# Patient Record
Sex: FEMALE | Race: WHITE | NOT HISPANIC OR LATINO | Employment: UNEMPLOYED | URBAN - METROPOLITAN AREA
[De-identification: names, ages, dates, MRNs, and addresses within clinical notes are randomized per-mention and may not be internally consistent; named-entity substitution may affect disease eponyms.]

---
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
## 2020-03-19 NOTE — PROGRESS NOTES
04/24/19 3977   Patient Belongings   Did you bring any home meds/supplements to the hospital?  No   Patient Belongings other (see comments)   Belongings Search Yes   Clothing Search Yes   Second Staff Christian      Bin:  Pillow, blanket, black leggings, gold key necklace, pink shoes, book (5), flower duffle bag, gray backpack (filled with fidgets & art supplies), glasses, journal (w/ string), essential oil diffuser, socks (10), gray leggings, navy sweatshirt (w/ string), black jeans, blue (w/ string),  Underwear (9), black t-shirt, gray sweatshirt (w/ string), black spandex shorts, red sweatshirt (w/ string), yellow sweater, black long sleeve, black ananya jacket, navy leggings, yellow t-shirt, purple t-shirt, gray t-shirt, gray/black t-shirt, charcoal t-shirt, mint tank top, navy blue shorts, black sweat pants (w/ string), bra (4)    No cellphone in belongings or items sent to security    A               Admission:  I am responsible for any personal items that are not sent to the safe or pharmacy.  [facility] is not responsible for loss, theft or damage of any property in my possession.    Signature:  _________________________________ Date: _______  Time: _____                                              Staff Signature:  ____________________________ Date: ________  Time: _____      2nd Staff person, if patient is unable/unwilling to sign:    Signature: ________________________________ Date: ________  Time: _____     Discharge:  [facility] has returned all of my personal belongings:    Signature: _________________________________ Date: ________  Time: _____                                          Staff Signature:  ____________________________ Date: ________  Time: _____            no tinnitus

## 2020-08-11 ENCOUNTER — TELEPHONE (OUTPATIENT)
Dept: BEHAVIORAL HEALTH | Facility: CLINIC | Age: 20
End: 2020-08-11

## 2020-08-11 ENCOUNTER — HOSPITAL ENCOUNTER (INPATIENT)
Facility: CLINIC | Age: 20
LOS: 2 days | Discharge: HOME | DRG: 885 | End: 2020-08-14
Attending: EMERGENCY MEDICINE | Admitting: PSYCHIATRY & NEUROLOGY
Payer: COMMERCIAL

## 2020-08-11 DIAGNOSIS — F42.9 OBSESSIVE-COMPULSIVE DISORDER, UNSPECIFIED TYPE: ICD-10-CM

## 2020-08-11 DIAGNOSIS — F33.2 SEVERE EPISODE OF RECURRENT MAJOR DEPRESSIVE DISORDER, WITHOUT PSYCHOTIC FEATURES (H): ICD-10-CM

## 2020-08-11 DIAGNOSIS — F43.10 PTSD (POST-TRAUMATIC STRESS DISORDER): ICD-10-CM

## 2020-08-11 DIAGNOSIS — K21.9 GASTROESOPHAGEAL REFLUX DISEASE, ESOPHAGITIS PRESENCE NOT SPECIFIED: ICD-10-CM

## 2020-08-11 DIAGNOSIS — Z20.822 2019-NCOV NOT DETECTED: ICD-10-CM

## 2020-08-11 DIAGNOSIS — G47.00 INSOMNIA, UNSPECIFIED TYPE: Primary | ICD-10-CM

## 2020-08-11 DIAGNOSIS — K59.00 CONSTIPATION, UNSPECIFIED CONSTIPATION TYPE: ICD-10-CM

## 2020-08-11 DIAGNOSIS — F60.3 BORDERLINE PERSONALITY DISORDER (H): ICD-10-CM

## 2020-08-11 DIAGNOSIS — R45.851 SUICIDAL IDEATION: ICD-10-CM

## 2020-08-11 PROCEDURE — 99285 EMERGENCY DEPT VISIT HI MDM: CPT | Mod: Z6 | Performed by: EMERGENCY MEDICINE

## 2020-08-11 PROCEDURE — 25000132 ZZH RX MED GY IP 250 OP 250 PS 637: Performed by: EMERGENCY MEDICINE

## 2020-08-11 PROCEDURE — U0003 INFECTIOUS AGENT DETECTION BY NUCLEIC ACID (DNA OR RNA); SEVERE ACUTE RESPIRATORY SYNDROME CORONAVIRUS 2 (SARS-COV-2) (CORONAVIRUS DISEASE [COVID-19]), AMPLIFIED PROBE TECHNIQUE, MAKING USE OF HIGH THROUGHPUT TECHNOLOGIES AS DESCRIBED BY CMS-2020-01-R: HCPCS | Performed by: EMERGENCY MEDICINE

## 2020-08-11 PROCEDURE — 81025 URINE PREGNANCY TEST: CPT | Performed by: FAMILY MEDICINE

## 2020-08-11 PROCEDURE — 99285 EMERGENCY DEPT VISIT HI MDM: CPT | Mod: 25 | Performed by: EMERGENCY MEDICINE

## 2020-08-11 PROCEDURE — 90791 PSYCH DIAGNOSTIC EVALUATION: CPT

## 2020-08-11 PROCEDURE — C9803 HOPD COVID-19 SPEC COLLECT: HCPCS | Performed by: EMERGENCY MEDICINE

## 2020-08-11 PROCEDURE — 80307 DRUG TEST PRSMV CHEM ANLYZR: CPT | Performed by: FAMILY MEDICINE

## 2020-08-11 PROCEDURE — 80320 DRUG SCREEN QUANTALCOHOLS: CPT | Performed by: FAMILY MEDICINE

## 2020-08-11 RX ORDER — GABAPENTIN 300 MG/1
300 CAPSULE ORAL ONCE
Status: COMPLETED | OUTPATIENT
Start: 2020-08-11 | End: 2020-08-11

## 2020-08-11 RX ADMIN — GABAPENTIN 300 MG: 300 CAPSULE ORAL at 22:01

## 2020-08-11 ASSESSMENT — ENCOUNTER SYMPTOMS
NERVOUS/ANXIOUS: 1
HALLUCINATIONS: 0

## 2020-08-11 NOTE — ED TRIAGE NOTES
BIB mother , per patient she was at her therapist today and they recommended her to come to the ED for Eval.
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
## 2020-08-11 NOTE — ED NOTES
RN (María) performed safety screen. Removed, shoes, sock, and belt to security desk. Pt retained her cell phone. Filled in white board. AIDET.

## 2020-08-12 PROBLEM — R45.851 SUICIDE IDEATION: Status: ACTIVE | Noted: 2020-08-12

## 2020-08-12 LAB
LABORATORY COMMENT REPORT: NORMAL
SARS-COV-2 RNA SPEC QL NAA+PROBE: NEGATIVE
SARS-COV-2 RNA SPEC QL NAA+PROBE: NORMAL
SPECIMEN SOURCE: NORMAL
SPECIMEN SOURCE: NORMAL

## 2020-08-12 PROCEDURE — 12400001 ZZH R&B MH UMMC

## 2020-08-12 PROCEDURE — 25000132 ZZH RX MED GY IP 250 OP 250 PS 637: Performed by: EMERGENCY MEDICINE

## 2020-08-12 PROCEDURE — 25000132 ZZH RX MED GY IP 250 OP 250 PS 637: Performed by: FAMILY MEDICINE

## 2020-08-12 PROCEDURE — 25000132 ZZH RX MED GY IP 250 OP 250 PS 637: Performed by: CLINICAL NURSE SPECIALIST

## 2020-08-12 RX ORDER — ASCORBIC ACID 500 MG
500 TABLET ORAL DAILY
Status: DISCONTINUED | OUTPATIENT
Start: 2020-08-13 | End: 2020-08-14 | Stop reason: HOSPADM

## 2020-08-12 RX ORDER — FAMOTIDINE 20 MG/1
20 TABLET, FILM COATED ORAL DAILY
Status: DISCONTINUED | OUTPATIENT
Start: 2020-08-12 | End: 2020-08-14 | Stop reason: HOSPADM

## 2020-08-12 RX ORDER — OLANZAPINE 5 MG/1
5-10 TABLET ORAL
Status: DISCONTINUED | OUTPATIENT
Start: 2020-08-12 | End: 2020-08-14 | Stop reason: HOSPADM

## 2020-08-12 RX ORDER — BISACODYL 10 MG
10 SUPPOSITORY, RECTAL RECTAL DAILY PRN
Status: DISCONTINUED | OUTPATIENT
Start: 2020-08-12 | End: 2020-08-14 | Stop reason: HOSPADM

## 2020-08-12 RX ORDER — GABAPENTIN 300 MG/1
600 CAPSULE ORAL AT BEDTIME
Status: DISCONTINUED | OUTPATIENT
Start: 2020-08-12 | End: 2020-08-14 | Stop reason: HOSPADM

## 2020-08-12 RX ORDER — VITAMIN B COMPLEX
50 TABLET ORAL DAILY
Status: DISCONTINUED | OUTPATIENT
Start: 2020-08-13 | End: 2020-08-14 | Stop reason: HOSPADM

## 2020-08-12 RX ORDER — CLINDAMYCIN PHOSPHATE 10 MG/G
1 GEL TOPICAL DAILY
Status: DISCONTINUED | OUTPATIENT
Start: 2020-08-13 | End: 2020-08-13

## 2020-08-12 RX ORDER — DOCUSATE SODIUM 100 MG/1
100 CAPSULE, LIQUID FILLED ORAL DAILY
Status: ON HOLD | COMMUNITY
End: 2020-08-14

## 2020-08-12 RX ORDER — GABAPENTIN 300 MG/1
300 CAPSULE ORAL ONCE
Status: COMPLETED | OUTPATIENT
Start: 2020-08-12 | End: 2020-08-12

## 2020-08-12 RX ORDER — GABAPENTIN 300 MG/1
300 CAPSULE ORAL 3 TIMES DAILY PRN
Status: DISCONTINUED | OUTPATIENT
Start: 2020-08-12 | End: 2020-08-14 | Stop reason: HOSPADM

## 2020-08-12 RX ORDER — MULTIPLE VITAMINS W/ MINERALS TAB 9MG-400MCG
1 TAB ORAL DAILY
Status: DISCONTINUED | OUTPATIENT
Start: 2020-08-12 | End: 2020-08-14 | Stop reason: HOSPADM

## 2020-08-12 RX ORDER — VENLAFAXINE HYDROCHLORIDE 150 MG/1
300 CAPSULE, EXTENDED RELEASE ORAL DAILY
Status: ON HOLD | COMMUNITY
End: 2020-08-14

## 2020-08-12 RX ORDER — POLYETHYLENE GLYCOL 3350 17 G/17G
1 POWDER, FOR SOLUTION ORAL DAILY PRN
Status: ON HOLD | COMMUNITY
End: 2020-08-27

## 2020-08-12 RX ORDER — GABAPENTIN 300 MG/1
300 CAPSULE ORAL 3 TIMES DAILY
Status: DISCONTINUED | OUTPATIENT
Start: 2020-08-12 | End: 2020-08-14 | Stop reason: HOSPADM

## 2020-08-12 RX ORDER — MULTIPLE VITAMINS W/ MINERALS TAB 9MG-400MCG
1 TAB ORAL DAILY
Status: ON HOLD | COMMUNITY
End: 2020-08-27

## 2020-08-12 RX ORDER — CALCIUM CARBONATE 500 MG/1
500 TABLET, CHEWABLE ORAL DAILY PRN
Status: DISCONTINUED | OUTPATIENT
Start: 2020-08-12 | End: 2020-08-14 | Stop reason: HOSPADM

## 2020-08-12 RX ORDER — ACETAMINOPHEN 325 MG/1
650 TABLET ORAL EVERY 6 HOURS PRN
Status: DISCONTINUED | OUTPATIENT
Start: 2020-08-12 | End: 2020-08-12

## 2020-08-12 RX ORDER — GABAPENTIN 300 MG/1
600 CAPSULE ORAL AT BEDTIME
Status: ON HOLD | COMMUNITY
End: 2020-08-27

## 2020-08-12 RX ORDER — OLANZAPINE 10 MG/2ML
5-10 INJECTION, POWDER, FOR SOLUTION INTRAMUSCULAR
Status: DISCONTINUED | OUTPATIENT
Start: 2020-08-12 | End: 2020-08-14 | Stop reason: HOSPADM

## 2020-08-12 RX ORDER — HYDROXYZINE HYDROCHLORIDE 25 MG/1
25 TABLET, FILM COATED ORAL EVERY 4 HOURS PRN
Status: DISCONTINUED | OUTPATIENT
Start: 2020-08-12 | End: 2020-08-13

## 2020-08-12 RX ORDER — GABAPENTIN 300 MG/1
300 CAPSULE ORAL 3 TIMES DAILY
Status: ON HOLD | COMMUNITY
End: 2020-08-27

## 2020-08-12 RX ORDER — TRAZODONE HYDROCHLORIDE 150 MG/1
150 TABLET ORAL AT BEDTIME
Status: DISCONTINUED | OUTPATIENT
Start: 2020-08-12 | End: 2020-08-14 | Stop reason: HOSPADM

## 2020-08-12 RX ORDER — ERENUMAB-AOOE 140 MG/ML
140 INJECTION, SOLUTION SUBCUTANEOUS
COMMUNITY
Start: 2020-08-17 | End: 2021-09-28

## 2020-08-12 RX ORDER — FAMOTIDINE 20 MG/1
20 TABLET, FILM COATED ORAL DAILY
Status: ON HOLD | COMMUNITY
End: 2020-08-14

## 2020-08-12 RX ORDER — CLINDAMYCIN PHOSPHATE 10 MG/G
1 GEL TOPICAL DAILY
Status: ON HOLD | COMMUNITY
End: 2020-08-27

## 2020-08-12 RX ORDER — TRETINOIN 0.5 MG/G
1 CREAM TOPICAL AT BEDTIME
Status: DISCONTINUED | OUTPATIENT
Start: 2020-08-12 | End: 2020-08-13

## 2020-08-12 RX ORDER — CALCIUM CARBONATE 500 MG/1
1 TABLET, CHEWABLE ORAL PRN
Status: ON HOLD | COMMUNITY
End: 2020-08-27

## 2020-08-12 RX ORDER — POLYETHYLENE GLYCOL 3350 17 G/17G
17 POWDER, FOR SOLUTION ORAL DAILY PRN
Status: DISCONTINUED | OUTPATIENT
Start: 2020-08-12 | End: 2020-08-14 | Stop reason: HOSPADM

## 2020-08-12 RX ORDER — VENLAFAXINE HYDROCHLORIDE 150 MG/1
300 CAPSULE, EXTENDED RELEASE ORAL AT BEDTIME
Status: DISCONTINUED | OUTPATIENT
Start: 2020-08-12 | End: 2020-08-12

## 2020-08-12 RX ORDER — ASCORBIC ACID 500 MG
500 TABLET ORAL DAILY
Status: ON HOLD | COMMUNITY
End: 2020-08-27

## 2020-08-12 RX ORDER — GABAPENTIN 300 MG/1
300 CAPSULE ORAL AT BEDTIME
Status: DISCONTINUED | OUTPATIENT
Start: 2020-08-12 | End: 2020-08-12

## 2020-08-12 RX ORDER — VENLAFAXINE HYDROCHLORIDE 150 MG/1
300 CAPSULE, EXTENDED RELEASE ORAL AT BEDTIME
Status: DISCONTINUED | OUTPATIENT
Start: 2020-08-12 | End: 2020-08-13

## 2020-08-12 RX ORDER — ALUMINA, MAGNESIA, AND SIMETHICONE 2400; 2400; 240 MG/30ML; MG/30ML; MG/30ML
30 SUSPENSION ORAL EVERY 4 HOURS PRN
Status: DISCONTINUED | OUTPATIENT
Start: 2020-08-12 | End: 2020-08-14 | Stop reason: HOSPADM

## 2020-08-12 RX ORDER — TRETINOIN 0.5 MG/G
1 CREAM TOPICAL AT BEDTIME
Status: ON HOLD | COMMUNITY
End: 2020-08-27

## 2020-08-12 RX ORDER — TRAZODONE HYDROCHLORIDE 150 MG/1
150 TABLET ORAL AT BEDTIME
Status: ON HOLD | COMMUNITY
End: 2020-08-27

## 2020-08-12 RX ORDER — ACETAMINOPHEN 325 MG/1
650 TABLET ORAL EVERY 4 HOURS PRN
Status: DISCONTINUED | OUTPATIENT
Start: 2020-08-12 | End: 2020-08-14 | Stop reason: HOSPADM

## 2020-08-12 RX ADMIN — MELATONIN 5 MG TABLET 5 MG: at 04:07

## 2020-08-12 RX ADMIN — CALCIUM CARBONATE (ANTACID) CHEW TAB 500 MG 500 MG: 500 CHEW TAB at 22:36

## 2020-08-12 RX ADMIN — ACETAMINOPHEN 650 MG: 325 TABLET, FILM COATED ORAL at 03:06

## 2020-08-12 RX ADMIN — GABAPENTIN 300 MG: 300 CAPSULE ORAL at 16:52

## 2020-08-12 RX ADMIN — Medication 75 MG: at 04:07

## 2020-08-12 RX ADMIN — VENLAFAXINE HYDROCHLORIDE 300 MG: 150 CAPSULE, EXTENDED RELEASE ORAL at 03:06

## 2020-08-12 RX ADMIN — GABAPENTIN 300 MG: 300 CAPSULE ORAL at 03:06

## 2020-08-12 RX ADMIN — ACETAMINOPHEN 650 MG: 325 TABLET, FILM COATED ORAL at 16:53

## 2020-08-12 RX ADMIN — GABAPENTIN 300 MG: 300 CAPSULE ORAL at 11:07

## 2020-08-12 ASSESSMENT — ACTIVITIES OF DAILY LIVING (ADL)
TOILETING: 0-->INDEPENDENT
RETIRED_COMMUNICATION: 0-->UNDERSTANDS/COMMUNICATES WITHOUT DIFFICULTY
COGNITION: 0 - NO COGNITION ISSUES REPORTED
RETIRED_EATING: 0-->INDEPENDENT
DRESS: 0-->INDEPENDENT
FALL_HISTORY_WITHIN_LAST_SIX_MONTHS: NO
SWALLOWING: 0-->SWALLOWS FOODS/LIQUIDS WITHOUT DIFFICULTY
BATHING: 0-->INDEPENDENT
AMBULATION: 0-->INDEPENDENT
TRANSFERRING: 0-->INDEPENDENT

## 2020-08-12 NOTE — PHARMACY-ADMISSION MEDICATION HISTORY
Admission Medication History Completed by Pharmacy    See [facility] Admission Navigator for allergy information, preferred outpatient pharmacy and prior to admission medications.     Medication History Sources:     Prescription fill history ([facility] in [place]) via [employer] data    Patient via iPad     Changes made to PTA medication list (reason):    Added:   o MVT, vitamin C, famotidine, docusate, miralax (per pt report)   o Clindamycin gel, tretinoin cream (per fill history, confirmed by pt)     Deleted: None    Changed: None    Additional Information:    Writer confirmed all prescribed medications with [facility] pharmacy fill records. Patient's report consistent with recent fill records.       Aimovig subcutaneous injection: monthly for migraines. Overdue per pt. Last injection was beginning of July. Informed pt we do not have supply at hospital, so will need to discuss options with provider or see if can obtain home supply.       Venlafaxine: Patient reports medication was being tapered starting 8/12 per outpt psychiatrist, however pt still taking 300mg PO daily.       Quetiapine 25mg tablets qty 60, 30 day supply last filled 6/1/2020: Did not add to PTA med list since not filled recently and patient did not seem to know if taking or not.     MN :  Gabapentin 300mg capsules, qty #120 (24 days) last filled 8-    Prior to Admission medications    Medication Sig Last Dose     acetaminophen (TYLENOL) 325 MG tablet Take 2 tablets (650 mg) by mouth every 4 hours as needed for mild pain     Past Month     calcium carbonate (TUMS) 500 MG chewable tablet Take 1 chew tab by mouth as needed for heartburn     Past Week     clindamycin (CLINDAMAX) 1 % external gel Apply 1 applicator topically daily     Past Week     docusate sodium (COLACE) 100 MG capsule Take 100 mg by mouth daily     8/11/2020     erenumab-aooe (AIMOVIG) 140 MG/ML injection     Inject 140 mg Subcutaneous every 30 days beginning of July      famotidine (PEPCID) 20 MG tablet Take 20 mg by mouth daily     8/11/2020     gabapentin (NEURONTIN) 300 MG capsule Take 300 mg by mouth 3 times daily In addition to 600mg at bedtime.     8/11/2020     gabapentin (NEURONTIN) 300 MG capsule Take 600 mg by mouth At Bedtime In addition to 300mg by mouth three times daily.     Past Week     melatonin 5 MG CAPS Take 5 mg by mouth At Bedtime     Past Week     multivitamin w/minerals (THERA-VIT-M) tablet     Take 1 tablet by mouth daily 8/11/2020     polyethylene glycol (MIRALAX) 17 g packet Take 1 packet by mouth daily as needed for constipation     Past Week     traZODone (DESYREL) 150 MG tablet Take 150 mg by mouth At Bedtime     Past Week     tretinoin (RETIN-A) 0.05 % external cream Apply 1 g topically At Bedtime     Past Week     venlafaxine (EFFEXOR-XR) 150 MG 24 hr capsule     Take 300 mg by mouth daily 8/11/2020     vitamin C (ASCORBIC ACID) 500 MG tablet Take 500 mg by mouth daily     8/11/2020     vitamin D3 (CHOLECALCIFEROL) 2000 units tablet     Take 1 tablet by mouth daily 8/10/2020       Date completed: 08/12/20    Medication history completed by:   -----------  Nina Zaidi PharmNoelleD., BCPP  Behavioral Health Inpatient Pharmacist  [facility]) Emergency Department  Phone: *[phone] ([facility]) or [phone]

## 2020-08-12 NOTE — ED NOTES
Patient reports feeling high anxiety since tray was delivered to room (history of anorexia). Asking for PRN. PRN not available in med room in ED, ordered from pharmacy and worked on distraction with patient.

## 2020-08-12 NOTE — ED NOTES
Patient states she is feeling very anxious, suicidal, and wishes she hadn't talked to her therapist yesterday and ended up here in the hospital so she could have instead acted on suicidal thought.

## 2020-08-12 NOTE — ED NOTES
"Pt reports \"I shouldn't be here, I should be dead right now\". RN asked if pt would hurt herself in the hospital and she stated No. Pt anxious in room, picking at nails and moving her leg contantly  "

## 2020-08-12 NOTE — ED NOTES
Sign out Provider: Anson      Sign out Plan: Patient was seen in the behavioral emergency center yesterday due to multiple mental health diagnosis with active suicidal ideation and plan for admission.  Has been cleared medically.      Reassessment: Regular medications are ordered and the patient is cooperative this morning. this morning.      Disposition: Continue to await bed placement       Connor Moser MD  08/12/20 0759

## 2020-08-12 NOTE — TELEPHONE ENCOUNTER
S: Pt is a 20 yrs old female in the [facility] ED for SI with a plan, reports by Dmitriy at 8:45PM.     B: Pt has a hx of Bordeline Personality d/o, Major Depressive d/o, Generalized Anxiety d/o, PTSD, Anorexia, and OCD.  Pt was referred by her DBT therapist for admission.  Pt has been having increased SI for the past 2 and 1/2 weeks.  Pt's parents are out of town and they removed anything lethal in the home including meds, scarf, sharp- that she could hurt herself with.  Pt still endorses SI with a plan to take her medications or bleach.  Pt told her therapist she has urgency to act on plan.  Pt is not able to contract for safety although she has [facility] providers.     Pt is medically cleared and ambulates independently.        Utox was negative.  Pt was swabbed for COVID test.  She is asymptomatic.  Result is pending.     A: Vol.  Prefers to stay at [facility].       R: Pt is placed on wait list until an appropriate bed becomes available.       Patient cleared and ready for behavioral bed placement: Yes

## 2020-08-12 NOTE — ED NOTES
"Patient was offered her nighttime meds.  She declined to take them at this time (although she asked for them earlier), stating, \"I don't feel comfortable taking them.\"  Informed patient meds will be held, and she can take them when she feels comfortable.  "

## 2020-08-12 NOTE — ED NOTES
ED to Behavioral Floor Handoff    SITUATION  Thea Castle is a 20 year old female who speaks English and lives in a home with family members The patient arrived in the ED by private car from home with a complaint of Suicidal (Worsening SI with plan to OD or drink bleach)  .The patient's current symptoms started/worsened 1 week(s) ago and during this time the symptoms have increased.   In the ED, pt was diagnosed with   Final diagnoses:   Severe episode of recurrent major depressive disorder, without psychotic features (H)   PTSD (post-traumatic stress disorder)   Borderline personality disorder (H)   Obsessive-compulsive disorder, unspecified type   Suicidal ideation        Initial vitals were: BP: 135/80  Pulse: 117  Temp: 99.6  F (37.6  C)  Resp: 20  Weight: (refused weight check d/t anorexia)  SpO2: 98 %   --------  Is the patient diabetic? No   If yes, last blood glucose? --     If yes, was this treated in the ED? --  --------  Is the patient inebriated (ETOH) No or Impaired on other substances? No  MSSA done? N/A  Last MSSA score: --    Were withdrawal symptoms treated? N/A  Does the patient have a seizure history? No. If yes, date of most recent seizure--  --------  Is the patient patient experiencing suicidal ideation? reports occasional suicidal thoughts representing feeling that life is not worth feeling    Homicidal ideation? denies current or recent homicidal ideation or behaviors.    Self-injurious behavior/urges? denies current or recent self injurious behavior or ideation.  ------  Was pt aggressive in the ED No  Was a code called No  Is the pt now cooperative? Yes  -------  Meds given in ED:   Medications   venlafaxine (EFFEXOR-XR) 24 hr capsule 300 mg (300 mg Oral Given 8/12/20 0306)   traZODone (DESYREL) half-tab 75 mg (75 mg Oral Given 8/12/20 0407)   acetaminophen (TYLENOL) tablet 650 mg (650 mg Oral Given 8/12/20 0306)   gabapentin (NEURONTIN) capsule 300 mg (300 mg Oral Given 8/12/20  0306)   gabapentin (NEURONTIN) capsule 300 mg (has no administration in time range)   melatonin tablet 5 mg (has no administration in time range)   gabapentin (NEURONTIN) capsule 300 mg (300 mg Oral Given 8/11/20 2201)   melatonin tablet 5 mg (5 mg Oral Given 8/12/20 0407)      Family present during ED course? No  Family currently present? No    BACKGROUND  Does the patient have a cognitive impairment or developmental disability? No  Allergies:   Allergies   Allergen Reactions     Penicillins    .   Social demographics are   Social History     Socioeconomic History     Marital status: Single     Spouse name: None     Number of children: None     Years of education: None     Highest education level: None   Occupational History     None   Social Needs     Financial resource strain: None     Food insecurity     Worry: None     Inability: None     Transportation needs     Medical: None     Non-medical: None   Tobacco Use     Smoking status: Never Smoker     Smokeless tobacco: Never Used   Substance and Sexual Activity     Alcohol use: Not Currently     Frequency: Never     Drug use: No     Sexual activity: Never   Lifestyle     Physical activity     Days per week: None     Minutes per session: None     Stress: None   Relationships     Social connections     Talks on phone: None     Gets together: None     Attends Church service: None     Active member of club or organization: None     Attends meetings of clubs or organizations: None     Relationship status: None     Intimate partner violence     Fear of current or ex partner: None     Emotionally abused: None     Physically abused: None     Forced sexual activity: None   Other Topics Concern     Parent/sibling w/ CABG, MI or angioplasty before 65F 55M? Not Asked   Social History Narrative     None        ASSESSMENT  Labs results   Labs Ordered and Resulted from Time of ED Arrival Up to the Time of Departure from the ED   DRUG ABUSE SCREEN 6 CHEM DEP URINE ([facility])    HCG QUALITATIVE URINE   COVID-19 VIRUS (CORONAVIRUS) BY PCR   SARS-COV-2 (COVID-19) VIRUS RT-PCR      Imaging Studies: No results found for this or any previous visit (from the past 24 hour(s)).   Most recent vital signs /70   Pulse 82   Temp 97.8  F (36.6  C) (Oral)   Resp 16   SpO2 100%   Breastfeeding No    Abnormal labs/tests/findings requiring intervention:---   Pain control: pt had none  Nausea control: pt had none    RECOMMENDATION  Are any infection precautions needed (MRSA, VRE, etc.)? No If yes, what infection? --  ---  Does the patient have mobility issues? independently. If yes, what device does the pt use? ---  ---  Is patient on 72 hour hold or commitment? No If on 72 hour hold, have hold and rights been given to patient? N/A  Are admitting orders written if after 10 p.m. ?N/A  Tasks needing to be completed:---     Desi Myers RN   [facility]-- [phone] [place] ED   [phone] [facility]

## 2020-08-12 NOTE — ED NOTES
Patient talked with staff about being scared and triggered by meal tray. Patient given new scrubs, new linens and hygiene products to clean up with. Meal tray removed from room. Patient states she is nervous and having flashbacks of stressors. Worried about virtual therapy appointment today at noon and 1pm. Staff encouraged patient to alert us when she is ready for virtual assessments. RN notified.

## 2020-08-12 NOTE — PROGRESS NOTES
"While pt was in the intake room, pt stated \"Last time I was here, I lied to get out.\"  Staff asked if pt wanted to go to her room and get the rest of her numbers that she needed.  She agreed to leave the exam room and go to her room.  While she was in her room, pt was saying \"Am I going to be able to have my therapy appointment tomorrow?\" Staff reminded her that she would have to talk to her doctor tomorrow and ask them.  Pt stated \"Oh I'll find a way.  I know how to play and I'll find a way.\"  Pt made numerous statements \"Look how many friends I have.  I have all these friends and I don't want to live.  I want to die but look at all the friends I have.\"  She also refused to work with a male RN and stated \"I absolutely can't have a male nurse.  It has to be female.\"  Staff informed her that staff would relay the information on.    Also, pt informed staff and RN, reforming the search, that she goes by \"Ezequiel\" in the common areas and by \"Thea\" in private because she doesn't want anyone to know her real name.  While doing the exam when she first got on the unit, pt appeared extremely paranoid about there being cameras in the bathroom in the intake room and why she can't wear her own clothes and mask.     "

## 2020-08-12 NOTE — PROGRESS NOTES
08/12/20 1844   Patient Belongings   Did you bring any home meds/supplements to the hospital?  No   Patient Belongings remains with patient;locker   Patient Belongings Remaining with Patient bracelet;clothing   Patient Belongings Put in Hospital Secure Location (Security or Locker, etc.) clothing;cell phone/electronics;watch;shoes   Belongings Search Yes   Clothing Search Yes   Second Staff Rafaela AGUILAR       On Patient:   bra  Underwear  2 tubes of play dough  Tin of puddy  2 bracelets    Locker:  pants  Sweater  Mask  Stocking hat  Watch-apple watch  Cellphone  Pair of socks  Shoes  Belt          A               Admission:  I am responsible for any personal items that are not sent to the safe or pharmacy.  [facility] is not responsible for loss, theft or damage of any property in my possession.    Signature:  _________________________________ Date: _______  Time: _____                                              Staff Signature:  ____________________________ Date: ________  Time: _____      2nd Staff person, if patient is unable/unwilling to sign:    Signature: ________________________________ Date: ________  Time: _____     Discharge:  [facility] has returned all of my personal belongings:    Signature: _________________________________ Date: ________  Time: _____                                          Staff Signature:  ____________________________ Date: ________  Time: _____

## 2020-08-13 LAB
ALBUMIN SERPL-MCNC: 3.7 G/DL (ref 3.4–5)
ALP SERPL-CCNC: 61 U/L (ref 40–150)
ALT SERPL W P-5'-P-CCNC: 24 U/L (ref 0–50)
ANION GAP SERPL CALCULATED.3IONS-SCNC: 8 MMOL/L (ref 3–14)
AST SERPL W P-5'-P-CCNC: 24 U/L (ref 0–45)
BASOPHILS # BLD AUTO: 0 10E9/L (ref 0–0.2)
BASOPHILS NFR BLD AUTO: 0.6 %
BILIRUB SERPL-MCNC: 0.5 MG/DL (ref 0.2–1.3)
BUN SERPL-MCNC: 13 MG/DL (ref 7–30)
CALCIUM SERPL-MCNC: 8.4 MG/DL (ref 8.5–10.1)
CHLORIDE SERPL-SCNC: 105 MMOL/L (ref 94–109)
CHOLEST SERPL-MCNC: 169 MG/DL
CO2 SERPL-SCNC: 26 MMOL/L (ref 20–32)
CREAT SERPL-MCNC: 0.84 MG/DL (ref 0.52–1.04)
DEPRECATED CALCIDIOL+CALCIFEROL SERPL-MC: 54 UG/L (ref 20–75)
DIFFERENTIAL METHOD BLD: NORMAL
EOSINOPHIL # BLD AUTO: 0.1 10E9/L (ref 0–0.7)
EOSINOPHIL NFR BLD AUTO: 1.1 %
ERYTHROCYTE [DISTWIDTH] IN BLOOD BY AUTOMATED COUNT: 12.9 % (ref 10–15)
GFR SERPL CREATININE-BSD FRML MDRD: >90 ML/MIN/{1.73_M2}
GLUCOSE SERPL-MCNC: 63 MG/DL (ref 70–99)
HCT VFR BLD AUTO: 36 % (ref 35–47)
HDLC SERPL-MCNC: 63 MG/DL
HGB BLD-MCNC: 12.1 G/DL (ref 11.7–15.7)
IMM GRANULOCYTES # BLD: 0 10E9/L (ref 0–0.4)
IMM GRANULOCYTES NFR BLD: 0 %
LDLC SERPL CALC-MCNC: 83 MG/DL
LYMPHOCYTES # BLD AUTO: 2.6 10E9/L (ref 0.8–5.3)
LYMPHOCYTES NFR BLD AUTO: 49.6 %
MAGNESIUM SERPL-MCNC: 1.8 MG/DL (ref 1.6–2.3)
MCH RBC QN AUTO: 30.9 PG (ref 26.5–33)
MCHC RBC AUTO-ENTMCNC: 33.6 G/DL (ref 31.5–36.5)
MCV RBC AUTO: 92 FL (ref 78–100)
MONOCYTES # BLD AUTO: 0.4 10E9/L (ref 0–1.3)
MONOCYTES NFR BLD AUTO: 8.2 %
NEUTROPHILS # BLD AUTO: 2.1 10E9/L (ref 1.6–8.3)
NEUTROPHILS NFR BLD AUTO: 40.5 %
NONHDLC SERPL-MCNC: 106 MG/DL
NRBC # BLD AUTO: 0 10*3/UL
NRBC BLD AUTO-RTO: 0 /100
PHOSPHATE SERPL-MCNC: 4.4 MG/DL (ref 2.5–4.5)
PLATELET # BLD AUTO: 233 10E9/L (ref 150–450)
POTASSIUM SERPL-SCNC: 4 MMOL/L (ref 3.4–5.3)
PROT SERPL-MCNC: 6.9 G/DL (ref 6.8–8.8)
RBC # BLD AUTO: 3.92 10E12/L (ref 3.8–5.2)
SODIUM SERPL-SCNC: 139 MMOL/L (ref 133–144)
TRIGL SERPL-MCNC: 113 MG/DL
TSH SERPL DL<=0.005 MIU/L-ACNC: 1.52 MU/L (ref 0.4–4)
WBC # BLD AUTO: 5.2 10E9/L (ref 4–11)

## 2020-08-13 PROCEDURE — 12400001 ZZH R&B MH UMMC

## 2020-08-13 PROCEDURE — 25000132 ZZH RX MED GY IP 250 OP 250 PS 637: Performed by: PSYCHIATRY & NEUROLOGY

## 2020-08-13 PROCEDURE — 84443 ASSAY THYROID STIM HORMONE: CPT | Performed by: CLINICAL NURSE SPECIALIST

## 2020-08-13 PROCEDURE — 80061 LIPID PANEL: CPT | Performed by: CLINICAL NURSE SPECIALIST

## 2020-08-13 PROCEDURE — 83735 ASSAY OF MAGNESIUM: CPT | Performed by: CLINICAL NURSE SPECIALIST

## 2020-08-13 PROCEDURE — 82306 VITAMIN D 25 HYDROXY: CPT | Performed by: CLINICAL NURSE SPECIALIST

## 2020-08-13 PROCEDURE — 85025 COMPLETE CBC W/AUTO DIFF WBC: CPT | Performed by: CLINICAL NURSE SPECIALIST

## 2020-08-13 PROCEDURE — 25000132 ZZH RX MED GY IP 250 OP 250 PS 637: Performed by: CLINICAL NURSE SPECIALIST

## 2020-08-13 PROCEDURE — 99207 ZZC DOWN CODE DUE TO INITIAL EXAM: CPT | Performed by: PSYCHIATRY & NEUROLOGY

## 2020-08-13 PROCEDURE — 84100 ASSAY OF PHOSPHORUS: CPT | Performed by: CLINICAL NURSE SPECIALIST

## 2020-08-13 PROCEDURE — 25000132 ZZH RX MED GY IP 250 OP 250 PS 637: Performed by: EMERGENCY MEDICINE

## 2020-08-13 PROCEDURE — 80053 COMPREHEN METABOLIC PANEL: CPT | Performed by: CLINICAL NURSE SPECIALIST

## 2020-08-13 PROCEDURE — 99221 1ST HOSP IP/OBS SF/LOW 40: CPT | Mod: AI | Performed by: PSYCHIATRY & NEUROLOGY

## 2020-08-13 PROCEDURE — 36415 COLL VENOUS BLD VENIPUNCTURE: CPT | Performed by: CLINICAL NURSE SPECIALIST

## 2020-08-13 RX ORDER — ARIPIPRAZOLE 2 MG/1
2 TABLET ORAL DAILY
Status: DISCONTINUED | OUTPATIENT
Start: 2020-08-13 | End: 2020-08-14 | Stop reason: HOSPADM

## 2020-08-13 RX ORDER — HYDROXYZINE HYDROCHLORIDE 25 MG/1
25-50 TABLET, FILM COATED ORAL EVERY 4 HOURS PRN
Status: DISCONTINUED | OUTPATIENT
Start: 2020-08-13 | End: 2020-08-14 | Stop reason: HOSPADM

## 2020-08-13 RX ORDER — SERTRALINE HYDROCHLORIDE 25 MG/1
25 TABLET, FILM COATED ORAL DAILY
Status: DISCONTINUED | OUTPATIENT
Start: 2020-08-13 | End: 2020-08-14 | Stop reason: HOSPADM

## 2020-08-13 RX ADMIN — VITAMIN D 50 MCG: 25 TAB ORAL at 09:04

## 2020-08-13 RX ADMIN — Medication 5 MG: at 22:00

## 2020-08-13 RX ADMIN — HYDROXYZINE HYDROCHLORIDE 25 MG: 25 TABLET, FILM COATED ORAL at 09:20

## 2020-08-13 RX ADMIN — VENLAFAXINE HYDROCHLORIDE 225 MG: 150 CAPSULE, EXTENDED RELEASE ORAL at 22:00

## 2020-08-13 RX ADMIN — ARIPIPRAZOLE 2 MG: 2 TABLET ORAL at 14:41

## 2020-08-13 RX ADMIN — FAMOTIDINE 20 MG: 20 TABLET ORAL at 09:07

## 2020-08-13 RX ADMIN — TRAZODONE HYDROCHLORIDE 150 MG: 150 TABLET ORAL at 22:00

## 2020-08-13 RX ADMIN — OLANZAPINE 5 MG: 5 TABLET, FILM COATED ORAL at 17:31

## 2020-08-13 RX ADMIN — GABAPENTIN 300 MG: 300 CAPSULE ORAL at 09:04

## 2020-08-13 RX ADMIN — SERTRALINE HYDROCHLORIDE 25 MG: 25 TABLET ORAL at 14:41

## 2020-08-13 RX ADMIN — GABAPENTIN 300 MG: 300 CAPSULE ORAL at 18:37

## 2020-08-13 RX ADMIN — MULTIPLE VITAMINS W/ MINERALS TAB 1 TABLET: TAB at 09:09

## 2020-08-13 RX ADMIN — DOCUSATE SODIUM 100 MG: 50 CAPSULE, LIQUID FILLED ORAL at 09:08

## 2020-08-13 RX ADMIN — OXYCODONE HYDROCHLORIDE AND ACETAMINOPHEN 500 MG: 500 TABLET ORAL at 09:07

## 2020-08-13 RX ADMIN — GABAPENTIN 600 MG: 300 CAPSULE ORAL at 22:00

## 2020-08-13 RX ADMIN — GABAPENTIN 300 MG: 300 CAPSULE ORAL at 14:03

## 2020-08-13 ASSESSMENT — ACTIVITIES OF DAILY LIVING (ADL)
HYGIENE/GROOMING: INDEPENDENT
DRESS: INDEPENDENT
DRESS: SCRUBS (BEHAVIORAL HEALTH)
ORAL_HYGIENE: INDEPENDENT
HYGIENE/GROOMING: INDEPENDENT
LAUNDRY: WITH SUPERVISION
ORAL_HYGIENE: INDEPENDENT
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
## 2020-08-13 NOTE — PROGRESS NOTES
CLINICAL NUTRITION SERVICES - ASSESSMENT NOTE     Nutrition Prescription    RECOMMENDATIONS FOR MDs/PROVIDERS TO ORDER:  If pt refuses the next 2-3 meals over the next 24 hrs, suggest ordering Boost as supplement    Encourage pt to allow staff to take blind weight     Suggest staff give pt encouragement to take meals, suggest staff document meal intakes in flow sheets     Malnutrition Status:    Unable to determine due to no NFPA    Recommendations already ordered by Registered Dietitian (RD):  RD will adjust weight order to weekly weights - blind if needed     Future/Additional Recommendations:  Monitor meal intakes  Monitor for need of supplement interventions  Monitor weight trends      REASON FOR ASSESSMENT  Thea Castle is a/an 20 year old female assessed by the dietitian for Provider Order - Hx of anorexia, hasn't ate in 3 days    NUTRITION/MEDICAL HISTORY  Per chart review: Pt admits to facility in the setting of SI, past medical history noted for PTSD, BPD, OCD, MDD, anorexia, and anxiety.    Patient visit done by phone today in light of reduced in-person exposure during COVID outbreak. RD reviewed reason for phone visit, reviewed pt's history of anorexia. Pt agreeing to discuss, reports oral intake restriction for some time, but reports even more so since around 8/9. RD reviewed how this can impact health and pt's course, pt very resistive to agreeing to recommendations, very resistive to oral supplement at present time, but with strong encouragement, was agreeing to attempting to take meals with safe foods (fruits/vegetables only), noted pt is on a vegan diet so menu would only have these items, reviewed the importance of attempting to consume at least 50% of a meal tray due to concern for inadequate protein intakes with only fruit/vegetable intake. Pt was very concerned with calorie counting when RD was discussing recommendations of either 50% of meals or supplement. Pt stated was very  "concerned with gaining weight. Pt admits current mental status strongly affecting ED.     CURRENT NUTRITION ORDERS  Diet: Vegan   Intake/Tolerance: Minimal thus far, pt had orange and some crackers last evening upon admit     LABS  Labs reviewed    MEDICATIONS  MVI, Pepcid, Vit C, Vit D     ANTHROPOMETRICS  Height: 162.6 cm (5' 4\")  Most Recent Weight: (refused)    48.9 kg (107 lb 12.9 oz) 07/30/2020 Per CE     49 kg (108 lb) 04/18/2020 Per CE     53.2 kg (117 lb 5 oz) 09/03/2019 Per CE     Wt Readings from Last 10 Encounters:   04/25/19 54.5 kg (120 lb 2.4 oz)    03/25/19 57.6 kg (127 lb)    03/07/19 55.3 kg (122 lb)    IBW: 54.5 kg  BMI: Underweight BMI <18.5 - per last noted weight of 107 lbs     Dosing Weight: 48.9 kg    ASSESSED NUTRITION NEEDS  Estimated Energy Needs: 9870-1459 kcals/day (30 - 35 kcals/kg )  Justification: Underweight  Estimated Protein Needs: 50-60 grams protein/day (1 - 1.2 grams of pro/kg)  Justification: Underweight   Estimated Fluid Needs: 1 mL/kcal  Justification: Maintenance    MALNUTRITION  % Intake: </= 50% for >/= 5 days (severe)  % Weight Loss: Weight loss does not meet criteria  Subcutaneous Fat Loss: Unable to assess  Muscle Loss: Unable to assess  Fluid Accumulation/Edema: None noted  Malnutrition Diagnosis: Unable to determine due to no NFPA    NUTRITION DIAGNOSIS  Inadequate protein-energy intake related to restrictive eating as evidenced by pt reports, minimal meal intakes recorded thus far     INTERVENTIONS  Implementation  Nutrition Education: RD reviewed the importance of adequate oral intakes for improved health status       Goals  Patient to consume % of nutritionally adequate meal trays TID, or the equivalent with supplements/snacks.     Monitoring/Evaluation  Progress toward goals will be monitored and evaluated per protocol.    Stephanie Donnelly RD, LD  [facility] RD Pager: [phone]  "

## 2020-08-13 NOTE — PROGRESS NOTES
Work Completed:  Team discussed patient's behaviors.  She is staff splitting to get want she wants and what she wants cannot happen: namely, wearing her own mask and doing therapy by phone with her therapist. Upset she isn't on 4A felt the ED staff lied to her.  Screaming and crying the entire day.  Mask given to her by the staff and she ripped it up and again demanded her own.  Stated she was anxious and given a fidgit but immediately ripped it apart.  Does not like our unit and is having a hard time with the rules on the unit.  She mentioned to staff this afternoon that she wants to [place].  This writer paged Dr. Hull and updated him and emphasized that the unit is very acute, we are short staffed trying to keep everyone safe and the patient has spent the day going from staff to staff arguing and upset while they are assigned to watch other patients on SIOs. Team to discuss discharging her tomorrow.    Discharge plan or goal:   Patient lives with her parents and has psychiatrist, therapist, nutritionist and primary care that she can resume care with after discharge.                Barriers to discharge:    Needs reassessment by the psychiatrist tomorrow.

## 2020-08-13 NOTE — PLAN OF CARE
BEHAVIORAL TEAM DISCUSSION    Participants: Dr. Александр Hull; Elizabeth MORSE; Kalyn Fogn RN  Progress: Patient compliant with care.  Anticipated length of stay: Short stay  Continued Stay Criteria/Rationale: New admit  Precautions:   Behavioral Orders   Procedures    Code 1 - Restrict to Unit    Routine Programming     As clinically indicated    Self Injury Precaution    Single Room    Status 15     Every 15 minutes.    Suicide precautions     Patients on Suicide Precautions should have a Combination Diet ordered that includes a Diet selection(s) AND a Behavioral Tray selection for Safe Tray - with utensils, or Safe Tray - NO utensils       Plan: Patient needs psychiatric assessment, medication management and stabilization of her mood and will return home when stable with her outpatient provider services.  Rationale for change in precautions or plan: No changes

## 2020-08-13 NOTE — PROGRESS NOTES
"Pt has been isolative to her room much of the shift.  She came out for meals and requests.  Pt reports that his anxiety level is \"off the charts.\"  Pt reports that depression level is 9/10 with 10 the most. Pt reports is very labile.  She is irritable and demanding.  Pt also is very contradicting in her statements. She reports that she can't have her own mask \"I'm going to kill myself.\"  She then will say \"why would I kill myself, I have everything going for me.\"  Pt denies any SIB thinking.  Will continue to monitor.     08/13/20 7985   Behavioral Health   Hallucinations denies / not responding to hallucinations   Thinking distractable;poor concentration   Orientation person: oriented;place: oriented;date: oriented   Insight poor   Judgement impaired   Eye Contact at examiner   Affect blunted, flat;tense;irritable   Mood depressed;anxious;labile   Physical Appearance/Attire attire appropriate to age and situation   Hygiene neglected grooming - unclean body, hair, teeth   Suicidality thoughts only  (says she is has SI one moment than denies she is at all)   Self Injury   (denies SIB thinkin)   Elopement   (none observed)   Activity isolative;hyperactive (agitated, impulsive);refusal   Speech pressured;rambling   Psychomotor / Gait balanced;steady   Activities of Daily Living   Hygiene/Grooming independent   Oral Hygiene independent   Dress scrubs (behavioral health)   Room Organization independent     "

## 2020-08-14 VITALS
RESPIRATION RATE: 16 BRPM | DIASTOLIC BLOOD PRESSURE: 49 MMHG | BODY MASS INDEX: 20.62 KG/M2 | HEART RATE: 75 BPM | TEMPERATURE: 98 F | OXYGEN SATURATION: 95 % | SYSTOLIC BLOOD PRESSURE: 106 MMHG | HEIGHT: 64 IN

## 2020-08-14 PROCEDURE — 99238 HOSP IP/OBS DSCHRG MGMT 30/<: CPT | Performed by: PSYCHIATRY & NEUROLOGY

## 2020-08-14 PROCEDURE — 25000132 ZZH RX MED GY IP 250 OP 250 PS 637: Performed by: PSYCHIATRY & NEUROLOGY

## 2020-08-14 PROCEDURE — 99207 ZZC CDG-CODE INCORRECT PER BILLING BASED ON TIME: CPT | Performed by: PSYCHIATRY & NEUROLOGY

## 2020-08-14 PROCEDURE — 25000132 ZZH RX MED GY IP 250 OP 250 PS 637: Performed by: CLINICAL NURSE SPECIALIST

## 2020-08-14 RX ORDER — SERTRALINE HYDROCHLORIDE 25 MG/1
25 TABLET, FILM COATED ORAL DAILY
Qty: 30 TABLET | Refills: 0 | Status: ON HOLD | OUTPATIENT
Start: 2020-08-15 | End: 2020-08-22

## 2020-08-14 RX ORDER — VENLAFAXINE HYDROCHLORIDE 75 MG/1
225 CAPSULE, EXTENDED RELEASE ORAL AT BEDTIME
Qty: 90 CAPSULE | Refills: 0 | Status: ON HOLD | OUTPATIENT
Start: 2020-08-14 | End: 2020-08-27

## 2020-08-14 RX ORDER — FAMOTIDINE 20 MG/1
20 TABLET, FILM COATED ORAL DAILY
Qty: 30 TABLET | Refills: 1 | Status: ON HOLD | OUTPATIENT
Start: 2020-08-14 | End: 2020-08-27

## 2020-08-14 RX ORDER — GABAPENTIN 300 MG/1
300 CAPSULE ORAL 3 TIMES DAILY PRN
Qty: 60 CAPSULE | Refills: 0 | Status: ON HOLD | OUTPATIENT
Start: 2020-08-14 | End: 2020-08-27

## 2020-08-14 RX ORDER — DOCUSATE SODIUM 100 MG/1
100 CAPSULE, LIQUID FILLED ORAL DAILY
Qty: 30 CAPSULE | Refills: 1 | Status: ON HOLD | OUTPATIENT
Start: 2020-08-14 | End: 2020-08-27

## 2020-08-14 RX ORDER — HYDROXYZINE HYDROCHLORIDE 25 MG/1
25-50 TABLET, FILM COATED ORAL EVERY 4 HOURS PRN
Qty: 60 TABLET | Refills: 0 | Status: ON HOLD | OUTPATIENT
Start: 2020-08-14 | End: 2020-08-27

## 2020-08-14 RX ORDER — ARIPIPRAZOLE 2 MG/1
2 TABLET ORAL DAILY
Qty: 30 TABLET | Refills: 0 | Status: ON HOLD | OUTPATIENT
Start: 2020-08-15 | End: 2020-08-27

## 2020-08-14 RX ADMIN — HYDROXYZINE HYDROCHLORIDE 50 MG: 25 TABLET, FILM COATED ORAL at 12:24

## 2020-08-14 RX ADMIN — DOCUSATE SODIUM 100 MG: 50 CAPSULE, LIQUID FILLED ORAL at 08:45

## 2020-08-14 RX ADMIN — CALCIUM CARBONATE (ANTACID) CHEW TAB 500 MG 500 MG: 500 CHEW TAB at 12:24

## 2020-08-14 RX ADMIN — ARIPIPRAZOLE 2 MG: 2 TABLET ORAL at 08:45

## 2020-08-14 RX ADMIN — FAMOTIDINE 20 MG: 20 TABLET ORAL at 08:45

## 2020-08-14 RX ADMIN — HYDROXYZINE HYDROCHLORIDE 50 MG: 25 TABLET, FILM COATED ORAL at 08:55

## 2020-08-14 RX ADMIN — SERTRALINE HYDROCHLORIDE 25 MG: 25 TABLET ORAL at 08:45

## 2020-08-14 RX ADMIN — OXYCODONE HYDROCHLORIDE AND ACETAMINOPHEN 500 MG: 500 TABLET ORAL at 08:46

## 2020-08-14 RX ADMIN — GABAPENTIN 300 MG: 300 CAPSULE ORAL at 12:24

## 2020-08-14 RX ADMIN — MULTIPLE VITAMINS W/ MINERALS TAB 1 TABLET: TAB at 08:45

## 2020-08-14 RX ADMIN — ACETAMINOPHEN 650 MG: 325 TABLET, FILM COATED ORAL at 12:26

## 2020-08-14 RX ADMIN — VITAMIN D 50 MCG: 25 TAB ORAL at 08:55

## 2020-08-14 RX ADMIN — GABAPENTIN 300 MG: 300 CAPSULE ORAL at 08:45

## 2020-08-14 ASSESSMENT — ACTIVITIES OF DAILY LIVING (ADL)
LAUNDRY: WITH SUPERVISION
ORAL_HYGIENE: INDEPENDENT
HYGIENE/GROOMING: INDEPENDENT
DRESS: STREET CLOTHES

## 2020-08-14 NOTE — PROGRESS NOTES
Work Completed:  The patient has a previously scheduled psychiatry appointment at [facility] with Dr. Dasha Jara on Tuesday 8/18/20 at 9:20am for medication management.     Discharge plan or goal:  Patient's mood is now stabilized and she will return home today with mother transporting.                Barriers to discharge:    Patient discharging this afternoon.

## 2020-08-14 NOTE — DISCHARGE INSTRUCTIONS
Behavioral Discharge Planning and Instructions      Summary:  You were admitted on 8/11/2020  due to Hx of PTSD, PBD, OCD, MDD Anorexia and Anxiety..  You were treated by Dr. Александр Hull MD and discharged on 8/14/2020 from [facility] to your home.    Principal Diagnosis:   Major Depressive Disorder  Borderline Personality Disorder  Anorexia   Anxiety Disorder  Posttraumatic Stress Disorder  Obssessive Compulsive Disorder    Health Care Follow-up Appointments:   Dr. Dasha Jarquin - Next August 18th at 9:20am for continued medication management  [address] [address]  [phone]  FAX: [phone]    Therapist Layla Carbajal [facility] [phone]  Julianne Chow [phone]  Chantell Rangel -[phone]                  Attend all scheduled appointments with your outpatient providers. Call at least 24 hours in advance if you need to reschedule an appointment to ensure continued access to your outpatient providers.   Major Treatments, Procedures and Findings:  You were provided with: a psychiatric assessment, assessed for medical stability, medication evaluation and/or management, group therapy and milieu management    Symptoms to Report: thoughts of suicide    Early warning signs can include: increased thoughts or behaviors of suicide or self-harm     Safety and Wellness:  Take all medicines as directed.  Make no changes unless your doctor suggests them.      Follow treatment recommendations.  Refrain from alcohol and non-prescribed drugs.  If there is a concern for safety, call 911.    Resources:   Crisis Intervention: [phone] or [phone] (TTY: [phone]).  Call anytime for help.  National Suicide Prevention Line (www.mentalhealthmn.org): 356-656-QYDK (5097)      The treatment team has appreciated the opportunity to work with you.     If you have any questions or concerns our unit number is [phone]

## 2020-08-14 NOTE — PLAN OF CARE
"  Problem: Adult Inpatient Plan of Care  Goal: Plan of Care Review  Outcome: No Change    48 Hour Nursing Observation    8/11/2020     Borderline personality disorder (H) [F60.3]  Suicidal ideation [R45.851]  PTSD (post-traumatic stress disorder) [F43.10]  Severe episode of recurrent major depressive disorder, without psychotic features (H) [F33.2]  Obsessive-compulsive disorder, unspecified type [F42.9]     /49   Pulse 75   Temp 98.4  F (36.9  C) (Oral)   Resp 16   Ht 1.626 m (5' 4\")   SpO2 95%   Breastfeeding No   BMI 20.62 kg/m       Patient was very anxious and irritable at early part of shift. Questioning staff about the rules here at the unit. Demanding to use her own mask, wanting to get another fidget toy. Staff was firm in explaining to her that she can not have the fidget toy since she already destroyed one. She was just crying and asking why's and insisting what she wants. And stated once that she will be suicidal if she'll not be given the fidget toy. Staff contacted Patient Relations Office and this pt was able to talk with them. Writer was able to offer her a PRN Olanzapine 5 mg in the evening and she eventually agreed to take it. She was observed to calm down, resting in bed. She ate her meal at supper time. After supper, she was given her scheduled Gabapentin 300 mg as she preferred to take it early than 8 pm. After taking her medication, she requested to wear her regular pants which was given to her. Then later was observed sleeping until past 10 pm. She woke up at past 10 pm and she went to the medication window to request her bed time meds. She went back to bed resting. Will continue to monitor and assess pt.   "

## 2020-08-14 NOTE — PROGRESS NOTES
Work Completed:  The patient's [facility] Care Manager called.  The patient has been doctor shopping and seeking treatment in many different emergency rooms and switching to too many primary care doctors and health systems.      Discharge plan or goal:   [facility] plans to restrict the patient.                Barriers to discharge:    Patient discharging today.
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
## 2020-08-14 NOTE — PROGRESS NOTES
"Discharge Note    Diagnosis: Borderline personality disorder (H) [F60.3]  Suicidal ideation [R45.851]  PTSD (post-traumatic stress disorder) [F43.10]  Severe episode of recurrent major depressive disorder, without psychotic features (H) [F33.2]  Obsessive-compulsive disorder, unspecified type [F42.9]  Admit Date: 8/11/2020  Precautions: Orders Placed This Encounter      Suicide precautions      Self Injury Precaution      Single Room     Recent Vitals: /49   Pulse 75   Temp 98  F (36.7  C) (Oral)   Resp 16   Ht 1.626 m (5' 4\")   SpO2 95%   Breastfeeding No   BMI 20.62 kg/m  ,  , Sitting Orthostatic BP: 104/66, Standing Orthostatic BP: 92/56     Patient primarily isolated to room, she stated she did not feel safe around the other patients. Patient stated she felt ready to discharge. She denied SI/HI/AVH/SIB, voiced mild anxiety and depression. She is medication compliant. Incite and judgement seem fair. Affect was blunted/flat. Patient discharged at 1415 to home. She was walked out to the [facility] entrance and picked up by mom in her mothers car. Patient was educated on her discharge instructions, writer dicussed medications with her. All questions were answered. Medications are to be picked up at her [facility] pharmacy. Patient left in her personal clothing, staff went over her belongings and all items were present.    Violetta Smiley RN MSN    "

## 2020-08-15 NOTE — DISCHARGE SUMMARY
"Admit Date:     08/11/2020   Discharge Date:     08/14/2020      DISCHARGE SUMMARY      The patient prefers to be called \"Lovely.\"      On the day of the patient was hospitalized between 08/11/2020-/08/14/2020.  On the day of discharge, was seen via telemedicine ([employer] [facility]) between 9:00-9:30 a.m.  The patient was hospitalized at [facility].  This provider was in the Hospitalist's room at [facility] in front of his computer.  Reason for tele-visit (COVID-19 pandemic) was explained to the patient and the patient consented to it.      CHIEF COMPLAINT AND REASON FOR ADMISSION:  The patient is a 20-year-old  female with longstanding history of mental illness, multiple psychiatric diagnoses who was admitted to this facility at the insistence of her outpatient therapist after she reported she could not contract for safety.  The patient presented as only a partially reliable historian, did provide some valuable information.  She said that her parents were out of town on vacation.  Said that she had never tried to kill herself, but had many urges to do so recently.  She was thinking about either overdosing on pills or drinking bleach.  Reported that she had thoughts about cutting herself, but her parents prior to leaving made significant efforts to lock up all dangerous objects such medications, sharps, scarves, car keys, etc.  The patient reported that she had been texting her therapist endorsing suicidal ideation with the above-mentioned plans.  For more details about the patient's presentation and past psychiatric history, please refer to Dr. Александр Hull's note from 08/13/2020.      DISCHARGE DIAGNOSES:   1. Major depressive disorder, recurrent, moderate severity.   2. Anorexia nervosa.   3. Generalized anxiety disorder.   4. Posttraumatic stress disorder.   5. Borderline personality disorder.      CONSULTATIONS:  There were no consults performed during this brief " "hospital stay.      LABORATORY WORK:  Comprehensive metabolic better showed decreased calcium of 8.4.  The rest of test was normal.  Fasting panel was normal.  TSH was normal.  Vitamin D was normal.  Glucose was normal.  CBC with differential was normal.  COVID-19 test was negative.  Urine drug screen negative for all screened substances.      HOSPITAL COURSE:  The patient presented as partially cooperative with multiple demands.  For example, had spent a significant amount of time talking about her requests to wear her clothes and to wear her own face mask.  She desired to talk to a counselor while at this hospital. I did inform that we could help her with some requests, but not others.  For example, that she would not be allowed to have her own mask which had strings on it and that she could not talk to her therapist as the therapist has no privileges to work at this hospital.  She spent a significant amount of time talking about her medication.  She was very concerned about weight gain.  For this reason, refused to take Paxil.  After long consideration, agreed to start taking sertraline.  We discussed starting her on a mood stabilizer such as Abilify.  After hearing that Abilify was somewhat weight neutral, she agreed to try it as well.  She agreed to start tapering herself off Effexor, agreed that medication was not working any longer.  Altogether had only a couple of visits with the patient.  According to the staff, she reported pretty labile mood, was irritable, demanding.  For example, initially saying that she could not have her mask because \"I'm going to kill myself.\"  Then said, \"why would I kill myself.  I have everything going on for me.\"  Reported by the staff members to be very anxious, irritable, demanding to get another fidget toy.  Stated that she would be suicidal if she were not given the fidget toy.  Needed multiple medications p.r.n. to calm down and rest.      On the day of discharge, the " patient primarily isolated to her room.  Said that she did not feel safe around other patients and said that she was safe to be discharged.  Denied suicidal or homicidal ideations, denied auditory or visual hallucinations and self-injurious behavior.  Reported that she would go back to her family.  She was unsure if she would go to [facility] as she says she was somewhat dissatisfied with the help they provided.  She was discharged back to the community.  According to clinical treatment coordinator, the patient [facility] contacted care manager and informed her that patient had been doctor shopping seeking treatment in many different emergency rooms, switching to too many primary care doctor health plans, and informed [facility] that preferred and plans to restrict the patient to only certain providers.      She was discharged on the following medications.      DISCHARGE MEDICATIONS:   1. Acetaminophen 650 mg every 4 hours as needed for mild pain.   2. Aimovig 140 mg subcutaneously every 30 days.   3. Abilify 2 mg daily.   4. Calcium carbonate 500 mg as needed for heartburn.   5. Clindamycin 1% external gel, 1 application topically daily.   6. Colace 100 mg daily.   7. Famotidine 20 mg daily.   8. Gabapentin 300 mg 3 times a day and 600 mg at night.   9. Gabapentin 300 mg 3 times a day p.r.n. for anxiety.   10. Hydroxyzine 25-50 mg every 4 hours p.r.n. for anxiety.   11. Melatonin 5 mg at bedtime p.r.n. for sleep.   12. Polyethylene glycol 17 grams as needed for constipation.   13. Multivitamins with minerals 1 tablet daily.   14. Sertraline 25 mg daily.   15. Trazodone 150 mg at bedtime.   16. Tretinoin 0.05% external cream 1 gram topically at bedtime.   17. Venlafaxine 225 mg at bedtime.   18. Vitamin C 500 mg daily.   19. Vitamin D3 2000 units daily.      The patient was informed that she was started on a pretty low dose of sertraline and Abilify and doses will likely have to be increased.  She was  also informed that she needed to discuss with her outpatient psychiatrist how to proceed with further tapering of Effexor.  An appointment was scheduled with Casimiro Saavedra on  at 9:20 a.m. at [facility] in [place], [place].  The patient will call and schedule an appointment with her outpatient therapist, which are Layla Carbajal, Krista Chow and Chantell Rangel.            MIKE YE MD             D: 2020   T: 2020   MT:       Name:     KENDALL JIMENEZ   MRN:      [id number]        Account:        [id number]   :      2000           Admit Date:     2020                                  Discharge Date: 2020      Document: [id number]       cc: Copy for Provider        EVELIA Bender MA

## 2020-08-17 ENCOUNTER — TELEPHONE (OUTPATIENT)
Dept: PHARMACY | Facility: OTHER | Age: 20
End: 2020-08-17

## 2020-08-17 NOTE — TELEPHONE ENCOUNTER
MTM referral from: Transitions of Care (recent hospital discharge or ED visit)    MTM referral outreach attempt #1 on August 17, 2020 at 9:03 AM      Outcome: Patient is not interested at this time because they are an [facility] patient, will route to MTM Pharmacist/Provider as an FYI. Thank you for the referral.     Yuridia Roper, MTM Coordinator

## 2020-08-21 ENCOUNTER — TELEPHONE (OUTPATIENT)
Dept: BEHAVIORAL HEALTH | Facility: CLINIC | Age: 20
End: 2020-08-21

## 2020-08-21 ENCOUNTER — HOSPITAL ENCOUNTER (INPATIENT)
Facility: CLINIC | Age: 20
LOS: 6 days | Discharge: HOME | DRG: 885 | End: 2020-08-27
Attending: PSYCHIATRY & NEUROLOGY | Admitting: PSYCHIATRY & NEUROLOGY
Payer: COMMERCIAL

## 2020-08-21 DIAGNOSIS — K59.00 CONSTIPATION, UNSPECIFIED CONSTIPATION TYPE: ICD-10-CM

## 2020-08-21 DIAGNOSIS — R45.851 SUICIDAL INTENT: ICD-10-CM

## 2020-08-21 DIAGNOSIS — G47.00 INSOMNIA, UNSPECIFIED TYPE: Primary | ICD-10-CM

## 2020-08-21 DIAGNOSIS — E56.9 VITAMIN DEFICIENCY: ICD-10-CM

## 2020-08-21 DIAGNOSIS — F32.A DEPRESSION WITH SUICIDAL IDEATION: ICD-10-CM

## 2020-08-21 DIAGNOSIS — F50.9 EATING DISORDER, UNSPECIFIED TYPE: ICD-10-CM

## 2020-08-21 DIAGNOSIS — K30 INDIGESTION: ICD-10-CM

## 2020-08-21 DIAGNOSIS — F33.2 SEVERE RECURRENT MAJOR DEPRESSION WITHOUT PSYCHOTIC FEATURES (H): ICD-10-CM

## 2020-08-21 DIAGNOSIS — R45.851 DEPRESSION WITH SUICIDAL IDEATION: ICD-10-CM

## 2020-08-21 DIAGNOSIS — F60.3 BORDERLINE PERSONALITY DISORDER (H): ICD-10-CM

## 2020-08-21 DIAGNOSIS — L70.9 ACNE, UNSPECIFIED ACNE TYPE: ICD-10-CM

## 2020-08-21 DIAGNOSIS — K21.9 GASTROESOPHAGEAL REFLUX DISEASE, ESOPHAGITIS PRESENCE NOT SPECIFIED: ICD-10-CM

## 2020-08-21 DIAGNOSIS — F43.10 PTSD (POST-TRAUMATIC STRESS DISORDER): ICD-10-CM

## 2020-08-21 DIAGNOSIS — Z20.822 2019-NCOV NOT DETECTED: ICD-10-CM

## 2020-08-21 DIAGNOSIS — R52 PAIN: ICD-10-CM

## 2020-08-21 LAB
AMPHETAMINES UR QL SCN: NEGATIVE
BARBITURATES UR QL: NEGATIVE
BENZODIAZ UR QL: NEGATIVE
CANNABINOIDS UR QL SCN: NEGATIVE
COCAINE UR QL: NEGATIVE
ETHANOL UR QL SCN: NEGATIVE
HCG UR QL: NEGATIVE
OPIATES UR QL SCN: NEGATIVE
SARS-COV-2 RNA SPEC QL NAA+PROBE: NORMAL
SPECIMEN SOURCE: NORMAL

## 2020-08-21 PROCEDURE — 80320 DRUG SCREEN QUANTALCOHOLS: CPT | Performed by: PSYCHIATRY & NEUROLOGY

## 2020-08-21 PROCEDURE — 90791 PSYCH DIAGNOSTIC EVALUATION: CPT

## 2020-08-21 PROCEDURE — 25000132 ZZH RX MED GY IP 250 OP 250 PS 637: Performed by: PSYCHIATRY & NEUROLOGY

## 2020-08-21 PROCEDURE — C9803 HOPD COVID-19 SPEC COLLECT: HCPCS | Performed by: PSYCHIATRY & NEUROLOGY

## 2020-08-21 PROCEDURE — 99285 EMERGENCY DEPT VISIT HI MDM: CPT | Mod: Z6 | Performed by: PSYCHIATRY & NEUROLOGY

## 2020-08-21 PROCEDURE — 12400001 ZZH R&B MH UMMC

## 2020-08-21 PROCEDURE — 80307 DRUG TEST PRSMV CHEM ANLYZR: CPT | Performed by: PSYCHIATRY & NEUROLOGY

## 2020-08-21 PROCEDURE — 99285 EMERGENCY DEPT VISIT HI MDM: CPT | Mod: 25 | Performed by: PSYCHIATRY & NEUROLOGY

## 2020-08-21 PROCEDURE — 81025 URINE PREGNANCY TEST: CPT | Performed by: PSYCHIATRY & NEUROLOGY

## 2020-08-21 RX ORDER — TRETINOIN 0.5 MG/G
1 CREAM TOPICAL AT BEDTIME
Status: DISCONTINUED | OUTPATIENT
Start: 2020-08-21 | End: 2020-08-22

## 2020-08-21 RX ORDER — GABAPENTIN 300 MG/1
300 CAPSULE ORAL 3 TIMES DAILY PRN
Status: DISCONTINUED | OUTPATIENT
Start: 2020-08-21 | End: 2020-08-27 | Stop reason: HOSPADM

## 2020-08-21 RX ORDER — POLYETHYLENE GLYCOL 3350 17 G/17G
17 POWDER, FOR SOLUTION ORAL DAILY PRN
Status: DISCONTINUED | OUTPATIENT
Start: 2020-08-21 | End: 2020-08-27 | Stop reason: HOSPADM

## 2020-08-21 RX ORDER — ACETAMINOPHEN 325 MG/1
TABLET ORAL
Status: DISCONTINUED
Start: 2020-08-21 | End: 2020-08-21 | Stop reason: HOSPADM

## 2020-08-21 RX ORDER — GABAPENTIN 300 MG/1
600 CAPSULE ORAL AT BEDTIME
Status: DISCONTINUED | OUTPATIENT
Start: 2020-08-21 | End: 2020-08-27 | Stop reason: HOSPADM

## 2020-08-21 RX ORDER — FAMOTIDINE 20 MG/1
20 TABLET, FILM COATED ORAL DAILY
Status: DISCONTINUED | OUTPATIENT
Start: 2020-08-22 | End: 2020-08-27 | Stop reason: HOSPADM

## 2020-08-21 RX ORDER — ASCORBIC ACID 500 MG
500 TABLET ORAL DAILY
Status: DISCONTINUED | OUTPATIENT
Start: 2020-08-22 | End: 2020-08-27 | Stop reason: HOSPADM

## 2020-08-21 RX ORDER — ALUMINA, MAGNESIA, AND SIMETHICONE 2400; 2400; 240 MG/30ML; MG/30ML; MG/30ML
30 SUSPENSION ORAL EVERY 4 HOURS PRN
Status: DISCONTINUED | OUTPATIENT
Start: 2020-08-21 | End: 2020-08-27 | Stop reason: HOSPADM

## 2020-08-21 RX ORDER — ACETAMINOPHEN 325 MG/1
650 TABLET ORAL EVERY 4 HOURS PRN
Status: DISCONTINUED | OUTPATIENT
Start: 2020-08-21 | End: 2020-08-27 | Stop reason: HOSPADM

## 2020-08-21 RX ORDER — TRAZODONE HYDROCHLORIDE 50 MG/1
50 TABLET, FILM COATED ORAL
Status: DISCONTINUED | OUTPATIENT
Start: 2020-08-21 | End: 2020-08-27 | Stop reason: HOSPADM

## 2020-08-21 RX ORDER — HYDROXYZINE HYDROCHLORIDE 25 MG/1
25-50 TABLET, FILM COATED ORAL EVERY 4 HOURS PRN
Status: DISCONTINUED | OUTPATIENT
Start: 2020-08-21 | End: 2020-08-27 | Stop reason: HOSPADM

## 2020-08-21 RX ORDER — VITAMIN B COMPLEX
50 TABLET ORAL DAILY
Status: DISCONTINUED | OUTPATIENT
Start: 2020-08-22 | End: 2020-08-27 | Stop reason: HOSPADM

## 2020-08-21 RX ORDER — CALCIUM CARBONATE 500 MG/1
500 TABLET, CHEWABLE ORAL DAILY PRN
Status: DISCONTINUED | OUTPATIENT
Start: 2020-08-21 | End: 2020-08-27 | Stop reason: HOSPADM

## 2020-08-21 RX ORDER — ARIPIPRAZOLE 2 MG/1
2 TABLET ORAL DAILY
Status: DISCONTINUED | OUTPATIENT
Start: 2020-08-22 | End: 2020-08-27 | Stop reason: HOSPADM

## 2020-08-21 RX ORDER — GABAPENTIN 300 MG/1
300 CAPSULE ORAL 3 TIMES DAILY
Status: DISCONTINUED | OUTPATIENT
Start: 2020-08-22 | End: 2020-08-27 | Stop reason: HOSPADM

## 2020-08-21 RX ORDER — TRAZODONE HYDROCHLORIDE 150 MG/1
150 TABLET ORAL AT BEDTIME
Status: DISCONTINUED | OUTPATIENT
Start: 2020-08-21 | End: 2020-08-27 | Stop reason: HOSPADM

## 2020-08-21 RX ORDER — ACETAMINOPHEN 325 MG/1
650 TABLET ORAL ONCE
Status: COMPLETED | OUTPATIENT
Start: 2020-08-21 | End: 2020-08-21

## 2020-08-21 RX ORDER — MULTIPLE VITAMINS W/ MINERALS TAB 9MG-400MCG
1 TAB ORAL DAILY
Status: DISCONTINUED | OUTPATIENT
Start: 2020-08-22 | End: 2020-08-27 | Stop reason: HOSPADM

## 2020-08-21 RX ORDER — HYDROXYZINE HYDROCHLORIDE 25 MG/1
25 TABLET, FILM COATED ORAL EVERY 4 HOURS PRN
Status: DISCONTINUED | OUTPATIENT
Start: 2020-08-21 | End: 2020-08-21

## 2020-08-21 RX ORDER — ACETAMINOPHEN 325 MG/1
650 TABLET ORAL EVERY 4 HOURS PRN
Status: DISCONTINUED | OUTPATIENT
Start: 2020-08-21 | End: 2020-08-21

## 2020-08-21 RX ORDER — OLANZAPINE 10 MG/1
10 TABLET, ORALLY DISINTEGRATING ORAL ONCE
Status: COMPLETED | OUTPATIENT
Start: 2020-08-21 | End: 2020-08-21

## 2020-08-21 RX ORDER — BISACODYL 10 MG
10 SUPPOSITORY, RECTAL RECTAL DAILY PRN
Status: DISCONTINUED | OUTPATIENT
Start: 2020-08-21 | End: 2020-08-27 | Stop reason: HOSPADM

## 2020-08-21 RX ORDER — CLINDAMYCIN PHOSPHATE 10 MG/G
1 GEL TOPICAL DAILY
Status: DISCONTINUED | OUTPATIENT
Start: 2020-08-22 | End: 2020-08-27 | Stop reason: HOSPADM

## 2020-08-21 RX ORDER — SERTRALINE HYDROCHLORIDE 25 MG/1
25 TABLET, FILM COATED ORAL DAILY
Status: DISCONTINUED | OUTPATIENT
Start: 2020-08-22 | End: 2020-08-22

## 2020-08-21 RX ADMIN — ACETAMINOPHEN 650 MG: 325 TABLET, FILM COATED ORAL at 18:52

## 2020-08-21 RX ADMIN — GABAPENTIN 600 MG: 300 CAPSULE ORAL at 22:40

## 2020-08-21 RX ADMIN — TRAZODONE HYDROCHLORIDE 150 MG: 150 TABLET ORAL at 22:40

## 2020-08-21 RX ADMIN — OLANZAPINE 10 MG: 10 TABLET, ORALLY DISINTEGRATING ORAL at 18:52

## 2020-08-21 RX ADMIN — VENLAFAXINE HYDROCHLORIDE 225 MG: 150 CAPSULE, EXTENDED RELEASE ORAL at 22:40

## 2020-08-21 ASSESSMENT — ENCOUNTER SYMPTOMS
GASTROINTESTINAL NEGATIVE: 1
MUSCULOSKELETAL NEGATIVE: 1
HALLUCINATIONS: 0
CONSTITUTIONAL NEGATIVE: 1
RESPIRATORY NEGATIVE: 1
DECREASED CONCENTRATION: 1
EYES NEGATIVE: 1
CARDIOVASCULAR NEGATIVE: 1
NERVOUS/ANXIOUS: 1
HYPERACTIVE: 0
NEUROLOGICAL NEGATIVE: 1

## 2020-08-21 ASSESSMENT — ACTIVITIES OF DAILY LIVING (ADL)
LAUNDRY: WITH SUPERVISION
ORAL_HYGIENE: INDEPENDENT
DRESS: INDEPENDENT
RETIRED_EATING: 0-->INDEPENDENT
TRANSFERRING: 0-->INDEPENDENT
COGNITION: 0 - NO COGNITION ISSUES REPORTED
BATHING: 0-->INDEPENDENT
AMBULATION: 0-->INDEPENDENT
FALL_HISTORY_WITHIN_LAST_SIX_MONTHS: NO
RETIRED_COMMUNICATION: 0-->UNDERSTANDS/COMMUNICATES WITHOUT DIFFICULTY
TOILETING: 0-->INDEPENDENT
HYGIENE/GROOMING: INDEPENDENT
DRESS: 0-->INDEPENDENT
SWALLOWING: 0-->SWALLOWS FOODS/LIQUIDS WITHOUT DIFFICULTY
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
## 2020-08-21 NOTE — ED NOTES
"Patient on phone with therapist and told patient that she was feeling suicidal. Patient states she had plan to drown herself with intent to act on plan. Patient states that she only wants to be called Sandy while in the hospital. Patient reports she doesn't want mother involved with care, as mother is \"manipulative and will say I have good support at home and am only doing this for attention.\"   "

## 2020-08-21 NOTE — ED NOTES
Bed: ED15  Expected date: 8/21/20  Expected time: 3:55 PM  Means of arrival:   Comments:  20 yoa female. +SI. Cooperative.

## 2020-08-22 LAB
LABORATORY COMMENT REPORT: NORMAL
SARS-COV-2 RNA SPEC QL NAA+PROBE: NEGATIVE
SPECIMEN SOURCE: NORMAL

## 2020-08-22 PROCEDURE — 25000132 ZZH RX MED GY IP 250 OP 250 PS 637: Performed by: PSYCHIATRY & NEUROLOGY

## 2020-08-22 PROCEDURE — 99207 ZZC CDG-HISTORY COMP: MEETS EXP. PROBLEM FOCUSED-DOWN CODED LACK OF ROS: CPT | Performed by: PSYCHIATRY & NEUROLOGY

## 2020-08-22 PROCEDURE — 12400001 ZZH R&B MH UMMC

## 2020-08-22 PROCEDURE — 99221 1ST HOSP IP/OBS SF/LOW 40: CPT | Mod: GT | Performed by: PSYCHIATRY & NEUROLOGY

## 2020-08-22 PROCEDURE — 99207 ZZC DOWN CODE DUE TO INITIAL EXAM: CPT | Performed by: PSYCHIATRY & NEUROLOGY

## 2020-08-22 RX ORDER — PRAZOSIN HYDROCHLORIDE 1 MG/1
1 CAPSULE ORAL AT BEDTIME
Status: DISCONTINUED | OUTPATIENT
Start: 2020-08-22 | End: 2020-08-24

## 2020-08-22 RX ORDER — VENLAFAXINE HYDROCHLORIDE 150 MG/1
150 CAPSULE, EXTENDED RELEASE ORAL AT BEDTIME
Status: DISCONTINUED | OUTPATIENT
Start: 2020-08-22 | End: 2020-08-24

## 2020-08-22 RX ORDER — TRETINOIN 0.5 MG/G
1 CREAM TOPICAL AT BEDTIME
Status: DISCONTINUED | OUTPATIENT
Start: 2020-08-24 | End: 2020-08-27 | Stop reason: HOSPADM

## 2020-08-22 RX ADMIN — TRAZODONE HYDROCHLORIDE 150 MG: 150 TABLET ORAL at 21:25

## 2020-08-22 RX ADMIN — ARIPIPRAZOLE 2 MG: 2 TABLET ORAL at 10:19

## 2020-08-22 RX ADMIN — MULTIPLE VITAMINS W/ MINERALS TAB 1 TABLET: TAB at 10:20

## 2020-08-22 RX ADMIN — HYDROXYZINE HYDROCHLORIDE 50 MG: 25 TABLET, FILM COATED ORAL at 10:28

## 2020-08-22 RX ADMIN — PRAZOSIN HYDROCHLORIDE 1 MG: 1 CAPSULE ORAL at 21:24

## 2020-08-22 RX ADMIN — GABAPENTIN 600 MG: 300 CAPSULE ORAL at 21:24

## 2020-08-22 RX ADMIN — SERTRALINE HYDROCHLORIDE 50 MG: 50 TABLET ORAL at 10:47

## 2020-08-22 RX ADMIN — GABAPENTIN 300 MG: 300 CAPSULE ORAL at 13:41

## 2020-08-22 RX ADMIN — HYDROXYZINE HYDROCHLORIDE 50 MG: 25 TABLET, FILM COATED ORAL at 17:13

## 2020-08-22 RX ADMIN — FAMOTIDINE 20 MG: 20 TABLET ORAL at 10:20

## 2020-08-22 RX ADMIN — OXYCODONE HYDROCHLORIDE AND ACETAMINOPHEN 500 MG: 500 TABLET ORAL at 10:20

## 2020-08-22 RX ADMIN — VENLAFAXINE HYDROCHLORIDE 150 MG: 150 CAPSULE, EXTENDED RELEASE ORAL at 21:24

## 2020-08-22 RX ADMIN — GABAPENTIN 300 MG: 300 CAPSULE ORAL at 10:20

## 2020-08-22 RX ADMIN — GABAPENTIN 300 MG: 300 CAPSULE ORAL at 16:04

## 2020-08-22 RX ADMIN — DOCUSATE SODIUM 100 MG: 50 CAPSULE, LIQUID FILLED ORAL at 10:20

## 2020-08-22 RX ADMIN — CLINDAMYCIN PHOSPHATE 1 G: 10 GEL TOPICAL at 10:47

## 2020-08-22 RX ADMIN — GABAPENTIN 300 MG: 300 CAPSULE ORAL at 12:29

## 2020-08-22 RX ADMIN — Medication 50 MCG: at 10:20

## 2020-08-22 ASSESSMENT — ACTIVITIES OF DAILY LIVING (ADL)
DRESS: INDEPENDENT
ORAL_HYGIENE: INDEPENDENT
DRESS: INDEPENDENT
HYGIENE/GROOMING: INDEPENDENT
LAUNDRY: WITH SUPERVISION
ORAL_HYGIENE: INDEPENDENT
HYGIENE/GROOMING: INDEPENDENT

## 2020-08-22 NOTE — PROGRESS NOTES
"ADMISSION     Precaution: SI,SINGLE ROOM     Status: 72HH     Reason for Admission: Patient is a 20 year old admitted for SI with a plan to drown herself, overdose, drink bleach. Pt participating in DBT therapy, OP services at [place] (anorexia)   Pt reports her tx programs aren't working so she is going to kill herself. Patient was recently discharge from [facility]. She likes to go by CC, she's paranoid of other patients knowing her name.         Upon arrival to the unit, pt was A/O x4. Speech was clear. Mood was calm, sad. Her affect was flat.Patient endorses depression, anxiety, racing thoughts, and SI. She stated, \" the thoughts are just really bad. I'm not going to hurt myself in the hospital, you guys don't have what I need to kill myself.\" Patient contracted for safety and verbalized she will let staff know if thoughts worsen. She denied SIB, Hallucinations, and HI. Patient doesn't appear to be responding to internal stimuli.She reports getting very little sleep . She last ate yesterday 8/20. Nutritional consult was placed due to Hx of anorexia and inconsistent with meal. Her stressor is starting classes for school on Monday. Skin is intact. Plan is to monitor patient status q 15 mins, assess response to medications, and maintain the patients safety.  "

## 2020-08-22 NOTE — PROGRESS NOTES
"\"I would never do anything to break this necklace.  I won't hurt myself.\"  Comment made regarding wearing her necklace, was able to contract verbally with this staff that would not use necklace to harm self.  Order to wear necklace given by Dr. Cardoza.  "

## 2020-08-22 NOTE — PROGRESS NOTES
08/21/20 2040   Patient Belongings   Patient Belongings remains with patient;locker   Patient Belongings Put in Hospital Secure Location (Security or Locker, etc.) shoes;watch;clothing   Belongings Search Yes   Clothing Search Yes   Second Staff Angely   .No items sent to security    On person - one sweat shirt, 1 pantie , 1 bra    In locker  Phillips bag  Side pocket - fidget, cell phone, , puddy - plastic bag - necklace, bracelet, watch, and essential oils  1 plaid shirt  4 sweat shirts  3 t shirts  4 pants  Book  3 panties  1 bra  Bible  Stuffed animal          A               Admission:  I am responsible for any personal items that are not sent to the safe or pharmacy.  [facility] is not responsible for loss, theft or damage of any property in my possession.    Signature:  _________________________________ Date: _______  Time: _____                                              Staff Signature:  ____________________________ Date: ________  Time: _____      2nd Staff person, if patient is unable/unwilling to sign:    Signature: ________________________________ Date: ________  Time: _____     Discharge:  [facility] has returned all of my personal belongings:    Signature: _________________________________ Date: ________  Time: _____                                          Staff Signature:  ____________________________ Date: ________  Time: _____

## 2020-08-22 NOTE — PROGRESS NOTES
"CLINICAL NUTRITION SERVICES - ASSESSMENT NOTE     Nutrition Prescription    RECOMMENDATIONS FOR MDs/PROVIDERS TO ORDER:  -Encourage pt to allow staff to take blind weight (last available weight 7/30).    -Suggest staff give pt encouragement to take meals and document meal intakes in flow sheets.  -If pt begins to take significant po intakes, recommend monitor refeeding labs K, Mg and Phos.      Malnutrition Status:    -Unable to determine due to no NFPA or recent weight check.    Recommendations already ordered by Registered Dietitian (RD):  -RN removed lactose allergy from Epic so pt can receive cottage cheese.  -Ordered cottage cheese with lunch and dinner daily.    -Ordered carrots and fresh orange with all meals.  -Ordered Breeze berry with dinner for a trial.      Future/Additional Recommendations:  -Monitor po intakes.  -Monitor supplement acceptance and adjust as indicated.    -Monitor weight trends.     REASON FOR ASSESSMENT  Thea Castle is a/an 20 year old female assessed by the dietitian for Admission Nutrition Risk Screen for unintentional loss of 10# or more in the past two months and RN consult:  \"Reported loss of about 20lbs. Hx of Eating Disorder\"     Per chart review:  Pt was admitted to the hospital due to suicidal planning.  PMH included anxiety, major depression, posttraumatic stress disorder, anorexia, OCD, borderline personality disorder.       NUTRITION HISTORY  Per chart review, pt has sought services at [facility] and also the [facility] in [facility]. .  Eating disorder probably had its onset in 8th grade per psych note.      Lives at home with her parents.    Pt assessed by RD during recent admission.  Per RD note 8/13:  \"RD reviewed reason for phone visit, reviewed pt's history of anorexia. Pt agreeing to discuss, reports oral intake restriction for some time, but reports even more so since around 8/9. RD reviewed how this can impact " "health and pt's course, pt very resistive to agreeing to recommendations, very resistive to oral supplement at present time, but with strong encouragement, was agreeing to attempting to take meals with safe foods (fruits/vegetables only), noted pt is on a vegan diet so menu would only have these items, reviewed the importance of attempting to consume at least 50% of a meal tray due to concern for inadequate protein intakes with only fruit/vegetable intake. Pt was very concerned with calorie counting when RD was discussing recommendations of either 50% of meals or supplement. Pt stated was very concerned with gaining weight. Pt admits current mental status strongly affecting ED.\"     Patient visit done by phone today in light of reduced in-person exposure during COVID outbreak. Per pt prefers fresh fruit (oranges, melon) and vegetables.  She will eat cottage cheese, but no other dairy.  Pt states will eat eggs from her neighbor due to \"I know where they come from.\"  She reports has been Vegan x 1 year.      CURRENT NUTRITION ORDERS  Diet: Regular  Food Allergy:  Lactose    Intake/Tolerance: Per nursing note last evening pt \"last ate yesterday 8/20.\"  Per RN, pt did not eat breakfast or lunch today.  She has been drinking some water and pop per RN.  Discussed food preferences with pt.  She states the only dairy she will eat is cottage cheese.  She did request to receive an orange and carrots with each meal and cottage cheese with lunch and supper daily.  Agreed to try Breeze Berry with supper.     LABS  Labs reviewed  (8/13) Vitamin D 54  BG 63 (L)    MEDICATIONS  Medications reviewed  Vitamin D3  Vitamin C  MVI with minerals    ANTHROPOMETRICS  Height: 160 cm (5' 3\")  Most Recent Weight: (refused to wt )    IBW: 115 lbs  BMI: Underweight BMI <18.5-per last noted weight of 107 lbs.  Weight History:   48.9 kg (107 lb 12.9 oz) 07/30/2020 Per CE      49 kg (108 lb) 04/18/2020 Per CE      53.2 kg (117 lb 5 oz) 09/03/2019 " Per CE       Wt Readings from Last 20 Encounters:   04/25/19 54.5 kg (120 lb 2.4 oz) (37 %, Z= -0.33)*   03/25/19 57.6 kg (127 lb) (51 %, Z= 0.03)*   03/07/19 55.3 kg (122 lb) (42 %, Z= -0.21)*     * Growth percentiles are based on CDC (Girls, 2-20 Years) data.       Dosing Weight: 49 kg (last available weight on 7/30)    ASSESSED NUTRITION NEEDS  Estimated Energy Needs: 6293-8014 kcals/day (30 - 35 kcals/kg )  Justification: Underweight  Estimated Protein Needs: 49-59 grams protein/day (1 - 1.2 grams of pro/kg)  Justification: Repletion  Estimated Fluid Needs: (1 mL/kcal)   Justification: Per provider pending fluid status    PHYSICAL FINDINGS  See malnutrition section below.    MALNUTRITION  % Intake: </= 50% for >/= 5 days (severe)  % Weight Loss: Unable to assess  Subcutaneous Fat Loss: Unable to assess  Muscle Loss: Unable to assess  Fluid Accumulation/Edema: None noted  Malnutrition Diagnosis: Unable to determine due to no NFPA or recent weight check.      NUTRITION DIAGNOSIS  Inadequate protein-energy intake related to restrictive eating as evidenced by pt reports, minimal meal intakes recorded thus far.     INTERVENTIONS  Implementation  Nutrition Education: RD reviewed the importance of adequate oral intakes for improved health status.         Collaboration with other providers-discussed with RN pt's po intakes and nutrition plan.    Goals  Patient to consume % of nutritionally adequate meal trays TID, or the equivalent with supplements/snacks.     Monitoring/Evaluation  Progress toward goals will be monitored and evaluated per protocol.    Audrey Blue RD, LD

## 2020-08-22 NOTE — PHARMACY-ADMISSION MEDICATION HISTORY
Admission medication history interview status for the 8/21/2020 admission is complete. See EPIC admission navigator for allergy information, pharmacy, prior to admission medications and immunization status.     Medication history interview sources:  Surescripts, Discharge summary from 8/14    Changes made to PTA medication list (reason)  Added: none  Deleted: none  Changed: changed sertraline from 25mg daily to 50mg daily    Additional medication history information (including reliability of information, actions taken by pharmacist):None    Medication history completed by: Paula Dempsey, PharmD, MPH      Prior to Admission medications    Medication Sig Last Dose Taking? Auth Provider   erenumab-aooe (AIMOVIG) 140 MG/ML injection Inject 140 mg Subcutaneous every 30 days Most recently given on 8/17.  Due on 9/17 Past Week at Unknown time Yes Unknown, Entered By History   sertraline (ZOLOFT) 50 MG tablet Take 50 mg by mouth daily  Yes Unknown, Entered By History   acetaminophen (TYLENOL) 325 MG tablet Take 2 tablets (650 mg) by mouth every 4 hours as needed for mild pain   Pierre Lenz MD   ARIPiprazole (ABILIFY) 2 MG tablet Take 1 tablet (2 mg) by mouth daily   Александр Hull MD   calcium carbonate (TUMS) 500 MG chewable tablet Take 1 chew tab by mouth as needed for heartburn   Reported, Patient   clindamycin (CLINDAMAX) 1 % external gel Apply 1 applicator topically daily   Unknown, Entered By History   docusate sodium (COLACE) 100 MG capsule Take 1 capsule (100 mg) by mouth daily   Александр Hull MD   famotidine (PEPCID) 20 MG tablet Take 1 tablet (20 mg) by mouth daily   Александр Hull MD   gabapentin (NEURONTIN) 300 MG capsule Take 1 capsule (300 mg) by mouth 3 times daily as needed for other (anxiety)   Александр Hull MD   gabapentin (NEURONTIN) 300 MG capsule Take 300 mg by mouth 3 times daily In addition to 600mg at bedtime.   Unknown, Entered By History   gabapentin  (NEURONTIN) 300 MG capsule Take 600 mg by mouth At Bedtime In addition to 300mg by mouth three times daily.   Unknown, Entered By History   hydrOXYzine (ATARAX) 25 MG tablet Take 1-2 tablets (25-50 mg) by mouth every 4 hours as needed for anxiety   Александр Hull MD   melatonin 5 MG CAPS Take 5 mg by mouth At Bedtime   Pierre Lenz MD   melatonin 5 MG tablet Take 1 tablet (5 mg) by mouth nightly as needed for sleep   Александр Hull MD   multivitamin w/minerals (THERA-VIT-M) tablet Take 1 tablet by mouth daily   Unknown, Entered By History   polyethylene glycol (MIRALAX) 17 g packet Take 1 packet by mouth daily as needed for constipation   Unknown, Entered By History   traZODone (DESYREL) 150 MG tablet Take 150 mg by mouth At Bedtime   Reported, Patient   tretinoin (RETIN-A) 0.05 % external cream Apply 1 g topically At Bedtime   Unknown, Entered By History   venlafaxine (EFFEXOR-XR) 75 MG 24 hr capsule Take 3 capsules (225 mg) by mouth At Bedtime   Александр Hull MD   vitamin C (ASCORBIC ACID) 500 MG tablet Take 500 mg by mouth daily   Unknown, Entered By History   vitamin D3 (CHOLECALCIFEROL) 2000 units tablet Take 1 tablet by mouth daily   Reported, Patient
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
## 2020-08-22 NOTE — PLAN OF CARE
"\"I'm very anxious.\"  When speaking with psychiatrist indicated has having many suicidal thoughts.  Indicates to this staff these thoughts were regarding plans if were not in the hospital.  \"There is nothing here I could use.\"  States feels safer here as knows the environment is safe.  Has dreams at night of killing self.  Agrees would be able to talk with staff if feels is unable to keep self safe.  Endorses a wish to be dead.  Denies self harm thoughts, \"I only scratch myself if I am having a panic attack.\"  Rates depression level at a 9 on a scale with 10 being the worst.  Rates anxiety at a 10 on the same scale.  Endorses poor focus and concentration with racing and ruminating thoughts.  States sleeps better in the hospital than at home as is unable to access means for suicide here.  Talked about eating disorder symptoms of restricting being very strong at the present.  Did not eat breakfast or lunch.  Would like to be able to eat in room due to being less anxious than in dining area.  Was given permission to do so.  After discharge plans to continue to work with DBT therapist Layla Carbajal at [facility] and [facility] with whom she meets weekly.  Layla has indicated would like information regarding hospital stay upon discharge.  Will also work with Shanta Chow who is \"depression and anxiety\" therapist with whom she meets twice weekly.  Works with a psychiatrist as well.  Has requested to talk with a dietitian here, a consult has been ordered.  Spoke to this staff regarding being able to use fidget sensory tools as was unable to do this during last hospitalization a few weeks ago after breaking a fidget.  States was having a panic attack and will work at talking to staff for assistance when extremely anxious.  \"I would like another chance to try and do better this time.\"  Staff agreed to allow her to use these if able to do so appropriately.  Requested and received hydroxyzine 50 mg's for " anxiety at 1030.  Has spent time resting in room this shift.

## 2020-08-22 NOTE — PLAN OF CARE
Illness Management Recovery model:  Ways to [place].    Patient identified the following specific strategies to cope with their symptoms:    1. Will talk with staff  2. Will use fidget toys  3. Will ask for prn's for anxiety
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
## 2020-08-23 PROCEDURE — 25000132 ZZH RX MED GY IP 250 OP 250 PS 637: Performed by: PSYCHIATRY & NEUROLOGY

## 2020-08-23 PROCEDURE — 12400001 ZZH R&B MH UMMC

## 2020-08-23 RX ORDER — OLANZAPINE 2.5 MG/1
2.5-5 TABLET, FILM COATED ORAL EVERY 6 HOURS PRN
Status: DISCONTINUED | OUTPATIENT
Start: 2020-08-23 | End: 2020-08-27 | Stop reason: HOSPADM

## 2020-08-23 RX ADMIN — PRAZOSIN HYDROCHLORIDE 1 MG: 1 CAPSULE ORAL at 22:41

## 2020-08-23 RX ADMIN — GABAPENTIN 600 MG: 300 CAPSULE ORAL at 22:41

## 2020-08-23 RX ADMIN — ACETAMINOPHEN 650 MG: 325 TABLET, FILM COATED ORAL at 14:01

## 2020-08-23 RX ADMIN — CLINDAMYCIN PHOSPHATE 1 G: 10 GEL TOPICAL at 09:12

## 2020-08-23 RX ADMIN — FAMOTIDINE 20 MG: 20 TABLET ORAL at 09:12

## 2020-08-23 RX ADMIN — ACETAMINOPHEN 650 MG: 325 TABLET, FILM COATED ORAL at 02:54

## 2020-08-23 RX ADMIN — ARIPIPRAZOLE 2 MG: 2 TABLET ORAL at 09:12

## 2020-08-23 RX ADMIN — HYDROXYZINE HYDROCHLORIDE 50 MG: 25 TABLET, FILM COATED ORAL at 09:12

## 2020-08-23 RX ADMIN — DOCUSATE SODIUM 100 MG: 50 CAPSULE, LIQUID FILLED ORAL at 09:12

## 2020-08-23 RX ADMIN — ACETAMINOPHEN 650 MG: 325 TABLET, FILM COATED ORAL at 09:15

## 2020-08-23 RX ADMIN — OLANZAPINE 5 MG: 2.5 TABLET, FILM COATED ORAL at 20:10

## 2020-08-23 RX ADMIN — SERTRALINE HYDROCHLORIDE 50 MG: 50 TABLET ORAL at 09:12

## 2020-08-23 RX ADMIN — HYDROXYZINE HYDROCHLORIDE 50 MG: 25 TABLET, FILM COATED ORAL at 02:54

## 2020-08-23 RX ADMIN — MULTIPLE VITAMINS W/ MINERALS TAB 1 TABLET: TAB at 09:12

## 2020-08-23 RX ADMIN — TRAZODONE HYDROCHLORIDE 50 MG: 50 TABLET ORAL at 02:54

## 2020-08-23 RX ADMIN — GABAPENTIN 300 MG: 300 CAPSULE ORAL at 16:52

## 2020-08-23 RX ADMIN — Medication 50 MCG: at 09:12

## 2020-08-23 RX ADMIN — TRAZODONE HYDROCHLORIDE 150 MG: 150 TABLET ORAL at 22:41

## 2020-08-23 RX ADMIN — GABAPENTIN 300 MG: 300 CAPSULE ORAL at 11:43

## 2020-08-23 RX ADMIN — HYDROXYZINE HYDROCHLORIDE 50 MG: 25 TABLET, FILM COATED ORAL at 17:20

## 2020-08-23 RX ADMIN — VENLAFAXINE HYDROCHLORIDE 150 MG: 150 CAPSULE, EXTENDED RELEASE ORAL at 22:41

## 2020-08-23 RX ADMIN — OXYCODONE HYDROCHLORIDE AND ACETAMINOPHEN 500 MG: 500 TABLET ORAL at 09:12

## 2020-08-23 RX ADMIN — GABAPENTIN 300 MG: 300 CAPSULE ORAL at 14:01

## 2020-08-23 RX ADMIN — GABAPENTIN 300 MG: 300 CAPSULE ORAL at 09:12

## 2020-08-23 RX ADMIN — OLANZAPINE 2.5 MG: 2.5 TABLET, FILM COATED ORAL at 12:55

## 2020-08-23 ASSESSMENT — ACTIVITIES OF DAILY LIVING (ADL)
ORAL_HYGIENE: INDEPENDENT
ORAL_HYGIENE: INDEPENDENT
DRESS: SCRUBS (BEHAVIORAL HEALTH)
LAUNDRY: WITH SUPERVISION
LAUNDRY: UNABLE TO COMPLETE
HYGIENE/GROOMING: INDEPENDENT
HYGIENE/GROOMING: INDEPENDENT
DRESS: INDEPENDENT

## 2020-08-23 NOTE — PROGRESS NOTES
Pt talked openly about her eating disorder. She said she was very anxious today and was having ptsd flashbacks. Pt was given numerous coping skills to try but continues to be very anxious. Pt requires a lot of time and attention. Pt was given a small dose of zyprexa which seemed to help     08/23/20 1336   Sleep/Rest/Relaxation   Sleep/Rest/Relaxation sleep interrupted;unable to go back to sleep;unable to stay asleep;feeling unrested;yawning frequently   Behavioral Health   Hallucinations denies / not responding to hallucinations   Thinking confused;distractable;paranoid;poor concentration   Orientation person: oriented;place: oriented;date: oriented;time: oriented   Memory baseline memory   Insight admits / accepts;poor   Judgement impaired   Eye Contact at examiner   Affect angry;blunted, flat;sad;tense;irritable   Mood depressed;hopeless;anxious   Physical Appearance/Attire neat   Suicidality other (see comments)  (denies)   Self Injury other (see comment)  (denies)   Activity isolative;withdrawn;restless   Speech clear;coherent   Psychomotor / Gait slow   Coping/Psychosocial   Verbalized Emotional State anger;anxiety;depression;fear;frustration;hopelessness;loneliness;powerlessness;sadness   Psycho Education   Type of Intervention 1:1 intervention   Safety   Suicidality Status 15   Activities of Daily Living   Hygiene/Grooming independent   Oral Hygiene independent   Dress scrubs (behavioral health)   Laundry unable to complete        08/23/20 1336   Sleep/Rest/Relaxation   Sleep/Rest/Relaxation sleep interrupted;unable to go back to sleep;unable to stay asleep;feeling unrested;yawning frequently   Behavioral Health   Hallucinations denies / not responding to hallucinations   Thinking confused;distractable;paranoid;poor concentration   Orientation person: oriented;place: oriented;date: oriented;time: oriented   Memory baseline memory   Insight admits / accepts;poor   Judgement impaired   Eye Contact at examiner    Affect angry;blunted, flat;sad;tense;irritable   Mood depressed;hopeless;anxious   Physical Appearance/Attire neat   Suicidality other (see comments)  (denies)   Self Injury other (see comment)  (denies)   Activity isolative;withdrawn;restless   Speech clear;coherent   Psychomotor / Gait slow   Coping/Psychosocial   Verbalized Emotional State anger;anxiety;depression;fear;frustration;hopelessness;loneliness;powerlessness;sadness   Psycho Education   Type of Intervention 1:1 intervention   Safety   Suicidality Status 15   Activities of Daily Living   Hygiene/Grooming independent   Oral Hygiene independent   Dress scrubs (behavioral health)   Laundry unable to complete     Pt. Said the medication did not help

## 2020-08-23 NOTE — PROGRESS NOTES
"   08/23/20 1328   Values Beliefs and Spiritual Care   C: Community: In support of your spiritual health, is there someone we may contact for you? (identify all that apply)   (shs/8-23)   Visit Information   Visit Made By Staff    Type of Visit Initial;On-call;Staff consultation/triage;Distress   Distress Emotional   Visited Patient  (Tele visit)   Interventions   Basic Spiritual Interventions    introduction/orientation to Spiritual Health Services;Assessment of spiritual needs/resources;Reflective conversation;Prayer   Advanced Assessments/Interventions   Presenting Concerns/Issues Spiritual/Presybeterian/emotional support;Stress/self-care;Challenged coping;Self-concept disturbance;Other  (Suicidal thoughts)   SPIRITUAL HEALTH SERVICES: Tele-Encounter  / On-Call  Patient Location (Hospital, Bank, Unit): [facility], ,[facility]  Spoke with (patient, family relationship): Pt      Referral Source: Epic consult.    If applicable: patient was appropriately screened for telechaplaincy support with bedside nurse prior to visit (e.g. Mental Health and Addiction contexts). See call details below.    DATA:  Thea said that she has PTSD from something that happened five months ago that made her feel like an object, and that about 1.5 years ago she started experiencing depression and an eating disorder. \"I didn't feel like I deserved to eat. 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  One of them sent her to [facility] in an ambulance b/c she had a plan and a time for suicide.    Thea said that her nadeen teaches her that God would leave her if she kills herself. \"I think that is a good thing, because it's keeping me here, along with some people I love.\" We spoke about her not leaving herself, and learning to trust her own wishes for wholesomeness and love " for herself.  We shared a prayer for protection and reassurance from God.       PLAN:  Will let unit  know of visit.    Chaplain NILA SAENZ    ______________________________    Type of service:  Telephone Visit     has received verbal consent for a TelephoneVisit from the patient? Yes    Distance Provider Location: designated [place] office or home office (secure setting)    Mode of Communication: telephone (via [employer] phone or [employer] tele-call-number ([phone]))

## 2020-08-23 NOTE — PROGRESS NOTES
"CC presents as tense, her mood is anxious and labile. She does admit/ accept her illness, but feels her current hospitalization is unnecessary. She reports her mother supports this belief. Her mother has voiced to her that she has an adequate support network outside of the hospital to support her returning home.     She reports high anxiety and was unable to rate this anxiety on a scale of 1-10. She explained that she is fearful that she is unsafe on this unit and is scared of the other patients. She denies active SI thinking, but acknowledges that this thinking was present prior to hospitalization. She denies SIB. She rates her depression at 7/10 and explains that if she eats her depression worsens. She reports food restriction makes her feel empowered and provides her a sense of control over her life (and reports this causes her depression to lessen).     When asked about her life situation, she described feeling that others in her life make her decisions for her and she feels an inability to take control of her life. 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     She asked writer what she needed to do to 'take control of her life\", writer explained that this is a question she will have to answer on her own as her depression begins to lift, but for this evening it would be better to focus on what would make tonight a pleasant experience for her. CC asked writer a few times throughout the evening for additional check-in time, after this writer spent 30 minutes talking with her. She retrieved numbers from her cellphone in order " "to call friends. slept for a couple hours, took a shower, declined her dinner meal, did not eat 4pm or 8pm snack, and seemed restless after she woke up from her evening nap.     CC may benefit from doctor's notes for her employer and college upon discharge.     Marisela Rosales  Psychiatric Associate        08/22/20 2024   Behavioral Health   Hallucinations denies / not responding to hallucinations   Thinking other (see comment)  (reports \"rapid changing thoughts\" )   Orientation person: oriented;place: oriented   Memory baseline memory   Insight poor   Judgement impaired   Eye Contact at examiner   Affect tense   Mood anxious;labile   Physical Appearance/Attire attire appropriate to age and situation   Hygiene other (see comment)  (adequate )   Suicidality other (see comments)  (denies )   1. Wish to be Dead (Recent) No   2. Non-Specific Active Suicidal Thoughts (Recent) No   Self Injury other (see comment)  (denies )   Elopement Statements about wanting to leave   Activity isolative;withdrawn   Speech clear;coherent   Psychomotor / Gait balanced;steady   Daily Care   Patient Performed Hygiene shower   Activities of Daily Living   Hygiene/Grooming independent   Oral Hygiene independent   Dress independent   Room Organization independent     "

## 2020-08-24 PROCEDURE — 25000132 ZZH RX MED GY IP 250 OP 250 PS 637: Performed by: PSYCHIATRY & NEUROLOGY

## 2020-08-24 PROCEDURE — 99207 ZZC NO CHARGE, DOC BY STUDENT: CPT | Performed by: PSYCHIATRY & NEUROLOGY

## 2020-08-24 PROCEDURE — 12400001 ZZH R&B MH UMMC

## 2020-08-24 RX ORDER — VENLAFAXINE HYDROCHLORIDE 75 MG/1
75 CAPSULE, EXTENDED RELEASE ORAL AT BEDTIME
Status: DISCONTINUED | OUTPATIENT
Start: 2020-08-24 | End: 2020-08-26

## 2020-08-24 RX ORDER — PRAZOSIN HYDROCHLORIDE 2 MG/1
2 CAPSULE ORAL AT BEDTIME
Status: DISCONTINUED | OUTPATIENT
Start: 2020-08-24 | End: 2020-08-26

## 2020-08-24 RX ORDER — SERTRALINE HYDROCHLORIDE 100 MG/1
100 TABLET, FILM COATED ORAL DAILY
Status: DISCONTINUED | OUTPATIENT
Start: 2020-08-25 | End: 2020-08-27 | Stop reason: HOSPADM

## 2020-08-24 RX ADMIN — OLANZAPINE 5 MG: 2.5 TABLET, FILM COATED ORAL at 18:27

## 2020-08-24 RX ADMIN — ARIPIPRAZOLE 2 MG: 2 TABLET ORAL at 08:39

## 2020-08-24 RX ADMIN — GABAPENTIN 300 MG: 300 CAPSULE ORAL at 03:34

## 2020-08-24 RX ADMIN — GABAPENTIN 600 MG: 300 CAPSULE ORAL at 21:24

## 2020-08-24 RX ADMIN — ACETAMINOPHEN 650 MG: 325 TABLET, FILM COATED ORAL at 10:44

## 2020-08-24 RX ADMIN — GABAPENTIN 300 MG: 300 CAPSULE ORAL at 16:23

## 2020-08-24 RX ADMIN — GABAPENTIN 300 MG: 300 CAPSULE ORAL at 08:39

## 2020-08-24 RX ADMIN — Medication 50 MCG: at 08:39

## 2020-08-24 RX ADMIN — ACETAMINOPHEN 650 MG: 325 TABLET, FILM COATED ORAL at 16:31

## 2020-08-24 RX ADMIN — OXYCODONE HYDROCHLORIDE AND ACETAMINOPHEN 500 MG: 500 TABLET ORAL at 08:39

## 2020-08-24 RX ADMIN — OLANZAPINE 5 MG: 2.5 TABLET, FILM COATED ORAL at 08:40

## 2020-08-24 RX ADMIN — TRAZODONE HYDROCHLORIDE 150 MG: 150 TABLET ORAL at 22:07

## 2020-08-24 RX ADMIN — PRAZOSIN HYDROCHLORIDE 2 MG: 2 CAPSULE ORAL at 21:24

## 2020-08-24 RX ADMIN — TRETINOIN 1 G: 0.5 CREAM TOPICAL at 22:16

## 2020-08-24 RX ADMIN — GABAPENTIN 300 MG: 300 CAPSULE ORAL at 11:59

## 2020-08-24 RX ADMIN — FAMOTIDINE 20 MG: 20 TABLET ORAL at 08:39

## 2020-08-24 RX ADMIN — CLINDAMYCIN PHOSPHATE 1 G: 10 GEL TOPICAL at 09:16

## 2020-08-24 RX ADMIN — VENLAFAXINE HYDROCHLORIDE 75 MG: 75 CAPSULE, EXTENDED RELEASE ORAL at 21:25

## 2020-08-24 RX ADMIN — DOCUSATE SODIUM 100 MG: 50 CAPSULE, LIQUID FILLED ORAL at 08:39

## 2020-08-24 RX ADMIN — HYDROXYZINE HYDROCHLORIDE 50 MG: 25 TABLET, FILM COATED ORAL at 11:59

## 2020-08-24 RX ADMIN — SERTRALINE HYDROCHLORIDE 50 MG: 50 TABLET ORAL at 08:39

## 2020-08-24 RX ADMIN — HYDROXYZINE HYDROCHLORIDE 50 MG: 25 TABLET, FILM COATED ORAL at 16:31

## 2020-08-24 RX ADMIN — GABAPENTIN 300 MG: 300 CAPSULE ORAL at 14:03

## 2020-08-24 RX ADMIN — MULTIPLE VITAMINS W/ MINERALS TAB 1 TABLET: TAB at 08:39

## 2020-08-24 ASSESSMENT — ACTIVITIES OF DAILY LIVING (ADL)
DRESS: SCRUBS (BEHAVIORAL HEALTH);STREET CLOTHES
HYGIENE/GROOMING: INDEPENDENT
ORAL_HYGIENE: INDEPENDENT
LAUNDRY: WITH SUPERVISION

## 2020-08-24 NOTE — PLAN OF CARE
"48 hour assessment    Admission date 8/21/2020 .    Admitting dx:Borderline personality disorder (H) [F60.3]  PTSD (post-traumatic stress disorder) [F43.10]  Suicidal intent [R45.851]  Eating disorder, unspecified type [F50.9]     BP 96/62   Pulse 92   Temp 97.6  F (36.4  C) (Oral)   Resp 16   Ht 1.6 m (5' 3\")   SpO2 98%   BMI 21.28 kg/m       Pt has been isolative and withdrawn to her room.  Pt did not eat breakfast and ate some fruit and saline crackers for lunch. Pt also had a diet soda before lunch. Pt requested several PRNs this shift. Requested zyprexa 5 first and stated it was minimally effective then requested visteral at 1200 and again said it only had some relief and then PRN gabapentin with little to no relief. Pt states her anxiety as 10/10 with 10 being high. Pt was given lavender patch. Pt has and uses fidgets, reading and weighted blanket. Tylenol given for headache with relief.  Rates depression 9/10.  Pt was able to keep her therapy call today, she called the unit. Pt stated it went really well.  Pt states she had flashbacks last night of her trauma that was very upsetting to her. Minipress increased at HS to 2mg from 1mg. Pt wants to discharge on Wednesday because she has a family vacation she would like to attend on Thursday. Her 72 hold is up Wednesday night. Pt wanted a different flavor boost for dinner, she wanted vanilla. Writer called and was told the boost breeze only comes in 3 flavors (berry, orange and peach) and the regular boost has lactose in it. Pt stated she wanted to stick with the berry breeze at supper. Medication complaint. Pt required lots of reassurance from writer this shift. Pt requested to talk several times this shift. Pt has SI thoughts with no plan or intent and contracts for safety. Denies SIB. Denies hallucinations.   "

## 2020-08-24 NOTE — PROGRESS NOTES
Personal Plan of Care    Reasons you are in the Hospital  1. Suicidal ideation  2. Increase in depressive symptoms  3. Increase in anxiety  4. Mood decompensation    Goals for Discharge   1. Absence of suicidal ideation  2. Decrease in depressive symptoms  3. Decrease in anxiety  4. Mood stabilization
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
## 2020-08-24 NOTE — PLAN OF CARE
BEHAVIORAL TEAM DISCUSSION    Participants: Byron Miller PA Student, Sola WOMACK, Rusty ARREDONDO, JANNETH  Progress:  Continuing to assess  Anticipated length of stay: 5-7 days  Continued Stay Criteria/Rationale: New patient  Medical/Physical:  Per Internal Medicine  Precautions:   Behavioral Orders   Procedures    Assault precautions    Code 1 - Restrict to Unit    Routine Programming     As clinically indicated    Single Room    Status 15     Every 15 minutes.    Suicide precautions     Patients on Suicide Precautions should have a Combination Diet ordered that includes a Diet selection(s) AND a Behavioral Tray selection for Safe Tray - with utensils, or Safe Tray - NO utensils       Plan: Patient will have a comprehensive psychiatric assessment to discuss medication options and treatment plan. CTC will meet with patient to discuss disposition plan. During hospitalization patient will attend therapy groups and participate in unit programming. CTC will coordinate aftercare plan.   Rationale for change in precautions or plan: No change

## 2020-08-24 NOTE — PLAN OF CARE
Problem: Behavioral Health Plan of Care  Goal: Patient-Specific Goal (Individualization)  Flowsheets (Taken 8/24/2020 1050)  Patient Personal Strengths:   ability to maintain sobriety   community support   coping skills   expressive of emotions   expressive of needs   family/social support   independent living skills   insight into illness/situation   intellectual cognitive skills   medication/treatment adherence   motivated for recovery   motivated for treatment   no history of violence   positive attitude   positive vocational history   positive educational history   resourceful   realistic evaluation of current/future capabilities   socioeconomic stability   self-awareness   stable living environment  Note:

## 2020-08-24 NOTE — PROGRESS NOTES
Work Completed:  CTC attended Team Meeting, reviewed chart notes. Completed Care Plan, Goal Note and Strengths and vulnerabilities.     Discharge plan or goal:  Unknown as patient may need referral for residential eating disorder program.                Barriers to discharge:  Acuity of illness complicated by anorexia. Patient continues to stabilize.

## 2020-08-24 NOTE — PROGRESS NOTES
"   08/23/20 2200   Behavioral Health   Hallucinations denies / not responding to hallucinations   Thinking confused;distractable;poor concentration   Orientation person: oriented;place: oriented   Memory baseline memory   Insight poor   Judgement impaired   Eye Contact at floor   Affect blunted, flat;sad   Mood depressed;hopeless;anxious;mood is calm   Physical Appearance/Attire appears stated age;attire appropriate to age and situation   Hygiene well groomed   Suicidality other (see comments)  (denies)   1. Wish to be Dead (Recent) Yes   Self Injury other (see comment)  (denies)   Elopement   (none staed or observed)   Activity isolative;withdrawn   Speech rambling;tangential;clear;coherent   Medication Sensitivity no stated side effects;no observed side effects   Psychomotor / Gait balanced;steady   Activities of Daily Living   Hygiene/Grooming independent   Oral Hygiene independent   Dress independent   Laundry with supervision   Room Organization independent     Patient was isolative and withdrawn. Spent majority of the time in her room either sleeping or laying in bed. Pt talked about her eating disorder and how she has been feeling nauseous. Patient reported that she has been feeling like vomiting all day. She said she was very anxious today and was having flashbacks of her PTSD. Patient reported feeling hopeless and helpless. She stated that she she is not actively suicidal but she wishes to be dead. Patient stated that she would have been better off if she wasn't alive.\" when writer asked if she is having any thoughts of hurting herself or ending her life, patient said \"No\" pt. continues to be very anxious. Pt requires a lot of time and attention and has had a lot of needs/requests throughout the shift. Patient took a shower. Denies any concerns, and contracts for safety.   "

## 2020-08-25 PROCEDURE — 99207 ZZC NO CHARGE, DOC BY STUDENT: CPT | Performed by: PSYCHIATRY & NEUROLOGY

## 2020-08-25 PROCEDURE — 25000132 ZZH RX MED GY IP 250 OP 250 PS 637: Performed by: PSYCHIATRY & NEUROLOGY

## 2020-08-25 PROCEDURE — 12400001 ZZH R&B MH UMMC

## 2020-08-25 RX ADMIN — GABAPENTIN 300 MG: 300 CAPSULE ORAL at 03:52

## 2020-08-25 RX ADMIN — PRAZOSIN HYDROCHLORIDE 2 MG: 2 CAPSULE ORAL at 22:29

## 2020-08-25 RX ADMIN — GABAPENTIN 600 MG: 300 CAPSULE ORAL at 21:50

## 2020-08-25 RX ADMIN — GABAPENTIN 300 MG: 300 CAPSULE ORAL at 16:10

## 2020-08-25 RX ADMIN — CLINDAMYCIN PHOSPHATE 1 G: 10 GEL TOPICAL at 08:58

## 2020-08-25 RX ADMIN — HYDROXYZINE HYDROCHLORIDE 50 MG: 25 TABLET, FILM COATED ORAL at 15:21

## 2020-08-25 RX ADMIN — DOCUSATE SODIUM 100 MG: 50 CAPSULE, LIQUID FILLED ORAL at 08:58

## 2020-08-25 RX ADMIN — OLANZAPINE 5 MG: 2.5 TABLET, FILM COATED ORAL at 03:52

## 2020-08-25 RX ADMIN — VENLAFAXINE HYDROCHLORIDE 75 MG: 75 CAPSULE, EXTENDED RELEASE ORAL at 21:50

## 2020-08-25 RX ADMIN — MAGNESIUM HYDROXIDE 30 ML: 400 SUSPENSION ORAL at 18:45

## 2020-08-25 RX ADMIN — HYDROXYZINE HYDROCHLORIDE 50 MG: 25 TABLET, FILM COATED ORAL at 11:29

## 2020-08-25 RX ADMIN — CALCIUM CARBONATE (ANTACID) CHEW TAB 500 MG 500 MG: 500 CHEW TAB at 22:13

## 2020-08-25 RX ADMIN — ACETAMINOPHEN 650 MG: 325 TABLET, FILM COATED ORAL at 08:58

## 2020-08-25 RX ADMIN — GABAPENTIN 300 MG: 300 CAPSULE ORAL at 12:19

## 2020-08-25 RX ADMIN — POLYETHYLENE GLYCOL 3350 17 G: 17 POWDER, FOR SOLUTION ORAL at 21:50

## 2020-08-25 RX ADMIN — ARIPIPRAZOLE 2 MG: 2 TABLET ORAL at 08:58

## 2020-08-25 RX ADMIN — TRAZODONE HYDROCHLORIDE 150 MG: 150 TABLET ORAL at 21:50

## 2020-08-25 RX ADMIN — Medication 50 MCG: at 08:58

## 2020-08-25 RX ADMIN — MULTIPLE VITAMINS W/ MINERALS TAB 1 TABLET: TAB at 08:58

## 2020-08-25 RX ADMIN — FAMOTIDINE 20 MG: 20 TABLET ORAL at 08:58

## 2020-08-25 RX ADMIN — OXYCODONE HYDROCHLORIDE AND ACETAMINOPHEN 500 MG: 500 TABLET ORAL at 08:58

## 2020-08-25 RX ADMIN — SERTRALINE HYDROCHLORIDE 100 MG: 100 TABLET ORAL at 08:58

## 2020-08-25 RX ADMIN — GABAPENTIN 300 MG: 300 CAPSULE ORAL at 08:58

## 2020-08-25 RX ADMIN — ACETAMINOPHEN 650 MG: 325 TABLET, FILM COATED ORAL at 14:35

## 2020-08-25 ASSESSMENT — ACTIVITIES OF DAILY LIVING (ADL)
ORAL_HYGIENE: INDEPENDENT
DRESS: INDEPENDENT
HYGIENE/GROOMING: INDEPENDENT
DRESS: SCRUBS (BEHAVIORAL HEALTH);STREET CLOTHES
HYGIENE/GROOMING: INDEPENDENT
LAUNDRY: WITH SUPERVISION
ORAL_HYGIENE: INDEPENDENT
LAUNDRY: WITH SUPERVISION

## 2020-08-25 NOTE — PROGRESS NOTES
"Pt was restless but calm throughout the shift.  She didn't eat much of her supper and no snacks and also not hydrating.  Pt reported having flashbacks from last night.  She feels that she wants to die and it would fix everything and she doesn't feels supported at home from her family.  Pt also stated that she is only living for a few people and not herself and stated that she doesn't know why she can't live for herself as well as a few people.      Pt reported depression at 9, sadness \"I'm internalizing everything.  It's the aspect of dying and I'm not sad if I die,\" confusion \"why God hasn't taken everything from me,\" anxiety at 10, concentration is low, and a lot of racing thoughts especially when she isolates in her room.  Pt reported thoughts of harming herself but she didn't want to divulge her plan to staff but she also did say that she would try to stay safe while she is here.  She reported no hallucinations.       Pt stated \"how do I know when it's time for me to discharge.  I feel that the meds aren't helping me and they have tried 3 different anxiety meds and they all haven't worked.\"  She reported her appetite \"when I eat food I feel like I'm losing control and that's when my suicidal thoughts increase,\" sleep \"I wake up because I have bad nightmares.  I can fall asleep but I can't stay asleep,\" pain \"headache and a stomach ache.  The stomach ache is probably from not eating enough and anxiety,\" and SE's \"more tired.\"         Staff will continue to monitor.      08/24/20 2208   Behavioral Health   Hallucinations denies / not responding to hallucinations   Thinking distractable;paranoid;poor concentration   Orientation time: oriented;date: oriented;place: oriented;person: oriented   Memory baseline memory   Insight poor   Judgement impaired   Eye Contact at examiner  (at her bed)   Affect blunted, flat;tense   Mood depressed;anxious;mood is calm   Physical Appearance/Attire appears stated age   Hygiene " well groomed   Suicidality active;thoughts and plan  (pt won't devulge plan to staff)   1. Wish to be Dead (Recent) Yes   Wish to be Dead Description (Recent)   (SI thoughts and plan but won't tell staff )   2. Non-Specific Active Suicidal Thoughts (Recent) Yes   3. Active Sucidal Ideation with any Methods (Not Plan) Without Intent to Act (Recent) Yes   Active Suicidal Ideation with any Methods (Not Plan) Description (Recent)   (SI thoughts, refused to state plan)   4. Active Suicidal Ideation with Some Intent to Act, Without Specific Plan (Recent) Yes   Active Suicidal Ideation with Some Intent to Act, Without Specific Plan Description (Recent)   (SI thoughts, refused to devulge plan)   5. Active Suicidal Ideation with Specific Plan and Intent (Recent) No   Self Injury plan;active;urges  (SI refuses to state plan)   Elopement   (shows no signs)   Activity withdrawn;isolative;restless   Speech coherent;clear;other (see comments)  (muffled at times)   Medication Sensitivity no stated side effects   Psychomotor / Gait steady;balanced   Activities of Daily Living   Hygiene/Grooming independent   Oral Hygiene independent   Dress scrubs (behavioral health);street clothes   Laundry with supervision   Room Organization independent

## 2020-08-25 NOTE — PROGRESS NOTES
"            Work Completed: JANNETH (writer) met with trx team, provided an update, and reviewed case. CTC received a call from care manager, Georgina [phone], at [facility] and was informed that pt's psychiatrist there recommended that pt attend a PHP program before returning to [facility]. At this time the [facility] team believes that pt needs to address her depression prior to being able to return to [place] to focus on her ED issues, and CTC updated attending.    [facility] met with pt to introduce themselves, and update them about what [facility] had reported. Pt stated that she was trying to get into residential at [facility] and that she wasn't interested in PHP at this time. [facility] explained that [place] team had called today and expressed that they would like pt to attend a PHP prior to her coming back. CT offered to help in getting referrals or appointments set up to facilitate this, but pt abruptly ended their conversations stating: \"I don't want to talk anymore\" and walked out of her room. Pt immediately went to make a call on the phone, [facility] is unsure of who pt called.    Discharge plan or goal:  Trx team is supporting referral to PHP program at this time.                  Barriers to discharge:  Acuity of illness complicated by anorexia. Patient continues to stabilize.    "

## 2020-08-25 NOTE — PROGRESS NOTES
Writer was able to reassess pt regarding her statement of SI, SIB at past 2200 and she said she has SI thoughts but no plans to act on them. She added that she will only have plans outside the hospital.  She said she will let staff know if those thoughts are bothering her during the night. She added that she will only have plans outside the hospital. All available PRN medications were given this shift for increase anxiety, pain and chronic SI thoughts. Will continue to monitor and assess pt.

## 2020-08-25 NOTE — PLAN OF CARE
"Pt is still stating \"if I leave the hospital right now, I will kill myself\". However, pt does contract for safety in the hospital. Pt has been complaining of \"high anxiety and flashbacks\" this afternoon. Pt states that since we have started recording her intake, she has felt a lot more anxious about that because eating/food in general causes a lot of anxiety for her. Pt given scheduled gabapentin 300 mg and a lavender patch around 1615. Pt needs a lot of reassurance and has been talking with multiple staff this evening.    Pt explained to writer that her main concern right now is taking care of her eating disorder because she feels like that is what is making her depression and SI thoughts worse. Writer gave patient a lot of positive feedback and we talked a lot about trying to change her negative thought processes related to food into positive ones. Pt stated she looks forward to having phone appointment with her outpatient therapist tomorrow. She says she is feeling stuck where she is because she feels like she needs to be doing her school work and she feels like she needs to be at [facility] for her eating disorder. Pt also mentioned she puts a lot of pressure and expectations on herself. We talked about taking baby steps which she seemed a bit receptive to. Pt was able to identify people in her support system as well.      Pt is complaining of lower abdominal cramping. Pt told this writer she has not had a bowel movement since last Thursday 8/20/2020. Pt states \"it's because i've abused laxatives in the past\". Pt states she feels comfortable trying milk of magnesia to try to have BM. Pt is also encouraged to try to eat more fiber, drink more water, and take some walks in the [place]. Pt verbalizes understanding of education. Will continue to monitor.  Pt given PRN miralax later tonight and prune juice to try to help with bowel movement. No success as of yet.     Pt is requesting another consult with ZACHARY. Pt also " requested to get Vanilla boost with dinner tray instead of the berry boost breeze. After further discussion related to her lactose allergy, she stated maybe she would try the peach boost breeze if available. Will pass this information on to day shift to discuss in team.    Pt took in 5,760 mLs of fluids this evening with a lot of encouragement from staff. Pt also ate some dinner and multiple snacks. Pt given a lot of positive feedback related to eating and drinking.

## 2020-08-25 NOTE — PROGRESS NOTES
"[facility], [place]   Psychiatric Progress Note        Interim History:   The patient's care was discussed with the treatment team during the daily team meeting and/or staff's chart notes were reviewed.  Staff report patient had a difficult night due to \"nightmares\" and \"flashbacks\" to past traumas in spite of taking medications including Minipress. She's still been feeling anxious, but reported that Zyprexa has been somewhat helpful for this. She reportedly been severely restricting meal intake (limited to supper). She was reporting suicidal ideations last evening (initially was not forthcoming about plan, but then later stated that she would attempt suicide in the hospital).      Time In: 1030  Time Out: 1045    The patient (\"CC\") is a 20 year old female who was recently discharged on 08/14/2020 from [facility] (refer to past notes for reason for admission and discharge) but was readmitted on 08/21/2020 for worsening suicidal ideation (with plan to \"drown herself\"). She is tapering off of Effexor and is on Zoloft.     Of note, patient reported being inpatient for eating disorder six times in the past. She sees a lot of providers as an outpatient, including therapists and a psychiatrist. She's been a part of the [facility] program as well.     She is on a 72 hour hold.     The patient was seen in the presence of a PA student. The patient states that, overall, she feels a \"little better\" than before. She states that she did have nightmares last night and was awake multiple times, and was complaining of waking up with sweats and dizziness/lightheadedness. She does complain of anxiety and suicidal ideations. She states that she does feel safe here (not intent of suicide or self harm here in the hospital) but it sounds like she would not feel safe if she were at home. When queried about meals, she states that she did eat a banana and grapes this morning for breakfast. " "Regarding her 72 hour hold, although apprehensive and indecisive at first, she eventually agrees to stay as late as Monday if deemed appropriate for safety and stability. We did inquire about participating in a partial hospitalization program, be she declined this option, stating that \"she hates groups\" and relates that groups bring up memories of past traumas. She did request a referral for inpatient program at [facility]. She does express desire to return to online college classes that she is enrolled for. Overall, she is tolerating medication changes. She does express that she \"wants to get help\". She also states that her family is going on a trip and will not return until Sunday evening. She did request if staff could check on her at night and, if she's awake, ask her how she is doing. She seems to tolerate yesterday med adjustments well. Otherwise no other questions or complaints at this time.          Medications:       ARIPiprazole  2 mg Oral Daily     clindamycin  1 applicator Topical Daily     docusate sodium  100 mg Oral Daily     [START ON 9/16/2020] erenumab-aooe  140 mg Subcutaneous Q30 Days     famotidine  20 mg Oral Daily     gabapentin  300 mg Oral TID     gabapentin  600 mg Oral At Bedtime     multivitamin w/minerals  1 tablet Oral Daily     prazosin  2 mg Oral At Bedtime     sertraline  100 mg Oral Daily     traZODone  150 mg Oral At Bedtime     tretinoin  1 g Topical At Bedtime     venlafaxine  75 mg Oral At Bedtime     vitamin C  500 mg Oral Daily     vitamin D3  50 mcg Oral Daily          Allergies:     Allergies   Allergen Reactions     Penicillins      Other Food Allergy GI Disturbance     Cilantro          Labs:   No results found for this or any previous visit (from the past 24 hour(s)).       Psychiatric Examination:     /64   Pulse 83   Temp 98.1  F (36.7  C) (Oral)   Resp 16   Ht 1.6 m (5' 3\")   SpO2 98%   BMI 21.28 kg/m    Weight is 0 lbs 0 oz  Body mass index is 21.28 " kg/m .    Orthostatic Vitals       Most Recent      Sitting Orthostatic /70 08/25 0905    Sitting Orthostatic Pulse (bpm) 107 08/25 0905    Standing Orthostatic /62 08/25 0905    Standing Orthostatic Pulse (bpm) 112 08/25 0905        Appearance: awake, alert  Attitude:  somewhat cooperative  Eye Contact:  poor   Mood:  depressed, anxious  Affect:  intensity is blunted  Speech:  clear, coherent; some latency before answering questions  Psychomotor Behavior:  no evidence of tardive dyskinesia, dystonia, or tics  Throught Process:  logical, linear  Associations:  no loose associations  Thought Content:  positive for suicidal thinking, but no evidence of homicidal ideations; no evidence of auditory or visual hallucinations  Insight:  moderately impaired  Judgement:  moderately impaired  Oriented to:  time, person, and place  Attention Span and Concentration:  intact  Recent and Remote Memory:  intact    Clinical Global Impressions  First:  Considering your total clinical experience with this particular patient population, how severe are the patient's symptoms at this time?: 6 (08/22/20 1010)  Compared to the patient's condition at the START of treatment, this patient's condition is: 4 (08/22/20 1010)  Most recent:  Considering your total clinical experience with this particular patient population, how severe are the patient's symptoms at this time?: 6 (08/22/20 1010)  Compared to the patient's condition at the START of treatment, this patient's condition is: 4 (08/22/20 1010)         Precautions:     Behavioral Orders   Procedures     Assault precautions     Code 1 - Restrict to Unit     Routine Programming     As clinically indicated     Single Room     Status 15     Every 15 minutes.     Suicide precautions     Patients on Suicide Precautions should have a Combination Diet ordered that includes a Diet selection(s) AND a Behavioral Tray selection for Safe Tray - with utensils, or Safe Tray - NO utensils             DIagnoses:   1. Major Depression  2. Generalized Anxiety Disorder  3. Postraumatic Stress Disorder  4. Anorexia  5. OCD  6. Borderline Personality Disorder         Plan:     No medication adjustments today. She remains on a 72 hour hold through tomorrow.      Disposition: Inpatient eating disorder program at [facility] versus [facility], will discuss with the patient's outpatient team. I will contact Dr. Jarquin (her psychiatrist) to discuss possible option of inpatient program at [facility].

## 2020-08-25 NOTE — PROGRESS NOTES
"Pt woke up at 0345 and came to the desk requesting \"something for anxiety\". Pt stated she had a bad dream including flashbacks that woke her up. She stated \"my thoughts are just really bad right now\". Pt first asked if she could have her 2nd trazodone dose, but it is too late in the night for that.  Pt then requested gabapentin and zyprexa. PRN gabapentin 300 mg and PRN zyprexa 5 mg were given. Will continue to monitor pt.  "

## 2020-08-26 PROCEDURE — 25000132 ZZH RX MED GY IP 250 OP 250 PS 637: Performed by: PSYCHIATRY & NEUROLOGY

## 2020-08-26 PROCEDURE — G0177 OPPS/PHP; TRAIN & EDUC SERV: HCPCS

## 2020-08-26 PROCEDURE — 12400001 ZZH R&B MH UMMC

## 2020-08-26 PROCEDURE — 99207 ZZC NO CHARGE, DOC BY STUDENT: CPT | Performed by: PSYCHIATRY & NEUROLOGY

## 2020-08-26 RX ORDER — NAPROXEN 250 MG/1
250 TABLET ORAL 3 TIMES DAILY PRN
Status: DISCONTINUED | OUTPATIENT
Start: 2020-08-26 | End: 2020-08-27 | Stop reason: HOSPADM

## 2020-08-26 RX ORDER — VENLAFAXINE HYDROCHLORIDE 37.5 MG/1
37.5 CAPSULE, EXTENDED RELEASE ORAL AT BEDTIME
Status: DISCONTINUED | OUTPATIENT
Start: 2020-08-26 | End: 2020-08-27 | Stop reason: HOSPADM

## 2020-08-26 RX ADMIN — ACETAMINOPHEN 650 MG: 325 TABLET, FILM COATED ORAL at 11:34

## 2020-08-26 RX ADMIN — DOCUSATE SODIUM 100 MG: 50 CAPSULE, LIQUID FILLED ORAL at 11:32

## 2020-08-26 RX ADMIN — HYDROXYZINE HYDROCHLORIDE 50 MG: 25 TABLET, FILM COATED ORAL at 11:34

## 2020-08-26 RX ADMIN — OXYCODONE HYDROCHLORIDE AND ACETAMINOPHEN 500 MG: 500 TABLET ORAL at 11:32

## 2020-08-26 RX ADMIN — ACETAMINOPHEN 650 MG: 325 TABLET, FILM COATED ORAL at 01:35

## 2020-08-26 RX ADMIN — SERTRALINE HYDROCHLORIDE 100 MG: 100 TABLET ORAL at 11:33

## 2020-08-26 RX ADMIN — TRAZODONE HYDROCHLORIDE 150 MG: 150 TABLET ORAL at 21:33

## 2020-08-26 RX ADMIN — NAPROXEN 250 MG: 250 TABLET ORAL at 21:34

## 2020-08-26 RX ADMIN — NAPROXEN 250 MG: 250 TABLET ORAL at 14:29

## 2020-08-26 RX ADMIN — ARIPIPRAZOLE 2 MG: 2 TABLET ORAL at 11:32

## 2020-08-26 RX ADMIN — PRAZOSIN HYDROCHLORIDE 3 MG: 2 CAPSULE ORAL at 21:33

## 2020-08-26 RX ADMIN — GABAPENTIN 300 MG: 300 CAPSULE ORAL at 14:30

## 2020-08-26 RX ADMIN — HYDROXYZINE HYDROCHLORIDE 50 MG: 25 TABLET, FILM COATED ORAL at 01:35

## 2020-08-26 RX ADMIN — TRETINOIN 1 G: 0.5 CREAM TOPICAL at 21:34

## 2020-08-26 RX ADMIN — OLANZAPINE 5 MG: 2.5 TABLET, FILM COATED ORAL at 13:13

## 2020-08-26 RX ADMIN — Medication 50 MCG: at 11:32

## 2020-08-26 RX ADMIN — MULTIPLE VITAMINS W/ MINERALS TAB 1 TABLET: TAB at 11:33

## 2020-08-26 RX ADMIN — GABAPENTIN 300 MG: 300 CAPSULE ORAL at 11:33

## 2020-08-26 RX ADMIN — OLANZAPINE 5 MG: 2.5 TABLET, FILM COATED ORAL at 04:07

## 2020-08-26 RX ADMIN — TRAZODONE HYDROCHLORIDE 50 MG: 50 TABLET ORAL at 01:35

## 2020-08-26 RX ADMIN — GABAPENTIN 600 MG: 300 CAPSULE ORAL at 21:33

## 2020-08-26 RX ADMIN — CALCIUM CARBONATE (ANTACID) CHEW TAB 500 MG 500 MG: 500 CHEW TAB at 20:17

## 2020-08-26 RX ADMIN — GABAPENTIN 300 MG: 300 CAPSULE ORAL at 17:07

## 2020-08-26 RX ADMIN — VENLAFAXINE HYDROCHLORIDE 37.5 MG: 37.5 CAPSULE, EXTENDED RELEASE ORAL at 21:34

## 2020-08-26 RX ADMIN — FAMOTIDINE 20 MG: 20 TABLET ORAL at 11:33

## 2020-08-26 RX ADMIN — CLINDAMYCIN PHOSPHATE 1 G: 10 GEL TOPICAL at 11:34

## 2020-08-26 RX ADMIN — HYDROXYZINE HYDROCHLORIDE 50 MG: 25 TABLET, FILM COATED ORAL at 18:23

## 2020-08-26 ASSESSMENT — ACTIVITIES OF DAILY LIVING (ADL)
DRESS: INDEPENDENT
LAUNDRY: WITH SUPERVISION
HYGIENE/GROOMING: INDEPENDENT
HYGIENE/GROOMING: INDEPENDENT
LAUNDRY: WITH SUPERVISION
ORAL_HYGIENE: INDEPENDENT
ORAL_HYGIENE: INDEPENDENT
DRESS: STREET CLOTHES;SCRUBS (BEHAVIORAL HEALTH)

## 2020-08-26 NOTE — PROGRESS NOTES
"Talked with patient. She reported that her therapist requested her weight to be done. The therapist wanted to make sure she is eating in the hospital. Patient's weight was 115 lb. Patient was upset about it because she wanted it to be lower. She was also upset that her therapist want her to continue therapy. Patient stated \" I hate my life, I should have found a way to die. I wasn't even that suicidal but now I am after talking to her.\" Patient continues to ask for miralax because she doesn't like her weight being that high. She has a small BM on day shift. She also reported \" I took a bunch of laxatives before I came in, my mom gives me laxatives when I gain too much weight at home\". Patient calmed down after a while and apologized for the way she acted. She appear brighter and socializing with peers. Patient thanked writer for talking with her today. He ate dinner and had snack. She wasn't focused on her weight, she stated, \" I think you are right, I just need to get healthy and that is why I'm here.\" Patient denied SI,SIB thoughts in the hospital but is afraid she will act on them at home. She did a few activities tonight independently.   "

## 2020-08-26 NOTE — PROGRESS NOTES
"[facility], [place]   Psychiatric Progress Note        Interim History:   The patient's care was discussed with the treatment team during the daily team meeting and/or staff's chart notes were reviewed.  Staff report patient \"needs lots of reassurance\". Reportedly had another difficult night sleeping despite medication regimen including Trazodone. She reported feeling safe here, but would not feel safe to discharge home. She took a PRN laxative due to constipation. She continues to restrict meal intake. [facility] reported that [facility] is willing to take the patient if she participates in a PHP first.     Time In: 1030  Time Out: 1045    The patient (\"CC\") is a 20 year old female who was recently discharged on 08/14/2020 from [facility] (refer to past notes for reason for admission and discharge) but was readmitted on 08/21/2020 for worsening suicidal ideation (with plan to \"drown herself\"). She is tapering off of Effexor and is on Zoloft.     Of note, patient reported being inpatient for eating disorder six times in the past. She sees a lot of providers as an outpatient, including therapists and a psychiatrist. She's been a part of the [facility] program as well.     She is on a 72 hour hold.     The patient was seen in the presence of a PA student. The patient had just woken up from sleep before the start of the interview. The patient states that she \"still feels depressed\" and \"worse than yesterday\" - voices suicidal ideations, but states that she feels safe at the hospital. She reports ongoing difficulties with sleep - requesting for melatonin (10 mg). She also requested for Naproxen for migraines. Agreed with suggestion to increase Minipress dose. When queried about [facility]'s request for PHP, the patient states that she does not want to participate in a PHP (voicing that she does not like group therapies), states that she does not want to return to [facility], and says that " "her mother canceled her Friday appointment with [facility]. She explains that she is planning to see \"an eating disorder doctor\" through [facility] and plans to continue with therapists she's been seeing as well as a dietician. She states that she would not feel safe to discharge home before Monday (stating that her parents don't return home from a trip until late Sunday night). She was requesting to go outside, but we explained that we cannot give her a pass due to safety concerns. Otherwise no other questions at this time.     Side Note: RN reported that the patient was \"requesting to have a different doctor\", but after speaking with the patient, she did agree to stay with the same doctor.           Medications:       ARIPiprazole  2 mg Oral Daily     clindamycin  1 applicator Topical Daily     docusate sodium  100 mg Oral Daily     [START ON 9/16/2020] erenumab-aooe  140 mg Subcutaneous Q30 Days     famotidine  20 mg Oral Daily     gabapentin  300 mg Oral TID     gabapentin  600 mg Oral At Bedtime     multivitamin w/minerals  1 tablet Oral Daily     prazosin  2 mg Oral At Bedtime     sertraline  100 mg Oral Daily     traZODone  150 mg Oral At Bedtime     tretinoin  1 g Topical At Bedtime     venlafaxine  75 mg Oral At Bedtime     vitamin C  500 mg Oral Daily     vitamin D3  50 mcg Oral Daily          Allergies:     Allergies   Allergen Reactions     Penicillins      Other Food Allergy GI Disturbance     Cilantro          Labs:   No results found for this or any previous visit (from the past 24 hour(s)).       Psychiatric Examination:     /76   Pulse 75   Temp 98.9  F (37.2  C) (Oral)   Resp 16   Ht 1.6 m (5' 3\")   SpO2 98%   BMI 21.28 kg/m    Weight is 0 lbs 0 oz  Body mass index is 21.28 kg/m .    Orthostatic Vitals       Most Recent      Sitting Orthostatic /68 08/26 1238    Sitting Orthostatic Pulse (bpm) 76 08/26 1238    Standing Orthostatic /71 08/26 1238    Standing Orthostatic Pulse " (bpm) 93 08/26 1238        Appearance: awake, alert  Attitude:  somewhat cooperative  Eye Contact:  poor   Mood:  depressed, anxious,   Affect:  intensity is blunted, slightly irritable  Speech:  clear, coherent; some latency before answering questions  Psychomotor Behavior:  no evidence of tardive dyskinesia, dystonia, or tics  Throught Process:  logical, linear  Associations:  no loose associations  Thought Content:  positive for suicidal thinking, but no evidence of homicidal ideations; no evidence of auditory or visual hallucinations  Insight:  moderately impaired  Judgement:  moderately impaired  Oriented to:  time, person, and place  Attention Span and Concentration:  intact  Recent and Remote Memory:  intact    Clinical Global Impressions  First:  Considering your total clinical experience with this particular patient population, how severe are the patient's symptoms at this time?: 6 (08/22/20 1010)  Compared to the patient's condition at the START of treatment, this patient's condition is: 4 (08/22/20 1010)  Most recent:  Considering your total clinical experience with this particular patient population, how severe are the patient's symptoms at this time?: 6 (08/22/20 1010)  Compared to the patient's condition at the START of treatment, this patient's condition is: 4 (08/22/20 1010)         Precautions:     Behavioral Orders   Procedures     Assault precautions     Code 1 - Restrict to Unit     Routine Programming     As clinically indicated     Single Room     Status 15     Every 15 minutes.     Suicide precautions     Patients on Suicide Precautions should have a Combination Diet ordered that includes a Diet selection(s) AND a Behavioral Tray selection for Safe Tray - with utensils, or Safe Tray - NO utensils            DIagnoses:   1. Major Depression  2. Generalized Anxiety Disorder  3. Postraumatic Stress Disorder  4. Anorexia  5. OCD  6. Borderline Personality Disorder         Plan:     Adding  melatonin (10 mg) as per the patient's request for sleep. We increased minipress from 2 mg to 3 mg for nightmares. Will add naproxen for migraines. She remains on a 72 hour hold through this evening. After this will sign in voluntarily.     Disposition: Home on Monday, with possibility of intermediate location as the patient states she would not feel safe at home without her parents there (they reportedly arrive home from a trip on Sunday evening).

## 2020-08-26 NOTE — PROGRESS NOTES
Work Completed: JANNETH (writer) met with trx team, provided an update, and reviewed case. Pt continues to advocate that she isn't ready for discharge. Pt has changed her mind about [facility] and no longer wants to attend their program. Pt continues to not be open to PHP/Day trx, but may be more willing tomorrow. JANNETH advocated that pt should discharge once her 72 hour hold is up due to pt having substantial services in place in the community and her flippant attitude towards her trx goals. Trx team will discuss pt case again tomorrow.      Discharge plan or goal:  Trx team is supporting referral to PHP program at this time.                  Barriers to discharge:  Acuity of illness complicated by anorexia. Patient continues to stabilize.

## 2020-08-26 NOTE — PROGRESS NOTES
Pt slept in until the early afternoon, reported poor sleep the day prior which is why she slept in late today. Pt reports high anxiety and depression. Minimally social with peers. Pt approached staff and reported thoughts of suicide, and thoughts specifically of self harm. Pt agreed to approach staff when the urges become unmanageable, agreed to remain in the lounge and try coping skills.      08/26/20 1430   Behavioral Health   Hallucinations denies / not responding to hallucinations   Thinking distractable;poor concentration   Orientation person: oriented;place: oriented;date: oriented;time: oriented   Memory baseline memory   Insight poor   Judgement impaired   Eye Contact at examiner   Affect blunted, flat   Mood mood is calm;depressed   Physical Appearance/Attire attire appropriate to age and situation   Hygiene well groomed   Suicidality thoughts and plan   1. Wish to be Dead (Recent) Yes   2. Non-Specific Active Suicidal Thoughts (Recent) Yes   Self Injury urges   Activity isolative;withdrawn;restless   Speech clear;coherent   Medication Sensitivity no stated side effects;no observed side effects   Psychomotor / Gait balanced;steady   Activities of Daily Living   Hygiene/Grooming independent   Oral Hygiene independent   Dress independent   Laundry with supervision   Room Organization independent   Activity   Activity Assistance Provided independent

## 2020-08-26 NOTE — PROGRESS NOTES
08/26/20 1442   General Information   Date Initially Attended OT 08/26/20     OT Groups Attended: Clinic     Affect/Hygiene/Presentation: Calm and pleasant upon approach, engaged, blunted affect. No apparent hygiene concerns.     Patient Performance/Response: Pt actively participated in occupational therapy clinic. Pt was able to ask for assistance as needed, and independently initiate a novel, goal-directed task with minimal assistance for task setup. Pt demonstrated good focus, planning, and attention to detail during task completion. Pt appeared comfortable interacting with peers and writer, and verbalized feeling proud of her project at the end of group.    Plan: More information needed to complete OT Initial Assessment; OT staff will meet with pt to review the role of occupational therapy and explain the value of having them involved in their treatment plan including options to meet current needs/self-identified goals. As group attendance is established, Pt will be given self-assessment to inform OT initial assessment.

## 2020-08-27 ENCOUNTER — TELEPHONE (OUTPATIENT)
Dept: PHARMACY | Facility: OTHER | Age: 20
End: 2020-08-27

## 2020-08-27 VITALS
RESPIRATION RATE: 16 BRPM | HEART RATE: 75 BPM | TEMPERATURE: 98.2 F | BODY MASS INDEX: 21.28 KG/M2 | DIASTOLIC BLOOD PRESSURE: 66 MMHG | OXYGEN SATURATION: 97 % | HEIGHT: 63 IN | SYSTOLIC BLOOD PRESSURE: 109 MMHG

## 2020-08-27 PROCEDURE — 25000132 ZZH RX MED GY IP 250 OP 250 PS 637: Performed by: PSYCHIATRY & NEUROLOGY

## 2020-08-27 PROCEDURE — 99239 HOSP IP/OBS DSCHRG MGMT >30: CPT | Performed by: PSYCHIATRY & NEUROLOGY

## 2020-08-27 RX ORDER — MULTIPLE VITAMINS W/ MINERALS TAB 9MG-400MCG
1 TAB ORAL DAILY
Qty: 30 TABLET | Refills: 0 | Status: SHIPPED | OUTPATIENT
Start: 2020-08-27

## 2020-08-27 RX ORDER — CALCIUM CARBONATE 500 MG/1
1 TABLET, CHEWABLE ORAL PRN
Qty: 30 TABLET | Refills: 0 | Status: SHIPPED | OUTPATIENT
Start: 2020-08-27

## 2020-08-27 RX ORDER — TRETINOIN 0.5 MG/G
1 CREAM TOPICAL AT BEDTIME
Qty: 45 G | Refills: 0 | Status: ON HOLD | OUTPATIENT
Start: 2020-08-27 | End: 2021-04-13

## 2020-08-27 RX ORDER — ARIPIPRAZOLE 2 MG/1
2 TABLET ORAL DAILY
Qty: 30 TABLET | Refills: 0 | Status: ON HOLD | OUTPATIENT
Start: 2020-08-27 | End: 2021-04-13

## 2020-08-27 RX ORDER — VENLAFAXINE HYDROCHLORIDE 37.5 MG/1
37.5 CAPSULE, EXTENDED RELEASE ORAL AT BEDTIME
Qty: 3 CAPSULE | Refills: 0 | Status: ON HOLD | OUTPATIENT
Start: 2020-08-27 | End: 2021-04-13

## 2020-08-27 RX ORDER — PHENOL 1.4 %
10 AEROSOL, SPRAY (ML) MUCOUS MEMBRANE
Qty: 30 TABLET | Refills: 0 | Status: SHIPPED | OUTPATIENT
Start: 2020-08-27 | End: 2021-09-28

## 2020-08-27 RX ORDER — FAMOTIDINE 20 MG/1
20 TABLET, FILM COATED ORAL DAILY
Qty: 30 TABLET | Refills: 1 | Status: ON HOLD | OUTPATIENT
Start: 2020-08-27 | End: 2021-04-13

## 2020-08-27 RX ORDER — GABAPENTIN 300 MG/1
300 CAPSULE ORAL 3 TIMES DAILY
Qty: 90 CAPSULE | Refills: 1 | Status: SHIPPED | OUTPATIENT
Start: 2020-08-27 | End: 2021-04-14

## 2020-08-27 RX ORDER — DOCUSATE SODIUM 100 MG/1
100 CAPSULE, LIQUID FILLED ORAL DAILY
Qty: 30 CAPSULE | Refills: 1 | Status: ON HOLD | OUTPATIENT
Start: 2020-08-27 | End: 2021-04-13

## 2020-08-27 RX ORDER — CHOLECALCIFEROL (VITAMIN D3) 50 MCG
1 TABLET ORAL DAILY
Qty: 30 TABLET | Refills: 0 | Status: SHIPPED | OUTPATIENT
Start: 2020-08-27 | End: 2023-01-04

## 2020-08-27 RX ORDER — TRAZODONE HYDROCHLORIDE 150 MG/1
150 TABLET ORAL AT BEDTIME
Qty: 30 TABLET | Refills: 0 | Status: SHIPPED | OUTPATIENT
Start: 2020-08-27 | End: 2021-09-28

## 2020-08-27 RX ORDER — HYDROXYZINE HYDROCHLORIDE 25 MG/1
25-50 TABLET, FILM COATED ORAL EVERY 4 HOURS PRN
Qty: 60 TABLET | Refills: 0 | Status: SHIPPED | OUTPATIENT
Start: 2020-08-27 | End: 2021-09-28

## 2020-08-27 RX ORDER — NAPROXEN 250 MG/1
250 TABLET ORAL 3 TIMES DAILY PRN
Qty: 60 TABLET | Refills: 0 | Status: ON HOLD | OUTPATIENT
Start: 2020-08-27 | End: 2023-02-03

## 2020-08-27 RX ORDER — GABAPENTIN 300 MG/1
600 CAPSULE ORAL AT BEDTIME
Qty: 60 CAPSULE | Refills: 1 | Status: SHIPPED | OUTPATIENT
Start: 2020-08-27 | End: 2021-09-28

## 2020-08-27 RX ORDER — CLINDAMYCIN PHOSPHATE 10 MG/G
1 GEL TOPICAL DAILY
Qty: 30 G | Refills: 0 | Status: ON HOLD | OUTPATIENT
Start: 2020-08-27 | End: 2021-04-13

## 2020-08-27 RX ORDER — SERTRALINE HYDROCHLORIDE 100 MG/1
100 TABLET, FILM COATED ORAL DAILY
Qty: 30 TABLET | Refills: 0 | Status: ON HOLD | OUTPATIENT
Start: 2020-08-28 | End: 2021-04-13

## 2020-08-27 RX ORDER — GABAPENTIN 300 MG/1
300 CAPSULE ORAL 3 TIMES DAILY PRN
Qty: 60 CAPSULE | Refills: 0 | Status: ON HOLD | OUTPATIENT
Start: 2020-08-27 | End: 2021-04-13

## 2020-08-27 RX ORDER — POLYETHYLENE GLYCOL 3350 17 G/17G
1 POWDER, FOR SOLUTION ORAL DAILY PRN
Qty: 30 PACKET | Refills: 0 | Status: SHIPPED | OUTPATIENT
Start: 2020-08-27 | End: 2023-01-04

## 2020-08-27 RX ORDER — ASCORBIC ACID 500 MG
500 TABLET ORAL DAILY
Qty: 30 TABLET | Refills: 0 | Status: SHIPPED | OUTPATIENT
Start: 2020-08-27 | End: 2023-01-04

## 2020-08-27 RX ORDER — PRAZOSIN HYDROCHLORIDE 1 MG/1
3 CAPSULE ORAL AT BEDTIME
Qty: 90 CAPSULE | Refills: 0 | Status: ON HOLD | OUTPATIENT
Start: 2020-08-27 | End: 2021-04-13

## 2020-08-27 RX ADMIN — Medication 50 MCG: at 08:52

## 2020-08-27 RX ADMIN — DOCUSATE SODIUM 100 MG: 50 CAPSULE, LIQUID FILLED ORAL at 08:51

## 2020-08-27 RX ADMIN — HYDROXYZINE HYDROCHLORIDE 50 MG: 25 TABLET, FILM COATED ORAL at 04:15

## 2020-08-27 RX ADMIN — FAMOTIDINE 20 MG: 20 TABLET ORAL at 08:51

## 2020-08-27 RX ADMIN — CLINDAMYCIN PHOSPHATE 1 G: 10 GEL TOPICAL at 08:52

## 2020-08-27 RX ADMIN — ARIPIPRAZOLE 2 MG: 2 TABLET ORAL at 08:51

## 2020-08-27 RX ADMIN — SERTRALINE HYDROCHLORIDE 100 MG: 100 TABLET ORAL at 08:52

## 2020-08-27 RX ADMIN — GABAPENTIN 300 MG: 300 CAPSULE ORAL at 13:21

## 2020-08-27 RX ADMIN — HYDROXYZINE HYDROCHLORIDE 50 MG: 25 TABLET, FILM COATED ORAL at 08:52

## 2020-08-27 RX ADMIN — OXYCODONE HYDROCHLORIDE AND ACETAMINOPHEN 500 MG: 500 TABLET ORAL at 08:51

## 2020-08-27 RX ADMIN — MULTIPLE VITAMINS W/ MINERALS TAB 1 TABLET: TAB at 08:52

## 2020-08-27 RX ADMIN — GABAPENTIN 300 MG: 300 CAPSULE ORAL at 08:52

## 2020-08-27 RX ADMIN — GABAPENTIN 300 MG: 300 CAPSULE ORAL at 07:40

## 2020-08-27 RX ADMIN — CALCIUM CARBONATE (ANTACID) CHEW TAB 500 MG 500 MG: 500 CHEW TAB at 10:03

## 2020-08-27 NOTE — PROGRESS NOTES
Staff wasn't able to do a formal check-in but this is what staffed observed.  Pt was active in the milieu and somewhat social with peers and staff.  She spent some time doing a sticker by number and reported that it was really nice to do that and she can't wait to be able to paint her elephant again.  She didn't appear to be a harm to herself or others.  She ate some food for supper and had a couple acosta crackers for snack this evening.      Staff will continue to monitor.      08/26/20 2300   Behavioral Health   Hallucinations other (see comment)  (OMID)   Thinking distractable   Orientation time: oriented;date: oriented;place: oriented;person: oriented   Memory baseline memory   Insight other (see comment)  (IMPROVING)   Judgement   (IMPROVING)   Eye Contact at examiner   Affect full range affect   Mood mood is calm;anxious   Physical Appearance/Attire attire appropriate to age and situation;appears stated age   Hygiene well groomed   Suicidality other (see comments)  (OMID)   1. Wish to be Dead (Recent)   (OMID)   2. Non-Specific Active Suicidal Thoughts (Recent)   (OMID)   3. Active Sucidal Ideation with any Methods (Not Plan) Without Intent to Act (Recent)   (OMID)   4. Active Suicidal Ideation with Some Intent to Act, Without Specific Plan (Recent)   (OMID)   5. Active Suicidal Ideation with Specific Plan and Intent (Recent)   (OMID)   Self Injury other (see comment)  (SHOWS NO SIGNS)   Elopement   (SHOWS NO SIGNS)   Activity withdrawn  (ACTIVE IN [place] AND SOMEWHAT SOCIAL WITH PEERS)   Speech coherent;clear   Medication Sensitivity no observed side effects   Psychomotor / Gait steady;balanced   Activities of Daily Living   Hygiene/Grooming independent   Oral Hygiene independent   Dress street clothes;scrubs (behavioral health)   Laundry with supervision   Room Organization independent

## 2020-08-27 NOTE — PLAN OF CARE
Patient has been been visible in the lounge/hallway. She spent time on the phone talking to her therapist. Social with selective peers. Reports she will try to attend one OT group today. Writer met with patient and she endorses high anxiety and depression. Rates depression 8-9 out 10 and anxiety 8 out of 10. Denies any AVH. Reports SI thoughts with no plan/intent to act in the hospital but states if discharged she will act on her SI. She is able to contract for safety on the unit. Reports she is trying to eat/drink. Has no issue falling asleep but woke up twice last night with nightmares. Shew denies pain. Denies medication SE. Staff will continue to monitor.

## 2020-08-27 NOTE — TELEPHONE ENCOUNTER
MTM referral from: Transitions of Care (recent hospital discharge or ED visit)    MTM referral outreach attempt #1 on August 27, 2020 at 4:48 PM      Outcome: Patient is not interested at this time because they are a [facility] patient, will route to MTM Pharmacist/Provider as an FYI. Thank you for the referral.     Yuridia Roper, MTM Coordinator

## 2020-08-27 NOTE — PROGRESS NOTES
"            Work Completed: JANNETH (writer) met with trx team, provided an update, and reviewed case. Trx team, including CTC, met with pt to discuss discharge plans and pt refused to take recommendation to be discharged to a Crisis bed. CTC attempted to explained what a crisis bed was and pt interrupted them multiple times to simply state that she would not go so such a place. Pt requested that her mother be included in the conversation and trx team accommodated her request. See Family Meeting Note for further details on this meeting. After the meeting CTC called and confirmed upcoming appointments with [facility]. Update AVS with this information and including community resources that had been discussed with family. [facility] was informed that by the time pt left, pt was expressing that \"everything was working out\" and was fine with discharge.      Discharge plan or goal:  Trx team is supporting referral to PHP program at this time.                  Barriers to discharge:  Acuity of illness complicated by anorexia. Patient continues to stabilize.    "

## 2020-08-27 NOTE — DISCHARGE INSTRUCTIONS
Behavioral Discharge Planning and Instructions      Summary:  You were admitted on 8/21/2020  due to suicidal ideation and increase in depression.  You were treated by Dr. Александр Hull MD and discharged on 08/27/2020 from [facility]  to home.    Principal Diagnosis:   Major depressive disorder  Generalized anxiety disorder  PTSD  Anorexia  OCD  Boderline personality disorder    Follow-up Appointments  Psychiatry Follow-up Appointments:  [facility]:  Video Appointment  Date/Time:  9/31/20 @ 9:40am:  Psychiatrist:  Dasha Jarquin  Address:  [address] [place].   Phone:  [phone]:  Fax: [phone]    DBT Therapist: Layla Carbajal @ [facility]: [place]  Appointment: 8/28/20 at 2:00pm (Video Appointment)  Phone: [phone]    Individual Therapist:  Marilyn Chow @ [facility]: [place]  Appointment: 8/31/20 at 5:00pm (Video Appointment)  Phone: [phone]    [facility]:  Chantell Mackeytle @ [facility]:  # [phone]  Care Manager: Georgina @ [facility] [phone]  You and your family decided to quite utilizing [facility], but this contact info is being provided for your convenience in case you change you mind.    Patient Relations Number for [facility]  Call Patient Relations, between 8-4:30, M-F at [phone]  Please direct any concerns or comments about your care to this number.    Crisis Bed Options  [facility] [employer]   Address: [address] [place] [address]   Phone: [phone]   Fax: [phone]    [facility] [employer]   Address: [address] [place] [address]   Phone: [phone]   Fax: [phone]    [facility] Crisis Stabilization Program  Address: [address] [facility] [address]   Telephone: [phone]   Fax: [phone]    Janell humphries [place]  Address: in a [facility]  Phone: [phone]   Call 24 hours a day for access to services anytime,  day or night,  if a room is available.    Crisis Residence  Address: [address] [place] [address]   Phone: [phone]   Fax: [phone]  Call COPE at [phone] if you need a referral    [facility] Day [facility]   Located in [facility] [address] ; [address] [facility] [place] [address]    If you have questions about the program call: [phone]  If questions about scheduling referral - contact out patient intake at : [phone]    Attend all scheduled appointments with your outpatient providers. Call at least 24 hours in advance if you need to reschedule an appointment to ensure continued access to your outpatient providers.   Major Treatments, Procedures and Findings:  You were provided with: a psychiatric assessment, assessed for medical stability, medication evaluation and/or management, group therapy and individual therapy    Symptoms to Report: feeling more aggressive, increased confusion, losing more sleep, mood getting worse or thoughts of suicide    Early warning signs can include: increased depression or anxiety sleep disturbances increased thoughts or behaviors of suicide or self-harm  increased unusual thinking, such as paranoia or hearing voices    Safety and Wellness:  Take all medicines as directed.  Make no changes unless your doctor suggests them.      Follow treatment recommendations.  Refrain from alcohol and non-prescribed drugs.  Ask your support system to help you reduce your access to items that could harm yourself or others. Items could include:  Firearms  Medicines (both prescribed and over-the-counter)  Knives and other sharp objects  Ropes and like materials  Car keys  If there is a concern for safety, call 911. If there is a concern for safety, call 911.    Community Resource Numers:   Crisis Intervention: 407.817.1601 or 462-444-1439 (TTY: 177.324.7305).  Call anytime for help.  [place] on Mental Illness  (www.[place].celestine.org): 394.127.5530 or 649-941-8026.  Suicide Awareness Voices of Education (SAVE) (www.save.org): 629-368-FRMS (5483)  National Suicide Prevention Line (www.mentalhealthmn.org): 417-146-LSPN (8031)  Mental Health Consumer/Survivor Network of [place] (www.mhcsn.net): 470.509.3833 or 728-879-8684  Mental Health Association of [place] (www.mentalhealth.org): 221.914.5248 or 484-217-9956  Self- Management and Recovery Training., SMART-- Toll free: [phone]  www.[address].org  [facility] Crisis (COPE) Response - Adult [phone]    The treatment team has appreciated the opportunity to work with you,  please take care and make your recovery a daily recovery.   The treatment team would also suggest considering family therapy in the future as an additional support option; please contact your insurance provider to find an in network provider if you decide to pursue this option.  If you have any questions or concerns our unit number is [phone]

## 2020-08-27 NOTE — PROGRESS NOTES
"FAMILY MEETING (remotely via phone)    Participants: Keshia Castle (Mom), Dr. Hull (Attending), Alexander Thornton (CTC), and Pt.  Time: 30-45 minutes.    At the pt's request the trx team agreed to do an impromptu family meeting to discuss discharge when she did not agree with the trx teams recommendation to discharge to a crsis bed. The trx team explained to pt's family that they believed pt would not benefit from continuing to stay in the hospital. Furthermore the attending clarified that a crisis bed option would meet pt's needs at this time and recommended that pt be discharge to one. CTC attempted to explain to family what a crisis bed was and assure the family that it was a safe discharge option. [facility] did admit that crisis beds are not locked units like the current IP unit, but are supervised by staff on site and do screen people prior to admitting them for appropraitness. Pt stated that she refused to attend such a place as \"it isn't the kind of place for people like me who go to country clubs\" and \"I wouldn't fit in there\".  Pt's parents supported pt's decision to not attend a crisis bed. Family also confirmed that they agreed with pt to [facility], and that her mother had cancelled her therapy appointments their due to not making progress. Family agreed that pt would likely be able to work through her depression on an outpatient basis with [facility]. Trx team did explained they offered pt PHP as an alternative, but pt refused that recommendation as well, as she plans on going back to school which will \"provide puspose\" to her life. A sentiment echoed by her mother. Pt expressed that she felt she was \"being given up on\", which trx team attempted to explain was not the case, but pt refused to acknowledge. Family was informed that the reason behind deciding to discharge was that pt's SI appeared to be chronically Suicidal and that when pressed, pt was unable to give clear indicators that her SI " "had not improved and was asked for details pt was vague/general, simply stating \"she had many\" plans and that life made her feel suicidal. Furthermore, the trx team did not feel three additional days in the hospital would not provide any additional benefit. Family had many concerns, most notably that they were out of town and were under the impression taht pt would be able to stay in the hospital till Monday. Pt's mother also clarified that she had been told in the ED that her daughter could remain in the hospital for two weeks. Pt's mother also expressed concern about her daughters ongoing SI statements, but trx team once again reiterated that this appears to be chronic. Furthermore, when asked why the pt came into the hospital again this time and what could be done to avoid this in the futuer, the family blamed pt's therapist for having pt admitted to the hospital this time and was unable to provide a clear solution or option to help reduce recidivism. It was clear that the family was upset about the change in the reported plan, though attending denied that this had been the plan and that originally the plan had been for pt to discharge today. It was also clear that the pt had made statements the to trx team and her parents that resulted jeannine lapse of communication to occurred. During the meeting pt became tearful, alternating between demanding her parents return to pick her up, stating she was suicidal, and offering to uber/[facility] home where she would be fine with being alone. Pt's family did not agree with pt going home by herself and that they would not leave her alone after being in the hospital, and family decided to cut their trip short and return to the [facility] to pick pt up for discharge. Pt clearly was most upset when her father tried to speak, and was very focused on trying to get her parents to acknowledge that the situation was not her fault. A large argument about having to cancel the family's hotel for " the night was also a big trigger point between pt and her family as well. CTC apologized for the lapse in communication and that they would provide the pt with the patient relations contact info for them in discharge paperwork. When asked, CTC explained that given pt's willingness to engage in treatment and pt's statement that continuing in an outpatient setting and coming up with an alternative plan to the hospital, including researching crisis beds, would likely be a good next step. Family as agreeable to this and [facility] provided crisis bed resources in AVS as well for them to look into post discharge as well. Family agreed that the hospital was not working for pt's symptoms and that an alternative plan was needed. [facility] encouraged pt and her family to connect with the therapist to discuss such plans, though if the family felt pt was a danger to herself, that the ED was always an option for evaluation as well.

## 2020-08-27 NOTE — PROGRESS NOTES
"Patient is discharged at 1515, accompanied by parents with belongings, medications and instructions.  During suicide risk prior to discharge patient indicated was having suicidal thoughts with a plan.  Would not relate plan, states wished she was dead.  Expressed frustration was being discharged with the suggestion to go to a crisis home.  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  Indicated would be going to [place] with her parents tomorrow and planned to spend time with friends after this.  Had initially refused to sign discharge paper work, but did sign if prior to leaving.  Stated, \"ready to go now\", appeared less angry and tense, brighter and more animated. Patient is noted to have chronic suicidal thoughts.  This was discussed with Dr Hull prior to her discharge.  "

## 2020-08-28 NOTE — DISCHARGE SUMMARY
Admit Date:     08/21/2020   Discharge Date:     08/27/2020      The patient was hospitalized between 08/21/2018 and 08/27/2021.  On the day of discharge, the patient was seen in the presence of clinical treatment coordinator BECCA salazar for altogether 45 minutes.  During that time, I had approximately 25 minutes of teleconference with the patient's parents.      CHIEF COMPLAINT AND REASON FOR ADMISSION:  The patient is a 20-year-old  female admitted to [facility] on 08/21/2020 due to depression, suicidal thoughts.  She was just recently discharged from under care of this provider (discharge note 08/14/2020).  The patient reported that she had misrepresented to safety and left when she was not safe to leave.  Reported quite significant anxiety and depressive symptoms.  She said that her eating disorder has been unstable.  She started restricting because of weight, did not believe that medications she was on were working for her.  Of note, the patient was rather vague about if she had any specific plan to commit suicide.      DISCHARGE DIAGNOSES:   1.  Major depressive disorder, recurrent, moderate severity.   2.  Generalized anxiety disorder.   3.  Posttraumatic stress disorder.   4.  Anorexia nervosa.     5.  Obsessive-compulsive disorder.     6.  Possible borderline personality disorder.      CONSULTATIONS:  No consults were done during this hospitalization.      LABORATORY DATA:  Comprehensive metabolic battery was not done.  Hematology was not done as patient was just recently discharged from this hospital.  Urine drug screen was negative for all screened substances.  COVID-19 nasopharyngeal swab was negative and pregnancy test was negative.  These tests were done on this admission.      HOSPITAL COURSE:  The patient was continued on her home medication.  She discussed with on-call doctor who admitted her switch from Effexor to Zoloft.  She agreed with that plan.   "Also agreed to be started on Minipress.  Also in agreement with recommendations.  Presented as rather dysphoric, was reported to have poor appetite.  For example, did not eat breakfast, complained of nausea.  Reported that she needed a lot of reassurance, was not going to group therapy stated that they were making her anxious, also stated food makes me \"suicidal.\"  She provided rather conflicting statements about her suicidality when for example asked about self-harm thoughts, said, \"I would not harm myself\" and then said that she does not want to live.  It sounded like she did not want to come to the hospital and was brought to the hospital by ambulance when therapist called EMS to bring her in for evaluation and safety.  When asked about her eating disorder, said that controlling how much she was eating was one of few things that she had control over, said that she had been in inpatient eating disorder program 6 times, did not feel like she benefited from it.  Reported nightmares.  Continued to taper her off Effexor and the dose of Zoloft was gradually increased.  Reported difficulties with sleep because of nightmares and flashbacks so dose of Minipress was gradually increased as well.  Continued to limit her food intake, limited to supper.  Stated that she would like to leave the hospital, then, said that she felt safe being in the hospital, but would not feel safe outside of hospital.  She said that she would like to go back to [facility].  However, after she was told that [facility] required her to go to partial hospitalization program and would not take that advice, she declined to go to [facility] and to go to partial hospitalization program.  \"I don't like groups and groups bring up memories of past traumas.\"  When I pointed out to the patient she does not necessarily have to talk about her past traumatic experiences, she told me that she does not like to hear painful history about " "people's trauma either.  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  She said that she would like to go to [place] with her parents.  On the day of patient's discharge, I, PA student, and clinical treatment coordinator talked to the patient first alone, then had a phone conference with her mother and dad.  When asked how she was doing, she stated that she feels suicidal about her life in general!  She then reported to RN about mentioned plans to become PCA, looking forward to doing that, said that she wanted to go to [place] with her parents and was planning to spend time with his friends after discharge.  We discussed with that patient that her suicidal thoughts seemed to be pretty chronic and she appears to be able to control them and did not believe that we had grounds for keeping her in the hospital for a longer period of time.  She did agree that she did not feel safe being alone at home.  Her parents left [facility] for [place].  I suggested to her to go to a crisis bed.  The patient hardly said that she would never go to one of those places because \"They are full of rapists and you don't know who is going to be there.\"  At the patient's request, I had a phone conference with her family.  I explained to the family that I did not believe the patient's condition would improve or worsen even a little bit with 4 more days of hospitalization until Monday and that she was offered to go to crisis bed as alternative option if she felt unsafe being alone at home.  The patient's mother indicated that " she was unhappy with this plan.  She did not understand why we could not keep patient in the hospital until Monday.  I explained again the reasoning behind discharging Thea today as not seeing clinical indications for that.  Mother said that she will come back from [place] and get patient from the hospital.  Immediately before discharge, the patient still reported some vague suicidal thinking, but would not say her plan.  Again, refused to go to crisis bed.      DISCHARGE MEDICATIONS:  Discharged on the following medications.   1.  Acetaminophen 650 mg every 4 hours as needed for mild pain.     2.  Aimovig 140 mg subcutaneously every 30 days, due on 09/17.   3.  Abilify 2 mg daily.   4.  Calcium carbonate 500 mg p.r.n. as needed for heartburn.   5.  Clindamycin 1% external gel, apply 1 gram topically daily.   6.  Colace 100 mg daily.   7.  Famotidine 20 mg daily.   8.  Gabapentin 300 mg 3 times a day and 600 mg at bedtime and gabapentin 300 mg 3 times a day as needed p.r.n. for anxiety.   9.  Hydroxyzine 25-50 mg every 4 hours as needed for anxiety.   10.  Melatonin 10 mg nightly as needed for sleep.   11.  Multivitamins with minerals 1 tablet daily.   12.  Naproxen 250 mg 2 times a day p.r.n. for headaches.   13.  Polyethylene glycol 17 g daily as needed for constipation.   14.  Prazosin 3 mg at bedtime.   15.  Sertraline 100 mg daily.   16.  Trazodone 150 mg at bedtime.   17.  Venlafaxine 37.5 mg at bedtime.   18.  Vitamin C 500 mg daily.   19.  Vitamin D3 2000 units daily.      FOLLOWUP APPOINTMENTS:  Psychiatric followup appointment was with the patient [facility].  [facility] psychiatrist Dasha Jarquin, appointment date 09/31/2020 at 9:40 a.m. in [place], [place].  The patient will see therapist, Maria Eugenia Carbajal at [facility] at [place] on 08/28 at 2:00 p.m.  For individual therapy with Julianne Chow at [facility] in [place] on 08/31 at p.m.  She has a care manager,  Georgina, at [facility] [phone] and Ct Rangel, also at [facility], phone number [phone].         MIKE YE MD             D: 2020   T: 2020   MT: JULIETTE      Name:     KENDALL JIMENEZ   MRN:      [id number]        Account:        [id number]   :      2000           Admit Date:     2020                                  Discharge Date: 2020      Document: [id number]

## 2020-09-24 ENCOUNTER — HOSPITAL ENCOUNTER (EMERGENCY)
Facility: CLINIC | Age: 20
Discharge: HOME | End: 2020-09-24
Attending: EMERGENCY MEDICINE | Admitting: EMERGENCY MEDICINE
Payer: COMMERCIAL

## 2020-09-24 VITALS
HEART RATE: 73 BPM | BODY MASS INDEX: 17.93 KG/M2 | OXYGEN SATURATION: 94 % | DIASTOLIC BLOOD PRESSURE: 61 MMHG | RESPIRATION RATE: 16 BRPM | SYSTOLIC BLOOD PRESSURE: 102 MMHG | WEIGHT: 105 LBS | TEMPERATURE: 97.3 F | HEIGHT: 64 IN

## 2020-09-24 DIAGNOSIS — E86.0 DEHYDRATION: ICD-10-CM

## 2020-09-24 DIAGNOSIS — E16.2 HYPOGLYCEMIA: ICD-10-CM

## 2020-09-24 LAB
ANION GAP SERPL CALCULATED.3IONS-SCNC: 9 MMOL/L (ref 3–14)
BUN SERPL-MCNC: 13 MG/DL (ref 7–30)
CALCIUM SERPL-MCNC: 8.8 MG/DL (ref 8.5–10.1)
CHLORIDE SERPL-SCNC: 107 MMOL/L (ref 94–109)
CO2 SERPL-SCNC: 23 MMOL/L (ref 20–32)
CREAT SERPL-MCNC: 0.86 MG/DL (ref 0.52–1.04)
GFR SERPL CREATININE-BSD FRML MDRD: >90 ML/MIN/{1.73_M2}
GLUCOSE BLDC GLUCOMTR-MCNC: 47 MG/DL (ref 70–99)
GLUCOSE BLDC GLUCOMTR-MCNC: 94 MG/DL (ref 70–99)
GLUCOSE SERPL-MCNC: 52 MG/DL (ref 70–99)
MAGNESIUM SERPL-MCNC: 2 MG/DL (ref 1.6–2.3)
PHOSPHATE SERPL-MCNC: 4 MG/DL (ref 2.5–4.5)
POTASSIUM SERPL-SCNC: 3.7 MMOL/L (ref 3.4–5.3)
SODIUM SERPL-SCNC: 139 MMOL/L (ref 133–144)

## 2020-09-24 PROCEDURE — 99285 EMERGENCY DEPT VISIT HI MDM: CPT | Mod: 25

## 2020-09-24 PROCEDURE — 25800025 ZZH RX 258: Performed by: EMERGENCY MEDICINE

## 2020-09-24 PROCEDURE — 99284 EMERGENCY DEPT VISIT MOD MDM: CPT | Mod: 25

## 2020-09-24 PROCEDURE — 96361 HYDRATE IV INFUSION ADD-ON: CPT

## 2020-09-24 PROCEDURE — 80048 BASIC METABOLIC PNL TOTAL CA: CPT | Performed by: EMERGENCY MEDICINE

## 2020-09-24 PROCEDURE — 25800030 ZZH RX IP 258 OP 636: Performed by: EMERGENCY MEDICINE

## 2020-09-24 PROCEDURE — 83735 ASSAY OF MAGNESIUM: CPT | Performed by: EMERGENCY MEDICINE

## 2020-09-24 PROCEDURE — 00000146 ZZHCL STATISTIC GLUCOSE BY METER IP

## 2020-09-24 PROCEDURE — 96365 THER/PROPH/DIAG IV INF INIT: CPT

## 2020-09-24 PROCEDURE — 84100 ASSAY OF PHOSPHORUS: CPT | Performed by: EMERGENCY MEDICINE

## 2020-09-24 RX ORDER — DEXTROSE MONOHYDRATE 25 G/50ML
25 INJECTION, SOLUTION INTRAVENOUS ONCE
Status: DISCONTINUED | OUTPATIENT
Start: 2020-09-24 | End: 2020-09-24

## 2020-09-24 RX ADMIN — SODIUM CHLORIDE 1000 ML: 9 INJECTION, SOLUTION INTRAVENOUS at 18:55

## 2020-09-24 RX ADMIN — DEXTROSE AND SODIUM CHLORIDE: 5; 450 INJECTION, SOLUTION INTRAVENOUS at 20:04

## 2020-09-24 RX ADMIN — SODIUM CHLORIDE 1000 ML: 9 INJECTION, SOLUTION INTRAVENOUS at 17:51

## 2020-09-24 ASSESSMENT — ENCOUNTER SYMPTOMS
FEVER: 0
DIZZINESS: 1
VOMITING: 0
COUGH: 0
SHORTNESS OF BREATH: 0
APPETITE CHANGE: 1

## 2020-09-24 ASSESSMENT — MIFFLIN-ST. JEOR: SCORE: 1231.28

## 2020-09-24 NOTE — ED PROVIDER NOTES
History   Chief Complaint:  Hypotension and Psychiatric Evaluation    HPI  Thea Castle is a 20 year old female with a history of anorexia, depression, and SI who presents from [facility] for evaluation of hypotension and psychiatric evaluation. She was seen in clinic for her eating disorder, and states she has been restricting food and fluids the past couple weeks and is now dizzy. In clinic her SBP was 60 and she was sent here. She states she has not eaten or drank anything recently, and notes a decreased urine output and generalized body aches. She states her last period was in March 2019 and she is not on birth control. She denies fever, cough, shortness of breath, or vomiting.     The patient also notes she has thoughts of killing herself but states she does not feel the need to act on them. She states she has a safety plan and is going to treatment next week. 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 She notes that she lives at home and feels supported by her mother. She does not take medication for depression.    Allergies:  Penicillins    Medications:    Abilify  Colace  Pepcid  Neurontin  Atarax  Naprosyn  Minipress  Zoloft  Desyrel  Effexor-XR    Past Medical History:    Anorexia  Anxiety  Depression  OCD  PTSD  SI    Past Surgical History:    ENT surgery     Family History:    No past pertinent family history.    Social History:  Marital Status: Single  Tobacco use: No  Alcohol use: Yes  Drug use: No    Review of Systems   Constitutional: Positive for appetite change. Negative for fever.   Respiratory: Negative for cough and shortness of breath.    Gastrointestinal: Negative for vomiting.   Genitourinary: Positive for decreased urine volume.   Musculoskeletal:        Body aches   Neurological: Positive for dizziness.   Psychiatric/Behavioral: Positive for suicidal ideas. Negative for self-injury.   All other systems reviewed and are  "negative.      Physical Exam     Patient Vitals for the past 24 hrs:   BP Temp Temp src Pulse Resp SpO2 Height Weight   09/24/20 2005 102/61 -- -- 73 16 94 % -- --   09/24/20 1645 106/62 97.3  F (36.3  C) Temporal -- 18 94 % 1.626 m (5' 4\") 47.6 kg (105 lb)       Physical Exam  General: Patient is alert and normal appearing.  HEENT: Head atraumatic    Eyes: pupils equal and reactive. Conjunctiva clear   Nares: patent   Oropharynx: no lesions, uvula midline, no palatal draping, normal voice, no trismus  Neck: Supple without lymphadenopathy, no meningismus  Chest: Heart regular rate and rhythm.   Lungs: Equal clear to auscultation with no wheeze or rales  Abdomen: Soft, non tender, nondistended, normal bowel sounds  Back: No costovertebral angle tenderness, no midline C, T or L spine tenderness  Neuro: Grossly nonfocal, normal speech, strength equal bilaterally, CN 2-12 intact  Extremities: No deformities, equal radial and DP pulses. No clubbing, cyanosis.  No edema  Skin: Warm and dry with no rash.       Emergency Department Course     Laboratory:  Laboratory findings were communicated with the patient who voiced understanding of the findings.    BMP: Glucose 52 (L), o/w WNL (Creatinine: 0.86)    Phosphorus: 4.0    Magnesium: 2.0     Glucose by meter: 47 (L)    Glucose by meter 109    Interventions:  1751: NS, 1 L, IV  1855: NS, 1 L, IV  2004: Dextrose 5% and 0.45% NaCl infusion, IV    Emergency Department Course:  Past medical records, nursing notes, and vitals reviewed.    1736: I performed an exam of the patient as documented above.     IV was inserted and blood was drawn for laboratory testing, results above.    1845:   Patient refused IV dextrose but agreed to eat applesauce x2.    1902: I rechecked the patient and discussed the results of her workup thus far.     1911:   I spoke with DEC regarding patient's presentation, findings, and plan of care.    2011:   I spoke with DEC again regarding patient's plan of " care after she refuses to eat or take IV sugar.    Findings and plan explained to the Patient. Patient discharged home with instructions regarding supportive care, medications, and reasons to return. The importance of close follow-up was reviewed.    I personally reviewed the laboratory results with the Patient and answered all related questions prior to discharge.     Impression & Plan     Medical Decision Making:  Patient is a 20-year-old female with history of restrictive eating disorder and depression and chronic suicidal ideation who presents from clinic today for hypotension.  Patient states that she has chronic suicidal thoughts and that she has a safety plan at home and has no intention of harming herself.  She feels that she has things to live for.  She is actively pursuing treatment for her eating disorder with plan for inpatient treatment starting next week.  She was feeling dizzy and was found to be hypotensive in clinic therefore sent to the emergency department.  Here fluids were provided and electrolytes evaluated.  She was found to be quite hypoglycemic.  It took quite extensive amount of time to get her to eat as well as a small amount of D5 half-normal saline to improve her blood sugar.  She did not wish for extensive food.  She states that she would rather be hyperglycemic.  I did discuss with her that hypoglycemia could lead to death but she said she would rather that then eat.  Ultimately she did eat applesauce and crackers and her blood sugar improved to 109.  DE C  did discuss with the patient's mother who did agree that the patient has chronic suicidal thoughts and that she did not feel she would actively act on them tonight.  She was comfortable with the plan for the patient to go home per the DE C .  Patient at this time is requesting discharge.  Improvement of her blood sugar allows this to be possible.  Return precautions the emergency department reviewed.    Diagnosis:     ICD-10-CM    1. Dehydration  E86.0    2. Hypoglycemia  E16.2        Disposition:  Discharged to home.    Scribe Disclosure:  I, Nanette Gorman, am serving as a scribe at 5:37 PM on 9/24/2020 to document services personally performed by Anni Camara MD based on my observations and the provider's statements to me.      Anni Camara MD  09/24/20 5619

## 2020-09-24 NOTE — ED TRIAGE NOTES
Hx of eating disorder, which creates issues with even drinking water.  Low PO for a few days, and now dizzy.  Came from clinic where SBP was 60.  Answered yes to suicide question, but states she has a safety plan and going to treatment next week.  States she won't hurt herself in the hospital today.

## 2020-09-24 NOTE — ED AVS SNAPSHOT
Emergency Department  [address] [place] [address]  Phone:  [phone]  Fax:  [phone]                                    Thea Castle   MRN: [id number]    Department:   Emergency Department   Date of Visit:  9/24/2020           After Visit Summary Signature Page    I have received my discharge instructions, and my questions have been answered. I have discussed any challenges I see with this plan with the nurse or doctor.    ..........................................................................................................................................  Patient/Patient Representative Signature      ..........................................................................................................................................  Patient Representative Print Name and Relationship to Patient    ..................................................               ................................................  Date                                   Time    ..........................................................................................................................................  Reviewed by Signature/Title    ...................................................              ..............................................  Date                                               Time          22EPIC Rev 08/18

## 2020-09-25 NOTE — PROGRESS NOTES
Writer spoke with pt's mother, Keshia ([phone]), who reported she is aware that pt is in the ED. She was sent here from her PCP due to concerns for her blood pressure and possibly her heart rate. Mother noted pt has an eating disorder and hasn't been drinking fluids, so they wanted her checked out. Mother stated pt has struggled with mental health and suicidal thoughts. However, she just came up with a new safety plan with her therapist today. Mother stated they are always on top of her mental health, and noted she is not left alone.     Mother further noted that she is aware pt's moods have been up and down recently, noting pt is leaving for a dual program (mh and eating disorder) in [place] on 10/2/20 (next Friday). She added that pt's eating disorder has also been ramping up due to her anxiety over going to this program. She has been worrying about gaining a few pounds and that they may then not accept her (pt has already been accepted and cleared for admission to the program). Mother does not think pt needs to have a crisis evaluation today. She will be picking pt up.     Discussed with TIMA who shared that pt also now did not wish to speak with writer; noting she is future oriented, has a safety plan, etc., and so TIMA is comfortable with this and will discharge pt.

## 2020-09-30 ENCOUNTER — APPOINTMENT (OUTPATIENT)
Dept: CT IMAGING | Facility: CLINIC | Age: 20
End: 2020-09-30
Attending: PHYSICIAN ASSISTANT
Payer: COMMERCIAL

## 2020-09-30 ENCOUNTER — HOSPITAL ENCOUNTER (EMERGENCY)
Facility: CLINIC | Age: 20
Discharge: HOME | End: 2020-09-30
Attending: PHYSICIAN ASSISTANT | Admitting: PHYSICIAN ASSISTANT
Payer: COMMERCIAL

## 2020-09-30 VITALS
RESPIRATION RATE: 18 BRPM | OXYGEN SATURATION: 100 % | DIASTOLIC BLOOD PRESSURE: 59 MMHG | BODY MASS INDEX: 17.93 KG/M2 | SYSTOLIC BLOOD PRESSURE: 107 MMHG | HEART RATE: 80 BPM | HEIGHT: 64 IN | TEMPERATURE: 99.2 F | WEIGHT: 105 LBS

## 2020-09-30 DIAGNOSIS — M54.2 NECK PAIN: ICD-10-CM

## 2020-09-30 DIAGNOSIS — S09.90XA INJURY OF HEAD, INITIAL ENCOUNTER: ICD-10-CM

## 2020-09-30 DIAGNOSIS — R42 DIZZINESS: ICD-10-CM

## 2020-09-30 DIAGNOSIS — E86.0 DEHYDRATION: ICD-10-CM

## 2020-09-30 LAB
ANION GAP SERPL CALCULATED.3IONS-SCNC: 1 MMOL/L (ref 3–14)
BASOPHILS # BLD AUTO: 0 10E9/L (ref 0–0.2)
BASOPHILS NFR BLD AUTO: 0.4 %
BUN SERPL-MCNC: 18 MG/DL (ref 7–30)
CALCIUM SERPL-MCNC: 8.3 MG/DL (ref 8.5–10.1)
CHLORIDE SERPL-SCNC: 111 MMOL/L (ref 94–109)
CO2 SERPL-SCNC: 30 MMOL/L (ref 20–32)
CREAT SERPL-MCNC: 0.76 MG/DL (ref 0.52–1.04)
DIFFERENTIAL METHOD BLD: ABNORMAL
EOSINOPHIL # BLD AUTO: 0.2 10E9/L (ref 0–0.7)
EOSINOPHIL NFR BLD AUTO: 3.3 %
ERYTHROCYTE [DISTWIDTH] IN BLOOD BY AUTOMATED COUNT: 13.1 % (ref 10–15)
GFR SERPL CREATININE-BSD FRML MDRD: >90 ML/MIN/{1.73_M2}
GLUCOSE SERPL-MCNC: 84 MG/DL (ref 70–99)
HCG SERPL QL: NEGATIVE
HCT VFR BLD AUTO: 32.2 % (ref 35–47)
HGB BLD-MCNC: 10.9 G/DL (ref 11.7–15.7)
IMM GRANULOCYTES # BLD: 0 10E9/L (ref 0–0.4)
IMM GRANULOCYTES NFR BLD: 0.2 %
INTERPRETATION ECG - MUSE: NORMAL
LYMPHOCYTES # BLD AUTO: 1.8 10E9/L (ref 0.8–5.3)
LYMPHOCYTES NFR BLD AUTO: 34.8 %
MAGNESIUM SERPL-MCNC: 2.1 MG/DL (ref 1.6–2.3)
MCH RBC QN AUTO: 31.1 PG (ref 26.5–33)
MCHC RBC AUTO-ENTMCNC: 33.9 G/DL (ref 31.5–36.5)
MCV RBC AUTO: 92 FL (ref 78–100)
MONOCYTES # BLD AUTO: 0.2 10E9/L (ref 0–1.3)
MONOCYTES NFR BLD AUTO: 4.7 %
NEUTROPHILS # BLD AUTO: 2.9 10E9/L (ref 1.6–8.3)
NEUTROPHILS NFR BLD AUTO: 56.6 %
NRBC # BLD AUTO: 0 10*3/UL
NRBC BLD AUTO-RTO: 0 /100
PLATELET # BLD AUTO: 227 10E9/L (ref 150–450)
POTASSIUM SERPL-SCNC: 4 MMOL/L (ref 3.4–5.3)
RBC # BLD AUTO: 3.5 10E12/L (ref 3.8–5.2)
SODIUM SERPL-SCNC: 142 MMOL/L (ref 133–144)
WBC # BLD AUTO: 5.2 10E9/L (ref 4–11)

## 2020-09-30 PROCEDURE — 96374 THER/PROPH/DIAG INJ IV PUSH: CPT

## 2020-09-30 PROCEDURE — 72125 CT NECK SPINE W/O DYE: CPT

## 2020-09-30 PROCEDURE — 25000128 H RX IP 250 OP 636: Performed by: PHYSICIAN ASSISTANT

## 2020-09-30 PROCEDURE — 96361 HYDRATE IV INFUSION ADD-ON: CPT

## 2020-09-30 PROCEDURE — 83735 ASSAY OF MAGNESIUM: CPT | Performed by: PHYSICIAN ASSISTANT

## 2020-09-30 PROCEDURE — 70450 CT HEAD/BRAIN W/O DYE: CPT

## 2020-09-30 PROCEDURE — 84703 CHORIONIC GONADOTROPIN ASSAY: CPT | Performed by: PHYSICIAN ASSISTANT

## 2020-09-30 PROCEDURE — 93005 ELECTROCARDIOGRAM TRACING: CPT

## 2020-09-30 PROCEDURE — 96375 TX/PRO/DX INJ NEW DRUG ADDON: CPT

## 2020-09-30 PROCEDURE — 25800030 ZZH RX IP 258 OP 636: Performed by: PHYSICIAN ASSISTANT

## 2020-09-30 PROCEDURE — 80048 BASIC METABOLIC PNL TOTAL CA: CPT | Performed by: PHYSICIAN ASSISTANT

## 2020-09-30 PROCEDURE — 99285 EMERGENCY DEPT VISIT HI MDM: CPT | Mod: 25

## 2020-09-30 PROCEDURE — 85025 COMPLETE CBC W/AUTO DIFF WBC: CPT | Performed by: PHYSICIAN ASSISTANT

## 2020-09-30 RX ORDER — DIPHENHYDRAMINE HYDROCHLORIDE 50 MG/ML
25 INJECTION INTRAMUSCULAR; INTRAVENOUS ONCE
Status: COMPLETED | OUTPATIENT
Start: 2020-09-30 | End: 2020-09-30

## 2020-09-30 RX ORDER — METOCLOPRAMIDE HYDROCHLORIDE 5 MG/ML
5 INJECTION INTRAMUSCULAR; INTRAVENOUS ONCE
Status: COMPLETED | OUTPATIENT
Start: 2020-09-30 | End: 2020-09-30

## 2020-09-30 RX ADMIN — METOCLOPRAMIDE 5 MG: 5 INJECTION, SOLUTION INTRAMUSCULAR; INTRAVENOUS at 16:18

## 2020-09-30 RX ADMIN — SODIUM CHLORIDE 1000 ML: 9 INJECTION, SOLUTION INTRAVENOUS at 16:16

## 2020-09-30 RX ADMIN — DIPHENHYDRAMINE HYDROCHLORIDE 25 MG: 50 INJECTION, SOLUTION INTRAMUSCULAR; INTRAVENOUS at 16:19

## 2020-09-30 ASSESSMENT — ENCOUNTER SYMPTOMS
FEVER: 0
ARTHRALGIAS: 1
HEADACHES: 1
COUGH: 0
NECK PAIN: 1
VOMITING: 0
DIZZINESS: 1

## 2020-09-30 ASSESSMENT — MIFFLIN-ST. JEOR: SCORE: 1231.28
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
## 2020-09-30 NOTE — ED AVS SNAPSHOT
Emergency Department  [address] [place] [address]  Phone:  [phone]  Fax:  [phone]                                    Thea Castle   MRN: [id number]    Department:   Emergency Department   Date of Visit:  9/30/2020           After Visit Summary Signature Page    I have received my discharge instructions, and my questions have been answered. I have discussed any challenges I see with this plan with the nurse or doctor.    ..........................................................................................................................................  Patient/Patient Representative Signature      ..........................................................................................................................................  Patient Representative Print Name and Relationship to Patient    ..................................................               ................................................  Date                                   Time    ..........................................................................................................................................  Reviewed by Signature/Title    ...................................................              ..............................................  Date                                               Time          22EPIC Rev 08/18

## 2020-09-30 NOTE — DISCHARGE INSTRUCTIONS
Discharge Instructions  Head Injury    You have been seen today for a head injury. Your evaluation included a history and physical examination. You may have had a CT (CAT) scan performed, though most head injuries do not require a scan. Based on this evaluation, your provider today does not feel that your head injury is serious.    Generally, every Emergency Department visit should have a follow-up clinic visit with either a primary or a specialty clinic/provider. Please follow-up as instructed by your emergency provider today.  Return to the Emergency Department if:  You are confused or you are not acting right.  Your headache gets worse or you start to have a really bad headache even with your recommended treatment plan.  You vomit (throw up) more than once.  You have a seizure.  You have trouble walking.  You have weakness or paralysis (cannot move) in an arm or a leg.  You have blood or fluid coming from your ears or nose.  You have new symptoms or anything that worries you.    Sleeping:  It is okay for you to sleep, but someone should wake you up if instructed by your provider, and someone should check on you at your usual time to wake up.     Activity:  Do not drive for at least 24 hours.  Do not drive if you have dizzy spells or trouble concentrating, or remembering things.  Do not return to any contact sports until cleared by your regular provider.     MORE INFORMATION:    Concussion:  A concussion is a minor head injury that may cause temporary problems with the way the brain works. Although concussions are important, they are generally not an emergency or a reason that a person needs to be hospitalized. Some concussion symptoms include confusion, amnesia (forgetful), nausea (sick to your stomach) and vomiting (throwing up), dizziness, fatigue, memory or concentration problems, irritability and sleep problems. For most people, concussions are mild and temporary but some will have more severe and persistent  symptoms that require on-going care and treatment.  CT Scans: Your evaluation today may have included a CT scan (CAT scan) to look for things like bleeding or a skull fracture (broken bone).  CT scans involve radiation and too many CT scans can cause serious health problems like cancer, especially in children.  Because of this, your provider may not have ordered a CT scan today if they think you are at low risk for a serious or life threatening problem.    If you were given a prescription for medicine here today, be sure to read all of the information (including the package insert) that comes with your prescription.  This will include important information about the medicine, its side effects, and any warnings that you need to know about.  The pharmacist who fills the prescription can provide more information and answer questions you may have about the medicine.  If you have questions or concerns that the pharmacist cannot address, please call or return to the Emergency Department.     Remember that you can always come back to the Emergency Department if you are not able to see your regular provider in the amount of time listed above, if you get any new symptoms, or if there is anything that worries you.    Discharge Instructions  Neck Strain    You have been seen today for a neck sprain or strain.  Neck strains usually result from an injury to the neck. Car accidents, contact sports, and falls are common causes of neck strain. Sometimes your neck can start to hurt because of increased activity, muscle tension, an abnormal sleeping position, or because of other problems like arthritis in the neck.     Neck pain usually comes from injured muscles and ligaments. Sometimes there is a herniated ( slipped ) disc. We do not usually do MRI scans to look for these right away, since most herniated discs will get better on their own with time. Today, we did not find any evidence that your neck pain was caused by a serious or  dangerous condition. However, sometimes symptoms develop over time and cannot be found during an emergency visit, so it is very important that you follow up with your primary provider.    Generally, every Emergency Department visit should have a follow-up clinic visit with either a primary or a specialty clinic/provider. Please follow-up as instructed by your emergency provider today.    Return to the Emergency Department if:  You have increasing pain in your neck.  You develop difficulty swallowing or breathing.  You have numbness, weakness, or trouble moving your arms or legs.  You have severe dizziness and difficulty walking.  You are unable to control your bladder or bowels.  You develop severe headache or ringing in the ears.    What can I do to help myself at home?  If you had an injury, use cold for the first 1-2 days. Cold helps relieve pain and reduce inflammation.  Apply ice packs to the neck or areas of pain every 1-2 hours for 20 minutes at a time. Place a towel or cloth between your skin and the ice pack.  After the first 2 days, using heat can help with neck pain and stiffness. You may use a warm shower or bath, warm towels on the neck, or a heating pad. Do not sleep with a heating pad, as you can be burned.   Pain medications - You may take a pain medication such as Tylenol  (acetaminophen), Advil  and Motrin  (ibuprofen), or Aleve  (naproxen).  It is usually best to rest the neck for 1-2 days after an injury, then start gentle stretching exercises.   It is helpful to place a small pillow under the nape of your neck to provide proper neutral positioning.   You should stay active and do your usual work as much as you can, unless this involves heavy physical labor. Ask your provider if you need work restrictions.  If you were given a prescription for medicine here today, be sure to read all of the information (including the package insert) that comes with your prescription.  This will include important  information about the medicine, its side effects, and any warnings that you need to know about.  The pharmacist who fills the prescription can provide more information and answer questions you may have about the medicine.  If you have questions or concerns that the pharmacist cannot address, please call or return to the Emergency Department.   Remember that you can always come back to the Emergency Department if you are not able to see your regular provider in the amount of time listed above, if you get any new symptoms, or if there is anything that worries you.

## 2020-11-16 ENCOUNTER — HEALTH MAINTENANCE LETTER (OUTPATIENT)
Age: 20
End: 2020-11-16

## 2021-04-12 ENCOUNTER — HOSPITAL ENCOUNTER (INPATIENT)
Facility: CLINIC | Age: 21
LOS: 1 days | Discharge: HOME | DRG: 885 | End: 2021-04-14
Attending: EMERGENCY MEDICINE | Admitting: PSYCHIATRY & NEUROLOGY
Payer: COMMERCIAL

## 2021-04-12 DIAGNOSIS — F33.9 RECURRENT MAJOR DEPRESSIVE DISORDER, REMISSION STATUS UNSPECIFIED (H): ICD-10-CM

## 2021-04-12 DIAGNOSIS — F41.9 ANXIETY: ICD-10-CM

## 2021-04-12 DIAGNOSIS — R45.851 SUICIDAL IDEATION: ICD-10-CM

## 2021-04-12 DIAGNOSIS — F43.10 PTSD (POST-TRAUMATIC STRESS DISORDER): ICD-10-CM

## 2021-04-12 DIAGNOSIS — Z11.52 ENCOUNTER FOR SCREENING LABORATORY TESTING FOR SEVERE ACUTE RESPIRATORY SYNDROME CORONAVIRUS 2 (SARS-COV-2): ICD-10-CM

## 2021-04-12 DIAGNOSIS — F60.3 BORDERLINE PERSONALITY DISORDER (H): ICD-10-CM

## 2021-04-12 PROCEDURE — 80320 DRUG SCREEN QUANTALCOHOLS: CPT | Performed by: EMERGENCY MEDICINE

## 2021-04-12 PROCEDURE — 80307 DRUG TEST PRSMV CHEM ANLYZR: CPT | Performed by: EMERGENCY MEDICINE

## 2021-04-12 PROCEDURE — C9803 HOPD COVID-19 SPEC COLLECT: HCPCS | Performed by: EMERGENCY MEDICINE

## 2021-04-12 PROCEDURE — 99283 EMERGENCY DEPT VISIT LOW MDM: CPT | Performed by: EMERGENCY MEDICINE

## 2021-04-12 PROCEDURE — 99285 EMERGENCY DEPT VISIT HI MDM: CPT | Mod: 25 | Performed by: EMERGENCY MEDICINE

## 2021-04-12 PROCEDURE — 81025 URINE PREGNANCY TEST: CPT | Performed by: EMERGENCY MEDICINE

## 2021-04-12 PROCEDURE — 250N000013 HC RX MED GY IP 250 OP 250 PS 637: Performed by: EMERGENCY MEDICINE

## 2021-04-12 RX ORDER — ACETAMINOPHEN 325 MG/1
975 TABLET ORAL ONCE
Status: COMPLETED | OUTPATIENT
Start: 2021-04-12 | End: 2021-04-12

## 2021-04-12 RX ADMIN — ACETAMINOPHEN 975 MG: 325 TABLET, FILM COATED ORAL at 21:33

## 2021-04-13 ENCOUNTER — TELEPHONE (OUTPATIENT)
Dept: BEHAVIORAL HEALTH | Facility: CLINIC | Age: 21
End: 2021-04-13
Payer: COMMERCIAL

## 2021-04-13 VITALS
TEMPERATURE: 97.9 F | RESPIRATION RATE: 16 BRPM | OXYGEN SATURATION: 98 % | SYSTOLIC BLOOD PRESSURE: 107 MMHG | HEART RATE: 69 BPM | DIASTOLIC BLOOD PRESSURE: 69 MMHG

## 2021-04-13 LAB
ANION GAP SERPL CALCULATED.3IONS-SCNC: 7 MMOL/L (ref 3–14)
BASOPHILS # BLD AUTO: 0 10E9/L (ref 0–0.2)
BASOPHILS NFR BLD AUTO: 0.5 %
BUN SERPL-MCNC: 14 MG/DL (ref 7–30)
CALCIUM SERPL-MCNC: 8.4 MG/DL (ref 8.5–10.1)
CHLORIDE SERPL-SCNC: 108 MMOL/L (ref 94–109)
CO2 SERPL-SCNC: 25 MMOL/L (ref 20–32)
CREAT SERPL-MCNC: 0.77 MG/DL (ref 0.52–1.04)
DIFFERENTIAL METHOD BLD: ABNORMAL
EOSINOPHIL # BLD AUTO: 0.1 10E9/L (ref 0–0.7)
EOSINOPHIL NFR BLD AUTO: 1.1 %
ERYTHROCYTE [DISTWIDTH] IN BLOOD BY AUTOMATED COUNT: 12.7 % (ref 10–15)
GFR SERPL CREATININE-BSD FRML MDRD: >90 ML/MIN/{1.73_M2}
GLUCOSE BLDC GLUCOMTR-MCNC: 121 MG/DL (ref 70–99)
GLUCOSE SERPL-MCNC: 133 MG/DL (ref 70–99)
HCT VFR BLD AUTO: 32.1 % (ref 35–47)
HGB BLD-MCNC: 11.1 G/DL (ref 11.7–15.7)
IMM GRANULOCYTES # BLD: 0 10E9/L (ref 0–0.4)
IMM GRANULOCYTES NFR BLD: 0.2 %
LABORATORY COMMENT REPORT: NORMAL
LYMPHOCYTES # BLD AUTO: 1.9 10E9/L (ref 0.8–5.3)
LYMPHOCYTES NFR BLD AUTO: 35.2 %
MCH RBC QN AUTO: 29.5 PG (ref 26.5–33)
MCHC RBC AUTO-ENTMCNC: 34.6 G/DL (ref 31.5–36.5)
MCV RBC AUTO: 85 FL (ref 78–100)
MONOCYTES # BLD AUTO: 0.3 10E9/L (ref 0–1.3)
MONOCYTES NFR BLD AUTO: 4.9 %
NEUTROPHILS # BLD AUTO: 3.2 10E9/L (ref 1.6–8.3)
NEUTROPHILS NFR BLD AUTO: 58.1 %
NRBC # BLD AUTO: 0 10*3/UL
NRBC BLD AUTO-RTO: 0 /100
PLATELET # BLD AUTO: 281 10E9/L (ref 150–450)
POTASSIUM SERPL-SCNC: 3.9 MMOL/L (ref 3.4–5.3)
RBC # BLD AUTO: 3.76 10E12/L (ref 3.8–5.2)
SARS-COV-2 RNA RESP QL NAA+PROBE: NEGATIVE
SODIUM SERPL-SCNC: 140 MMOL/L (ref 133–144)
SPECIMEN SOURCE: NORMAL
WBC # BLD AUTO: 5.5 10E9/L (ref 4–11)

## 2021-04-13 PROCEDURE — 258N000003 HC RX IP 258 OP 636: Performed by: INTERNAL MEDICINE

## 2021-04-13 PROCEDURE — 999N001017 HC STATISTIC GLUCOSE BY METER IP

## 2021-04-13 PROCEDURE — 87635 SARS-COV-2 COVID-19 AMP PRB: CPT | Performed by: EMERGENCY MEDICINE

## 2021-04-13 PROCEDURE — 90791 PSYCH DIAGNOSTIC EVALUATION: CPT

## 2021-04-13 PROCEDURE — 250N000013 HC RX MED GY IP 250 OP 250 PS 637: Performed by: CLINICAL NURSE SPECIALIST

## 2021-04-13 PROCEDURE — 85025 COMPLETE CBC W/AUTO DIFF WBC: CPT | Performed by: INTERNAL MEDICINE

## 2021-04-13 PROCEDURE — 80048 BASIC METABOLIC PNL TOTAL CA: CPT | Performed by: INTERNAL MEDICINE

## 2021-04-13 PROCEDURE — 124N000002 HC R&B MH UMMC

## 2021-04-13 PROCEDURE — 36415 COLL VENOUS BLD VENIPUNCTURE: CPT | Performed by: INTERNAL MEDICINE

## 2021-04-13 PROCEDURE — 93005 ELECTROCARDIOGRAM TRACING: CPT

## 2021-04-13 PROCEDURE — 250N000013 HC RX MED GY IP 250 OP 250 PS 637: Performed by: EMERGENCY MEDICINE

## 2021-04-13 PROCEDURE — 99223 1ST HOSP IP/OBS HIGH 75: CPT | Mod: AI | Performed by: CLINICAL NURSE SPECIALIST

## 2021-04-13 RX ORDER — LORAZEPAM 1 MG/1
1 TABLET ORAL AT BEDTIME
Status: DISCONTINUED | OUTPATIENT
Start: 2021-04-13 | End: 2021-04-14 | Stop reason: HOSPADM

## 2021-04-13 RX ORDER — TRAZODONE HYDROCHLORIDE 150 MG/1
150 TABLET ORAL AT BEDTIME
Status: DISCONTINUED | OUTPATIENT
Start: 2021-04-13 | End: 2021-04-13

## 2021-04-13 RX ORDER — HYDROXYZINE HYDROCHLORIDE 25 MG/1
25 TABLET, FILM COATED ORAL EVERY 4 HOURS PRN
Status: DISCONTINUED | OUTPATIENT
Start: 2021-04-13 | End: 2021-04-13

## 2021-04-13 RX ORDER — POLYETHYLENE GLYCOL 3350 17 G/17G
17 POWDER, FOR SOLUTION ORAL DAILY PRN
Status: DISCONTINUED | OUTPATIENT
Start: 2021-04-13 | End: 2021-04-14 | Stop reason: HOSPADM

## 2021-04-13 RX ORDER — LANOLIN ALCOHOL/MO/W.PET/CERES
3 CREAM (GRAM) TOPICAL
Status: DISCONTINUED | OUTPATIENT
Start: 2021-04-13 | End: 2021-04-13

## 2021-04-13 RX ORDER — LORAZEPAM 1 MG/1
1 TABLET ORAL EVERY 4 HOURS PRN
Status: DISCONTINUED | OUTPATIENT
Start: 2021-04-13 | End: 2021-04-13

## 2021-04-13 RX ORDER — FERROUS SULFATE 325(65) MG
325 TABLET ORAL DAILY
Status: DISCONTINUED | OUTPATIENT
Start: 2021-04-13 | End: 2021-04-13

## 2021-04-13 RX ORDER — ONDANSETRON 4 MG/1
4 TABLET, FILM COATED ORAL EVERY 6 HOURS PRN
Status: DISCONTINUED | OUTPATIENT
Start: 2021-04-13 | End: 2021-04-14 | Stop reason: HOSPADM

## 2021-04-13 RX ORDER — LORAZEPAM 1 MG/1
1 TABLET ORAL AT BEDTIME
Status: ON HOLD | COMMUNITY
End: 2023-02-08

## 2021-04-13 RX ORDER — MAGNESIUM HYDROXIDE/ALUMINUM HYDROXICE/SIMETHICONE 120; 1200; 1200 MG/30ML; MG/30ML; MG/30ML
30 SUSPENSION ORAL EVERY 4 HOURS PRN
Status: DISCONTINUED | OUTPATIENT
Start: 2021-04-13 | End: 2021-04-14 | Stop reason: HOSPADM

## 2021-04-13 RX ORDER — VENLAFAXINE HYDROCHLORIDE 37.5 MG/1
37.5 CAPSULE, EXTENDED RELEASE ORAL AT BEDTIME
Status: DISCONTINUED | OUTPATIENT
Start: 2021-04-13 | End: 2021-04-13

## 2021-04-13 RX ORDER — GABAPENTIN 300 MG/1
300 CAPSULE ORAL 3 TIMES DAILY
Status: DISCONTINUED | OUTPATIENT
Start: 2021-04-13 | End: 2021-04-13

## 2021-04-13 RX ORDER — HYDROXYZINE HYDROCHLORIDE 25 MG/1
25-50 TABLET, FILM COATED ORAL EVERY 4 HOURS PRN
Status: DISCONTINUED | OUTPATIENT
Start: 2021-04-13 | End: 2021-04-14 | Stop reason: HOSPADM

## 2021-04-13 RX ORDER — IBUPROFEN 200 MG
600 TABLET ORAL EVERY 6 HOURS PRN
Status: DISCONTINUED | OUTPATIENT
Start: 2021-04-13 | End: 2021-04-14 | Stop reason: HOSPADM

## 2021-04-13 RX ORDER — OLANZAPINE 10 MG/2ML
10 INJECTION, POWDER, FOR SOLUTION INTRAMUSCULAR 3 TIMES DAILY PRN
Status: DISCONTINUED | OUTPATIENT
Start: 2021-04-13 | End: 2021-04-13

## 2021-04-13 RX ORDER — ONDANSETRON 4 MG/1
4 TABLET, FILM COATED ORAL EVERY 6 HOURS PRN
Status: DISCONTINUED | OUTPATIENT
Start: 2021-04-13 | End: 2021-04-13

## 2021-04-13 RX ORDER — FLUOXETINE 40 MG/1
40 CAPSULE ORAL DAILY
COMMUNITY
End: 2021-09-29

## 2021-04-13 RX ORDER — NAPROXEN 250 MG/1
250 TABLET ORAL 3 TIMES DAILY PRN
Status: DISCONTINUED | OUTPATIENT
Start: 2021-04-13 | End: 2021-04-13

## 2021-04-13 RX ORDER — OLANZAPINE 10 MG/2ML
5 INJECTION, POWDER, FOR SOLUTION INTRAMUSCULAR 3 TIMES DAILY PRN
Status: DISCONTINUED | OUTPATIENT
Start: 2021-04-13 | End: 2021-04-14 | Stop reason: HOSPADM

## 2021-04-13 RX ORDER — ACETAMINOPHEN 325 MG/1
650 TABLET ORAL EVERY 4 HOURS PRN
Status: DISCONTINUED | OUTPATIENT
Start: 2021-04-13 | End: 2021-04-13

## 2021-04-13 RX ORDER — ASCORBIC ACID 500 MG
500 TABLET ORAL DAILY
Status: DISCONTINUED | OUTPATIENT
Start: 2021-04-13 | End: 2021-04-14 | Stop reason: HOSPADM

## 2021-04-13 RX ORDER — OLANZAPINE 10 MG/1
10 TABLET ORAL 3 TIMES DAILY PRN
Status: DISCONTINUED | OUTPATIENT
Start: 2021-04-13 | End: 2021-04-13

## 2021-04-13 RX ORDER — FAMOTIDINE 10 MG
20 TABLET ORAL DAILY
Status: DISCONTINUED | OUTPATIENT
Start: 2021-04-13 | End: 2021-04-13

## 2021-04-13 RX ORDER — DROSPIRENONE AND ETHINYL ESTRADIOL 0.02-3(28)
1 KIT ORAL DAILY
COMMUNITY
End: 2023-01-04

## 2021-04-13 RX ORDER — OLANZAPINE 5 MG/1
5-10 TABLET ORAL 3 TIMES DAILY PRN
Status: DISCONTINUED | OUTPATIENT
Start: 2021-04-13 | End: 2021-04-13

## 2021-04-13 RX ORDER — ARIPIPRAZOLE 15 MG/1
7.5 TABLET ORAL DAILY
COMMUNITY
End: 2021-09-28

## 2021-04-13 RX ORDER — VITAMIN B COMPLEX
50 TABLET ORAL DAILY
Status: DISCONTINUED | OUTPATIENT
Start: 2021-04-13 | End: 2021-04-14 | Stop reason: HOSPADM

## 2021-04-13 RX ORDER — TRAZODONE HYDROCHLORIDE 150 MG/1
150 TABLET ORAL AT BEDTIME
Status: DISCONTINUED | OUTPATIENT
Start: 2021-04-13 | End: 2021-04-14 | Stop reason: HOSPADM

## 2021-04-13 RX ORDER — SERTRALINE HYDROCHLORIDE 100 MG/1
100 TABLET, FILM COATED ORAL DAILY
Status: DISCONTINUED | OUTPATIENT
Start: 2021-04-13 | End: 2021-04-13

## 2021-04-13 RX ORDER — GABAPENTIN 300 MG/1
600 CAPSULE ORAL AT BEDTIME
Status: DISCONTINUED | OUTPATIENT
Start: 2021-04-13 | End: 2021-04-14 | Stop reason: HOSPADM

## 2021-04-13 RX ORDER — OLANZAPINE 2.5 MG/1
2.5-5 TABLET, FILM COATED ORAL 3 TIMES DAILY PRN
Status: DISCONTINUED | OUTPATIENT
Start: 2021-04-13 | End: 2021-04-14 | Stop reason: HOSPADM

## 2021-04-13 RX ORDER — CALCIUM CARBONATE 500 MG/1
500 TABLET, CHEWABLE ORAL DAILY PRN
Status: DISCONTINUED | OUTPATIENT
Start: 2021-04-13 | End: 2021-04-14 | Stop reason: HOSPADM

## 2021-04-13 RX ORDER — MULTIPLE VITAMINS W/ MINERALS TAB 9MG-400MCG
1 TAB ORAL DAILY
Status: DISCONTINUED | OUTPATIENT
Start: 2021-04-13 | End: 2021-04-14 | Stop reason: HOSPADM

## 2021-04-13 RX ORDER — GABAPENTIN 300 MG/1
300 CAPSULE ORAL 3 TIMES DAILY PRN
Status: DISCONTINUED | OUTPATIENT
Start: 2021-04-13 | End: 2021-04-14 | Stop reason: HOSPADM

## 2021-04-13 RX ADMIN — SODIUM CHLORIDE 1000 ML: 9 INJECTION, SOLUTION INTRAVENOUS at 18:52

## 2021-04-13 RX ADMIN — GABAPENTIN 300 MG: 300 CAPSULE ORAL at 08:56

## 2021-04-13 RX ADMIN — Medication 7.5 MG: at 21:54

## 2021-04-13 RX ADMIN — Medication 50 MCG: at 12:30

## 2021-04-13 RX ADMIN — TRAZODONE HYDROCHLORIDE 150 MG: 150 TABLET ORAL at 01:58

## 2021-04-13 RX ADMIN — GABAPENTIN 600 MG: 300 CAPSULE ORAL at 21:54

## 2021-04-13 RX ADMIN — GABAPENTIN 600 MG: 300 CAPSULE ORAL at 01:33

## 2021-04-13 RX ADMIN — FLUOXETINE 40 MG: 20 CAPSULE ORAL at 12:30

## 2021-04-13 RX ADMIN — LORAZEPAM 1 MG: 1 TABLET ORAL at 21:54

## 2021-04-13 RX ADMIN — IBUPROFEN 600 MG: 200 TABLET, FILM COATED ORAL at 20:49

## 2021-04-13 RX ADMIN — TRAZODONE HYDROCHLORIDE 150 MG: 150 TABLET ORAL at 21:59

## 2021-04-13 RX ADMIN — HYDROXYZINE HYDROCHLORIDE 50 MG: 25 TABLET, FILM COATED ORAL at 18:08

## 2021-04-13 RX ADMIN — GABAPENTIN 300 MG: 300 CAPSULE ORAL at 13:48

## 2021-04-13 RX ADMIN — Medication 10 MG: at 01:58

## 2021-04-13 RX ADMIN — IBUPROFEN 600 MG: 200 TABLET, FILM COATED ORAL at 14:13

## 2021-04-13 NOTE — PLAN OF CARE
"Patient has been mostly reclusive to her room thus far.  Out for vital signs, talked with karenist on the phone.   Refused breakfast.    Flat affect, depressed.  Reports feeling sad.  Patient tearful. Quiet and soft spoken.  Requested naprosyn.  Counseled taking some food with medications which she initially agreed but did not eat the cereal or water that she requested..    Reports her main stressor is her parents-feels they are not supportive any more--\"they have given up\".  Also school is stressful.      Continues to endorse suicidal though but without a plan or intent.  Contracts for safety in that she will let staff know if she is not feeling safe.    Rates depression and anxiety both as 8-9 010 worst).    1200: Still has not taken any po.      "

## 2021-04-13 NOTE — PROGRESS NOTES
Patient appeared to be asleep for 2 hours (new admit) during safety checks this shift. No complaints or concerns voiced by patient or noted by staff. Will continue to monitor and update if there are changes.

## 2021-04-13 NOTE — TELEPHONE ENCOUNTER
Patient cleared and ready for behavioral bed placement: Yes     S: 20 y/o female presented to the [facility] ED with SI.    B: Hx depression, PTSD, anorexia, OCD.  SI with a plan to overdose on gabapentin or asphyxiation with carbon monoxide in the garage.  Pt was meeting with her PCP due to her anorexia and was subsequently placed on a [place] due to SI.  Pt's anorexia is active but she was able to eat some crackers and cheese in the ER.  Pt is upset because her mother does not want her to go to a residential treatment facility for her eating disorder and has stress with school.  No reported HI, psychosis or substance abuse    A: Voluntary.   reported pt's PCP placed her on a [place] but she will sign in voluntarily.  No acute medical concerns.  COVID has not been ordered.  No reported sx.  Drug screen and HCG negative    R: 0132 COVID has been collected.    0156 On-Call paged.  Awaiting callback.      0158 On-Call accepts.  4A/[facility] ([place]).  Placed in queue at 0210.  Unit notified at 0210 and will call ED when available for report.  ED notified at 0213.

## 2021-04-13 NOTE — ED PROVIDER NOTES
ED Provider Note  [facility]      History     Chief Complaint   Patient presents with     Suicidal     Plan to overdose, intention      HPI  Thea Frances Castle is a 21 year old female who has a past medical history of anxiety, PTSD, depression presenting with thoughts of harming her self.  Plan on overdosing on drugs.  She denies ingestions, alcohol or drug use tonight.  Denies hallucinations or voices.  She denies physical discomfort or pain.  Denies self injury cutting.  She has not had a stress test in the past.  She has not had an admission.  She does see a psychiatrist and therapist, she does have medication which she has been taking regularly.  No extra doses.    Past Medical History  Past Medical History:   Diagnosis Date     Anorexia      Anxiety      Depressive disorder      OCD (obsessive compulsive disorder)      PTSD (post-traumatic stress disorder)      Past Surgical History:   Procedure Laterality Date     ENT SURGERY       gabapentin (NEURONTIN) 300 MG capsule  melatonin 10 MG TABS tablet  traZODone (DESYREL) 150 MG tablet  acetaminophen (TYLENOL) 325 MG tablet  ARIPiprazole (ABILIFY) 2 MG tablet  calcium carbonate (TUMS) 500 MG chewable tablet  clindamycin (CLINDAMAX) 1 % external gel  docusate sodium (COLACE) 100 MG capsule  erenumab-aooe (AIMOVIG) 140 MG/ML injection  famotidine (PEPCID) 20 MG tablet  gabapentin (NEURONTIN) 300 MG capsule  gabapentin (NEURONTIN) 300 MG capsule  hydrOXYzine (ATARAX) 25 MG tablet  multivitamin w/minerals (THERA-VIT-M) tablet  naproxen (NAPROSYN) 250 MG tablet  polyethylene glycol (MIRALAX) 17 g packet  prazosin (MINIPRESS) 1 MG capsule  sertraline (ZOLOFT) 100 MG tablet  tretinoin (RETIN-A) 0.05 % external cream  venlafaxine (EFFEXOR-XR) 37.5 MG 24 hr capsule  vitamin C (ASCORBIC ACID) 500 MG tablet  vitamin D3 (CHOLECALCIFEROL) 50 mcg (2000 units) tablet      Allergies   Allergen Reactions     Penicillins      Other Food Allergy GI  Disturbance     Cilantro     Family History  Family History   Problem Relation Age of Onset     Suicide Other      Social History   Social History     Tobacco Use     Smoking status: Never Smoker     Smokeless tobacco: Never Used   Substance Use Topics     Alcohol use: Yes     Frequency: Never     Drug use: No      Past medical history, past surgical history, medications, allergies, family history, and social history were reviewed with the patient. No additional pertinent items.       Review of Systems  A complete review of systems was performed with pertinent positives and negatives noted in the HPI, and all other systems negative.    Physical Exam   BP: 111/71  Pulse: 81  Temp: 98.9  F (37.2  C)  Resp: 14  SpO2: 98 %  Physical Exam  Physical Exam   Constitutional: oriented to person, place, and time. appears well-developed and well-nourished.   HENT:   Head: Normocephalic and atraumatic.   Neck: Normal range of motion.   Pulmonary/Chest: Effort normal. No respiratory distress.   Cardiac: No murmurs, rubs, gallops. RRR.  Abdominal: Abdomen soft, nontender, nondistended. No rebound tenderness.  MSK: Long bones without deformity or evidence of trauma  Neurological: alert and oriented to person, place, and time.   Skin: Skin is warm and dry.   Psychiatric: Flat affect. Poor eye contact    ED Course      Procedures             Results for orders placed or performed during the hospital encounter of 04/12/21   Drug abuse screen 6 urine (tox)     Status: None   Result Value Ref Range    Amphetamine Qual Urine Negative NEG^Negative    Barbiturates Qual Urine Negative NEG^Negative    Benzodiazepine Qual Urine Negative NEG^Negative    Cannabinoids Qual Urine Negative NEG^Negative    Cocaine Qual Urine Negative NEG^Negative    Ethanol Qual Urine Negative NEG^Negative    Opiates Qualitative Urine Negative NEG^Negative   HCG qualitative urine     Status: None   Result Value Ref Range    HCG Qual Urine Negative NEG^Negative      Medications   acetaminophen (TYLENOL) tablet 975 mg (975 mg Oral Given 4/12/21 2133)        Assessments & Plan (with Medical Decision Making)   MDM  Patient with SI and plan. Patient has SI with plan. Cannot be safe as outpatient. Will plan on inpatient psych treatment for stabilization.    I have reviewed the nursing notes. I have reviewed the findings, diagnosis, plan and need for follow up with the patient.    New Prescriptions    No medications on file       Final diagnoses:   Suicidal ideation       --  Robert Griggs  [facility] EMERGENCY DEPARTMENT  4/12/2021     Robert Griggs MD  04/13/21 0113

## 2021-04-13 NOTE — SAFE
Thea Daniels Tin  April 13, 2021    Due to SI with plans and intent to either OD on her Gabapentin or to sit in the garage with the car running; this patient will be admitted to [facility] for further assessment, safety, and stabilization.  She is voluntary.  The patient agreed to this plan.  Patient denied SIB or HI.  She denied any symptoms of Psychosis or Laurita.  She denied alcohol or other chemical use.  She is not aggressive.      Current Suicidal Ideation/Self-Injurious Concerns/Methods: Ingestion on her Gabapentin or to sit in the garage with her car on.    Inappropriate Sexual Behavior: No    Aggression/Homicidal Ideation: None - N/A      For additional details see full DEC assessment.       Caryl Fowler, LICSW

## 2021-04-13 NOTE — PLAN OF CARE
S: Patient is a 21 year old female admitted from [facility] ED to 4A due to SI.    B: Patient was at PCP visit, when patient admitted to SI with plans to overdose on Gabapentin or from carbon monoxide inhalation in car garage. Therefore, PCP recommended that patient be admitted to the hospital. Patient has history of depression, OCD, anorexia, and SI. On admission, patient said does have thoughts of SI with plans to overdose, but contracts for safety. Patient denies SIB/HI/AH/VH. Per patient, she had a fall on Easter-4/4/21.    A: Voluntary. YESSI signed for patient's mother- Keshia Castle ([phone]). On-call doc called, updated and orders placed (see new orders). PTA medications were not reconciled because patient said does not know the medications she is taking. Patient said her mother is the one that knows her medications. Patient did not want mother to be called tonight rather during the day. On-call doc placed an order for pharmacy consult for medications. Nutrition consult was also placed per patient's request. Patient was cooperative with the admission. However, refused to be weighed. No skin injuries or rashes noted or reported by patient.    R: Please call mother for medications.Please weigh patient. Assess and treat as ordered.
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
## 2021-04-13 NOTE — ED NOTES
"Pt informed MH  that she has not been offered her medications for food. Pt was informed that the medications she requested (trazadone/melatonin/gabapentin would be given after her mental health assessment, multiple times). Pt also offered food/jitendra. When this writer asked if pt wanted food pt said no. Pt reports that \"she is a liar\" (mental health ). Pt drank water though.   "
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
## 2021-04-13 NOTE — ED NOTES
ED to Behavioral Floor Handoff    SITUATION  Thea Castle is a 21 year old female who speaks English and lives in a home with family members The patient arrived in the ED by ambulance from clinic with a complaint of Suicidal (Plan to overdose, intention )  .The patient's current symptoms started/worsened 1 day(s) ago and during this time the symptoms have increased.   In the ED, pt was diagnosed with   Final diagnoses:   Suicidal ideation        Initial vitals were: BP: 111/71  Pulse: 81  Temp: 98.9  F (37.2  C)  Resp: 14  SpO2: 98 %   --------  Is the patient diabetic? No   If yes, last blood glucose? --     If yes, was this treated in the ED? --  --------  Is the patient inebriated (ETOH) No or Impaired on other substances? No  MSSA done? N/A  Last MSSA score: --    Were withdrawal symptoms treated? N/A  Does the patient have a seizure history? No. If yes, date of most recent seizure--  --------  Is the patient patient experiencing suicidal ideation? reports the following suicide factors: suicidal ideations with plan to overdose     Homicidal ideation? denies current or recent homicidal ideation or behaviors.    Self-injurious behavior/urges? denies current or recent self injurious behavior or ideation.  ------  Was pt aggressive in the ED No  Was a code called No  Is the pt now cooperative? Yes  -------  Meds given in ED:   Medications   gabapentin (NEURONTIN) capsule 300 mg (has no administration in time range)   gabapentin (NEURONTIN) capsule 600 mg (600 mg Oral Given 4/13/21 0133)   traZODone (DESYREL) tablet 150 mg (150 mg Oral Given 4/13/21 0158)   melatonin tablet 10 mg (10 mg Oral Given 4/13/21 0158)   acetaminophen (TYLENOL) tablet 975 mg (975 mg Oral Given 4/12/21 2133)      Family present during ED course? No  Family currently present? No    BACKGROUND  Does the patient have a cognitive impairment or developmental disability? No  Allergies:   Allergies   Allergen Reactions     Penicillins       Other Food Allergy GI Disturbance     Cilantro   .   Social demographics are   Social History     Socioeconomic History     Marital status: Single     Spouse name: None     Number of children: None     Years of education: None     Highest education level: None   Occupational History     None   Social Needs     Financial resource strain: None     Food insecurity     Worry: None     Inability: None     Transportation needs     Medical: None     Non-medical: None   Tobacco Use     Smoking status: Never Smoker     Smokeless tobacco: Never Used   Substance and Sexual Activity     Alcohol use: Yes     Frequency: Never     Drug use: No     Sexual activity: Never   Lifestyle     Physical activity     Days per week: None     Minutes per session: None     Stress: None   Relationships     Social connections     Talks on phone: None     Gets together: None     Attends Church service: None     Active member of club or organization: None     Attends meetings of clubs or organizations: None     Relationship status: None     Intimate partner violence     Fear of current or ex partner: None     Emotionally abused: None     Physically abused: None     Forced sexual activity: None   Other Topics Concern     Parent/sibling w/ CABG, MI or angioplasty before 65F 55M? Not Asked   Social History Narrative     None        ASSESSMENT  Labs results   Labs Ordered and Resulted from Time of ED Arrival Up to the Time of Departure from the ED   DRUG ABUSE SCREEN 6 CHEM DEP URINE ([facility])   HCG QUALITATIVE URINE   SARS-COV-2 (COVID-19) VIRUS RT-PCR   DOCUMENT IN LEGAL HOLD NAVIGATOR      Imaging Studies: No results found for this or any previous visit (from the past 24 hour(s)).   Most recent vital signs /71   Pulse 81   Temp 98.9  F (37.2  C) (Tympanic)   Resp 14   SpO2 98%    Abnormal labs/tests/findings requiring intervention:---   Pain control: good  Nausea control: pt had none    RECOMMENDATION  Are any infection precautions  needed (MRSA, VRE, etc.)? No If yes, what infection? --  ---  Does the patient have mobility issues? independently. If yes, what device does the pt use? ---  ---  Is patient on 72 hour hold or commitment? No If on 72 hour hold, have hold and rights been given to patient? N/A  Are admitting orders written if after 10 p.m. ?N/A  Tasks needing to be completed:---     ELVER ZAFAR RN   [facility]--    [phone] [facility]   [phone] [facility]

## 2021-04-13 NOTE — PHARMACY-ADMISSION MEDICATION HISTORY
Admission Medication History Completed by Pharmacy    See [facility] Admission Navigator for allergy information, preferred outpatient pharmacy, prior to admission medications and immunization status.     Medication History Sources:     Christiannerioliver (fill history), [place], and patient's mother, Keshia (via telephone)    Changes made to PTA medication list (reason):    Added: per fill history and mother  o Drosperinone-ethinyl estradiol tablet   o Fluoxetine   o Lorazepam  o Tazorac cream    Deleted: old/completed rx's per fill history and mother  o Clindamycin gel  o Docusate   o Famotidine   o Prazosin   o Sertraline  o Tretinoin cream  o Venlafaxine    Changed: per fill history and mother  o Aripiprazole 15 mg tablet - take one-half tablet (7.5 mg) daily  o Hydroxyzine 25 mg tablet - TID PRN     Additional Information:    Spoke with mother about cost of dispensing multivitamins, vitamin C, and D to patient in hospital. Each would be $5 per tab, per day. Provided mother information that unopened bottles of vitamins and supplements can be brought for personal use to be verified by pharmacy, if needed. Mother is ok to hold off on all vitamins/supplements until closer to discharge.  o Reviewed additional medications on list with mother that are ok to hold during admission:  - Drosperinone-ethinyl estradiol tablet - taking for acne, will resume at discharge  - Tazorac cream - taking for acne, will resume at discharge    Prior to Admission medications    Medication Sig Last Dose Taking? Auth Provider   ARIPiprazole (ABILIFY) 15 MG tablet Take 7.5 mg by mouth daily  4/12/2021 at Unknown time Yes Unknown, Entered By History   drospirenone-ethinyl estradiol (DIANA) 3-0.02 MG tablet Take 1 tablet by mouth daily Past Week at Unknown time Yes Unknown, Entered By History   FLUoxetine (PROZAC) 40 MG capsule Take 40 mg by mouth daily 4/12/2021 at Unknown time Yes Unknown, Entered By History   gabapentin (NEURONTIN) 300 MG capsule  Take 1 capsule (300 mg) by mouth 3 times daily In addition to 600mg at bedtime. 4/12/2021 at Unknown time Yes Александр Hull MD   gabapentin (NEURONTIN) 300 MG capsule Take 2 capsules (600 mg) by mouth At Bedtime In addition to 300mg by mouth three times daily. 4/11/2021 at Unknown time Yes Александр Hull MD   hydrOXYzine (ATARAX) 25 MG tablet Take 25 mg by mouth 3 times daily as needed for anxiety  4/12/2021 at Unknown time Yes Александр Hull MD   LORazepam (ATIVAN) 1 MG tablet Take 1 mg by mouth At Bedtime Past Week at Unknown time Yes Unknown, Entered By History   melatonin 10 MG TABS tablet Take 1 tablet (10 mg) by mouth nightly as needed for sleep 4/11/2021 at Unknown time Yes Александр Hull MD   multivitamin w/minerals (THERA-VIT-M) tablet Take 1 tablet by mouth daily 4/12/2021 at Unknown time Yes Александр Hull MD   tazarotene (TAZORAC) 0.05 % external cream Externally apply topically every evening Past Week at Unknown time Yes Unknown, Entered By History   traZODone (DESYREL) 150 MG tablet Take 1 tablet (150 mg) by mouth At Bedtime 4/11/2021 at Unknown time Yes Александр Hull MD   vitamin C (ASCORBIC ACID) 500 MG tablet Take 1 tablet (500 mg) by mouth daily 4/12/2021 at Unknown time Yes Александр Hull MD   vitamin D3 (CHOLECALCIFEROL) 50 mcg (2000 units) tablet Take 1 tablet (50 mcg) by mouth daily 4/12/2021 at Unknown time Yes Александр Hull MD   acetaminophen (TYLENOL) 325 MG tablet Take 2 tablets (650 mg) by mouth every 4 hours as needed for mild pain  at PRN  Pierre Lenz MD   calcium carbonate (TUMS) 500 MG chewable tablet Take 1 tablet (500 mg) by mouth as needed for heartburn  at PRN  Александр Hull MD   erenumab-aooe (AIMOVIG) 140 MG/ML injection Inject 140 mg Subcutaneous every 30 days  4/1/2021  Unknown, Entered By History   naproxen (NAPROSYN) 250 MG tablet Take 1 tablet (250 mg) by mouth 3 times daily as needed for  headaches  at PRN  Александр Hull MD   polyethylene glycol (MIRALAX) 17 g packet Take 17 g by mouth daily as needed for constipation  at PRАлександр Pretty MD       Date completed: 04/13/21    Medication history completed by:     Nicollette McMann, Renea  [facility] Building: [facility] *[id number]

## 2021-04-13 NOTE — PLAN OF CARE
The patient specific goals include:   Patient will participate in unit programming  Patient will identify triggers and positive coping skills  Patient will take medications as prescribed by physician both for mental health and medical  Patient coached to work on coping packet    The patient identified the following reasons for hospitalization:  Suicidal Ideations    The patient identified the following goals for discharge:    Pt would like to attend [employer] after her spring semester of school    Mood stabilization    Medication Management.

## 2021-04-13 NOTE — PROGRESS NOTES
CLINICAL NUTRITION SERVICES - ASSESSMENT NOTE     Nutrition Prescription    RECOMMENDATIONS FOR MDs/PROVIDERS TO ORDER:  None at this time    Malnutrition Status:    Non-severe malnutrition in the context of environmental/social circumstances - pt may meet criteria for severe malnutrition however no NFPE performed    Recommendations already ordered by Registered Dietitian (RD):  Ensure (vanilla) BID with lunch & dinner   In order to reduce eating disorder symptome usage, recommend the following:  - Remove trash can from pt's room  - Keep pt in common space for 1.5 hour after eating  - Encourage distraction during mealtime   - Remove bathroom door if able     Future/Additional Recommendations:  Monitor PO intake and feeding behaviors      REASON FOR ASSESSMENT  Thea Castle is a/an 21 year old female assessed by the dietitian for Patient/Family Request    NUTRITION HISTORY  Presents with SI. PMH of anxiety, depression, PTSD, OCD and anorexia. Syncope episodes.   Per previous RD assessments:  - 4/25/2019: ED started in 2017; eats very little, restricting kcal to 500/day. Stated she has success eating when encouraged   - 8/13/20: reported restriction increased, pt noted safe foods were fruits and vegetables. Pt stated she has been vegan for 1x year, however states she eats cottage cheese & eggs. Pt agreed to boost claudia flaherty    Per chart review of outpatient eating disorder treatment:  1/21/21: attended inpatient program (10/6/20-12/6/20); has been 5x, residential facility 6x.   3/25/21 (most recent note): lost 2#, has been restricting heavily, last laxative use on 3/21/21.     Per pt: Spoke with pt today over the phone. Pt states she requested RD consult as she has been dizzy and dehydrated - wants someone to discuss with her what foods to eat. With pt's permission, discussed eating disorder history, treatment, triggers, and feelings. Pt confirmed she has been completing outpatient ED treatment but has  not seen an RD since January. Noted her recent intake PTA was has been under 1,000kcal, consuming goldfish, popcorn. Last meal was a cheese stick at 2pm, per mom's encouragement. Safe foods are fruit. Pt identified meal planing as helpful in alleviating stress around meal times, no other environmental triggers/tips noted. Has had nutritional supplements in past, unable to identify types and frequency.     CURRENT NUTRITION ORDERS  Diet: Regular  Intake/Tolerance:  Per chart review: pt declined PO in ED  Per RN: did not eat breakfast, refusing weights, is requiring encouragement with fluids.  Per pt: Pt has had breakfast today, states that she does not eat in the morning as she is not hungry. Pt has yet to receive menu (confirmed with RN) and was unable to choose meal for lunch. When discussing lunch, pt expressed concern and anxiety over meal time, especially given default tray. Writer counseled and encouraged pt to eat lunch as best as able; given recent poor intake and c/o dizziness, pt agreed to receiving ensure enlive and consuming if unable to eat lunch meal. During call RN provided pt with diet lemon soda to encourage fluids, pt agreed to drinking. Writer asked how pt could be supported during meal times, pt requested writer to assist in talking over menu. After speaking with pt, RN noted that she ate a small salad and box of cereal - requested more cereal. Spoke with patient again after lunch to go over menu options. Pt reported she did not have the supplement that was sent up (wanted Ensure instead of boost) but did have part of the meal instead. Pt open to having Ensure BID with lunch and dinner. Writer counseled patient through choosing menu options, pt chose salad and fruit for lunch & dinner, but was open to adding chicken and soup to side. Pt continued to endorse dizziness. Encouraged protein and educated on the importance of protein, focused on the positive effects that protein foods will have on  "dizziness (did not discuss calorie content). Pt and writer agreed to try to eat as much PO as tolerated/able, and consuming Ensure in addition or if unable to eat PO. Pt asked how much fluid should be drank, writer encouraged at least one cup (unsure of cup sizes on unit) between meals to provide fluids but limit feelings of fullness from fluids that may impede PO intake. Pt open and willing to discuss food intake and participated in making nutrition plan/recommendations.     LABS  Labs reviewed  Labs on 3/4/21 WNL  No updated labs during time of assessment, will monitor results     MEDICATIONS  Medications reviewed  Zyprexa  Miralax PRN  Maalox PRN    ANTHROPOMETRICS  Height: 5'4\"  Most Recent Weight: (pt refused); most recent from OSH: 51.8kg (114lb) 4/12/21    IBW: 54.5 kg  BMI: Normal BMI  Weight History:   3/7/19: 55.3kg (122lb)  3/25/19: 57.6kg (127lb)  4/25/19: 54.5kg (120lb)  9/3/19: 53.2kg (117lb)  4/18/20: 49kg (108lb)  7/30/20: 48.9kg (107lb)  9/30/20: 47.6kg (105lb)   1/21/21: 52.3kg (115lb)  3/11/21: 51.1kg (112lb)    Pt has a hx of significant weight loss (4/2019-4/2020 lost 10.2%BW in 1 year). Recently gained 8.6% BW in past 6-7 months, recently per outpatient reports weight was stable but recently been down a few pounds. Based on recent weight of 4/12/21 weight is trending up.     Dosing Weight: 52 kg, based on last weight of 51.8kg on 4/12/21    ASSESSED NUTRITION NEEDS  Estimated Energy Needs: 0192-4771 kcals/day (30 - 35 kcals/kg )  Justification: Repletion  Estimated Protein Needs: 57-73 grams protein/day (1.1 - 1.4 grams of pro/kg)  Justification: Increased needs  Estimated Fluid Needs: (1 mL/kcal)   Justification: Maintenance    PHYSICAL FINDINGS  See malnutrition section below.  Did not see in person; per chart review: \"appears well-developed and well-nourished\"    MALNUTRITION  % Intake: </=50% for >/= 1 month (severe)  % Weight Loss: Up to 1-2% in 1 week (non-severe)  Subcutaneous Fat Loss: " Unable to assess  Muscle Loss: Unable to assess  Fluid Accumulation/Edema: None noted  Malnutrition Diagnosis: Non-severe malnutrition in the context of environmental/social circumstances - pt may meet criteria for severe malnutrition however no NFPE performed    NUTRITION DIAGNOSIS  Inadequate oral intake related to anorexia nervosa as evidenced by pt reporting of restricted intake (<1000kcal/day), minimal PO in past 24hr       INTERVENTIONS  Implementation  Nutrition education for nutrition relationship to health/disease - as above   Medical food supplement therapy - as above   Feeding environment: In order to reduce eating disorder symptome usage, recommend the following:  - Remove trash can from pt's room  - Keep pt in common space for 1.5 hour after eating  - Encourage distraction during mealtime   - Remove bathroom door if able     Goals  Patient to consume 50-75% of nutritionally adequate meal trays TID, or the equivalent with supplements/snacks.  Patient to consume 25-50% of Ensure BID      Monitoring/Evaluation  Progress toward goals will be monitored and evaluated per protocol.    Shabnam Duvall  Dietetic Intern

## 2021-04-13 NOTE — PLAN OF CARE
Work Completed: Completed psychosocial assessment, team note and plan of care.    Discharge plan or goal: Home with current services.                 Barriers to discharge: Symptom Management.

## 2021-04-13 NOTE — PROGRESS NOTES
1234: patient up and now eating: ate a box of corn flakes, small salad.  Requesting more cereal. 60 ml water with meds.  More alert than this morning.

## 2021-04-13 NOTE — PROGRESS NOTES
04/13/21 0355   Patient Belongings   Did you bring any home meds/supplements to the hospital?  No   Patient Belongings locker   Patient Belongings Put in Hospital Secure Location (Security or Locker, etc.) cell phone/electronics;clothing;necklace;plastic bag;shoes;other (see comments)   Belongings Search Yes   Clothing Search Yes   Second Staff Beata and Renetta   Comment Patient came in with a grey sweater, one yellow hair tie, pair of black sock, bra, under garment shirt, grey pant, mask, underwear, cell phone, one yellow necklace with a pendant, pair of white tennis shoe, one draw string and two fitgits.     A               Admission:  I am responsible for any personal items that are not sent to the safe or pharmacy.  [facility] is not responsible for loss, theft or damage of any property in my possession.    Signature:  _________________________________ Date: _______  Time: _____                                              Staff Signature:  ____________________________ Date: ________  Time: _____      2nd Staff person, if patient is unable/unwilling to sign:    Signature: ________________________________ Date: ________  Time: _____     Discharge:  [facility] has returned all of my personal belongings:    Signature: _________________________________ Date: ________  Time: _____                                          Staff Signature:  ____________________________ Date: ________  Time: _____

## 2021-04-14 ENCOUNTER — APPOINTMENT (OUTPATIENT)
Dept: CT IMAGING | Facility: CLINIC | Age: 21
DRG: 885 | End: 2021-04-14
Attending: PHYSICIAN ASSISTANT
Payer: COMMERCIAL

## 2021-04-14 LAB
ALBUMIN SERPL-MCNC: 3 G/DL (ref 3.4–5)
ALP SERPL-CCNC: 50 U/L (ref 40–150)
ALT SERPL W P-5'-P-CCNC: 19 U/L (ref 0–50)
ANION GAP SERPL CALCULATED.3IONS-SCNC: 5 MMOL/L (ref 3–14)
AST SERPL W P-5'-P-CCNC: 11 U/L (ref 0–45)
BASOPHILS # BLD AUTO: 0 10E9/L (ref 0–0.2)
BASOPHILS NFR BLD AUTO: 0.6 %
BILIRUB SERPL-MCNC: 0.3 MG/DL (ref 0.2–1.3)
BUN SERPL-MCNC: 10 MG/DL (ref 7–30)
CALCIUM SERPL-MCNC: 8.5 MG/DL (ref 8.5–10.1)
CHLORIDE SERPL-SCNC: 110 MMOL/L (ref 94–109)
CHOLEST SERPL-MCNC: 164 MG/DL
CO2 SERPL-SCNC: 26 MMOL/L (ref 20–32)
CREAT SERPL-MCNC: 0.88 MG/DL (ref 0.52–1.04)
DIFFERENTIAL METHOD BLD: ABNORMAL
EOSINOPHIL # BLD AUTO: 0.1 10E9/L (ref 0–0.7)
EOSINOPHIL NFR BLD AUTO: 2.7 %
ERYTHROCYTE [DISTWIDTH] IN BLOOD BY AUTOMATED COUNT: 12.9 % (ref 10–15)
GFR SERPL CREATININE-BSD FRML MDRD: >90 ML/MIN/{1.73_M2}
GLUCOSE SERPL-MCNC: 91 MG/DL (ref 70–99)
HCT VFR BLD AUTO: 34.4 % (ref 35–47)
HDLC SERPL-MCNC: 72 MG/DL
HGB BLD-MCNC: 11.4 G/DL (ref 11.7–15.7)
IMM GRANULOCYTES # BLD: 0 10E9/L (ref 0–0.4)
IMM GRANULOCYTES NFR BLD: 0.2 %
IRON SERPL-MCNC: 83 UG/DL (ref 35–180)
LDLC SERPL CALC-MCNC: 73 MG/DL
LYMPHOCYTES # BLD AUTO: 2.3 10E9/L (ref 0.8–5.3)
LYMPHOCYTES NFR BLD AUTO: 48.1 %
MAGNESIUM SERPL-MCNC: 1.8 MG/DL (ref 1.6–2.3)
MCH RBC QN AUTO: 28.9 PG (ref 26.5–33)
MCHC RBC AUTO-ENTMCNC: 33.1 G/DL (ref 31.5–36.5)
MCV RBC AUTO: 87 FL (ref 78–100)
MONOCYTES # BLD AUTO: 0.3 10E9/L (ref 0–1.3)
MONOCYTES NFR BLD AUTO: 6.4 %
NEUTROPHILS # BLD AUTO: 2 10E9/L (ref 1.6–8.3)
NEUTROPHILS NFR BLD AUTO: 42 %
NONHDLC SERPL-MCNC: 92 MG/DL
NRBC # BLD AUTO: 0 10*3/UL
NRBC BLD AUTO-RTO: 0 /100
PHOSPHATE SERPL-MCNC: 3.1 MG/DL (ref 2.5–4.5)
PLATELET # BLD AUTO: 250 10E9/L (ref 150–450)
POTASSIUM SERPL-SCNC: 4.1 MMOL/L (ref 3.4–5.3)
PROT SERPL-MCNC: 6.3 G/DL (ref 6.8–8.8)
RBC # BLD AUTO: 3.94 10E12/L (ref 3.8–5.2)
SODIUM SERPL-SCNC: 141 MMOL/L (ref 133–144)
TRIGL SERPL-MCNC: 96 MG/DL
TSH SERPL DL<=0.005 MIU/L-ACNC: 1.8 MU/L (ref 0.4–4)
WBC # BLD AUTO: 4.8 10E9/L (ref 4–11)

## 2021-04-14 PROCEDURE — 250N000013 HC RX MED GY IP 250 OP 250 PS 637: Performed by: CLINICAL NURSE SPECIALIST

## 2021-04-14 PROCEDURE — 70450 CT HEAD/BRAIN W/O DYE: CPT

## 2021-04-14 PROCEDURE — 85025 COMPLETE CBC W/AUTO DIFF WBC: CPT | Performed by: PSYCHIATRY & NEUROLOGY

## 2021-04-14 PROCEDURE — 80061 LIPID PANEL: CPT | Performed by: PSYCHIATRY & NEUROLOGY

## 2021-04-14 PROCEDURE — 84100 ASSAY OF PHOSPHORUS: CPT | Performed by: PSYCHIATRY & NEUROLOGY

## 2021-04-14 PROCEDURE — 84443 ASSAY THYROID STIM HORMONE: CPT | Performed by: PSYCHIATRY & NEUROLOGY

## 2021-04-14 PROCEDURE — 83735 ASSAY OF MAGNESIUM: CPT | Performed by: PSYCHIATRY & NEUROLOGY

## 2021-04-14 PROCEDURE — 83540 ASSAY OF IRON: CPT | Performed by: PSYCHIATRY & NEUROLOGY

## 2021-04-14 PROCEDURE — 99239 HOSP IP/OBS DSCHRG MGMT >30: CPT | Performed by: CLINICAL NURSE SPECIALIST

## 2021-04-14 PROCEDURE — 36415 COLL VENOUS BLD VENIPUNCTURE: CPT | Performed by: PSYCHIATRY & NEUROLOGY

## 2021-04-14 PROCEDURE — 99232 SBSQ HOSP IP/OBS MODERATE 35: CPT | Performed by: PHYSICIAN ASSISTANT

## 2021-04-14 PROCEDURE — 80053 COMPREHEN METABOLIC PANEL: CPT | Performed by: PSYCHIATRY & NEUROLOGY

## 2021-04-14 PROCEDURE — 70450 CT HEAD/BRAIN W/O DYE: CPT | Mod: 26 | Performed by: RADIOLOGY

## 2021-04-14 RX ORDER — ARIPIPRAZOLE 10 MG/1
7.5 TABLET ORAL DAILY
COMMUNITY
End: 2021-04-14

## 2021-04-14 RX ORDER — ARIPIPRAZOLE 2 MG/1
2 TABLET ORAL DAILY
Qty: 30 TABLET | Refills: 0 | Status: SHIPPED | OUTPATIENT
Start: 2021-04-14 | End: 2021-09-28

## 2021-04-14 RX ADMIN — IBUPROFEN 600 MG: 200 TABLET, FILM COATED ORAL at 17:32

## 2021-04-14 RX ADMIN — IBUPROFEN 600 MG: 200 TABLET, FILM COATED ORAL at 10:39

## 2021-04-14 RX ADMIN — IBUPROFEN 600 MG: 200 TABLET, FILM COATED ORAL at 05:29

## 2021-04-14 RX ADMIN — FLUOXETINE 40 MG: 20 CAPSULE ORAL at 09:05

## 2021-04-14 NOTE — PROGRESS NOTES
Pt is on SI/ SIB precaution for;  Pt reports to PA on the unit about feeling suicidal thoughts and strong urges to hurt self. (Points to a plastic cover from IV tubing in her room and states that she had been thinking of using them to hurt herself) per the PA. Pt is currently on SIO, tolerating well.

## 2021-04-14 NOTE — DISCHARGE SUMMARY
"Psychiatric Discharge Summary    Thea Castle MRN# [id number]   Age: 21 year old YOB: 2000     Date of Admission:  4/12/2021  Date of Discharge:  4/14/2021  5:39 PM  Admitting Physician:  Roberto Joshua MD  Discharge Physician:  Debra A. Naegele, APRN CNS (Contact: [phone])         Event Leading to Hospitalization:   Thea Castle is a 21-year-old single  female presenting with a history of PTSD, depression, anxiety, OCD, anorexia nervosa and suicidal ideation with plan to overdose on gabapentin or sit in a running car.  The patient reports she wants to go back to an RTC for her eating disorder of anorexia nervosa.  The patient states that she wants to go to [facility].  Mother is in disagreement with his plan.  Mother is concerned that patient has eating disorder as part of her identity and is having trouble letting go.  The patient does not do well when she is at eating disorder treatment.  She tends to not comply with the rules at the treatment center and is discharged.  The patient reports that she was at her primary care for her annual checkup regarding her eating disorder.  PCP sent her to the emergency room because \"my mother wouldn't bring me to the ED.\"  The patient has some conflict with mother.  When provider met with patient, she reports she resolved the conflict with mother.  Mother reports she is concerned regarding patient's reluctance to work on her eating disorder and sees it as her identity.  The patient signed a release of information for mother.  Goal for this hospitalization is stabilization with medications and returning to home.             See Admission note by Naegele, Debra Ann, APRN CNS found on 4/13/2021 for additional details.          DIagnoses:   1.  Major depressive disorder, recurrent, severe without psychosis.   2.  Anorexia nervosa, severe symptoms currently active.   3.  History of posttraumatic stress disorder.   4.  History of " obsessive-compulsive disorder.          Labs:     Results for orders placed or performed during the hospital encounter of 04/12/21   CT Head w/o Contrast     Status: None    Narrative    CT HEAD W/O CONTRAST 4/14/2021 1:33 PM    History: Head trauma, mod-severe; fall 4/13 unwitnessed, hit head   ICD-10:    Comparison: 9/30/2020    Technique: Using multidetector thin collimation helical acquisition  technique, axial, coronal and sagittal CT images from the skull base  to the vertex were obtained without intravenous contrast.   (topogram) image(s) also obtained and reviewed.    Findings: There is no intracranial hemorrhage, mass effect, or midline  shift. Gray/white matter differentiation in both cerebral hemispheres  is preserved. Ventricles are proportionate to the cerebral sulci. The  basal cisterns are clear.    The bony calvaria and the bones of the skull base are normal. The  visualized portions of the paranasal sinuses and mastoid air cells are  clear.      Impression    Impression:  No acute intracranial pathology.     ALISIA DEAN MD   Drug abuse screen 6 urine (tox)     Status: None   Result Value Ref Range    Amphetamine Qual Urine Negative NEG^Negative    Barbiturates Qual Urine Negative NEG^Negative    Benzodiazepine Qual Urine Negative NEG^Negative    Cannabinoids Qual Urine Negative NEG^Negative    Cocaine Qual Urine Negative NEG^Negative    Ethanol Qual Urine Negative NEG^Negative    Opiates Qualitative Urine Negative NEG^Negative   HCG qualitative urine     Status: None   Result Value Ref Range    HCG Qual Urine Negative NEG^Negative   Asymptomatic SARS-CoV-2 COVID-19 Virus (Coronavirus) by PCR     Status: None    Specimen: Nasopharyngeal   Result Value Ref Range    SARS-CoV-2 Virus Specimen Source Nasopharyngeal     SARS-CoV-2 PCR Result NEGATIVE     SARS-CoV-2 PCR Comment (Note)    Glucose by meter     Status: Abnormal   Result Value Ref Range    Glucose 121 (H) 70 - 99 mg/dL   CBC with  platelets differential     Status: Abnormal   Result Value Ref Range    WBC 5.5 4.0 - 11.0 10e9/L    RBC Count 3.76 (L) 3.8 - 5.2 10e12/L    Hemoglobin 11.1 (L) 11.7 - 15.7 g/dL    Hematocrit 32.1 (L) 35.0 - 47.0 %    MCV 85 78 - 100 fl    MCH 29.5 26.5 - 33.0 pg    MCHC 34.6 31.5 - 36.5 g/dL    RDW 12.7 10.0 - 15.0 %    Platelet Count 281 150 - 450 10e9/L    Diff Method Automated Method     % Neutrophils 58.1 %    % Lymphocytes 35.2 %    % Monocytes 4.9 %    % Eosinophils 1.1 %    % Basophils 0.5 %    % Immature Granulocytes 0.2 %    Nucleated RBCs 0 0 /100    Absolute Neutrophil 3.2 1.6 - 8.3 10e9/L    Absolute Lymphocytes 1.9 0.8 - 5.3 10e9/L    Absolute Monocytes 0.3 0.0 - 1.3 10e9/L    Absolute Eosinophils 0.1 0.0 - 0.7 10e9/L    Absolute Basophils 0.0 0.0 - 0.2 10e9/L    Abs Immature Granulocytes 0.0 0 - 0.4 10e9/L    Absolute Nucleated RBC 0.0    Basic metabolic panel     Status: Abnormal   Result Value Ref Range    Sodium 140 133 - 144 mmol/L    Potassium 3.9 3.4 - 5.3 mmol/L    Chloride 108 94 - 109 mmol/L    Carbon Dioxide 25 20 - 32 mmol/L    Anion Gap 7 3 - 14 mmol/L    Glucose 133 (H) 70 - 99 mg/dL    Urea Nitrogen 14 7 - 30 mg/dL    Creatinine 0.77 0.52 - 1.04 mg/dL    GFR Estimate >90 >60 mL/min/[1.73_m2]    GFR Estimate If Black >90 >60 mL/min/[1.73_m2]    Calcium 8.4 (L) 8.5 - 10.1 mg/dL   Lipid panel     Status: None   Result Value Ref Range    Cholesterol 164 <200 mg/dL    Triglycerides 96 <150 mg/dL    HDL Cholesterol 72 >49 mg/dL    LDL Cholesterol Calculated 73 <100 mg/dL    Non HDL Cholesterol 92 <130 mg/dL   TSH with free T4 reflex and/or T3 as indicated     Status: None   Result Value Ref Range    TSH 1.80 0.40 - 4.00 mU/L   Comprehensive metabolic panel     Status: Abnormal   Result Value Ref Range    Sodium 141 133 - 144 mmol/L    Potassium 4.1 3.4 - 5.3 mmol/L    Chloride 110 (H) 94 - 109 mmol/L    Carbon Dioxide 26 20 - 32 mmol/L    Anion Gap 5 3 - 14 mmol/L    Glucose 91 70 - 99 mg/dL     Urea Nitrogen 10 7 - 30 mg/dL    Creatinine 0.88 0.52 - 1.04 mg/dL    GFR Estimate >90 >60 mL/min/[1.73_m2]    GFR Estimate If Black >90 >60 mL/min/[1.73_m2]    Calcium 8.5 8.5 - 10.1 mg/dL    Bilirubin Total 0.3 0.2 - 1.3 mg/dL    Albumin 3.0 (L) 3.4 - 5.0 g/dL    Protein Total 6.3 (L) 6.8 - 8.8 g/dL    Alkaline Phosphatase 50 40 - 150 U/L    ALT 19 0 - 50 U/L    AST 11 0 - 45 U/L   CBC with platelets differential     Status: Abnormal   Result Value Ref Range    WBC 4.8 4.0 - 11.0 10e9/L    RBC Count 3.94 3.8 - 5.2 10e12/L    Hemoglobin 11.4 (L) 11.7 - 15.7 g/dL    Hematocrit 34.4 (L) 35.0 - 47.0 %    MCV 87 78 - 100 fl    MCH 28.9 26.5 - 33.0 pg    MCHC 33.1 31.5 - 36.5 g/dL    RDW 12.9 10.0 - 15.0 %    Platelet Count 250 150 - 450 10e9/L    Diff Method Automated Method     % Neutrophils 42.0 %    % Lymphocytes 48.1 %    % Monocytes 6.4 %    % Eosinophils 2.7 %    % Basophils 0.6 %    % Immature Granulocytes 0.2 %    Nucleated RBCs 0 0 /100    Absolute Neutrophil 2.0 1.6 - 8.3 10e9/L    Absolute Lymphocytes 2.3 0.8 - 5.3 10e9/L    Absolute Monocytes 0.3 0.0 - 1.3 10e9/L    Absolute Eosinophils 0.1 0.0 - 0.7 10e9/L    Absolute Basophils 0.0 0.0 - 0.2 10e9/L    Abs Immature Granulocytes 0.0 0 - 0.4 10e9/L    Absolute Nucleated RBC 0.0    Magnesium     Status: None   Result Value Ref Range    Magnesium 1.8 1.6 - 2.3 mg/dL   Phosphorus     Status: None   Result Value Ref Range    Phosphorus 3.1 2.5 - 4.5 mg/dL   Iron     Status: None   Result Value Ref Range    Iron 83 35 - 180 ug/dL   EKG 12-lead, complete     Status: None (Preliminary result)   Result Value Ref Range    Interpretation ECG Click View Image link to view waveform and result             Consults:   Consultation during this admission received from internal medicine    Alla Hall PA   Physician Assistant   Medicine   Progress Notes      Addendum   Date of Service:  4/14/2021 10:24 AM   Creation Time:  4/14/2021 10:24 AM            Addendum              []Hide copied text    []Toney for details  Internal Medicine Progress Note        Assessment and plan:  Thea Castle is a 21 year old female with a past medical history of PTSD, depression, anxiety, OCD, anorexia nervosa, suicidal ideation admitted to station 4A for SI. Patient was evaluated 4/13 for rapid response following a fall. Patient reported she felt weak while walking and fell. She did not remember anything until she woke up. She reported she felt lightheaded and dizzy prior to falling. No cardiac hx. EKG with NSR. HR 66. Patient received 1L IVF.      Fall   Headache   Dizziness   Concussion   Extensive hx of head injury/concussions. Was recently seen at sports medicine for concussion on 4/6/2021 after head re-injury on 4/4/21 secondary to fainting in the bathroom and hitting the left side of her head. She was previously treated for concussion 2019 with protracted recovery with recommendations of vision therapy and vestibular therapy. Also sustained concussion in 2020. She is currently undergoing vision therapy with Dr. Guzman. Fell 4/13 and hit the left side of her head. Patient reported she felt weak while walking and fell. She did not remember anything until she woke up. She reported she felt lightheaded and dizzy prior to falling. No cardiac hx. EKG with NSR. HR 66. Patient received 1L IVF. Patient reports she feels slightly more confused today than days prior. She also notes she continues to feel dizzy. She notes she is restricting PO fluid intake but ate cereal this AM. CT head 4/14 with no intracranial pathology.   - Encourage PO fluid intake    - Monitor orthostatic blood pressures - notify medicine if positive orthostatic blood pressures as she may require additional IVF if continues to restrict PO fluid intake   - Continued follow up with sports medicine for concussion as outpatient   - Follow up with PCP within one week of discharge   - Fall precautions     - Patient encouraged  "to rest and limit physical and \"thinking\" activities, drink a lot of fluids  - Please notify medicine if patient develops any of the following     Headaches that get worse    Feeling more and more drowsy    Keeps repeating herrself    Strange behavior    Seizures    Repeat vomiting (throwing up)    Trouble walking    Growing confusion    Feeling more irritable    Neck pain that gets worse    Slurred speech    Weakness or numbness    Loss of consciousness    Fluid or blood coming from ears or nose        Objective:  /69   Pulse 69   Temp 97.9  F (36.6  C) (Oral)   Resp 16   SpO2 98%      Vitals signs reviewed and noted     GENERAL: Alert and oriented x 3. NAD.   HEENT: Anicteric sclera. Mucous membranes moist.  CV: RRR. S1, S2. No murmurs appreciated.   RESPIRATORY: Effort normal on RA. Lungs CTAB with no wheezing, rales, rhonchi.   GI: Abdomen soft and non distended with normoactive bowel sounds present in all quadrants. No tenderness, rebound, guarding.   NEUROLOGICAL: CN II-XII grossly intact. Generalized weakness. Moves all extremities. Gait steady.   EXTREMITIES: No peripheral edema. Intact bilateral pedal pulses.   SKIN: No jaundice. No rashes.      Pertinent labs and procedures were reviewed.      Subjective:   Patient seen and examined. Reports she continues to have headache today on left side. Notes associated nausea and mild confusion. She reports she continues to feel dizzy and slightly unsteady on her feet. Notes she has felt dizzy for a while and has recently had a concussion for a fall PTA. Reports she follows with the concussion clinic. She reports mild central chest pain that she and SOB that she associates with anxiety. Denies fevers or chills, dysuria, abdominal pain.      Alla Hall PA-C  Internal Medicine [facility] Hospitalist   Contact information available via [facility] Paging/Directory               Revision History                                         Hospital Course:   Thea Daniels " Tin was admitted to Station 4A with attending Roberto Joshua MD found as a voluntary patient. The patient was placed under status 15 (15 minute checks) to ensure patient safety.     Patient was continued on Abilify 7.5 mg for mood stabilization, Prozac 40 mg to address depressive and anxiety symptoms, gabapentin 600 ms at bedtime for anxiety., hydroxyzine for anxiety, Ativan for panic. Dicussed risks, benefits and side effects of medication with patient and with her mother.    Reviewed admission labs: CMP WNL except for chloride 110, protein   6.3 Low, albumin 3 low, CBC with diff WNL except for hemoglobin 11.4, hematocrit 34.4 low, Head CT WNL, EKG , normal, sinus rhythm.    Thea Castle did not participate in groups and was visible in the milieu. The patient's symptoms of suicidal ideation improved. Patient's mood improved and she deneis suicidal ideation. Patient has protective factors of supportive mother and going to treatment.     Patient no longer meets criteria for hospital level of care.     She is at moderate risk of relapse due to her psychiatric history.     Thea Castle was released to home into mother's care. At the time of discharge Thea Castle was determined to not be a danger to herself or others.          Discharge Medications:     Discharge Medication List as of 4/14/2021  4:02 PM      CONTINUE these medications which have NOT CHANGED    Details   ARIPiprazole (ABILIFY) 15 MG tablet Take 7.5 mg by mouth daily , Historical      drospirenone-ethinyl estradiol (DIANA) 3-0.02 MG tablet Take 1 tablet by mouth daily, Historical      FLUoxetine (PROZAC) 40 MG capsule Take 40 mg by mouth daily, Historical      !! gabapentin (NEURONTIN) 300 MG capsule Take 2 capsules (600 mg) by mouth At Bedtime In addition to 300mg by mouth three times daily., Disp-60 capsule, R-1, E-Prescribe      hydrOXYzine (ATARAX) 25 MG tablet Take 1-2 tablets (25-50 mg) by mouth every 4 hours as  needed for anxiety, Disp-60 tablet, R-0, E-Prescribe      LORazepam (ATIVAN) 1 MG tablet Take 1 mg by mouth At Bedtime, Historical      melatonin 10 MG TABS tablet Take 1 tablet (10 mg) by mouth nightly as needed for sleep, Disp-30 tablet, R-0, E-Prescribe      multivitamin w/minerals (THERA-VIT-M) tablet Take 1 tablet by mouth daily, Disp-30 tablet, R-0, E-Prescribe      tazarotene (TAZORAC) 0.05 % external cream Externally apply topically every eveningHistorical      traZODone (DESYREL) 150 MG tablet Take 1 tablet (150 mg) by mouth At Bedtime, Disp-30 tablet, R-0, E-Prescribe      vitamin C (ASCORBIC ACID) 500 MG tablet Take 1 tablet (500 mg) by mouth daily, Disp-30 tablet, R-0, E-Prescribe      vitamin D3 (CHOLECALCIFEROL) 50 mcg (2000 units) tablet Take 1 tablet (50 mcg) by mouth daily, Disp-30 tablet, R-0, E-Prescribe      acetaminophen (TYLENOL) 325 MG tablet Take 2 tablets (650 mg) by mouth every 4 hours as needed for mild pain, Disp-30 tablet, R-1, E-Prescribe      calcium carbonate (TUMS) 500 MG chewable tablet Take 1 tablet (500 mg) by mouth as needed for heartburn, Disp-30 tablet, R-0, E-Prescribe      erenumab-aooe (AIMOVIG) 140 MG/ML injection Inject 140 mg Subcutaneous every 30 days , Historical      naproxen (NAPROSYN) 250 MG tablet Take 1 tablet (250 mg) by mouth 3 times daily as needed for headaches, Disp-60 tablet, R-0, E-Prescribe      polyethylene glycol (MIRALAX) 17 g packet Take 17 g by mouth daily as needed for constipation, Disp-30 packet, R-0, E-Prescribe      !! gabapentin (NEURONTIN) 300 MG capsule Take 1 capsule (300 mg) by mouth 3 times daily In addition to 600mg at bedtime., Disp-90 capsule, R-1, E-Prescribe       !! - Potential duplicate medications found. Please discuss with provider.               Psychiatric Examination:   Appearance:  awake, alert and adequately groomed  Attitude:  guarded  Eye Contact:  good  Mood:  better  Affect:  appropriate and in normal range  Speech:  normal  prosody  Psychomotor Behavior:  no evidence of tardive dyskinesia, dystonia, or tics  Thought Process:  goal oriented  Associations:  no loose associations  Thought Content:  no evidence of suicidal ideation or homicidal ideation  Insight:  limited  Judgment:  limited  Oriented to:  time, person, and place  Attention Span and Concentration:  intact  Recent and Remote Memory:  intact  Language: Able to name objects, Able to repeat phrases and Able to read and write  Fund of Knowledge: appropriate  Muscle Strength and Tone: normal  Gait and Station: Normal         Discharge Plan:   Behavioral Discharge Planning and Instructions     Summary: You were admitted on 4/12/2021  due to Suicidal Ideations.  You were treated by Debra Naegele, APRN and discharged on 04/14/2021 from 4A to Home     Main Diagnosis:   1.  Major depressive disorder, recurrent, severe without psychosis.   2.  Anorexia nervosa, severe symptoms currently active.   3.  History of posttraumatic stress disorder.   4.  History of obsessive-compulsive disorder.      Health Care Follow-up:   You have stated that you will set up follow up appointments with your providers listed below.      Medication Management:   Provider: Brittney Pneny  Address: [facility]:   Phone: [phone]        Individual Therapist:   [facility] Shanta Chow  [facility]   Phone: [phone]    Fax: [phone]     Eating Disorder Therapist:   Provider: Анна Alvarado  Address: [facility]: [address], [place] [address]  Phone: [phone]    Fax: [phone]     Attend all scheduled appointments with your outpatient providers. Call at least 24 hours in advance if you need to reschedule an appointment to ensure continued access to your outpatient providers.      Major Treatments, Procedures and Findings:  You were provided with: a psychiatric assessment, assessed for  "medical stability, medication evaluation and/or management, group therapy and medical interventions     Symptoms to Report: feeling more aggressive, increased confusion, losing more sleep, mood getting worse or thoughts of suicide     Early warning signs can include: increased depression or anxiety sleep disturbances increased thoughts or behaviors of suicide or self-harm  increased unusual thinking, such as paranoia or hearing voices     Safety and Wellness:  Take all medicines as directed.  Make no changes unless your doctor suggests them.      Follow treatment recommendations.  Refrain from alcohol and non-prescribed drugs.  Ask your support system to help you reduce your access to items that could harm yourself or others. If there is a concern for safety, call 911.     Resources:   Crisis Intervention: 834.389.1018 or 131-013-8677 (TTY: 334.249.2257).  Call anytime for help.  National Rebecca on Mental Illness (www.mn.celestine.org): 311.492.8345 or 114-896-4716.  National Suicide Prevention Line (www.mentalhealthmn.org): 184-784-PYXH (3288)  [facility] Crisis (COPE) Response - Adult 031 685-7345  Text 4 Life: txt \"LIFE\" to 84068 for immediate support and crisis intervention  Crisis text line: Text \"[place]\" to 973179. Free, confidential, 24/7.     [facility]- Acute Psychiatric Services:[phone]  Services 24-hour walk-in crisis intervention and treatment of behavioral emergencies. Crisis intervention phone service for assessment, information, and referral for psychiatric emergencies. Treatment of psychiatric emergencies such as acute psychotic conditions, panic states, severe and incapacitating depression, suicidal crisis, danger to others, sudden loss of memory, and situations involving grave mental disability. Community consultation and education on crisis intervention     Lifestyle Adjustment:   1. Adjust your lifestyle to get enough sleep, relaxation, exercise and good nutrition.  Continue to develop healthy " coping skills to decrease stress and promote a healthy lifestyle.  2. Abstain from all substances of abuse.  3. Take medications as prescribed.  Please work with your doctor to discuss any concerns you have with your medications or side affects you may be experiencing.  4. Follow up with appointments as scheduled.       General Medication Instructions:   See your medication sheet(s) for instructions.   Take all medicines as directed.  Make no changes unless your doctor suggests them.   Go to all your doctor visits.  Be sure to have all your required lab tests. This way, your medicines can be refilled on time.  Do not use any drugs not prescribed by your doctor.  Avoid alcohol.     Advance Directives:   Scanned document on file with [facility]? No scanned doc  Is document scanned? Pt states no documents  Honoring Choices Your Rights Handout: Informed and given  Was more information offered? Pt declined     The Treatment team has appreciated the opportunity to work with you. If you have any questions or concerns about your recent admission, you can contact the unit which can receive your call 24 hours a day, 7 days a week. They will be able to get in touch with a Provider if needed. The unit number is [phone] .     If you would like to obtain any specific documentation regarding your hospitalization after your discharge, contact [facility] Release of Information/Medical Records:  [phone]     Attestation:   The patient has been seen and evaluated by me,  Debra A. Naegele, APRN CNS on 4/14/2021  Discharge summary time > 30 minutes

## 2021-04-14 NOTE — PROVIDER NOTIFICATION
04/13/21 2100   Quick Adds   Quick Adds Pupils (Group)   Pupils (CN II)   Pupil PERRLA yes   [place] Coma Scale   Best Eye Response 4-->(E4) spontaneous   Best Motor Response 6-->(M6) obeys commands   Best Verbal Response 5-->(V5) oriented   [place] Coma Scale Score 15       Neuro checks post fall.  Patient vitals are stable.  Neuro checks are WNL.  Patient reports pain 8/10. Located on her head.  Head is smooth, skin intact, and no swelling is observed.  Ibuprofen 600 mg is given.

## 2021-04-14 NOTE — PROGRESS NOTES
"Internal Medicine Progress Note      Assessment and plan:  Thea Castle is a 21 year old female with a past medical history of PTSD, depression, anxiety, OCD, anorexia nervosa, suicidal ideation admitted to station 4A for SI. Patient was evaluated 4/13 for rapid response following a fall. Patient reported she felt weak while walking and fell. She did not remember anything until she woke up. She reported she felt lightheaded and dizzy prior to falling. No cardiac hx. EKG with NSR. HR 66. Patient received 1L IVF.     Fall   Headache   Dizziness   Concussion   Extensive hx of head injury/concussions. Was recently seen at sports medicine for concussion on 4/6/2021 after head re-injury on 4/4/21 secondary to fainting in the bathroom and hitting the left side of her head. She was previously treated for concussion 2019 with protracted recovery with recommendations of vision therapy and vestibular therapy. Also sustained concussion in 2020. She is currently undergoing vision therapy with Dr. Guzman. Fell 4/13 and hit the left side of her head. Patient reported she felt weak while walking and fell. She did not remember anything until she woke up. She reported she felt lightheaded and dizzy prior to falling. No cardiac hx. EKG with NSR. HR 66. Patient received 1L IVF. Patient reports she feels slightly more confused today than days prior. She also notes she continues to feel dizzy. She notes she is restricting PO fluid intake but ate cereal this AM. CT head 4/14 with no intracranial pathology.   - Encourage PO fluid intake    - Monitor orthostatic blood pressures - notify medicine if positive orthostatic blood pressures as she may require additional IVF if continues to restrict PO fluid intake   - Continued follow up with sports medicine for concussion as outpatient   - Follow up with PCP within one week of discharge   - Fall precautions     - Patient encouraged to rest and limit physical and \"thinking\" " activities, drink a lot of fluids  - Please notify medicine if patient develops any of the following     Headaches that get worse    Feeling more and more drowsy    Keeps repeating herrself    Strange behavior    Seizures    Repeat vomiting (throwing up)    Trouble walking    Growing confusion    Feeling more irritable    Neck pain that gets worse    Slurred speech    Weakness or numbness    Loss of consciousness    Fluid or blood coming from ears or nose      Objective:  /69   Pulse 69   Temp 97.9  F (36.6  C) (Oral)   Resp 16   SpO2 98%     Vitals signs reviewed and noted    GENERAL: Alert and oriented x 3. NAD.   HEENT: Anicteric sclera. Mucous membranes moist.  CV: RRR. S1, S2. No murmurs appreciated.   RESPIRATORY: Effort normal on RA. Lungs CTAB with no wheezing, rales, rhonchi.   GI: Abdomen soft and non distended with normoactive bowel sounds present in all quadrants. No tenderness, rebound, guarding.   NEUROLOGICAL: CN II-XII grossly intact. Generalized weakness. Moves all extremities. Gait steady.   EXTREMITIES: No peripheral edema. Intact bilateral pedal pulses.   SKIN: No jaundice. No rashes.     Pertinent labs and procedures were reviewed.     Subjective:   Patient seen and examined. Reports she continues to have headache today on left side. Notes associated nausea and mild confusion. She reports she continues to feel dizzy and slightly unsteady on her feet. Notes she has felt dizzy for a while and has recently had a concussion for a fall PTA. Reports she follows with the concussion clinic. She reports mild central chest pain that she and SOB that she associates with anxiety. Denies fevers or chills, dysuria, abdominal pain.     Alla Hall PA-C  Internal Medicine [facility]   Contact information available via [facility] Paging/Directory

## 2021-04-14 NOTE — PLAN OF CARE
Work Completed: Checked in with pt about discharge follow up appointments. Pt reported that she doesn't need help with setting any appointments up. Pt asked about safety plan and who she can review it with before she discharges. CTC let pt know that staff or her assigned nurse would go over it with her before she leaves.     Discharge plan or goal: home and return to current services.                 Barriers to discharge: None. Pt will discharge this afternoon.

## 2021-04-14 NOTE — PROGRESS NOTES
Rapid response note:      Rapid response was called as patient fell on the ground. Patient was walking on the lounge. No one witnessed the fall. On my arrival patient was sitting on the chair. She was alert,a wake, and communicative    Patient reports she was walking and felt weak and fell. She denies remembering anything until she woke up. She felt lightheaded and dizzy before this. She denies any chest pain or palpitation prior to event. No seizure event noted  She reports hitting left side of the head.     Currently vital signs are within normal limit    Alert,a wake, and oriented  No open injury on the head. Mild tenderness on left parietal area on the head. But no swelling.   Pupils b/l reactive to light  Neuro exam non focal.       A/p    21-year-old single  female presenting with a history of PTSD, depression, anxiety, OCD, anorexia nervosa and suicidal ideation with plan to overdose on gabapentin or sit in a running car.    Rapid response called for fall  Likely d/t poor po intake, generalized weakness , as patient reports not eating and drinking well today  Blood sugar has been normal  Vital signs are normal  Doubt any cardiac or neurological event. No prior cardiac hx as well  Will give 1 liter NS infusion  Will check CBC, BMP  Patient is okay to continue on current unit  No indication for head imaging unless clinical situation changes  Continue to monitor  Page with questions    Goran Chávez MD  [facility]  Contact information available via [facility] Paging/Directory

## 2021-04-14 NOTE — PLAN OF CARE
Focus: SIO Discontinuation/PRN administration    Data: SIO discontinued due to no longer being on IV fluids. Discontinuation criteria met. Patient also requested ibuprofen for pain. Ibuprofen 600 mg PO PRN given @ 0532 for 8/10 pain that is described as a headache. No other interventions necessary at this time. Will continue to monitor and provide interventions as necessary.    Response: Continue to monitor patient and re-evaluate pain in one hour at 0630.

## 2021-04-14 NOTE — PROGRESS NOTES
Pt's BG at the time she fell was at 121. No hypoglycemic symptoms  noted. Will continue to monitor.

## 2021-04-14 NOTE — PLAN OF CARE
"Shift update: Thea states that she \"isn't doing well\" today. She has a headache from the fall yesterday. Ibuprofen and ice pack given. This helped. She said her mood is low today. Calm but quiet and low interaction in milieu.     Mood/Affect: flat/ restricted    SI: chronic, unchanged    Hallucinations/Psychosis: Denies    Activity/Participation: needs encouragement to participate  PRN Meds: Tylenol for H/A   Appetite: poor- eating 25% breakfast and lunch. Adequate fluid intake.     Medical: none - denies dizziness this morning.     Plan:        "

## 2021-04-14 NOTE — PROGRESS NOTES
"Pt refused to eat dinner in spite of being encouraged. Agreed to eat cereal. Pt indicated that she was having increased anxiety. Rated her anxiety and depression as 10/10.  Stated \"I'm worried I'm gonna be behind in school. I have feelings that my parents are not supportive. I don't feel like my parents understand, they want me to leave so I can focus on school and I don't know how it'll be when I get home\". Pt endorsed SI and SIB but has no plans while in the hospital. Pt expressed that when she gets home, she plans to overdose on Gabapentin. Pt denied HI and hallucinations. Writer wanted to administer Hydroxyzine to help her to  calm down when she began to cry. While writer was getting the medication, staff was informed that pt had fell. Pt was found laying on the floor, no thud was heard. No staff witnessed the fall. Pt indicated that she hit the left side of her head on the floor. Pt's VS were WDL. Pt was assisted into the chair, MD was paged. Dr Chávez ordered 0.9% NS 1000 ml  to be infused at the rate of 500 ml/hr. Neuro assessment were intact, No apparent injuries noted at this time. Pt's mother, Keshia was updated about the incidence. Per pt's mother, pt has history of saying that she fell at home but never did. Per pt's mother, pt does this to pravin attention. Pt's mother stated ,\"Beware, she does this to get attention, she never fell at home\". Another patient that witnessed the fall  mentioned that she may have fallen on purpose. The other male patient stated that he witnessed patient going on the floor on her own.   Dr Chávez was informed to order neuro checks since the fall was unwitnessed.  Will continue to assess pt's status.   "

## 2021-04-14 NOTE — PLAN OF CARE
discharge info given.   Safety plan discussed in length.   Therapy appointment set up for tomorrow.   Questions answered.   AVS signed.   No medications needed for discharge/

## 2021-04-15 LAB — INTERPRETATION ECG - MUSE: NORMAL
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
## 2021-09-18 ENCOUNTER — HEALTH MAINTENANCE LETTER (OUTPATIENT)
Age: 21
End: 2021-09-18

## 2021-09-27 ENCOUNTER — TELEPHONE (OUTPATIENT)
Dept: BEHAVIORAL HEALTH | Facility: CLINIC | Age: 21
End: 2021-09-27

## 2021-09-27 ENCOUNTER — HOSPITAL ENCOUNTER (EMERGENCY)
Facility: CLINIC | Age: 21
Discharge: HOME | End: 2021-09-29
Attending: EMERGENCY MEDICINE | Admitting: EMERGENCY MEDICINE
Payer: COMMERCIAL

## 2021-09-27 DIAGNOSIS — R45.851 SUICIDAL IDEATION: ICD-10-CM

## 2021-09-27 LAB — SARS-COV-2 RNA RESP QL NAA+PROBE: NEGATIVE

## 2021-09-27 PROCEDURE — 90791 PSYCH DIAGNOSTIC EVALUATION: CPT

## 2021-09-27 PROCEDURE — 99204 OFFICE O/P NEW MOD 45 MIN: CPT | Performed by: PSYCHIATRY & NEUROLOGY

## 2021-09-27 PROCEDURE — 87635 SARS-COV-2 COVID-19 AMP PRB: CPT | Performed by: EMERGENCY MEDICINE

## 2021-09-27 PROCEDURE — 99285 EMERGENCY DEPT VISIT HI MDM: CPT | Mod: 25

## 2021-09-27 PROCEDURE — 250N000013 HC RX MED GY IP 250 OP 250 PS 637: Performed by: EMERGENCY MEDICINE

## 2021-09-27 PROCEDURE — C9803 HOPD COVID-19 SPEC COLLECT: HCPCS

## 2021-09-27 PROCEDURE — 250N000013 HC RX MED GY IP 250 OP 250 PS 637: Performed by: PSYCHIATRY & NEUROLOGY

## 2021-09-27 RX ORDER — POLYETHYLENE GLYCOL 3350 17 G/17G
17 POWDER, FOR SOLUTION ORAL DAILY PRN
Status: DISCONTINUED | OUTPATIENT
Start: 2021-09-27 | End: 2021-09-29 | Stop reason: HOSPADM

## 2021-09-27 RX ORDER — GABAPENTIN 300 MG/1
600 CAPSULE ORAL ONCE
Status: COMPLETED | OUTPATIENT
Start: 2021-09-27 | End: 2021-09-27

## 2021-09-27 RX ORDER — LORAZEPAM 1 MG/1
1 TABLET ORAL AT BEDTIME
Status: DISCONTINUED | OUTPATIENT
Start: 2021-09-27 | End: 2021-09-29 | Stop reason: HOSPADM

## 2021-09-27 RX ORDER — CALCIUM CARBONATE 500 MG/1
500 TABLET, CHEWABLE ORAL 2 TIMES DAILY PRN
Status: DISCONTINUED | OUTPATIENT
Start: 2021-09-27 | End: 2021-09-29 | Stop reason: HOSPADM

## 2021-09-27 RX ORDER — MULTIPLE VITAMINS W/ MINERALS TAB 9MG-400MCG
1 TAB ORAL DAILY
Status: DISCONTINUED | OUTPATIENT
Start: 2021-09-27 | End: 2021-09-29 | Stop reason: HOSPADM

## 2021-09-27 RX ORDER — NAPROXEN 250 MG/1
250 TABLET ORAL 3 TIMES DAILY PRN
Status: DISCONTINUED | OUTPATIENT
Start: 2021-09-27 | End: 2021-09-29 | Stop reason: HOSPADM

## 2021-09-27 RX ORDER — HYDROXYZINE HYDROCHLORIDE 25 MG/1
25 TABLET, FILM COATED ORAL 3 TIMES DAILY PRN
Status: DISCONTINUED | OUTPATIENT
Start: 2021-09-27 | End: 2021-09-29 | Stop reason: HOSPADM

## 2021-09-27 RX ORDER — DROSPIRENONE AND ETHINYL ESTRADIOL 0.02-3(28)
1 KIT ORAL DAILY
Status: DISCONTINUED | OUTPATIENT
Start: 2021-09-27 | End: 2021-09-27 | Stop reason: RX

## 2021-09-27 RX ORDER — ACETAMINOPHEN 325 MG/1
650 TABLET ORAL ONCE
Status: COMPLETED | OUTPATIENT
Start: 2021-09-27 | End: 2021-09-27

## 2021-09-27 RX ORDER — TRAZODONE HYDROCHLORIDE 150 MG/1
150 TABLET ORAL AT BEDTIME
Status: DISCONTINUED | OUTPATIENT
Start: 2021-09-27 | End: 2021-09-29 | Stop reason: HOSPADM

## 2021-09-27 RX ORDER — ACETAMINOPHEN 325 MG/1
650 TABLET ORAL EVERY 4 HOURS PRN
Status: DISCONTINUED | OUTPATIENT
Start: 2021-09-27 | End: 2021-09-29 | Stop reason: HOSPADM

## 2021-09-27 RX ADMIN — GABAPENTIN 600 MG: 300 CAPSULE ORAL at 20:35

## 2021-09-27 RX ADMIN — ACETAMINOPHEN 650 MG: 325 TABLET, FILM COATED ORAL at 17:16

## 2021-09-27 RX ADMIN — TRAZODONE HYDROCHLORIDE 150 MG: 150 TABLET ORAL at 20:35

## 2021-09-27 RX ADMIN — MELATONIN 5 MG TABLET 10 MG: at 21:06

## 2021-09-27 RX ADMIN — LORAZEPAM 1 MG: 1 TABLET ORAL at 20:35

## 2021-09-27 ASSESSMENT — MIFFLIN-ST. JEOR
SCORE: 1307.92
SCORE: 1308.38

## 2021-09-27 ASSESSMENT — ENCOUNTER SYMPTOMS
SHORTNESS OF BREATH: 0
ABDOMINAL PAIN: 0
DYSPHORIC MOOD: 1

## 2021-09-27 NOTE — ED TRIAGE NOTES
At therapy appointment today and expressed suicidal thoughts with plan x3 weeks to overdose on benadryl tonight. Per therapist patient reported doing research on if she can go to [facility] after suicide. Hx of anorexia, depression, anxiety, ptsd. Cooperative, voluntary.

## 2021-09-27 NOTE — Clinical Note
Tin was seen and treated in our emergency department on 9/27/2021.  She may return to school on 09/30/2021.      If you have any questions or concerns, please don't hesitate to call.      Best Collier MD
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
## 2021-09-27 NOTE — PROGRESS NOTES
Patient expressed feeling unsafe around other patients. Denies thoughts of self harm currently. In sensory room A.

## 2021-09-27 NOTE — ED NOTES
Video Observation initiated, patient informed. Pt changed into scrubs and belongings secured per unit protocol.     Caryl Riley RN

## 2021-09-27 NOTE — PROGRESS NOTES
"Patient finished meeting with [facility] and asked to stay in the consultation room alone with the lights off. Writer informed patient that she would not be allowed to stay in consultation room with the lights off and suggested patient go to a sensory room instead. Patient stated \"I don't feel safe.\" Writer offered to sit with patient in sensory room and offered stress ball and fidget popper as distraction. Patient was agreeable. When questioned what was making her feel unsafe, patient shrugged her shoulders.       "

## 2021-09-27 NOTE — ED PROVIDER NOTES
"  History   Chief Complaint:  Suicidal       HPI   Thea Castle is a 21 year old female with history of anxiety, depression, and PTSD who presents with suicidal ideation. Patient was at her therapist's office today and reported increased suicidal ideation. She has been researching if she can go to [facility] if she commits suicide and had plan on overdosing on Benadryl this evening. 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 She endorses increased depression secondary to loneliness. Past medical history of OCD, anxiety, eating disorder, and PTSD. She denies overdose, toxic ingestion, or intoxication. Denies drug use, homicidal ideation or hallucinations.      Review of Systems   Respiratory: Negative for shortness of breath.    Cardiovascular: Negative for chest pain.   Gastrointestinal: Negative for abdominal pain.   Psychiatric/Behavioral: Positive for dysphoric mood and suicidal ideas.   All other systems reviewed and are negative.    Allergies:  Penicillins    Medications:  Tylenol   Abilify   Prozac   Atarax   Ativan   Neurontin   Naprosyn   Desyrel     Past Medical History:    Anorexia   Depression   Anxiety   PTSD  Suicidal ideation   OCD  Borderline personality disorder  Migraines    Past Surgical History:    Tonsillectomy      Social History:  Patient presents to the ED via EMS  2 alcoholic beverages weekly  Denies drug use and smoking    Physical Exam     Patient Vitals for the past 24 hrs:   BP Temp Temp src Pulse Resp SpO2 Height Weight   09/27/21 1730 112/71 98.4  F (36.9  C) Oral 99 16 94 % 1.626 m (5' 4\") 55.8 kg (123 lb 1.6 oz)   09/27/21 1549 127/79 98.8  F (37.1  C) Oral 81 16 100 % 1.626 m (5' 4\") 55.8 kg (123 lb)       Physical Exam  General: Patient in mild distress.  Alert and cooperative with exam. Normal mentation  HEENT: NC/AT. Conjunctiva without injection or scleral icterus. External ears normal.  Respiratory: Breathing " comfortably on room air  CV: Normal rate, all extremities well perfused  GI:  Non-distended abdomen  Skin: Warm, dry, no rashes/open wounds on exposed skin  Musculoskeletal: No obvious deformities  Neuro: Alert, answers questions appropriately. No gross motor deficits  Psychiatric: Endorses depression and suicidal ideation with plan. Denies hallucination or homicidal ideation      Emergency Department Course     Laboratory:  Asymptomatic COVID-19 PCR: negative        Emergency Department Course:    Reviewed:  nursing notes, vitals, past medical history and care everywhere        Interventions:  1716 Tylenol 650 mg PO    [facility]  [facility]       Impression & Plan     Medical Decision Making:  Patient is a 21-year-old female presents with depression and suicidal ideation.  On evaluation she denies toxic ingestion, intentional overdose, or intoxication.  Asymptomatic Covid testing was obtained and negative.  Patient's vital signs are normal and there is no indication for additional emergent testing at this point.  Given depression and active suicidal ideation, patient was transferred to empath unit for further mental health assessment and care.  She remained stable throughout ED course.    Diagnosis:    ICD-10-CM    1. Suicidal ideation  R45.851        Scribe Disclosure:  I, Ruperto Yi, am serving as a scribe at 3:56 PM on 9/27/2021 to document services personally performed by Rohan Garcia DO based on my observations and the provider's statements to me.              Rohan Garcia DO  09/28/21 0152

## 2021-09-27 NOTE — PROGRESS NOTES
"Writer gave patient tour of unit and showed patient to her assigned recliner chair. Patient stated \"I usually don't do well around others in these settings.\" Writer showed patient sensory room and patient stated \"no. I don't want to go in there.\" Patient then stated, \"I don't want to stay the night here.\" Writer encouraged patient to talk to [facility] and Psychiatrist. Patient agreeable with this.     "

## 2021-09-27 NOTE — PLAN OF CARE
21 year old female with history of depression, anxiety, PTSD, and anorexia received from ED due to suicidal ideation. Reports was at therapy appointment today and had thoughts of suicide with plan to overdose on benadryl. Expressed that she continues to feel suicidal with plan to overdose on benadryl. Contracts for safety on EmPATH unit.    Nursing and risk assessments completed. Assessments reviewed with LMHP and physician. Video monitoring in progress, patient informed.  Admission information reviewed with patient. Patient given a tour of Em[facility] and instructions on using the facility. Questions regarding [facility] addressed. Pt search completed and belongings inventoried.    Araceli Pappas RN

## 2021-09-28 ENCOUNTER — TELEPHONE (OUTPATIENT)
Dept: BEHAVIORAL HEALTH | Facility: CLINIC | Age: 21
End: 2021-09-28

## 2021-09-28 PROBLEM — T74.21XA ADULT VICTIM OF RAPE: Status: ACTIVE | Noted: 2020-03-17

## 2021-09-28 PROBLEM — N91.1 SECONDARY AMENORRHEA: Status: ACTIVE | Noted: 2019-05-06

## 2021-09-28 PROBLEM — F50.89 OTHER SPECIFIED EATING DISORDER: Status: ACTIVE | Noted: 2020-02-11

## 2021-09-28 PROBLEM — F50.9 ATYPICAL ANOREXIA NERVOSA: Status: ACTIVE | Noted: 2021-09-28

## 2021-09-28 PROBLEM — F42.9 OCD (OBSESSIVE COMPULSIVE DISORDER): Status: ACTIVE | Noted: 2019-10-01

## 2021-09-28 PROBLEM — G43.909 MIGRAINE: Status: ACTIVE | Noted: 2020-05-08

## 2021-09-28 PROBLEM — F32.9 MAJOR DEPRESSIVE DISORDER: Status: ACTIVE | Noted: 2020-02-11

## 2021-09-28 PROBLEM — F50.019 ANOREXIA NERVOSA, RESTRICTING TYPE: Status: ACTIVE | Noted: 2019-04-23

## 2021-09-28 LAB
AMPHETAMINES UR QL SCN: NORMAL
BARBITURATES UR QL: NORMAL
BENZODIAZ UR QL: NORMAL
CANNABINOIDS UR QL SCN: NORMAL
COCAINE UR QL: NORMAL
HCG UR QL: NEGATIVE
OPIATES UR QL SCN: NORMAL
PCP UR QL SCN: NORMAL

## 2021-09-28 PROCEDURE — 250N000013 HC RX MED GY IP 250 OP 250 PS 637: Performed by: PSYCHIATRY & NEUROLOGY

## 2021-09-28 PROCEDURE — 99218 PR INITIAL OBSERVATION CARE,LEVEL I: CPT | Performed by: PSYCHIATRY & NEUROLOGY

## 2021-09-28 PROCEDURE — 80307 DRUG TEST PRSMV CHEM ANLYZR: CPT | Performed by: PSYCHIATRY & NEUROLOGY

## 2021-09-28 PROCEDURE — 99207 PR CDG-CODE CATEGORY CHANGED: CPT | Performed by: PSYCHIATRY & NEUROLOGY

## 2021-09-28 PROCEDURE — 81025 URINE PREGNANCY TEST: CPT | Performed by: PSYCHIATRY & NEUROLOGY

## 2021-09-28 RX ORDER — DULOXETIN HYDROCHLORIDE 20 MG/1
20 CAPSULE, DELAYED RELEASE ORAL DAILY
Status: DISCONTINUED | OUTPATIENT
Start: 2021-09-28 | End: 2021-09-29 | Stop reason: HOSPADM

## 2021-09-28 RX ADMIN — HYDROXYZINE HYDROCHLORIDE 25 MG: 25 TABLET ORAL at 22:38

## 2021-09-28 RX ADMIN — HYDROXYZINE HYDROCHLORIDE 25 MG: 25 TABLET ORAL at 13:44

## 2021-09-28 RX ADMIN — MULTIPLE VITAMINS W/ MINERALS TAB 1 TABLET: TAB at 08:50

## 2021-09-28 RX ADMIN — HYDROXYZINE HYDROCHLORIDE 25 MG: 25 TABLET ORAL at 17:07

## 2021-09-28 RX ADMIN — MELATONIN 5 MG TABLET 10 MG: at 21:50

## 2021-09-28 RX ADMIN — ARIPIPRAZOLE 7.5 MG: 15 TABLET ORAL at 08:49

## 2021-09-28 RX ADMIN — TRAZODONE HYDROCHLORIDE 150 MG: 150 TABLET ORAL at 19:22

## 2021-09-28 RX ADMIN — DULOXETINE 20 MG: 20 CAPSULE, DELAYED RELEASE ORAL at 10:22

## 2021-09-28 RX ADMIN — LORAZEPAM 1 MG: 1 TABLET ORAL at 19:21

## 2021-09-28 RX ADMIN — FLUOXETINE 40 MG: 20 CAPSULE ORAL at 08:50

## 2021-09-28 RX ADMIN — ACETAMINOPHEN 650 MG: 325 TABLET, FILM COATED ORAL at 10:22

## 2021-09-28 ASSESSMENT — ACTIVITIES OF DAILY LIVING (ADL)
LAUNDRY: WITH SUPERVISION
DRESS: SCRUBS (BEHAVIORAL HEALTH)
HYGIENE/GROOMING: INDEPENDENT
ORAL_HYGIENE: INDEPENDENT
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
## 2021-09-28 NOTE — SAFE
"Thea Daniels Tin  September 27, 2021  SAFE Note    Critical Safety Issues: Pt is a 21 year old female with history of anxiety, depression, PTSD, OCD, and eating disorder who presents to the ED with reports in increase of suicidal ideation, plan, and intent.  Pt reports being really suicidal for the past 2 weeks. Pt reports it has been increasing and getting worse. While at therapy today, Pt reports her therapist was able to \"pry\" it out of her regarding her suicidal ideation, plan, and intent. Pt reports she has a plan of overdosing on her Benadryl. Pt reports her therapist then called 911 and came to the ED voluntarily in an ambulance. She reports multiple hospitalizations regarding similar presentation. Pt endorses SI, plan, and intent. When prompted by Writer, Pt stated if her parents were to lock up all the medication, she would just find another way to complete suicide.      Current Suicidal Ideation/Self-Injurious Concerns/Methods: Ingestion Benadryl and/or other medications.      Current or Historical Inappropriate Sexual Behavior: No      Current or Historical Aggression/Homicidal Ideation: None - N/A      Triggers: Does not want Keshia (mother) involved in her care. Believes parents to be invalidating.    Updated care team: Yes: RN, Provider    For additional details see full LMHP assessment.       VICENTE Cohn      "

## 2021-09-28 NOTE — ED NOTES
Pt still waiting for placement. States she will go out of the [facility]. This was message was passed on to Intake.
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
## 2021-09-28 NOTE — ED NOTES
Pt has been accepted to [facility] inpatient. Pt states she wants a private room and [facility] was called and could not guarantee a private bed. Will communicate this to patient.

## 2021-09-28 NOTE — ED NOTES
Patient does not want to eat breakfast.. She states she has anxiety and depression and SI to overdose but contracts for safety and will not do anything here.She does not endorse  Self harm at this point.   36.9

## 2021-09-28 NOTE — TELEPHONE ENCOUNTER
R:  Patient cleared and ready for behavioral bed placement: Yes     Intake called April At 2:27pm and presented pt for 5[facility]  April Accepted pt for [facility] @ 2:31pm  Pt placed in units que and charge called with disposition @ 2:32pm  Justina Brantley updated with placement @ 3;27pm

## 2021-09-28 NOTE — TELEPHONE ENCOUNTER
R:  Patient cleared and ready for behavioral bed placement: Yes     No available beds for placement at [place], [facility], [facility], or [place].   Pt prefers to stay within [facility] area.

## 2021-09-28 NOTE — ED NOTES
Pt states she is chronically suicidal but will not do anything here.Pt also states that she does not have self harm urges at this time.Pt has an eating disorder and will not eat or drink anything here. Pt states she will drink a smoothie. She has not eaten since yesterday. Order obtained to have a smoothie brought in and will pick it up at door 6.

## 2021-09-28 NOTE — PROGRESS NOTES
Patient was to discharge to inpatient tonight, however pt's mother adamant that she cannot go to the planned hospital in [place] due to insurance issues, saying they wouldn't cover anything outside of the [facility], and that her insurance has heavily restricted the pt's options due to the number of previous hospital stays.    Mother also fixed on thinking pt does not need to be hospitalized, due to the fact that she can keep the pt safe at home. Dr Collier called and agreed to keep on [facility] overnight on observation status, and reassess in the morning. Pt currently on 72 hour hold.

## 2021-09-28 NOTE — TELEPHONE ENCOUNTER
S: 7:10 pm Pt is a 21 yr old fem on [facility] for SI w/ plan to overdose on benadryl report by Ben DAWKINS: Hx of anxiety dep, ptsd, odd, and eating disorder.  Pt has a psychiatrist and a therapist.  Last reported ip admission was 1 yr ago.  No reported cd issues.      A: vol     R: Pt waiting in ED for appropriate placement.  [facility] only at this time.    Patient cleared and ready for behavioral bed placement: Yes

## 2021-09-29 VITALS
WEIGHT: 123.1 LBS | SYSTOLIC BLOOD PRESSURE: 110 MMHG | OXYGEN SATURATION: 100 % | HEIGHT: 64 IN | DIASTOLIC BLOOD PRESSURE: 73 MMHG | BODY MASS INDEX: 21.02 KG/M2 | RESPIRATION RATE: 16 BRPM | HEART RATE: 76 BPM | TEMPERATURE: 98.9 F

## 2021-09-29 PROCEDURE — 250N000013 HC RX MED GY IP 250 OP 250 PS 637: Performed by: PSYCHIATRY & NEUROLOGY

## 2021-09-29 PROCEDURE — 99217 PR OBSERVATION CARE DISCHARGE: CPT | Performed by: PSYCHIATRY & NEUROLOGY

## 2021-09-29 RX ORDER — DULOXETIN HYDROCHLORIDE 30 MG/1
30 CAPSULE, DELAYED RELEASE ORAL DAILY
Qty: 30 CAPSULE | Refills: 0 | Status: SHIPPED | OUTPATIENT
Start: 2021-09-29 | End: 2023-01-04

## 2021-09-29 RX ADMIN — ARIPIPRAZOLE 7.5 MG: 15 TABLET ORAL at 08:08

## 2021-09-29 RX ADMIN — MULTIPLE VITAMINS W/ MINERALS TAB 1 TABLET: TAB at 08:08

## 2021-09-29 RX ADMIN — DULOXETINE 20 MG: 20 CAPSULE, DELAYED RELEASE ORAL at 08:07

## 2021-09-29 ASSESSMENT — ACTIVITIES OF DAILY LIVING (ADL): DRESS: SCRUBS (BEHAVIORAL HEALTH);STREET CLOTHES

## 2021-09-29 NOTE — DISCHARGE INSTRUCTIONS
You are being discharged today from the [facility] EmPath/ED unit.    You stated you are no longer feeling suicidal and are safe to go home.    You are being discharged to your mother's care.  Please follow up with your providers (you schedule your own appointments)    Therapist:  Shanta Chow at [facility] in [place].   You see her twice per week.  Psychiatric medication provider:  Dr Cortes at [facility] in [facility]  Doctor who treats eating disorder:  Dr De La O at [facility] in [place]      If I am feeling unsafe or I am in a crisis, I will:   Contact my established care providers   Call the National Suicide Prevention Lifeline: [phone]   Go to the nearest emergency room   Call [phone]       Warning signs that I or other people might notice when a crisis is developing for me:  I start sleeping too much, isolating from friends, not getting homework done.     Things I am able to do for distraction and on my own to cope or help me feel better:   Take short naps, listen to music, take a walk, interact with my cat    Things that I am able to do with others to cope or help me better:   Go to school at [employer] in [place].  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   My goal is to be a .   I also like hanging out with friends - like going downtown and going out to dinner.    Changes I can make to support my mental health and wellness:  Start to use my coping skills    People in my life that I can ask for help:   My family, my friends, my therapist    Your [facility] has a mental health crisis team you can call 24/7:    COPE (Community Outreach for Psychiatric Emergencies): [phone].  Available 24-hours a day, 7 days a week.  COPE professionals are available to go where you are. Telephone consultations are also available.    Other things that are important when I m in crisis:  Remember you are not alone when you are struggling with mental health.   You can reach out and talk  with your personal supports and professional supports.    Or, you can always go to the nearest emergency room.

## 2021-09-29 NOTE — TELEPHONE ENCOUNTER
709pm - [place] called and reports the pt is no longer coming. Mom doesn't feel the pt needs to be admitted. EmPath to keep the pt overnight and re-assess in the morning.   711pm - Intake called [facility] and confirms the pt is no longer in need of IP care.   Intake no longer following   Pt removed from queue

## 2021-09-29 NOTE — ED NOTES
Patient agrees to discharge plan. Discharge instructions reviewed with patient including follow-up care plan. Medications: ordered and sent to own pharmacy. Reviewed safety plan and outpatient resources. Denies SI and HI. All belongings that were brought into the hospital have been returned to patient. Escorted off the unit at 0955 accompanied by [facility] staff. Discharged to home via ride with family friend. Pt escorted to door 6 to wait for ride.

## 2021-09-29 NOTE — ED PROVIDER NOTES
"EmPATH Unit - Psychiatric Observation Discharge Summary  [facility] Emergency Department  Discharge Date: 9/29/2021    Thea Castle MRN: [id number]   Age: 21 year old YOB: 2000     Brief HPI & Initial ED Course     Chief Complaint   Patient presents with     Suicidal     HPI  Thea Castle is a 21 year old female with PMH notable for PTSD, OCD, MAT, anorexia, and MDD, who presents to the ED Observation Unit with suicidal ideation.   She was admitted to observation status, Prozac was discontinued and replaced with Cymbalta for more effective mood and anxiety management.  Plans were made to transition to the inpatient setting given reports of suicidal ideation and inability to contract for safety if returning home.  There was an extended wait time for inpatient admission which resulted in the patient having her stay on the empath unit extended.  During that time, her mood symptoms began to improve and she no longer reported experiencing active suicidal thoughts.  She was able to contract for safety if returning home.  Conversations were initiated with her mother regarding discharge planning will also supported the patient discharging home.  She is being reassessed today.  On examination, the patient continues to report symptom stability in the improvements outlined above.  She continues to deny suicidal ideation.  She is looking forward to her therapy session later today.  She requested a letter to excuse her from work.  She discussed plans to attend homecoming this weekend.  She anticipates returning home today.  She is tolerating her medications without side effects.        Physical Examination   BP: 110/73  Pulse: 76  Temp: 98.9  F (37.2  C)  Resp: 16  Height: 162.6 cm (5' 4\")  Weight: 55.8 kg (123 lb 1.6 oz)  SpO2: 100 %    Physical Exam  General: Appears stated age.   Neuro: Alert and fully oriented. Extremities appear to demonstrate normal strength on visual inspection. "   Integumentary/Skin: no rash visualized, normal color    Psychiatric Examination   Appearance: awake, alert  Attitude:  cooperative  Eye Contact:  fair  Mood:  better  Affect:  appropriate and in normal range  Speech:  clear, coherent  Psychomotor Behavior:  no evidence of tardive dyskinesia, dystonia, or tics  Thought Process:  logical and linear  Associations:  no loose associations  Thought Content:  no evidence of suicidal ideation or homicidal ideation and no evidence of psychotic thought  Insight:  fair  Judgement:  intact  Oriented to:  time, person, and place  Attention Span and Concentration:  intact  Recent and Remote Memory:  intact  Language: able to name/identify objects without impairment  Fund of Knowledge: intact with awareness of current and past events    Results        Labs Ordered and Resulted from Time of ED Arrival Up to the Time of Departure from the ED   COVID-19 VIRUS (CORONAVIRUS) BY PCR - Normal    Narrative:     Testing was performed using the kye  SARS-CoV-2 & Influenza A/B Assay on the kye  Esther  System.  This test should be ordered for the detection of SARS-COV-2 in individuals who meet SARS-CoV-2 clinical and/or epidemiological criteria. Test performance is unknown in asymptomatic patients.  This test is for in vitro diagnostic use under the FDA EUA for laboratories certified under CLIA to perform moderate and/or high complexity testing. This test has not been FDA cleared or approved.  A negative test does not rule out the presence of PCR inhibitors in the specimen or target RNA in concentration below the limit of detection for the assay. The possibility of a false negative should be considered if the patient's recent exposure or clinical presentation suggests COVID-19.  [facility] are certified under the Clinical Laboratory Improvement Amendments of 1988 (CLIA-88) as qualified to perform moderate and/or high complexity laboratory testing.   HCG QUALITATIVE  URINE - Normal   DRUG ABUSE SCREEN 77 URINE (FL, RH, SH) - Normal   DOCUMENT   PATIENT CARE ORDER   PATIENT CARE ORDER   URINE DRUGS OF ABUSE SCREEN    Narrative:     The following orders were created for panel order Urine Drugs of Abuse Screen.  Procedure                               Abnormality         Status                     ---------                               -----------         ------                     Drug abuse screen 77 uri...[[id number]]  Normal              Final result                 Please view results for these tests on the individual orders.       Observation Course   The patient was found to have a psychiatric condition that would benefit from an observation stay in the emergency department for further psychiatric stabilization and/or coordination of a safe disposition. The plan upon observation admission included serial assessments of psychiatric condition, potential administration of medications if indicated, further disposition pending the patient's psychiatric course during the monitoring period.     Serial assessments of the patient's psychiatric condition were performed. Nursing notes were reviewed. During the observation period, the patient did not require medications for agitation, and did not require restraints/seclusion for patient and/or provider safety.     After a period of working with the treatment team on the EmPATH unit, the patient's mental state improved to allow a safe transition to outpatient care. After counseling on the diagnosis, work-up, and treatment plan, the patient was discharged. Close follow-up with a psychiatrist and/or therapist was recommended and community psychiatric resources were provided. Patient is to return to the ED if any urgent or potentially life-threatening concerns.     Discharge Diagnoses:   Major depressive disorder-recurrent, severe without psychotic features  Generalized anxiety disorder  Posttraumatic stress disorder  Unspecified eating  disorder    Treatment Plan:  -Increase Cymbalta from 20 to 30 mg daily as planned.  She should continue this dose for at least 1 week to assess her response and tolerability after which time optimizing the dose to 40 or 60 mg should be considered.  -The remainder of her medications have been continued without change including Abilify for augmentation of her antidepressant.  -She will follow-up with her outpatient therapist later today  -She will follow-up with her outpatient psychiatrist as scheduled  -Discharge home today.      At the time of discharge, the patient's acute suicide risk was determined to be low due to the following factors: Reduction in the intensity of mood/anxiety symptoms that preceded the admission, denial of suicidal thoughts, denies feeling helpless or helpless, not currently under the influence of alcohol or illicit substances, denies experiencing command hallucinations, no immediate access to firearms. The patient's acute risk could be higher if noncompliant with their treatment plan, medications, follow-up appointments or using illicit substances or alcohol. Protective factors include: social supports, school, stable housing    --  Best Collier MD  [facility] EMERGENCY DEPT  EmPATH Unit  9/29/2021      Best Collier MD  09/29/21 [id number]

## 2021-09-29 NOTE — ED NOTES
"emPATH [facility] Reassessment and Progress Note    Client Name: Thea Castle  Date: September 29, 2021       Presenting issue that brought patient to the emPATH unit:     Pt presents to the ED with reports in increase of suicidal ideation, plan, and intent.  Pt reports being really suicidal for the past 2 weeks. Pt reports it has been increasing and getting worse. While at therapy today, Pt reports her therapist was able to \"pry\" it out of her regarding her suicidal ideation, plan, and intent. Pt reports she has a plan of overdosing on her Benadryl. Pt reports her therapist then called 911 and came to the ED voluntarily in an ambulance. She reports multiple hospitalizations regarding similar presentation.  Pt reports she has been feeling lonely lately and not being supported by her parents. Pt reports when she speaks to them about her mental health, they invalidate her and tell her to stop being entitled. Pt reports last night her father told her to stop being a \"adry downer\" and being entitled. Pt reports difficulties with her school work lately and not connecting with others. 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   Pt endorses SI, plan, and intent. When prompted by Writer, Pt stated if her parents were to lock up all the medication, she would just find another way to complete suicide. Pt denies any substance use. Pt states she wants to inpatient care because she believes it is what is best for her. Pt reports not wanting her mother involved in her care because she will only invalidate Pt to the providers.    Current presentation on the unit:   Pt presents as sitting in the milieu and alert.   She was quick to engage on approach.   Affect lacked spontaneity    Current risk to self or others? No    Summary of therapeutic interventions completed with patient: active listening, rapport building, brief supportive therapy    Treatment objectives addressed in this session:  " "safety    Progress on treatment goals:  Pt made good progress regarding suicidal ideation.     \"I feel I'm in a much better spot than the other people around me here.   So that makes me feel better.  And my Mom says she can keep me safe at home.     I had gone to my therapist's office and said I was thinking of taking Benadryl.   Then my therapist caled 911.   My pills were kept in the bathroom and dining room closet, so I knew right where to get them if I wanted to overdose.   But now, my Mom said she is going to put the pills somewhere else - in a place I can't find.\"     Mental Status:     Appearance:   Appropriate    Eye Contact:   Fair    Psychomotor Behavior: Normal    Attitude:   Cooperative    Orientation:   All   Speech    Rate / Production: Normal/ Responsive    Volume:  Normal    Mood:    Normal   Affect:    Blunted  Constricted    Thought Content:  Clear    Thought Form:  Logical    Insight:    Fair       Plan: discharge home today.  She will resume seeing her outpt providers.    Disposition:    discharge home today.  She will resume seeing her outpt providers.    Rationale for disposition:  Pt states she is no longer feeling suicidal and says she feels safe to return home    Reviewed assessment with attending provider: Yes, Dr Collier     Total time spent with patient:.50 hrs     CPT code: 60367 - Psychotherapy (with patient) - 30 (16-37*) min      LITO DAVE                                                           "

## 2021-09-29 NOTE — ED NOTES
Pt appeared irritable and blunt. Asked about discharging this morning. States that she feels that she can keep herself safe. Also states that her mom will be able to keep watch over her. Cooperative with cares.

## 2022-01-08 ENCOUNTER — HEALTH MAINTENANCE LETTER (OUTPATIENT)
Age: 22
End: 2022-01-08

## 2022-11-19 ENCOUNTER — HEALTH MAINTENANCE LETTER (OUTPATIENT)
Age: 22
End: 2022-11-19

## 2023-01-03 ENCOUNTER — TELEPHONE (OUTPATIENT)
Dept: BEHAVIORAL HEALTH | Facility: CLINIC | Age: 23
End: 2023-01-03

## 2023-01-03 ENCOUNTER — HOSPITAL ENCOUNTER (INPATIENT)
Facility: CLINIC | Age: 23
LOS: 34 days | Discharge: PSYCHIATRIC OR SUBSTANCE USE FACILITY | DRG: 918 | End: 2023-02-07
Attending: EMERGENCY MEDICINE | Admitting: INTERNAL MEDICINE
Payer: COMMERCIAL

## 2023-01-03 DIAGNOSIS — F32.A DEPRESSION WITH SUICIDAL IDEATION: Primary | ICD-10-CM

## 2023-01-03 DIAGNOSIS — R45.851 SUICIDAL IDEATION: ICD-10-CM

## 2023-01-03 DIAGNOSIS — R33.9 URINARY RETENTION: ICD-10-CM

## 2023-01-03 DIAGNOSIS — F50.89 OTHER SPECIFIED EATING DISORDER: ICD-10-CM

## 2023-01-03 DIAGNOSIS — R45.851 DEPRESSION WITH SUICIDAL IDEATION: Primary | ICD-10-CM

## 2023-01-03 LAB
ALBUMIN SERPL-MCNC: 3.4 G/DL (ref 3.4–5)
ALP SERPL-CCNC: 59 U/L (ref 40–150)
ALT SERPL W P-5'-P-CCNC: 26 U/L (ref 0–50)
AMPHETAMINES UR QL SCN: ABNORMAL
ANION GAP SERPL CALCULATED.3IONS-SCNC: 8 MMOL/L (ref 3–14)
APAP SERPL-MCNC: <2 MG/L (ref 10–30)
AST SERPL W P-5'-P-CCNC: 25 U/L (ref 0–45)
ATRIAL RATE - MUSE: 102 BPM
BARBITURATES UR QL: ABNORMAL
BASOPHILS # BLD AUTO: 0 10E3/UL (ref 0–0.2)
BASOPHILS NFR BLD AUTO: 1 %
BENZODIAZ UR QL: ABNORMAL
BILIRUB SERPL-MCNC: 0.2 MG/DL (ref 0.2–1.3)
BUN SERPL-MCNC: 11 MG/DL (ref 7–30)
CALCIUM SERPL-MCNC: 8.7 MG/DL (ref 8.5–10.1)
CANNABINOIDS UR QL SCN: ABNORMAL
CHLORIDE BLD-SCNC: 107 MMOL/L (ref 94–109)
CO2 SERPL-SCNC: 24 MMOL/L (ref 20–32)
COCAINE UR QL: ABNORMAL
CREAT SERPL-MCNC: 0.74 MG/DL (ref 0.52–1.04)
DIASTOLIC BLOOD PRESSURE - MUSE: NORMAL MMHG
EOSINOPHIL # BLD AUTO: 0 10E3/UL (ref 0–0.7)
EOSINOPHIL NFR BLD AUTO: 0 %
ERYTHROCYTE [DISTWIDTH] IN BLOOD BY AUTOMATED COUNT: 13.5 % (ref 10–15)
ETHANOL SERPL-MCNC: <0.01 G/DL
GFR SERPL CREATININE-BSD FRML MDRD: >90 ML/MIN/1.73M2
GLUCOSE BLD-MCNC: 113 MG/DL (ref 70–99)
HCG SERPL QL: NEGATIVE
HCT VFR BLD AUTO: 33.1 % (ref 35–47)
HGB BLD-MCNC: 11.1 G/DL (ref 11.7–15.7)
HOLD SPECIMEN: NORMAL
IMM GRANULOCYTES # BLD: 0 10E3/UL
IMM GRANULOCYTES NFR BLD: 0 %
INTERPRETATION ECG - MUSE: NORMAL
LYMPHOCYTES # BLD AUTO: 2.4 10E3/UL (ref 0.8–5.3)
LYMPHOCYTES NFR BLD AUTO: 38 %
MCH RBC QN AUTO: 29.8 PG (ref 26.5–33)
MCHC RBC AUTO-ENTMCNC: 33.5 G/DL (ref 31.5–36.5)
MCV RBC AUTO: 89 FL (ref 78–100)
MONOCYTES # BLD AUTO: 0.3 10E3/UL (ref 0–1.3)
MONOCYTES NFR BLD AUTO: 5 %
NEUTROPHILS # BLD AUTO: 3.5 10E3/UL (ref 1.6–8.3)
NEUTROPHILS NFR BLD AUTO: 56 %
NRBC # BLD AUTO: 0 10E3/UL
NRBC BLD AUTO-RTO: 0 /100
OPIATES UR QL SCN: ABNORMAL
P AXIS - MUSE: 71 DEGREES
PCP UR QL SCN: ABNORMAL
PLATELET # BLD AUTO: 273 10E3/UL (ref 150–450)
POTASSIUM BLD-SCNC: 3.6 MMOL/L (ref 3.4–5.3)
PR INTERVAL - MUSE: 116 MS
PROT SERPL-MCNC: 7.1 G/DL (ref 6.8–8.8)
QRS DURATION - MUSE: 84 MS
QT - MUSE: 342 MS
QTC - MUSE: 445 MS
R AXIS - MUSE: 70 DEGREES
RBC # BLD AUTO: 3.73 10E6/UL (ref 3.8–5.2)
SALICYLATES SERPL-MCNC: <2 MG/DL
SARS-COV-2 RNA RESP QL NAA+PROBE: NEGATIVE
SODIUM SERPL-SCNC: 139 MMOL/L (ref 133–144)
SYSTOLIC BLOOD PRESSURE - MUSE: NORMAL MMHG
T AXIS - MUSE: -9 DEGREES
VENTRICULAR RATE- MUSE: 102 BPM
WBC # BLD AUTO: 6.2 10E3/UL (ref 4–11)

## 2023-01-03 PROCEDURE — 80143 DRUG ASSAY ACETAMINOPHEN: CPT | Performed by: EMERGENCY MEDICINE

## 2023-01-03 PROCEDURE — 90791 PSYCH DIAGNOSTIC EVALUATION: CPT

## 2023-01-03 PROCEDURE — 80179 DRUG ASSAY SALICYLATE: CPT | Performed by: EMERGENCY MEDICINE

## 2023-01-03 PROCEDURE — 85025 COMPLETE CBC W/AUTO DIFF WBC: CPT | Performed by: EMERGENCY MEDICINE

## 2023-01-03 PROCEDURE — C9803 HOPD COVID-19 SPEC COLLECT: HCPCS

## 2023-01-03 PROCEDURE — 82077 ASSAY SPEC XCP UR&BREATH IA: CPT | Performed by: EMERGENCY MEDICINE

## 2023-01-03 PROCEDURE — 36415 COLL VENOUS BLD VENIPUNCTURE: CPT | Performed by: EMERGENCY MEDICINE

## 2023-01-03 PROCEDURE — 258N000003 HC RX IP 258 OP 636: Performed by: EMERGENCY MEDICINE

## 2023-01-03 PROCEDURE — 96366 THER/PROPH/DIAG IV INF ADDON: CPT

## 2023-01-03 PROCEDURE — 84703 CHORIONIC GONADOTROPIN ASSAY: CPT | Performed by: EMERGENCY MEDICINE

## 2023-01-03 PROCEDURE — 80307 DRUG TEST PRSMV CHEM ANLYZR: CPT | Performed by: EMERGENCY MEDICINE

## 2023-01-03 PROCEDURE — 99285 EMERGENCY DEPT VISIT HI MDM: CPT

## 2023-01-03 PROCEDURE — 93005 ELECTROCARDIOGRAM TRACING: CPT

## 2023-01-03 PROCEDURE — U0005 INFEC AGEN DETEC AMPLI PROBE: HCPCS | Performed by: EMERGENCY MEDICINE

## 2023-01-03 PROCEDURE — 96365 THER/PROPH/DIAG IV INF INIT: CPT

## 2023-01-03 PROCEDURE — 80053 COMPREHEN METABOLIC PANEL: CPT | Performed by: EMERGENCY MEDICINE

## 2023-01-03 RX ORDER — HYDROXYZINE HYDROCHLORIDE 25 MG/1
25 TABLET, FILM COATED ORAL
Status: DISCONTINUED | OUTPATIENT
Start: 2023-01-03 | End: 2023-01-07

## 2023-01-03 RX ADMIN — DEXTROSE AND SODIUM CHLORIDE: 5; 900 INJECTION, SOLUTION INTRAVENOUS at 20:35

## 2023-01-03 ASSESSMENT — COLUMBIA-SUICIDE SEVERITY RATING SCALE - C-SSRS
TOTAL  NUMBER OF INTERRUPTED ATTEMPTS LIFETIME: 1
1. HAVE YOU WISHED YOU WERE DEAD OR WISHED YOU COULD GO TO SLEEP AND NOT WAKE UP?: YES
TOTAL  NUMBER OF INTERRUPTED ATTEMPTS LIFETIME: YES
LETHALITY/MEDICAL DAMAGE CODE MOST LETHAL POTENTIAL ATTEMPT: BEHAVIOR LIKELY TO RESULT IN INJURY BUT NOT LIKELY TO CAUSE DEATH
TOTAL  NUMBER OF ABORTED OR SELF INTERRUPTED ATTEMPTS LIFETIME: NO
2. HAVE YOU ACTUALLY HAD ANY THOUGHTS OF KILLING YOURSELF?: YES
2. HAVE YOU ACTUALLY HAD ANY THOUGHTS OF KILLING YOURSELF?: YES
4. HAVE YOU HAD THESE THOUGHTS AND HAD SOME INTENTION OF ACTING ON THEM?: YES
TOTAL  NUMBER OF ACTUAL ATTEMPTS PAST 3 MONTHS: 1
ATTEMPT LIFETIME: YES
4. HAVE YOU HAD THESE THOUGHTS AND HAD SOME INTENTION OF ACTING ON THEM?: YES
MOST RECENT DATE: 66477
TOTAL  NUMBER OF ACTUAL ATTEMPTS LIFETIME: 1
LETHALITY/MEDICAL DAMAGE CODE MOST LETHAL ACTUAL ATTEMPT: MODERATE PHYSICAL DAMAGE, MEDICAL ATTENTION NEEDED
LETHALITY/MEDICAL DAMAGE CODE MOST RECENT ACTUAL ATTEMPT: MODERATE PHYSICAL DAMAGE, MEDICAL ATTENTION NEEDED
ATTEMPT PAST THREE MONTHS: YES
REASONS FOR IDEATION PAST MONTH: MOSTLY TO END OR STOP THE PAIN (YOU COULDN'T GO ON LIVING WITH THE PAIN OR HOW YOU WERE FEELING)
5. HAVE YOU STARTED TO WORK OUT OR WORKED OUT THE DETAILS OF HOW TO KILL YOURSELF? DO YOU INTEND TO CARRY OUT THIS PLAN?: YES
3. HAVE YOU BEEN THINKING ABOUT HOW YOU MIGHT KILL YOURSELF?: YES
6. HAVE YOU EVER DONE ANYTHING, STARTED TO DO ANYTHING, OR PREPARED TO DO ANYTHING TO END YOUR LIFE?: NO
LETHALITY/MEDICAL DAMAGE CODE FIRST ACTUAL ATTEMPT: MODERATE PHYSICAL DAMAGE, MEDICAL ATTENTION NEEDED
TOTAL  NUMBER OF INTERRUPTED ATTEMPTS PAST 3 MONTHS: YES
REASONS FOR IDEATION LIFETIME: MOSTLY TO END OR STOP THE PAIN (YOU COULDN'T GO ON LIVING WITH THE PAIN OR HOW YOU WERE FEELING)
MOST LETHAL DATE: 66477
5. HAVE YOU STARTED TO WORK OUT OR WORKED OUT THE DETAILS OF HOW TO KILL YOURSELF? DO YOU INTEND TO CARRY OUT THIS PLAN?: YES
LETHALITY/MEDICAL DAMAGE CODE FIRST POTENTIAL ATTEMPT: BEHAVIOR LIKELY TO RESULT IN INJURY BUT NOT LIKELY TO CAUSE DEATH
TOTAL  NUMBER OF INTERRUPTED ATTEMPTS PAST 3 MONTHS: 1
1. IN THE PAST MONTH, HAVE YOU WISHED YOU WERE DEAD OR WISHED YOU COULD GO TO SLEEP AND NOT WAKE UP?: YES
FIRST ATTEMPT DATE: 66477
LETHALITY/MEDICAL DAMAGE CODE MOST RECENT POTENTIAL ATTEMPT: BEHAVIOR LIKELY TO RESULT IN INJURY BUT NOT LIKELY TO CAUSE DEATH

## 2023-01-03 ASSESSMENT — ACTIVITIES OF DAILY LIVING (ADL)
ADLS_ACUITY_SCORE: 35

## 2023-01-03 ASSESSMENT — ENCOUNTER SYMPTOMS: FATIGUE: 1
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
## 2023-01-03 NOTE — ED TRIAGE NOTES
"Patient had 2 virtual appointments this am with therapists. Had a home care RN at house also. Patient expressed sadness and at 0900 grabbed Gabapentin and at 1200 consumed Gabapentin. Patient states that she took 20 x 300 mg pills. Mom manages patients meds and doesn't believe that patient took that many as the pill bottle is only slightly lower than when she last saw it.     Patient has a pantoja for chronic urinary retention. Patient states \"we don't need to talk about it.\"     Triage Assessment     Row Name 01/03/23 1410       Triage Assessment (Adult)    Airway WDL WDL       Respiratory WDL    Respiratory WDL WDL       Skin Circulation/Temperature WDL    Skin Circulation/Temperature WDL WDL       Cardiac WDL    Cardiac WDL WDL       Peripheral/Neurovascular WDL    Peripheral Neurovascular WDL WDL       Cognitive/Neuro/Behavioral WDL    Cognitive/Neuro/Behavioral WDL WDL              "

## 2023-01-04 ENCOUNTER — TELEPHONE (OUTPATIENT)
Dept: BEHAVIORAL HEALTH | Facility: CLINIC | Age: 23
End: 2023-01-04
Payer: COMMERCIAL

## 2023-01-04 ENCOUNTER — APPOINTMENT (OUTPATIENT)
Dept: GENERAL RADIOLOGY | Facility: CLINIC | Age: 23
DRG: 918 | End: 2023-01-04
Attending: EMERGENCY MEDICINE
Payer: COMMERCIAL

## 2023-01-04 PROBLEM — R45.851 SUICIDAL IDEATION: Status: ACTIVE | Noted: 2019-04-24

## 2023-01-04 LAB
ANION GAP SERPL CALCULATED.3IONS-SCNC: 6 MMOL/L (ref 3–14)
BUN SERPL-MCNC: 8 MG/DL (ref 7–30)
CALCIUM SERPL-MCNC: 8.5 MG/DL (ref 8.5–10.1)
CHLORIDE BLD-SCNC: 107 MMOL/L (ref 94–109)
CO2 SERPL-SCNC: 27 MMOL/L (ref 20–32)
CREAT SERPL-MCNC: 0.73 MG/DL (ref 0.52–1.04)
GFR SERPL CREATININE-BSD FRML MDRD: >90 ML/MIN/1.73M2
GLUCOSE BLD-MCNC: 111 MG/DL (ref 70–99)
MAGNESIUM SERPL-MCNC: 1.9 MG/DL (ref 1.6–2.3)
POTASSIUM BLD-SCNC: 3.8 MMOL/L (ref 3.4–5.3)
SODIUM SERPL-SCNC: 140 MMOL/L (ref 133–144)

## 2023-01-04 PROCEDURE — 80048 BASIC METABOLIC PNL TOTAL CA: CPT | Performed by: EMERGENCY MEDICINE

## 2023-01-04 PROCEDURE — 99223 1ST HOSP IP/OBS HIGH 75: CPT | Mod: AI | Performed by: INTERNAL MEDICINE

## 2023-01-04 PROCEDURE — 120N000001 HC R&B MED SURG/OB

## 2023-01-04 PROCEDURE — 83735 ASSAY OF MAGNESIUM: CPT | Performed by: EMERGENCY MEDICINE

## 2023-01-04 PROCEDURE — 250N000009 HC RX 250: Performed by: EMERGENCY MEDICINE

## 2023-01-04 PROCEDURE — 250N000013 HC RX MED GY IP 250 OP 250 PS 637: Performed by: EMERGENCY MEDICINE

## 2023-01-04 PROCEDURE — 36415 COLL VENOUS BLD VENIPUNCTURE: CPT | Performed by: EMERGENCY MEDICINE

## 2023-01-04 PROCEDURE — 250N000013 HC RX MED GY IP 250 OP 250 PS 637: Performed by: STUDENT IN AN ORGANIZED HEALTH CARE EDUCATION/TRAINING PROGRAM

## 2023-01-04 PROCEDURE — 74340 X-RAY GUIDE FOR GI TUBE: CPT

## 2023-01-04 RX ORDER — ACETAMINOPHEN 325 MG/1
325 TABLET ORAL EVERY 4 HOURS PRN
Status: DISCONTINUED | OUTPATIENT
Start: 2023-01-04 | End: 2023-01-07 | Stop reason: ALTCHOICE

## 2023-01-04 RX ORDER — TAMSULOSIN HYDROCHLORIDE 0.4 MG/1
0.4 CAPSULE ORAL DAILY
COMMUNITY
Start: 2022-12-20 | End: 2023-01-04

## 2023-01-04 RX ORDER — TRAZODONE HYDROCHLORIDE 50 MG/1
50 TABLET, FILM COATED ORAL AT BEDTIME
Status: DISCONTINUED | OUTPATIENT
Start: 2023-01-04 | End: 2023-01-07

## 2023-01-04 RX ORDER — LORAZEPAM 1 MG/1
1 TABLET ORAL AT BEDTIME
Status: DISCONTINUED | OUTPATIENT
Start: 2023-01-04 | End: 2023-01-07

## 2023-01-04 RX ORDER — DROSPIRENONE AND ETHINYL ESTRADIOL 0.02-3(28)
1 KIT ORAL DAILY
COMMUNITY

## 2023-01-04 RX ORDER — GUAIFENESIN 600 MG/1
15 TABLET, EXTENDED RELEASE ORAL DAILY
Status: DISCONTINUED | OUTPATIENT
Start: 2023-01-04 | End: 2023-01-23

## 2023-01-04 RX ORDER — ARIPIPRAZOLE 10 MG/1
10 TABLET ORAL DAILY
Status: ON HOLD | COMMUNITY
End: 2023-02-08

## 2023-01-04 RX ORDER — TRAZODONE HYDROCHLORIDE 50 MG/1
50 TABLET, FILM COATED ORAL AT BEDTIME
Status: ON HOLD | COMMUNITY
Start: 2022-10-26 | End: 2023-02-08

## 2023-01-04 RX ORDER — LACTOBACILLUS RHAMNOSUS GG 10B CELL
1 CAPSULE ORAL DAILY
COMMUNITY

## 2023-01-04 RX ORDER — GABAPENTIN 300 MG/1
300 CAPSULE ORAL AT BEDTIME
Status: ON HOLD | COMMUNITY
Start: 2022-10-08 | End: 2023-02-03

## 2023-01-04 RX ORDER — POLYETHYLENE GLYCOL 3350 17 G/17G
17 POWDER, FOR SOLUTION ORAL DAILY PRN
Status: DISCONTINUED | OUTPATIENT
Start: 2023-01-04 | End: 2023-02-07 | Stop reason: HOSPADM

## 2023-01-04 RX ORDER — PHENOL 1.4 %
10 AEROSOL, SPRAY (ML) MUCOUS MEMBRANE AT BEDTIME
COMMUNITY
Start: 2022-04-11 | End: 2023-04-11

## 2023-01-04 RX ORDER — LORAZEPAM 1 MG/1
1 TABLET ORAL
COMMUNITY
Start: 2022-01-24 | End: 2023-01-04

## 2023-01-04 RX ORDER — PROMETHAZINE HYDROCHLORIDE 25 MG/1
25 TABLET ORAL 2 TIMES DAILY
Status: ON HOLD | COMMUNITY
End: 2023-02-08

## 2023-01-04 RX ORDER — PANTOPRAZOLE SODIUM 40 MG/1
40 TABLET, DELAYED RELEASE ORAL 2 TIMES DAILY
Status: ON HOLD | COMMUNITY
Start: 2022-11-10 | End: 2023-02-08

## 2023-01-04 RX ORDER — ACETAMINOPHEN 325 MG/1
2 TABLET ORAL EVERY 4 HOURS PRN
COMMUNITY
Start: 2022-08-29

## 2023-01-04 RX ORDER — DEXTROSE MONOHYDRATE 100 MG/ML
INJECTION, SOLUTION INTRAVENOUS CONTINUOUS PRN
Status: DISCONTINUED | OUTPATIENT
Start: 2023-01-04 | End: 2023-01-19

## 2023-01-04 RX ORDER — DULOXETIN HYDROCHLORIDE 30 MG/1
30 CAPSULE, DELAYED RELEASE ORAL DAILY
Status: ON HOLD | COMMUNITY
Start: 2021-09-29 | End: 2023-02-08

## 2023-01-04 RX ORDER — LIDOCAINE HYDROCHLORIDE 20 MG/ML
JELLY TOPICAL ONCE
Status: COMPLETED | OUTPATIENT
Start: 2023-01-04 | End: 2023-01-04

## 2023-01-04 RX ORDER — ERENUMAB-AOOE 140 MG/ML
140 INJECTION, SOLUTION SUBCUTANEOUS
COMMUNITY
Start: 2022-09-19 | End: 2023-01-04

## 2023-01-04 RX ORDER — DULOXETIN HYDROCHLORIDE 60 MG/1
60 CAPSULE, DELAYED RELEASE ORAL DAILY
Status: ON HOLD | COMMUNITY
End: 2023-02-03

## 2023-01-04 RX ORDER — PRUCALOPRIDE 2 MG/1
1 TABLET, FILM COATED ORAL DAILY
Status: ON HOLD | COMMUNITY
Start: 2022-09-28 | End: 2023-01-12

## 2023-01-04 RX ORDER — PROMETHAZINE HYDROCHLORIDE 25 MG/1
12.5 TABLET ORAL EVERY 6 HOURS PRN
Status: DISCONTINUED | OUTPATIENT
Start: 2023-01-04 | End: 2023-01-07

## 2023-01-04 RX ORDER — QUETIAPINE FUMARATE 25 MG/1
TABLET, FILM COATED ORAL
Status: ON HOLD | COMMUNITY
Start: 2022-12-21 | End: 2023-02-08

## 2023-01-04 RX ORDER — MULTIVIT WITH MINERALS/LUTEIN
250 TABLET ORAL DAILY
COMMUNITY

## 2023-01-04 RX ORDER — DOCUSATE SODIUM 100 MG/1
100 CAPSULE, LIQUID FILLED ORAL DAILY
COMMUNITY
End: 2023-01-04

## 2023-01-04 RX ADMIN — MELATONIN 5 MG TABLET 5 MG: at 21:11

## 2023-01-04 RX ADMIN — TRAZODONE HYDROCHLORIDE 50 MG: 50 TABLET ORAL at 02:04

## 2023-01-04 RX ADMIN — TRAZODONE HYDROCHLORIDE 50 MG: 50 TABLET ORAL at 21:06

## 2023-01-04 RX ADMIN — QUETIAPINE 12.5 MG: 25 TABLET, FILM COATED ORAL at 21:06

## 2023-01-04 RX ADMIN — MELATONIN 5 MG TABLET 5 MG: at 02:39

## 2023-01-04 RX ADMIN — QUETIAPINE 12.5 MG: 25 TABLET, FILM COATED ORAL at 02:39

## 2023-01-04 RX ADMIN — PROMETHAZINE HYDROCHLORIDE 12.5 MG: 25 TABLET ORAL at 21:06

## 2023-01-04 RX ADMIN — MULTIVITAMIN 15 ML: LIQUID ORAL at 16:56

## 2023-01-04 RX ADMIN — LORAZEPAM 1 MG: 1 TABLET ORAL at 21:06

## 2023-01-04 RX ADMIN — ACETAMINOPHEN 325 MG: 325 TABLET, FILM COATED ORAL at 20:20

## 2023-01-04 RX ADMIN — LIDOCAINE HYDROCHLORIDE 3 ML: 20 JELLY TOPICAL at 08:48

## 2023-01-04 RX ADMIN — LORAZEPAM 1 MG: 1 TABLET ORAL at 02:03

## 2023-01-04 ASSESSMENT — COLUMBIA-SUICIDE SEVERITY RATING SCALE - C-SSRS
ATTEMPT SINCE LAST CONTACT: NO
2. HAVE YOU ACTUALLY HAD ANY THOUGHTS OF KILLING YOURSELF?: YES
TOTAL  NUMBER OF ABORTED OR SELF INTERRUPTED ATTEMPTS SINCE LAST CONTACT: NO
LETHALITY/MEDICAL DAMAGE CODE MOST LETHAL POTENTIAL ATTEMPT: BEHAVIOR LIKELY TO RESULT IN INJURY BUT NOT LIKELY TO CAUSE DEATH
1. SINCE LAST CONTACT, HAVE YOU WISHED YOU WERE DEAD OR WISHED YOU COULD GO TO SLEEP AND NOT WAKE UP?: YES
SUICIDE, SINCE LAST CONTACT: NO
5. HAVE YOU STARTED TO WORK OUT OR WORKED OUT THE DETAILS OF HOW TO KILL YOURSELF? DO YOU INTEND TO CARRY OUT THIS PLAN?: NO
MOST LETHAL DATE: 66477
6. HAVE YOU EVER DONE ANYTHING, STARTED TO DO ANYTHING, OR PREPARED TO DO ANYTHING TO END YOUR LIFE?: NO
LETHALITY/MEDICAL DAMAGE CODE MOST LETHAL ACTUAL ATTEMPT: MODERATE PHYSICAL DAMAGE, MEDICAL ATTENTION NEEDED
TOTAL  NUMBER OF INTERRUPTED ATTEMPTS SINCE LAST CONTACT: NO
REASONS FOR IDEATION SINCE LAST CONTACT: COMPLETELY TO END OR STOP THE PAIN (YOU COULDN'T GO ON LIVING WITH THE PAIN OR HOW YOU WERE FEELING)

## 2023-01-04 ASSESSMENT — ACTIVITIES OF DAILY LIVING (ADL)
ADLS_ACUITY_SCORE: 35

## 2023-01-04 NOTE — CONSULTS
"CLINICAL NUTRITION SERVICES - BRIEF NOTE    Consult received for Provider Order - Patient on tube feedings, assistance in arranging for proper tube feeding in ED    PMH significant for anorexia nervosa, depression, anxiety     Noted patient initially had nasoenteric (NDT) tube placed at OSH on 12/21/22 per recommendation of GI to gain weight. Patient reported she felt some discomfort with the tube and that part of it was coming out of her mouth so she pulled it out and appears to have cut it. Presented to urgent care at outside facility on 1/2 where distal remnant of tube was found in the stomach and successfully removed.   Presented to ED here yesterday after reported suicide attempt.   Patient reported on admission that she had not eaten or drank anything for 2 days prior to admission. Had NDT vs NJT replaced this morning in IR.   Noted patient has been following with a dietitian with [facility] but due to their documentation process I am not able to see these records in chart/Care Everywhere. Unclear what TF formula patient was using at home and whether she was truly utilizing her FT or not.     Weight hx reviewed. Current BMI at height of 5' 4\" is 20.53  54.3 kg (119 lb 9.6 oz) 01/02/2023     Wt Readings from Last 10 Encounters:   09/27/21 55.8 kg (123 lb 1.6 oz)   09/30/20 47.6 kg (105 lb)   09/24/20 47.6 kg (105 lb)   04/25/19 54.5 kg (120 lb 2.4 oz) (37 %, Z= -0.33)*   03/25/19 57.6 kg (127 lb) (51 %, Z= 0.03)*   03/07/19 55.3 kg (122 lb) (42 %, Z= -0.21)*     * Growth percentiles are based on CDC (Girls, 2-20 Years) data.       Assessed Nutrition Needs  Dosing Weight: 54.3 kg (1/2)  Estimated Energy Needs: 5227-7198+ (25-30 kcal/kg)  Justification: maintenance vs repletion   Estimated Protein Needs: 55-65+ (1-1.2+ g/kg)  Justification: preservation of lean body mass vs repletion  Estimated Fluid Needs: 1 mL/Kcal for maintenance     Interventions/Recommendations:  Start Osmolite 1.5 at 10 mL/hr today and " monitor tolerance/acceptance of TF   Will reassess tomorrow morning for TF advancement   Continue regular diet and encourage regular meal intakes as per unit routine   Baseline K+, Mg and Phos labs  Certavite 15 mL/day for micronutrients     Recommended TF goal -   Osmolite 1.5 Pramod @ goal of  40ml/hr  (960ml/day)  will provide: 1440 kcals (27 kcal/kg), 60 g PRO (1.1 g/kg), 731 ml free H20, 195 g CHO, and 0 g fiber daily.    Leona Russ RD, LD
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
## 2023-01-04 NOTE — ED NOTES
PT'S FRIEND HANNA EXPRESSED CONCERN ABOUT PT GOING HOME TO HER MOTHER'S HOUSE . PT AND HER FRIEND BOTH STATED THAT THE HOME ENVIRONMENT IS NOT SAFE.   This was also discussed with Tabby . Plan is to have [facility] assess pt and go from there. Debate is whether or not pt can have her NG tube replaced before discharge.

## 2023-01-04 NOTE — ED PROVIDER NOTES
ED course:  Patient received as a handoff from my partner Dr. Ta  On face-to-face evaluation patient reports no additional complaints.  Patient refused transfer to [facility].  Patient's care was discussed with extended care and it is felt that she continues to require inpatient mental health admission; I agree with this assessment.  She did have NG tube replaced by IR as she requires supplemental feedings given gastroparesis.  It is unclear if patient will be accepted by inpatient mental health unit due to NG tube; discussed with central intake who is looking into the matter.  Patient is otherwise medically clear.  Patient will be signed out to my partner Dr. Thakkar.    Impression:    ICD-10-CM    1. Suicidal ideation  R45.851             Disposition:  Awaiting inpatient mental health bed availability versus inpatient medical bed with psychiatry consultation if inpatient psychiatry unable to accommodate NG tube         Rohan Garcia, DO  01/04/23 1554
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
## 2023-01-04 NOTE — TELEPHONE ENCOUNTER
Patient cleared and ready for behavioral bed placement: Yes     S: [facility] ED , DEC  [place] calling at 10:07 PM  about a 22 year old/Female presenting with SA by overdose; SI and depression       B: Pt arrived via Friend. Presenting problem, stressors: Pt reports she took approximately 20 300mg Gabapentin pills as an overdose.  Pts mom doesn't believe that amount but Pt denied permission to talk to family and Pt and her friend say her family is not safe.  Pt reports continued SI and doesn't feel safe to leave ED.  Pt reports depression and SI since 2019 when grandmother .  Pt reports lots of trauma and friends who  of eating disorder and suicide.    Pt has a feeding tube and says she hasn't had any food or water for last 2 days - ED is looking into replacing it.  Pt also uses a Smith catheter due to chronic UTIs.    DEC  stated Pt had appropriate affect but appeared younger than age; almost infantile and childlike.     Pt affect in ED: Anxious , Tearful and childlike  Pt Dx: Borderline Personality Disorder  Previous [facility] hx? Yes: Last [facility] was in     Pt endorses SI with a plan to overdose   Hx of suicide attempt? Yes: earlier today by Gaaipentin overdose  Pt denies SIB  Pt denies HI   Pt denies hallucinations .     Hx of aggression/violence, sexual offences, legal concerns, or Epic care plan? describe: None  Current concerns for aggression this visit? No  Does pt have a history of Civil Commitment? No  Is Pt their own guardian? Yes    Pt is prescribed medication. Is patient medication compliant? Yes  Pt endorses OP services: Psychiatrist and Therapist  CD concerns: None  Acute or chronic medical concerns: Pt endorses an Eating Disorder in remission  Does Pt present with specific needs, assistive devices, or exclusionary criteria? Catheter: will need further review for admission criteria  and Feeding tube: requires Nurse        Pt is ambulatory  Pt is able to perform ADLs  independently      A: Pt to be reviewed for IP admission. Pt is Voluntary  [facility] placement: [facility]    COVID:Negative  Utox: Positive for Benzodiazepines   CMP: WNL  CBC: WNL  HCG: Negative    R: Patient cleared and ready for behavioral bed placement: Yes  Pt placed on [facility] worklist? Yes

## 2023-01-04 NOTE — ED NOTES
Spoke with ICU Rn constantino Avelar regarding attempting tube feeding placement because ICU Rn's do place keofeeds - but they have a policy that stated they cannot place the SALOME-feeds outside of ICU . Md and myself discussed this .

## 2023-01-04 NOTE — CONSULTS
IR consult for NG tube replacement. Order entered. Consult completed.     Thanks, Terrie [facility] Interventional Radiology CNP ([phone]) (phone [phone])

## 2023-01-04 NOTE — PHARMACY-ADMISSION MEDICATION HISTORY
Pharmacy Medication History  Admission medication history interview status for the 1/3/2023  admission is complete. See EPIC admission navigator for prior to admission medications     Location of Interview: Outside patient room but on unit  Medication history sources: Patient's family/friend (mother), Surescripts and Care Everywhere    Significant changes made to the medication list:  Added: Probiotic, Abilify  Removed: Flomax, docusate, miralax  Changed: gabapentin, linzess, melatonin, promethazine, pantoprazole      Time spent in this activity: 30 minutes    Prior to Admission medications    Medication Sig Last Dose Taking? Auth Provider Long Term End Date   acetaminophen (TYLENOL) 325 MG tablet Take 2 tablets by mouth every 4 hours as needed Past Week at pn Yes Reported, Patient No    ARIPiprazole (ABILIFY) 10 MG tablet Take 10 mg by mouth daily 1/3/2023 at AM Yes Unknown, Entered By History Yes    calcium carbonate (TUMS) 500 MG chewable tablet Take 1 tablet (500 mg) by mouth as needed for heartburn Unknown at prn Yes Александр Hull MD     cholecalciferol (VITAMIN D3) 25 mcg (1000 units) capsule Take 1 capsule by mouth daily 1/3/2023 Yes Reported, Patient     drospirenone-ethinyl estradiol (FELIPE) 3-0.02 MG tablet Take 1 tablet by mouth daily 1/3/2023 Yes Unknown, Entered By History No    DULoxetine (CYMBALTA) 30 MG capsule Take 30 mg by mouth daily With 60mg to make 90mg total 1/3/2023 at AM Yes Reported, Patient Yes    DULoxetine (CYMBALTA) 60 MG capsule Take 60 mg by mouth daily With 30mg to make 90mg total 1/3/2023 at AM Yes Unknown, Entered By History Yes    gabapentin (NEURONTIN) 300 MG capsule Take 300 mg by mouth At Bedtime 1/3/2023 at HS Yes Reported, Patient Yes    lactobacillus rhamnosus, GG, (CULTURELL) capsule Take 1 capsule by mouth daily 1/3/2023 at AM Yes Unknown, Entered By History     linaclotide (LINZESS) 290 MCG capsule Take 290 mcg by mouth every morning (before breakfast) 1/3/2023  at AM Yes Reported, Patient     LORazepam (ATIVAN) 1 MG tablet Take 1 mg by mouth At Bedtime 1/3/2023 at HS Yes Unknown, Entered By History     Melatonin 10 MG TABS tablet Take 10 mg by mouth At Bedtime 1/3/2023 at HS Yes Reported, Patient No 4/11/23   multivitamin w/minerals (THERA-VIT-M) tablet Take 1 tablet by mouth daily 1/3/2023 at AM Yes Александр Hull MD     naproxen (NAPROSYN) 250 MG tablet Take 1 tablet (250 mg) by mouth 3 times daily as needed for headaches Unknown at prn Yes Александр Hull MD     pantoprazole (PROTONIX) 40 MG EC tablet Take 40 mg by mouth 2 times daily 1/3/2023 at PM Yes Reported, Patient     promethazine (PHENERGAN) 25 MG tablet Take 25 mg by mouth 2 times daily 1/3/2023 Yes Reported, Patient     prucalopride succinate (MOTEGRITY) 2 MG tablet Take 1 tablet by mouth daily 1/3/2023 at AM Yes Reported, Patient     QUEtiapine (SEROQUEL) 25 MG tablet Take 0.5 Tablets (12.5 mg) by mouth daily at bedtime. Indications: Generalized Anxiety Disorder 1/3/2023 at HS Yes Reported, Patient Yes    tazarotene (TAZORAC) 0.05 % external cream Externally apply topically every evening Unknown Yes Unknown, Entered By History     traZODone (DESYREL) 50 MG tablet Take 50 mg by mouth At Bedtime 1/3/2023 at HS Yes Reported, Patient Yes    vitamin C (ASCORBIC ACID) 250 MG tablet Take 250 mg by mouth daily 1/3/2023 at AM Yes Unknown, Entered By History         The information provided in this note is only as accurate as the sources available at the time of update(s)

## 2023-01-04 NOTE — ED NOTES
Pt quite concerned that she has not had any food or water for the last 2 days - discussed with pt and provider regarding getting tube feeding replaced. DEC is to come and assess pt - pt is to get an iv placed. Alert and orientated to baseline. Will place an IV and get orders for fluids.

## 2023-01-04 NOTE — ED NOTES
Connor from [facility] adult [facility] services called for an update on patient and requested to be kept in loop for plan of care. [phone]

## 2023-01-04 NOTE — TELEPHONE ENCOUNTER
2/2 Per JOSEPH Howell, in conference with Dr Joshua we are placing patient on work list. Pt has NJ tube for feeding and pantoja catheter. Both can and will be removed upon transfer to [facility].  Pt continues to state SI and no contract for safety.  No aggression and will be placed on 72 he hold when bed on MH available.     1/4 130 pm. Pt has NJ tube receiving 10 ML per hour. Patient does eat orally but very minimally. Pt dx with anorexia,   Per ED  , no emergent GI issues, Tube is for supplemental feeding only. Pt uses catheter and is currently getting IV fluids but per RN, can be removed from IV. Intake directive is to have DEC leadership review and determine proper placement. 1/4 3pm Per DEC Leadership, Feeding tube is exclusionary criteria. DEC will inform [facility] ED.
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
## 2023-01-04 NOTE — ED NOTES
"[facility]  ED Nurse Handoff Report    ED Chief complaint: No chief complaint on file.      ED Diagnosis:   Final diagnoses:   None       Code Status: Full Code    Allergies:   Allergies   Allergen Reactions    Penicillins Unknown     Mother unsure if patient or sister had a reaction. Mother reports she \"didn't think it was anaphylactic\".    Other Food Allergy GI Disturbance     Cilantro       Patient Story: Pt presents for suicidal thoughts.  Focused Assessment:  Pt is here for suicidal thought. Pt has a hx of gastroparesis and is supposed to have a tube feeding but pt had cut it a few days ago. Pt takes all meds and food through the tube feeding. Also has a pantoja catheter. DEC  wants patient inpatient. Receiving iv fluids D5NS at 125/hr. Pt walks independently. Tearful at times and needs lots of reassurance.    Treatments and/or interventions provided: see above  Patient's response to treatments and/or interventions: see above    To be done/followed up on inpatient unit:  possible GI consult to have feeding tube replaced.    Does this patient have any cognitive concerns?: none    Activity level - Baseline/Home:  Independent  Activity Level - Current:   Independent    Patient's Preferred language: English   Needed?: No    Isolation: None  Infection: Not Applicable  Patient tested for COVID 19 prior to admission: YES  Bariatric?: No    Vital Signs:   Vitals:    01/03/23 1600 01/03/23 2100 01/03/23 2130 01/03/23 2200   BP: (!) 131/92 (!) 135/93 (!) 127/93 (!) 124/92   Pulse: 87 92 89 92   Resp: 14 17 17 17   Temp:       TempSrc:       SpO2: 98% 97% 97% 96%       Cardiac Rhythm:     Was the PSS-3 completed:   Yes  What interventions are required if any?               Family Comments: No family present  OBS brochure/video discussed/provided to patient/family: N/A              Name of person given brochure if not patient: N/A              Relationship to patient: N/A    For the " majority of the shift this patient's behavior was Yellow.   Behavioral interventions performed were information.    ED NURSE PHONE NUMBER: [phone]

## 2023-01-05 LAB
ANION GAP SERPL CALCULATED.3IONS-SCNC: 8 MMOL/L (ref 3–14)
BUN SERPL-MCNC: 9 MG/DL (ref 7–30)
CALCIUM SERPL-MCNC: 9 MG/DL (ref 8.5–10.1)
CHLORIDE BLD-SCNC: 106 MMOL/L (ref 94–109)
CO2 SERPL-SCNC: 24 MMOL/L (ref 20–32)
CREAT SERPL-MCNC: 0.68 MG/DL (ref 0.52–1.04)
ERYTHROCYTE [DISTWIDTH] IN BLOOD BY AUTOMATED COUNT: 13.3 % (ref 10–15)
GFR SERPL CREATININE-BSD FRML MDRD: >90 ML/MIN/1.73M2
GLUCOSE BLD-MCNC: 102 MG/DL (ref 70–99)
GLUCOSE BLDC GLUCOMTR-MCNC: 115 MG/DL (ref 70–99)
HCT VFR BLD AUTO: 34 % (ref 35–47)
HGB BLD-MCNC: 11.3 G/DL (ref 11.7–15.7)
MAGNESIUM SERPL-MCNC: 2.1 MG/DL (ref 1.6–2.3)
MCH RBC QN AUTO: 30.4 PG (ref 26.5–33)
MCHC RBC AUTO-ENTMCNC: 33.2 G/DL (ref 31.5–36.5)
MCV RBC AUTO: 91 FL (ref 78–100)
PHOSPHATE SERPL-MCNC: 4.2 MG/DL (ref 2.5–4.5)
PLATELET # BLD AUTO: 282 10E3/UL (ref 150–450)
POTASSIUM BLD-SCNC: 3.9 MMOL/L (ref 3.4–5.3)
RBC # BLD AUTO: 3.72 10E6/UL (ref 3.8–5.2)
SODIUM SERPL-SCNC: 138 MMOL/L (ref 133–144)
WBC # BLD AUTO: 5.1 10E3/UL (ref 4–11)

## 2023-01-05 PROCEDURE — 120N000001 HC R&B MED SURG/OB

## 2023-01-05 PROCEDURE — 80048 BASIC METABOLIC PNL TOTAL CA: CPT | Performed by: INTERNAL MEDICINE

## 2023-01-05 PROCEDURE — 250N000013 HC RX MED GY IP 250 OP 250 PS 637: Performed by: INTERNAL MEDICINE

## 2023-01-05 PROCEDURE — 258N000003 HC RX IP 258 OP 636: Performed by: STUDENT IN AN ORGANIZED HEALTH CARE EDUCATION/TRAINING PROGRAM

## 2023-01-05 PROCEDURE — 99232 SBSQ HOSP IP/OBS MODERATE 35: CPT

## 2023-01-05 PROCEDURE — 99233 SBSQ HOSP IP/OBS HIGH 50: CPT | Performed by: REGISTERED NURSE

## 2023-01-05 PROCEDURE — 84100 ASSAY OF PHOSPHORUS: CPT | Performed by: INTERNAL MEDICINE

## 2023-01-05 PROCEDURE — 85014 HEMATOCRIT: CPT | Performed by: INTERNAL MEDICINE

## 2023-01-05 PROCEDURE — 250N000011 HC RX IP 250 OP 636: Performed by: INTERNAL MEDICINE

## 2023-01-05 PROCEDURE — 250N000009 HC RX 250: Performed by: EMERGENCY MEDICINE

## 2023-01-05 PROCEDURE — 83735 ASSAY OF MAGNESIUM: CPT | Performed by: INTERNAL MEDICINE

## 2023-01-05 PROCEDURE — 36415 COLL VENOUS BLD VENIPUNCTURE: CPT | Performed by: INTERNAL MEDICINE

## 2023-01-05 PROCEDURE — 250N000013 HC RX MED GY IP 250 OP 250 PS 637: Performed by: REGISTERED NURSE

## 2023-01-05 PROCEDURE — G0463 HOSPITAL OUTPT CLINIC VISIT: HCPCS

## 2023-01-05 RX ORDER — BISACODYL 10 MG
10 SUPPOSITORY, RECTAL RECTAL DAILY PRN
Status: DISCONTINUED | OUTPATIENT
Start: 2023-01-05 | End: 2023-02-07 | Stop reason: HOSPADM

## 2023-01-05 RX ORDER — LORAZEPAM 2 MG/ML
2 INJECTION INTRAMUSCULAR
Status: DISCONTINUED | OUTPATIENT
Start: 2023-01-05 | End: 2023-01-10

## 2023-01-05 RX ORDER — LIDOCAINE 40 MG/G
CREAM TOPICAL
Status: DISCONTINUED | OUTPATIENT
Start: 2023-01-05 | End: 2023-02-07 | Stop reason: HOSPADM

## 2023-01-05 RX ORDER — ARIPIPRAZOLE 5 MG/1
5 TABLET ORAL DAILY
Status: DISCONTINUED | OUTPATIENT
Start: 2023-01-05 | End: 2023-01-07

## 2023-01-05 RX ORDER — DROSPIRENONE AND ETHINYL ESTRADIOL 0.02-3(28)
1 KIT ORAL DAILY
Status: DISCONTINUED | OUTPATIENT
Start: 2023-01-05 | End: 2023-02-07 | Stop reason: HOSPADM

## 2023-01-05 RX ORDER — ONDANSETRON 4 MG/1
4 TABLET, ORALLY DISINTEGRATING ORAL EVERY 6 HOURS PRN
Status: DISCONTINUED | OUTPATIENT
Start: 2023-01-05 | End: 2023-02-07 | Stop reason: HOSPADM

## 2023-01-05 RX ORDER — ACETAMINOPHEN 325 MG/1
650 TABLET ORAL EVERY 6 HOURS PRN
Status: DISCONTINUED | OUTPATIENT
Start: 2023-01-05 | End: 2023-01-07

## 2023-01-05 RX ORDER — PANTOPRAZOLE SODIUM 40 MG/1
40 TABLET, DELAYED RELEASE ORAL 2 TIMES DAILY
Status: DISCONTINUED | OUTPATIENT
Start: 2023-01-05 | End: 2023-01-07 | Stop reason: ALTCHOICE

## 2023-01-05 RX ORDER — ARIPIPRAZOLE 10 MG/1
10 TABLET ORAL DAILY
Status: DISCONTINUED | OUTPATIENT
Start: 2023-01-09 | End: 2023-01-07

## 2023-01-05 RX ORDER — ACETAMINOPHEN 650 MG/1
650 SUPPOSITORY RECTAL EVERY 6 HOURS PRN
Status: DISCONTINUED | OUTPATIENT
Start: 2023-01-05 | End: 2023-01-07

## 2023-01-05 RX ORDER — ONDANSETRON 2 MG/ML
4 INJECTION INTRAMUSCULAR; INTRAVENOUS EVERY 6 HOURS PRN
Status: DISCONTINUED | OUTPATIENT
Start: 2023-01-05 | End: 2023-02-07 | Stop reason: HOSPADM

## 2023-01-05 RX ORDER — SILVER SULFADIAZINE 10 MG/G
CREAM TOPICAL DAILY
Status: DISCONTINUED | OUTPATIENT
Start: 2023-01-05 | End: 2023-01-25

## 2023-01-05 RX ORDER — DULOXETIN HYDROCHLORIDE 60 MG/1
60 CAPSULE, DELAYED RELEASE ORAL DAILY
Status: DISCONTINUED | OUTPATIENT
Start: 2023-01-05 | End: 2023-02-07 | Stop reason: HOSPADM

## 2023-01-05 RX ADMIN — LINACLOTIDE 290 MCG: 290 CAPSULE, GELATIN COATED ORAL at 08:32

## 2023-01-05 RX ADMIN — QUETIAPINE 12.5 MG: 25 TABLET, FILM COATED ORAL at 21:15

## 2023-01-05 RX ADMIN — TRAZODONE HYDROCHLORIDE 50 MG: 50 TABLET ORAL at 21:14

## 2023-01-05 RX ADMIN — PANTOPRAZOLE SODIUM 40 MG: 40 TABLET, DELAYED RELEASE ORAL at 08:32

## 2023-01-05 RX ADMIN — ACETAMINOPHEN 650 MG: 325 TABLET, FILM COATED ORAL at 14:32

## 2023-01-05 RX ADMIN — ARIPIPRAZOLE 5 MG: 5 TABLET ORAL at 17:57

## 2023-01-05 RX ADMIN — ONDANSETRON 4 MG: 2 INJECTION INTRAMUSCULAR; INTRAVENOUS at 16:20

## 2023-01-05 RX ADMIN — PANTOPRAZOLE SODIUM 40 MG: 40 TABLET, DELAYED RELEASE ORAL at 21:15

## 2023-01-05 RX ADMIN — SILVER SULFADIAZINE: 10 CREAM TOPICAL at 14:38

## 2023-01-05 RX ADMIN — SODIUM CHLORIDE, POTASSIUM CHLORIDE, SODIUM LACTATE AND CALCIUM CHLORIDE 1000 ML: 600; 310; 30; 20 INJECTION, SOLUTION INTRAVENOUS at 21:06

## 2023-01-05 RX ADMIN — LORAZEPAM 1 MG: 1 TABLET ORAL at 21:13

## 2023-01-05 RX ADMIN — MULTIVITAMIN 15 ML: LIQUID ORAL at 08:32

## 2023-01-05 RX ADMIN — PROMETHAZINE HYDROCHLORIDE 12.5 MG: 25 TABLET ORAL at 20:00

## 2023-01-05 RX ADMIN — DULOXETINE HYDROCHLORIDE 60 MG: 60 CAPSULE, DELAYED RELEASE ORAL at 17:57

## 2023-01-05 RX ADMIN — LINACLOTIDE 290 MCG: 290 CAPSULE, GELATIN COATED ORAL at 01:50

## 2023-01-05 ASSESSMENT — ACTIVITIES OF DAILY LIVING (ADL)
ADLS_ACUITY_SCORE: 35

## 2023-01-05 NOTE — ED NOTES
Hi ED Bed 18 SIMA is boarding status in ED. She is asking for tylenol for headache. Please advise and if you want any orders released while she is boarding please release.     Thank you.   VU Elias  [phone]

## 2023-01-05 NOTE — PROGRESS NOTES
ED 21 CJ  Pt experienced a seizure lasting 3 minutes   Can we have orders for I.V Ativan ?    Please advise  VU Evans  *[phone]

## 2023-01-05 NOTE — CODE/RAPID RESPONSE
Notified patient having rectal pain after having a bowel movement, not relieved by Tylenol. Witch Hazel Tucks pads ordered. Suspect pain related to hemorrhoids vs. Straining pain from constipation.

## 2023-01-05 NOTE — CONSULTS
"      Psychiatry Consultation; Follow up              Reason for Consult, requesting source:    Restarting psychiatric medications    Requesting source: Michoacano Mcpherson    Labs and imaging reviewed.               Interim history:    Thea Castle is a 22 year-old female admitted to the emergency room following an attempted overdose on 30 tablets of gabapentin. Aside from her gabapentin, she states that she has not taken her medications for six days. Patient reports that she feels she needs a medication change. She feels that she is still irritable, lacks interest in things, and has chronic suicidal ideation. States \"If I'm discharged from the hospital, I have an active plan how I will kill myself.\" However, pt feels safe in the hospital. Pt denies active or thoughts of SIB.     She reports that she is concerned that she has chronic suicidal thoughts.         Current Medications:       drospirenone-ethinyl estradiol  1 tablet Oral Daily     linaclotide  290 mcg Oral QAM AC     LORazepam  1 mg Oral At Bedtime     multivitamins w/minerals  15 mL Per Feeding Tube Daily     pantoprazole  40 mg Oral BID     QUEtiapine  12.5 mg Oral At Bedtime     silver sulfADIAZINE   Topical Daily     sodium chloride (PF)  3 mL Intracatheter Q8H     traZODone  50 mg Oral At Bedtime              Family and Social History:   Patient currently lives at home with her mother, father and sister. 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 States that she feels her home life is somewhat dysfunctional, but there is no abuse present and patient feels safe at home.           MSE:   Appearance: appeared younger than stated age and poorly groomed  Attitude:  cooperative  Eye Contact:  fair  Mood:  anxious and \"OK\"  Affect:  : slightly restricted  Speech:  clear, coherent  Psychomotor Behavior:  fidgeting, somewhat restless  Muscle strength and tone: Appears underweight  Thought Process:  logical  Associations:  no loose " "associations  Thought Content:  no evidence of psychotic thought, plan for suicide present, no auditory hallucinations present and no visual hallucinations present  Insight:  limited  Judgement:  poor  Oriented to:  time, person, and place  Attention Span and Concentration:  fair  Recent and Remote Memory:  fair    Vital signs:      BP: 92/68 Pulse: 82   Resp: 16 SpO2: 98 % O2 Device: None (Room air)     Weight: 54 kg (119 lb)  Estimated body mass index is 20.43 kg/m  as calculated from the following:    Height as of 9/27/21: 1.626 m (5' 4\").    Weight as of this encounter: 54 kg (119 lb).    Qtc: 445 as of 01/03/2023         DSM-5 Diagnosis:   PTSD per report and history  OCD per report and history  Borderline personality disorder per report and history  MDD  Anxiety  Anorexia            Assessment:   Patient assessment reveals chronic suicidal thoughts with a specific plan, were patient to be discharged from the hospital. Continued symptoms of irritation and chronic suicidal thoughts may benefit from increased mood stabilization. Due to patient reporting being without her medications for six days, will restart Abilify at 5 mg daily for four days and then increase to 10 mg daily. Will restart duloxetine (Cymbalta) at 60 mg daily, as opposed to 90 mg daily. There is evidence to support that dosing Cymbalta higher than 60 mg does not have additional benefit to symptom management. Will hold off on restarting gabapentin, as this is a medication that patient used for attempted overdose.           Summary of Recommendations:   1. Restart duloxetine (Cymbalta) 60 mg daily  2. Start Abilify at 5 mg daily for four days; increase to 10 mg daily thereafter  3. Consult psychiatry as needed    RUBIN Nazario, APRN  Psych Consult/[facility]          "
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
## 2023-01-05 NOTE — ED NOTES
Patient reporting rectal pain post BM unrelieved by tylenol, hospitalist notified. Awaiting further orders.

## 2023-01-05 NOTE — ED PROVIDER NOTES
This patient's care was signed out to me by Dr. Garcia.  This patient arrived to the emergency department due to suicide attempt.  She was initially on the list for mental health admission however empath in the mental health unit would not take her because she has a feeding tube.  Therefore she was admitted medically under the care of the hospitalist service after I spoke to Dr. Rubi.     Yluy Thakkar MD  01/04/23 1379
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
## 2023-01-05 NOTE — PROGRESS NOTES
Pt had what appears to be a tonic clonic seizure lasting 3 minutes from 0421 to 0424. Pt did bite tongue and airway was maintained. MD notified. Will continue to monitor.,

## 2023-01-06 LAB
ANION GAP SERPL CALCULATED.3IONS-SCNC: 5 MMOL/L (ref 3–14)
BUN SERPL-MCNC: 10 MG/DL (ref 7–30)
CALCIUM SERPL-MCNC: 8.4 MG/DL (ref 8.5–10.1)
CHLORIDE BLD-SCNC: 105 MMOL/L (ref 94–109)
CO2 SERPL-SCNC: 29 MMOL/L (ref 20–32)
CREAT SERPL-MCNC: 0.81 MG/DL (ref 0.52–1.04)
ERYTHROCYTE [DISTWIDTH] IN BLOOD BY AUTOMATED COUNT: 13.2 % (ref 10–15)
GFR SERPL CREATININE-BSD FRML MDRD: >90 ML/MIN/1.73M2
GLUCOSE BLD-MCNC: 121 MG/DL (ref 70–99)
HCT VFR BLD AUTO: 32.1 % (ref 35–47)
HGB BLD-MCNC: 10.8 G/DL (ref 11.7–15.7)
MAGNESIUM SERPL-MCNC: 2.1 MG/DL (ref 1.6–2.3)
MCH RBC QN AUTO: 30.1 PG (ref 26.5–33)
MCHC RBC AUTO-ENTMCNC: 33.6 G/DL (ref 31.5–36.5)
MCV RBC AUTO: 89 FL (ref 78–100)
PHOSPHATE SERPL-MCNC: 4.7 MG/DL (ref 2.5–4.5)
PLATELET # BLD AUTO: 275 10E3/UL (ref 150–450)
POTASSIUM BLD-SCNC: 4.2 MMOL/L (ref 3.4–5.3)
RBC # BLD AUTO: 3.59 10E6/UL (ref 3.8–5.2)
SODIUM SERPL-SCNC: 139 MMOL/L (ref 133–144)
WBC # BLD AUTO: 5 10E3/UL (ref 4–11)

## 2023-01-06 PROCEDURE — 250N000013 HC RX MED GY IP 250 OP 250 PS 637: Performed by: REGISTERED NURSE

## 2023-01-06 PROCEDURE — 83735 ASSAY OF MAGNESIUM: CPT | Performed by: INTERNAL MEDICINE

## 2023-01-06 PROCEDURE — 85027 COMPLETE CBC AUTOMATED: CPT

## 2023-01-06 PROCEDURE — 250N000013 HC RX MED GY IP 250 OP 250 PS 637: Performed by: STUDENT IN AN ORGANIZED HEALTH CARE EDUCATION/TRAINING PROGRAM

## 2023-01-06 PROCEDURE — 36415 COLL VENOUS BLD VENIPUNCTURE: CPT | Performed by: INTERNAL MEDICINE

## 2023-01-06 PROCEDURE — 250N000013 HC RX MED GY IP 250 OP 250 PS 637: Performed by: HOSPITALIST

## 2023-01-06 PROCEDURE — 250N000013 HC RX MED GY IP 250 OP 250 PS 637: Performed by: INTERNAL MEDICINE

## 2023-01-06 PROCEDURE — 84100 ASSAY OF PHOSPHORUS: CPT | Performed by: INTERNAL MEDICINE

## 2023-01-06 PROCEDURE — 250N000013 HC RX MED GY IP 250 OP 250 PS 637: Performed by: PHYSICIAN ASSISTANT

## 2023-01-06 PROCEDURE — 99232 SBSQ HOSP IP/OBS MODERATE 35: CPT | Performed by: HOSPITALIST

## 2023-01-06 PROCEDURE — 80048 BASIC METABOLIC PNL TOTAL CA: CPT | Performed by: INTERNAL MEDICINE

## 2023-01-06 PROCEDURE — 250N000011 HC RX IP 250 OP 636: Performed by: INTERNAL MEDICINE

## 2023-01-06 PROCEDURE — 120N000001 HC R&B MED SURG/OB

## 2023-01-06 RX ORDER — VITAMIN B COMPLEX
25 TABLET ORAL DAILY
Status: DISCONTINUED | OUTPATIENT
Start: 2023-01-06 | End: 2023-01-07

## 2023-01-06 RX ORDER — MULTIVIT WITH MINERALS/LUTEIN
250 TABLET ORAL DAILY
Status: DISCONTINUED | OUTPATIENT
Start: 2023-01-06 | End: 2023-01-07

## 2023-01-06 RX ADMIN — Medication 0.25 G: at 21:51

## 2023-01-06 RX ADMIN — DULOXETINE HYDROCHLORIDE 60 MG: 60 CAPSULE, DELAYED RELEASE ORAL at 08:22

## 2023-01-06 RX ADMIN — ACETAMINOPHEN 325 MG: 325 TABLET, FILM COATED ORAL at 19:00

## 2023-01-06 RX ADMIN — PANTOPRAZOLE SODIUM 40 MG: 40 TABLET, DELAYED RELEASE ORAL at 20:53

## 2023-01-06 RX ADMIN — ARIPIPRAZOLE 5 MG: 5 TABLET ORAL at 08:21

## 2023-01-06 RX ADMIN — Medication 250 MG: at 10:15

## 2023-01-06 RX ADMIN — BISACODYL 10 MG: 10 SUPPOSITORY RECTAL at 15:12

## 2023-01-06 RX ADMIN — LORAZEPAM 1 MG: 1 TABLET ORAL at 21:50

## 2023-01-06 RX ADMIN — ONDANSETRON 4 MG: 2 INJECTION INTRAMUSCULAR; INTRAVENOUS at 08:31

## 2023-01-06 RX ADMIN — LINACLOTIDE 290 MCG: 290 CAPSULE, GELATIN COATED ORAL at 10:01

## 2023-01-06 RX ADMIN — MULTIVITAMIN 15 ML: LIQUID ORAL at 08:22

## 2023-01-06 RX ADMIN — TRAZODONE HYDROCHLORIDE 50 MG: 50 TABLET ORAL at 21:50

## 2023-01-06 RX ADMIN — SILVER SULFADIAZINE: 10 CREAM TOPICAL at 10:01

## 2023-01-06 RX ADMIN — ONDANSETRON 4 MG: 2 INJECTION INTRAMUSCULAR; INTRAVENOUS at 21:59

## 2023-01-06 RX ADMIN — QUETIAPINE 12.5 MG: 25 TABLET, FILM COATED ORAL at 21:50

## 2023-01-06 RX ADMIN — Medication 25 MCG: at 10:15

## 2023-01-06 RX ADMIN — PANTOPRAZOLE SODIUM 40 MG: 40 TABLET, DELAYED RELEASE ORAL at 10:01

## 2023-01-06 RX ADMIN — MELATONIN 5 MG TABLET 5 MG: at 01:17

## 2023-01-06 RX ADMIN — ONDANSETRON 4 MG: 2 INJECTION INTRAMUSCULAR; INTRAVENOUS at 15:12

## 2023-01-06 RX ADMIN — ACETAMINOPHEN 650 MG: 325 TABLET, FILM COATED ORAL at 10:06

## 2023-01-06 ASSESSMENT — ACTIVITIES OF DAILY LIVING (ADL)
ADLS_ACUITY_SCORE: 35

## 2023-01-06 NOTE — ED NOTES
Pt had call light on, stating that her mother is asking for information but pt doesn't feel comfortable having information given to mother. Informed pt that no information needs to be relayed to mother if she doesn't want any but then stated that maybe the DEC  could give minor information as the mother will get upset if there is no information given.

## 2023-01-06 NOTE — ED NOTES
Per report from previous RN, NG TPN rate should change from 20mL/hour to 30mL/hour at midnight, and increase again to 40mL/hour at noon. Rate is now at 30mL/hour and pt is tolerating well. Goal is for pt to receive TPN at 40mL/hour.

## 2023-01-06 NOTE — ED NOTES
"Triage & Transition Services, Extended Care     Therapy Progress Note    Patient: Thea goes by \"Thea,\" uses she/her pronouns  Date of Service: January 6, 2023  Site of Service: [facility] ED  Patient was seen virtually (AmWell cart or other teleconferencing device).     Presenting problem:   Thea is followed related to Long wait time for admission: awaiting admission to medical floor.  Please see initial DEC/[facility] Crisis Assessment completed by Kenyon Jose on 1/3/23 for complete assessment information. Notable concerns include suicide attempt by overdose on gabapentin.     Individuals Present: Thea & Mavis Salazar    Session start: 2:15pm  Session end: 2:37pm  Session duration in minutes: 22 minutes  Session number: 2  Anticipated number of sessions or this episode of care: 2-4  CPT utilized: 66197 - Psychotherapy (with patient) - 30 (16-37*) min    Current Presentation:     Clinician received report from clinician Dmitriy SANDY who worked with patient yesterday.  Received update that patient is under a 72HH and is willing to stay.  A petition for commitment has not been initiated.  72HH expires on 1/9/23 at 3:23pm    Patient reports feeling sad and lonely.  She reports having trouble setting boundaries with her mother.  Patient states her mother texted her stating that she needs an MD to call her.  Patient shares that there are things she does not want shared with her mom and others that she is okay with sharing.  Patient states her mother would be upset that she has a feeding tube so she does not want that shared with her mom and wants to keep it that way.  Patient states she is comfortable with her mother knowing she is still here and generally speaking getting the help that she needs.  Patient continues to reports suicidal ideation with plan to overdose and identifies her ideation is worse following the exchange with her mother.  Patient shares that her mother did not want her to get tube feedings at home.  " "Patient states it is important for her to get the tube feedings and her providers have indicated so.  Patient states her mother has threatened calling a  and sending patient to group home.  \"I'm not willing to go to a group home.  I am capable of a lot.\"  Patient states her mother thinks she likes the attention she receives at the hospital and that is not true.  She reports mother tells her a lot of lies.  Patient states she does not want to die a painful death and would rather fall asleep peacefully. Patient reports a friend came to visit today and that was positive.  She reports having access to her phone and playing games on it.  Discussed different apps including calm, headspace, and happify.  Patient states she enjoys listening to Judaism music.       Mental Status Exam:   Appearance: awake, alert  Attitude: cooperative  Eye Contact: good  Mood: sad  and depressed  Affect: mood congruent  Speech: clear, coherent  Psychomotor Behavior: no evidence of tardive dyskinesia, dystonia, or tics  Thought Process:  logical  Associations: no loose associations  Thought Content: active suicidal ideation present and plan for suicide present  Insight: good  Judgement: intact  Oriented to: time, person, and place  Attention Span and Concentration: intact  Recent and Remote Memory: intact    Diagnosis:   311 (F32.9) Unspecified Depressive Disorder    300.00 (F41.9) Unspecified Anxiety Disorder  - Rule Out   307.1 Anorexia Nervosa  (F50.01) Restricting type  In partial remission  Severity: Moderate - by history     Therapeutic Intervention(s):   Provided active listening, unconditional positive regard, and validation. Identified stress relief practices. Explored strategies for self-soothing.     Treatment Objective(s) Addressed:   The focus of this session was on rapport building, identifying and practicing coping strategies, identifying additional supports and patient's reported struggles with boundaries for " mother.      Progress Towards Goals:   Patient reports her suicidal ideation is really high today.  She reports it is worse today than yesterday following messages with her mother.  Patient continues to endorse plan to overdose.  Patient states she does not understand why she lets her mother trigger her.     Case Management:   Spoke with patient's mother by phone.  Patient requested mother be called and reassured that patient is still in the hospital and where she needs to be.  Patient did not want any specific medical information shared with mother, and more specifically states she did not want her mother to know about her feeding tube.  Clinician informed mother patient is still in the ED.   Clinician listened to mother's concerns and provided support.  Mother reports patient is complicated and has neuro, GI issues with her mental health concerns.  Mother states patient wanted a G tube long term when it was not warranted.  Mother states patient needs a diagnosis that warrants it and it is hard to see her pursue it.  She reports sitting in a dietician meeting with patient and patient believed she needed a feeding tube for her diagnosis of gastroparesis.  In the meeting the dietician stated to patient that she had not yet been diagnosed with gastroparesis.  Mother states patient leaps to the worst possible case scenarios.  Mother states when patient had a feeding tube she was eating at the same time.  Patient was reporting feeling sick and induced vomiting.  Mother states they were trying to help get patient off her catheter       General Recommendations:   Continue to monitor for harm. Consider: Be firm but gentle when redirecting and Listen in a neutral, non-judgmental way. Offer reassurance    Plan:   Inpatient Medical with support from psychiatry.  Patient reports suicidal ideation with plan to overdose.  She has reported that she will not act on SI while being in the hospital.  She is under a 72HH which expires  on 1/9/23 at 3:23pm.  Patient states that is agreement with plan for admission to medical.  She expresses uncertainty regarding what she would do if she were not under a hold.  Reviewed willingness for admission and patient states she intends to stay.  Patient notes a preference to work with female assessors.  Clinician reported patient preference to Extended Care team.     Plan for Care reviewed with Assigned Medical Provider? Yes Provider, Dr. Hanna, response: agreement     Mavis Salazar, [facility]  Licensed Mental Health Professional (LMHP), Extended Care  [phone]
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
## 2023-01-06 NOTE — ED PROVIDER NOTES
"Sign Out Note     Pt accepted in sign out from: Dr. Gaming    Briefly pt presented to the ED for: Patient admitted to hospitalist on previous shift.  Patient came in to the ED for overdose of gabapentin.  Has a chronic feeding tube for history of gastroparesis and anorexia.       Plan at time of sign out: continue inpatient care.     Care of patient during my shift: I spoke to the DEC .   was able to contact the patient's mother, honoring patient's preference to not inform mother about feeding tube.  Mother related that the patient has been trying to get a feeding tube placed even when her diagnosis of gastroparesis had not been confirmed.  Mother is concerned that the patient has seen many different doctors and \"grabs onto any diagnosis.\"     Plan for patient at this time: Continue care with hospitalist team.   MD Cyndi Irizarry, Chelsey Rolle MD  01/06/23 2011    "
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
## 2023-01-07 ENCOUNTER — HOSPITAL ENCOUNTER (INPATIENT)
Dept: NEUROLOGY | Facility: CLINIC | Age: 23
Discharge: HOME | DRG: 918 | End: 2023-01-07
Attending: INTERNAL MEDICINE
Payer: COMMERCIAL

## 2023-01-07 PROCEDURE — 99207 PR NO BILLABLE SERVICE THIS VISIT: CPT | Performed by: HOSPITALIST

## 2023-01-07 PROCEDURE — 120N000001 HC R&B MED SURG/OB

## 2023-01-07 PROCEDURE — 95816 EEG AWAKE AND DROWSY: CPT

## 2023-01-07 PROCEDURE — 250N000013 HC RX MED GY IP 250 OP 250 PS 637: Performed by: HOSPITALIST

## 2023-01-07 PROCEDURE — 99232 SBSQ HOSP IP/OBS MODERATE 35: CPT | Performed by: HOSPITALIST

## 2023-01-07 PROCEDURE — 250N000013 HC RX MED GY IP 250 OP 250 PS 637: Performed by: INTERNAL MEDICINE

## 2023-01-07 PROCEDURE — 250N000011 HC RX IP 250 OP 636: Performed by: INTERNAL MEDICINE

## 2023-01-07 PROCEDURE — 250N000011 HC RX IP 250 OP 636: Performed by: HOSPITALIST

## 2023-01-07 PROCEDURE — 250N000013 HC RX MED GY IP 250 OP 250 PS 637: Performed by: REGISTERED NURSE

## 2023-01-07 RX ORDER — LORAZEPAM 1 MG/1
1 TABLET ORAL AT BEDTIME
Status: DISCONTINUED | OUTPATIENT
Start: 2023-01-07 | End: 2023-02-07 | Stop reason: HOSPADM

## 2023-01-07 RX ORDER — METOCLOPRAMIDE HYDROCHLORIDE 5 MG/ML
10 INJECTION INTRAMUSCULAR; INTRAVENOUS EVERY 6 HOURS
Status: DISCONTINUED | OUTPATIENT
Start: 2023-01-07 | End: 2023-01-08

## 2023-01-07 RX ORDER — PROMETHAZINE HYDROCHLORIDE 25 MG/1
12.5 TABLET ORAL EVERY 6 HOURS PRN
Status: DISCONTINUED | OUTPATIENT
Start: 2023-01-07 | End: 2023-01-16

## 2023-01-07 RX ORDER — ARIPIPRAZOLE 5 MG/1
5 TABLET ORAL DAILY
Status: COMPLETED | OUTPATIENT
Start: 2023-01-08 | End: 2023-01-08

## 2023-01-07 RX ORDER — HYDROXYZINE HYDROCHLORIDE 25 MG/1
25 TABLET, FILM COATED ORAL
Status: DISCONTINUED | OUTPATIENT
Start: 2023-01-07 | End: 2023-01-24

## 2023-01-07 RX ORDER — VITAMIN B COMPLEX
25 TABLET ORAL DAILY
Status: DISCONTINUED | OUTPATIENT
Start: 2023-01-08 | End: 2023-02-07 | Stop reason: HOSPADM

## 2023-01-07 RX ORDER — SENNOSIDES 8.6 MG
1-2 TABLET ORAL 2 TIMES DAILY PRN
Status: DISCONTINUED | OUTPATIENT
Start: 2023-01-07 | End: 2023-01-08

## 2023-01-07 RX ORDER — ARIPIPRAZOLE 10 MG/1
10 TABLET ORAL DAILY
Status: DISCONTINUED | OUTPATIENT
Start: 2023-01-09 | End: 2023-01-16

## 2023-01-07 RX ORDER — ACETAMINOPHEN 325 MG/1
650 TABLET ORAL EVERY 6 HOURS PRN
Status: DISCONTINUED | OUTPATIENT
Start: 2023-01-07 | End: 2023-02-07 | Stop reason: HOSPADM

## 2023-01-07 RX ORDER — HYDROXYZINE HCL 10 MG/5 ML
25 SOLUTION, ORAL ORAL
Status: DISCONTINUED | OUTPATIENT
Start: 2023-01-07 | End: 2023-01-24

## 2023-01-07 RX ORDER — ACETAMINOPHEN 650 MG/1
650 SUPPOSITORY RECTAL EVERY 6 HOURS PRN
Status: DISCONTINUED | OUTPATIENT
Start: 2023-01-07 | End: 2023-02-07 | Stop reason: HOSPADM

## 2023-01-07 RX ORDER — MULTIVIT WITH MINERALS/LUTEIN
250 TABLET ORAL DAILY
Status: DISCONTINUED | OUTPATIENT
Start: 2023-01-08 | End: 2023-02-07 | Stop reason: HOSPADM

## 2023-01-07 RX ORDER — TRAZODONE HYDROCHLORIDE 50 MG/1
50 TABLET, FILM COATED ORAL AT BEDTIME
Status: DISCONTINUED | OUTPATIENT
Start: 2023-01-07 | End: 2023-02-07 | Stop reason: HOSPADM

## 2023-01-07 RX ADMIN — Medication 40 MG: at 16:46

## 2023-01-07 RX ADMIN — LORAZEPAM 1 MG: 1 TABLET ORAL at 21:00

## 2023-01-07 RX ADMIN — Medication 12.5 MG: at 16:46

## 2023-01-07 RX ADMIN — Medication 12.5 MG: at 21:00

## 2023-01-07 RX ADMIN — ONDANSETRON 4 MG: 2 INJECTION INTRAMUSCULAR; INTRAVENOUS at 08:06

## 2023-01-07 RX ADMIN — POLYETHYLENE GLYCOL 3350 17 G: 17 POWDER, FOR SOLUTION ORAL at 10:31

## 2023-01-07 RX ADMIN — METOCLOPRAMIDE 10 MG: 5 INJECTION, SOLUTION INTRAMUSCULAR; INTRAVENOUS at 20:52

## 2023-01-07 RX ADMIN — SILVER SULFADIAZINE: 10 CREAM TOPICAL at 10:34

## 2023-01-07 RX ADMIN — Medication 0.25 G: at 21:13

## 2023-01-07 RX ADMIN — Medication 250 MG: at 10:33

## 2023-01-07 RX ADMIN — ARIPIPRAZOLE 5 MG: 5 TABLET ORAL at 10:32

## 2023-01-07 RX ADMIN — DULOXETINE HYDROCHLORIDE 60 MG: 60 CAPSULE, DELAYED RELEASE ORAL at 10:32

## 2023-01-07 RX ADMIN — Medication 25 MCG: at 10:32

## 2023-01-07 RX ADMIN — Medication 0.25 G: at 10:33

## 2023-01-07 RX ADMIN — MELATONIN TAB 3 MG 5 MG: 3 TAB at 20:58

## 2023-01-07 RX ADMIN — PANTOPRAZOLE SODIUM 40 MG: 40 TABLET, DELAYED RELEASE ORAL at 10:34

## 2023-01-07 RX ADMIN — MELATONIN 5 MG TABLET 5 MG: at 01:14

## 2023-01-07 RX ADMIN — Medication 0.25 G: at 16:46

## 2023-01-07 RX ADMIN — TRAZODONE HYDROCHLORIDE 50 MG: 50 TABLET ORAL at 21:00

## 2023-01-07 RX ADMIN — ACETAMINOPHEN 650 MG: 325 TABLET, FILM COATED ORAL at 20:58

## 2023-01-07 RX ADMIN — PROMETHAZINE HYDROCHLORIDE 12.5 MG: 25 TABLET ORAL at 10:33

## 2023-01-07 RX ADMIN — ONDANSETRON 4 MG: 2 INJECTION INTRAMUSCULAR; INTRAVENOUS at 14:45

## 2023-01-07 RX ADMIN — ACETAMINOPHEN 650 MG: 325 TABLET, FILM COATED ORAL at 10:32

## 2023-01-07 ASSESSMENT — ACTIVITIES OF DAILY LIVING (ADL)
ADLS_ACUITY_SCORE: 25
ADLS_ACUITY_SCORE: 26
ADLS_ACUITY_SCORE: 25
ADLS_ACUITY_SCORE: 26
ADLS_ACUITY_SCORE: 25
ADLS_ACUITY_SCORE: 26
ADLS_ACUITY_SCORE: 35
ADLS_ACUITY_SCORE: 26
ADLS_ACUITY_SCORE: 26
ADLS_ACUITY_SCORE: 35
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
## 2023-01-07 NOTE — PROVIDER NOTIFICATION
Pt complaining of nausea, abdominal pain and constipation all day, miralax given. Pt requested additional meds and says the suppositories do not work for her. Paged Dr. Mathews, order for Senokot received.

## 2023-01-07 NOTE — PROGRESS NOTES
RECEIVING UNIT ED HANDOFF REVIEW    ED Nurse Handoff Report was reviewed by: Kiara Becerra RN on January 7, 2023 at 2:48 AM
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
## 2023-01-07 NOTE — PROVIDER NOTIFICATION
"Per nursing assistant, pt had a 30 second episode of shaking and unresponsiveness, vitals stable immediately after episode. Pt was alert but slow to follow commands, told writer she is usually \"confused for 20-30 minutes after episodes. Notified hospitalist and neurology.   "

## 2023-01-07 NOTE — PROGRESS NOTES
"SPIRITUAL HEALTH SERVICES Progress Note  [facility]    Saw pt Thea Castle per admission request. Pt reflected on her recent suicide attempt and past hospital experiences, as well as her support system, nadeen, and coping strategies. Pt is familiar with Spiritual Health Services and requested prayer, a prayer shawl, and tactile stones, which I provided.      Patient/Family Understanding of Illness and Goals of Care    - Pt shared that she \"overdosed on gabapentin.\" She stated, \"I had an argument with my mom, and that was kind of the straw that broke the camel's back for me to want to just be done.\" Pt shared that she has had previous admissions to [facility].    - Pt noted that she has had \"a few seizures\" in the hospital.   - In prayer together, pt stated that her goal is for \"[her] body and brain to heal together.\"      Distress and Loss    - Relational: Pt shared that she has a difficult relationship with her mom, as she feels her mom \"is in denial about [her] illness.\"   - Spiritual: Pt stated that she has been \"angry at God\" for allowing her to continue to be sick. She also noted that her illness and hospitalizations have made it difficult for her to engage at [employer].    Strengths, Coping, and Resources - Pt shared that she feels supported by her friends, and expects a friend to come and visit her today. Pt also noted feeling comforted by \"soft things\" and requested a prayer shawl. Pt also requested a tactile stone, which I provided along with '[employer]' deidra for additional tactile options.      Meaning, Beliefs, and Spirituality - Pt is Lutheran and attends [employer], where she has a \"great mentor.\" She requested prayer together, specifically asking for healing.       Plan of Care - Chaplains to follow up with pt as requested. Spiritual Health remains available for spiritual and emotional support as needed/requested, please consult should any immediate needs " arise.    Sarah Johanson  Chaplain Resident  Spiritual Health Services  Pager: [phone]     [facility] available 24/7 for emergent requests/referrals, either by having the on-call  paged or by entering an ASAP/STAT consult in Epic (this will also page the on-call ).

## 2023-01-07 NOTE — PROGRESS NOTES
Charge RN called to pt room. 60 second episode of shaking with eyes closed. No apnea or desaturation of oxygen levels. No loss of bowel or bladder control. Pt alert after episode. Bedside RN assessing pt and need to page neurology. Awaiting EEG to be started.

## 2023-01-08 LAB
ANION GAP SERPL CALCULATED.3IONS-SCNC: 7 MMOL/L (ref 3–14)
BUN SERPL-MCNC: 10 MG/DL (ref 7–30)
CALCIUM SERPL-MCNC: 8.7 MG/DL (ref 8.5–10.1)
CHLORIDE BLD-SCNC: 102 MMOL/L (ref 94–109)
CO2 SERPL-SCNC: 28 MMOL/L (ref 20–32)
CREAT SERPL-MCNC: 0.69 MG/DL (ref 0.52–1.04)
ERYTHROCYTE [DISTWIDTH] IN BLOOD BY AUTOMATED COUNT: 13.1 % (ref 10–15)
GFR SERPL CREATININE-BSD FRML MDRD: >90 ML/MIN/1.73M2
GLUCOSE BLD-MCNC: 98 MG/DL (ref 70–99)
GLUCOSE BLDC GLUCOMTR-MCNC: 104 MG/DL (ref 70–99)
HCT VFR BLD AUTO: 34.3 % (ref 35–47)
HGB BLD-MCNC: 11.4 G/DL (ref 11.7–15.7)
MAGNESIUM SERPL-MCNC: 2 MG/DL (ref 1.6–2.3)
MCH RBC QN AUTO: 29.9 PG (ref 26.5–33)
MCHC RBC AUTO-ENTMCNC: 33.2 G/DL (ref 31.5–36.5)
MCV RBC AUTO: 90 FL (ref 78–100)
PLATELET # BLD AUTO: 308 10E3/UL (ref 150–450)
POTASSIUM BLD-SCNC: 3.7 MMOL/L (ref 3.4–5.3)
RBC # BLD AUTO: 3.81 10E6/UL (ref 3.8–5.2)
SODIUM SERPL-SCNC: 137 MMOL/L (ref 133–144)
WBC # BLD AUTO: 8.1 10E3/UL (ref 4–11)

## 2023-01-08 PROCEDURE — 120N000001 HC R&B MED SURG/OB

## 2023-01-08 PROCEDURE — 85027 COMPLETE CBC AUTOMATED: CPT | Performed by: NURSE PRACTITIONER

## 2023-01-08 PROCEDURE — 36415 COLL VENOUS BLD VENIPUNCTURE: CPT | Performed by: NURSE PRACTITIONER

## 2023-01-08 PROCEDURE — 250N000013 HC RX MED GY IP 250 OP 250 PS 637: Performed by: INTERNAL MEDICINE

## 2023-01-08 PROCEDURE — 250N000013 HC RX MED GY IP 250 OP 250 PS 637: Performed by: HOSPITALIST

## 2023-01-08 PROCEDURE — 93005 ELECTROCARDIOGRAM TRACING: CPT

## 2023-01-08 PROCEDURE — 80048 BASIC METABOLIC PNL TOTAL CA: CPT | Performed by: NURSE PRACTITIONER

## 2023-01-08 PROCEDURE — 250N000011 HC RX IP 250 OP 636: Performed by: HOSPITALIST

## 2023-01-08 PROCEDURE — 250N000013 HC RX MED GY IP 250 OP 250 PS 637: Performed by: REGISTERED NURSE

## 2023-01-08 PROCEDURE — 250N000011 HC RX IP 250 OP 636: Performed by: INTERNAL MEDICINE

## 2023-01-08 PROCEDURE — 99231 SBSQ HOSP IP/OBS SF/LOW 25: CPT | Performed by: NURSE PRACTITIONER

## 2023-01-08 PROCEDURE — 83735 ASSAY OF MAGNESIUM: CPT | Performed by: NURSE PRACTITIONER

## 2023-01-08 PROCEDURE — 93010 ELECTROCARDIOGRAM REPORT: CPT | Performed by: INTERNAL MEDICINE

## 2023-01-08 PROCEDURE — 99232 SBSQ HOSP IP/OBS MODERATE 35: CPT | Performed by: HOSPITALIST

## 2023-01-08 RX ORDER — AMOXICILLIN 250 MG
1 CAPSULE ORAL 2 TIMES DAILY
Status: DISCONTINUED | OUTPATIENT
Start: 2023-01-08 | End: 2023-02-07 | Stop reason: HOSPADM

## 2023-01-08 RX ORDER — METOCLOPRAMIDE 5 MG/1
5 TABLET ORAL 4 TIMES DAILY
Status: DISCONTINUED | OUTPATIENT
Start: 2023-01-08 | End: 2023-01-08

## 2023-01-08 RX ADMIN — ACETAMINOPHEN 650 MG: 325 TABLET, FILM COATED ORAL at 04:23

## 2023-01-08 RX ADMIN — ONDANSETRON 4 MG: 2 INJECTION INTRAMUSCULAR; INTRAVENOUS at 17:06

## 2023-01-08 RX ADMIN — Medication 0.25 G: at 20:58

## 2023-01-08 RX ADMIN — DULOXETINE HYDROCHLORIDE 60 MG: 60 CAPSULE, DELAYED RELEASE ORAL at 07:59

## 2023-01-08 RX ADMIN — Medication 12.5 MG: at 21:00

## 2023-01-08 RX ADMIN — LORAZEPAM 1 MG: 1 TABLET ORAL at 21:00

## 2023-01-08 RX ADMIN — LINACLOTIDE 290 MCG: 290 CAPSULE, GELATIN COATED ORAL at 07:59

## 2023-01-08 RX ADMIN — Medication 0.25 G: at 08:00

## 2023-01-08 RX ADMIN — METOCLOPRAMIDE 5 MG: 5 TABLET ORAL at 14:08

## 2023-01-08 RX ADMIN — POLYETHYLENE GLYCOL 3350 17 G: 17 POWDER, FOR SOLUTION ORAL at 21:10

## 2023-01-08 RX ADMIN — Medication 250 MG: at 07:59

## 2023-01-08 RX ADMIN — Medication 40 MG: at 17:05

## 2023-01-08 RX ADMIN — METOCLOPRAMIDE 10 MG: 5 INJECTION, SOLUTION INTRAMUSCULAR; INTRAVENOUS at 09:43

## 2023-01-08 RX ADMIN — SILVER SULFADIAZINE: 10 CREAM TOPICAL at 09:43

## 2023-01-08 RX ADMIN — METOCLOPRAMIDE 10 MG: 5 INJECTION, SOLUTION INTRAMUSCULAR; INTRAVENOUS at 04:14

## 2023-01-08 RX ADMIN — MULTIVITAMIN 15 ML: LIQUID ORAL at 08:00

## 2023-01-08 RX ADMIN — MELATONIN TAB 3 MG 5 MG: 3 TAB at 21:10

## 2023-01-08 RX ADMIN — Medication 0.25 G: at 14:08

## 2023-01-08 RX ADMIN — LORAZEPAM 2 MG: 2 INJECTION INTRAMUSCULAR; INTRAVENOUS at 04:09

## 2023-01-08 RX ADMIN — TRAZODONE HYDROCHLORIDE 50 MG: 50 TABLET ORAL at 21:00

## 2023-01-08 RX ADMIN — ARIPIPRAZOLE 5 MG: 5 TABLET ORAL at 07:59

## 2023-01-08 RX ADMIN — SENNOSIDES AND DOCUSATE SODIUM 1 TABLET: 8.6; 5 TABLET ORAL at 20:59

## 2023-01-08 RX ADMIN — Medication 40 MG: at 08:00

## 2023-01-08 RX ADMIN — Medication 25 MCG: at 07:59

## 2023-01-08 ASSESSMENT — ACTIVITIES OF DAILY LIVING (ADL)
ADLS_ACUITY_SCORE: 26

## 2023-01-08 NOTE — PROGRESS NOTES
Tap water enema given @ 4:30    ADDENDUM @ 5:20   Pt not passing any stool or liquid from enema---Dr. Mathews notified--will repeat enema

## 2023-01-08 NOTE — PROGRESS NOTES
Received a page regarding persistent nausea and and abdominal pain.  Held tube feeds and started Reglan.
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
## 2023-01-08 NOTE — PLAN OF CARE
A&Ox3, witnessed seizure at 0400, ativan given x1. Not always following commands, slow to respond. VSS, tylenol given for headache. Smith intact, no BM. Sitter at bedside. Continue plan of care.
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
## 2023-01-08 NOTE — PROGRESS NOTES
"Triage & Transition Services, Extended Care     Therapy Progress Note    Patient: Thea goes by \"Thea,\" uses she/her pronouns  Date of Service: January 8, 2023  Site of Service:  EEG unit  Patient was seen virtually (Novap[employer] cart or other teleconferencing device).     Presenting problem:   Thea is followed related to Complex Care Plan which indicates pt unable to move to in mental health unit as feeding tube meets exclusionary requirements. Please see initial DEC/[facility] Crisis Assessment completed by Kenyon Villanueva on 01/03/23 for complete assessment information. Notable concerns include suicide attempt via overdose of gabapentin..     Individuals Present: Thea & Agnieszka Nuno [facility]    Session start: 1130  Session end: 1212  Session duration in minutes: 42  Session number: 2  Anticipated number of sessions or this episode of care: 1-4  CPT utilized: 56590 - Psychotherapy (with patient) - 45 (38-52*) min    Current Presentation:   Pt is sitting up in bed, cooperative and agreeable to meeting with writer.  Reports she continues to be in a 'dark place'.  Pt reports conflicted relationship with mother has been most significant trigger, states mother's statements about 'wasting life god gave you' has been devastating. Pt reports having someone who is suppose to know you so well (mother) make this statement makes her believe she may have wasted her life.  Discussed pt's nadeen and beliefs around who decides in the end what someone did with their life, pt reports God does.  Pt does believe God is forgiving and supportive of those in need.  Pt is able to identify future goals including completing college and getting her degree.  Pt does want to work with children in ED setting.  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  Pt reports parents have access to my chart, email, bank account, my location on her phone.  Discussed creating boundaries by changing passwords, having discussions about " limited access.  Pt reports she feels anxious about doing this, admits fearing her parents will withdraw their support of her.  Continues to endorse high levels of SI with plan to overdose on medications.  Rates SI at an 8 on scale of 0-10.  Pt would like to see psychiatrist again to discuss medications, reports there had been discussion about changing a medication.  Pt reports taking medications a prescribed but having several episodes of AM vomiting, often vomiting medications.  Pt did ask how long she needed to stay in the hospital, discussed need for pt to be safe and supported due to continued SI with plan.       Mental Status Exam:   Appearance: awake, alert and disheveled   Attitude: cooperative  Eye Contact: good  Mood: anxious and depressed  Affect: mood congruent  Speech: clear, coherent  Psychomotor Behavior: no evidence of tardive dyskinesia, dystonia, or tics  Thought Process:  logical  Associations: no loose associations  Thought Content: active suicidal ideation present and plan for suicide present  Insight: fair  Judgement: fair  Oriented to: time, person, and place  Attention Span and Concentration: intact  Recent and Remote Memory: intact    Diagnosis:   311 (F32.9) Unspecified Depressive Disorder    300.00 (F41.9) Unspecified Anxiety Disorder  - Rule Out   307.1 Anorexia Nervosa  (F50.01) Restricting type  In partial remission  Severity: Moderate - by history     Therapeutic Intervention(s):   Provided active listening, unconditional positive regard, and validation. Engaged in safety planning.  Engaged in cognitive restructuring/ reframing, looked at common cognitive distortions and challenged negative thoughts. Worked on relapse prevention planning (review of stressors, early warning signs, written plan to respond to signs, and rehearse plan).    Treatment Objective(s) Addressed:   The focus of this session was on rapport building, orienting the patient to therapy, processing feelings related to  conflicted relationship with mother, , identifying an appropriate aftercare plan and building self-esteem .     Progress Towards Goals:   Patient reports stable symptoms. Patient is making progress towards treatment goals as evidenced by engaging in video visits with extended care, identifying barriers to independence .     General Recommendations:   Continue to monitor for harm. Consider: Use a positive, direct and calm approach. Pt's tend to match the energy/mood of the staff. Keep focus positive and upbeat, Be firm but gentle when redirecting and Listen in a neutral, non-judgmental way. Offer reassurance    Plan:   Inpatient Medical with support from psychiatry.  Pt continues to endorse SI. Pt reports she will not act on SI while being in the hospital because she does not have the means. Pt reports she does not have any plans to use any other medical equipment to harm herself because she does not want it to hurt if she attempts suicide. Pt expresses feeling sad today and having a difficult time identifying reasons for living. Pt is currently at elevated risk of harm to self.      Pt is unable to admit to IP MH unit or [facility] due to exclusionary criteria: medical needs with feeding tube. Pt plans to admit medically with psych support.     Plan for Care reviewed with Assigned Medical Provider? Yes. Provider,hospitalist Dr Mathews , response: supports plan.  Pt continues to have episodes seizure like activity.  Medical work ups have been unremarkable.  Maybe pseudoseizure activity.  Team will continue to monitor.      Agnieszka Reina [facility]   Licensed Mental Health Professional (LMHP), [facility]  [phone]

## 2023-01-08 NOTE — PROGRESS NOTES
"    Gastroenterology Follow Up Note    Thea Castle MRN#  [id number]   Age:  22 year old YOB: 2000    Date of Admission:  1/3/2023     Subjective   Patient noted worsening of her abdominal pain and distention yesterday evening prompting tube feeds to be held.  Today patient reports rema-umbilicus abdominal discomfort and bloating.  Denies any nausea, emesis, or worsening heartburn.  Notes no bowel movements in past 3 days, and only once in past week.  She feels pain likely due to constipation.  Otherwise remains afebrile and hemodynamically stable.       MEDICATIONS & ALLERGIES   Current medication list reviewed.      Allergies   Allergen Reactions     Penicillins Unknown     Mother unsure if patient or sister had a reaction. Mother reports she \"didn't think it was anaphylactic\".     Other Food Allergy GI Disturbance     Cilantro          OBJECTIVE   Vitals /73 (BP Location: Right arm)   Pulse 71   Temp 98.8  F (37.1  C) (Oral)   Resp 14   Wt 54.3 kg (119 lb 11.4 oz)   SpO2 97%   BMI 20.55 kg/m          General:   Thin young CF in NAD.   HEENT:   NC/AT. MMM. NG in place, clamped.   Lungs:  No respiratory distress.   Heart:  RRR. +S1, S2.    Abdomen:   Soft. Minimal distention. Moderate tenderness diffusely without rebound or guarding.    Ext/MS:   No cyanosis or erythema.    Neuro:   No focal deficits noted.    Psych:  Deferred.   Skin:  No obvious rashes or lesions noted.         LABORATORY AND IMAGING    ELECTROLYTE PANEL   Recent Labs   Lab Test 01/06/23  0625 01/05/23  0643 01/04/23  1309   POTASSIUM 4.2 3.9 3.8   BUN 10 9 8      HEMATOLOGY PANEL   Recent Labs   Lab Test 01/06/23  0625 01/05/23  0643 01/03/23  1427   WBC 5.0 5.1 6.2   MCV 89 91 89      LIVER AND PANCREAS PANEL   Recent Labs   Lab Test 01/03/23  1427 04/14/21  0727 08/13/20  0728   ALBUMIN 3.4 3.0* 3.7      I have reviewed the current diagnostic and laboratory tests.     Assessment / Recommendations: "     Assessment:  1. Suicidal attempt with ingestion of 20-30 tablets of gabapentin.  2. Chronic constipation secondary to slow transit and pelvic floor dysfunction.  Managed with Linzess, Motegrity, and Colace at home.  Also takes as needed Bisacodyl or enemas for breakthrough symptoms.  No bowel movements in past 3 days, and only one in past week.  Patient to get tap water enema x1 this afternoon.  Suspect acute worsening of constipation likely a side effect of gabapentin overdose.  Patient without any other worrisome features to suggest acute obstruction or ileus on exam.  3. Malnutrition.  Secondary to severe GI symptoms from delayed motility.  On NG tube feeds since last month.  Held yesterday evening after worsening of abdominal pain.    Recommendations:  1. Agree with tap water enema administration today.  2. Continue home regimen of Linzess 290 mcg and Motegrity 2 mg daily.  3. If constipation persists, would give as needed doses of Bisacodyl.  4. Once abdominal pain improves, resume tube feeds and slowly advance to goal rate as tolerated.  5. Management of gabapentin overdose as per primary team.  6. Outpatient GI/[facility] clinic follow up discussed with patient.       Total time spent in chart review, direct medical discussion, examination, and documentation was 20 minutes.    Shawn Travis MD  [facility]  [phone]  I appreciate the opportunity to participate in the care of this patient.   Please feel free to call me with any questions or concerns.

## 2023-01-09 ENCOUNTER — APPOINTMENT (OUTPATIENT)
Dept: GENERAL RADIOLOGY | Facility: CLINIC | Age: 23
DRG: 918 | End: 2023-01-09
Attending: NURSE PRACTITIONER
Payer: COMMERCIAL

## 2023-01-09 LAB
ANION GAP SERPL CALCULATED.3IONS-SCNC: 6 MMOL/L (ref 3–14)
BUN SERPL-MCNC: 10 MG/DL (ref 7–30)
CALCIUM SERPL-MCNC: 9 MG/DL (ref 8.5–10.1)
CHLORIDE BLD-SCNC: 103 MMOL/L (ref 94–109)
CO2 SERPL-SCNC: 28 MMOL/L (ref 20–32)
CREAT SERPL-MCNC: 0.72 MG/DL (ref 0.52–1.04)
GFR SERPL CREATININE-BSD FRML MDRD: >90 ML/MIN/1.73M2
GLUCOSE BLD-MCNC: 117 MG/DL (ref 70–99)
GLUCOSE BLDC GLUCOMTR-MCNC: 177 MG/DL (ref 70–99)
MAGNESIUM SERPL-MCNC: 2.1 MG/DL (ref 1.6–2.3)
PHOSPHATE SERPL-MCNC: 3.8 MG/DL (ref 2.5–4.5)
POTASSIUM BLD-SCNC: 4.2 MMOL/L (ref 3.4–5.3)
SODIUM SERPL-SCNC: 137 MMOL/L (ref 133–144)

## 2023-01-09 PROCEDURE — 99233 SBSQ HOSP IP/OBS HIGH 50: CPT | Performed by: HOSPITALIST

## 2023-01-09 PROCEDURE — 36415 COLL VENOUS BLD VENIPUNCTURE: CPT | Performed by: INTERNAL MEDICINE

## 2023-01-09 PROCEDURE — 250N000013 HC RX MED GY IP 250 OP 250 PS 637: Performed by: HOSPITALIST

## 2023-01-09 PROCEDURE — 250N000011 HC RX IP 250 OP 636: Performed by: INTERNAL MEDICINE

## 2023-01-09 PROCEDURE — 250N000013 HC RX MED GY IP 250 OP 250 PS 637: Performed by: REGISTERED NURSE

## 2023-01-09 PROCEDURE — 250N000013 HC RX MED GY IP 250 OP 250 PS 637: Performed by: INTERNAL MEDICINE

## 2023-01-09 PROCEDURE — 84100 ASSAY OF PHOSPHORUS: CPT | Performed by: INTERNAL MEDICINE

## 2023-01-09 PROCEDURE — 74018 RADEX ABDOMEN 1 VIEW: CPT

## 2023-01-09 PROCEDURE — 120N000001 HC R&B MED SURG/OB

## 2023-01-09 PROCEDURE — 82947 ASSAY GLUCOSE BLOOD QUANT: CPT | Performed by: INTERNAL MEDICINE

## 2023-01-09 PROCEDURE — 83735 ASSAY OF MAGNESIUM: CPT | Performed by: INTERNAL MEDICINE

## 2023-01-09 RX ADMIN — ONDANSETRON 4 MG: 4 TABLET, ORALLY DISINTEGRATING ORAL at 21:28

## 2023-01-09 RX ADMIN — Medication 40 MG: at 16:03

## 2023-01-09 RX ADMIN — Medication 12.5 MG: at 21:26

## 2023-01-09 RX ADMIN — Medication 25 MCG: at 08:08

## 2023-01-09 RX ADMIN — MELATONIN TAB 3 MG 5 MG: 3 TAB at 21:25

## 2023-01-09 RX ADMIN — ACETAMINOPHEN 650 MG: 325 TABLET, FILM COATED ORAL at 20:01

## 2023-01-09 RX ADMIN — SENNOSIDES AND DOCUSATE SODIUM 1 TABLET: 8.6; 5 TABLET ORAL at 21:25

## 2023-01-09 RX ADMIN — LORAZEPAM 2 MG: 2 INJECTION INTRAMUSCULAR; INTRAVENOUS at 02:11

## 2023-01-09 RX ADMIN — ONDANSETRON 4 MG: 2 INJECTION INTRAMUSCULAR; INTRAVENOUS at 01:46

## 2023-01-09 RX ADMIN — LORAZEPAM 2 MG: 2 INJECTION INTRAMUSCULAR; INTRAVENOUS at 10:06

## 2023-01-09 RX ADMIN — Medication 250 MG: at 08:08

## 2023-01-09 RX ADMIN — ARIPIPRAZOLE 10 MG: 10 TABLET ORAL at 08:08

## 2023-01-09 RX ADMIN — ONDANSETRON 4 MG: 2 INJECTION INTRAMUSCULAR; INTRAVENOUS at 10:06

## 2023-01-09 RX ADMIN — POLYETHYLENE GLYCOL 3350 17 G: 17 POWDER, FOR SOLUTION ORAL at 14:04

## 2023-01-09 RX ADMIN — LINACLOTIDE 290 MCG: 290 CAPSULE, GELATIN COATED ORAL at 10:01

## 2023-01-09 RX ADMIN — Medication 0.25 G: at 20:01

## 2023-01-09 RX ADMIN — LORAZEPAM 2 MG: 2 INJECTION INTRAMUSCULAR; INTRAVENOUS at 06:07

## 2023-01-09 RX ADMIN — Medication 0.25 G: at 14:06

## 2023-01-09 RX ADMIN — Medication 0.25 G: at 10:21

## 2023-01-09 RX ADMIN — Medication 40 MG: at 10:02

## 2023-01-09 RX ADMIN — BISACODYL 10 MG: 10 SUPPOSITORY RECTAL at 16:08

## 2023-01-09 RX ADMIN — LORAZEPAM 1 MG: 1 TABLET ORAL at 21:25

## 2023-01-09 RX ADMIN — DULOXETINE HYDROCHLORIDE 60 MG: 60 CAPSULE, DELAYED RELEASE ORAL at 08:08

## 2023-01-09 RX ADMIN — MULTIVITAMIN 15 ML: LIQUID ORAL at 10:02

## 2023-01-09 RX ADMIN — TRAZODONE HYDROCHLORIDE 50 MG: 50 TABLET ORAL at 21:26

## 2023-01-09 RX ADMIN — SILVER SULFADIAZINE: 10 CREAM TOPICAL at 10:19

## 2023-01-09 RX ADMIN — SENNOSIDES AND DOCUSATE SODIUM 1 TABLET: 8.6; 5 TABLET ORAL at 08:08

## 2023-01-09 RX ADMIN — ACETAMINOPHEN 650 MG: 325 TABLET, FILM COATED ORAL at 07:37

## 2023-01-09 ASSESSMENT — ACTIVITIES OF DAILY LIVING (ADL)
ADLS_ACUITY_SCORE: 26
ADLS_ACUITY_SCORE: 26
ADLS_ACUITY_SCORE: 30
ADLS_ACUITY_SCORE: 26
ADLS_ACUITY_SCORE: 30
ADLS_ACUITY_SCORE: 26
ADLS_ACUITY_SCORE: 30
ADLS_ACUITY_SCORE: 26
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
## 2023-01-09 NOTE — PROGRESS NOTES
"SPIRITUAL HEALTH SERVICES Progress Note  [facility]  Unit Medical- Surgical     Saw pt Thea Castle per referral from previous . Introduced myself and SHS, I assessed for spiritual needs.      Patient/Family Understanding of Illness and Goals of Care - Pt was tearful and shaking as she spoke about how having multiple seizures and getting code blue called were the reasons why \"today is not a good day.\" She named complex family dynamics and friends as difficult to manage at times, specifically remarking about \"creating boundaries.\"      Distress and Loss - Pt stated physical and emotional distress as hard and painful to deal with.    Physical- She spoke about the toll that having seizures takes on her body.  \"When I wake up, I can't remember my last name or the president.\"               Emotional- She spoke about how difficult is can be combating negative voices when she \"wants to live\" and \"get better\" but has to advocate for her care by coming to the hospital.        Strengths, Coping, and Resources - Pt spoke about her home nurse, friends, and \"people who are like a second family\" as supportive, trusting, and encouraging influences in her life.    Nurse- \"I told her about what I did and she told me to go to the hospital.\"    Friends- \"I talk to my friends everyday.\" \"My friends visited me 2 or 3 times yesterday.\"     Meaning, Beliefs, and Spirituality - Pt is a [employer] and talked about her nadeen. I provided support that affirmed emotions and validated experiences.  Pt requested a prayer shawl, which I provided. I also provided her with a journal, Bible, and prayer bear. I provided reflective listening, compassionate touch, and said a prayer.     Plan of Care - I communicated with the  that I plan to have the on-call  follow-up with her daily.     SHS remain available.     LAUREN HuDiv  Chaplain Resident   [phone]   "

## 2023-01-09 NOTE — CONSULTS
Care Management Follow Up    Length of Stay (days): 5    Expected Discharge Date: 01/11/2023     Concerns to be Addressed:       Patient plan of care discussed at interdisciplinary rounds: Yes    Anticipated Discharge Disposition: TBD  Pt is being followed by mental health and likely move to  mental health unit     Anticipated Discharge Services:    Anticipated Discharge DME:      Referrals Placed by CM/SW:  SW filed [facility] report # [id number] per request of gastroenterology after seeing pt today.  [facility] report also filed per chart note on 1/4 from mental health.  Private pay costs discussed:     Additional Information:      DIVYA Toussaint  [facility]  Care Transitions  [phone]
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
## 2023-01-09 NOTE — PROVIDER NOTIFICATION
"Call out to Dr. Dahl: \"Pt having increased episodes of seizure like activity. 3 episodes in 4 hours. No major change in VS. Any concerns or intervention? Also---Ativan ordered for seizure lasting >1min. Verifying this is correct and you don't want it to be seizure lasting >2-3min?\"    Per Dr. Dahl----as long as VS remain stable no further interventions/workup from a general neurology standpoint indicated. Ok to give ativan for seizure >1min.   "

## 2023-01-09 NOTE — CONSULTS
Triage and Transition - Consult and Liaison     Thea Castle  January 8, 2023    Psychiatry consult acknowledged. Patient seen by extended care clinician. See note by Agnieszka Reina for details.    Anne Melendez, [facility]   Triage and Transition - Consult and Liaison   [phone]
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
## 2023-01-09 NOTE — PROGRESS NOTES
"    Thea Castle MRN#  [id number]   Age:  22 year old YOB: 2000    Date of Admission:  1/3/2023     Subjective   Patient reports she has three episodes of \"seizure like\" activity, after she feels very confused and becomes incontinent of stool. She is still very depressed. She does not feel like she is emptying. She is tolerating tube feeding at this time, no nausea. Requesting IV fluids.     She also reports PTA her mom withheld tube feedings for two days, states her mom threatened to send her to a [facility] home, she also states when she intentionally overdosed on 30 pills of gabapentin RN called 911 but mom thought did not want her sent via ambulance.      MEDICATIONS & ALLERGIES   Current medication list reviewed.      Allergies   Allergen Reactions     Penicillins Unknown     Mother unsure if patient or sister had a reaction. Mother reports she \"didn't think it was anaphylactic\".     Other Food Allergy GI Disturbance     Cilantro          OBJECTIVE   Vitals /69 (BP Location: Right arm)   Pulse 98   Temp 98.4  F (36.9  C) (Oral)   Resp 17   Wt 54.3 kg (119 lb 11.4 oz)   SpO2 98%   BMI 20.55 kg/m          General:   NAD, pale chronically ill appearing   HEENT:   NC/AT. MMM. NJ in place, clamped.   Lungs:  No respiratory distress.   Heart:  RRR. +S1, S2.    Abdomen:   Soft. +distention. Minimal tenderness diffusely without rebound or guarding.    Ext/MS:   No cyanosis or erythema.    Neuro:   No focal deficits noted.    Psych:  Depressed, flat   Skin:  No obvious rashes or lesions noted.         LABORATORY AND IMAGING    ELECTROLYTE PANEL   Recent Labs   Lab Test 01/06/23  0625 01/05/23  0643 01/04/23  1309   POTASSIUM 4.2 3.9 3.8   BUN 10 9 8      HEMATOLOGY PANEL   Recent Labs   Lab Test 01/06/23  0625 01/05/23  0643 01/03/23  1427   WBC 5.0 5.1 6.2   MCV 89 91 89      LIVER AND PANCREAS PANEL   Recent Labs   Lab Test 01/03/23  1427 04/14/21  0727 08/13/20  0728   ALBUMIN 3.4 " 3.0* 3.7      I have reviewed the current diagnostic and laboratory tests.     Assessment / Recommendations:     Assessment:  1. Suicidal attempt with ingestion of 20-30 tablets of gabapentin. Concerns today about patient being a vulnerable adult given statements made today to staff including ISAEL, GI NP and RN regarding her mom.   2. Chronic constipation secondary to slow transit and pelvic floor dysfunction.  Managed with Linzess, Motegrity, and Colace at home.  Also takes as needed Bisacodyl or enemas for breakthrough symptoms.  No bowel movements in past 3 days, and only one in past week.  Received a tap water enema x1 this afternoon.  Suspect acute worsening of constipation likely a side effect of gabapentin overdose.  Patient without any other worrisome features to suggest acute obstruction or ileus on exam. Xray of abdomen today to evaluate stool burden.   3. Malnutrition.  Secondary to severe GI symptoms from delayed motility.  On NJ tube feeds since last month.  Tolerating now.     Recommendations:  1. Xray of abdomen today to evaluate stool burden   2. Continue home regimen of Linzess 290 mcg and Motegrity 2 mg daily.  3. If constipation persists, would give as needed doses of Bisacodyl.  4. Once abdominal pain improves, resume tube feeds and slowly advance to goal rate as tolerated.  5. Management of gabapentin overdose as per primary team.  6. Outpatient GI/[facility] clinic follow up discussed with patient.  7. I did speak with social work to discuss patient considered vulnerable adult, I do not think she is safe to return to currently living situation       Total time spent in chart review, direct medical discussion, examination, and documentation was 45 minutes.    Jennyfer Brown  DNP, APRN, FNP-C  Please feel free to call me or GI MD with any questions or concerns.

## 2023-01-09 NOTE — CODE/RAPID RESPONSE
[facility] NP Note  1/9/2023  1133    CODE BLUE initially called to x-ray.  On my arrival, the patient has some upper extremity rhythmic movements, eyes deviated up and left, she is nonresponsive to voice.  Vitals were stable the entire time, BP 130s/60s, heart rate sinus rhythm, oxygen 100%.  Oxygen mask was placed on the patient at 10 L, she had adequate air movement, normal respiratory effort.  The rhythmic movements ceased spontaneously without intervention.      Following this event, the patient was immediately oriented, following commands, no observed postictal period.  On the way up to her medical room, the patient was conversant and endorsed a headache. She denied any nausea.    I did not recommend any Ativan, the patient is back at her baseline status.  Discussed with nursing staff, updated hospitalist MD.  Neurology following for nonepileptic events.  Please contact rapid response team should any further needs arise.    /89 (BP Location: Right arm)   Pulse 102   Temp 97.6  F (36.4  C) (Oral)   Resp 18   Wt 54.3 kg (119 lb 11.4 oz)   SpO2 98%   BMI 20.55 kg/m      Dx: self limiting non epileptic events   - monitor, supportive cares     CONRAD Garcia [facility]  Hospitalist Service  House Officer  Pager: [phone] (2j - 6p)

## 2023-01-09 NOTE — PROGRESS NOTES
CLINICAL NUTRITION SERVICES - REASSESSMENT NOTE      Recommendations:     Advance TF back to goal rate of 40 mL/hr as tolerates     Malnutrition:   % Weight Loss:  Weight loss does not meet criteria for malnutrition - wt appears to fluctuate 110-120# over the past 3 months (suspect 2' to Anorexia and GI issues)  % Intake:  <75% for > 7 days (moderate malnutrition) - admit 7 days ago, TF started 1/4, was on hold yesterday, pt hasn't received consistent goal nutrition in this past week  Subcutaneous Fat Loss:  Unable to assess  Muscle Loss:  Unable to assess  Fluid Retention:  None noted    Malnutrition Diagnosis: Suspect Moderate malnutrition  In Context of:  Chronic illness or disease         EVALUATION OF PROGRESS TOWARD GOALS   Diet:    Regular    Per sitter, pt only ate a small amount of a bagel for breakfast  Pt has minimal po intake    Nutrition Support:    Type of Feeding Tube: ND  Enteral Frequency:  Continuous  Enteral Regimen: Osmolite 1.5 @ 40 mL/hr (goal rate)  Total Enteral Provisions: 1440 kcals (27 kcal/kg), 60 g PRO (1.1 g/kg), 731 ml free H20, 195 g CHO, and 0 g fiber daily.  Free Water Flush: 60 mL every 4 hrs    Daily multivitamin     Intake/Tolerance:    Chart reviewed  Pt previously followed by [facility] Dietitian at [facility] (last documented note was 10/14/22)    12/21/22: Initial NJ feeding tube placed - intention was for wt gain with dx gastroparesis   1/1/23: FT pulled out by pt  Unsure of home TF regimen - no notes in chart (pt unable to provide at this time)    1/4/23: 10 Fr ND feeding tube placed    TF held yesterday 2' to abdominal discomfort (constipation)  Tap water enema, reglan, senokot, miralax given  BM x1 noted in flowsheets   TF was restarted at 20 mL/hr, with slow advancement back to goal rate - currently at 30 mL/hr    1/8: K 3.7         Mg 2.0    Stopped by to see pt  Currently shaking and staring straight ahead  Per sitter, this just started and they are waiting  for the RN to come eval pt  Note pt had 3 seizures overnight        ASSESSED NUTRITION NEEDS:  Dosing Weight: 54.3 kg (1/8)  Estimated Energy Needs: 0718-2830+ (25-30 kcal/kg)  Justification: maintenance vs repletion   Estimated Protein Needs: 55-65+ (1-1.2+ g/kg)  Justification: preservation of lean body mass vs repletion  Estimated Fluid Needs: 1 mL/Kcal for maintenance       NEW FINDINGS:     01/08/23 0800 54.3 kg (119 lb 11.4 oz) Bed scale   01/04/23 1723 54 kg (119 lb)      Wt Readings from Last 10 Encounters:   01/08/23 54.3 kg (119 lb 11.4 oz)   09/27/21 55.8 kg (123 lb 1.6 oz)   09/30/20 47.6 kg (105 lb)   09/24/20 47.6 kg (105 lb)   04/25/19 54.5 kg (120 lb 2.4 oz) (37 %, Z= -0.33)*   03/25/19 57.6 kg (127 lb) (51 %, Z= 0.03)*   03/07/19 55.3 kg (122 lb) (42 %, Z= -0.21)*     * Growth percentiles are based on CDC (Girls, 2-20 Years) data.     Per Care Everywhere:  12/19/22: 55.2 kg (121 lb)  11/11/22: 52.2 kg  (115 lb)  10/31/22: 54.2 kg (119 lb)  10/6/22: 50.3 kg (110 lb)  9/27/22: 51.7 kg (114 lb)    Previous Goals:   No previous goals      Previous Nutrition Diagnosis:   No previous diagnosis        MALNUTRITION  % Weight Loss:  Weight loss does not meet criteria for malnutrition - wt appears to fluctuate 110-120# over the past 3 months (suspect 2' to Anorexia and GI issues)  % Intake:  <75% for > 7 days (moderate malnutrition) - admit 7 days ago, TF started 1/4, was on hold yesterday, pt hasn't received consistent goal nutrition in this past week  Subcutaneous Fat Loss:  Unable to assess  Muscle Loss:  Unable to assess  Fluid Retention:  None noted    Malnutrition Diagnosis: Suspect Moderate malnutrition  In Context of:  Chronic illness or disease    CURRENT NUTRITION DIAGNOSIS  Inadequate enteral nutrition infusion related to constipation as evidenced by TF running at 75% goal rate    INTERVENTIONS  Recommendations / Nutrition Prescription  Regular diet    Advance TF back to goal rate of 40 mL/hr as  tolerates        Goals  EN to meet % estimated needs while po intake is minimal      MONITORING AND EVALUATION:  Progress towards goals will be monitored and evaluated per protocol and Practice Guidelines

## 2023-01-10 ENCOUNTER — APPOINTMENT (OUTPATIENT)
Dept: GENERAL RADIOLOGY | Facility: CLINIC | Age: 23
DRG: 918 | End: 2023-01-10
Attending: HOSPITALIST
Payer: COMMERCIAL

## 2023-01-10 LAB
ATRIAL RATE - MUSE: 84 BPM
DIASTOLIC BLOOD PRESSURE - MUSE: NORMAL MMHG
INTERPRETATION ECG - MUSE: NORMAL
P AXIS - MUSE: 87 DEGREES
POTASSIUM BLD-SCNC: 4 MMOL/L (ref 3.4–5.3)
PR INTERVAL - MUSE: 148 MS
QRS DURATION - MUSE: 88 MS
QT - MUSE: 364 MS
QTC - MUSE: 430 MS
R AXIS - MUSE: 82 DEGREES
SYSTOLIC BLOOD PRESSURE - MUSE: NORMAL MMHG
T AXIS - MUSE: 51 DEGREES
VENTRICULAR RATE- MUSE: 84 BPM

## 2023-01-10 PROCEDURE — 250N000009 HC RX 250: Performed by: INTERNAL MEDICINE

## 2023-01-10 PROCEDURE — 0DHA7UZ INSERTION OF FEEDING DEVICE INTO JEJUNUM, VIA NATURAL OR ARTIFICIAL OPENING: ICD-10-PCS | Performed by: PHYSICIAN ASSISTANT

## 2023-01-10 PROCEDURE — 84132 ASSAY OF SERUM POTASSIUM: CPT | Performed by: HOSPITALIST

## 2023-01-10 PROCEDURE — 250N000013 HC RX MED GY IP 250 OP 250 PS 637: Performed by: INTERNAL MEDICINE

## 2023-01-10 PROCEDURE — 74018 RADEX ABDOMEN 1 VIEW: CPT

## 2023-01-10 PROCEDURE — 250N000013 HC RX MED GY IP 250 OP 250 PS 637: Performed by: HOSPITALIST

## 2023-01-10 PROCEDURE — 272N000470 XR FEEDING TUBE PLACEMENT

## 2023-01-10 PROCEDURE — 36415 COLL VENOUS BLD VENIPUNCTURE: CPT | Performed by: HOSPITALIST

## 2023-01-10 PROCEDURE — 99232 SBSQ HOSP IP/OBS MODERATE 35: CPT | Performed by: HOSPITALIST

## 2023-01-10 PROCEDURE — 120N000001 HC R&B MED SURG/OB

## 2023-01-10 RX ORDER — LIDOCAINE HYDROCHLORIDE 20 MG/ML
10 JELLY TOPICAL ONCE
Status: DISCONTINUED | OUTPATIENT
Start: 2023-01-10 | End: 2023-01-10 | Stop reason: CLARIF

## 2023-01-10 RX ORDER — LIDOCAINE HYDROCHLORIDE 20 MG/ML
JELLY TOPICAL ONCE
Status: COMPLETED | OUTPATIENT
Start: 2023-01-10 | End: 2023-01-10

## 2023-01-10 RX ORDER — LIDOCAINE HYDROCHLORIDE 20 MG/ML
5 SOLUTION OROPHARYNGEAL ONCE
Status: CANCELLED | OUTPATIENT
Start: 2023-01-10 | End: 2023-01-10

## 2023-01-10 RX ADMIN — LORAZEPAM 1 MG: 1 TABLET ORAL at 23:34

## 2023-01-10 RX ADMIN — Medication 40 MG: at 13:47

## 2023-01-10 RX ADMIN — ACETAMINOPHEN 650 MG: 325 TABLET, FILM COATED ORAL at 05:12

## 2023-01-10 RX ADMIN — DULOXETINE HYDROCHLORIDE 60 MG: 60 CAPSULE, DELAYED RELEASE ORAL at 13:57

## 2023-01-10 RX ADMIN — Medication 12.5 MG: at 23:34

## 2023-01-10 RX ADMIN — MELATONIN TAB 3 MG 5 MG: 3 TAB at 23:34

## 2023-01-10 RX ADMIN — TRAZODONE HYDROCHLORIDE 50 MG: 50 TABLET ORAL at 23:34

## 2023-01-10 RX ADMIN — Medication 12.5 MG: at 20:11

## 2023-01-10 RX ADMIN — LIDOCAINE HYDROCHLORIDE 6 ML: 20 JELLY TOPICAL at 12:31

## 2023-01-10 RX ADMIN — SENNOSIDES AND DOCUSATE SODIUM 1 TABLET: 8.6; 5 TABLET ORAL at 20:11

## 2023-01-10 RX ADMIN — Medication 0.25 G: at 20:13

## 2023-01-10 RX ADMIN — SILVER SULFADIAZINE: 10 CREAM TOPICAL at 10:25

## 2023-01-10 RX ADMIN — Medication 12.5 MG: at 05:05

## 2023-01-10 RX ADMIN — Medication 0.25 G: at 10:26

## 2023-01-10 RX ADMIN — LINACLOTIDE 290 MCG: 290 CAPSULE, GELATIN COATED ORAL at 09:55

## 2023-01-10 RX ADMIN — Medication 250 MG: at 13:48

## 2023-01-10 RX ADMIN — Medication 25 MCG: at 13:48

## 2023-01-10 RX ADMIN — ACETAMINOPHEN 650 MG: 325 TABLET, FILM COATED ORAL at 20:11

## 2023-01-10 RX ADMIN — ARIPIPRAZOLE 10 MG: 10 TABLET ORAL at 13:45

## 2023-01-10 RX ADMIN — MULTIVITAMIN 15 ML: LIQUID ORAL at 13:47

## 2023-01-10 ASSESSMENT — ACTIVITIES OF DAILY LIVING (ADL)
ADLS_ACUITY_SCORE: 26

## 2023-01-10 NOTE — PROGRESS NOTES
"[facility] Progress Note     IMPRESSION:  Suicide attempt  Reports of seizure activity  Chronic constipation, multifactorial  Malnutrition/impaired UGI motility/NJ feeds    AXR reviewed with pt - having mult BM, and has anal fissure (agree with CRS recs)    RECOMMENDATIONS:  -Replace NJT  -Titrate feeds as tolerated  -Given suicide attempt will have to re-address motegrity in the outpt setting.  Continue linzess.    -Is on multiple psychotropic meds with anticholinergic side effects worsening gut motility - if these can be decreased, gut function will likely improve.     Will follow peripherally, call with questions.     Total time spent in chart review, direct medical discussion, examination, and documentation was 20 minutes    Alden Robert DO   [facility]  Cell [phone]    ________________________________________________________________________      SUBJECTIVE:  Some ongoing abd pain.  Concern for ongoing sz activity.  FT dislodged.  Having BMs.      OBJECTIVE:  /74 (BP Location: Left arm)   Pulse 89   Temp 99.9  F (37.7  C) (Oral)   Resp 16   Ht 1.626 m (5' 4\")   Wt 54 kg (119 lb)   SpO2 97%   BMI 20.43 kg/m    Temp (24hrs), Av.5  F (36.9  C), Min:97.1  F (36.2  C), Max:99.9  F (37.7  C)    Patient Vitals for the past 72 hrs:   Weight   23 1300 54 kg (119 lb)   23 0800 54.3 kg (119 lb 11.4 oz)       Intake/Output Summary (Last 24 hours) at 1/10/2023 1135  Last data filed at 1/10/2023 0655  Gross per 24 hour   Intake 300 ml   Output 750 ml   Net -450 ml        PHYSICAL EXAM  GEN: Alert, oriented x3, communicative and in NAD.    ABD: ND, +BS, no guarding or pain to palpation, no rebound, no HSM.  SKIN: No rash, jaundice or spider angiomata      Additional Data:  I have reviewed the patient's new clinical lab results:     Recent Labs   Lab Test 23  1920 23  0625 23  0643   WBC 8.1 5.0 5.1   HGB 11.4* 10.8* 11.3*   MCV 90 89 91    275 " 282     Recent Labs   Lab Test 01/10/23  1021 01/09/23  0843 01/08/23  1920 01/06/23  0625   POTASSIUM 4.0 4.2 3.7 4.2   CHLORIDE  --  103 102 105   CO2  --  28 28 29   BUN  --  10 10 10   ANIONGAP  --  6 7 5     Recent Labs   Lab Test 01/03/23  1427 04/14/21  0727 08/13/20  0728 03/23/19  2148 03/23/19  2140   ALBUMIN 3.4 3.0* 3.7   < >  --    BILITOTAL 0.2 0.3 0.5   < >  --    ALT 26 19 24   < >  --    AST 25 11 24   < >  --    PROTEIN  --   --   --   --  Negative    < > = values in this interval not displayed.

## 2023-01-10 NOTE — CONSULTS
Psychiatry consult noted, seen by LM today who updated me on this case. I plan to see her tomorrow, I did not see her today.    Discussed with pharmacist regarding duloxetine: capsule CAN be opened and mixed with water to administer via feeding tube as long as tube 12 Fr or larger. Spoke with nursing, she just had new tube placed today after removing her tube earlier, 10 Fr was placed so duloxetine can be given. Pharmacy to update administration instructions with details on how to mix. Called and updated nurse.      CONRAD Gracia CNP   Consult/Liaison Psychiatry   [facility]    Contact information available via [facility] Paging/Directory  If I am not available, then [facility] CL line ([phone]) should know who is covering our consult service.

## 2023-01-10 NOTE — CONSULTS
Triage and Transition - Consult and Liaison     Thea Castle  January 10, 2023    Session start: 2:00 pm   Session end: 2:45 pm  Session duration in minutes: 45 min  CPT utilized: 95047 - Psychotherapy (with patient) - 30 (16-37*) min  Patient was seen virtually ([employer] cart or other teleconferencing device).  Anticipated number of sessions or this episode of care: 1-4    Diagnosis:   296.32 (F33.1) Major Depressive Disorder, Recurrent Episode, Moderate _, by history;    Plan/Recommendations:     Due to feeding tube, patient meets exclusionary criteria for inpatient mental health and is not able to be transferred. Will require psych consults while on medical unit until safe discharge is in place.     Patient reports active plan for suicide should she leave the hospital. Patient at risk of harm and therefore hold is necessary should she demand to leave again. Currently voluntary and does not require sitter.     Appears patient's current living situation is not appropriate, recommend  attempt to work with patient on securing a more supportive environment for patient upon discharge.     Will ask psych NP to comment on medications.     Reason for consult: Thea is 22 year old White  female . Psychiatry consult was requested due to suicidal ideation. Patient was seen by [facility] Consult & Liaison team.     Presenting problem: Patient admitted to the emergency room following an attempted overdose on 30 tablets of gabapentin. Patient recommended inpatient psych, however, due to feeding tube, she is not able to transfer, she is now convalescing at [facility] station [facility]. Please see initial [facility] Crisis Assessment completed by Kenyon Villanueva on 01/03/23 for complete assessment information    Session Summary: Patient presents as infantile and childlike, younger than stated age. Patient is observed to be clutching nydia bear, in fetal position throughout some of session. Patient reports she is  "having a bad day today.  Had a fall last night. Feeding tube came out. Bad day. Doesn't feel like she is being heard or understood. Patient states she is having a lot of seizures, loses control of bowels. Patient reports hearing different doctors say different things about what is going on. She states she feels ativan is helpful for her and would like to stay on it. 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 She randomly states she is worried people will think she has [place] and that will be written in her chart, I inquired if she meant herself, or by another (by proxy), she indicated herself. I stated at this time that was not a concern of mine, and if it was, we would likely be consulted to explore that. She states doesn't want epipilepsy. Worried that they think  She was drug seeking.    We reviewed suicidal thoughts.   Patient reports worse than when came in. She states she feels safe in hospital and would not harm herself here. Patient does share she kept part of her feeding tube last night when she had irrational thoughts of harming herself with it. She states she is willing to give it to provider, does not want to harm herself. States \"I want to be brave\" . She reports Inpatient hospital doesn't feel safe, while in the ED another patient opened her door and said they were going to kill her. I explained that she likely would not meet criteria and would have to stay on unit. She was appreciative of this. Patient indicated that her mom shames her when she states she has suicidal thoughts so she doesn't share about them often. She states that her mom tells her she \"doesn't want to continue to hear her threats\". I affirmed that this was safe place to share those thoughts and encourage her to do so. She agrees to do this and states she feels comfortable sharing if thoughts increase or if she feels unsafe here.     She reports she had a few other concerns. She " states medications were supposed to be adjusted and she doesn't think they were. I reviewed medications from MAR and what has been given. She states she didn't realize abilify was increased. She states she would like to know about what medications she is given.     We discussed possible recommendations once mental health is more stable. We discuss living situation, she states she has pretty much always lived with family, did go to college freshman year. She reports she was in eating disorder treatment 6 times and stayed there, felt like she was living out of the home. She states she is not interested in waivered services. Not interested in group home. Not interested in group therapy- feels it is impacted by her trauma. I discuss that we likely will have to talk about these options again when she is more stable.     Mental Status Exam   Affect: Flat  Appearance: Appropriate   Attention Span/Concentration: Attentive    Eye Contact: Variable  Fund of Knowledge: Delayed   Language /Speech Content: Fluent  Language /Speech Volume: Normal   Language /Speech Rate/Productions: Normal   Recent Memory: Intact  Remote Memory: Intact  Mood: Sad   Orientation:   Person: Yes   Place: Yes  Time of Day: Yes   Date: Yes   Situation (Do they understand why they are here?): Yes   Psychomotor Behavior: Normal   Thought Content: Clear and Suicidal  Thought Form: Intact    Current medications:   Current Facility-Administered Medications   Medication     acetaminophen (TYLENOL) tablet 650 mg    Or     acetaminophen (TYLENOL) Suppository 650 mg     ARIPiprazole (ABILIFY) tablet 10 mg     bisacodyl (DULCOLAX) suppository 10 mg     dextrose 10% infusion     diltiazem 2% in PLO gel 0.25 g     drospirenone-ethinyl estradiol (DIANA) 3-0.02 MG per tablet 1 tablet     DULoxetine (CYMBALTA) DR capsule 60 mg     hydrOXYzine (ATARAX) syrup 25 mg    Or     hydrOXYzine (ATARAX) tablet 25 mg     lidocaine (LMX4) cream     lidocaine 1 % 0.1-1 mL      linaclotide (LINZESS) capsule 290 mcg     LORazepam (ATIVAN) injection 2 mg     LORazepam (ATIVAN) tablet 1 mg     melatonin tablet 5 mg     multivitamins w/minerals liquid 15 mL     ondansetron (ZOFRAN ODT) ODT tab 4 mg    Or     ondansetron (ZOFRAN) injection 4 mg     pantoprazole (PROTONIX) 2 mg/mL suspension 40 mg     polyethylene glycol (MIRALAX) Packet 17 g     promethazine (PHENERGAN) half-tab 12.5 mg     QUEtiapine (SEROquel) half-tab 12.5 mg     senna-docusate (SENOKOT-S/PERICOLACE) 8.6-50 MG per tablet 1 tablet     silver sulfADIAZINE (SILVADENE) 1 % cream     sodium chloride (PF) 0.9% PF flush 3 mL     sodium chloride (PF) 0.9% PF flush 3 mL     traZODone (DESYREL) tablet 50 mg     vitamin C (ASCORBIC ACID) tablet 250 mg     Vitamin D3 (CHOLECALCIFEROL) tablet 25 mcg     witch hazel-glycerin (TUCKS) pad         MeasurableTreatment Goal(s) related to diagnosis / functional impairment(s)    Goal 1: Patient will alleviate the suicidal impulses/ideation and return to the highest level of previous daily functioning.     Objective #A     Patient will participate in therapy for an identified emotional problem resulting in suicidal thoughts.  Status: New as of January 10, 2023    Intervention(s)  LMHP will assess the severity of the level of impairment to the client s functioning to determine the appropriate level of care.       LMHP will encourage the patient to express feelings related to their suicidal ideation in order to clarify them and increase insight as to the cause for them.      LMHP will assist the patient in developing coping strategies for suicidal ideation.       Goal 2: Patient will accept placement/referral to an appropriate level of care to safely address the suicidal crisis.     Objective #A     Patient will state the strength of the suicidal feeling, frequency of the thoughts and the detail of the plans   Status: New as of January 10, 2023    Intervention(s)  LM will facilitate conversations  about options and encourage patient to engage in services.            Therapeutic intervention and progress:  Therapeutic intervention consisted of building therapeutic rapport, active listening, validation, active problem solving and crisis management. Patient is not making progress towards treatment goals as evidenced by increased suicidal ideation.     Collateral information:   Reviewed chart and coordinated with nurse and psych NP. Upset she is not receiving ativan for seizure like activity, non-epileptic seizures. Became upset, agreed to stay to talk to psych. They want to Make sure he is holdable. Looking for non benzo- feel she is very dependent on this. Talked with psych NP regarding need to review medications.      Anne Melendez, [facility]  Triage and Transition - Consult and Liaison   [phone]

## 2023-01-10 NOTE — SIGNIFICANT EVENT
Significant Event Note    Time of event: 6:18 AM January 10, 2023    Description of event:  Patient had a witnessed fall while using the bathroom. Patient was standing at the sink with bedside attendant when she reportedly went limp and sat down quickly. Per bedside attendant the patient did not hit her head or appear sustain any injury during the event. Patient was then able to stand and be assisted back to bed by staff.    Upon my arrival the patient was lying on her right side and appeared comfortable. Patient was alert and oriented but forgetful which she says has been her baseline for the past few days. 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 I discussed with her that this did not mean that her seizures were not 'real' but just that the treatment was different. Patient was able to push/pull in all extremities although at 3/5 strength and she had no pain in any joints or on her spine. Patient reported a headache but was otherwise without complaint.    Plan:  No testing at this time, continue to monitor for signs/symptoms of injury    Discussed with: bedside nurse    CONRAD Mccoy CNP

## 2023-01-10 NOTE — PROVIDER NOTIFICATION
Notified provider about indwelling pantoja catheter discussed removal or continued need.    Did provider choose to remove indwelling pantoja catheter? Yes    Provider's pantoja indication for keeping indwelling pantoja catheter: Retention.    Is there an order for indwelling pantoja catheter? No    *If there is a plan to keep pantoja catheter in place at discharge daily notification with provider is not necessary.     Orders: Initiate voiding trial

## 2023-01-10 NOTE — PROVIDER NOTIFICATION
"MD Notification    Notified Person: MD    Notified Person Name: Dr. tripp    Notification Date/Time: 1/10/22 at 0806    Notification Interaction: cheko    Purpose of Notification: \"Patients NJ tube marker extended post fall. Currently at 70cm, marked at 80. Do you want to order x-ray to confirm still in place prior to me using?\"    Orders Received: Abdomen xray ordered    Comments:    "

## 2023-01-10 NOTE — PROGRESS NOTES
Pt here with suicide attempt. A&Ox4. Neuros intact. VSS on RA. Tele SR. Reg diet, thin liquids, TF at 40ml/hr with 60 ml fwf q4h. Takes pills crushed in NG. PRN tylenol given for HA pain. Pt reporting abdominal discomfort and constipation, had several BMs this shift.    2 witnessed seizure-like episodes overnight. Both lasting no more than about 1 minute at 0200 and 0215. Pt reports feeling confused afterwards but is currently intact on neuro assessment. Pt became very upset with staff and tearful when she did not receive IV ativan during this time.     Pt has been moving SBA but had a fall prior to shift change, writer/oncoming RN informed pt that it would be best to only be getting up with additional help from staff to the commode or use a bedpan for now. At shift change, pt reported her NG had become dislodged during fall. TF held.    Sitter in place for safety. Pt did not report any suicidal thoughts.

## 2023-01-10 NOTE — PROGRESS NOTES
RADIOLOGY PROCEDURE NOTE  Patient name: Thea Castle  MRN: [id number]  : 2000    Pre-procedure diagnosis: Siezures and gastroparesis  Post-procedure diagnosis: Same    Procedure Date/Time: January 10, 2023  12:31 PM  Procedure: NJ tube placement  Estimated blood loss: None  Specimen(s) collected with description: none  The patient tolerated the procedure well with no immediate complications.  Significant findings: Tip at the ligament of trietz.  Ready for use.    See imaging dictation for procedural details.    Provider name: Aquilino Bush PA-C  Assistant(s):None
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
## 2023-01-10 NOTE — PROGRESS NOTES
[facility]  Neuroscience and Spine [place]  Neurology Daily Note                 Interval History:   This neurological follow-up is recommended requested  to address this patient's Ativan treatment of her nonepileptic spells.  She was evaluated for spells in 10/2022 and though spells were described as the patient noting déjà vu symptoms, metallic taste, strange smell and then progressed to staring followed by falling to the ground with convulsive movements, notes indicated biting of her tongue and bowel incontinence.  She is reported to have had 2 weakness seizure-like episodes last night, lasting for about 1 minute, at 0 200 20215 and the patient reporting that she felt confused afterwards and became upset because the staff did not give her IV Ativan to manage the spells, she stated that they lasted for more than 1 minute.  Patient has been requesting Ativan for these spells, which had previously be been prescribed.  Ms. Castle had previously been seen by Dr. Dahl on 1/6/2023 and a routine EEG was done on 1/7/2023 and reported as normal awake EEG recording.  She has a complex history for anorexia nervosa, traumatic brain injury and depression, gastroparesis, hematochezia, urinary retention nephrolithiasis and and recurring spells, suspected to be nonepileptic spells, having had prolonged EEG assessment 10/24/22- 10/25 2022 at [facility] ([facility] where spells were recorded, and was not suspected of having nonepileptic seizures, and was not placed on antiepileptic medications.  Brain MRI 5/21 was reported as normal,  ambulatory EEG and tilt table test was recommended 10/31/2022 and she has been evaluated by psychiatry for her eating disorder.  Patient had reportedly had recurrent spells this admission and reported that she was given Ativan for spells which lasted more than 2 minutes and was adamant that she should continue this regime.       Review of Systems:   The 10 point Review of Systems is  "negative other than noted in the HPI       Medications:   Scheduled Meds:    ARIPiprazole  10 mg Oral or Feeding Tube Daily     diltiazem 2% in PLO gel  0.25 g Topical TID     drospirenone-ethinyl estradiol  1 tablet Oral Daily     DULoxetine  60 mg Oral Daily     linaclotide  290 mcg Oral QAM AC     LORazepam  1 mg Oral or Feeding Tube At Bedtime     multivitamins w/minerals  15 mL Per Feeding Tube Daily     pantoprazole  40 mg Oral or Feeding Tube BID AC     QUEtiapine  12.5 mg Oral or Feeding Tube At Bedtime     senna-docusate  1 tablet Oral or Feeding Tube BID     silver sulfADIAZINE   Topical Daily     sodium chloride (PF)  3 mL Intracatheter Q8H     traZODone  50 mg Oral or Feeding Tube At Bedtime     vitamin C  250 mg Oral or Feeding Tube Daily     Vitamin D3  25 mcg Oral or Feeding Tube Daily     PRN Meds: acetaminophen **OR** acetaminophen, bisacodyl, dextrose, hydrOXYzine **OR** hydrOXYzine, lidocaine 4%, lidocaine (buffered or not buffered), LORazepam, melatonin, ondansetron **OR** ondansetron, polyethylene glycol, promethazine, sodium chloride (PF), witch hazel-glycerin        Physical Exam:   Vitals: Blood pressure 116/74, pulse 89, temperature 99.9  F (37.7  C), temperature source Oral, resp. rate 16, height 1.626 m (5' 4\"), weight 54 kg (119 lb), SpO2 97 %, not currently breastfeeding.         Data:   ROUTINE IP LABS (Last 3results)  CBC RESULTS:     Recent Labs   Lab 01/08/23 1920 01/06/23  0625 01/05/23  0643   WBC 8.1 5.0 5.1   RBC 3.81 3.59* 3.72*   HGB 11.4* 10.8* 11.3*   HCT 34.3* 32.1* 34.0*    275 282     Basic Metabolic Panel:  Recent Labs   Lab 01/10/23  1021 01/09/23  1200 01/09/23  0843 01/08/23  1920 01/08/23  1814 01/06/23  0625   NA  --   --  137 137  --  139   POTASSIUM 4.0  --  4.2 3.7  --  4.2   CHLORIDE  --   --  103 102  --  105   CO2  --   --  28 28  --  29   BUN  --   --  10 10  --  10   CR  --   --  0.72 0.69  --  0.81   GLC  --  177* 117* 98   < > 121*   DENNIS  --   --  " 9.0 8.7  --  8.4*    < > = values in this interval not displayed.     INR:No lab results found in last 7 days.   Lipid Profile:  Recent Labs   Lab Test 04/14/21  0727 08/13/20  0728   CHOL 164 169   HDL 72 63   LDL 73 83   TRIG 96 113     TSH:  TSH   Date Value Ref Range Status   04/14/2021 1.80 0.40 - 4.00 mU/L Final   ,   Vitamin B12: No results found for: B12     MRI of the brain 5/7/2021: [facility], images not personally reviewed  Reported as a normal brain MRI.  Prolonged EEG monitoring 10/24-10/25, [facility], patient had several spells where she was noted to slump to one side, recorded, no EEG correlate, reported normal study.      Assessment and Plan:   1.   Recurring nonepileptic spells, this patient has had prolonged EEG recording at [facility], monitor 10/24 to 10/25/2022 when the patient was noted to have had several spells where she would slump over to one side, these events were recorded, with no EEG correlate.  Spells have been diagnosed as nonepileptic spells.  2.   -She has a history for anorexia nervosa, depression, and presented in the ED on 1/3/2023 with concerns regarding depression with suicidal attempt, gabapentin overdose.  Recommendations: I had a long discussion with the patient, the RN was present, as she continued to request Ativan IV as needed for her nonepileptic spells.  She was told that scheduled Ativan was not appropriate treatment for these recurring nonepileptic spells.   -Also would not recommend AED at this time and continue to monitor these spells to see whether EEG monitoring is indicated.  -Transfer to inpatient psychiatric unit is anticipated  The assessment and recommendations were discussed with the patient's RN.    Beatriz Prather MD  [facility] Neurology, [facility]  Telephone number [phone]

## 2023-01-10 NOTE — PROGRESS NOTES
Pt A&O x4, up x1. VSS except BP slight high after seizure. Had seizures x4. Pt alert after seizure, feeling tired, headache sometime. Pt was crying and upset about not having Ativan. Advised pt that Ativan doesn't help with post-seizure. Pt c/o constipation, Senna, Miralax, prune juice and supp given, small BM x2. PRN Zofran given x1 for n/v. NG feeding running at 30 ml/hr, will increase to 40 ml tonight. flushed with 60 ml q4h. Med through NG. Smith patent. Ponce on R thigh and buttocks, dressing changed. Tele: tachycardiac. Will continue to monitor.

## 2023-01-10 NOTE — PROGRESS NOTES
"SPIRITUAL HEALTH SERVICES Progress Note  [facility]  NeuroScience    Saw pt Theairina Castle per epic request for emotional support.    Pt was upset and tearful during the visit, shared her fear that people will think \"I'm drug seeking\" or \"I have Munchausen's.\" Mental health provider came on the iPad for their scheduled visit; pt asked me to stay. Provider approved me staying in the room during their visit. Near the end of the visit, pt informed Mental Health Provider \"I hid the NG tube when it came out\" earlier today during a \"moment of irrationality.\" Pt said \"I was going to use this for self-harm and now I want to give it to someone.\" Pt said she wanted to give the NG tube to the Charge Nurse. Pt gave the NG tube to the Charge Nurse and expressed \"I feel relief\" and \"I feel very proud of myself for being brave\" enough to seek help by naming her intention for potential self-harm. Pt requested prayer, which I provided.     Plan: Updated Charge Nurse. [facility] will continue to provide support.     LAUREN HuDiv  Chaplain Resident   [phone]   "

## 2023-01-10 NOTE — PROVIDER NOTIFICATION
Per pharmacist, jovon Romero to give 1630 Protonix at 2100 tonight due to delay in administration of AM meds.

## 2023-01-11 LAB
ABO/RH(D): NORMAL
ANTIBODY SCREEN: NEGATIVE
BASOPHILS # BLD AUTO: 0 10E3/UL (ref 0–0.2)
BASOPHILS NFR BLD AUTO: 1 %
EOSINOPHIL # BLD AUTO: 0.2 10E3/UL (ref 0–0.7)
EOSINOPHIL NFR BLD AUTO: 2 %
ERYTHROCYTE [DISTWIDTH] IN BLOOD BY AUTOMATED COUNT: 13.2 % (ref 10–15)
HCT VFR BLD AUTO: 34.8 % (ref 35–47)
HGB BLD-MCNC: 11.5 G/DL (ref 11.7–15.7)
IMM GRANULOCYTES # BLD: 0 10E3/UL
IMM GRANULOCYTES NFR BLD: 1 %
LYMPHOCYTES # BLD AUTO: 2.9 10E3/UL (ref 0.8–5.3)
LYMPHOCYTES NFR BLD AUTO: 44 %
MCH RBC QN AUTO: 29.7 PG (ref 26.5–33)
MCHC RBC AUTO-ENTMCNC: 33 G/DL (ref 31.5–36.5)
MCV RBC AUTO: 90 FL (ref 78–100)
MONOCYTES # BLD AUTO: 0.4 10E3/UL (ref 0–1.3)
MONOCYTES NFR BLD AUTO: 7 %
NEUTROPHILS # BLD AUTO: 3 10E3/UL (ref 1.6–8.3)
NEUTROPHILS NFR BLD AUTO: 45 %
NRBC # BLD AUTO: 0 10E3/UL
NRBC BLD AUTO-RTO: 0 /100
PLATELET # BLD AUTO: 318 10E3/UL (ref 150–450)
RBC # BLD AUTO: 3.87 10E6/UL (ref 3.8–5.2)
SPECIMEN EXPIRATION DATE: NORMAL
WBC # BLD AUTO: 6.6 10E3/UL (ref 4–11)

## 2023-01-11 PROCEDURE — 36415 COLL VENOUS BLD VENIPUNCTURE: CPT | Performed by: STUDENT IN AN ORGANIZED HEALTH CARE EDUCATION/TRAINING PROGRAM

## 2023-01-11 PROCEDURE — 120N000001 HC R&B MED SURG/OB

## 2023-01-11 PROCEDURE — 250N000013 HC RX MED GY IP 250 OP 250 PS 637: Performed by: HOSPITALIST

## 2023-01-11 PROCEDURE — 250N000011 HC RX IP 250 OP 636: Performed by: INTERNAL MEDICINE

## 2023-01-11 PROCEDURE — 99233 SBSQ HOSP IP/OBS HIGH 50: CPT | Mod: 25

## 2023-01-11 PROCEDURE — 85025 COMPLETE CBC W/AUTO DIFF WBC: CPT | Performed by: STUDENT IN AN ORGANIZED HEALTH CARE EDUCATION/TRAINING PROGRAM

## 2023-01-11 PROCEDURE — 250N000013 HC RX MED GY IP 250 OP 250 PS 637: Performed by: INTERNAL MEDICINE

## 2023-01-11 PROCEDURE — 86901 BLOOD TYPING SEROLOGIC RH(D): CPT | Performed by: STUDENT IN AN ORGANIZED HEALTH CARE EDUCATION/TRAINING PROGRAM

## 2023-01-11 PROCEDURE — 99233 SBSQ HOSP IP/OBS HIGH 50: CPT | Performed by: HOSPITALIST

## 2023-01-11 RX ADMIN — SENNOSIDES AND DOCUSATE SODIUM 1 TABLET: 8.6; 5 TABLET ORAL at 08:42

## 2023-01-11 RX ADMIN — ARIPIPRAZOLE 10 MG: 10 TABLET ORAL at 08:42

## 2023-01-11 RX ADMIN — Medication 25 MCG: at 08:42

## 2023-01-11 RX ADMIN — ACETAMINOPHEN 650 MG: 325 TABLET, FILM COATED ORAL at 06:44

## 2023-01-11 RX ADMIN — Medication 0.25 G: at 20:42

## 2023-01-11 RX ADMIN — DULOXETINE HYDROCHLORIDE 60 MG: 60 CAPSULE, DELAYED RELEASE ORAL at 08:42

## 2023-01-11 RX ADMIN — Medication 250 MG: at 08:42

## 2023-01-11 RX ADMIN — SENNOSIDES AND DOCUSATE SODIUM 1 TABLET: 8.6; 5 TABLET ORAL at 20:42

## 2023-01-11 RX ADMIN — ONDANSETRON 4 MG: 4 TABLET, ORALLY DISINTEGRATING ORAL at 19:04

## 2023-01-11 RX ADMIN — Medication 40 MG: at 17:49

## 2023-01-11 RX ADMIN — LINACLOTIDE 290 MCG: 290 CAPSULE, GELATIN COATED ORAL at 09:15

## 2023-01-11 RX ADMIN — ACETAMINOPHEN 650 MG: 325 TABLET, FILM COATED ORAL at 08:42

## 2023-01-11 RX ADMIN — Medication 12.5 MG: at 21:27

## 2023-01-11 RX ADMIN — ACETAMINOPHEN 650 MG: 325 TABLET, FILM COATED ORAL at 18:00

## 2023-01-11 RX ADMIN — MULTIVITAMIN 15 ML: LIQUID ORAL at 08:42

## 2023-01-11 RX ADMIN — Medication 0.25 G: at 08:42

## 2023-01-11 RX ADMIN — ONDANSETRON 4 MG: 2 INJECTION INTRAMUSCULAR; INTRAVENOUS at 06:44

## 2023-01-11 RX ADMIN — Medication 0.25 G: at 14:20

## 2023-01-11 RX ADMIN — Medication 40 MG: at 08:41

## 2023-01-11 ASSESSMENT — ACTIVITIES OF DAILY LIVING (ADL)
ADLS_ACUITY_SCORE: 26
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
## 2023-01-11 NOTE — PROGRESS NOTES
"SPIRITUAL HEALTH SERVICES Progress Note  [facility] 77    Saw pt Thea Dainels Tin per RN referral. Pt reported feeling a lot of \"shame and embarrassment\" about her mental and physical illness. We reflected together about ways that she has had courage in the face of her illness and prayed together. Additionally, I taught pt a simple breathing exercise/prayer to utilize in times of distress.    Patient/Family Understanding of Illness and Goals of Care - Pt reported that she has had two seizures this morning and has been frequently meeting with psychiatry. She discussed her NJ tube falling out yesterday, her hiding it with the intent to harm herself, and her revealing that she had it during an encounter with psychiatry and another .    Distress and Loss   - Pt spoke about her struggles with \"shame and embarrassment,\" particularly around her suicidal ideation and the side effects of her seizures.   - Pt noted that she has wanted to \"check [herself out]\" of the hospital, but stated \"I know I would just hurt myself if I went home.     Strengths, Coping, and Resources - Pt shared that her RN has been influential in helping her want to stay in the hospital, and that she has had friends visit her. We reflected together about the courage it took to share her plan to harm herself yesterday, and framed this as \"saying no to shame.\"    Meaning, Beliefs, and Spirituality - Pt requested prayer for family relationships and answers about her seizures. I taught pt a simple breathing exercise/prayer to utilize, which pt expressed finding helpful.     Plan of Care - Spiritual Health to follow up with pt regularly for emotional and spiritual support. Please consult should any immediate needs arise.    Sarah Johanson  Chaplain Resident  Spiritual Health Services  Pager: [phone]     [facility] available 24/7 for emergent requests/referrals, either by having the on-call  paged or by entering an ASAP/STAT " consult in Epic (this will also page the on-call ).
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
## 2023-01-11 NOTE — PLAN OF CARE
"Pt here following suicide attempt and seizure workup. EEG previously negative for epileptic seizures. Pt was A&Ox3, disoriented to time this shift. Numbness to to right side of face. Intermittent numbness to BUE. Constant numbness/tingling to BLE. Slow/deliberate finger nose. Patient's NJ was dislodged post fall from this AM; later removed by patient prior to going to radiology for new placement. New NJ placed by radiology. Pt was very sad, tearful, and frustrated about not being able to receive Ativan for seizure activity; made this known prior to going to radiology. Upon arrival back to unit, Dr. Prather from gen neuro met with patient at bedside to explain why use of Ativan is not appropriate for her diagnosis. Discontinued PRN dose. Patient became upset, started crying and told Dr. Prather to get out of her room. At this time, patient stated she wanted to \"sign herself out.\" RN talked with patient, attempted to remind her she is at the hospital to get better, and ideally would like to stay here until that is so.  at bedside and RN asked her if she could finish her discussion with the  and then consider still meeting with psych at 1400. She agreed and spoke with psych. After meeting, patient agreed to stay in hospital. Dmitriy, from psych states that if patient attempts to leave, she is holdable. More than once patient has reported having thoughts of hurting self today. States she is proud that she has not listened or attempted. TF restarted at 20mL/h at 1625. Plan to increase rate by 10mL/h every 12 hours with GR of 40mL/h. At approx. 1700 patient states she \"accidentally pulled out my Smith.\" Baloon still inflated. No injury noted to rema area, but does report pain. Cold pack provided. Voiding trial initiated. At 1840 patient had episode of shaking, witnessed by sitter. No injury noted. Appeared confused immediately after but following most commands and able to move all extremities; pupils reactive. " Oncoming RN updated and patient currently in good spirits, laughing with staff upon shift change. Plan to monitor for retention and tolerance of TF. Discharge plan pending, likely to IP Psych.
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
## 2023-01-12 PROCEDURE — 120N000001 HC R&B MED SURG/OB

## 2023-01-12 PROCEDURE — G0463 HOSPITAL OUTPT CLINIC VISIT: HCPCS

## 2023-01-12 PROCEDURE — 99232 SBSQ HOSP IP/OBS MODERATE 35: CPT | Performed by: INTERNAL MEDICINE

## 2023-01-12 PROCEDURE — 250N000013 HC RX MED GY IP 250 OP 250 PS 637: Performed by: INTERNAL MEDICINE

## 2023-01-12 PROCEDURE — 250N000013 HC RX MED GY IP 250 OP 250 PS 637: Performed by: HOSPITALIST

## 2023-01-12 PROCEDURE — 250N000011 HC RX IP 250 OP 636: Performed by: INTERNAL MEDICINE

## 2023-01-12 RX ADMIN — Medication 12.5 MG: at 00:59

## 2023-01-12 RX ADMIN — ONDANSETRON 4 MG: 4 TABLET, ORALLY DISINTEGRATING ORAL at 09:07

## 2023-01-12 RX ADMIN — DULOXETINE HYDROCHLORIDE 60 MG: 60 CAPSULE, DELAYED RELEASE ORAL at 08:06

## 2023-01-12 RX ADMIN — ARIPIPRAZOLE 10 MG: 10 TABLET ORAL at 08:06

## 2023-01-12 RX ADMIN — MELATONIN TAB 3 MG 5 MG: 3 TAB at 21:31

## 2023-01-12 RX ADMIN — Medication 40 MG: at 15:49

## 2023-01-12 RX ADMIN — SENNOSIDES AND DOCUSATE SODIUM 1 TABLET: 8.6; 5 TABLET ORAL at 21:32

## 2023-01-12 RX ADMIN — Medication 0.25 G: at 14:43

## 2023-01-12 RX ADMIN — Medication 12.5 MG: at 21:23

## 2023-01-12 RX ADMIN — Medication 25 MCG: at 08:06

## 2023-01-12 RX ADMIN — Medication 0.25 G: at 21:24

## 2023-01-12 RX ADMIN — ONDANSETRON 4 MG: 4 TABLET, ORALLY DISINTEGRATING ORAL at 18:42

## 2023-01-12 RX ADMIN — ACETAMINOPHEN 650 MG: 325 TABLET, FILM COATED ORAL at 00:59

## 2023-01-12 RX ADMIN — MULTIVITAMIN 15 ML: LIQUID ORAL at 08:06

## 2023-01-12 RX ADMIN — TRAZODONE HYDROCHLORIDE 50 MG: 50 TABLET ORAL at 21:23

## 2023-01-12 RX ADMIN — LINACLOTIDE 290 MCG: 290 CAPSULE, GELATIN COATED ORAL at 08:06

## 2023-01-12 RX ADMIN — ACETAMINOPHEN 650 MG: 325 TABLET, FILM COATED ORAL at 15:49

## 2023-01-12 RX ADMIN — LORAZEPAM 1 MG: 1 TABLET ORAL at 21:23

## 2023-01-12 RX ADMIN — TRAZODONE HYDROCHLORIDE 50 MG: 50 TABLET ORAL at 00:59

## 2023-01-12 RX ADMIN — ONDANSETRON 4 MG: 4 TABLET, ORALLY DISINTEGRATING ORAL at 03:03

## 2023-01-12 RX ADMIN — ACETAMINOPHEN 650 MG: 325 TABLET, FILM COATED ORAL at 09:07

## 2023-01-12 RX ADMIN — SENNOSIDES AND DOCUSATE SODIUM 1 TABLET: 8.6; 5 TABLET ORAL at 08:06

## 2023-01-12 RX ADMIN — Medication 40 MG: at 06:57

## 2023-01-12 RX ADMIN — Medication 250 MG: at 08:06

## 2023-01-12 RX ADMIN — LORAZEPAM 1 MG: 1 TABLET ORAL at 00:59

## 2023-01-12 ASSESSMENT — ACTIVITIES OF DAILY LIVING (ADL)
ADLS_ACUITY_SCORE: 26

## 2023-01-12 NOTE — PROGRESS NOTES
"GI PROGRESS NOTE  1/12/2023  Thea Castle  2000  1729/1729-01    Subjective:   Patient reports one black stool yesterday evening, bowel movements following with red blood. Hgb 11.5. Experiencing rectal pain with bowel movements, consistent with anal fissure. NJ tube feeds were held briefly due to abdominal pain. Feeds currently running at 30ml/hr (goal 40ml/hr). States that when she is at goal she has more pain. At current rate denies any  pain. Drank water by mouth, but had significant nausea following. No vomiting.     Had discussion that gastrostomy/jejunostomy tube would not be recommended due to risks of complications. She agrees and expresses that even the thought of this intervention makes her feel anxious.    She finds great support in her meetings with [facility] GI psychologist and would like to continue these visits outpatient.   Objective:     Blood pressure 134/89, pulse 98, temperature 98.6  F (37  C), temperature source Oral, resp. rate 20, height 1.626 m (5' 4\"), weight 54 kg (119 lb), SpO2 99 %, not currently breastfeeding.    Body mass index is 20.43 kg/m .  Gen: lying in bed, restless/anxious at times   GI: non-distended  External rectal exam: anal fissure noted at midline. No prolapsing tissue observed.   Neuro: alert and oriented  Psych: mood and affect appropriate     Laboratory:    BMP  Recent Labs   Lab 01/10/23  1021 01/09/23  1200 01/09/23  0843 01/08/23  1920 01/08/23  1814 01/06/23  0625   NA  --   --  137 137  --  139   POTASSIUM 4.0  --  4.2 3.7  --  4.2   CHLORIDE  --   --  103 102  --  105   DENNIS  --   --  9.0 8.7  --  8.4*   CO2  --   --  28 28  --  29   BUN  --   --  10 10  --  10   CR  --   --  0.72 0.69  --  0.81   GLC  --  177* 117* 98   < > 121*    < > = values in this interval not displayed.     CBC  Recent Labs   Lab 01/11/23  2311 01/08/23  1920 01/06/23  0625   WBC 6.6 8.1 5.0   RBC 3.87 3.81 3.59*   HGB 11.5* 11.4* 10.8*   HCT 34.8* 34.3* 32.1*   MCV 90 90 89   [facility] " Addended by: CAMACHO CHRISTOPHER on: 6/16/2022 05:51 AM     Modules accepted: Orders     29.7 29.9 30.1   MCHC 33.0 33.2 33.6   RDW 13.2 13.1 13.2    308 275     INRNo lab results found in last 7 days.   LFTs  Recent Labs   Lab Test 01/03/23  1427 04/14/21  0727 08/13/20  0728 03/23/19  2148 03/23/19  2140   ALBUMIN 3.4 3.0* 3.7   < >  --    BILITOTAL 0.2 0.3 0.5   < >  --    ALT 26 19 24   < >  --    AST 25 11 24   < >  --    PROTEIN  --   --   --   --  Negative    < > = values in this interval not displayed.       Assessment:       1. Suicidal attempt with ingestion of gabapentin. Undergoing evaluation for inpatient mental health transfer.    2. Chronic constipation secondary to slow transit and pelvic floor dysfunction. Managed with Linzess, Motegrity, and Colace at home. Motegrity has been discontinued to due concern regarding risk of suicidal thoughts with this medication. No significant stool burden on AXR.   3. Malnutrition. Secondary to severe GI symptoms from delayed gastric motility. Unable to find record of gastric emptying study. This information would be helpful to objectively understand current gastric function. Ideally, she would not require a gastrostomy or jejunostomy tube due to risk of self harm and complications. If unable to find a facility that will accept the patient with NJ tube feeds and if GES reveals severe gastroparesis, we may have to re-consider longer term nutritional plan. If GES does not reveal gastroparesis, would rather recommend encouraging oral intake and ensuring optimal management of lower GI motility.       Plan:   1.  Gastric emptying study ordered   2.  Continue Linzess 290mcg daily   3.  Continue Diltiazem ointment for anal fissure  4.  Titrate NJ tube feeds as tolerated.  5.  Outpatient GI/NMD[facility] clinic follow up discussed with patient.    Discussed with Dr. Robert, GI staff physician.    Total time spent in chart review, medical discussion, examination, and documentation was 45 minutes.                                                                          Violetta Tsang, DNP, APRN, CNP  Thank you for the opportunity to participate in the care of this patient.   Please feel free to call me with any questions or concerns.  Phone number [phone].

## 2023-01-12 NOTE — PROGRESS NOTES
CLINICAL NUTRITION SERVICES - REASSESSMENT NOTE      Recommendations Ordered by Registered Dietitian (RD):   ordered chocolate [employer] protein drink with dinner per pt request    Malnutrition:   % Weight Loss:  Weight loss does not meet criteria for malnutrition - wt appears to fluctuate 110-120# over the past 3 months (suspect 2' to Anorexia and GI issues)  % Intake:  <75% for > 7 days (moderate malnutrition) - poor PO intake, variable TF difficulties    Subcutaneous Fat Loss:  None observed  Muscle Loss:  None observed  Fluid Retention:  None noted    Malnutrition Diagnosis: Patient does not meet two of the established criteria necessary for diagnosing malnutrition but is at risk for malnutrition       EVALUATION OF PROGRESS TOWARD GOALS   Diet: Regular (safe tray with utensils)    Nutrition Support: Enteral  Type of Feeding Tube: NJ  Enteral Frequency:  Continuous  Enteral Regimen: Osmolite 1.5 @ 40 mL/hr (goal rate)  Total Enteral Provisions: 1440 kcals (27 kcal/kg), 60 g PRO (1.1 g/kg), 731 ml free H20, 195 g CHO, and 0 g fiber daily.  Free Water Flush: 60 mL every 4 hrs    - 1/12: TF held last night d/t stomach discomfort   - 1/11: TF advanced to 30 mL/hr, then held d/t nausea   - 1/10: FT became dislodged during fall --> TF held. FT later replaced by IR and restarted @ 20 mL/hr   - 1/9: TF advanced to goal rate      Intake/Tolerance:  - visited with pt this morning in her room. Pt initially wondering why RD was visiting with her- RD explained that nutrition team has been following for her current TF and pt was understanding. Pt reported she has been nauseous lately which has impacted her receiving her TF and eating anything. Pt reported having TF at home and receiving Knowledge Factor formula. Pt reported she was started on TF to start increasing her wt which has been historically variable. Was meeting all her estimated nutrition needs from TF as pt reported her doctor told her to obtain it from TF rather than  PO intake. At home, has very minimal PO intake. Pt did request a protein drink while admitted. Reported having difficulty tolerating Ensure, is lactose-intolerant. Would like to have chocolate [employer] protein drink with dinner.   - per nursing flow sheet, % intakes. Mostly 25%  - per health touch, pt has received 1 small meal since 1/9        ASSESSED NUTRITION NEEDS:  Dosing Weight: 54.3 kg (1/8)  Estimated Energy Needs: 6159-9404+ (25-30 kcal/kg)  Justification: maintenance vs repletion   Estimated Protein Needs: 55-65+ (1-1.2+ g/kg)  Justification: preservation of lean body mass vs repletion  Estimated Fluid Needs: 1 mL/Kcal for maintenance       NEW FINDINGS:   General:   - 1/10: per MD, Patient is cleared for discharge from a medical standpoint and will need inpatient psychiatry placement  - 1/10: significant event called for witnessed fall  - 1/9: code blue called for self limiting non epileptic events     Weight: stable wt this admit     Medications: multivitamins w minerals liquid, protonix, senna-docusate, vitamin C, vitamin D3    GI/Nutrition:   - last BM x1 today and x2 yesterday   - 1/11: per MD note, Currently they would recommend against placing a percutaneous J-tube due to higher risk of manipulation and complications.  Patient does not have significant GI structural issues and it would be preferable to push oral intake to see if she can eventually come off of Nj tube  - 1/9: abdomen X-ray = did not show any significant stool burden in colon.    - per care everywhere, post-pyloric FT was placed 12/21/22 d/t N/V. Pt is followed by [facility] and receives IVF 3x/week    Previous Goals:   EN to meet % estimated needs while po intake is minimal  Evaluation: Not met    Previous Nutrition Diagnosis:   Inadequate enteral nutrition infusion related to constipation as evidenced by TF running at 75% goal rate  Evaluation: Improving, updated below      MALNUTRITION  % Weight Loss:  Weight loss does not  meet criteria for malnutrition - wt appears to fluctuate 110-120# over the past 3 months (suspect 2' to Anorexia and GI issues)  % Intake:  <75% for > 7 days (moderate malnutrition) - poor PO intake, variable TF difficulties    Subcutaneous Fat Loss:  None observed  Muscle Loss:  None observed  Fluid Retention:  None noted    Malnutrition Diagnosis: Patient does not meet two of the established criteria necessary for diagnosing malnutrition but is at risk for malnutrition      CURRENT NUTRITION DIAGNOSIS  Inadequate oral intake related to nausea/vomiting, need for TF to supplement PO intake, eating disorder (anorexia) as evidenced by minimal PO intake this admit, various interruptions to TF recently d/t FT removal and nausea     INTERVENTIONS  Recommendations / Nutrition Prescription  ordered chocolate Purple protein drink with dinner per pt request       Implementation  Medical Food Supplement    Goals  TF + PO to meet % estimated needs over the next 5-7 days.    MONITORING AND EVALUATION:  Progress towards goals will be monitored and evaluated per protocol and Practice Guidelines      Tameka Farrell RD, LD

## 2023-01-12 NOTE — PROGRESS NOTES
"  Spiritual Health Services Progress Note  [place] [id number]    Saw pt Thea Castle per on-call request for emotional/spiritual support. Pt is Islam.    Patient/Family Understanding of Illness and Goals of Care - Pt shared she has been admitted for suicide attempt and, though feeling physically better, continues to struggle with shame and \"thoughts of harming myself\".       Distress and Loss - Thea noted difficulty in her relationship with her mother.  She also shared about losses - her grandmother and several friends with eating disorders.    Strengths, Coping, and Resources -Thea named many people who have been important to her and care about her - her \"chosen family\".   She is particularly grateful for her caregivers who have kept her safe. She described having removed her feeding tube, hiding it and then having the courage to tell staff.  Thea said \"I want to live but sometimes I don't believe it\".     Meaning, Beliefs, and Spirituality - Identifies as Islam.  Thea reflected on her questions about guardian angels and if her grandmother is among them. She welcomed a time of prayer and blessing.      Provided reflective conversation, provided encouragement and took time with Thea to create a list in her journal of all the people who care about her.   Provided guidance on breath work as a means to find peace in times of distress.     Plan of Care - Pt is requesting follow up by unit . Spiritual Health remains available throughout hospital stay.      Dionicio Oliveros MA  Staff   [phone]    [facility] routine referrals *[phone]  [facility] available 24/7 for emergent requests/referrals, either by having the on-call  paged or by entering an ASAP/STAT consult in Epic   (this will also page the on-call ).        "

## 2023-01-12 NOTE — PROGRESS NOTES
"SPIRITUAL HEALTH SERVICES Progress Note  [facility]    Saw pt Thea Franceslemuel Castle per follow up. I followed up with pt to provide emotional/spiritual support and to communicate a plan of care for  support. [facility] will plan to visit pt on Mondays, Wednesdays, and Fridays. I informed pt that this schedule allows us to be consistent in providing her care and allows her to know when to expect a .    Patient/Family Understanding of Illness and Goals of Care - Pt shared that she was \"scared\" about having a gastrointestinal procedure this afternoon. Pt stated that she \"didn't really feel like talking today.\"    Distress and Loss   - Pt shared distress about her upcoming procedure. She stated, \"I'm feeling really scared because the test is hard on my stomach and I have a fear of throwing up.\"  - Pt expressed fear that she had done something wrong for [facility] to make a visitation schedule, making reference to experiences she has had at other care facilities. I offered clarification and empathetic listening, and answered pt's questions.    Strengths, Coping, and Resources - I taught pt a few simple breathing exercises to utilize as she goes into her procedure this afternoon.     Meaning, Beliefs, and Spirituality - Pt requested prayer for her upcoming procedure.    Plan of Care - Chaplains to follow up with pt on Mondays, Wednesdays, and Fridays throughout her admission. Please consult in the case of emergent needs or crisis events.    Sarah Johanson  Chaplain Resident  Spiritual Health Services  Pager: [phone]     [facility] available 24/7 for emergent requests/referrals, either by having the on-call  paged or by entering an ASAP/STAT consult in Epic (this will also page the on-call ).   "

## 2023-01-12 NOTE — PROVIDER NOTIFICATION
"Jose Armando Richmond    Via- [facility]    \"C Tin- 729. Patient reports black stool w/ increased amount of blood from anus, accompanied by some shooting pain. Patient has history of GI bleed & bowel prolapse. Stat CT ? Let me know what you think, thanks Jaclyn cordero\"  "

## 2023-01-12 NOTE — PROGRESS NOTES
Notified provider about indwelling pantoja catheter discussed removal or continued need.    Did provider choose to remove indwelling pantoja catheter? No    Provider's pantoja indication for keeping indwelling pantoja catheter: retention    Is there an order for indwelling pantoja catheter? yes    *If there is a plan to keep pantoja catheter in place at discharge daily notification with provider is not necessary.

## 2023-01-12 NOTE — CONSULTS
"  Triage and Transition - Consult and Liaison     Thea Castle  January 12, 2023    Session start: 12:02 pm  Session end: 12:39 pm  Session duration in minutes: 36 min  CPT utilized: 59685 - Psychotherapy (with patient) - 30 (16-37*) min  Patient was seen virtually ([employer] cart or other teleconferencing device).  Anticipated number of sessions or this episode of care: 1-4    Diagnosis:   296.22 (F32.1)  Major Depressive Disorder, Single Episode, Moderate _, by history;  301.83 (F60.3) Borderline Personality Disorder, by history;     Plan/Recommendations:     Psych NP to see patient in person tomorrow to check in regarding medications.     Patient expressed interest in possible IRTS referral at discharge. Recommend social work begin process of finding IRTS that would be able to take patient with tube feeding.    Will send worksheets to unit for patient.     Discussed with patient best way to assess mental health by medical staff while on unit. Plan to ask patient to rate strength of suicidal thoughts from 1-10, if patient indicates a rating of 6 or higher (which she considers high), then can ask \"have you thought of any plans to act on it while here in the hospital?\".     Please enter another psychiatry if further visits are needed. Patients are not followed by Psychiatry C&L Service unless otherwise indicated.     Reason for consult: Thea is 22 year old White  female . Psychiatry consult was requested due to suicidal ideation, depression therapeutic follow up. Patient was seen by [facility] Consult & Liaison team.     Presenting problem: Patient admitted to the emergency room following an attempted overdose on 30 tablets of gabapentin. Patient recommended inpatient psych, however, due to feeding tube, she is not able to transfer, she is now convalescing at [facility] station 73. Please see initial [facility] Crisis Assessment completed by Kenyon Villanueva on 01/03/23 for complete assessment " "information    Session Summary: Patient appeared less withdrawn today, was sitting up, did not hide herself in blankets or with stuffed animal. Patient reports she has been really emotional, up and down.. Patient states last night started feeling this way, talked with  and that helped. Patient reports she made list of people who care about her. Patient reports she tried to do deep breathing. Patient reports when feeling anxious it is hard to catch breathe, hyper ventilling, crying . Patient reports shutting everyone out during panic attack. Things that help her: Jew music and prayer. Deep breathing. We engaged in [address] grounding techniques and I encouraged her to use this. Discussed plan to send this information to unit for patient ot have and review. She reports she posted on Facebook about how she is doing and she reconnected with her \"choosen mom\". She reports struggling with grief lately. Lost her friend chelly to cardiac arrest due to eating disorder in November while in hospital. Patient states missed . Feels suicidal about wanting to go to heaven with them. We reviewed these feelings. She reports she was working with a therapist left  [facility] after 3 years. She had made a plan with this therapist to write out things her family said that are hurtful. She states once her therapist left she never processed that with anyone and still has them written down. States she would eventually like to do this with someone.     I prompted patient to discuss plan, stating we would not be discharging her today but need to think about long term plan. I reviewed plan to possible consider an IRTS. Patient states her friend recently went to an IRTS and had a good experience. Would like to ask her friend where she went. Would consider this as an option but does not want to go today.  I explain that it would not be today but something we would need to plan for. She states she doesn't want people to think that she " "is \"camping out\" here.     Mental Status Exam   Affect: Appropriate  Appearance: Appropriate   Attention Span/Concentration: Attentive    Eye Contact: Engaged  Fund of Knowledge: Appropriate   Language /Speech Content: Fluent  Language /Speech Volume: Soft   Language /Speech Rate/Productions: Normal   Recent Memory: Intact  Remote Memory: Intact  Mood: Sad   Orientation:   Person: Yes   Place: Yes  Time of Day: Yes   Date: Yes   Situation (Do they understand why they are here?): Yes   Psychomotor Behavior: Normal   Thought Content: Clear and Suicidal  Thought Form: Tangential    Current medications:   Current Facility-Administered Medications   Medication     acetaminophen (TYLENOL) tablet 650 mg    Or     acetaminophen (TYLENOL) Suppository 650 mg     ARIPiprazole (ABILIFY) tablet 10 mg     bisacodyl (DULCOLAX) suppository 10 mg     dextrose 10% infusion     diltiazem 2% in PLO gel 0.25 g     drospirenone-ethinyl estradiol (DIANA) 3-0.02 MG per tablet 1 tablet     DULoxetine (CYMBALTA) DR capsule 60 mg     hydrOXYzine (ATARAX) syrup 25 mg    Or     hydrOXYzine (ATARAX) tablet 25 mg     lidocaine (LMX4) cream     lidocaine 1 % 0.1-1 mL     linaclotide (LINZESS) capsule 290 mcg     LORazepam (ATIVAN) tablet 1 mg     melatonin tablet 5 mg     multivitamins w/minerals liquid 15 mL     ondansetron (ZOFRAN ODT) ODT tab 4 mg    Or     ondansetron (ZOFRAN) injection 4 mg     pantoprazole (PROTONIX) 2 mg/mL suspension 40 mg     polyethylene glycol (MIRALAX) Packet 17 g     promethazine (PHENERGAN) half-tab 12.5 mg     QUEtiapine (SEROquel) half-tab 12.5 mg     senna-docusate (SENOKOT-S/PERICOLACE) 8.6-50 MG per tablet 1 tablet     silver sulfADIAZINE (SILVADENE) 1 % cream     sodium chloride (PF) 0.9% PF flush 3 mL     sodium chloride (PF) 0.9% PF flush 3 mL     traZODone (DESYREL) tablet 50 mg     vitamin C (ASCORBIC ACID) tablet 250 mg     Vitamin D3 (CHOLECALCIFEROL) tablet 25 mcg     witch hazel-glycerin (TUCKS) pad "         MeasurableTreatment Goal(s) related to diagnosis / functional impairment(s)  Goal 1: Patient will alleviate the suicidal impulses/ideation and return to the highest level of previous daily functioning.      Objective #A                Patient will participate in therapy for an identified emotional problem resulting in suicidal thoughts.  Status: New as of January 10, 2023     Intervention(s)  LMHP will assess the severity of the level of impairment to the client s functioning to determine the appropriate level of care.        LMHP will encourage the patient to express feelings related to their suicidal ideation in order to clarify them and increase insight as to the cause for them.       LMHP will assist the patient in developing coping strategies for suicidal ideation.        Goal 2: Patient will accept placement/referral to an appropriate level of care to safely address the suicidal crisis.      Objective #A                Patient will state the strength of the suicidal feeling, frequency of the thoughts and the detail of the plans   Status: New as of January 10, 2023     Intervention(s)  LMHP will facilitate conversations about options and encourage patient to engage in services.              Therapeutic intervention and progress:  Therapeutic intervention consisted of building therapeutic rapport, validation, engaging in learning/practicing coping skills, active problem solving and crisis management. Patient is making progress towards treatment goals as evidenced by affect improving.     Anne Melendez, [facility]   Triage and Transition - Consult and Liaison   [phone]
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
## 2023-01-12 NOTE — PROVIDER NOTIFICATION
"Noah- Romeo Richmond    Via- [facility]    \"C Tin- 729. Patient having nausea for a couple hours. Zofran and promethazine given- no result. Thinking it may be due to increased NG tube feed. What should we do? Thanks, Jaclyn Mcgrath RN\"      "
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
## 2023-01-13 ENCOUNTER — APPOINTMENT (OUTPATIENT)
Dept: NUCLEAR MEDICINE | Facility: CLINIC | Age: 23
DRG: 918 | End: 2023-01-13
Attending: NURSE PRACTITIONER
Payer: COMMERCIAL

## 2023-01-13 LAB — POTASSIUM BLD-SCNC: 3.9 MMOL/L (ref 3.4–5.3)

## 2023-01-13 PROCEDURE — 343N000001 HC RX 343: Performed by: INTERNAL MEDICINE

## 2023-01-13 PROCEDURE — 250N000013 HC RX MED GY IP 250 OP 250 PS 637: Performed by: HOSPITALIST

## 2023-01-13 PROCEDURE — 250N000013 HC RX MED GY IP 250 OP 250 PS 637: Performed by: INTERNAL MEDICINE

## 2023-01-13 PROCEDURE — 99233 SBSQ HOSP IP/OBS HIGH 50: CPT | Performed by: REGISTERED NURSE

## 2023-01-13 PROCEDURE — 36415 COLL VENOUS BLD VENIPUNCTURE: CPT | Performed by: INTERNAL MEDICINE

## 2023-01-13 PROCEDURE — 78264 GASTRIC EMPTYING IMG STUDY: CPT

## 2023-01-13 PROCEDURE — 99232 SBSQ HOSP IP/OBS MODERATE 35: CPT | Performed by: INTERNAL MEDICINE

## 2023-01-13 PROCEDURE — 250N000011 HC RX IP 250 OP 636: Performed by: INTERNAL MEDICINE

## 2023-01-13 PROCEDURE — 84132 ASSAY OF SERUM POTASSIUM: CPT | Performed by: INTERNAL MEDICINE

## 2023-01-13 PROCEDURE — A9541 TC99M SULFUR COLLOID: HCPCS | Performed by: INTERNAL MEDICINE

## 2023-01-13 PROCEDURE — 120N000001 HC R&B MED SURG/OB

## 2023-01-13 RX ADMIN — ARIPIPRAZOLE 10 MG: 10 TABLET ORAL at 07:34

## 2023-01-13 RX ADMIN — MELATONIN TAB 3 MG 5 MG: 3 TAB at 21:16

## 2023-01-13 RX ADMIN — ACETAMINOPHEN 650 MG: 325 TABLET, FILM COATED ORAL at 21:16

## 2023-01-13 RX ADMIN — Medication 25 MCG: at 07:34

## 2023-01-13 RX ADMIN — Medication 1.2 MILLICURIE: at 08:00

## 2023-01-13 RX ADMIN — Medication 40 MG: at 07:35

## 2023-01-13 RX ADMIN — Medication 12.5 MG: at 18:03

## 2023-01-13 RX ADMIN — ONDANSETRON 4 MG: 4 TABLET, ORALLY DISINTEGRATING ORAL at 21:09

## 2023-01-13 RX ADMIN — Medication 40 MG: at 16:29

## 2023-01-13 RX ADMIN — Medication 0.25 G: at 16:29

## 2023-01-13 RX ADMIN — ACETAMINOPHEN 650 MG: 325 TABLET, FILM COATED ORAL at 03:49

## 2023-01-13 RX ADMIN — Medication 0.25 G: at 07:36

## 2023-01-13 RX ADMIN — ACETAMINOPHEN 650 MG: 325 TABLET, FILM COATED ORAL at 13:24

## 2023-01-13 RX ADMIN — LINACLOTIDE 290 MCG: 290 CAPSULE, GELATIN COATED ORAL at 09:57

## 2023-01-13 RX ADMIN — SENNOSIDES AND DOCUSATE SODIUM 1 TABLET: 8.6; 5 TABLET ORAL at 21:25

## 2023-01-13 RX ADMIN — POLYETHYLENE GLYCOL 3350 17 G: 17 POWDER, FOR SOLUTION ORAL at 13:17

## 2023-01-13 RX ADMIN — Medication 0.25 G: at 22:30

## 2023-01-13 RX ADMIN — DULOXETINE HYDROCHLORIDE 60 MG: 60 CAPSULE, DELAYED RELEASE ORAL at 07:34

## 2023-01-13 RX ADMIN — Medication 12.5 MG: at 21:09

## 2023-01-13 RX ADMIN — LORAZEPAM 1 MG: 1 TABLET ORAL at 21:09

## 2023-01-13 RX ADMIN — SENNOSIDES AND DOCUSATE SODIUM 1 TABLET: 8.6; 5 TABLET ORAL at 09:56

## 2023-01-13 RX ADMIN — Medication 250 MG: at 07:34

## 2023-01-13 RX ADMIN — ONDANSETRON 4 MG: 2 INJECTION INTRAMUSCULAR; INTRAVENOUS at 13:17

## 2023-01-13 RX ADMIN — TRAZODONE HYDROCHLORIDE 50 MG: 50 TABLET ORAL at 21:16

## 2023-01-13 RX ADMIN — MULTIVITAMIN 15 ML: LIQUID ORAL at 07:35

## 2023-01-13 RX ADMIN — ONDANSETRON 4 MG: 2 INJECTION INTRAMUSCULAR; INTRAVENOUS at 06:02

## 2023-01-13 RX ADMIN — SILVER SULFADIAZINE: 10 CREAM TOPICAL at 09:57

## 2023-01-13 ASSESSMENT — ACTIVITIES OF DAILY LIVING (ADL)
ADLS_ACUITY_SCORE: 26
ADLS_ACUITY_SCORE: 26
ADLS_ACUITY_SCORE: 28
ADLS_ACUITY_SCORE: 26
ADLS_ACUITY_SCORE: 28
ADLS_ACUITY_SCORE: 26
ADLS_ACUITY_SCORE: 28
ADLS_ACUITY_SCORE: 26
ADLS_ACUITY_SCORE: 26
ADLS_ACUITY_SCORE: 28
ADLS_ACUITY_SCORE: 26
ADLS_ACUITY_SCORE: 28

## 2023-01-13 NOTE — PROGRESS NOTES
"GI PROGRESS NOTE  1/12/2023  Thea Castle  2000  1729/1729-01    Subjective:     Patient reports did not tolerate gastric emptying study today, took two bites and during third bite reports projectile vomiting.   She is worried/anxious. Feels she does not tolerate feeding running higher than 40ml per hour. Did not tolerate [employer] orally.       Round #2: Updated patient of GI recommendations to remove NJ tube, explained rational of necessity. Patient shut down and states she would not eat then and yelled to speak with hospital team.       Objective:     Blood pressure 127/86, pulse 83, temperature 98.4  F (36.9  C), temperature source Oral, resp. rate 16, height 1.626 m (5' 4\"), weight 54 kg (119 lb), SpO2 99 %, not currently breastfeeding.    Body mass index is 20.43 kg/m .  Gen: lying in bed, restless/anxious at times 1:1  GI: non-distended   Neuro: alert and oriented  Psych: flat mood    Laboratory:  BMP  Recent Labs   Lab 01/10/23  1021 01/09/23  1200 01/09/23  0843 01/08/23  1920   NA  --   --  137 137   POTASSIUM 4.0  --  4.2 3.7   CHLORIDE  --   --  103 102   DENNIS  --   --  9.0 8.7   CO2  --   --  28 28   BUN  --   --  10 10   CR  --   --  0.72 0.69   GLC  --  177* 117* 98     CBC  Recent Labs   Lab 01/11/23  2311 01/08/23  1920   WBC 6.6 8.1   RBC 3.87 3.81   HGB 11.5* 11.4*   HCT 34.8* 34.3*   MCV 90 90   MCH 29.7 29.9   MCHC 33.0 33.2   RDW 13.2 13.1    308     INRNo lab results found in last 7 days.   LFTs  Recent Labs   Lab Test 01/03/23  1427 04/14/21  0727 08/13/20  0728 03/23/19  2148 03/23/19  2140   ALBUMIN 3.4 3.0* 3.7   < >  --    BILITOTAL 0.2 0.3 0.5   < >  --    ALT 26 19 24   < >  --    AST 25 11 24   < >  --    PROTEIN  --   --   --   --  Negative    < > = values in this interval not displayed.     Assessment:   1. Suicidal attempt with ingestion of gabapentin. Undergoing evaluation for inpatient mental health transfer.    2. Chronic constipation secondary to slow " transit and pelvic floor dysfunction. Managed with Linzess, Motegrity, and Colace at home. Motegrity has been discontinued to due concern regarding risk of suicidal thoughts with this medication. No significant stool burden on AXR.   3. Malnutrition. Secondary to severe GI symptoms from delayed gastric motility without clear evidence/prealbumin normal. Unable to find record of gastric emptying study. Attempted again today but vomited after two bites? suspected habitual vs rumination. Gastrostomy or jejunostomy tube is not recommended, due to risk of self harm and complications. At this point feel NJ is not medically necessary, would remove NJ and encouraging oral intake and ensuring optimal management of lower GI motility.       Plan:   1.  Gastric emptying study ordered today, did not tolerate unsure of necessity to repeat (suspected habitual vs rumination)  2.  Continue Linzess 290mcg daily   3.  Continue Diltiazem ointment for anal fissure  4.   GI highly recommends removal of NJ tube, at this point feel underlying psych is priority, further in consideration of medical necessity, needs to be encouraged to be on full liquid diet.   5. Consider adding promethazine 12.5mg BID to help with nausea/vomiting/promotility agent PRN  6.  Outpatient GI/NMDC clinic follow up discussed  7. GI will see Monday    Discussed with Dr. Robert, GI staff physician, plan as been updated.    Total time spent in chart review, medical discussion, examination, and documentation was 30 minutes.                                                                       Jennyfer Brown   DNP, APRN, CNP  Thank you for the opportunity to participate in the care of this patient.   Please feel free to call me with any questions or concerns.  Phone number [phone].

## 2023-01-13 NOTE — PROGRESS NOTES
[id number]  Pt here with suicidal ideation- sitter in place. A&O X4. Neuros intact, can be shaky at times. VSS. Regular diet, thin liquids- poor appetite. Does have TF running at goal 40/hr.- will be NPO at midnight for gastric emptying study. Takes pills crushed in tube. Up with Ax1GB. Stomach cramping pain managed with warm packs. Pt did have emesis episode this evening. Pt was very tearful this evening, RN provided therapeutic communication and listening. Smith in for retention. Pt scoring green on the Aggression Stop Light Tool. Plan for EGD tomorrow. Discharge pending placement.

## 2023-01-13 NOTE — PLAN OF CARE
Pt admitted on 1-3-23 for suicidal ideations. NG feeding stopped at midnight per order for gastric emptying study today. Pt was preferring meds crushed via NG, but was able to tolerate oral Tylenol this morning. Complaint of nausea this morning, refusing oral.  IV went bed this morning, new one placed. Moves as A1/GB. Per gastroenterology note, if GES does not reveal gastroparesis, would rather recommend encouraging oral intake and ensuring optimal management of lower GI motility.  Smith for retention, patent.
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
## 2023-01-13 NOTE — PROGRESS NOTES
"SPIRITUAL HEALTH SERVICES Progress Note  [facility]    Saw pt Thea Castle per paged by unit for support.    Patient/Family Understanding of Illness and Goals of Care - Thea was tearful as she shared that she had just spoken with the GI doctor and not being able to complete the testing \"because I threw up.\"       Distress and Loss -     Thea said that \"today is the last you'll see me\" and it was her plan to \"leave today.\" When asked where she planned to go, responded by saying \"I don't know.\" Later in the conversation, Thea agreed to stay in the hospital.    Thea was crying when I arrived and expressed being upset after talking with her mother last night. She named not feeling supported by her mother \"she thinks I'm putting my health before my school work.\"       Strengths, Coping, and Resources -     Thea was able to name \"times that I've been brave\" and choosing to care for herself and her safety.     Thea shared the list of people she has created who care about her and are supportive. She expressed being grateful for friends who have come to the hospital and she is looking forward to a visit this afternoon from a professor from [employer].      Meaning, Beliefs, and Spirituality - Micheal shared her tattoos that include \"Beloved\" written in Telugu and the Maximus 41:10 that was \"in my grandmother's handwriting.\" Read Psa 46 and shared a breath prayer of \"Be Still and Know that I Am God.\"     I offered spiritual and emotional support through reflective listening that affirmed emotions, experience, and meaning. Offered assurance through prayer which incorporated conversational themes.      Plan of Care - [facility] will follow up on Monday, per the M, W, F plan for visits. [facility] remains available for support if emergent needs arise.    Nichole Aguilera MDiv  Associate   Pager [phone]  [facility] pager [phone]  [facility] phone [phone]    [facility] available 24/7 for " emergent requests/referrals, either by having the on-call  paged or by entering an ASAP/STAT consult in Epic (this will also page the on-call ).

## 2023-01-14 LAB
ALBUMIN UR-MCNC: NEGATIVE MG/DL
APPEARANCE UR: ABNORMAL
BACTERIA #/AREA URNS HPF: ABNORMAL /HPF
BILIRUB UR QL STRIP: NEGATIVE
COLOR UR AUTO: ABNORMAL
GLUCOSE UR STRIP-MCNC: NEGATIVE MG/DL
HGB UR QL STRIP: NEGATIVE
KETONES UR STRIP-MCNC: NEGATIVE MG/DL
LEUKOCYTE ESTERASE UR QL STRIP: ABNORMAL
MUCOUS THREADS #/AREA URNS LPF: PRESENT /LPF
NITRATE UR QL: NEGATIVE
PH UR STRIP: 7 [PH] (ref 5–7)
POTASSIUM BLD-SCNC: 4 MMOL/L (ref 3.4–5.3)
RBC URINE: 4 /HPF
SP GR UR STRIP: 1.01 (ref 1–1.03)
UROBILINOGEN UR STRIP-MCNC: NORMAL MG/DL
WBC URINE: 51 /HPF

## 2023-01-14 PROCEDURE — 250N000011 HC RX IP 250 OP 636: Performed by: INTERNAL MEDICINE

## 2023-01-14 PROCEDURE — 120N000001 HC R&B MED SURG/OB

## 2023-01-14 PROCEDURE — 250N000013 HC RX MED GY IP 250 OP 250 PS 637: Performed by: INTERNAL MEDICINE

## 2023-01-14 PROCEDURE — 99207 PR NO BILLABLE SERVICE THIS VISIT: CPT | Performed by: NURSE PRACTITIONER

## 2023-01-14 PROCEDURE — 250N000013 HC RX MED GY IP 250 OP 250 PS 637: Performed by: HOSPITALIST

## 2023-01-14 PROCEDURE — 99232 SBSQ HOSP IP/OBS MODERATE 35: CPT | Performed by: INTERNAL MEDICINE

## 2023-01-14 PROCEDURE — 84132 ASSAY OF SERUM POTASSIUM: CPT | Performed by: INTERNAL MEDICINE

## 2023-01-14 PROCEDURE — 36415 COLL VENOUS BLD VENIPUNCTURE: CPT | Performed by: INTERNAL MEDICINE

## 2023-01-14 PROCEDURE — 87088 URINE BACTERIA CULTURE: CPT | Performed by: INTERNAL MEDICINE

## 2023-01-14 PROCEDURE — 81001 URINALYSIS AUTO W/SCOPE: CPT | Performed by: INTERNAL MEDICINE

## 2023-01-14 RX ORDER — METOCLOPRAMIDE HYDROCHLORIDE 5 MG/ML
5 INJECTION INTRAMUSCULAR; INTRAVENOUS EVERY 8 HOURS PRN
Status: DISCONTINUED | OUTPATIENT
Start: 2023-01-14 | End: 2023-02-07 | Stop reason: HOSPADM

## 2023-01-14 RX ADMIN — LINACLOTIDE 290 MCG: 290 CAPSULE, GELATIN COATED ORAL at 09:13

## 2023-01-14 RX ADMIN — DULOXETINE HYDROCHLORIDE 60 MG: 60 CAPSULE, DELAYED RELEASE ORAL at 09:13

## 2023-01-14 RX ADMIN — METOCLOPRAMIDE HYDROCHLORIDE 5 MG: 5 INJECTION INTRAMUSCULAR; INTRAVENOUS at 15:22

## 2023-01-14 RX ADMIN — Medication 40 MG: at 17:52

## 2023-01-14 RX ADMIN — MELATONIN TAB 3 MG 5 MG: 3 TAB at 21:47

## 2023-01-14 RX ADMIN — POLYETHYLENE GLYCOL 3350 17 G: 17 POWDER, FOR SOLUTION ORAL at 11:59

## 2023-01-14 RX ADMIN — Medication 0.25 G: at 09:13

## 2023-01-14 RX ADMIN — ACETAMINOPHEN 650 MG: 325 TABLET, FILM COATED ORAL at 11:59

## 2023-01-14 RX ADMIN — LORAZEPAM 1 MG: 1 TABLET ORAL at 21:38

## 2023-01-14 RX ADMIN — SILVER SULFADIAZINE: 10 CREAM TOPICAL at 09:13

## 2023-01-14 RX ADMIN — Medication 12.5 MG: at 21:38

## 2023-01-14 RX ADMIN — Medication 0.25 G: at 21:40

## 2023-01-14 RX ADMIN — ONDANSETRON 4 MG: 2 INJECTION INTRAMUSCULAR; INTRAVENOUS at 06:49

## 2023-01-14 RX ADMIN — Medication 12.5 MG: at 07:55

## 2023-01-14 RX ADMIN — ARIPIPRAZOLE 10 MG: 10 TABLET ORAL at 09:13

## 2023-01-14 RX ADMIN — MULTIVITAMIN 15 ML: LIQUID ORAL at 09:13

## 2023-01-14 RX ADMIN — Medication 40 MG: at 09:14

## 2023-01-14 RX ADMIN — SENNOSIDES AND DOCUSATE SODIUM 1 TABLET: 8.6; 5 TABLET ORAL at 09:13

## 2023-01-14 RX ADMIN — ONDANSETRON 4 MG: 2 INJECTION INTRAMUSCULAR; INTRAVENOUS at 22:23

## 2023-01-14 RX ADMIN — Medication 25 MCG: at 09:13

## 2023-01-14 RX ADMIN — SENNOSIDES AND DOCUSATE SODIUM 1 TABLET: 8.6; 5 TABLET ORAL at 21:38

## 2023-01-14 RX ADMIN — Medication 250 MG: at 09:13

## 2023-01-14 RX ADMIN — TRAZODONE HYDROCHLORIDE 50 MG: 50 TABLET ORAL at 21:38

## 2023-01-14 ASSESSMENT — ACTIVITIES OF DAILY LIVING (ADL)
ADLS_ACUITY_SCORE: 26
ADLS_ACUITY_SCORE: 31
ADLS_ACUITY_SCORE: 26
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
## 2023-01-14 NOTE — PROVIDER NOTIFICATION
Pt vomited a small amount after lunch, PRN nausea meds have been exhausted. Paged Dr. Call, orders received for PRN Reglan.     Also paged Dr. Call to address pantoja catheter, urine looks cloudy and has sediment, pt also reporting some burning/itching. Orders received to change catheter and get a UA/UC.

## 2023-01-14 NOTE — CONSULTS
"BRIEF NUTRITION NOTE:    Received MD Consult \"Psychiatry recommends education for food reintroduction after TF.\"  A full Nutrition Reassessment was completed 1/12.  See note for details.    NEW FINDINGS:  TF via NG was discontinued yesterday.  Plan is to continue to hold TF, but keep the FT in place.  Last recorded weight 1/9 was 54 kg.  K 4 (NL).    No meals received 1/12.  On 1/13, pt ordered dinner only (chicken tenders and white rice), but ate only very little.  Today for breakfast she ordered orange juice and rice krispies, followed by small emesis.  Today for lunch she ordered orange juice, chicken tenders, white rice, and carrots, followed by emesis that was larger.  She does not tolerate the chocolate Gavi Tracour 1.0 supplement.  She is receptive to trying different CL (lactose free) supplements with meals.  If the meal goes poorly, she will attempt to at least drink the supplement.     INTERVENTIONS:  - Nutrition Education - Discussed strategy of small frequent meals and  solids from liquids when feeling nauseous. Discussed the important use of oral liquid nutritional supplements when poor solid food intake.  - Medical food supplement therapy - Cancel Gavi Farms at dinner. Send CL supplements as follows:  Breakfast:  Berry Ensure Clear  Lunch:  Cherry Gelatein Plus  Dinner:  Strawberry Kiwi Boost Soothe.    Agnieszka Dupont, RD, LD, CNSC          "

## 2023-01-14 NOTE — PROGRESS NOTES
"Pt here with suicidal ideation. A&O x4. VSS. Tele NSR. Regular diet, thin liquids. Takes pills whole. NJ tube in place, oral intake encouraged.  Up with SBRYAN Smith for retention. Denies pain. Pt scoring green on the Aggression Stop Light Tool. Psych, spiritual health and GI following. Sitter at bedside. Discharge pending.    8351 addendum: Pt reports nausea. IV zofran given. Pt also stated \"If I'm being honest, I am feeling pretty suicidal this morning.\" Still does not have a plan for her hospital stay. Sitter continues.   "
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
## 2023-01-14 NOTE — PLAN OF CARE
Goal Outcome Evaluation:      Plan of Care Reviewed With: patient    Pt here with suicide attempt. A&0x4, VSS on RA. Generalized weakness. Pleasant, calm and cooperative today. Miralax and Tylenol given for constipation and reported headache. TF clamped - used occasionally for some meds (pt needs encouragement to swallow meds or she requests them to be via NG). Zofran and Phenergan given for nausea - small emesis after breakfast and lunch. Smith for retention, replaced for cloudy urine and UA sent. Sitter at the bedside at all times. Now needing inpatient psych, discharge pending.

## 2023-01-15 PROCEDURE — 250N000013 HC RX MED GY IP 250 OP 250 PS 637: Performed by: HOSPITALIST

## 2023-01-15 PROCEDURE — 250N000013 HC RX MED GY IP 250 OP 250 PS 637: Performed by: INTERNAL MEDICINE

## 2023-01-15 PROCEDURE — 99232 SBSQ HOSP IP/OBS MODERATE 35: CPT | Performed by: INTERNAL MEDICINE

## 2023-01-15 PROCEDURE — 120N000001 HC R&B MED SURG/OB

## 2023-01-15 PROCEDURE — 250N000011 HC RX IP 250 OP 636: Performed by: INTERNAL MEDICINE

## 2023-01-15 RX ADMIN — ACETAMINOPHEN 650 MG: 325 TABLET, FILM COATED ORAL at 08:48

## 2023-01-15 RX ADMIN — LORAZEPAM 1 MG: 1 TABLET ORAL at 21:20

## 2023-01-15 RX ADMIN — POLYETHYLENE GLYCOL 3350 17 G: 17 POWDER, FOR SOLUTION ORAL at 08:48

## 2023-01-15 RX ADMIN — DULOXETINE HYDROCHLORIDE 60 MG: 60 CAPSULE, DELAYED RELEASE ORAL at 08:23

## 2023-01-15 RX ADMIN — ACETAMINOPHEN 650 MG: 325 TABLET, FILM COATED ORAL at 21:20

## 2023-01-15 RX ADMIN — ARIPIPRAZOLE 10 MG: 10 TABLET ORAL at 08:23

## 2023-01-15 RX ADMIN — TRAZODONE HYDROCHLORIDE 50 MG: 50 TABLET ORAL at 21:20

## 2023-01-15 RX ADMIN — Medication 250 MG: at 08:23

## 2023-01-15 RX ADMIN — SENNOSIDES AND DOCUSATE SODIUM 1 TABLET: 8.6; 5 TABLET ORAL at 08:23

## 2023-01-15 RX ADMIN — Medication 40 MG: at 08:24

## 2023-01-15 RX ADMIN — ONDANSETRON 4 MG: 2 INJECTION INTRAMUSCULAR; INTRAVENOUS at 21:20

## 2023-01-15 RX ADMIN — Medication 25 MCG: at 08:23

## 2023-01-15 RX ADMIN — SILVER SULFADIAZINE: 10 CREAM TOPICAL at 08:48

## 2023-01-15 RX ADMIN — Medication 0.25 G: at 21:19

## 2023-01-15 RX ADMIN — SENNOSIDES AND DOCUSATE SODIUM 1 TABLET: 8.6; 5 TABLET ORAL at 21:20

## 2023-01-15 RX ADMIN — Medication 0.25 G: at 15:46

## 2023-01-15 RX ADMIN — MELATONIN TAB 3 MG 5 MG: 3 TAB at 21:20

## 2023-01-15 RX ADMIN — METOCLOPRAMIDE HYDROCHLORIDE 5 MG: 5 INJECTION INTRAMUSCULAR; INTRAVENOUS at 02:51

## 2023-01-15 RX ADMIN — Medication 0.25 G: at 08:24

## 2023-01-15 RX ADMIN — BISACODYL 10 MG: 10 SUPPOSITORY RECTAL at 11:45

## 2023-01-15 RX ADMIN — MULTIVITAMIN 15 ML: LIQUID ORAL at 08:25

## 2023-01-15 RX ADMIN — SODIUM PHOSPHATE, DIBASIC AND SODIUM PHOSPHATE, MONOBASIC 1 ENEMA: 7; 19 ENEMA RECTAL at 18:47

## 2023-01-15 RX ADMIN — ONDANSETRON 4 MG: 2 INJECTION INTRAMUSCULAR; INTRAVENOUS at 10:15

## 2023-01-15 RX ADMIN — LINACLOTIDE 290 MCG: 290 CAPSULE, GELATIN COATED ORAL at 08:48

## 2023-01-15 RX ADMIN — Medication 40 MG: at 18:48

## 2023-01-15 RX ADMIN — Medication 12.5 MG: at 21:20

## 2023-01-15 ASSESSMENT — ACTIVITIES OF DAILY LIVING (ADL)
ADLS_ACUITY_SCORE: 26
ADLS_ACUITY_SCORE: 31
ADLS_ACUITY_SCORE: 31
ADLS_ACUITY_SCORE: 27
ADLS_ACUITY_SCORE: 27
ADLS_ACUITY_SCORE: 31
ADLS_ACUITY_SCORE: 29
ADLS_ACUITY_SCORE: 31
ADLS_ACUITY_SCORE: 27
ADLS_ACUITY_SCORE: 31
ADLS_ACUITY_SCORE: 27
ADLS_ACUITY_SCORE: 29
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
## 2023-01-15 NOTE — PROVIDER NOTIFICATION
Date paged: 1/15    Time paged: [phone]    Person paged: [employer]    Paging system used: Easia[employer]    Message: Patient is requesting to speak with psych, can you add a psych consult for today? Thanks, Macie WOMACK *[phone]

## 2023-01-15 NOTE — PROVIDER NOTIFICATION
Date paged: 1/15    Time paged: [phone]    Person paged: [employer]    Paging system used: [employer]    Message: FYI patient complaining of abdominal pain, feeling constipated, requesting enema. Minimal to no oral intake over the past few days. Already gave miralax, senna, and suppository to no effect. Thanks, Macie WOMACK *[phone]     Response: Fleet enema ordered.
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
## 2023-01-15 NOTE — PROVIDER NOTIFICATION
"Date paged: 1/15    Time paged: [phone]    Person paged: [employer] system used: [employer]    Message: Patient states she has increased suicidal ideation. States she has a plan for home and that she's thinking more about leaving. I asked how serious she is about leaving, states \"I don't want to answer than... I guess I'm pretty serious.\" Can we look into a 72 hr hold for her? Please advise. Macie WOMACK *[phone]     Response: MD aware, will come see patient shortly to assess.    "
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
## 2023-01-15 NOTE — PLAN OF CARE
Goal Outcome Evaluation:      Plan of Care Reviewed With: patient    Pt here with Gabapentin overdose. Sitter at the bedside at all times. Reporting nausea most of the day, and had several small episodes of vomiting after meals or meds today, PRNs given. Poor appetite - supplements started by dietician. Pt had seizure like activity while in the shower this evening, assessed by house SHADI with new new orders. Discharge plan pending as patient needs inpatient psych.
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
## 2023-01-15 NOTE — PROGRESS NOTES
House SHADI brief note:    Paged by nursing at 2111 noting pt had seizure like activity while taking a shower striking head on wall, requesting bedside evaluation.  Upon arrival, pt sitting upright in bed, awake, alert, in no overt distress with excellent nursing cares being provided.  Nursing notes pt had taken a shower this evening; however, while standing upright, developed seizure like activity leading to pt falling backward onto the chair and then subsequently had another episode of shaking/tremors while on the chair causing pt to lean towards her left and her head hit against the wall.  Seizure like/shaking/tremor episode lasted for ~ 30 seconds in total with pt returning to baseline after episode.  Pt able to ambulate back to bed.  Upon arrival, pt awake, alert, reports L frontal headache due to striking head on wall, no obvious bruising or hematoma noted.  Pt's CN II-XII intact, PERRL, moving all extremities.  Pt's VS reviewed, stable.    Interventions:  - Maintain fall precautions at all times  - Bedside attendant remains at pt's bedside    CONRAD Velazquez, CNP  House SHADI    No charge.

## 2023-01-16 LAB
ANION GAP SERPL CALCULATED.3IONS-SCNC: 6 MMOL/L (ref 3–14)
BACTERIA UR CULT: ABNORMAL
BACTERIA UR CULT: ABNORMAL
BUN SERPL-MCNC: 10 MG/DL (ref 7–30)
CALCIUM SERPL-MCNC: 9.1 MG/DL (ref 8.5–10.1)
CHLORIDE BLD-SCNC: 102 MMOL/L (ref 94–109)
CO2 SERPL-SCNC: 28 MMOL/L (ref 20–32)
CREAT SERPL-MCNC: 0.82 MG/DL (ref 0.52–1.04)
GFR SERPL CREATININE-BSD FRML MDRD: >90 ML/MIN/1.73M2
GLUCOSE BLD-MCNC: 104 MG/DL (ref 70–99)
MAGNESIUM SERPL-MCNC: 1.8 MG/DL (ref 1.7–2.3)
PHOSPHATE SERPL-MCNC: 5.3 MG/DL (ref 2.5–4.5)
POTASSIUM BLD-SCNC: 3.8 MMOL/L (ref 3.4–5.3)
SODIUM SERPL-SCNC: 136 MMOL/L (ref 133–144)

## 2023-01-16 PROCEDURE — 250N000011 HC RX IP 250 OP 636: Performed by: INTERNAL MEDICINE

## 2023-01-16 PROCEDURE — 250N000013 HC RX MED GY IP 250 OP 250 PS 637: Performed by: NURSE PRACTITIONER

## 2023-01-16 PROCEDURE — 250N000013 HC RX MED GY IP 250 OP 250 PS 637: Performed by: INTERNAL MEDICINE

## 2023-01-16 PROCEDURE — 120N000001 HC R&B MED SURG/OB

## 2023-01-16 PROCEDURE — 84100 ASSAY OF PHOSPHORUS: CPT | Performed by: INTERNAL MEDICINE

## 2023-01-16 PROCEDURE — 99233 SBSQ HOSP IP/OBS HIGH 50: CPT | Performed by: REGISTERED NURSE

## 2023-01-16 PROCEDURE — 250N000013 HC RX MED GY IP 250 OP 250 PS 637: Performed by: HOSPITALIST

## 2023-01-16 PROCEDURE — 36415 COLL VENOUS BLD VENIPUNCTURE: CPT | Performed by: INTERNAL MEDICINE

## 2023-01-16 PROCEDURE — 83735 ASSAY OF MAGNESIUM: CPT | Performed by: INTERNAL MEDICINE

## 2023-01-16 PROCEDURE — 99232 SBSQ HOSP IP/OBS MODERATE 35: CPT | Performed by: INTERNAL MEDICINE

## 2023-01-16 PROCEDURE — 80048 BASIC METABOLIC PNL TOTAL CA: CPT | Performed by: INTERNAL MEDICINE

## 2023-01-16 PROCEDURE — 258N000003 HC RX IP 258 OP 636: Performed by: INTERNAL MEDICINE

## 2023-01-16 PROCEDURE — 99221 1ST HOSP IP/OBS SF/LOW 40: CPT | Performed by: STUDENT IN AN ORGANIZED HEALTH CARE EDUCATION/TRAINING PROGRAM

## 2023-01-16 RX ORDER — PROMETHAZINE HYDROCHLORIDE 25 MG/1
25 TABLET ORAL 2 TIMES DAILY
Status: DISCONTINUED | OUTPATIENT
Start: 2023-01-16 | End: 2023-02-07 | Stop reason: HOSPADM

## 2023-01-16 RX ORDER — CEFTRIAXONE 1 G/1
1 INJECTION, POWDER, FOR SOLUTION INTRAMUSCULAR; INTRAVENOUS EVERY 24 HOURS
Status: DISCONTINUED | OUTPATIENT
Start: 2023-01-16 | End: 2023-01-18

## 2023-01-16 RX ORDER — ARIPIPRAZOLE 15 MG/1
15 TABLET ORAL DAILY
Status: DISCONTINUED | OUTPATIENT
Start: 2023-01-17 | End: 2023-02-07 | Stop reason: HOSPADM

## 2023-01-16 RX ADMIN — SENNOSIDES AND DOCUSATE SODIUM 1 TABLET: 8.6; 5 TABLET ORAL at 20:39

## 2023-01-16 RX ADMIN — ACETAMINOPHEN 650 MG: 325 TABLET, FILM COATED ORAL at 12:29

## 2023-01-16 RX ADMIN — TRAZODONE HYDROCHLORIDE 50 MG: 50 TABLET ORAL at 20:40

## 2023-01-16 RX ADMIN — BISACODYL 10 MG: 10 SUPPOSITORY RECTAL at 13:33

## 2023-01-16 RX ADMIN — Medication 250 MG: at 07:55

## 2023-01-16 RX ADMIN — Medication 25 MCG: at 07:54

## 2023-01-16 RX ADMIN — SENNOSIDES AND DOCUSATE SODIUM 1 TABLET: 8.6; 5 TABLET ORAL at 08:02

## 2023-01-16 RX ADMIN — POLYETHYLENE GLYCOL 3350 17 G: 17 POWDER, FOR SOLUTION ORAL at 08:56

## 2023-01-16 RX ADMIN — ONDANSETRON 4 MG: 2 INJECTION INTRAMUSCULAR; INTRAVENOUS at 06:40

## 2023-01-16 RX ADMIN — MULTIVITAMIN 15 ML: LIQUID ORAL at 07:57

## 2023-01-16 RX ADMIN — SILVER SULFADIAZINE: 10 CREAM TOPICAL at 08:55

## 2023-01-16 RX ADMIN — ACETAMINOPHEN 650 MG: 325 TABLET, FILM COATED ORAL at 06:35

## 2023-01-16 RX ADMIN — ARIPIPRAZOLE 10 MG: 10 TABLET ORAL at 07:53

## 2023-01-16 RX ADMIN — ONDANSETRON 4 MG: 2 INJECTION INTRAMUSCULAR; INTRAVENOUS at 20:33

## 2023-01-16 RX ADMIN — Medication 0.25 G: at 20:53

## 2023-01-16 RX ADMIN — LINACLOTIDE 290 MCG: 290 CAPSULE, GELATIN COATED ORAL at 07:56

## 2023-01-16 RX ADMIN — METOCLOPRAMIDE HYDROCHLORIDE 5 MG: 5 INJECTION INTRAMUSCULAR; INTRAVENOUS at 14:03

## 2023-01-16 RX ADMIN — CEFTRIAXONE SODIUM 1 G: 1 INJECTION, POWDER, FOR SOLUTION INTRAMUSCULAR; INTRAVENOUS at 19:55

## 2023-01-16 RX ADMIN — Medication 12.5 MG: at 20:40

## 2023-01-16 RX ADMIN — Medication 25 MG: at 20:39

## 2023-01-16 RX ADMIN — Medication 0.25 G: at 08:55

## 2023-01-16 RX ADMIN — SODIUM CHLORIDE, POTASSIUM CHLORIDE, SODIUM LACTATE AND CALCIUM CHLORIDE 250 ML: 600; 310; 30; 20 INJECTION, SOLUTION INTRAVENOUS at 14:36

## 2023-01-16 RX ADMIN — Medication 40 MG: at 16:35

## 2023-01-16 RX ADMIN — Medication 40 MG: at 07:57

## 2023-01-16 RX ADMIN — Medication 0.25 G: at 14:04

## 2023-01-16 RX ADMIN — MELATONIN TAB 3 MG 5 MG: 3 TAB at 20:52

## 2023-01-16 RX ADMIN — DULOXETINE HYDROCHLORIDE 60 MG: 60 CAPSULE, DELAYED RELEASE ORAL at 07:55

## 2023-01-16 RX ADMIN — LORAZEPAM 1 MG: 1 TABLET ORAL at 20:40

## 2023-01-16 RX ADMIN — ACETAMINOPHEN 650 MG: 325 TABLET, FILM COATED ORAL at 18:00

## 2023-01-16 ASSESSMENT — ACTIVITIES OF DAILY LIVING (ADL)
ADLS_ACUITY_SCORE: 27
ADLS_ACUITY_SCORE: 26
ADLS_ACUITY_SCORE: 27
ADLS_ACUITY_SCORE: 26
ADLS_ACUITY_SCORE: 27
ADLS_ACUITY_SCORE: 26
ADLS_ACUITY_SCORE: 27
ADLS_ACUITY_SCORE: 26
ADLS_ACUITY_SCORE: 27
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
## 2023-01-16 NOTE — PLAN OF CARE
Goal Outcome Evaluation:      Plan of Care Reviewed With: patient    Overall Patient Progress: decliningOverall Patient Progress: declining    Outcome Evaluation: continued minimal PO intake. pt declines ONS. TF discontinued. calorie count started today    Tameka Farrell RD, LD

## 2023-01-16 NOTE — PROGRESS NOTES
"SPIRITUAL HEALTH SERVICES Progress Note  [facility]    Saw pt Thea Castle per follow up.    Patient/Family Understanding of Illness and Goals of Care   - Pt shared that she expects to meet with psychiatry and GI this afternoon.  - Pt noted that she is on a 72 hour hold for suicidal thoughts and plans.    Distress and Loss   - Relational and Spiritual: Pt expressed feeling like she had some \"Taoist trauma\" from her parents, and shared that they have been called her \"the prodigal son.\"   - Medical: Pt noted feeling \"scared\" to have conversations about her feeding tube and nutrition.    Strengths, Coping, and Resources   - Pt reflected on the support of her friends and hospital care team.  - Pt shared a few mantras that have been helpful for her to keep in mind- \"I am so [place] and everything works out for me,\" \"my story isn't over,\" \"I am brave.\"     Meaning, Beliefs, and Spirituality   - In response to pt's distress over her family's comments, pt and I read the Parable of the Lost Son together and pt reflected on how she heard God's love for her in the story.   - Pt requested prayer to be vulnerable about her emotions, for her upcoming medical conversations, and for her suicidal ideation.    Plan of Care - Spiritual Health to continue to follow pt on Mondays, Wednesdays, and Fridays.    Sarah Johanson  Chaplain Resident  Spiritual Health Services  Pager: [phone]     [facility] available 24/7 for emergent requests/referrals, either by having the on-call  paged or by entering an ASAP/STAT consult in Epic (this will also page the on-call ).   "
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
## 2023-01-16 NOTE — PROGRESS NOTES
"GI PROGRESS NOTE  1/12/2023  Thea Castle  2000  1729/1729-01    Subjective:     Patient reports the only thing she can tolerate is popsicle. She is also trying to drink clear ensure. Eating other foods makes her nauseous and vomit.     She had enema and suppository with that had one hard stool.     She still has thoughts to harm herself. Has a couple of ideas. She states that having low nutrition worsens her thoughts to harm herself. She is also having nightmares.      Objective:     Blood pressure 122/87, pulse 85, temperature 98.2  F (36.8  C), temperature source Oral, resp. rate 18, height 1.626 m (5' 4\"), weight 54 kg (119 lb), SpO2 100 %, not currently breastfeeding.    Body mass index is 20.43 kg/m .  Gen: lying in bed, restless/anxious at times 1:1  GI: rounded, hypoactive bowel sounds  Neuro: alert and oriented  Psych: flat mood    Laboratory:  BMP  Recent Labs   Lab 01/16/23  0907 01/14/23  0847 01/13/23  1643 01/10/23  1021 01/09/23  1200     --   --   --   --    POTASSIUM 3.8 4.0 3.9   < >  --    CHLORIDE 102  --   --   --   --    DENNIS 9.1  --   --   --   --    CO2 28  --   --   --   --    BUN 10  --   --   --   --    CR 0.82  --   --   --   --    *  --   --   --  177*    < > = values in this interval not displayed.     CBC  Recent Labs   Lab 01/11/23  2311   WBC 6.6   RBC 3.87   HGB 11.5*   HCT 34.8*   MCV 90   MCH 29.7   MCHC 33.0   RDW 13.2        INRNo lab results found in last 7 days.   LFTs  Recent Labs   Lab Test 01/14/23  1803 01/03/23  1427 04/14/21  0727 08/13/20  0728 03/23/19  2148 03/23/19  2140   ALBUMIN  --  3.4 3.0* 3.7   < >  --    BILITOTAL  --  0.2 0.3 0.5   < >  --    ALT  --  26 19 24   < >  --    AST  --  25 11 24   < >  --    PROTEIN Negative  --   --   --   --  Negative    < > = values in this interval not displayed.     Assessment:   This is a 22 y-o female with who attempted suicide and ingested 20-30 tgabapentin. Undergoing evaluation for " inpatient mental health transfer.  History is notable for chronic constipation secondary to slow transit and pelvic floor dysfunction. Managed with Linzess, Motegrity, and Colace at home. Motegrity has been discontinued to due concern regarding risk of suicidal thoughts with this medication. No significant stool burden on AXR.     Malnutrition secondary to severe GI symptoms from delayed gastric motility without clear evidence/prealbumin normal. Unable to find record of gastric emptying study. Attempted again lasg week but vomited after two bites? suspected habitual vs rumination. Gastrostomy or jejunostomy tube is not recommended, due to risk of self harm and complications.   Plan:   1.  Gastric emptying study was attempted but, did not tolerate unsure of necessity to repeat (suspected habitual vs rumination)  2.  Continue Linzess 290mcg daily   3.  Continue Diltiazem ointment for anal fissure  4.  NJ tube is still in place. Patient requesting tube feeding to be initiated.    5. Increasing promethazine to 25mg BID to help with nausea/vomiting/promotility agent PRN  6. Outpatient GI/[facility] clinic follow up discussed  7. Consider another suppository and enema to help with constipation.     Discussed with Dr. Meeks, GI staff physician, plan as been updated.    Total time spent in chart review, medical discussion, examination, and documentation was 30 minutes.                             Linda Salas CNP   [facility] ([facility])  [phone]

## 2023-01-16 NOTE — PLAN OF CARE
Goal Outcome Evaluation:      Plan of Care Reviewed With: patient    Pt here with suicide attempt. Needing inpatient psych, 72 hour hold placed today as pt reported feeling more suicidal today and wanting to leave. Sitter at the bedside at all times for pt safety. Smith patent. NG clamped. Discharge pending.
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
## 2023-01-17 LAB
ANION GAP SERPL CALCULATED.3IONS-SCNC: 3 MMOL/L (ref 3–14)
BUN SERPL-MCNC: 8 MG/DL (ref 7–30)
CALCIUM SERPL-MCNC: 8.9 MG/DL (ref 8.5–10.1)
CHLORIDE BLD-SCNC: 102 MMOL/L (ref 94–109)
CO2 SERPL-SCNC: 33 MMOL/L (ref 20–32)
CREAT SERPL-MCNC: 0.92 MG/DL (ref 0.52–1.04)
GFR SERPL CREATININE-BSD FRML MDRD: 90 ML/MIN/1.73M2
GLUCOSE BLD-MCNC: 123 MG/DL (ref 70–99)
GLUCOSE BLDC GLUCOMTR-MCNC: 169 MG/DL (ref 70–99)
MAGNESIUM SERPL-MCNC: 1.7 MG/DL (ref 1.7–2.3)
PHOSPHATE SERPL-MCNC: 4.2 MG/DL (ref 2.5–4.5)
POTASSIUM BLD-SCNC: 3.5 MMOL/L (ref 3.4–5.3)
SODIUM SERPL-SCNC: 138 MMOL/L (ref 133–144)

## 2023-01-17 PROCEDURE — 99233 SBSQ HOSP IP/OBS HIGH 50: CPT | Performed by: INTERNAL MEDICINE

## 2023-01-17 PROCEDURE — 120N000001 HC R&B MED SURG/OB

## 2023-01-17 PROCEDURE — 250N000013 HC RX MED GY IP 250 OP 250 PS 637: Performed by: INTERNAL MEDICINE

## 2023-01-17 PROCEDURE — 250N000011 HC RX IP 250 OP 636: Performed by: INTERNAL MEDICINE

## 2023-01-17 PROCEDURE — 250N000013 HC RX MED GY IP 250 OP 250 PS 637: Performed by: REGISTERED NURSE

## 2023-01-17 PROCEDURE — 250N000013 HC RX MED GY IP 250 OP 250 PS 637: Performed by: NURSE PRACTITIONER

## 2023-01-17 PROCEDURE — 84100 ASSAY OF PHOSPHORUS: CPT | Performed by: INTERNAL MEDICINE

## 2023-01-17 PROCEDURE — 250N000013 HC RX MED GY IP 250 OP 250 PS 637: Performed by: HOSPITALIST

## 2023-01-17 PROCEDURE — 83735 ASSAY OF MAGNESIUM: CPT | Performed by: INTERNAL MEDICINE

## 2023-01-17 PROCEDURE — 80048 BASIC METABOLIC PNL TOTAL CA: CPT | Performed by: INTERNAL MEDICINE

## 2023-01-17 PROCEDURE — 36415 COLL VENOUS BLD VENIPUNCTURE: CPT | Performed by: INTERNAL MEDICINE

## 2023-01-17 RX ORDER — DEXTROSE MONOHYDRATE 100 MG/ML
INJECTION, SOLUTION INTRAVENOUS CONTINUOUS PRN
Status: DISCONTINUED | OUTPATIENT
Start: 2023-01-17 | End: 2023-02-07 | Stop reason: HOSPADM

## 2023-01-17 RX ADMIN — Medication 250 MG: at 08:08

## 2023-01-17 RX ADMIN — ONDANSETRON 4 MG: 2 INJECTION INTRAMUSCULAR; INTRAVENOUS at 18:14

## 2023-01-17 RX ADMIN — HYDROXYZINE HYDROCHLORIDE 25 MG: 25 TABLET ORAL at 03:52

## 2023-01-17 RX ADMIN — SILVER SULFADIAZINE: 10 CREAM TOPICAL at 16:45

## 2023-01-17 RX ADMIN — ONDANSETRON 4 MG: 2 INJECTION INTRAMUSCULAR; INTRAVENOUS at 05:35

## 2023-01-17 RX ADMIN — Medication 40 MG: at 08:10

## 2023-01-17 RX ADMIN — SODIUM PHOSPHATE, DIBASIC AND SODIUM PHOSPHATE, MONOBASIC 1 ENEMA: 7; 19 ENEMA RECTAL at 16:12

## 2023-01-17 RX ADMIN — Medication 25 MCG: at 08:08

## 2023-01-17 RX ADMIN — METOCLOPRAMIDE HYDROCHLORIDE 5 MG: 5 INJECTION INTRAMUSCULAR; INTRAVENOUS at 14:51

## 2023-01-17 RX ADMIN — CEFTRIAXONE SODIUM 1 G: 1 INJECTION, POWDER, FOR SOLUTION INTRAMUSCULAR; INTRAVENOUS at 18:12

## 2023-01-17 RX ADMIN — ARIPIPRAZOLE 15 MG: 15 TABLET ORAL at 08:08

## 2023-01-17 RX ADMIN — DULOXETINE HYDROCHLORIDE 60 MG: 60 CAPSULE, DELAYED RELEASE ORAL at 08:08

## 2023-01-17 RX ADMIN — POLYETHYLENE GLYCOL 3350 17 G: 17 POWDER, FOR SOLUTION ORAL at 08:15

## 2023-01-17 RX ADMIN — SENNOSIDES AND DOCUSATE SODIUM 1 TABLET: 8.6; 5 TABLET ORAL at 22:20

## 2023-01-17 RX ADMIN — ACETAMINOPHEN 650 MG: 325 TABLET, FILM COATED ORAL at 09:30

## 2023-01-17 RX ADMIN — MELATONIN TAB 3 MG 5 MG: 3 TAB at 22:29

## 2023-01-17 RX ADMIN — TRAZODONE HYDROCHLORIDE 50 MG: 50 TABLET ORAL at 22:20

## 2023-01-17 RX ADMIN — Medication 25 MG: at 22:20

## 2023-01-17 RX ADMIN — LORAZEPAM 1 MG: 1 TABLET ORAL at 22:21

## 2023-01-17 RX ADMIN — ONDANSETRON 2 MG: 2 INJECTION INTRAMUSCULAR; INTRAVENOUS at 10:42

## 2023-01-17 RX ADMIN — LINACLOTIDE 290 MCG: 290 CAPSULE, GELATIN COATED ORAL at 08:09

## 2023-01-17 RX ADMIN — Medication 12.5 MG: at 22:20

## 2023-01-17 RX ADMIN — SENNOSIDES AND DOCUSATE SODIUM 1 TABLET: 8.6; 5 TABLET ORAL at 08:08

## 2023-01-17 RX ADMIN — ACETAMINOPHEN 650 MG: 325 TABLET, FILM COATED ORAL at 16:44

## 2023-01-17 RX ADMIN — Medication 40 MG: at 18:11

## 2023-01-17 RX ADMIN — Medication 0.25 G: at 08:10

## 2023-01-17 RX ADMIN — Medication 25 MG: at 08:08

## 2023-01-17 RX ADMIN — Medication 0.25 G: at 22:21

## 2023-01-17 RX ADMIN — MULTIVITAMIN 15 ML: LIQUID ORAL at 08:10

## 2023-01-17 RX ADMIN — Medication 0.25 G: at 16:45

## 2023-01-17 RX ADMIN — ACETAMINOPHEN 650 MG: 325 TABLET, FILM COATED ORAL at 03:46

## 2023-01-17 ASSESSMENT — ACTIVITIES OF DAILY LIVING (ADL)
ADLS_ACUITY_SCORE: 26
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
## 2023-01-17 NOTE — PROGRESS NOTES
"GI PROGRESS NOTE  1/12/2023  Thea Castle  2000  1729/1729[birth year]01    Subjective:     She tried orange juice today and kept it down.     No BM today. Requesting enema.        Objective:     Blood pressure 110/70, pulse 85, temperature 98.6  F (37  C), temperature source Oral, resp. rate 18, height 1.626 m (5' 4.02\"), weight 53.6 kg (118 lb 2.7 oz), SpO2 98 %, not currently breastfeeding.    Body mass index is 20.27 kg/m .  Gen: lying in bed, restless/anxious at times 1:1  GI: rounded, hypoactive bowel sounds  Neuro: alert and oriented  Psych: flat mood    Laboratory:  BMP  Recent Labs   Lab 01/17/23  0633 01/16/23  0907 01/14/23  0847    136  --    POTASSIUM 3.5 3.8 4.0   CHLORIDE 102 102  --    DENNIS 8.9 9.1  --    CO2 33* 28  --    BUN 8 10  --    CR 0.92 0.82  --    * 104*  --      CBC  Recent Labs   Lab 01/11/23  2311   WBC 6.6   RBC 3.87   HGB 11.5*   HCT 34.8*   MCV 90   MCH 29.7   MCHC 33.0   RDW 13.2        INRNo lab results found in last 7 days.   LFTs  Recent Labs   Lab Test 01/14/23  1803 01/03/23  1427 04/14/21  0727 08/13/20  0728 03/23/19  2148 03/23/19  2140   ALBUMIN  --  3.4 3.0* 3.7   < >  --    BILITOTAL  --  0.2 0.3 0.5   < >  --    ALT  --  26 19 24   < >  --    AST  --  25 11 24   < >  --    PROTEIN Negative  --   --   --   --  Negative    < > = values in this interval not displayed.     Assessment:   This is a 22 y-o female with who attempted suicide and ingested 20-30 tgabapentin. Undergoing evaluation for inpatient mental health transfer.  History is notable for chronic constipation secondary to slow transit and pelvic floor dysfunction. Managed with Linzess, Motegrity, and Colace at home. Motegrity has been discontinued to due concern regarding risk of suicidal thoughts with this medication. No significant stool burden on AXR.     Malnutrition secondary to severe GI symptoms from delayed gastric motility without clear evidence/prealbumin normal. Unable to find " record of gastric emptying study. Attempted again lasg week but vomited after two bites? suspected habitual vs rumination. Gastrostomy or jejunostomy tube is not recommended, due to risk of self harm and complications.     Plan:   1.  Gastric emptying study was attempted but, did not tolerate unsure of necessity to repeat (suspected habitual vs rumination)  2.  Continue Linzess 290mcg daily   3.  Continue Diltiazem ointment for anal fissure  4.  NJ tube is still in place. Patient requesting tube feeding to be initiated. Okay to restart tube feed.   5. Increasing promethazine to 25mg BID to help with nausea/vomiting/promotility   6. Outpatient GI/[facility] clinic follow up discussed  7. Fleet enema today   8. Administer PRN suppository and enema every 3 days if no bowel movement   9. GI will sign off please let us know if there is any additional questions or concerns.     Discussed with Dr. Meeks, GI staff physician, plan as been updated.    Total time spent in chart review, medical discussion, examination, and documentation was 30 minutes.                             Linda Salas CNP   [facility] ([facility])  [phone]
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
## 2023-01-17 NOTE — CONSULTS
Triage and Transition - Consult and Liaison     Thea Castle  January 17, 2023    Session start: 3:28 pm  Session end: 4:10 pm  Session duration in minutes: 32   CPT utilized: 41832 - Psychotherapy (with patient) - 30 (16-37*) min  Patient was seen virtually (AmWell cart or other teleconferencing device).  Anticipated number of sessions or this episode of care: 1-4    Diagnosis:   296.33 (F33.2) Major Depressive Disorder, Recurrent Episode, Severe _, by history;  301.83 (F60.3) Borderline Personality Disorder, by history;     Plan/Recommendations:     Patient and I discussed possibility of care conference to establish goals of care and allow everyone to be on same page. Unclear if this would be an option with GI?     I plan to confirm there are no inpatient facilities within [facility] that will allow a feeding tube. IRTS placement has been recommended, but I don't believe any referrals have been made. I had consulted social work to help with placement, unclear if this was started. Patient is open to this.     Individuals with borderline personality disorder are extremely sensitive to change and perceived criticism/disagreements. Patient's also struggles with distress tolerance. To prevent patient from being triggered, allow time to process change and attempt to involve patient in decision making to give her most sense of autonomy.     Reason for consult: Thea is 22 year old White  female . Psychiatry consult was requested due to therapeutic support. Patient was seen by [facility] Consult & Liaison team.     Presenting problem: Patient admitted to the emergency room following an attempted overdose on 30 tablets of gabapentin. Patient recommended inpatient psych, however, due to feeding tube, she is not able to transfer, she is now convalescing at [facility] station 73. Please see initial DEC/[facility] Crisis Assessment completed by Kenyon Villanueva on 01/03/23 for complete assessment information. Patient has been  "evaluated by psychiatry daily.    Session Summary: Patient reports her thoughts are rampant. She states she is having more suicidal thoughts today. Reports this was impacted by talking to her health psychologist this morning and finding out that she can no longer work with her due to her needing a higher level of care. She reports she also found out her parents are trying to get access to her [facility] records, she got email that password was changed. She states they told her they were going to cut off her insurance if they can't see her records. They said they are not happy with her decisions.   During meeting, hospitalist came by to meet with patient to discuss increasing nutrition and weaning off tube. Indicated patient would benefit from this to be able to go to mental health treatment. Provided education regarding this. It was observed that patient was alarmed by this news and caught off guard, she appeared tearful and shut down, she became defensive in stating she did not want to remove it and that we were not focusing on her \"whole body and mind\". She indicated that she didn't want to be placed anywhere she couldn't have her tube and she wanted to stay here.   After the visit with the hospitalist patient reported she did not want to go to inpatient mental health. She asked about transfering somewhere she could keep her tube. I provided education on circumstances, stating that the current placement is not appropriate for treating her mental health. I indicated she would be better treated in a mental health facility. Stating that we have seen little improvement in the two weeks she has been here. Patient became defensive regarding making no improvement. I challenged patient stating suicidal thoughts per her report have not improved, nor has her anxiety. We discussed that mental health could be playing a role in her physical health. She was dismissive of this, stating she had GI issues before her mental health had " decompensated. She does not believe they are related. Patient hyperfocused on needing her feeding tube and was not willing to discuss alternative option. She indicated she would not be willing to go if they have to take out her feeding tube. She asks several times about putting it in stomach (GJ tube), and I referenced back to what hospitalist had said to her in that the risks are too high . She attempted to negotiate with me, stating if we allowed her to get a GJ tube she would agree to any placement we find. I indicated that we are attempting to all have the same goals here and we are not against her, trying to work alongside her and have same goals of care.   We reviewed what she can do after visit to cope with stress she is currently experiencing. She requested to sleep, I assessed whether this would benefit her or whether it would be an escape mechanism and she would wake up in worse state, she indicated it likely wouldn't be helpful. We discussed listening to Mormon music, deep breathing, and writing out her thoughts, if she is still feeling tired after this, she could then take a rest. Patient indicates todays session was wasted on having to talk about feeding tube, I stated I would come by again tomorrow to discuss further.       Mental Status Exam   Affect: Labile  Appearance: Appropriate   Attention Span/Concentration: Attentive    Eye Contact: Variable  Fund of Knowledge: Appropriate   Language /Speech Content: Fluent  Language /Speech Volume: Normal   Language /Speech Rate/Productions: Normal   Recent Memory: Intact  Remote Memory: Intact  Mood: Anxious   Orientation:   Person: Yes   Place: Yes  Time of Day: Yes   Date: Yes   Situation (Do they understand why they are here?): Yes   Psychomotor Behavior: Normal   Thought Content: Clear and Suicidal  Thought Form: Intact    Current medications:   Current Facility-Administered Medications   Medication     acetaminophen (TYLENOL) tablet 650 mg    Or      acetaminophen (TYLENOL) Suppository 650 mg     ARIPiprazole (ABILIFY) tablet 15 mg     bisacodyl (DULCOLAX) suppository 10 mg     cefTRIAXone (ROCEPHIN) 1 g vial to attach to  mL bag for ADULTS or NS 50 mL bag for PEDS     dextrose 10% infusion     dextrose 10% infusion     diltiazem 2% in PLO gel 0.25 g     drospirenone-ethinyl estradiol (DIANA) 3-0.02 MG per tablet 1 tablet     DULoxetine (CYMBALTA) DR capsule 60 mg     hydrOXYzine (ATARAX) syrup 25 mg    Or     hydrOXYzine (ATARAX) tablet 25 mg     lactated ringers BOLUS 250 mL     lidocaine (LMX4) cream     lidocaine 1 % 0.1-1 mL     linaclotide (LINZESS) capsule 290 mcg     LORazepam (ATIVAN) tablet 1 mg     melatonin tablet 5 mg     metoclopramide (REGLAN) injection 5 mg     multivitamins w/minerals liquid 15 mL     ondansetron (ZOFRAN ODT) ODT tab 4 mg    Or     ondansetron (ZOFRAN) injection 4 mg     pantoprazole (PROTONIX) 2 mg/mL suspension 40 mg     polyethylene glycol (MIRALAX) Packet 17 g     promethazine (PHENERGAN) half-tab 25 mg     QUEtiapine (SEROquel) half-tab 12.5 mg     senna-docusate (SENOKOT-S/PERICOLACE) 8.6-50 MG per tablet 1 tablet     silver sulfADIAZINE (SILVADENE) 1 % cream     sodium chloride (PF) 0.9% PF flush 3 mL     sodium chloride (PF) 0.9% PF flush 3 mL     traZODone (DESYREL) tablet 50 mg     vitamin C (ASCORBIC ACID) tablet 250 mg     Vitamin D3 (CHOLECALCIFEROL) tablet 25 mcg     witch hazel-glycerin (TUCKS) pad         MeasurableTreatment Goal(s) related to diagnosis / functional impairment(s)  Goal 1: Patient will alleviate the suicidal impulses/ideation and return to the highest level of previous daily functioning.      Objective #A                Patient will participate in therapy for an identified emotional problem resulting in suicidal thoughts.  Status: New as of January 10, 2023     Intervention(s)  [facility] will assess the severity of the level of impairment to the client s functioning to determine the appropriate  level of care.        LMHP will encourage the patient to express feelings related to their suicidal ideation in order to clarify them and increase insight as to the cause for them.       LMHP will assist the patient in developing coping strategies for suicidal ideation.        Goal 2: Patient will accept placement/referral to an appropriate level of care to safely address the suicidal crisis.      Objective #A                Patient will state the strength of the suicidal feeling, frequency of the thoughts and the detail of the plans   Status: New as of January 10, 2023     Intervention(s)  LMHP will facilitate conversations about options and encourage patient to engage in services.            Therapeutic intervention and progress:  Therapeutic intervention consisted of active listening, validation, thought reframing, engaging in learning/practicing coping skills, CBT concepts and challenging patient in fixed beliefs. Patient is not making progress towards treatment goals as evidenced by ongoing reported suicidal ideation.     Collateral information:   Reviewed chart and coordinated with hospitalist. Made plan to follow up today or tomorrow to discuss plan.   In reviewing records, it is felt that patient's nausea is functional. Provider discussed at length with Dr. Juan Carlos Meeks from [facility] on 01/17/23.  Believed her mental health disorder likely is driving her nausea and inability to eat. Therefore they do NOTrecommend a GJ tube, risks greatly outweigh benefits in this case.     Anne Melendez, [facility]   Triage and Transition - Consult and Liaison   [phone]

## 2023-01-17 NOTE — PROGRESS NOTES
[id number]  Pt here with suicidal ideation. A&O X4. Oh 72 hour hold with 1:1 supervision. VSS. Regular diet, thin liquids- poor appetite. Pt threw up dinner this evening. Takes pills whole. Up with SBSILVIA GB. Stomach pain managed with Tylenol X1 this evening. Smith in place, patient and draining. Pt scoring green on the Aggression Stop Light Tool. Discharge pending.

## 2023-01-17 NOTE — PROGRESS NOTES
CALORIE COUNT      Approximate Oral Intake for: 1/16  Calories: 236 kcal  Protein: 0 grams      Number of Meals Recorded: 0    Number of Snacks Recorded: 3 popsicle      Estimated Needs:    Estimated Energy Needs: 4185-1439+ (25-30 kcal/kg) -maintenance vs repletion   Estimated Protein Needs: 55-65+ (1-1.2+ g/kg) - preservation LBM vs repletion       Summary:   Pt met 17% of min kcal needs and 0% of min protein needs   Pt only able to tolerate popsicles yesterday, pt threw up after meal    Tameka Farrell, RD, LD
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
## 2023-01-17 NOTE — CONSULTS
CLINICAL NUTRITION SERVICES - BRIEF NOTE       Nutrition Prescription    Recommendations already ordered by Registered Dietitian (RD):  - ordered TF and FWF   Type of Feeding Tube: NJ  Enteral Frequency:  Continuous  Enteral Regimen: [employer] 1.5 @ 40 mL/hr x24 hours (960 mL)  --> start @ 10 mL/hr and advance by 10 mL q 8 hours as tolerated. Do not start or advance TF rate unless K+ > 3.0, Mg++ > 1.5, Phos > 1.9   Total Enteral Provisions: 1440 kcal, 71 g protein, 133 g CHO, 74 g fat, 9 g fiber, 672 mL free water  Free Water Flush: 60 mL q 4 hours for tube patency         Consult received for Registered Dietitian to order TF per Medical Nutrition Therapy Guidelines    See RD note on 1/16 for most recent full nutrition assessment     EVALUATION OF THE PROGRESS TOWARD GOALS   Diet: Regular (no lactose)    Nutrition Support:  Enteral --> discontinued 1/13  Type of Feeding Tube: NJ  Enteral Frequency:  Continuous  Enteral Regimen: Osmolite 1.5 @ 40 mL/hr (goal rate)  Total Enteral Provisions: 1440 kcals (27 kcal/kg), 60 g PRO (1.1 g/kg), 731 ml free H20, 195 g CHO, and 0 g fiber daily.  Free Water Flush: 60 mL every 4 hrs    Pt was only able to tolerate 30 mL/hr typically per chart review. ---> Osmolite 1.5 Pramod @ goal of 30 mL/hr (720 mL/day) = 1080 kcal, 45 g PRO    Intake: calorie count started yesterday. Pt has been able to tolerate minimal PO intake. N/V continues      ASSESSED NUTRITION NEEDS:  Dosing Weight: 54.3 kg (1/8)  Estimated Energy Needs: 4449-8096+ (25-30 kcal/kg)  Justification: maintenance vs repletion   Estimated Protein Needs: 55-65+ (1-1.2+ g/kg)  Justification: preservation of lean body mass vs repletion  Estimated Fluid Needs: 1 mL/Kcal for maintenance      NEW FINDINGS   Labs reviewed  Meds: multivitamins w minerals liquid, protonix, vitamin C, vitamin D3  I/O: 6x BM yesterday, 3x emesis yesterday     Visited with pt this afternoon regarding prior TF regimen. Pt reported she was receiving Gavi  [employer] 1.4 @ 40 mL/hr x 24 hours   --> currently out of stock of [employer] 1.4. will order a similar product- [employer] 1.5    INTERVENTIONS  EN Composition, EN Schedule, Feeding Tube Flush   Collaboration and Referral of Nutrition care - spoke with [facility] provider regarding TF re-start    Monitoring/Evaluation  Progress toward goals will be monitored and evaluated per protocol.     Tameka Farrell RD, LD

## 2023-01-18 LAB — POTASSIUM SERPL-SCNC: 4.4 MMOL/L (ref 3.4–5.3)

## 2023-01-18 PROCEDURE — 250N000013 HC RX MED GY IP 250 OP 250 PS 637: Performed by: HOSPITALIST

## 2023-01-18 PROCEDURE — 84132 ASSAY OF SERUM POTASSIUM: CPT | Performed by: INTERNAL MEDICINE

## 2023-01-18 PROCEDURE — 250N000011 HC RX IP 250 OP 636: Performed by: INTERNAL MEDICINE

## 2023-01-18 PROCEDURE — 250N000013 HC RX MED GY IP 250 OP 250 PS 637: Performed by: INTERNAL MEDICINE

## 2023-01-18 PROCEDURE — 250N000013 HC RX MED GY IP 250 OP 250 PS 637: Performed by: NURSE PRACTITIONER

## 2023-01-18 PROCEDURE — 250N000013 HC RX MED GY IP 250 OP 250 PS 637: Performed by: REGISTERED NURSE

## 2023-01-18 PROCEDURE — 99233 SBSQ HOSP IP/OBS HIGH 50: CPT | Performed by: INTERNAL MEDICINE

## 2023-01-18 PROCEDURE — 120N000001 HC R&B MED SURG/OB

## 2023-01-18 PROCEDURE — G0463 HOSPITAL OUTPT CLINIC VISIT: HCPCS

## 2023-01-18 PROCEDURE — 36415 COLL VENOUS BLD VENIPUNCTURE: CPT | Performed by: INTERNAL MEDICINE

## 2023-01-18 RX ADMIN — ONDANSETRON 4 MG: 4 TABLET, ORALLY DISINTEGRATING ORAL at 10:39

## 2023-01-18 RX ADMIN — LINACLOTIDE 290 MCG: 290 CAPSULE, GELATIN COATED ORAL at 08:45

## 2023-01-18 RX ADMIN — HYDROXYZINE HYDROCHLORIDE 25 MG: 25 TABLET ORAL at 20:12

## 2023-01-18 RX ADMIN — ARIPIPRAZOLE 15 MG: 15 TABLET ORAL at 08:46

## 2023-01-18 RX ADMIN — MULTIVITAMIN 15 ML: LIQUID ORAL at 08:55

## 2023-01-18 RX ADMIN — SENNOSIDES AND DOCUSATE SODIUM 1 TABLET: 8.6; 5 TABLET ORAL at 08:46

## 2023-01-18 RX ADMIN — SILVER SULFADIAZINE: 10 CREAM TOPICAL at 09:06

## 2023-01-18 RX ADMIN — Medication 40 MG: at 16:58

## 2023-01-18 RX ADMIN — ACETAMINOPHEN 650 MG: 325 TABLET, FILM COATED ORAL at 10:45

## 2023-01-18 RX ADMIN — ONDANSETRON 4 MG: 4 TABLET, ORALLY DISINTEGRATING ORAL at 19:09

## 2023-01-18 RX ADMIN — TRAZODONE HYDROCHLORIDE 50 MG: 50 TABLET ORAL at 20:04

## 2023-01-18 RX ADMIN — Medication 12.5 MG: at 20:03

## 2023-01-18 RX ADMIN — DULOXETINE HYDROCHLORIDE 60 MG: 60 CAPSULE, DELAYED RELEASE ORAL at 08:46

## 2023-01-18 RX ADMIN — LORAZEPAM 1 MG: 1 TABLET ORAL at 20:04

## 2023-01-18 RX ADMIN — SENNOSIDES AND DOCUSATE SODIUM 1 TABLET: 8.6; 5 TABLET ORAL at 20:04

## 2023-01-18 RX ADMIN — Medication 25 MCG: at 08:46

## 2023-01-18 RX ADMIN — MELATONIN TAB 3 MG 5 MG: 3 TAB at 20:09

## 2023-01-18 RX ADMIN — ONDANSETRON 4 MG: 4 TABLET, ORALLY DISINTEGRATING ORAL at 19:14

## 2023-01-18 RX ADMIN — Medication 40 MG: at 08:55

## 2023-01-18 RX ADMIN — Medication 250 MG: at 08:46

## 2023-01-18 RX ADMIN — Medication 0.25 G: at 09:06

## 2023-01-18 RX ADMIN — CEFTRIAXONE SODIUM 1 G: 1 INJECTION, POWDER, FOR SOLUTION INTRAMUSCULAR; INTRAVENOUS at 19:15

## 2023-01-18 RX ADMIN — Medication 0.25 G: at 13:30

## 2023-01-18 RX ADMIN — Medication 25 MG: at 20:04

## 2023-01-18 RX ADMIN — Medication 25 MG: at 08:45

## 2023-01-18 RX ADMIN — METOCLOPRAMIDE HYDROCHLORIDE 5 MG: 5 INJECTION INTRAMUSCULAR; INTRAVENOUS at 13:45

## 2023-01-18 RX ADMIN — POLYETHYLENE GLYCOL 3350 17 G: 17 POWDER, FOR SOLUTION ORAL at 08:47

## 2023-01-18 RX ADMIN — Medication 0.25 G: at 20:09

## 2023-01-18 ASSESSMENT — ACTIVITIES OF DAILY LIVING (ADL)
ADLS_ACUITY_SCORE: 26
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
## 2023-01-18 NOTE — CONSULTS
Triage and Transition - Consult and Liaison     Thea Castle  January 18, 2023    Session start: 11:20 am  Session end: 11:55 am  Session duration in minutes: 35 min  CPT utilized: 90446 - Psychotherapy (with patient) - 30 (16-37*) min  Patient was seen virtually (AmWell cart or other teleconferencing device).  Anticipated number of sessions or this episode of care: 4-8    Diagnosis:   296.33 (F33.2) Major Depressive Disorder, Recurrent Episode, Severe _, by history;  301.83 (F60.3) Borderline Personality Disorder, by history;     Plan/Recommendations:     Continue as able to maintain boundaries and encourage patient to use coping skills to avoid maladaptive dependent coping mechanisms.     Discussed with patient the role her mental health/eating disorder may be playing in current situation, specifically attention seeking behavior and need for control, patient was receptive of this conversation and stated she would also like to look into dual diagnosis eating disorder/MH treatments. I plan to look into [facility] as well as [facility] at request of patient.     Appreciate assistance if social work could coordinate referral to [facility] facility ([email or web address])       Reason for consult: Thea is 22 year old White  female . Psychiatry consult was requested due to therapeutic support. Patient was seen by [facility] Consult & Liaison team.     Presenting problem: Patient admitted to the emergency room following an attempted overdose on 30 tablets of gabapentin. Patient recommended inpatient psych, however, due to feeding tube, she is not able to transfer, she is now convalescing at [facility] station 73. Please see initial DEC/[facility] Crisis Assessment completed by Kenyon Villanueva on 01/03/23 for complete assessment information. Patient has been evaluated by psychiatry daily.    Session Summary: Patient reports she had a good conversation with charge nurse about how she fell apart yesterday.  "Patient reports they started talking about future and how that could motivate her. Patient states she cried a lot yesterday after meeting. Patient reports she didn't talk for a while. Patient states she ended up feeling better after trying to reframe her goals. She states she has been sleeping a lot today. Worries hydroxyzine is cause of this. We discussed it is difficult to make that assumption after one day, encouraged patient to continue to try and we can continue to evaluate if it is causing drowsiness.   We discussed disposition options. She states \"while i'd like to go home, my parents have expectations, they want me to get 40 hour week job\". She indicated she thinks she needs residential . We discussed IRTS options as well as other residential options- like eating disorder/dual diagnosis programs. Would like referrals sent. We discussed how eating disorder could be playing role in current symptoms. Patient was initially reluctant, however, requested to revisit this concern at the end of session- stating she maybe was having somewhat of a relapse with her eating disorder. She reviewed common symptoms of \"competing for illness\" and attention seeking behaviors. She plans to continue to review this and determine what role it may be playing.   Patient reports she is struggling because her sister and her used to be pretty close. Patient states she feels her sister has put her relationship on the back burner since she has been in the hospital . Worried sister has taken her parents side. Patient indicates she plans to reach out to her today.   Patient discusses benefit of journaling, worried she would focus on negative and only write about negative things. We discussed how patient could utilize journaling- getting words out and then throwing it away instead of re-reading it.      Mental Status Exam   Affect: Appropriate  Appearance: Appropriate   Attention Span/Concentration: Attentive    Eye Contact: Engaged  Fund " of Knowledge: Appropriate   Language /Speech Content: Fluent  Language /Speech Volume: Soft   Language /Speech Rate/Productions: Normal   Recent Memory: Intact  Remote Memory: Intact  Mood: Normal   Orientation:   Person: Yes   Place: Yes  Time of Day: Yes   Date: Yes   Situation (Do they understand why they are here?): Yes   Psychomotor Behavior: Normal   Thought Content: Clear  Thought Form: Intact    Current medications:   Current Facility-Administered Medications   Medication     acetaminophen (TYLENOL) tablet 650 mg    Or     acetaminophen (TYLENOL) Suppository 650 mg     ARIPiprazole (ABILIFY) tablet 15 mg     bisacodyl (DULCOLAX) suppository 10 mg     cefTRIAXone (ROCEPHIN) 1 g vial to attach to  mL bag for ADULTS or NS 50 mL bag for PEDS     dextrose 10% infusion     dextrose 10% infusion     diltiazem 2% in PLO gel 0.25 g     drospirenone-ethinyl estradiol (DIANA) 3-0.02 MG per tablet 1 tablet     DULoxetine (CYMBALTA) DR capsule 60 mg     hydrOXYzine (ATARAX) syrup 25 mg    Or     hydrOXYzine (ATARAX) tablet 25 mg     lactated ringers BOLUS 250 mL     lidocaine (LMX4) cream     lidocaine 1 % 0.1-1 mL     linaclotide (LINZESS) capsule 290 mcg     LORazepam (ATIVAN) tablet 1 mg     melatonin tablet 5 mg     metoclopramide (REGLAN) injection 5 mg     multivitamins w/minerals liquid 15 mL     ondansetron (ZOFRAN ODT) ODT tab 4 mg    Or     ondansetron (ZOFRAN) injection 4 mg     pantoprazole (PROTONIX) 2 mg/mL suspension 40 mg     polyethylene glycol (MIRALAX) Packet 17 g     promethazine (PHENERGAN) half-tab 25 mg     QUEtiapine (SEROquel) half-tab 12.5 mg     senna-docusate (SENOKOT-S/PERICOLACE) 8.6-50 MG per tablet 1 tablet     silver sulfADIAZINE (SILVADENE) 1 % cream     sodium chloride (PF) 0.9% PF flush 3 mL     sodium chloride (PF) 0.9% PF flush 3 mL     traZODone (DESYREL) tablet 50 mg     vitamin C (ASCORBIC ACID) tablet 250 mg     Vitamin D3 (CHOLECALCIFEROL) tablet 25 mcg     witch  julio-glycerin (TUCKS) pad         MeasurableTreatment Goal(s) related to diagnosis / functional impairment(s)  Goal 1: Patient will alleviate the suicidal impulses/ideation and return to the highest level of previous daily functioning.      Objective #A                Patient will participate in therapy for an identified emotional problem resulting in suicidal thoughts.  Status: New as of January 10, 2023     Intervention(s)  LMHP will assess the severity of the level of impairment to the client s functioning to determine the appropriate level of care.        LMHP will encourage the patient to express feelings related to their suicidal ideation in order to clarify them and increase insight as to the cause for them.       LMHP will assist the patient in developing coping strategies for suicidal ideation.        Goal 2: Patient will accept placement/referral to an appropriate level of care to safely address the suicidal crisis.      Objective #A                Patient will state the strength of the suicidal feeling, frequency of the thoughts and the detail of the plans   Status: New as of January 10, 2023     Intervention(s)  LMHP will facilitate conversations about options and encourage patient to engage in services.              Therapeutic intervention and progress:  Therapeutic intervention consisted of active listening, validation, engaging in learning/practicing coping skills and active problem solving. Patient is making progress towards treatment goals as evidenced by willingness to do some self-introspection and look closely at eating disorder symptoms.     Collateral information:   Reviewed chart and coordinated with hospitalist via phone. Discuss concerns regarding patient's dependent personality disorder symptoms being exacerbated by current setting. Also control of situation/feeding tube likely related to eating disorder. Discussed plan for care conference and referrals to be sent. Goal would be to have  patient move out of medical setting.     Anne Melendez, [facility]   Triage and Transition - Consult and Liaison   [phone]
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
## 2023-01-18 NOTE — PROGRESS NOTES
"SPIRITUAL HEALTH SERVICES Progress Note  [facility]    Saw pt Thea Castle per phone call from unit for support. Thea was sitting on the floor of the bathroom with the 1:1 sitter, when I arrived. After a few minutes, she decided to return to her bed.      Distress and Loss -     Thea shared feelings of loss that she hasn't spoken with her sister in over a week and feels that her parents \"have turned her to their side.\" She shared her plan to \"not let others come and visit me until she comes.\" Encouraged her not to limit visits from friends who are supportive and encouraging.    Thea shared feeling confused about talking with different people on her medical team and understanding the plan of care, especially as it relates to keeping the ng tube and feeling anxious about talking with the GI doctor tomorrow. I asked if there's someone that she trusts in her life who could be present, in person or virtually, when she speaks with doctors. Initially she indicated \"that that could be helpful\" but then expressed her fear of \"being a burden.\"      Strengths, Coping, and Resources - Support of friends - people who visited in the afternoon and were playing games with her, as well as friend who are sending messages of support and gifts.    Meaning, Beliefs, and Spirituality - Thea shared that her parents refer to her as \"the Prodigal Son who won't come home.\" Explored with her what the parable says about God's relationship with her and being at home with God.     I offered spiritual and emotional support through reflective listening that affirmed emotions, experience, and meaning. Read from Ps 27: 13-14.  Contracted with Thea for follow up tomorrow and then a return to the M, W, F schedule for visits.      Plan of Care - Will follow up tomorrow for a check in.    Nichole Aguilera MDiv  Associate   Pager [phone]  [facility] pager [phone]  [facility] phone [phone]    [facility] " available 24/7 for emergent requests/referrals, either by having the on-call  paged or by entering an ASAP/STAT consult in Epic (this will also page the on-call ).
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
## 2023-01-18 NOTE — PROGRESS NOTES
SPIRITUAL HEALTH SERVICES  SPIRITUAL ASSESSMENT Progress Note  [facility]     REFERRAL SOURCE: Follow up visit     Attempted to visit Thea throughout the day, she was with provider or with visitors. Contracted with her to follow up for a visit tomorrow.    PLAN: Will follow up tomorrow.    Nichole Aguilera  Associate   Pager [phone]  [facility] Phone [phone]  [facility] Pager [phone]    [facility] available 24/7 for emergent requests/referrals, either by having the on-call  paged or by entering an ASAP/STAT consult in Epic (this will also page the on-call ).

## 2023-01-18 NOTE — PROGRESS NOTES
CALORIE COUNT      Approximate Oral Intake for: 1/17  Calories: 114 kcal  Protein: 1 gram    TF + PO = 474 kcal, 19 grams protein       Number of Meals Recorded: 1     Number of Snacks Recorded: 1 popsicle       TF providing:     Type of Feeding Tube: NJ  Enteral Frequency:  Continuous  Enteral Regimen: [place] 1.5 @ 40 mL/hr x24 hours (960 mL)  Total Enteral Provisions: 1440 kcal, 71 g protein, 133 g CHO, 74 g fat, 9 g fiber, 672 mL free water  Free Water Flush: 60 mL q 4 hours for tube patency       Estimated Needs:    Dosing Weight: 54.3 kg (1/8)  Estimated Energy Needs: 8250-8612+ (25-30 kcal/kg)  Justification: maintenance vs repletion   Estimated Protein Needs: 55-65+ (1-1.2+ g/kg)  Justification: preservation of lean body mass vs repletion  Estimated Fluid Needs: 1 mL/Kcal for maintenance       Summary:   Per chart review, TF was still running @ 10 mL/hr this AM --> providing 360 kcal/day and 18 grams protein  Pt met 35% of min kcal needs and min protein needs yesterday   I/O: 1x emesis documented yesterday     Tameka Farrell RDN

## 2023-01-18 NOTE — PLAN OF CARE
Pt here with suicidal ideation. A&O. VSS. Regular diet, thin liquids- poor appetite, drinking ginger ale and eating crackers. Discussed with pt and encouraged to try to order something of the kitchen, discussed trying 1 thing. Takes pills orally and through NJ tube. Up with 1. Denies pain. 1:1 supervision

## 2023-01-18 NOTE — PROGRESS NOTES
"/69 (BP Location: Left arm)   Pulse 80   Temp 97.7  F (36.5  C) (Oral)   Resp 18   Ht 1.626 m (5' 4.02\")   Wt 53.6 kg (118 lb 2.7 oz)   SpO2 97%   BMI 20.27 kg/m   .    Assessment: Patient is alert and oriented x4, neuros intact. Reporting suicidal ideation with plan, sitter present at bedside. Court 72hr hold in place. Reports intermittent abdominal pain and headache along with nausea/vomiting. x1 episode of emesis with dinner. Tube feed restarted at 1830, infusing at 10ml/hr and 60ml flush q4hrs.Poor oral intake, encouragement needed. Smith with adequate output. Enema given this shift with no relief, per patient \"only passed enema solution and a lot of gas\". Wound cares completed on right posterior thigh.     Interventions this shift: sitter present at bedside, oral encouragement               "

## 2023-01-19 ENCOUNTER — APPOINTMENT (OUTPATIENT)
Dept: GENERAL RADIOLOGY | Facility: CLINIC | Age: 23
DRG: 918 | End: 2023-01-19
Attending: INTERNAL MEDICINE
Payer: COMMERCIAL

## 2023-01-19 PROCEDURE — 250N000013 HC RX MED GY IP 250 OP 250 PS 637: Performed by: HOSPITALIST

## 2023-01-19 PROCEDURE — 250N000011 HC RX IP 250 OP 636: Performed by: INTERNAL MEDICINE

## 2023-01-19 PROCEDURE — 250N000013 HC RX MED GY IP 250 OP 250 PS 637: Performed by: INTERNAL MEDICINE

## 2023-01-19 PROCEDURE — 250N000013 HC RX MED GY IP 250 OP 250 PS 637: Performed by: NURSE PRACTITIONER

## 2023-01-19 PROCEDURE — 250N000013 HC RX MED GY IP 250 OP 250 PS 637: Performed by: REGISTERED NURSE

## 2023-01-19 PROCEDURE — 120N000001 HC R&B MED SURG/OB

## 2023-01-19 PROCEDURE — 99232 SBSQ HOSP IP/OBS MODERATE 35: CPT | Performed by: INTERNAL MEDICINE

## 2023-01-19 PROCEDURE — 74018 RADEX ABDOMEN 1 VIEW: CPT

## 2023-01-19 RX ADMIN — Medication 25 MG: at 20:09

## 2023-01-19 RX ADMIN — DULOXETINE HYDROCHLORIDE 60 MG: 60 CAPSULE, DELAYED RELEASE ORAL at 08:28

## 2023-01-19 RX ADMIN — Medication 250 MG: at 08:28

## 2023-01-19 RX ADMIN — Medication 0.25 G: at 20:09

## 2023-01-19 RX ADMIN — Medication 12.5 MG: at 20:09

## 2023-01-19 RX ADMIN — Medication 0.25 G: at 08:29

## 2023-01-19 RX ADMIN — Medication 40 MG: at 08:29

## 2023-01-19 RX ADMIN — HYDROXYZINE HYDROCHLORIDE 25 MG: 25 TABLET ORAL at 20:09

## 2023-01-19 RX ADMIN — Medication 25 MG: at 08:28

## 2023-01-19 RX ADMIN — MELATONIN TAB 3 MG 5 MG: 3 TAB at 20:09

## 2023-01-19 RX ADMIN — Medication 25 MCG: at 08:28

## 2023-01-19 RX ADMIN — SENNOSIDES AND DOCUSATE SODIUM 1 TABLET: 8.6; 5 TABLET ORAL at 20:09

## 2023-01-19 RX ADMIN — MULTIVITAMIN 15 ML: LIQUID ORAL at 08:29

## 2023-01-19 RX ADMIN — POLYETHYLENE GLYCOL 3350 17 G: 17 POWDER, FOR SOLUTION ORAL at 10:52

## 2023-01-19 RX ADMIN — TRAZODONE HYDROCHLORIDE 50 MG: 50 TABLET ORAL at 20:09

## 2023-01-19 RX ADMIN — LINACLOTIDE 290 MCG: 290 CAPSULE, GELATIN COATED ORAL at 08:31

## 2023-01-19 RX ADMIN — LORAZEPAM 1 MG: 1 TABLET ORAL at 20:09

## 2023-01-19 RX ADMIN — Medication 0.25 G: at 16:52

## 2023-01-19 RX ADMIN — ARIPIPRAZOLE 15 MG: 15 TABLET ORAL at 08:28

## 2023-01-19 RX ADMIN — ACETAMINOPHEN 650 MG: 325 TABLET, FILM COATED ORAL at 14:06

## 2023-01-19 RX ADMIN — ONDANSETRON 4 MG: 4 TABLET, ORALLY DISINTEGRATING ORAL at 03:34

## 2023-01-19 RX ADMIN — Medication 40 MG: at 16:50

## 2023-01-19 RX ADMIN — SENNOSIDES AND DOCUSATE SODIUM 1 TABLET: 8.6; 5 TABLET ORAL at 08:28

## 2023-01-19 RX ADMIN — SILVER SULFADIAZINE: 10 CREAM TOPICAL at 08:29

## 2023-01-19 ASSESSMENT — ACTIVITIES OF DAILY LIVING (ADL)
ADLS_ACUITY_SCORE: 26
ADLS_ACUITY_SCORE: 29
ADLS_ACUITY_SCORE: 29
ADLS_ACUITY_SCORE: 30
ADLS_ACUITY_SCORE: 26
ADLS_ACUITY_SCORE: 30

## 2023-01-19 NOTE — CONSULTS
Triage and Transition - Consult and Liaison     Thea Castle  January 19, 2023    Session start: 3:48 pm  Session end: 4:17 pm  Session duration in minutes: 29 min  CPT utilized: 27832 - Psychotherapy (with patient) - 30 (16-37*) min  Patient was seen virtually ([employer] cart or other teleconferencing device).  Anticipated number of sessions or this episode of care: 1-4    Diagnosis:   296.33 (F33.2) Major Depressive Disorder, Recurrent Episode, Severe _, by history;  301.83 (F60.3) Borderline Personality Disorder, by history;   F68.10 Factitious disorder imposed on self, RULE OUT    Plan/Recommendations:     I am concerned patient is regressing due to current setting encouraging maladaptive and dependent coping behaviors. Boundaries, setting firm limits, and creating a behavior plan is going to be essential for this patient. It would be ideal to wean the 1:1 sitter and instead encourage use of coping skills, however, in even discussing this today, patient immediately began making threats about ending her life. Plan to work with patient and care team to develop a behavioral plan to support safety of patient and reduce regression of illness.     Patient has referrals in to [facility] and [facility] residential program.     Reason for consult: Thea is 22 year old White  female . Psychiatry consult was requested due to therapy. Patient was seen by [facility] Consult & Liaison team.     Presenting problem: Patient admitted to the emergency room following an attempted overdose on 30 tablets of gabapentin. Patient recommended inpatient psych, however, due to feeding tube, she is not able to transfer, she is now convalescing at [facility]. Please see initial DEC/[facility] Crisis Assessment completed by Kenyon Villanueva on 01/03/23 for complete assessment information. Patient has been evaluated by psychiatry daily.    Session Summary: Patient reports she called [facility].  "Patient reports she also called [facility], feels [facility] may be too far. Patient reports she checked in with GI doctor today and that went okay. She reports the tube needs to come out eventually. She stated it would maybe be more ideal to work on mental health side of things and then eventually take the tube out. Does not want tube out.    I attempted to discuss with patient plan to set boundaries and reduce reliance on staff/sitters. I began to explain how this would support patient's independence and plan to discharge to residential. She began to shut down, stopped talking. She states \"I'm going to be honest, my suicidal thoughts just got pretty excited\". Patient reports \"I don't want to talk about it\". Patient displayed labile emotions, began crying stating \"Thoughts got really loud,and now could think about acting on out plan in hospital\". I again reframed why this would be a goal and ways we could work towards this (decreasing amount of time sitter is in room, do not play games with patient, do brief check ins, etc). Patient again was closed off and not open to discuss these options. I requested patient think about this over the next day and how we can support her in allowing her to feel safe and supported while decreasing reliance on sitter, as she would not have this available to her at next level of care. She states she didn't think writer was hearing her and continued to be frustrated.     Mental Status Exam   Affect: Labile  Appearance: Appropriate   Attention Span/Concentration: Attentive    Eye Contact: Variable  Fund of Knowledge: Appropriate   Language /Speech Content: Fluent  Language /Speech Volume: Other: variable   Language /Speech Rate/Productions: Normal   Recent Memory: Intact  Remote Memory: Intact  Mood: Other: labile   Orientation:   Person: Yes   Place: Yes  Time of Day: Yes   Date: Yes   Situation (Do they understand why they are here?): Yes   Psychomotor Behavior: Normal   Thought " Content: Clear and Suicidal  Thought Form: Intact    Current medications:   Current Facility-Administered Medications   Medication     acetaminophen (TYLENOL) tablet 650 mg    Or     acetaminophen (TYLENOL) Suppository 650 mg     ARIPiprazole (ABILIFY) tablet 15 mg     bisacodyl (DULCOLAX) suppository 10 mg     dextrose 10% infusion     dextrose 10% infusion     diltiazem 2% in PLO gel 0.25 g     drospirenone-ethinyl estradiol (DIANA) 3-0.02 MG per tablet 1 tablet     DULoxetine (CYMBALTA) DR capsule 60 mg     hydrOXYzine (ATARAX) syrup 25 mg    Or     hydrOXYzine (ATARAX) tablet 25 mg     lactated ringers BOLUS 250 mL     lidocaine (LMX4) cream     lidocaine 1 % 0.1-1 mL     linaclotide (LINZESS) capsule 290 mcg     LORazepam (ATIVAN) tablet 1 mg     melatonin tablet 5 mg     metoclopramide (REGLAN) injection 5 mg     multivitamins w/minerals liquid 15 mL     ondansetron (ZOFRAN ODT) ODT tab 4 mg    Or     ondansetron (ZOFRAN) injection 4 mg     pantoprazole (PROTONIX) 2 mg/mL suspension 40 mg     polyethylene glycol (MIRALAX) Packet 17 g     promethazine (PHENERGAN) half-tab 25 mg     QUEtiapine (SEROquel) half-tab 12.5 mg     senna-docusate (SENOKOT-S/PERICOLACE) 8.6-50 MG per tablet 1 tablet     silver sulfADIAZINE (SILVADENE) 1 % cream     sodium chloride (PF) 0.9% PF flush 3 mL     sodium chloride (PF) 0.9% PF flush 3 mL     traZODone (DESYREL) tablet 50 mg     vitamin C (ASCORBIC ACID) tablet 250 mg     Vitamin D3 (CHOLECALCIFEROL) tablet 25 mcg     witch hazel-glycerin (TUCKS) pad       MeasurableTreatment Goal(s) related to diagnosis / functional impairment(s)  Goal 1: Patient will alleviate the suicidal impulses/ideation and return to the highest level of previous daily functioning.      Objective #A                Patient will participate in therapy for an identified emotional problem resulting in suicidal thoughts.  Status: New as of January 10, 2023     Intervention(s)  [facility] will assess the severity of  the level of impairment to the client s functioning to determine the appropriate level of care.        LMHP will encourage the patient to express feelings related to their suicidal ideation in order to clarify them and increase insight as to the cause for them.       LMHP will assist the patient in developing coping strategies for suicidal ideation.        Goal 2: Patient will accept placement/referral to an appropriate level of care to safely address the suicidal crisis.      Objective #A                Patient will state the strength of the suicidal feeling, frequency of the thoughts and the detail of the plans   Status: New as of January 10, 2023     Intervention(s)  LMHP will facilitate conversations about options and encourage patient to engage in services.             Therapeutic intervention and progress:  Therapeutic intervention consisted of active listening, engaging in learning/practicing coping skills, active problem solving and crisis management. Patient is not making progress towards treatment goals as evidenced by .     Collateral information:   Reviewed chart and coordinated with referrals.   Placed referral to [facility]idential program. They stated that patient called and made referral herself yesterday, has done initial intake.   Placed referral to [employer], via fax.     Discussed case amongst behavioral health team, including director of psychiatry at [place], clinical manager, and psychiatric providers. All professionals in agreement that current sitting is only setting patient back in regard to symptoms. Patient is exhibiting extreme attention seeking and maladaptive dependent behaviors. Discussed following concerns: allowing patient control of essentially all of her care, 1:1 promoting dependence- with someone to talk to/play games with all day, and staff coming in her room to talk and hugging patient. Discussed need to create behavior plan and set firm limits.  Examples discussed:only allowed to play 1 game a day with staff. Reduce sitter to one hour on and one hour off. Doing brief hourly check ins. Discussed plan to hopefully have patient wean off feeding tube and transfer to inpatient psych the same day. Having conversations with patient that it will be uncomfortable to not have feeding tube at first and stomach will need time to get used to it, ex: similar to how when a patient gets cast removed- takes some time to feel comfortable on leg again. Encourage PO intake. Encourage coping skills. Discussed plan to create behavioral plan for both patient and staff direction and provide to unit.       Anne Melendez, [facility]  Triage and Transition - Consult and Liaison   [phone]

## 2023-01-19 NOTE — PLAN OF CARE
Pt alert and oriented, VSS on room air. Complains of pain in abdomen and headache, tylenol given x1. Enema and miralax given for constipation. Up SBA, sitter at bedside. TF at 40 ml/hr, this is goal rate. Q4 hr water flushes. Encouraging PO intake. GI consult done today, see note. Discharge pending placement.
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
## 2023-01-19 NOTE — PLAN OF CARE
Goal Outcome Evaluation:           Overall Patient Progress: no changeOverall Patient Progress: no change    Outcome Evaluation: pt continues to have minimal PO intake. TF re-started and pt is tolerating at goal rate.    Tameka Farrell RD, LD

## 2023-01-19 NOTE — PROGRESS NOTES
GASTROENTEROLOGY PROGRESS NOTE     IMPRESSION:  1. Suicidal attempt with ingestion of gabapentin - awaiting transfer for inpatient mental health. She was admitted 1/3/23.    2. Nausea, vomiting and abdominal pain - patient feels this is triggered by food intake. Per the [facility] notes and hospital notes, no official record or testing showing gastroparesis. Gastric emptying study tried 1/13/23, unable to ingest eggs.  - On phenergan 25mg twice a day  - Pantoprazole liquid 40mg twice a day    3. Chronic constipation secondary to slow transit and pelvic floor dysfunction - Managed by Linzess (290mcg), Motegrity and Colace at home. Motegrity has been discontinued due to concern regarding risk of suicidal thoughts. Minimal stool burden 1/10/23 on AXR  Currently:  - Linzess 290mcg daily  - Senna 1 tablet twice a day    4. History of anorexia nervosa - patient states this is not active now.    5. Malnutrition - Secondary to GI symptoms or fear of GI symptoms, difficult to discern between the two. As an outpatient, she was getting IV fluids three times a week and it was recommended to place NJ tube for feeding. This was completed 12/21/22 at [facility]. The patient had the feeding tube dislodged and retained tip in stomach, this was removed by EGD at [facility] 1/2/23, it was replaced after this.   - Her primary [facility] provider, Jennyfer Brown CNP last saw patient this hospitalization on 1/13/23 and felt that some of the concern for vomiting after two bites could be habitual vs rumination. It was felt at this time that a gastrostomy or jejunostomy tube is not recommended due to risk of self harm and complications. Also, it was felt that the NJ is not medically necessary and should try to remove NJ, encourage oral intake and ensure optimal management of lower GI motility.  - The patient feels that the NJ is necessary for her nutrition and health in the short term. She fears the abdominal pain  "that she experiences after eating small amounts. She fears vomiting.   - We discussed that her homework assignment for tonight is to try to design a plan or strategy to remove the NG and start to introduce oral intake.   - Given her fears of eating and focus on the NJ, if there is a mental health facility that allows the NG then, this may be the best approach to allow her to work on her mental health and fears of eating at that facility and gradually increase her intake and remove the NJ  - Since her mental health is the priority, if there are no mental health facilities that will accept her with an NJ, then may need to try to see if we can get a more rapid schedule to withdrawal of the NJ and initiation of oral intake for days to a week.  - We did discuss that she may need to consider transferring care to [facility] or [place] to have the multispecialty care within the same system and EMR.    6. Anal fissure - diagnosed per CRS.    RECOMMENDATIONS:  - Await patients plan to initiate oral intake and remove NJ  - Determine if there are mental health facilities that would accept the NJ  - AXR today to assess stool burden and location of feeding tube.    Juan Carlos Meeks MD  [facility]  [phone]      ________________________________________________________________________      SUBJECTIVE:  Patient is fearful of pain and vomiting that can occur with eating.       OBJECTIVE:  /89 (BP Location: Right arm)   Pulse 93   Temp 98.2  F (36.8  C) (Oral)   Resp 16   Ht 1.626 m (5' 4.02\")   Wt 53.6 kg (118 lb 2.7 oz)   SpO2 99%   BMI 20.27 kg/m    Temp (24hrs), Av.1  F (36.7  C), Min:98  F (36.7  C), Max:98.2  F (36.8  C)    Patient Vitals for the past 72 hrs:   Weight   23 0830 53.6 kg (118 lb 2.7 oz)        PHYSICAL EXAM  GEN: Alert, NAD.    No further exam was done.    Additional Data:  I have reviewed the patient's new clinical lab results:     Recent Labs   Lab Test 23  2311 23  1920 " 01/06/23  0625   WBC 6.6 8.1 5.0   HGB 11.5* 11.4* 10.8*   MCV 90 90 89    308 275     Recent Labs   Lab Test 01/18/23  1053 01/17/23  1813 01/17/23  0633 01/16/23  0907 01/09/23  1200 01/09/23  0843   NA  --   --  138 136  --  137   POTASSIUM 4.4  --  3.5 3.8   < > 4.2   CHLORIDE  --   --  102 102  --  103   CO2  --   --  33* 28  --  28   BUN  --   --  8 10  --  10   CR  --   --  0.92 0.82  --  0.72   ANIONGAP  --   --  3 6  --  6   DENNIS  --   --  8.9 9.1  --  9.0   GLC  --  169* 123* 104*   < > 117*    < > = values in this interval not displayed.     Recent Labs   Lab Test 01/14/23  1803 01/03/23  1427 04/14/21  0727 08/13/20  0728 03/23/19  2148 03/23/19  2140   ALBUMIN  --  3.4 3.0* 3.7   < >  --    BILITOTAL  --  0.2 0.3 0.5   < >  --    ALT  --  26 19 24   < >  --    AST  --  25 11 24   < >  --    PROTEIN Negative  --   --   --   --  Negative    < > = values in this interval not displayed.
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
## 2023-01-19 NOTE — PROGRESS NOTES
SPIRITUAL HEALTH SERVICES  SPIRITUAL ASSESSMENT Progress Note  [facility]     REFERRAL SOURCE: follow up visit    Brief visit with Thea who was playing Gin with a friend. Thea shared a photo of the volunteer support dog who visited her earlier today.     PLAN: Per plan, [facility] will follow up with a visit tomorrow.    Nichole Aguilera  Associate   Pager [phone]  [facility] Phone [phone]  [facility] Pager [phone]    [facility] available 24/7 for emergent requests/referrals, either by having the on-call  paged or by entering an ASAP/STAT consult in Epic (this will also page the on-call ).

## 2023-01-20 PROCEDURE — 250N000011 HC RX IP 250 OP 636: Performed by: INTERNAL MEDICINE

## 2023-01-20 PROCEDURE — 250N000013 HC RX MED GY IP 250 OP 250 PS 637: Performed by: INTERNAL MEDICINE

## 2023-01-20 PROCEDURE — 250N000013 HC RX MED GY IP 250 OP 250 PS 637: Performed by: REGISTERED NURSE

## 2023-01-20 PROCEDURE — 250N000013 HC RX MED GY IP 250 OP 250 PS 637

## 2023-01-20 PROCEDURE — 250N000013 HC RX MED GY IP 250 OP 250 PS 637: Performed by: NURSE PRACTITIONER

## 2023-01-20 PROCEDURE — 99232 SBSQ HOSP IP/OBS MODERATE 35: CPT | Performed by: INTERNAL MEDICINE

## 2023-01-20 PROCEDURE — 250N000013 HC RX MED GY IP 250 OP 250 PS 637: Performed by: HOSPITALIST

## 2023-01-20 PROCEDURE — 120N000001 HC R&B MED SURG/OB

## 2023-01-20 RX ORDER — SUMATRIPTAN 50 MG/1
50 TABLET, FILM COATED ORAL ONCE
Status: COMPLETED | OUTPATIENT
Start: 2023-01-20 | End: 2023-01-20

## 2023-01-20 RX ADMIN — Medication 0.25 G: at 08:42

## 2023-01-20 RX ADMIN — POLYETHYLENE GLYCOL 3350 17 G: 17 POWDER, FOR SOLUTION ORAL at 08:44

## 2023-01-20 RX ADMIN — MELATONIN TAB 3 MG 5 MG: 3 TAB at 20:42

## 2023-01-20 RX ADMIN — ONDANSETRON 4 MG: 2 INJECTION INTRAMUSCULAR; INTRAVENOUS at 10:32

## 2023-01-20 RX ADMIN — DULOXETINE HYDROCHLORIDE 60 MG: 60 CAPSULE, DELAYED RELEASE ORAL at 08:40

## 2023-01-20 RX ADMIN — Medication 250 MG: at 08:39

## 2023-01-20 RX ADMIN — SENNOSIDES AND DOCUSATE SODIUM 1 TABLET: 8.6; 5 TABLET ORAL at 08:39

## 2023-01-20 RX ADMIN — ACETAMINOPHEN 650 MG: 325 TABLET, FILM COATED ORAL at 10:32

## 2023-01-20 RX ADMIN — Medication 40 MG: at 08:40

## 2023-01-20 RX ADMIN — Medication 0.25 G: at 14:03

## 2023-01-20 RX ADMIN — LINACLOTIDE 290 MCG: 290 CAPSULE, GELATIN COATED ORAL at 08:42

## 2023-01-20 RX ADMIN — ACETAMINOPHEN 650 MG: 325 TABLET, FILM COATED ORAL at 01:58

## 2023-01-20 RX ADMIN — Medication 25 MG: at 08:39

## 2023-01-20 RX ADMIN — TRAZODONE HYDROCHLORIDE 50 MG: 50 TABLET ORAL at 20:42

## 2023-01-20 RX ADMIN — METOCLOPRAMIDE HYDROCHLORIDE 5 MG: 5 INJECTION INTRAMUSCULAR; INTRAVENOUS at 20:42

## 2023-01-20 RX ADMIN — ONDANSETRON 4 MG: 2 INJECTION INTRAMUSCULAR; INTRAVENOUS at 01:58

## 2023-01-20 RX ADMIN — MULTIVITAMIN 15 ML: LIQUID ORAL at 08:40

## 2023-01-20 RX ADMIN — METOCLOPRAMIDE HYDROCHLORIDE 5 MG: 5 INJECTION INTRAMUSCULAR; INTRAVENOUS at 05:11

## 2023-01-20 RX ADMIN — Medication 25 MCG: at 08:40

## 2023-01-20 RX ADMIN — WITCH HAZEL: 500 SOLUTION RECTAL; TOPICAL at 08:42

## 2023-01-20 RX ADMIN — SILVER SULFADIAZINE: 10 CREAM TOPICAL at 08:43

## 2023-01-20 RX ADMIN — Medication 40 MG: at 17:20

## 2023-01-20 RX ADMIN — Medication 12.5 MG: at 20:42

## 2023-01-20 RX ADMIN — SUMATRIPTAN SUCCINATE 50 MG: 50 TABLET ORAL at 14:02

## 2023-01-20 RX ADMIN — LORAZEPAM 1 MG: 1 TABLET ORAL at 20:42

## 2023-01-20 RX ADMIN — Medication 0.25 G: at 20:43

## 2023-01-20 RX ADMIN — ACETAMINOPHEN 650 MG: 325 TABLET, FILM COATED ORAL at 17:20

## 2023-01-20 RX ADMIN — ARIPIPRAZOLE 15 MG: 15 TABLET ORAL at 08:39

## 2023-01-20 RX ADMIN — SENNOSIDES AND DOCUSATE SODIUM 1 TABLET: 8.6; 5 TABLET ORAL at 20:42

## 2023-01-20 RX ADMIN — Medication 25 MG: at 20:42

## 2023-01-20 ASSESSMENT — ACTIVITIES OF DAILY LIVING (ADL)
ADLS_ACUITY_SCORE: 29
ADLS_ACUITY_SCORE: 27
ADLS_ACUITY_SCORE: 29
ADLS_ACUITY_SCORE: 27

## 2023-01-20 NOTE — PROGRESS NOTES
"[facility] Progress Note:    Date: 1/20/2023    Patient Name: Thea Castle    SUBJECTIVE: Pt sitting up in bed, appeared comfortable. Denies vomiting however reports nausea following attempts to eat rice and chicken.Reports she tolerated popsicles without difficulty.  Reports she is tolerating the increased rate of the Gavi Farm tube feeding. Denies  bowel movement with enema yesterday.     Reports she has spoken with the [facility] in [place], [place] for possible admission. Safety sitter was present during today's visit.     OBJECTIVE: /88 (BP Location: Right arm)   Pulse 87   Temp 98.4  F (36.9  C) (Oral)   Resp 16   Ht 1.626 m (5' 4.02\")   Wt 53.6 kg (118 lb 2.7 oz)   SpO2 99%   BMI 20.27 kg/m      Body mass index is 20.27 kg/m .    Gen: A&O X3, NAD  Resp: CTA   CVS: RRR  Abd/GI: Soft, non-tender. BS- hypoactive.       IMAGING  1/19/23 ABDOMEN XRAY- one view                                                                   IMPRESSION: Feeding tube tip in the projected location of the fourth  portion of the duodenum. Minimal amount of stool.  Nonobstructed bowel  gas pattern.      IMPRESSION:  22 year old female admitted 1/3/23 for suicidal attempt with ingestion of gabapentin - awaiting transfer for inpatient mental health. Pt has history of anorexia nervosa and malnutrition secondary to fear of negative GI symptoms. Currently managed on Gavi Farm NJ tube feedings, tolerating well. Reviewed [facility] notes and hospital notes, no official record or testing showing gastroparesis. Gastric emptying study tried 1/13/23, unable to ingest eggs.     Pertinent GI Assessment:   1. Nausea, vomiting and abdominal pain - patient feels this is triggered by food intake.   2. Chronic constipation secondary to slow transit and pelvic floor dysfunction -  Motegrity has been discontinued due to concern regarding risk of suicidal thoughts.  1/19/23 AXR again with minimal stool burden.       PLAN: "   - Continue phenergan 25mg twice a day  - Continue Pantoprazole liquid 40mg twice a day  - continue Linzess 290mcg daily  - Continue Senna 1 tablet twice a day  -pending soap suds enema  -Encouragement provided regarding oral intake.   -Pt agreeable to continue to trial different foods. Discussed trial of Gavi Farm shakes today  -Continue to recommend avoidance of gastrostomy or jejunostomy due to risk of self harm and complications.   -GI will follow peripherally through the weekend; call with any changes or acute concerns.   -Will formally round on Monday, 1/23/23      40  minutes of total time was spent providing patient care, including patient evaluation, reviewing documentation/test results, and .    Skyla Hooper CNP Thank you for the opportunity to participate in the care of this patient. Please call with any questions or concerns. [phone]. Ext [phone]    CC: [facility], Brittney Penny  ________________________________________________________________________

## 2023-01-20 NOTE — PROGRESS NOTES
"SPIRITUAL HEALTH SERVICES Progress Note  [facility] 73    Saw pt Thea Castle per follow up. Pt was tearful throughout visit as she discussed her relationship with her family. We reflected together about her support system and coping skills.    Patient/Family Understanding of Illness and Goals of Care - Pt shared that she expects to go to a residential treatment facility upon discharge, and stated \"I need to stop having thoughts of hurting myself to be able to go.\"    Distress and Loss    - Pt expressed distress surrounding her relationship with her sister.  - Pt expressed feeling unworthy of love and became tearful as she stated \"I feel like if my own family doesn't love me, everyone else- my chosen family- must not know the real me.\"    Strengths, Coping, and Resources   - When asked what she would say to a friend in her position, pt responded \"I would tell them that they are loved,\" and added \"you are loved\" to her list of personal mantras.    Meaning, Beliefs, and Spirituality - Pt requested prayer.    Plan of Care - Spiritual Health to continue plan to visit pt on Mondays, Wednesdays and Fridays with alternating chaplains.    Sarah Johanson  Chaplain Resident  Spiritual Health Services  Pager: [phone]     [facility] available 24/7 for emergent requests/referrals, either by having the on-call  paged or by entering an ASAP/STAT consult in Epic (this will also page the on-call ).   "
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
## 2023-01-20 NOTE — PLAN OF CARE
Pt A&Ox4, VSS, sitter at bedside. C/o headache, tylenol given. Smith with adequate UOP, slight rash on groin. TF@ 40/hr, Q4 flushes 60mL. R PIV redressed. Pending placement for rehab.

## 2023-01-21 ENCOUNTER — APPOINTMENT (OUTPATIENT)
Dept: GENERAL RADIOLOGY | Facility: CLINIC | Age: 23
DRG: 918 | End: 2023-01-21
Attending: NURSE PRACTITIONER
Payer: COMMERCIAL

## 2023-01-21 ENCOUNTER — APPOINTMENT (OUTPATIENT)
Dept: CT IMAGING | Facility: CLINIC | Age: 23
DRG: 918 | End: 2023-01-21
Attending: NURSE PRACTITIONER
Payer: COMMERCIAL

## 2023-01-21 LAB
ANION GAP SERPL CALCULATED.3IONS-SCNC: 10 MMOL/L (ref 7–15)
BUN SERPL-MCNC: 11.9 MG/DL (ref 6–20)
CALCIUM SERPL-MCNC: 9.2 MG/DL (ref 8.6–10)
CHLORIDE SERPL-SCNC: 97 MMOL/L (ref 98–107)
CREAT SERPL-MCNC: 0.75 MG/DL (ref 0.51–0.95)
DEPRECATED HCO3 PLAS-SCNC: 31 MMOL/L (ref 22–29)
GFR SERPL CREATININE-BSD FRML MDRD: >90 ML/MIN/1.73M2
GLUCOSE BLDC GLUCOMTR-MCNC: 100 MG/DL (ref 70–99)
GLUCOSE SERPL-MCNC: 161 MG/DL (ref 70–99)
HGB BLD-MCNC: 12.5 G/DL (ref 11.7–15.7)
POTASSIUM SERPL-SCNC: 4 MMOL/L (ref 3.4–5.3)
SODIUM SERPL-SCNC: 138 MMOL/L (ref 136–145)

## 2023-01-21 PROCEDURE — 93005 ELECTROCARDIOGRAM TRACING: CPT

## 2023-01-21 PROCEDURE — 72072 X-RAY EXAM THORAC SPINE 3VWS: CPT

## 2023-01-21 PROCEDURE — 250N000011 HC RX IP 250 OP 636: Performed by: INTERNAL MEDICINE

## 2023-01-21 PROCEDURE — 250N000013 HC RX MED GY IP 250 OP 250 PS 637: Performed by: INTERNAL MEDICINE

## 2023-01-21 PROCEDURE — 36415 COLL VENOUS BLD VENIPUNCTURE: CPT | Performed by: NURSE PRACTITIONER

## 2023-01-21 PROCEDURE — 80048 BASIC METABOLIC PNL TOTAL CA: CPT | Performed by: NURSE PRACTITIONER

## 2023-01-21 PROCEDURE — 250N000013 HC RX MED GY IP 250 OP 250 PS 637: Performed by: REGISTERED NURSE

## 2023-01-21 PROCEDURE — 93010 ELECTROCARDIOGRAM REPORT: CPT | Performed by: INTERNAL MEDICINE

## 2023-01-21 PROCEDURE — 70450 CT HEAD/BRAIN W/O DYE: CPT

## 2023-01-21 PROCEDURE — 250N000013 HC RX MED GY IP 250 OP 250 PS 637: Performed by: HOSPITALIST

## 2023-01-21 PROCEDURE — 73502 X-RAY EXAM HIP UNI 2-3 VIEWS: CPT

## 2023-01-21 PROCEDURE — 99232 SBSQ HOSP IP/OBS MODERATE 35: CPT | Performed by: INTERNAL MEDICINE

## 2023-01-21 PROCEDURE — 120N000001 HC R&B MED SURG/OB

## 2023-01-21 PROCEDURE — 250N000013 HC RX MED GY IP 250 OP 250 PS 637: Performed by: NURSE PRACTITIONER

## 2023-01-21 PROCEDURE — 85018 HEMOGLOBIN: CPT | Performed by: NURSE PRACTITIONER

## 2023-01-21 RX ORDER — SUMATRIPTAN 25 MG/1
25 TABLET, FILM COATED ORAL ONCE
Status: COMPLETED | OUTPATIENT
Start: 2023-01-21 | End: 2023-01-21

## 2023-01-21 RX ADMIN — Medication 25 MG: at 20:10

## 2023-01-21 RX ADMIN — Medication 40 MG: at 09:16

## 2023-01-21 RX ADMIN — ACETAMINOPHEN 650 MG: 325 TABLET, FILM COATED ORAL at 03:08

## 2023-01-21 RX ADMIN — SENNOSIDES AND DOCUSATE SODIUM 1 TABLET: 8.6; 5 TABLET ORAL at 09:15

## 2023-01-21 RX ADMIN — SILVER SULFADIAZINE: 10 CREAM TOPICAL at 09:16

## 2023-01-21 RX ADMIN — ACETAMINOPHEN 650 MG: 325 TABLET, FILM COATED ORAL at 18:54

## 2023-01-21 RX ADMIN — SUMATRIPTAN SUCCINATE 25 MG: 25 TABLET ORAL at 16:04

## 2023-01-21 RX ADMIN — Medication 0.25 G: at 20:10

## 2023-01-21 RX ADMIN — TRAZODONE HYDROCHLORIDE 50 MG: 50 TABLET ORAL at 20:10

## 2023-01-21 RX ADMIN — ARIPIPRAZOLE 15 MG: 15 TABLET ORAL at 09:15

## 2023-01-21 RX ADMIN — ACETAMINOPHEN 650 MG: 325 TABLET, FILM COATED ORAL at 09:15

## 2023-01-21 RX ADMIN — Medication 12.5 MG: at 20:10

## 2023-01-21 RX ADMIN — ONDANSETRON 4 MG: 2 INJECTION INTRAMUSCULAR; INTRAVENOUS at 20:27

## 2023-01-21 RX ADMIN — SENNOSIDES AND DOCUSATE SODIUM 1 TABLET: 8.6; 5 TABLET ORAL at 20:11

## 2023-01-21 RX ADMIN — POLYETHYLENE GLYCOL 3350 17 G: 17 POWDER, FOR SOLUTION ORAL at 09:15

## 2023-01-21 RX ADMIN — LINACLOTIDE 290 MCG: 290 CAPSULE, GELATIN COATED ORAL at 12:24

## 2023-01-21 RX ADMIN — Medication 0.25 G: at 09:16

## 2023-01-21 RX ADMIN — Medication 250 MG: at 09:15

## 2023-01-21 RX ADMIN — MULTIVITAMIN 15 ML: LIQUID ORAL at 09:16

## 2023-01-21 RX ADMIN — LORAZEPAM 1 MG: 1 TABLET ORAL at 20:11

## 2023-01-21 RX ADMIN — Medication 25 MCG: at 09:15

## 2023-01-21 RX ADMIN — Medication 25 MG: at 09:15

## 2023-01-21 RX ADMIN — Medication 40 MG: at 16:04

## 2023-01-21 RX ADMIN — DULOXETINE HYDROCHLORIDE 60 MG: 60 CAPSULE, DELAYED RELEASE ORAL at 09:15

## 2023-01-21 ASSESSMENT — ACTIVITIES OF DAILY LIVING (ADL)
ADLS_ACUITY_SCORE: 27
ADLS_ACUITY_SCORE: 29
ADLS_ACUITY_SCORE: 26
ADLS_ACUITY_SCORE: 27
ADLS_ACUITY_SCORE: 27
ADLS_ACUITY_SCORE: 26
ADLS_ACUITY_SCORE: 29
ADLS_ACUITY_SCORE: 27
ADLS_ACUITY_SCORE: 27
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
## 2023-01-22 LAB — POTASSIUM SERPL-SCNC: 4.4 MMOL/L (ref 3.4–5.3)

## 2023-01-22 PROCEDURE — 250N000013 HC RX MED GY IP 250 OP 250 PS 637: Performed by: INTERNAL MEDICINE

## 2023-01-22 PROCEDURE — 99232 SBSQ HOSP IP/OBS MODERATE 35: CPT | Performed by: INTERNAL MEDICINE

## 2023-01-22 PROCEDURE — 250N000013 HC RX MED GY IP 250 OP 250 PS 637: Performed by: HOSPITALIST

## 2023-01-22 PROCEDURE — 250N000013 HC RX MED GY IP 250 OP 250 PS 637: Performed by: NURSE PRACTITIONER

## 2023-01-22 PROCEDURE — 250N000013 HC RX MED GY IP 250 OP 250 PS 637: Performed by: REGISTERED NURSE

## 2023-01-22 PROCEDURE — 250N000011 HC RX IP 250 OP 636: Performed by: INTERNAL MEDICINE

## 2023-01-22 PROCEDURE — 36415 COLL VENOUS BLD VENIPUNCTURE: CPT | Performed by: INTERNAL MEDICINE

## 2023-01-22 PROCEDURE — 120N000001 HC R&B MED SURG/OB

## 2023-01-22 PROCEDURE — 84132 ASSAY OF SERUM POTASSIUM: CPT | Performed by: INTERNAL MEDICINE

## 2023-01-22 RX ADMIN — Medication 40 MG: at 09:59

## 2023-01-22 RX ADMIN — BISACODYL 10 MG: 10 SUPPOSITORY RECTAL at 21:15

## 2023-01-22 RX ADMIN — Medication 0.25 G: at 10:14

## 2023-01-22 RX ADMIN — Medication 12.5 MG: at 21:15

## 2023-01-22 RX ADMIN — MELATONIN TAB 3 MG 5 MG: 3 TAB at 21:15

## 2023-01-22 RX ADMIN — ARIPIPRAZOLE 15 MG: 15 TABLET ORAL at 09:58

## 2023-01-22 RX ADMIN — Medication 40 MG: at 16:52

## 2023-01-22 RX ADMIN — ONDANSETRON 4 MG: 4 TABLET, ORALLY DISINTEGRATING ORAL at 15:02

## 2023-01-22 RX ADMIN — LORAZEPAM 1 MG: 1 TABLET ORAL at 21:15

## 2023-01-22 RX ADMIN — Medication 25 MG: at 21:15

## 2023-01-22 RX ADMIN — DULOXETINE HYDROCHLORIDE 60 MG: 60 CAPSULE, DELAYED RELEASE ORAL at 09:58

## 2023-01-22 RX ADMIN — Medication 0.25 G: at 21:15

## 2023-01-22 RX ADMIN — MULTIVITAMIN 15 ML: LIQUID ORAL at 09:59

## 2023-01-22 RX ADMIN — Medication 250 MG: at 09:58

## 2023-01-22 RX ADMIN — POLYETHYLENE GLYCOL 3350 17 G: 17 POWDER, FOR SOLUTION ORAL at 09:59

## 2023-01-22 RX ADMIN — Medication 25 MG: at 09:58

## 2023-01-22 RX ADMIN — Medication 25 MCG: at 09:58

## 2023-01-22 RX ADMIN — SENNOSIDES AND DOCUSATE SODIUM 1 TABLET: 8.6; 5 TABLET ORAL at 21:15

## 2023-01-22 RX ADMIN — ACETAMINOPHEN 650 MG: 325 TABLET, FILM COATED ORAL at 02:30

## 2023-01-22 RX ADMIN — TRAZODONE HYDROCHLORIDE 50 MG: 50 TABLET ORAL at 21:15

## 2023-01-22 RX ADMIN — ONDANSETRON 4 MG: 2 INJECTION INTRAMUSCULAR; INTRAVENOUS at 02:29

## 2023-01-22 RX ADMIN — SENNOSIDES AND DOCUSATE SODIUM 1 TABLET: 8.6; 5 TABLET ORAL at 09:58

## 2023-01-22 RX ADMIN — LINACLOTIDE 290 MCG: 290 CAPSULE, GELATIN COATED ORAL at 10:22

## 2023-01-22 RX ADMIN — SILVER SULFADIAZINE: 10 CREAM TOPICAL at 10:15

## 2023-01-22 RX ADMIN — Medication 0.25 G: at 15:26

## 2023-01-22 RX ADMIN — ACETAMINOPHEN 650 MG: 325 TABLET, FILM COATED ORAL at 18:17

## 2023-01-22 RX ADMIN — HYDROXYZINE HYDROCHLORIDE 25 MG: 25 TABLET ORAL at 21:15

## 2023-01-22 ASSESSMENT — ACTIVITIES OF DAILY LIVING (ADL)
ADLS_ACUITY_SCORE: 26

## 2023-01-22 NOTE — PLAN OF CARE
Reason for Admission: suicide attempt, eval for seizures     Cognitive/Mentation: A/Ox 4  Neuros/CMS: Intact ex gen weakness  VS: stable on RA.  GI: BS present, passing flatus. Continent.  : .pt has pantoja for urinary retention.  Pulmonary: LS clear.  Pain: denies pain.      Drains/Lines: PIV patent and intact  Skin: scattered bruising bald patch on scalp  Activity: IND.  Diet: reg with thin liquids. Takes pills whole one at a time. Tube feed running at goal pt took meds orally except night medications she requested crushed.     Therapies recs: pending  Discharge: pending     Aggression Stoplight Tool: green
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
## 2023-01-23 ENCOUNTER — APPOINTMENT (OUTPATIENT)
Dept: GENERAL RADIOLOGY | Facility: CLINIC | Age: 23
DRG: 918 | End: 2023-01-23
Attending: NURSE PRACTITIONER
Payer: COMMERCIAL

## 2023-01-23 LAB
ANION GAP SERPL CALCULATED.3IONS-SCNC: 10 MMOL/L (ref 7–15)
BUN SERPL-MCNC: 13.5 MG/DL (ref 6–20)
CALCIUM SERPL-MCNC: 9.5 MG/DL (ref 8.6–10)
CHLORIDE SERPL-SCNC: 101 MMOL/L (ref 98–107)
CREAT SERPL-MCNC: 0.81 MG/DL (ref 0.51–0.95)
DEPRECATED HCO3 PLAS-SCNC: 27 MMOL/L (ref 22–29)
GFR SERPL CREATININE-BSD FRML MDRD: >90 ML/MIN/1.73M2
GLUCOSE SERPL-MCNC: 135 MG/DL (ref 70–99)
MAGNESIUM SERPL-MCNC: 1.7 MG/DL (ref 1.7–2.3)
PHOSPHATE SERPL-MCNC: 3.5 MG/DL (ref 2.5–4.5)
POTASSIUM SERPL-SCNC: 4 MMOL/L (ref 3.4–5.3)
SODIUM SERPL-SCNC: 138 MMOL/L (ref 136–145)

## 2023-01-23 PROCEDURE — 250N000013 HC RX MED GY IP 250 OP 250 PS 637: Performed by: INTERNAL MEDICINE

## 2023-01-23 PROCEDURE — 36415 COLL VENOUS BLD VENIPUNCTURE: CPT | Performed by: INTERNAL MEDICINE

## 2023-01-23 PROCEDURE — 74018 RADEX ABDOMEN 1 VIEW: CPT

## 2023-01-23 PROCEDURE — 99232 SBSQ HOSP IP/OBS MODERATE 35: CPT | Performed by: INTERNAL MEDICINE

## 2023-01-23 PROCEDURE — 250N000013 HC RX MED GY IP 250 OP 250 PS 637: Performed by: NURSE PRACTITIONER

## 2023-01-23 PROCEDURE — 250N000011 HC RX IP 250 OP 636: Performed by: INTERNAL MEDICINE

## 2023-01-23 PROCEDURE — 250N000013 HC RX MED GY IP 250 OP 250 PS 637: Performed by: REGISTERED NURSE

## 2023-01-23 PROCEDURE — 250N000013 HC RX MED GY IP 250 OP 250 PS 637: Performed by: HOSPITALIST

## 2023-01-23 PROCEDURE — 83735 ASSAY OF MAGNESIUM: CPT | Performed by: INTERNAL MEDICINE

## 2023-01-23 PROCEDURE — 80048 BASIC METABOLIC PNL TOTAL CA: CPT | Performed by: INTERNAL MEDICINE

## 2023-01-23 PROCEDURE — 84100 ASSAY OF PHOSPHORUS: CPT | Performed by: INTERNAL MEDICINE

## 2023-01-23 PROCEDURE — 120N000001 HC R&B MED SURG/OB

## 2023-01-23 RX ORDER — PANTOPRAZOLE SODIUM 40 MG/1
40 TABLET, DELAYED RELEASE ORAL
Status: DISCONTINUED | OUTPATIENT
Start: 2023-01-23 | End: 2023-02-07 | Stop reason: HOSPADM

## 2023-01-23 RX ORDER — LACTULOSE 10 G/15ML
10 SOLUTION ORAL 2 TIMES DAILY
Status: DISCONTINUED | OUTPATIENT
Start: 2023-01-23 | End: 2023-01-25

## 2023-01-23 RX ORDER — MULTIPLE VITAMINS W/ MINERALS TAB 9MG-400MCG
1 TAB ORAL DAILY
Status: DISCONTINUED | OUTPATIENT
Start: 2023-01-24 | End: 2023-02-07 | Stop reason: HOSPADM

## 2023-01-23 RX ADMIN — ONDANSETRON 4 MG: 4 TABLET, ORALLY DISINTEGRATING ORAL at 08:15

## 2023-01-23 RX ADMIN — ONDANSETRON 4 MG: 4 TABLET, ORALLY DISINTEGRATING ORAL at 01:19

## 2023-01-23 RX ADMIN — SENNOSIDES AND DOCUSATE SODIUM 1 TABLET: 8.6; 5 TABLET ORAL at 08:13

## 2023-01-23 RX ADMIN — Medication 25 MG: at 08:13

## 2023-01-23 RX ADMIN — Medication 40 MG: at 08:13

## 2023-01-23 RX ADMIN — Medication 0.25 G: at 12:40

## 2023-01-23 RX ADMIN — Medication 12.5 MG: at 21:00

## 2023-01-23 RX ADMIN — DULOXETINE HYDROCHLORIDE 60 MG: 60 CAPSULE, DELAYED RELEASE ORAL at 08:12

## 2023-01-23 RX ADMIN — Medication 0.25 G: at 08:11

## 2023-01-23 RX ADMIN — Medication 25 MG: at 21:00

## 2023-01-23 RX ADMIN — ACETAMINOPHEN 650 MG: 325 TABLET, FILM COATED ORAL at 12:33

## 2023-01-23 RX ADMIN — LACTULOSE 10 G: 10 SOLUTION ORAL at 21:01

## 2023-01-23 RX ADMIN — HYDROXYZINE HYDROCHLORIDE 25 MG: 25 TABLET ORAL at 21:02

## 2023-01-23 RX ADMIN — MULTIVITAMIN 15 ML: LIQUID ORAL at 08:13

## 2023-01-23 RX ADMIN — PANTOPRAZOLE SODIUM 40 MG: 40 TABLET, DELAYED RELEASE ORAL at 16:36

## 2023-01-23 RX ADMIN — SILVER SULFADIAZINE: 10 CREAM TOPICAL at 08:14

## 2023-01-23 RX ADMIN — Medication 250 MG: at 08:14

## 2023-01-23 RX ADMIN — Medication 0.25 G: at 21:10

## 2023-01-23 RX ADMIN — LINACLOTIDE 290 MCG: 290 CAPSULE, GELATIN COATED ORAL at 08:12

## 2023-01-23 RX ADMIN — LORAZEPAM 1 MG: 1 TABLET ORAL at 21:01

## 2023-01-23 RX ADMIN — Medication 25 MCG: at 08:14

## 2023-01-23 RX ADMIN — POLYETHYLENE GLYCOL 3350 17 G: 17 POWDER, FOR SOLUTION ORAL at 08:15

## 2023-01-23 RX ADMIN — TRAZODONE HYDROCHLORIDE 50 MG: 50 TABLET ORAL at 21:01

## 2023-01-23 RX ADMIN — METOCLOPRAMIDE HYDROCHLORIDE 5 MG: 5 INJECTION INTRAMUSCULAR; INTRAVENOUS at 04:35

## 2023-01-23 RX ADMIN — MELATONIN TAB 3 MG 5 MG: 3 TAB at 21:01

## 2023-01-23 RX ADMIN — ARIPIPRAZOLE 15 MG: 15 TABLET ORAL at 08:10

## 2023-01-23 RX ADMIN — SENNOSIDES AND DOCUSATE SODIUM 1 TABLET: 8.6; 5 TABLET ORAL at 21:01

## 2023-01-23 ASSESSMENT — ACTIVITIES OF DAILY LIVING (ADL)
ADLS_ACUITY_SCORE: 27
ADLS_ACUITY_SCORE: 26
ADLS_ACUITY_SCORE: 27
ADLS_ACUITY_SCORE: 27
ADLS_ACUITY_SCORE: 26
ADLS_ACUITY_SCORE: 27
ADLS_ACUITY_SCORE: 27
ADLS_ACUITY_SCORE: 26
ADLS_ACUITY_SCORE: 27

## 2023-01-23 NOTE — PROGRESS NOTES
"[facility] Progress Note:    Date: 1/23/2023    Patient Name: Thea Castle    SUBJECTIVE: Appeared comfortable lying in bed. Reports small liquid BM this morning, denies passing gas. Has tried tap water and soap suds enema, miralax, senna, and Linzess with continued abdominal di=n round and distended, +BS. NJ tube with [employer]. Continues to trial different foods, tolerating saltines and popsicles. Continues to request oral pills to go through NJ tube. Continues to report active suicidal thoughts. Continues to have 1:1 sitter for suicide precautions.       OBJECTIVE: /73 (BP Location: Right arm)   Pulse 103   Temp 98.6  F (37  C) (Oral)   Resp 16   Ht 1.626 m (5' 4.02\")   Wt 55.1 kg (121 lb 7.6 oz)   SpO2 98%   BMI 20.84 kg/m      Body mass index is 20.84 kg/m .    Constitutional: NAD, comfortable  Cardiac: RRR   Respiratory: CTAB  Abdomen: round, distended, non tender, +BS    IMPRESSION:  22 year old female admitted 1/3/23 for suicidal attempt with ingestion of gabapentin - awaiting transfer for inpatient mental health. Pt has history of anorexia nervosa and malnutrition secondary to fear of negative GI symptoms. Currently managed on Gavi [employer] NJ tube feedings, tolerating well. Gastric emptying study tried 1/13/23, unable to ingest eggs. At present, pt is awaiting admission to [facility], [place], [place], per pt report. Hospital discharge is difficult due to NJ tube dependence and would be reasonable to address weaning/discontinuing NJ with inpatient mental health services.        Pertinent GI Assessment:   1. Nausea, vomiting and abdominal pain - food intake improving.    2. Chronic constipation secondary to slow transit and pelvic floor dysfunction -       PLAN  -Will obtain abdominal x-ray to assess stool burden  - Continue phenergan 25mg twice a day  - Continue Pantoprazole liquid 40mg twice a day  - continue Linzess 290mcg daily  - Continue Senna 1 tablet twice a " day  -Motegrity has been discontinued due to concern regarding risk of suicidal thoughts.    -Continued encouragement provided regarding oral intake.   -Encouraged pt to be up in chair and walking around the room  -Continue to recommend avoidance of gastrostomy or jejunostomy due to risk of self harm and complications.   - Social work assessment may be helpful to assist with discharge/transition to mental health program that will accept NJ tube.     Addendum:   Today at 1500: Reviewed abdominal x-ray: IMPRESSION: Feeding tube remains in good position in the distal duodenum. Moderate gas and stool throughout the colon. No obstruction.     Will prescribe/trial Lactulose 10g BID for  Constipation.     40 minutes of total time was spent providing patient care, including patient evaluation, reviewing documentation/test results, and .    Skyla Hooper CNP Thank you for the opportunity to participate in the care of this patient. Please call with any questions or concerns. [phone]. Ext [phone]    CC: [facility], Brittney Penny  ________________________________________________________________________
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
## 2023-01-23 NOTE — PROGRESS NOTES
CLINICAL NUTRITION SERVICES - REASSESSMENT NOTE      Recommendations:     Changed diet from NO Lactose to Low Lactose (pt is lactose intolerant, not an allergy)      Recommend continue with current EN regimen    Encourage po intake as she tolerates     Malnutrition: (1/19)  % Weight Loss:  Weight loss does not meet criteria for malnutrition - wt appears to fluctuate 110-120# over the past 3 months (suspect 2' to Anorexia and GI issues) - per 1/9 RD note  % Intake:  </= 50% for >/= 5 days (severe malnutrition) - continued, improving with TF  Subcutaneous Fat Loss:  None observed - per 1/12 RD note  Muscle Loss:  None observed - per 1/12 RD note  Fluid Retention:  None noted     Malnutrition Diagnosis: Patient does not meet two of the established criteria necessary for diagnosing malnutrition but is at risk for malnutrition       EVALUATION OF PROGRESS TOWARD GOALS   Diet:    No Lactose Diet    Nutrition Support:    Type of Feeding Tube: NJ  Enteral Frequency:  Continuous  Enteral Regimen: [employer] 1.5 @ 40 mL/hr x24 hours (960 mL)  Total Enteral Provisions: 1440 kcal, 71 g protein, 133 g CHO, 74 g fat, 9 g fiber, 672 mL free water  Free Water Flush: 60 mL q 4 hours for tube patency     Daily multivitamin w/minerals    Intake/Tolerance:    Chart reviewed  Did not visit with pt (working remotely)  Tolerating TF at goal rate  BM x2 this am (bowel meds given)  Pt taking very small amount of po (gingerale, popsicles, crackers)      ASSESSED NUTRITION NEEDS:  Dosing Weight: 54.3 kg (1/8)  Estimated Energy Needs: 7298-0787+ (25-30 kcal/kg)  Justification: maintenance vs repletion   Estimated Protein Needs: 55-65+ (1-1.2+ g/kg)  Justification: preservation of lean body mass vs repletion      NEW FINDINGS:     01/23/23 0651 55.1 kg (121 lb 7.6 oz) Standing scale   01/17/23 0830 53.6 kg (118 lb 2.7 oz) Bed scale   01/09/23 1300 54 kg (119 lb) --   01/08/23 0800 54.3 kg (119 lb 11.4 oz) Bed scale   01/04/23 1723 54 kg (119  lb) --     Pt still with 1:1 sitter      Previous Goals (1/19):   Patient to consume >50% of nutritionally adequate meals/snacks at least BID over the next 5-7 days.  Evaluation: Not met    TF + PO to meet % estimated needs over the next 5-7 days.  Evaluation: Met (TF meeting needs)    Maintain wt >53 kg over the next 5-7 days.  Evaluation: Met    Previous Nutrition Diagnosis (1/19):   Inadequate oral intake related to nausea/vomiting, continued need for TF to supplement PO intake, eating disorder (anorexia) as evidenced by minimal PO intake this admit (mostly 0-25%)  Evaluation: ongoing, modified below        CURRENT NUTRITION DIAGNOSIS  No nutrition diagnosis identified at this time as EN meeting nutrition needs while pt with minimal po intake      INTERVENTIONS  Recommendations / Nutrition Prescription  No lactose diet ---> changed diet to Low Lactose (pt is lactose intolerant, not an allergy)    Recommend continue with current EN regimen    Encourage po intake as she tolerates      Goals  EN to meet estimated needs while po intake is minimal      MONITORING AND EVALUATION:  Progress towards goals will be monitored and evaluated per protocol and Practice Guidelines
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
## 2023-01-23 NOTE — PROGRESS NOTES
"SPIRITUAL HEALTH SERVICES Progress Note  [facility]    Saw pt Thea Castle per follow up visit.      Patient/Family Understanding of Illness and Goals of Care - not discussed      Distress and Loss -     Thea shared regarding feelings of guilt and shame when she has conversations with her mother.     Thea talked about feeling a sense of loss and suffering related her nadeen and an experience of \"[facility] hurt\" as it relates to her self-image and sexual identity.     Thea shared that she has been having \"nightmares\" and struggling to get enough sleep. She welcomed receiving a copy of [facility] prayer as a reminder God's presence and protection.      Strengths, Coping, and Resources - Thea shared that she has been trying to journal as a way of processing her feelings and experiences. She continues to enjoy visits from friends and connecting with them by phone and text.      Meaning, Beliefs, and Spirituality - Thea requested prayer today, which I offered, expressing God's ongoing loving presence in her life.      Plan of Care - Triaged for follow up with [facility] on Wednesday. [facility] continues for follow for support.    Nichole Aguilera MDiv  Associate   Pager [phone]  [facility] pager [phone]  [facility] phone [phone]    [facility] available 24/7 for emergent requests/referrals, either by having the on-call  paged or by entering an ASAP/STAT consult in Epic (this will also page the on-call ).      "

## 2023-01-23 NOTE — CONSULTS
Triage and Transition - Consult and Liaison     Thea Castle  January 23, 2023    Session start: 10:00 am  Session end: 10:33 am  Session duration in minutes: 33  [facility] utilized: [address]06 - Psychotherapy (with patient) - 30 (16-37*) min  Patient was seen virtually ([employer] cart or other teleconferencing device).  Anticipated number of sessions or this episode of care: 1-4    Diagnosis:   296.33 (F33.2) Major Depressive Disorder, Recurrent Episode, Severe _, by history;  301.83 (F60.3) Borderline Personality Disorder, by history;   F68.10 Factitious disorder imposed on self, RULE OUT    Plan/Recommendations:     I am concerned patient is regressing due to current setting encouraging maladaptive and dependent coping behaviors. Boundaries, setting firm limits, and creating a behavior plan is going to be essential for this patient.     ? the attendant should engage in minimum needed socialization to meet patient s needs.    ? Patient is to engage and perform her ADLs as independently as possible when she is hospitalized.     ? It is requested that the 1:1 does not braid or brush patient s hair.     Continue to explore alternative placement that would allow tube/catheter. Referrals made to [email or web address] and [facility].      Reason for consult: Thea is 22 year old White  female . Psychiatry consult was requested due to therapy. Patient was seen by [facility] Consult & Liaison team.     Presenting problem: Patient admitted to the emergency room following an attempted overdose on 30 tablets of gabapentin. Patient recommended inpatient psych, however, due to feeding tube, she is not able to transfer, she is now convalescing at [facility] station [facility]. Please see initial [facility] Crisis Assessment completed by Kenyon Villanueva on 01/03/23 for complete assessment information. Patient has been evaluated by psychiatry daily.    Session Summary: Patient reports she had some pretty intesne thoughts yesterday.  "Patient reports tried journaling. Found that she was ruminating on it. Patient reports she was journaling about her mom and dialogue she wanted to have.  She reports she talked with a nurse about it and felt it was helpful. Patient reports she felt better to manage reaction from her mom. Patient reports sister told her she was frustrated about her going to hospital and that she \"landed here\". Patient has not talked to sister since.     Patient reports mornings are better for her. Patient states at night worries about what will happen next day. Patient states she has a lot of dread. Patient reports its difficult when everyone leaves at the end of the day. Jing and I discussed coping skills to use during those times. Discussed focus on the now and what is within her control.     Patient reports she called [facility] on Friday, calling them tomorrow morning to check on status. Unsure if they accept tube feeds or catheter. She states she met with [facility] again,  Stated they recommended to take baby steps.  Discussed catheter. Did a void trial, Urology came and said it \"wasn't high on the to do list\" recommended set up outpatient appointment. Now she is wondering if she should be straight cathing. I indicated this was something to talk about with provider/urology if [facility] will not allow catheter.     Mental Status Exam   Affect: Appropriate  Appearance: Appropriate   Attention Span/Concentration: Attentive    Eye Contact: Engaged  Fund of Knowledge: Appropriate   Language /Speech Content: Fluent  Language /Speech Volume: Normal   Language /Speech Rate/Productions: Normal   Recent Memory: Intact  Remote Memory: Intact  Mood: Normal   Orientation:   Person: Yes   Place: Yes  Time of Day: Yes   Date: Yes   Situation (Do they understand why they are here?): Yes   Psychomotor Behavior: Normal   Thought Content: Clear  Thought Form: Intact    Current medications:   Current Facility-Administered Medications   Medication     " acetaminophen (TYLENOL) tablet 650 mg    Or     acetaminophen (TYLENOL) Suppository 650 mg     ARIPiprazole (ABILIFY) tablet 15 mg     bisacodyl (DULCOLAX) suppository 10 mg     dextrose 10% infusion     diltiazem 2% in PLO gel 0.25 g     drospirenone-ethinyl estradiol (DIANA) 3-0.02 MG per tablet 1 tablet     DULoxetine (CYMBALTA) DR capsule 60 mg     hydrOXYzine (ATARAX) syrup 25 mg    Or     hydrOXYzine (ATARAX) tablet 25 mg     lactated ringers BOLUS 250 mL     lidocaine (LMX4) cream     lidocaine 1 % 0.1-1 mL     linaclotide (LINZESS) capsule 290 mcg     LORazepam (ATIVAN) tablet 1 mg     melatonin tablet 5 mg     metoclopramide (REGLAN) injection 5 mg     multivitamins w/minerals liquid 15 mL     ondansetron (ZOFRAN ODT) ODT tab 4 mg    Or     ondansetron (ZOFRAN) injection 4 mg     pantoprazole (PROTONIX) 2 mg/mL suspension 40 mg     polyethylene glycol (MIRALAX) Packet 17 g     promethazine (PHENERGAN) half-tab 25 mg     QUEtiapine (SEROquel) half-tab 12.5 mg     senna-docusate (SENOKOT-S/PERICOLACE) 8.6-50 MG per tablet 1 tablet     silver sulfADIAZINE (SILVADENE) 1 % cream     sodium chloride (PF) 0.9% PF flush 3 mL     sodium chloride (PF) 0.9% PF flush 3 mL     traZODone (DESYREL) tablet 50 mg     vitamin C (ASCORBIC ACID) tablet 250 mg     Vitamin D3 (CHOLECALCIFEROL) tablet 25 mcg     witch hazel-glycerin (TUCKS) pad         MeasurableTreatment Goal(s) related to diagnosis / functional impairment(s)    Goal 1: Patient will alleviate the suicidal impulses/ideation and return to the highest level of previous daily functioning.      Objective #A                Patient will participate in therapy for an identified emotional problem resulting in suicidal thoughts.  Status: New as of January 10, 2023     Intervention(s)  [facility] will assess the severity of the level of impairment to the client s functioning to determine the appropriate level of care.        [facility] will encourage the patient to express feelings  related to their suicidal ideation in order to clarify them and increase insight as to the cause for them.       [facility] will assist the patient in developing coping strategies for suicidal ideation.        Goal 2: Patient will accept placement/referral to an appropriate level of care to safely address the suicidal crisis.      Objective #A                Patient will state the strength of the suicidal feeling, frequency of the thoughts and the detail of the plans   Status: New as of January 10, 2023     Intervention(s)  [facility] will facilitate conversations about options and encourage patient to engage in services.              Therapeutic intervention and progress:  Therapeutic intervention consisted of building therapeutic rapport, engaging in learning/practicing coping skills, active problem solving and crisis management. Patient is making progress towards treatment goals as evidenced by ability to use coping skills. Identifying coping skills used.     Collateral information:   Reviewed chart and coordinated with nurse regarding recommendations and concerns.     Anne Melendez, [facility]   Triage and Transition - Consult and Liaison   [phone]

## 2023-01-24 LAB
ATRIAL RATE - MUSE: 86 BPM
DIASTOLIC BLOOD PRESSURE - MUSE: NORMAL MMHG
INTERPRETATION ECG - MUSE: NORMAL
P AXIS - MUSE: 80 DEGREES
POTASSIUM SERPL-SCNC: 4.5 MMOL/L (ref 3.4–5.3)
PR INTERVAL - MUSE: 148 MS
QRS DURATION - MUSE: 88 MS
QT - MUSE: 354 MS
QTC - MUSE: 423 MS
R AXIS - MUSE: 86 DEGREES
SYSTOLIC BLOOD PRESSURE - MUSE: NORMAL MMHG
T AXIS - MUSE: 43 DEGREES
VENTRICULAR RATE- MUSE: 86 BPM

## 2023-01-24 PROCEDURE — 250N000013 HC RX MED GY IP 250 OP 250 PS 637: Performed by: INTERNAL MEDICINE

## 2023-01-24 PROCEDURE — 120N000001 HC R&B MED SURG/OB

## 2023-01-24 PROCEDURE — 84132 ASSAY OF SERUM POTASSIUM: CPT | Performed by: INTERNAL MEDICINE

## 2023-01-24 PROCEDURE — 99232 SBSQ HOSP IP/OBS MODERATE 35: CPT | Performed by: INTERNAL MEDICINE

## 2023-01-24 PROCEDURE — 36415 COLL VENOUS BLD VENIPUNCTURE: CPT | Performed by: INTERNAL MEDICINE

## 2023-01-24 PROCEDURE — 250N000013 HC RX MED GY IP 250 OP 250 PS 637: Performed by: REGISTERED NURSE

## 2023-01-24 PROCEDURE — 250N000013 HC RX MED GY IP 250 OP 250 PS 637: Performed by: NURSE PRACTITIONER

## 2023-01-24 PROCEDURE — 250N000013 HC RX MED GY IP 250 OP 250 PS 637: Performed by: HOSPITALIST

## 2023-01-24 PROCEDURE — 250N000011 HC RX IP 250 OP 636: Performed by: INTERNAL MEDICINE

## 2023-01-24 PROCEDURE — 99252 IP/OBS CONSLTJ NEW/EST SF 35: CPT | Mod: 25

## 2023-01-24 RX ORDER — HYDROXYZINE HYDROCHLORIDE 25 MG/1
25 TABLET, FILM COATED ORAL EVERY 4 HOURS PRN
Status: DISCONTINUED | OUTPATIENT
Start: 2023-01-24 | End: 2023-02-07 | Stop reason: HOSPADM

## 2023-01-24 RX ORDER — HYDROXYZINE HCL 10 MG/5 ML
25 SOLUTION, ORAL ORAL EVERY 4 HOURS PRN
Status: DISCONTINUED | OUTPATIENT
Start: 2023-01-24 | End: 2023-02-07 | Stop reason: HOSPADM

## 2023-01-24 RX ADMIN — Medication 25 MG: at 21:22

## 2023-01-24 RX ADMIN — METOCLOPRAMIDE HYDROCHLORIDE 5 MG: 5 INJECTION INTRAMUSCULAR; INTRAVENOUS at 13:26

## 2023-01-24 RX ADMIN — LACTULOSE 10 G: 10 SOLUTION ORAL at 09:02

## 2023-01-24 RX ADMIN — SILVER SULFADIAZINE: 10 CREAM TOPICAL at 09:12

## 2023-01-24 RX ADMIN — Medication 12.5 MG: at 21:36

## 2023-01-24 RX ADMIN — LACTULOSE 10 G: 10 SOLUTION ORAL at 21:36

## 2023-01-24 RX ADMIN — MELATONIN TAB 3 MG 5 MG: 3 TAB at 21:23

## 2023-01-24 RX ADMIN — SENNOSIDES AND DOCUSATE SODIUM 1 TABLET: 8.6; 5 TABLET ORAL at 09:01

## 2023-01-24 RX ADMIN — LINACLOTIDE 290 MCG: 290 CAPSULE, GELATIN COATED ORAL at 10:32

## 2023-01-24 RX ADMIN — Medication 250 MG: at 09:02

## 2023-01-24 RX ADMIN — Medication 25 MCG: at 09:02

## 2023-01-24 RX ADMIN — PANTOPRAZOLE SODIUM 40 MG: 40 TABLET, DELAYED RELEASE ORAL at 06:52

## 2023-01-24 RX ADMIN — Medication 25 MG: at 09:00

## 2023-01-24 RX ADMIN — DULOXETINE HYDROCHLORIDE 60 MG: 60 CAPSULE, DELAYED RELEASE ORAL at 09:02

## 2023-01-24 RX ADMIN — MULTIPLE VITAMINS W/ MINERALS TAB 1 TABLET: TAB at 09:01

## 2023-01-24 RX ADMIN — PANTOPRAZOLE SODIUM 40 MG: 40 TABLET, DELAYED RELEASE ORAL at 17:29

## 2023-01-24 RX ADMIN — ARIPIPRAZOLE 15 MG: 15 TABLET ORAL at 09:02

## 2023-01-24 RX ADMIN — SENNOSIDES AND DOCUSATE SODIUM 1 TABLET: 8.6; 5 TABLET ORAL at 21:23

## 2023-01-24 RX ADMIN — Medication 0.25 G: at 13:26

## 2023-01-24 RX ADMIN — TRAZODONE HYDROCHLORIDE 50 MG: 50 TABLET ORAL at 21:23

## 2023-01-24 RX ADMIN — ONDANSETRON 4 MG: 4 TABLET, ORALLY DISINTEGRATING ORAL at 10:32

## 2023-01-24 RX ADMIN — Medication 0.25 G: at 09:12

## 2023-01-24 RX ADMIN — LORAZEPAM 1 MG: 1 TABLET ORAL at 21:22

## 2023-01-24 ASSESSMENT — ACTIVITIES OF DAILY LIVING (ADL)
ADLS_ACUITY_SCORE: 32
ADLS_ACUITY_SCORE: 26
ADLS_ACUITY_SCORE: 28
ADLS_ACUITY_SCORE: 27
ADLS_ACUITY_SCORE: 27
ADLS_ACUITY_SCORE: 26
ADLS_ACUITY_SCORE: 27
ADLS_ACUITY_SCORE: 26
ADLS_ACUITY_SCORE: 27
ADLS_ACUITY_SCORE: 26
ADLS_ACUITY_SCORE: 32
ADLS_ACUITY_SCORE: 27
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
## 2023-01-24 NOTE — PROGRESS NOTES
GI chart review note:  - Revewed X-ray. NJ tube with tip in distal duodenum. Appropriate location for feeding by pump.  - continue tube feeds, noting at goal.   - linzess 290 mcg,  Lactulose BID (new, added yesterday PM) for bowel movements.   - supportive cares.     GI team will monitor. Ps call back if any ? Or change in status.  [place] MD  [facility]  Phone [phone]

## 2023-01-24 NOTE — CONSULTS
"Triage and Transition - Consult and Liaison     Thea Frances Castle  January 24, 2023    Session start: 3:15 pm  Session end: 3:45 pm  Session duration in minutes: 30 min  CPT utilized: 16435 - Psychotherapy (with patient) - 30 (16-37*) min  Patient was seen virtually ([employer] cart or other teleconferencing device).  Anticipated number of sessions or this episode of care: 1-4    Diagnosis:   296.33 (F33.2) Major Depressive Disorder, Recurrent Episode, Severe _, by history;  301.83 (F60.3) Borderline Personality Disorder, by history;   F68.10 Factitious disorder imposed on self, RULE OUT    Plan/Recommendations:     Appreciate any assistance social work can provide in referrals to residential placement that may allow feeding tube. Referrals placed to [facility] and [facility]. Options that have been discussed but not placed yet- [facility].     Safety plan/behavior plan to be implemented tomorrow with patient and sent to unit. 1/25    Psych NP to see patient to discuss medications.    There is concern  patient is regressing due to current setting encouraging maladaptive and dependent coping behaviors. Boundaries, setting firm limits, and creating a behavior plan is going to be essential for this patient. The attendant should engage in minimum needed socialization to meet patient s needs. Patient is to engage and perform her ADLs as independently as possible when she is hospitalized.  It is requested that the 1:1 does not braid or brush patient s hair.     In reviewing records, it appears providers have been concerned about patient's report of physical health symptoms for sometime- see below from recent provider:   \"Patient presenting with a long list of complaints, including prolapsed rectum and blood in stool. None was seen during her 3 day hospital stay. Had extensive work up and seen by different specialistS. Work up has been all normal. Patient was always " "requesting to see more providers from different specialities and was upset by her reassuring results. Fortunately she has close follow up with her psychiatrist this month. Would avoid future unnecessary admissions, limiting her providers and avoiding unnecessary testings / referrals.\" PATIENT LIKELY WOULD BENEFIT FROM CARE CONFERENCE.    Reason for consult: Thea is 22 year old White  female . Psychiatry consult was requested due to therapy check in. Patient was seen by [facility] Consult & Liaison team.     Presenting problem: Patient admitted to the emergency room following an attempted overdose on 30 tablets of gabapentin. Patient recommended inpatient psych, however, due to feeding tube, she is not able to transfer, she is now convalescing at [facility]. Please see initial [facility] Crisis Assessment completed by Kenyon Villanueva on 01/03/23 for complete assessment information. Patient has been evaluated by psychiatry daily.    Session Summary: Patient reports she had a really hard night. Patient states she never came down from that. Patient reports wants to hold off on having vistors, because they get to go on with their lives, when she is feeling so stuck. Last night she thinks that is what happened. Patient reports mind is racing today. I asked about what coping skills she has been using. Patient reports using coloring book. She states it hasn't been very helpful. I prompted patient to use more grounding type techniques. Reviewing \"TIPP\" from DBT skills. Spent specific time discussing using temperature for distress tolerance. Patient reports willingness to do so.     She shares other things stressingher out. She reports Stressed out about insurance contacting her saying her tubing for feeding tube is not covered anymore. We discussed that is not a \"Right now worry\", and is part of her catastrophic thinking of \"would, should, could\" thinking. Processed this. She reports she clashed with provider " "yesterday on goals of care.     Yesterday was really thinking about a plan to hurt self, not here but at home. Those thoughts were really loud. She states \"Important for people to know that when I say I want to go home, its not from a good place, it comes from place of feeling like I want to harm myself\". Notes this as a warning sign.     We discussed creating a behavioral plan and safety plan of best ways to support her while in hospital. Discussed plan to have me write up plan and give to her and providers working with her.     Mental Status Exam   Affect: Appropriate  Appearance: Appropriate   Attention Span/Concentration: Attentive    Eye Contact: Engaged  Fund of Knowledge: Appropriate   Language /Speech Content: Fluent  Language /Speech Volume: Normal   Language /Speech Rate/Productions: Normal   Recent Memory: Intact  Remote Memory: Intact  Mood: Normal   Orientation:   Person: Yes   Place: Yes  Time of Day: Yes   Date: Yes   Situation (Do they understand why they are here?): Yes   Psychomotor Behavior: Normal   Thought Content: Clear  Thought Form: Intact    Current medications:   Current Facility-Administered Medications   Medication     acetaminophen (TYLENOL) tablet 650 mg    Or     acetaminophen (TYLENOL) Suppository 650 mg     ARIPiprazole (ABILIFY) tablet 15 mg     bisacodyl (DULCOLAX) suppository 10 mg     dextrose 10% infusion     diltiazem 2% in PLO gel 0.25 g     drospirenone-ethinyl estradiol (DIANA) 3-0.02 MG per tablet 1 tablet     DULoxetine (CYMBALTA) DR capsule 60 mg     hydrOXYzine (ATARAX) syrup 25 mg    Or     hydrOXYzine (ATARAX) tablet 25 mg     lactated ringers BOLUS 250 mL     lactulose (CHRONULAC) solution 10 g     lidocaine (LMX4) cream     lidocaine 1 % 0.1-1 mL     linaclotide (LINZESS) capsule 290 mcg     LORazepam (ATIVAN) tablet 1 mg     melatonin tablet 5 mg     metoclopramide (REGLAN) injection 5 mg     multivitamin w/minerals (THERA-VIT-M) tablet 1 tablet     ondansetron " (ZOFRAN ODT) ODT tab 4 mg    Or     ondansetron (ZOFRAN) injection 4 mg     pantoprazole (PROTONIX) EC tablet 40 mg     polyethylene glycol (MIRALAX) Packet 17 g     promethazine (PHENERGAN) half-tab 25 mg     QUEtiapine (SEROquel) half-tab 12.5 mg     senna-docusate (SENOKOT-S/PERICOLACE) 8.6-50 MG per tablet 1 tablet     silver sulfADIAZINE (SILVADENE) 1 % cream     sodium chloride (PF) 0.9% PF flush 3 mL     sodium chloride (PF) 0.9% PF flush 3 mL     traZODone (DESYREL) tablet 50 mg     vitamin C (ASCORBIC ACID) tablet 250 mg     Vitamin D3 (CHOLECALCIFEROL) tablet 25 mcg     witch hazel-glycerin (TUCKS) pad         MeasurableTreatment Goal(s) related to diagnosis / functional impairment(s)         Goal 1: Patient will alleviate the suicidal impulses/ideation and return to the highest level of previous daily functioning.      Objective #A                Patient will participate in therapy for an identified emotional problem resulting in suicidal thoughts.  Status: New as of January 10, 2023     Intervention(s)  LMHP will assess the severity of the level of impairment to the client s functioning to determine the appropriate level of care.        LMHP will encourage the patient to express feelings related to their suicidal ideation in order to clarify them and increase insight as to the cause for them.       LMHP will assist the patient in developing coping strategies for suicidal ideation.        Goal 2: Patient will accept placement/referral to an appropriate level of care to safely address the suicidal crisis.      Objective #A                Patient will state the strength of the suicidal feeling, frequency of the thoughts and the detail of the plans   Status: New as of January 10, 2023     Intervention(s)  LMHP will facilitate conversations about options and encourage patient to engage in services.      Therapeutic intervention and progress:  Therapeutic intervention consisted of building therapeutic rapport,  engaging in learning/practicing coping skills, active problem solving, stress relief practices and CBT concepts.     Anne Melendez, [facility]   Triage and Transition - Consult and Liaison   [phone]
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
## 2023-01-25 LAB — POTASSIUM SERPL-SCNC: 4.4 MMOL/L (ref 3.4–5.3)

## 2023-01-25 PROCEDURE — 250N000013 HC RX MED GY IP 250 OP 250 PS 637

## 2023-01-25 PROCEDURE — 99232 SBSQ HOSP IP/OBS MODERATE 35: CPT | Performed by: INTERNAL MEDICINE

## 2023-01-25 PROCEDURE — 250N000013 HC RX MED GY IP 250 OP 250 PS 637: Performed by: REGISTERED NURSE

## 2023-01-25 PROCEDURE — 250N000013 HC RX MED GY IP 250 OP 250 PS 637: Performed by: NURSE PRACTITIONER

## 2023-01-25 PROCEDURE — 250N000013 HC RX MED GY IP 250 OP 250 PS 637: Performed by: HOSPITALIST

## 2023-01-25 PROCEDURE — 250N000011 HC RX IP 250 OP 636: Performed by: INTERNAL MEDICINE

## 2023-01-25 PROCEDURE — G0463 HOSPITAL OUTPT CLINIC VISIT: HCPCS

## 2023-01-25 PROCEDURE — 999N000128 HC STATISTIC PERIPHERAL IV START W/O US GUIDANCE

## 2023-01-25 PROCEDURE — 36415 COLL VENOUS BLD VENIPUNCTURE: CPT | Performed by: INTERNAL MEDICINE

## 2023-01-25 PROCEDURE — 120N000001 HC R&B MED SURG/OB

## 2023-01-25 PROCEDURE — 250N000013 HC RX MED GY IP 250 OP 250 PS 637: Performed by: INTERNAL MEDICINE

## 2023-01-25 PROCEDURE — 84132 ASSAY OF SERUM POTASSIUM: CPT | Performed by: INTERNAL MEDICINE

## 2023-01-25 RX ORDER — LACTULOSE 10 G/15ML
10 SOLUTION ORAL 3 TIMES DAILY
Status: DISCONTINUED | OUTPATIENT
Start: 2023-01-25 | End: 2023-01-27

## 2023-01-25 RX ORDER — PROCHLORPERAZINE MALEATE 5 MG
5 TABLET ORAL EVERY 6 HOURS PRN
Status: DISCONTINUED | OUTPATIENT
Start: 2023-01-25 | End: 2023-02-07 | Stop reason: HOSPADM

## 2023-01-25 RX ORDER — METOCLOPRAMIDE 5 MG/1
10 TABLET ORAL
Status: COMPLETED | OUTPATIENT
Start: 2023-01-25 | End: 2023-01-27

## 2023-01-25 RX ADMIN — LACTULOSE 10 G: 10 SOLUTION ORAL at 16:54

## 2023-01-25 RX ADMIN — ACETAMINOPHEN 650 MG: 325 TABLET, FILM COATED ORAL at 06:54

## 2023-01-25 RX ADMIN — LACTULOSE 10 G: 10 SOLUTION ORAL at 08:36

## 2023-01-25 RX ADMIN — HYDROXYZINE HYDROCHLORIDE 25 MG: 25 TABLET, FILM COATED ORAL at 03:11

## 2023-01-25 RX ADMIN — SENNOSIDES AND DOCUSATE SODIUM 1 TABLET: 8.6; 5 TABLET ORAL at 20:27

## 2023-01-25 RX ADMIN — Medication 25 MCG: at 08:36

## 2023-01-25 RX ADMIN — MELATONIN TAB 3 MG 5 MG: 3 TAB at 20:33

## 2023-01-25 RX ADMIN — LORAZEPAM 1 MG: 1 TABLET ORAL at 20:27

## 2023-01-25 RX ADMIN — ARIPIPRAZOLE 15 MG: 15 TABLET ORAL at 08:36

## 2023-01-25 RX ADMIN — SENNOSIDES AND DOCUSATE SODIUM 1 TABLET: 8.6; 5 TABLET ORAL at 08:36

## 2023-01-25 RX ADMIN — ONDANSETRON 4 MG: 4 TABLET, ORALLY DISINTEGRATING ORAL at 09:04

## 2023-01-25 RX ADMIN — Medication 0.25 G: at 16:54

## 2023-01-25 RX ADMIN — Medication 25 MG: at 08:36

## 2023-01-25 RX ADMIN — DULOXETINE HYDROCHLORIDE 60 MG: 60 CAPSULE, DELAYED RELEASE ORAL at 08:36

## 2023-01-25 RX ADMIN — PANTOPRAZOLE SODIUM 40 MG: 40 TABLET, DELAYED RELEASE ORAL at 08:36

## 2023-01-25 RX ADMIN — LINACLOTIDE 290 MCG: 290 CAPSULE, GELATIN COATED ORAL at 08:37

## 2023-01-25 RX ADMIN — Medication 25 MG: at 20:27

## 2023-01-25 RX ADMIN — PANTOPRAZOLE SODIUM 40 MG: 40 TABLET, DELAYED RELEASE ORAL at 16:54

## 2023-01-25 RX ADMIN — Medication 0.25 G: at 08:43

## 2023-01-25 RX ADMIN — Medication 12.5 MG: at 20:27

## 2023-01-25 RX ADMIN — LACTULOSE 10 G: 10 SOLUTION ORAL at 20:27

## 2023-01-25 RX ADMIN — MULTIPLE VITAMINS W/ MINERALS TAB 1 TABLET: TAB at 08:37

## 2023-01-25 RX ADMIN — ACETAMINOPHEN 650 MG: 325 TABLET, FILM COATED ORAL at 20:26

## 2023-01-25 RX ADMIN — SILVER SULFADIAZINE: 10 CREAM TOPICAL at 08:43

## 2023-01-25 RX ADMIN — TRAZODONE HYDROCHLORIDE 50 MG: 50 TABLET ORAL at 20:27

## 2023-01-25 RX ADMIN — Medication 250 MG: at 08:36

## 2023-01-25 RX ADMIN — METOCLOPRAMIDE 10 MG: 5 TABLET ORAL at 16:54

## 2023-01-25 ASSESSMENT — ACTIVITIES OF DAILY LIVING (ADL)
ADLS_ACUITY_SCORE: 27

## 2023-01-25 NOTE — CONSULTS
Triage and Transition - Consult and Liaison      Thea Castle  January 25, 2023     Session start: 2:50 pm  Session end: 3:15 pm  Session duration in minutes: 25 min  CPT utilized: 39852 - Psychotherapy (with patient) - 30 (16-37*) min  Patient was seen virtually ([employer] cart or other teleconferencing device).  Anticipated number of sessions or this episode of care: 1-4     Diagnosis:   296.33 (F33.2) Major Depressive Disorder, Recurrent Episode, Severe _, by history;  301.83 (F60.3) Borderline Personality Disorder, by history;   F68.10 Factitious disorder imposed on self, RULE OUT     Plan/Recommendations:   1. Safety plan/behavior plan to be implemented TODAY with patient and sent to unit. Copy has been sent. Patient is aware of change in sitter's role to reduce reliance on sitter. She should have a copy of her safety plan we created. Discussed having sitter not engage in with patient or play games, on Friday look at moving sitter to door/outside door within eyesight.   2. Appreciate any assistance social work can provide in referrals to residential placement that may allow feeding tube. Referrals placed to [facility] and [facility]. Options that have been discussed but not placed yet- [facility], [facility], [facility].  3. There is concern  patient is regressing due to current setting encouraging maladaptive and dependent coping behaviors. Boundaries, setting firm limits, and creating a behavior plan is going to be essential for this patient. The attendant should engage in minimum needed socialization to meet patient s needs. Patient is to engage and perform her ADLs as independently as possible when she is hospitalized.  It is requested that the 1:1 does not braid or brush patient s hair.   4. In reviewing records, it appears providers have been concerned about patient's report of physical health symptoms for sometime- see below from recent provider:  "  \"Patient presenting with a long list of complaints, including prolapsed rectum and blood in stool. None was seen during her 3 day hospital stay. Had extensive work up and seen by different specialistS. Work up has been all normal. Patient was always requesting to see more providers from different specialities and was upset by her reassuring results. Fortunately she has close follow up with her psychiatrist this month. Would avoid future unnecessary admissions, limiting her providers and avoiding unnecessary testings / referrals.\" PATIENT LIKELY WOULD BENEFIT FROM CARE CONFERENCE- appreciate social work in setting up this with specialities.      Reason for consult: Thea is 22 year old White  female . Psychiatry consult was requested due to therapy check in. Patient was seen by [facility] Consult & Liaison team.      Presenting problem: Patient admitted to the emergency room following an attempted overdose on 30 tablets of gabapentin. Patient recommended inpatient psych, however, due to feeding tube, she is not able to transfer, she is now convalescing at [facility] station [facility]. Please see initial [facility] Crisis Assessment completed by Kenyon Villanueva on 01/03/23 for complete assessment information. Patient has been evaluated by psychiatry daily.     Session Summary:   Discussed with patient and processed sexual orientation and exploring this. She reports Wasn't able to truly be self around her family. [place] Alone in that, feels like this played a role in her suicide attempt. Discussed conversations she has had that have been helpful.   We discussed safety plan we created yesterday. Plan to have patient have a copy. Discussed plan to reduce reliance on sitter. Patient states immediately she has a \"re-bound\" reaction to this and feels her thoughts in crease, she asks what skills to use when this happens. I refer specifically to TIPP- DBT skills, indicating she should refocus her mind and concentrate on moment. " Patient expresses willingness to have sitter adjusted. She is willing to have sitter in room but not engage with her. She states feels okay without interacting with them. States maybe on Friday they can move to outside/by the door. Patient reports willingness to reach out if she has thoughts of acting on her plan.     Patient to call [facility] to check status of referral.        Mental Status Exam   Affect: Appropriate  Appearance: Appropriate   Attention Span/Concentration: Attentive    Eye Contact: Engaged  Fund of Knowledge: Appropriate   Language /Speech Content: Fluent  Language /Speech Volume: Normal   Language /Speech Rate/Productions: Normal   Recent Memory: Intact  Remote Memory: Intact  Mood: Normal   Orientation:   Person: Yes   Place: Yes  Time of Day: Yes   Date: Yes   Situation (Do they understand why they are here?): Yes   Psychomotor Behavior: Normal   Thought Content: Clear  Thought Form: Intact     Current medications:       Current Facility-Administered Medications   Medication     acetaminophen (TYLENOL) tablet 650 mg     Or     acetaminophen (TYLENOL) Suppository 650 mg     ARIPiprazole (ABILIFY) tablet 15 mg     bisacodyl (DULCOLAX) suppository 10 mg     dextrose 10% infusion     diltiazem 2% in PLO gel 0.25 g     drospirenone-ethinyl estradiol (DIANA) 3-0.02 MG per tablet 1 tablet     DULoxetine (CYMBALTA) DR capsule 60 mg     hydrOXYzine (ATARAX) syrup 25 mg     Or     hydrOXYzine (ATARAX) tablet 25 mg     lactated ringers BOLUS 250 mL     lactulose (CHRONULAC) solution 10 g     lidocaine (LMX4) cream     lidocaine 1 % 0.1-1 mL     linaclotide (LINZESS) capsule 290 mcg     LORazepam (ATIVAN) tablet 1 mg     melatonin tablet 5 mg     metoclopramide (REGLAN) injection 5 mg     multivitamin w/minerals (THERA-VIT-M) tablet 1 tablet     ondansetron (ZOFRAN ODT) ODT tab 4 mg     Or     ondansetron (ZOFRAN) injection 4 mg     pantoprazole (PROTONIX) EC tablet 40 mg     polyethylene glycol (MIRALAX)  Packet 17 g     promethazine (PHENERGAN) half-tab 25 mg     QUEtiapine (SEROquel) half-tab 12.5 mg     senna-docusate (SENOKOT-S/PERICOLACE) 8.6-50 MG per tablet 1 tablet     silver sulfADIAZINE (SILVADENE) 1 % cream     sodium chloride (PF) 0.9% PF flush 3 mL     sodium chloride (PF) 0.9% PF flush 3 mL     traZODone (DESYREL) tablet 50 mg     vitamin C (ASCORBIC ACID) tablet 250 mg     Vitamin D3 (CHOLECALCIFEROL) tablet 25 mcg     witch hazel-glycerin (TUCKS) pad            MeasurableTreatment Goal(s) related to diagnosis / functional impairment(s)         Goal 1: Patient will alleviate the suicidal impulses/ideation and return to the highest level of previous daily functioning.      Objective #A                Patient will participate in therapy for an identified emotional problem resulting in suicidal thoughts.  Status: New as of January 10, 2023     Intervention(s)  LMHP will assess the severity of the level of impairment to the client s functioning to determine the appropriate level of care.        LMHP will encourage the patient to express feelings related to their suicidal ideation in order to clarify them and increase insight as to the cause for them.       LMHP will assist the patient in developing coping strategies for suicidal ideation.        Goal 2: Patient will accept placement/referral to an appropriate level of care to safely address the suicidal crisis.      Objective #A                Patient will state the strength of the suicidal feeling, frequency of the thoughts and the detail of the plans   Status: New as of January 10, 2023     Intervention(s)  LMHP will facilitate conversations about options and encourage patient to engage in services.      Therapeutic intervention and progress:  Therapeutic intervention consisted of building therapeutic rapport, engaging in learning/practicing coping skills, active problem solving, stress relief practices and CBT concepts. Patient appears to be making some  progress, as she is willing to have conversation about sitter and possible discharge.      Anne Melendez, [facility]   Triage and Transition - Consult and Liaison   [phone]
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
## 2023-01-25 NOTE — PROGRESS NOTES
"[facility] Digestive [facility] Progress Note:    Date: 1/25/2023    Patient Name: Thea Castle    SUBJECTIVE: Pt is pleasant and appeares comfortable. Reports increased abdominal discomfort and distention. No bowel movement with lactulose BID. Decreased ambulation d/t need for 2:1 assist. Reports continues to tolerate crackers, water, and popsicles.     OBJECTIVE: /71 (BP Location: Right arm)   Pulse 86   Temp 98.2  F (36.8  C) (Oral)   Resp 16   Ht 1.626 m (5' 4.02\")   Wt 55.1 kg (121 lb 7.6 oz)   SpO2 99%   BMI 20.84 kg/m      Body mass index is 20.84 kg/m .    Constitutional: NAD, comfortable  Cardiovascular: RRR, normal S1, S2   Respiratory: CTAB  Abdomen: distended, firm, mild tenderness LLQ , +BS    LABS    No results found for this or any previous visit (from the past 24 hour(s)).    Recent Labs   Lab Test 01/24/23  0927 01/23/23  1124 01/13/23  1643 01/11/23  2311 01/04/23  1309 01/03/23  1427   WBC  --   --   --  6.6   < > 6.2   POTASSIUM 4.5 4.0   < >  --    < > 3.6   BUN  --  13.5   < >  --    < > 11   ALBUMIN  --   --   --   --   --  3.4    < > = values in this interval not displayed.       Primary GI Diagnosis  1. Chronic constipation--most likely r/t slow transit complicated by decreased mobility requiring 2:1 assist for fall precaution. Reviewed -1/23/23 abdominal X-ray with moderate stool burden. No obstruction.  NJ tube with tip in distal duodenum. Appropriate location for feeding by pump.   3. H/o anorexia     PLAN:  - linzess 290 mcg,   - Increase Lactulose TID for bowel movements.   - continue tube feeds  -Continue to encourage increased oral intake,  -Discharge barrier d/t tube feeding. Continue to work with mental providers for program that will accept NJ tube.   - supportive cares.     35 minutes of total time was spent providing patient care, including patient evaluation, reviewing documentation/test results, and .    Skyla Hooper, CNP Thank you for the opportunity " to participate in the care of this patient. Please call with any questions or concerns. [phone]. Ext [phone]    CC: [facility], Brittney Penny  ________________________________________________________________________

## 2023-01-25 NOTE — PROGRESS NOTES
"SPIRITUAL HEALTH SERVICES Progress Note  [facility] 73    Saw pt Thea Castle per Spiritual Health plan of care. We reflected together about how her nadeen interacts with her coping skills and the exploration of her own identity. Pt was given a page of affirming scriptural passages and a daily prayer journaling prompt.      Patient/Family Understanding of Illness and Goals of Care   - Pt shared that she is awaiting a plan for discharge and mentioned that she has heard her feeding tube might be a barrier.  - Pt stated that she is working on taking her medications orally.      Distress and Loss   - Pt discussed distress around her nadeen and family's engagement with her sexual orientation. She stated \"I feel like not being accepted for who I am contributed to me not wanting to be alive anymore.\"      Strengths, Coping, and Resources    - Pt noted the support of a lifelong friend, and expressed finding it helpful to be vulnerable about her illness and emotions with her.   - Pt shared that listening to music has been a useful coping tool for her.   - Pt used language about \"choosing life for [herself]\" and reflected that \"being open about who I am feels like a step in choosing life.\"      Meaning, Beliefs, and Spirituality   - Pt was given a page of short, affirming Bible passages at her request. We discussed the themes of the passages together, and I encouraged incorporating them into her journaling. Pt requested prayer.      Plan of Care - Spiritual Health to continue to follow pt on Mondays, Wednesdays and Fridays with alternating chaplains.     Sarah Johanson  Chaplain Resident  Spiritual Health Services  Pager: [phone]     [facility] available 24/7 for emergent requests/referrals, either by having the on-call  paged or by entering an ASAP/STAT consult in Epic (this will also page the on-call ).   "

## 2023-01-25 NOTE — PLAN OF CARE
Goal Outcome Evaluation:    A&Ox4. VSS on Rm Air. C/o intermittent headache. NG tube in place & patent. Prefers meds thru NG tube, becomes very anxious when oral meds are encouraged. TF at goal rate; 40ml/hr w/ water flushes 60ml/4hrs. Continues w/ suicidal thoughts, no active plan. 1:1 sitter. New drsg applied to R medial thigh. Seen by IP psychiatry via singh this afternoon.

## 2023-01-25 NOTE — CARE PLAN
Patient has history of presenting to several hospitals, urgent cares, and different providers with various health concerns. There is concern about factitious disorder. Recent note from hospital indicates  Patient presenting with a long list of complaints, including prolapsed rectum and blood in stool. None was seen during her 3-day hospital stay. Had extensive work up and seen by different specialists. Work up has been all normal. Patient was always requesting to see more providers from different specialties and was upset by her reassuring results.   Following care plan would be recommended:     - If patient presents to the hospital, would avoid future unnecessary admissions, limiting her providers and avoiding unnecessary testing / referrals.  - Boundaries and setting firm limits is essential for this patient. If patient presents with suicidal ideation, assess per Severance policy. If 1:1 is deemed necessary, The attendant should engage in minimum needed socialization to meet patient s needs. It is requested that the 1:1 does not braid or brush patient s hair.   - Patient is to engage and perform her ADLs as independently as possible when she is hospitalized.  - Discourage allowing patient to  play favorite  by selecting what staff work with her or requesting certain staff to come into her room to see her. If possible, only staff assigned to patient should be working with patient or coming into her room. It is appropriate to honor patient s wishes of preference of female provider due to trauma history.

## 2023-01-25 NOTE — PLAN OF CARE
A&Ox4, VSS on RA. A1 in room, A2 while in halls. TF running through NG at 40ml/hr. Minimal appetite, ordered toast for lunch. Complaints of nausea all day, zofran given x1. Did take all pills orally this AM, but needed some encouragement. Feels weak today stating she didn't sleep much last night. Met with  this morning, currently meeting with mental health provider. Discharge pending inpt psych placement.
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
## 2023-01-26 ENCOUNTER — APPOINTMENT (OUTPATIENT)
Dept: CT IMAGING | Facility: CLINIC | Age: 23
DRG: 918 | End: 2023-01-26
Attending: NURSE PRACTITIONER
Payer: COMMERCIAL

## 2023-01-26 PROCEDURE — 250N000013 HC RX MED GY IP 250 OP 250 PS 637

## 2023-01-26 PROCEDURE — 250N000013 HC RX MED GY IP 250 OP 250 PS 637: Performed by: REGISTERED NURSE

## 2023-01-26 PROCEDURE — 120N000001 HC R&B MED SURG/OB

## 2023-01-26 PROCEDURE — 250N000011 HC RX IP 250 OP 636: Performed by: INTERNAL MEDICINE

## 2023-01-26 PROCEDURE — 250N000013 HC RX MED GY IP 250 OP 250 PS 637: Performed by: INTERNAL MEDICINE

## 2023-01-26 PROCEDURE — 250N000013 HC RX MED GY IP 250 OP 250 PS 637: Performed by: NURSE PRACTITIONER

## 2023-01-26 PROCEDURE — 250N000013 HC RX MED GY IP 250 OP 250 PS 637: Performed by: HOSPITALIST

## 2023-01-26 PROCEDURE — 99232 SBSQ HOSP IP/OBS MODERATE 35: CPT | Performed by: INTERNAL MEDICINE

## 2023-01-26 PROCEDURE — 250N000009 HC RX 250: Performed by: INTERNAL MEDICINE

## 2023-01-26 PROCEDURE — 74177 CT ABD & PELVIS W/CONTRAST: CPT

## 2023-01-26 RX ORDER — IOPAMIDOL 755 MG/ML
61 INJECTION, SOLUTION INTRAVASCULAR ONCE
Status: COMPLETED | OUTPATIENT
Start: 2023-01-26 | End: 2023-01-26

## 2023-01-26 RX ADMIN — ONDANSETRON 4 MG: 2 INJECTION INTRAMUSCULAR; INTRAVENOUS at 08:48

## 2023-01-26 RX ADMIN — Medication 25 MCG: at 08:48

## 2023-01-26 RX ADMIN — METOCLOPRAMIDE 10 MG: 5 TABLET ORAL at 08:48

## 2023-01-26 RX ADMIN — PANTOPRAZOLE SODIUM 40 MG: 40 TABLET, DELAYED RELEASE ORAL at 08:48

## 2023-01-26 RX ADMIN — HYDROXYZINE HYDROCHLORIDE 25 MG: 25 TABLET, FILM COATED ORAL at 23:58

## 2023-01-26 RX ADMIN — MELATONIN TAB 3 MG 5 MG: 3 TAB at 20:47

## 2023-01-26 RX ADMIN — PANTOPRAZOLE SODIUM 40 MG: 40 TABLET, DELAYED RELEASE ORAL at 16:36

## 2023-01-26 RX ADMIN — LACTULOSE 10 G: 10 SOLUTION ORAL at 08:48

## 2023-01-26 RX ADMIN — LACTULOSE 10 G: 10 SOLUTION ORAL at 16:36

## 2023-01-26 RX ADMIN — ARIPIPRAZOLE 15 MG: 15 TABLET ORAL at 08:48

## 2023-01-26 RX ADMIN — SENNOSIDES AND DOCUSATE SODIUM 1 TABLET: 8.6; 5 TABLET ORAL at 08:48

## 2023-01-26 RX ADMIN — SODIUM CHLORIDE 60 ML: 900 INJECTION INTRAVENOUS at 19:18

## 2023-01-26 RX ADMIN — IOPAMIDOL 61 ML: 755 INJECTION, SOLUTION INTRAVENOUS at 19:18

## 2023-01-26 RX ADMIN — DULOXETINE HYDROCHLORIDE 60 MG: 60 CAPSULE, DELAYED RELEASE ORAL at 08:48

## 2023-01-26 RX ADMIN — Medication 0.25 G: at 09:03

## 2023-01-26 RX ADMIN — METOCLOPRAMIDE 10 MG: 5 TABLET ORAL at 12:31

## 2023-01-26 RX ADMIN — Medication 12.5 MG: at 20:42

## 2023-01-26 RX ADMIN — TRAZODONE HYDROCHLORIDE 50 MG: 50 TABLET ORAL at 20:42

## 2023-01-26 RX ADMIN — LINACLOTIDE 290 MCG: 290 CAPSULE, GELATIN COATED ORAL at 09:01

## 2023-01-26 RX ADMIN — LACTULOSE 10 G: 10 SOLUTION ORAL at 21:12

## 2023-01-26 RX ADMIN — SENNOSIDES AND DOCUSATE SODIUM 1 TABLET: 8.6; 5 TABLET ORAL at 20:42

## 2023-01-26 RX ADMIN — LORAZEPAM 1 MG: 1 TABLET ORAL at 20:42

## 2023-01-26 RX ADMIN — Medication 250 MG: at 08:48

## 2023-01-26 RX ADMIN — HYDROXYZINE HYDROCHLORIDE 25 MG: 25 TABLET, FILM COATED ORAL at 02:35

## 2023-01-26 RX ADMIN — Medication 0.25 G: at 14:04

## 2023-01-26 RX ADMIN — Medication 25 MG: at 08:48

## 2023-01-26 RX ADMIN — METOCLOPRAMIDE 10 MG: 5 TABLET ORAL at 16:36

## 2023-01-26 RX ADMIN — MULTIPLE VITAMINS W/ MINERALS TAB 1 TABLET: TAB at 08:48

## 2023-01-26 RX ADMIN — Medication 0.25 G: at 20:47

## 2023-01-26 RX ADMIN — ACETAMINOPHEN 650 MG: 325 TABLET, FILM COATED ORAL at 13:56

## 2023-01-26 RX ADMIN — Medication 25 MG: at 20:42

## 2023-01-26 ASSESSMENT — ACTIVITIES OF DAILY LIVING (ADL)
ADLS_ACUITY_SCORE: 27
ADLS_ACUITY_SCORE: 27
ADLS_ACUITY_SCORE: 35
ADLS_ACUITY_SCORE: 28
ADLS_ACUITY_SCORE: 27
ADLS_ACUITY_SCORE: 27
ADLS_ACUITY_SCORE: 31
ADLS_ACUITY_SCORE: 35
ADLS_ACUITY_SCORE: 27
ADLS_ACUITY_SCORE: 27

## 2023-01-26 NOTE — PROGRESS NOTES
Notified provider about indwelling pantoja catheter discussed removal or continued need.    Did provider choose to remove indwelling pantoja catheter? Yes  Provider's pantoja indication for keeping indwelling pantoja catheter: Retention  Is there an order for indwelling pantoja catheter? Yes  *If there is a plan to keep pantoja catheter in place at discharge daily notification with provider is not necessary.

## 2023-01-26 NOTE — CONSULTS
"Triage and Transition - Consult and Liaison      Thea Franceslemuel Castle  January 26, 2023     Session start: 12:45 pm  Session end: 1:02 pm  Session duration in minutes: 16 min  CPT utilized: 50163 - Psychotherapy (with patient) - 30 (16-37*) min  Patient was seen virtually ([employer] cart or other teleconferencing device).       Diagnosis:   296.33 (F33.2) Major Depressive Disorder, Recurrent Episode, Severe _, by history;  301.83 (F60.3) Borderline Personality Disorder, by history;   F68.10 Factitious disorder imposed on self, RULE OUT     Plan/Recommendations:   1. Continue to refer to behavioral plan given to unit. She should have a copy of her safety plan we created. Discussed having sitter not engage in with patient or play games, on Friday look at moving sitter to door/outside door within eyesight.   2. Appreciate any assistance social work can provide in referrals to residential placement that may allow feeding tube. Discussed with sitter today. Referrals placed to [facility] and [facility]. Options that have been discussed but not placed yet- [facility], [facility], [facility].  3. There is concern  patient is regressing due to current setting encouraging maladaptive and dependent coping behaviors. Boundaries, setting firm limits, and creating a behavior plan is going to be essential for this patient. The attendant should engage in minimum needed socialization to meet patient s needs. Patient is to engage and perform her ADLs as independently as possible when she is hospitalized.  It is requested that the 1:1 does not braid or brush patient s hair.   4. In reviewing records, it appears providers have been concerned about patient's report of physical health symptoms for sometime- see below from recent provider:   \"Patient presenting with a long list of complaints, including prolapsed rectum and blood in stool. None was seen during her 3 day hospital stay. Had " "extensive work up and seen by different specialistS. Work up has been all normal. Patient was always requesting to see more providers from different specialities and was upset by her reassuring results. Fortunately she has close follow up with her psychiatrist this month. Would avoid future unnecessary admissions, limiting her providers and avoiding unnecessary testings / referrals.\" PATIENT LIKELY WOULD BENEFIT FROM CARE CONFERENCE- appreciate social work in setting up this with specialities.      Reason for consult: Thea is 22 year old White  female . Psychiatry consult was requested due to therapy check in. Patient was seen by [facility] Consult & Liaison team.      Presenting problem: Patient admitted to the emergency room following an attempted overdose on 30 tablets of gabapentin. Patient recommended inpatient psych, however, due to feeding tube, she is not able to transfer, she is now convalescing at [facility] station [facility]. Please see initial [facility] Crisis Assessment completed by Kenyon Villanueva on 01/03/23 for complete assessment information. Patient has been evaluated by psychiatry daily.     Session Summary:   Patient reports feeling sluggish today. Patient reports woke up in an Ok mindset this morning but has struggled throughout the day and is not reaching out to friends. We discussed plan to reach out to friends, prompted her to practice during session. Patient reports doing well with sitter not engaging as much. She reports she is trying to rely on her skills more. She has copy of safety plan. Patient states suicidal thoughts are still there. Reviewed coping skills- watched a show today.     Patient reports they called [facility] and they stated they don't have recommendation from doctor yet. They will call her when they do.      Mental Status Exam   Affect: Appropriate  Appearance: Appropriate   Attention Span/Concentration: Attentive    Eye Contact: Engaged  Fund of Knowledge: Appropriate   Language " /Speech Content: Fluent  Language /Speech Volume: Normal   Language /Speech Rate/Productions: Normal   Recent Memory: Intact  Remote Memory: Intact  Mood: Normal   Orientation:   Person: Yes   Place: Yes  Time of Day: Yes   Date: Yes   Situation (Do they understand why they are here?): Yes   Psychomotor Behavior: Normal   Thought Content: Clear  Thought Form: Intact     Current medications:          Current Facility-Administered Medications  Medication    acetaminophen (TYLENOL) tablet 650 mg    Or    acetaminophen (TYLENOL) Suppository 650 mg    ARIPiprazole (ABILIFY) tablet 15 mg    bisacodyl (DULCOLAX) suppository 10 mg    dextrose 10% infusion    diltiazem 2% in PLO gel 0.25 g    drospirenone-ethinyl estradiol (DIANA) 3-0.02 MG per tablet 1 tablet    DULoxetine (CYMBALTA) DR capsule 60 mg    hydrOXYzine (ATARAX) syrup 25 mg    Or    hydrOXYzine (ATARAX) tablet 25 mg    lactated ringers BOLUS 250 mL    lactulose (CHRONULAC) solution 10 g    lidocaine (LMX4) cream    lidocaine 1 % 0.1-1 mL    linaclotide (LINZESS) capsule 290 mcg    LORazepam (ATIVAN) tablet 1 mg    melatonin tablet 5 mg    metoclopramide (REGLAN) injection 5 mg    multivitamin w/minerals (THERA-VIT-M) tablet 1 tablet    ondansetron (ZOFRAN ODT) ODT tab 4 mg    Or    ondansetron (ZOFRAN) injection 4 mg    pantoprazole (PROTONIX) EC tablet 40 mg    polyethylene glycol (MIRALAX) Packet 17 g    promethazine (PHENERGAN) half-tab 25 mg    QUEtiapine (SEROquel) half-tab 12.5 mg    senna-docusate (SENOKOT-S/PERICOLACE) 8.6-50 MG per tablet 1 tablet    silver sulfADIAZINE (SILVADENE) 1 % cream    sodium chloride (PF) 0.9% PF flush 3 mL    sodium chloride (PF) 0.9% PF flush 3 mL    traZODone (DESYREL) tablet 50 mg    vitamin C (ASCORBIC ACID) tablet 250 mg    Vitamin D3 (CHOLECALCIFEROL) tablet 25 mcg    witch hazel-glycerin (TUCKS) pad           MeasurableTreatment Goal(s) related to diagnosis / functional impairment(s)         Goal 1: Patient will  alleviate the suicidal impulses/ideation and return to the highest level of previous daily functioning.      Objective #A                Patient will participate in therapy for an identified emotional problem resulting in suicidal thoughts.  Status: New as of January 10, 2023     Intervention(s)  LMHP will assess the severity of the level of impairment to the client s functioning to determine the appropriate level of care.        LMHP will encourage the patient to express feelings related to their suicidal ideation in order to clarify them and increase insight as to the cause for them.       LMHP will assist the patient in developing coping strategies for suicidal ideation.        Goal 2: Patient will accept placement/referral to an appropriate level of care to safely address the suicidal crisis.      Objective #A                Patient will state the strength of the suicidal feeling, frequency of the thoughts and the detail of the plans   Status: New as of January 10, 2023     Intervention(s)  LMHP will facilitate conversations about options and encourage patient to engage in services.      Therapeutic intervention and progress:  Therapeutic intervention consisted of building therapeutic rapport, engaging in learning/practicing coping skills, active problem solving, stress relief practices and CBT concepts. Patient appears to be making some progress, as she is willing to have conversation about sitter and possible discharge.      Anne Melendez, [facility]   Triage and Transition - Consult and Liaison   [phone]

## 2023-01-26 NOTE — PROGRESS NOTES
"[facility] Digestive [facility] Progress Note:    Date: 1/26/2023    Patient Name: Thea Castle    SUBJECTIVE: Pt appears comfortable. Denies bowel movement despite use of lactulose, enemas, and linzess. Mild-mod abdominal discomfort. Denies passing gas.  Mobility limited due to 2:1 assist and limited staff. Oral intake is unchanged. Reports mild nausea. Reports feeling more depressed today.        OBJECTIVE: /74 (BP Location: Right arm)   Pulse 103   Temp 97.4  F (36.3  C) (Axillary)   Resp 16   Ht 1.626 m (5' 4.02\")   Wt 55.1 kg (121 lb 7.6 oz)   SpO2 97%   BMI 20.84 kg/m      Body mass index is 20.84 kg/m .    Constitutional: NAD, comfortable  Abdomen: round, firm and distended, hypoactive bowel sounds  Skin: warm and dry      Pertinent GI Concerns  1. Chronic constipation-previous abdominal x-ray with mod stool burden.   2. Abdominal distention/pain    PLAN:  1. Abdominal/pelvis CT with oral contrast today to assess stool burden vs obstruction  2. Limit use of Reglan pending CT       Discussed above plan with Dr. Saha.     40 minutes of total time was spent providing patient care, including patient evaluation, reviewing documentation/test results, and .    Skyla Hooper CNP Thank you for the opportunity to participate in the care of this patient. Please call with any questions or concerns. [phone]. Ext [phone]    CC: [facility], Brittney Penny  ________________________________________________________________________  "

## 2023-01-26 NOTE — PROGRESS NOTES
Alert and oriented x4. VSS, on RA, sating at 99%. Denies SOB. Has an NG feeding 40 ml/hr with 60 ml flush q4hrs. Regular diet, poor appetite. C/O nausea which was managed with PRN Zofran. Chronic pantoja in place. Right thigh(inner) wound dressing was changed during this shift. Pt stated she still has suicidal ideations, but dose not have a plan because she's here. Has a sitter in her room. She also had a mental health appointment today. Pt has an abdominal scan at 1600 today.
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
## 2023-01-26 NOTE — PLAN OF CARE
Alert and oriented X 4, up with A2 d/t falls. Neuros intact. Reports intermittent nausea, stomach ache, improved with scheduled oral Reglan. Tolerating lactulose, no BM today. TF running @ goal rate. One piece of toast for dinner. Smith patent and draining, output adequate. Sitter present at bedside for suicide watch. Pt cooperative and engaging with staff. Continue to monitor.

## 2023-01-27 ENCOUNTER — APPOINTMENT (OUTPATIENT)
Dept: GENERAL RADIOLOGY | Facility: CLINIC | Age: 23
DRG: 918 | End: 2023-01-27
Attending: NURSE PRACTITIONER
Payer: COMMERCIAL

## 2023-01-27 LAB
ALBUMIN UR-MCNC: NEGATIVE MG/DL
ANION GAP SERPL CALCULATED.3IONS-SCNC: 12 MMOL/L (ref 7–15)
APPEARANCE UR: CLEAR
BACTERIA #/AREA URNS HPF: ABNORMAL /HPF
BILIRUB UR QL STRIP: NEGATIVE
BUN SERPL-MCNC: 12.8 MG/DL (ref 6–20)
CALCIUM SERPL-MCNC: 9.2 MG/DL (ref 8.6–10)
CHLORIDE SERPL-SCNC: 102 MMOL/L (ref 98–107)
COLOR UR AUTO: ABNORMAL
CREAT SERPL-MCNC: 0.71 MG/DL (ref 0.51–0.95)
DEPRECATED HCO3 PLAS-SCNC: 24 MMOL/L (ref 22–29)
ERYTHROCYTE [DISTWIDTH] IN BLOOD BY AUTOMATED COUNT: 13.2 % (ref 10–15)
GFR SERPL CREATININE-BSD FRML MDRD: >90 ML/MIN/1.73M2
GLUCOSE SERPL-MCNC: 99 MG/DL (ref 70–99)
GLUCOSE UR STRIP-MCNC: NEGATIVE MG/DL
HCT VFR BLD AUTO: 34.4 % (ref 35–47)
HGB BLD-MCNC: 11.5 G/DL (ref 11.7–15.7)
HGB UR QL STRIP: NEGATIVE
KETONES UR STRIP-MCNC: NEGATIVE MG/DL
LEUKOCYTE ESTERASE UR QL STRIP: ABNORMAL
MCH RBC QN AUTO: 30.2 PG (ref 26.5–33)
MCHC RBC AUTO-ENTMCNC: 33.4 G/DL (ref 31.5–36.5)
MCV RBC AUTO: 90 FL (ref 78–100)
NITRATE UR QL: NEGATIVE
PH UR STRIP: 8 [PH] (ref 5–7)
PLATELET # BLD AUTO: 251 10E3/UL (ref 150–450)
POTASSIUM SERPL-SCNC: 4.3 MMOL/L (ref 3.4–5.3)
POTASSIUM SERPL-SCNC: 4.8 MMOL/L (ref 3.4–5.3)
PROCALCITONIN SERPL IA-MCNC: 0.04 NG/ML
RBC # BLD AUTO: 3.81 10E6/UL (ref 3.8–5.2)
RBC URINE: 9 /HPF
SODIUM SERPL-SCNC: 138 MMOL/L (ref 136–145)
SP GR UR STRIP: 1.01 (ref 1–1.03)
SQUAMOUS EPITHELIAL: <1 /HPF
UROBILINOGEN UR STRIP-MCNC: NORMAL MG/DL
WBC # BLD AUTO: 7.1 10E3/UL (ref 4–11)
WBC URINE: 10 /HPF

## 2023-01-27 PROCEDURE — 250N000013 HC RX MED GY IP 250 OP 250 PS 637: Performed by: REGISTERED NURSE

## 2023-01-27 PROCEDURE — 250N000013 HC RX MED GY IP 250 OP 250 PS 637: Performed by: INTERNAL MEDICINE

## 2023-01-27 PROCEDURE — 250N000013 HC RX MED GY IP 250 OP 250 PS 637: Performed by: NURSE PRACTITIONER

## 2023-01-27 PROCEDURE — 80048 BASIC METABOLIC PNL TOTAL CA: CPT | Performed by: INTERNAL MEDICINE

## 2023-01-27 PROCEDURE — 99233 SBSQ HOSP IP/OBS HIGH 50: CPT | Performed by: INTERNAL MEDICINE

## 2023-01-27 PROCEDURE — 250N000013 HC RX MED GY IP 250 OP 250 PS 637: Performed by: HOSPITALIST

## 2023-01-27 PROCEDURE — 84450 TRANSFERASE (AST) (SGOT): CPT | Performed by: INTERNAL MEDICINE

## 2023-01-27 PROCEDURE — 84132 ASSAY OF SERUM POTASSIUM: CPT | Performed by: INTERNAL MEDICINE

## 2023-01-27 PROCEDURE — 120N000001 HC R&B MED SURG/OB

## 2023-01-27 PROCEDURE — 84145 PROCALCITONIN (PCT): CPT | Performed by: INTERNAL MEDICINE

## 2023-01-27 PROCEDURE — 81001 URINALYSIS AUTO W/SCOPE: CPT | Performed by: INTERNAL MEDICINE

## 2023-01-27 PROCEDURE — 84460 ALANINE AMINO (ALT) (SGPT): CPT | Performed by: INTERNAL MEDICINE

## 2023-01-27 PROCEDURE — 74283 THER NMA RDCTJ INTUS/OBSTRCJ: CPT

## 2023-01-27 PROCEDURE — 36415 COLL VENOUS BLD VENIPUNCTURE: CPT | Performed by: INTERNAL MEDICINE

## 2023-01-27 PROCEDURE — 85041 AUTOMATED RBC COUNT: CPT | Performed by: INTERNAL MEDICINE

## 2023-01-27 PROCEDURE — 250N000013 HC RX MED GY IP 250 OP 250 PS 637

## 2023-01-27 RX ORDER — LACTULOSE 10 G/15ML
20 SOLUTION ORAL 3 TIMES DAILY
Status: DISCONTINUED | OUTPATIENT
Start: 2023-01-27 | End: 2023-02-07 | Stop reason: HOSPADM

## 2023-01-27 RX ORDER — NYSTATIN 100000 U/G
CREAM TOPICAL 2 TIMES DAILY
Status: DISCONTINUED | OUTPATIENT
Start: 2023-01-27 | End: 2023-02-07 | Stop reason: HOSPADM

## 2023-01-27 RX ADMIN — LACTULOSE 20 G: 10 SOLUTION ORAL at 16:39

## 2023-01-27 RX ADMIN — Medication 25 MCG: at 09:17

## 2023-01-27 RX ADMIN — SENNOSIDES AND DOCUSATE SODIUM 1 TABLET: 8.6; 5 TABLET ORAL at 20:16

## 2023-01-27 RX ADMIN — DIATRIZOATE MEGLUMINE AND DIATRIZOATE SODIUM 480 ML: 660; 100 SOLUTION ORAL; RECTAL at 12:13

## 2023-01-27 RX ADMIN — PANTOPRAZOLE SODIUM 40 MG: 40 TABLET, DELAYED RELEASE ORAL at 09:17

## 2023-01-27 RX ADMIN — LORAZEPAM 1 MG: 1 TABLET ORAL at 20:16

## 2023-01-27 RX ADMIN — NYSTATIN: 100000 CREAM TOPICAL at 09:17

## 2023-01-27 RX ADMIN — HYDROXYZINE HYDROCHLORIDE 25 MG: 25 TABLET, FILM COATED ORAL at 03:54

## 2023-01-27 RX ADMIN — Medication 0.25 G: at 09:17

## 2023-01-27 RX ADMIN — TRAZODONE HYDROCHLORIDE 50 MG: 50 TABLET ORAL at 20:16

## 2023-01-27 RX ADMIN — MELATONIN TAB 3 MG 5 MG: 3 TAB at 20:22

## 2023-01-27 RX ADMIN — MULTIPLE VITAMINS W/ MINERALS TAB 1 TABLET: TAB at 09:16

## 2023-01-27 RX ADMIN — Medication 25 MG: at 09:16

## 2023-01-27 RX ADMIN — ARIPIPRAZOLE 15 MG: 15 TABLET ORAL at 09:17

## 2023-01-27 RX ADMIN — Medication 25 MG: at 20:16

## 2023-01-27 RX ADMIN — Medication 0.25 G: at 13:19

## 2023-01-27 RX ADMIN — PANTOPRAZOLE SODIUM 40 MG: 40 TABLET, DELAYED RELEASE ORAL at 16:39

## 2023-01-27 RX ADMIN — LINACLOTIDE 290 MCG: 290 CAPSULE, GELATIN COATED ORAL at 09:17

## 2023-01-27 RX ADMIN — SENNOSIDES AND DOCUSATE SODIUM 1 TABLET: 8.6; 5 TABLET ORAL at 09:17

## 2023-01-27 RX ADMIN — LACTULOSE 10 G: 10 SOLUTION ORAL at 09:16

## 2023-01-27 RX ADMIN — METOCLOPRAMIDE 10 MG: 5 TABLET ORAL at 09:17

## 2023-01-27 RX ADMIN — Medication 12.5 MG: at 20:15

## 2023-01-27 RX ADMIN — Medication 250 MG: at 09:17

## 2023-01-27 RX ADMIN — DULOXETINE HYDROCHLORIDE 60 MG: 60 CAPSULE, DELAYED RELEASE ORAL at 09:17

## 2023-01-27 RX ADMIN — METOCLOPRAMIDE 10 MG: 5 TABLET ORAL at 13:18

## 2023-01-27 RX ADMIN — ACETAMINOPHEN 650 MG: 325 TABLET, FILM COATED ORAL at 09:49

## 2023-01-27 RX ADMIN — ACETAMINOPHEN 650 MG: 325 TABLET, FILM COATED ORAL at 03:54

## 2023-01-27 ASSESSMENT — ACTIVITIES OF DAILY LIVING (ADL)
ADLS_ACUITY_SCORE: 35
ADLS_ACUITY_SCORE: 35
ADLS_ACUITY_SCORE: 33
ADLS_ACUITY_SCORE: 31
ADLS_ACUITY_SCORE: 37
ADLS_ACUITY_SCORE: 31
ADLS_ACUITY_SCORE: 37
ADLS_ACUITY_SCORE: 35
ADLS_ACUITY_SCORE: 31
ADLS_ACUITY_SCORE: 37
ADLS_ACUITY_SCORE: 33
ADLS_ACUITY_SCORE: 35
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
## 2023-01-27 NOTE — PROGRESS NOTES
"SPIRITUAL HEALTH SERVICES  SPIRITUAL ASSESSMENT Progress Note  [facility]     REFERRAL SOURCE: Follow up visit    Thea shared that she was \"having a hard day.\" Encouraged her to use her coping skills including reaching out to friends and teaching herself to play solitaire as a way to pass the time.   Spoke with Thea about [facility] visit schedule to 2 times/week, proposing T, Th schedule. She shared that \"Fridays are hard,\" so for next week agreed to visit on T, Th, and F and if she remains in the hospital will plan reduce visit schedule to 2 days/wk with either a T, Th or T, F schedule moving forward. Thea expressed agreement to the change.  Thea requested prayer, which I offered.    I offered spiritual and emotional support through reflective listening that affirmed emotions, experience, and meaning. Offered assurance through prayer which incorporated conversational themes.    PLAN: Triaged for follow up visit on Tuesday. [facility] remains available for support.    Nichole Aguilera  Associate   Pager [phone]  [facility] Phone [phone]  [facility] Pager [phone]    [facility] available 24/7 for emergent requests/referrals, either by having the on-call  paged or by entering an ASAP/STAT consult in Epic (this will also page the on-call ).        "
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
## 2023-01-27 NOTE — PROGRESS NOTES
Care Management Follow Up    Length of Stay (days): 23    Expected Discharge Date: 01/30/2023     Concerns to be Addressed:     continues to have 1:1 sitter for suicide precautions, psychiatry needs meet inpatient psych criteria however pantoja catheter and NG tubes are barriers to inpatient psych placement     Patient plan of care discussed at interdisciplinary rounds: Yes; per discussion pt does not want group home and does not want to return home with mother, also reviewed Mental Health note:   Appreciate any assistance social work can provide in referrals to residential placement that may allow feeding tube. Discussed with sitter today. Referrals placed to [facility]. Options that have been discussed but not placed yet- [facility], [facility], [facility].  Looped in SW team to see if they have ideas; however CM team aware pt is deemed decisional and has expressed she does not want group home.      Anticipated Discharge Disposition: Inpatient Mental Health, Home (pt is open to Agency: [facility] for RN/PT/[phone]).      Anticipated Discharge Services: Mental Health Resources  Anticipated Discharge DME: Other (see comment) (TBD - NG tube, pantoja catheter)    Patient/family educated on Medicare website which has current facility and service quality ratings: no  Education Provided on the Discharge Plan:    Patient/Family in Agreement with the Plan: no    Referrals Placed by CM/SW: Durable Medical Equipment (DME)  Private pay costs discussed: Not applicable    Additional Information:  Received forwarded email and provided UR team with CM team contact information, as well as reached out to [employer]     From: Barbara Mujica  Sent: Friday, January 27, 2023 9:53 AM  To: Chayito Esquivel > Cc: Tonya Feliz   Subject: [facility] Neuro - secure phi    Good morning,  I had a vm from Shellie @ [employer] (P)[phone] looking  "for chart notes and orders for formula for this patient. [....]  If you can help me figure out who to get this information to I can call Shellie back to provide the information, I am just currently at a loss with this.  [....]    Thank you,  Maggi Mujica  Utilization       CANELO-RN called Shellie ([phone]) who states she received a message from Insurance Care Coordinator ([phone]) Alis with [facility]  saying they were trying to obtain an authorization for an enteral formula (an order was faxed, Shellie will send it to me) and that [facility]  could not provide formula because they are out of network.  [facility] states \"we don't carry the formula.\" The forwarded fax appears to have originated from [facility], lists Dr. Brown ([facility]) as ordering provider, and DME provider notes \"[facility] and [facility] are both OON with the patient's insurance.\" Shellie was under the impression that pt was discharged 1/26/23.    NIMARN clarified with IP nutrition, they have not been reaching out to outpatient nutrition DME providers.  NIMARN reached out to [facility] liaison ([phone] / branch [phone]) who will check in to it.  Also left message for insurance care coordinator at the number above.      Received callback from Insurance care Coordinator, who explains he role is more for authorization requests, she is contracted with insurance and sends for PA for review if meets insurance guidelines--it does not sound like this coordinator would be helpful .  She received the request from [facility] (Heather [phone]). Received message from Jada with [employer]; pt previously used [facility] for home infusion \"but with change of insurance payor [facility] was deemed out of network.  [facility] was given approval to send transition packet to [facility].\"  Updated all that pt does not have an expected discharge at this time.           Layla Malcolm RN, BSN, PHN  [facility]  Inpatient Care Management - FLOAT  Neurology CM RN Mobile: [phone] daily 7:30-4:00

## 2023-01-27 NOTE — PROGRESS NOTES
"[facility] Progress Note:    Date: 1/27/2023    Patient Name: Thea Castle    SUBJECTIVE:  Pt appeared comfortable. Status essentially unchanged. Continues with 1:1 sitter. Mild abdominal discomfort secondary to stool retention not resolved with current bowel regimen, tap water enema, or soap suds enema. Oral intake suboptimal with decreased appetite due to current constipation.     OBJECTIVE: /78 (BP Location: Right arm)   Pulse 108   Temp 98.2  F (36.8  C) (Oral)   Resp 16   Ht 1.626 m (5' 4.02\")   Wt 55.1 kg (121 lb 7.6 oz)   SpO2 98%   BMI 20.84 kg/m      Body mass index is 20.84 kg/m .    Constitutional: NAD, comfortable  Abdomen: round, firm and distended, hypoactive bowel sounds unchanged from previous assessment      IMAGING  1/26/23 Abdominal CT  IMPRESSION:   1.  Large amount of stool throughout the colon suggests constipation.  2.  Several nonobstructing stones in the right kidney measures 0.3 cm or smaller.  3.  A left ovarian cystic lesion measures 2.2 cm, and most likely represents a dominant follicle.      ASSESSMENT:    1. Chronic constipation-     PLAN:   1. Gastrografin enema today.   2. Increase Lactulose to 20mg TID  3. Continue Linzess   4. Continue strict bowel regimen at discharge.    GI will follow peripherally over the weekend will resume follow up on Monday 1/30/23 if pt still admitted.  Please reach out if questions or concerns develop.     Above plan discussed with Dr. Saha.     40 minutes of total time was spent providing patient care, including patient evaluation, reviewing documentation/test results, and .    Skyla Hooper CNP Thank you for the opportunity to participate in the care of this patient. Please call with any questions or concerns. [phone]. Ext [phone]    CC: [facility], Brittney Penny  ________________________________________________________________________  "

## 2023-01-27 NOTE — PROGRESS NOTES
Alert and oriented x4. VSS, on RA. Has NG feeding continuous@40 ml/hr with pre set water flush q4hrs. Regular diet, poor appetite, intermittent nausea on antiemetics scheduled, PRN also available, Chronic pantoja for retention in place, draining clowdy urine, sticky note left for physicians. Right thigh(inner) wound dressing intact.Sitter in her room, pt on her way down to CT at change of shift.

## 2023-01-28 LAB
ALT SERPL W P-5'-P-CCNC: 19 U/L (ref 10–35)
AST SERPL W P-5'-P-CCNC: 32 U/L (ref 10–35)

## 2023-01-28 PROCEDURE — 250N000013 HC RX MED GY IP 250 OP 250 PS 637: Performed by: INTERNAL MEDICINE

## 2023-01-28 PROCEDURE — 250N000013 HC RX MED GY IP 250 OP 250 PS 637: Performed by: HOSPITALIST

## 2023-01-28 PROCEDURE — 250N000013 HC RX MED GY IP 250 OP 250 PS 637

## 2023-01-28 PROCEDURE — 120N000001 HC R&B MED SURG/OB

## 2023-01-28 PROCEDURE — 250N000013 HC RX MED GY IP 250 OP 250 PS 637: Performed by: REGISTERED NURSE

## 2023-01-28 PROCEDURE — 99233 SBSQ HOSP IP/OBS HIGH 50: CPT | Performed by: INTERNAL MEDICINE

## 2023-01-28 PROCEDURE — 250N000013 HC RX MED GY IP 250 OP 250 PS 637: Performed by: NURSE PRACTITIONER

## 2023-01-28 PROCEDURE — 250N000011 HC RX IP 250 OP 636: Performed by: INTERNAL MEDICINE

## 2023-01-28 RX ORDER — FLUCONAZOLE 150 MG/1
150 TABLET ORAL ONCE
Status: COMPLETED | OUTPATIENT
Start: 2023-01-28 | End: 2023-01-28

## 2023-01-28 RX ORDER — SUMATRIPTAN 50 MG/1
50 TABLET, FILM COATED ORAL ONCE
Status: COMPLETED | OUTPATIENT
Start: 2023-01-28 | End: 2023-01-28

## 2023-01-28 RX ADMIN — HYDROXYZINE HYDROCHLORIDE 25 MG: 25 TABLET, FILM COATED ORAL at 03:03

## 2023-01-28 RX ADMIN — PANTOPRAZOLE SODIUM 40 MG: 40 TABLET, DELAYED RELEASE ORAL at 17:03

## 2023-01-28 RX ADMIN — ONDANSETRON 4 MG: 2 INJECTION INTRAMUSCULAR; INTRAVENOUS at 19:59

## 2023-01-28 RX ADMIN — ONDANSETRON 4 MG: 2 INJECTION INTRAMUSCULAR; INTRAVENOUS at 10:31

## 2023-01-28 RX ADMIN — SUMATRIPTAN SUCCINATE 50 MG: 50 TABLET ORAL at 11:42

## 2023-01-28 RX ADMIN — LACTULOSE 20 G: 10 SOLUTION ORAL at 17:03

## 2023-01-28 RX ADMIN — HYDROXYZINE HYDROCHLORIDE 25 MG: 25 TABLET, FILM COATED ORAL at 10:31

## 2023-01-28 RX ADMIN — Medication 25 MG: at 21:27

## 2023-01-28 RX ADMIN — ACETAMINOPHEN 650 MG: 325 TABLET, FILM COATED ORAL at 03:03

## 2023-01-28 RX ADMIN — HYDROXYZINE HYDROCHLORIDE 25 MG: 25 TABLET, FILM COATED ORAL at 17:03

## 2023-01-28 RX ADMIN — SENNOSIDES AND DOCUSATE SODIUM 1 TABLET: 8.6; 5 TABLET ORAL at 21:27

## 2023-01-28 RX ADMIN — METOCLOPRAMIDE HYDROCHLORIDE 5 MG: 5 INJECTION INTRAMUSCULAR; INTRAVENOUS at 22:11

## 2023-01-28 RX ADMIN — Medication 25 MG: at 09:42

## 2023-01-28 RX ADMIN — Medication 0.25 G: at 17:04

## 2023-01-28 RX ADMIN — ARIPIPRAZOLE 15 MG: 15 TABLET ORAL at 09:43

## 2023-01-28 RX ADMIN — Medication 0.25 G: at 09:44

## 2023-01-28 RX ADMIN — BISACODYL 10 MG: 10 SUPPOSITORY RECTAL at 12:56

## 2023-01-28 RX ADMIN — MULTIPLE VITAMINS W/ MINERALS TAB 1 TABLET: TAB at 09:42

## 2023-01-28 RX ADMIN — Medication 25 MCG: at 09:43

## 2023-01-28 RX ADMIN — LACTULOSE 20 G: 10 SOLUTION ORAL at 21:26

## 2023-01-28 RX ADMIN — Medication 12.5 MG: at 21:27

## 2023-01-28 RX ADMIN — TRAZODONE HYDROCHLORIDE 50 MG: 50 TABLET ORAL at 21:27

## 2023-01-28 RX ADMIN — SENNOSIDES AND DOCUSATE SODIUM 1 TABLET: 8.6; 5 TABLET ORAL at 09:43

## 2023-01-28 RX ADMIN — FLUCONAZOLE 150 MG: 150 TABLET ORAL at 12:45

## 2023-01-28 RX ADMIN — LACTULOSE 20 G: 10 SOLUTION ORAL at 09:42

## 2023-01-28 RX ADMIN — LORAZEPAM 1 MG: 1 TABLET ORAL at 21:27

## 2023-01-28 RX ADMIN — DULOXETINE HYDROCHLORIDE 60 MG: 60 CAPSULE, DELAYED RELEASE ORAL at 09:43

## 2023-01-28 RX ADMIN — NYSTATIN: 100000 CREAM TOPICAL at 21:33

## 2023-01-28 RX ADMIN — PANTOPRAZOLE SODIUM 40 MG: 40 TABLET, DELAYED RELEASE ORAL at 09:42

## 2023-01-28 RX ADMIN — Medication 0.25 G: at 21:36

## 2023-01-28 RX ADMIN — ACETAMINOPHEN 650 MG: 325 TABLET, FILM COATED ORAL at 12:45

## 2023-01-28 RX ADMIN — Medication 250 MG: at 09:42

## 2023-01-28 RX ADMIN — LINACLOTIDE 290 MCG: 290 CAPSULE, GELATIN COATED ORAL at 09:44

## 2023-01-28 RX ADMIN — NYSTATIN: 100000 CREAM TOPICAL at 09:44

## 2023-01-28 ASSESSMENT — ACTIVITIES OF DAILY LIVING (ADL)
ADLS_ACUITY_SCORE: 33
ADLS_ACUITY_SCORE: 37
ADLS_ACUITY_SCORE: 33
ADLS_ACUITY_SCORE: 37
ADLS_ACUITY_SCORE: 33
ADLS_ACUITY_SCORE: 37
ADLS_ACUITY_SCORE: 33
ADLS_ACUITY_SCORE: 33
ADLS_ACUITY_SCORE: 37
ADLS_ACUITY_SCORE: 37

## 2023-01-28 NOTE — PROGRESS NOTES
Charge RN spoke with gastroenterology who stated that [facility] provides mental health services and will take patients with tubes. Hospitalist notified and okay'd transfer if able to find a bed. ANS notified who will reach out to hospital to see if they have bed placement available

## 2023-01-28 NOTE — CONSULTS
Triage and Transition - Consult and Liaison     Thea Castle  January 28, 2023    Psychiatry consult acknowledged.Recieved VM on C&L VM that patient was no longer verbally speaking to staff.     This is likely a behavioral demonstration vs a physical symptom.  Pt will be seen tomorrow by [facility].     Circumstance has additionally be [facility] to [facility] and Psychiatry Director for further guidance.     RAYRAY NYE MSW, LICSW  Triage and Transition - Consult and Liaison   [phone]

## 2023-01-28 NOTE — PLAN OF CARE
Goal Outcome Evaluation:      Plan of Care Reviewed With: patient    Overall Patient Progress: no changeOverall Patient Progress: no change       Orientation/Cognitive: A&OX4  Mobility Level/Assist Equipment: AX1-2GB/W  Fall Risk (Y/N): Y  Behavior Concerns: None this shift  Pain Management: PRN Tylenol and heat packs  Tele/VS/O2: VSS on RA  ABNL Lab/BG: None this shift  Diet: Low lactose diet, TF cont@40mls/hr  Bowel/Bladder: Continent, Smith in place for retention  Skin Concerns: redness around perianal area, refused the creams/gel  Drains/Devices: PEG tube, Smith, PIV s/l  Tests/Procedures for next shift: none  Anticipated DC date & active delays: awaits transfer to inpatient psych
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
## 2023-01-28 NOTE — PROVIDER NOTIFICATION
"MD Notification    Notified Person: MD    Notified Person Name: Brittany    Notification Date/Time: 0114 1/28/2023    Notification Interaction: AmCom    Purpose of Notification: \"Pt complaining of full bladder. Pantoja doesn't seem to be draining, BS for about 305 mL. Order to irrigate?\"     Orders Received: Order for bladder irrigation     Comments: Bladder irrigation unsuccessful. Hospitalist paged again- \"Pt continuing to complain of bad stomach pain. Patient are order to replace pantoja?\".        "
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
## 2023-01-28 NOTE — PROGRESS NOTES
Charge RN was told by [facility] that when sitter told her she was not to braid her hair patient attempted to shut sit out of bathroom. When aide was able to get door open, patient was found on floor pulling at her hair/pulling hair out and crying. Bedside RN aware and to talk with patient.

## 2023-01-28 NOTE — SIGNIFICANT EVENT
Significant Event Note    Time of event: 5:43 AM January 28, 2023    Description of event:  Trouble overnight with her Smith, not draining urine, causing abdominal pain. Irrigated to no effect.     Plan:  Smith replaced, now draining urine well and she feels better reportedly.    Discussed with: bedside nurse    bAilio Soto MD

## 2023-01-29 PROCEDURE — 120N000001 HC R&B MED SURG/OB

## 2023-01-29 PROCEDURE — 250N000013 HC RX MED GY IP 250 OP 250 PS 637: Performed by: REGISTERED NURSE

## 2023-01-29 PROCEDURE — 250N000013 HC RX MED GY IP 250 OP 250 PS 637: Performed by: NURSE PRACTITIONER

## 2023-01-29 PROCEDURE — 250N000013 HC RX MED GY IP 250 OP 250 PS 637: Performed by: INTERNAL MEDICINE

## 2023-01-29 PROCEDURE — 99233 SBSQ HOSP IP/OBS HIGH 50: CPT | Performed by: INTERNAL MEDICINE

## 2023-01-29 PROCEDURE — 250N000011 HC RX IP 250 OP 636: Performed by: INTERNAL MEDICINE

## 2023-01-29 PROCEDURE — 250N000013 HC RX MED GY IP 250 OP 250 PS 637: Performed by: HOSPITALIST

## 2023-01-29 PROCEDURE — 250N000013 HC RX MED GY IP 250 OP 250 PS 637

## 2023-01-29 RX ADMIN — SENNOSIDES AND DOCUSATE SODIUM 1 TABLET: 8.6; 5 TABLET ORAL at 21:16

## 2023-01-29 RX ADMIN — LINACLOTIDE 290 MCG: 290 CAPSULE, GELATIN COATED ORAL at 10:07

## 2023-01-29 RX ADMIN — Medication 0.25 G: at 21:19

## 2023-01-29 RX ADMIN — ACETAMINOPHEN 650 MG: 325 TABLET, FILM COATED ORAL at 12:03

## 2023-01-29 RX ADMIN — BISACODYL 10 MG: 10 SUPPOSITORY RECTAL at 12:52

## 2023-01-29 RX ADMIN — HYDROXYZINE HYDROCHLORIDE 25 MG: 25 TABLET, FILM COATED ORAL at 04:10

## 2023-01-29 RX ADMIN — ONDANSETRON 4 MG: 4 TABLET, ORALLY DISINTEGRATING ORAL at 12:04

## 2023-01-29 RX ADMIN — Medication 12.5 MG: at 21:16

## 2023-01-29 RX ADMIN — LACTULOSE 20 G: 10 SOLUTION ORAL at 15:15

## 2023-01-29 RX ADMIN — ACETAMINOPHEN 650 MG: 325 TABLET, FILM COATED ORAL at 18:44

## 2023-01-29 RX ADMIN — NYSTATIN: 100000 CREAM TOPICAL at 21:20

## 2023-01-29 RX ADMIN — Medication 250 MG: at 09:03

## 2023-01-29 RX ADMIN — PANTOPRAZOLE SODIUM 40 MG: 40 TABLET, DELAYED RELEASE ORAL at 16:49

## 2023-01-29 RX ADMIN — Medication 25 MG: at 09:02

## 2023-01-29 RX ADMIN — NYSTATIN: 100000 CREAM TOPICAL at 10:06

## 2023-01-29 RX ADMIN — LACTULOSE 20 G: 10 SOLUTION ORAL at 09:02

## 2023-01-29 RX ADMIN — PROCHLORPERAZINE MALEATE 5 MG: 5 TABLET ORAL at 16:49

## 2023-01-29 RX ADMIN — Medication 0.25 G: at 10:06

## 2023-01-29 RX ADMIN — SENNOSIDES AND DOCUSATE SODIUM 1 TABLET: 8.6; 5 TABLET ORAL at 09:03

## 2023-01-29 RX ADMIN — MULTIPLE VITAMINS W/ MINERALS TAB 1 TABLET: TAB at 09:02

## 2023-01-29 RX ADMIN — TRAZODONE HYDROCHLORIDE 50 MG: 50 TABLET ORAL at 21:16

## 2023-01-29 RX ADMIN — HYDROXYZINE HYDROCHLORIDE 25 MG: 25 TABLET, FILM COATED ORAL at 15:15

## 2023-01-29 RX ADMIN — Medication 25 MCG: at 09:03

## 2023-01-29 RX ADMIN — LACTULOSE 20 G: 10 SOLUTION ORAL at 21:16

## 2023-01-29 RX ADMIN — Medication 0.25 G: at 14:57

## 2023-01-29 RX ADMIN — PANTOPRAZOLE SODIUM 40 MG: 40 TABLET, DELAYED RELEASE ORAL at 09:03

## 2023-01-29 RX ADMIN — DULOXETINE HYDROCHLORIDE 60 MG: 60 CAPSULE, DELAYED RELEASE ORAL at 09:03

## 2023-01-29 RX ADMIN — Medication 25 MG: at 21:16

## 2023-01-29 RX ADMIN — LORAZEPAM 1 MG: 1 TABLET ORAL at 21:16

## 2023-01-29 RX ADMIN — ARIPIPRAZOLE 15 MG: 15 TABLET ORAL at 09:02

## 2023-01-29 ASSESSMENT — ACTIVITIES OF DAILY LIVING (ADL)
ADLS_ACUITY_SCORE: 33
ADLS_ACUITY_SCORE: 33
ADLS_ACUITY_SCORE: 31
ADLS_ACUITY_SCORE: 31
ADLS_ACUITY_SCORE: 33

## 2023-01-29 NOTE — PROGRESS NOTES
Reason for Admission: Suicidal Ideation  Cognitive/Mentation: A/Ox 4  Neuros/CMS: Intact with gen. weakness  VS: VSS on RA   GI: BS active, last BM 1/28- Pt requested suppository requested, given and effective. Continent.  : pantoja due to retention. Placed 1/28/2023 due to be replaced 2/27/2023.  Pulmonary: LS clear.  Pain: Stomach pain managed with Tylenol.   Drains/Lines: NJ running TF.  Skin: wounds TEDDY  Activity: Assist x1 when in the room but needs Ax2 when up walking the halls.  Diet: No lactose diet with thin liquids- poor intake. Takes pills whole. TF running at goal of 40 mL/hr with q4hr 60 mL flushes.  Therapies recs: Psy to see Pt tomorrow. Pt chose to be nonverbal today. Would only communicate VIA cell phone messages.  Discharge: pending placement.  Aggression Stoplight Tool: green, Pt was very tearful, angry and frustrated during beginning of shift.  End of shift summary: 1:1 sitter maintained. Pt continued to make suicidial threats VIA cell phone messages. ANS is currently looking into medical mental health care facilities that have an open bed. Pt requesting to 1. Keep personal belongings (blankets, stuffed animals and cell phone) with her. 2. Have a private room/ 1:1 sitter at all times. 3. Not participate in group therapy. ANS to follow up tomorrow at 10am.
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
## 2023-01-30 LAB
ANION GAP SERPL CALCULATED.3IONS-SCNC: 11 MMOL/L (ref 7–15)
BUN SERPL-MCNC: 12.6 MG/DL (ref 6–20)
CALCIUM SERPL-MCNC: 9.4 MG/DL (ref 8.6–10)
CHLORIDE SERPL-SCNC: 98 MMOL/L (ref 98–107)
CREAT SERPL-MCNC: 0.78 MG/DL (ref 0.51–0.95)
DEPRECATED HCO3 PLAS-SCNC: 30 MMOL/L (ref 22–29)
GFR SERPL CREATININE-BSD FRML MDRD: >90 ML/MIN/1.73M2
GLUCOSE SERPL-MCNC: 100 MG/DL (ref 70–99)
MAGNESIUM SERPL-MCNC: 1.9 MG/DL (ref 1.7–2.3)
PHOSPHATE SERPL-MCNC: 3.8 MG/DL (ref 2.5–4.5)
POTASSIUM SERPL-SCNC: 4.2 MMOL/L (ref 3.4–5.3)
SODIUM SERPL-SCNC: 139 MMOL/L (ref 136–145)

## 2023-01-30 PROCEDURE — 250N000013 HC RX MED GY IP 250 OP 250 PS 637: Performed by: NURSE PRACTITIONER

## 2023-01-30 PROCEDURE — 36415 COLL VENOUS BLD VENIPUNCTURE: CPT | Performed by: INTERNAL MEDICINE

## 2023-01-30 PROCEDURE — 250N000013 HC RX MED GY IP 250 OP 250 PS 637: Performed by: INTERNAL MEDICINE

## 2023-01-30 PROCEDURE — 250N000013 HC RX MED GY IP 250 OP 250 PS 637

## 2023-01-30 PROCEDURE — 82310 ASSAY OF CALCIUM: CPT | Performed by: INTERNAL MEDICINE

## 2023-01-30 PROCEDURE — 120N000001 HC R&B MED SURG/OB

## 2023-01-30 PROCEDURE — 99233 SBSQ HOSP IP/OBS HIGH 50: CPT | Performed by: INTERNAL MEDICINE

## 2023-01-30 PROCEDURE — 250N000011 HC RX IP 250 OP 636: Performed by: INTERNAL MEDICINE

## 2023-01-30 PROCEDURE — 250N000013 HC RX MED GY IP 250 OP 250 PS 637: Performed by: HOSPITALIST

## 2023-01-30 PROCEDURE — 83735 ASSAY OF MAGNESIUM: CPT | Performed by: INTERNAL MEDICINE

## 2023-01-30 PROCEDURE — 84100 ASSAY OF PHOSPHORUS: CPT | Performed by: INTERNAL MEDICINE

## 2023-01-30 PROCEDURE — 250N000013 HC RX MED GY IP 250 OP 250 PS 637: Performed by: REGISTERED NURSE

## 2023-01-30 RX ADMIN — Medication 25 MG: at 08:49

## 2023-01-30 RX ADMIN — ARIPIPRAZOLE 15 MG: 15 TABLET ORAL at 08:49

## 2023-01-30 RX ADMIN — Medication 0.25 G: at 20:32

## 2023-01-30 RX ADMIN — TRAZODONE HYDROCHLORIDE 50 MG: 50 TABLET ORAL at 20:32

## 2023-01-30 RX ADMIN — LACTULOSE 20 G: 10 SOLUTION ORAL at 08:49

## 2023-01-30 RX ADMIN — PANTOPRAZOLE SODIUM 40 MG: 40 TABLET, DELAYED RELEASE ORAL at 16:42

## 2023-01-30 RX ADMIN — ACETAMINOPHEN 650 MG: 325 TABLET, FILM COATED ORAL at 19:04

## 2023-01-30 RX ADMIN — NYSTATIN: 100000 CREAM TOPICAL at 09:03

## 2023-01-30 RX ADMIN — Medication 250 MG: at 08:49

## 2023-01-30 RX ADMIN — Medication 25 MG: at 20:31

## 2023-01-30 RX ADMIN — Medication 12.5 MG: at 20:31

## 2023-01-30 RX ADMIN — LORAZEPAM 1 MG: 1 TABLET ORAL at 20:31

## 2023-01-30 RX ADMIN — Medication 0.25 G: at 16:55

## 2023-01-30 RX ADMIN — LACTULOSE 20 G: 10 SOLUTION ORAL at 20:32

## 2023-01-30 RX ADMIN — Medication 0.25 G: at 09:01

## 2023-01-30 RX ADMIN — HYDROXYZINE HYDROCHLORIDE 25 MG: 25 TABLET, FILM COATED ORAL at 03:09

## 2023-01-30 RX ADMIN — Medication 25 MCG: at 08:49

## 2023-01-30 RX ADMIN — MELATONIN TAB 3 MG 5 MG: 3 TAB at 20:35

## 2023-01-30 RX ADMIN — LACTULOSE 20 G: 10 SOLUTION ORAL at 16:43

## 2023-01-30 RX ADMIN — HYDROXYZINE HYDROCHLORIDE 25 MG: 25 TABLET, FILM COATED ORAL at 10:40

## 2023-01-30 RX ADMIN — PANTOPRAZOLE SODIUM 40 MG: 40 TABLET, DELAYED RELEASE ORAL at 08:49

## 2023-01-30 RX ADMIN — LINACLOTIDE 290 MCG: 290 CAPSULE, GELATIN COATED ORAL at 09:03

## 2023-01-30 RX ADMIN — ONDANSETRON 4 MG: 4 TABLET, ORALLY DISINTEGRATING ORAL at 08:57

## 2023-01-30 RX ADMIN — NYSTATIN: 100000 CREAM TOPICAL at 20:32

## 2023-01-30 RX ADMIN — SENNOSIDES AND DOCUSATE SODIUM 1 TABLET: 8.6; 5 TABLET ORAL at 20:32

## 2023-01-30 RX ADMIN — DULOXETINE HYDROCHLORIDE 60 MG: 60 CAPSULE, DELAYED RELEASE ORAL at 08:49

## 2023-01-30 RX ADMIN — MULTIPLE VITAMINS W/ MINERALS TAB 1 TABLET: TAB at 08:49

## 2023-01-30 RX ADMIN — SENNOSIDES AND DOCUSATE SODIUM 1 TABLET: 8.6; 5 TABLET ORAL at 08:50

## 2023-01-30 ASSESSMENT — ACTIVITIES OF DAILY LIVING (ADL)
ADLS_ACUITY_SCORE: 31
ADLS_ACUITY_SCORE: 31
ADLS_ACUITY_SCORE: 33
ADLS_ACUITY_SCORE: 33
ADLS_ACUITY_SCORE: 31
ADLS_ACUITY_SCORE: 29
ADLS_ACUITY_SCORE: 29
ADLS_ACUITY_SCORE: 31
ADLS_ACUITY_SCORE: 31
ADLS_ACUITY_SCORE: 33
ADLS_ACUITY_SCORE: 29
ADLS_ACUITY_SCORE: 33

## 2023-01-30 NOTE — PROGRESS NOTES
"[facility]  [facility]  Gastroenterology Progress note    Interval History:      Pt reports that her stomach hurts.  She had good results from gastrografin enema Friday.  Reports small BM since.  Tolerating TF.  Only taking small amounts of po intake - crackers and applesauce.      Vital Signs:      /75 (BP Location: Left arm)   Pulse 104   Temp 99  F (37.2  C) (Oral)   Resp 16   Ht 1.626 m (5' 4.02\")   Wt 55.1 kg (121 lb 7.6 oz)   SpO2 99%   BMI 20.84 kg/m    Temp (24hrs), Av.9  F (37.2  C), Min:98.8  F (37.1  C), Max:99  F (37.2  C)    No data found.    Intake/Output Summary (Last 24 hours) at 2023 0921  Last data filed at 2023 0853  Gross per 24 hour   Intake 60 ml   Output 1350 ml   Net -1290 ml         Constitutional: NAD, comfortable  Cardiovascular: RRR, normal S1, S2   Respiratory: CTAB  Abdomen: soft, non-tender, distended    Additional Comments:  ROS, FH, SH: See initial GI consult for details.    Laboratory Data:  Recent Labs   Lab Test 23  1258 23  1923 23  2311 23  1920   WBC 7.1  --  6.6 8.1   HGB 11.5* 12.5 11.5* 11.4*   MCV 90  --  90 90     --  318 308     Recent Labs   Lab Test 23  0818 23  1258 23  1054 23  0927 23  1124     --  138  --  138   POTASSIUM 4.2 4.3 4.8   < > 4.0   CHLORIDE 98  --  102  --  101   CO2 30*  --  -     BUN 12.6  --  12.8  --  13.5   CR 0.78  --  0.71  --  0.81   ANIONGAP 11  --  12  --  10   DENNIS 9.4  --  9.2  --  9.5    < > = values in this interval not displayed.     Recent Labs   Lab Test 23  1258 23  1059 23  1803 23  1427 21  0727 20  0728 19  2148 19   ALBUMIN  --   --   --  3.4 3.0* 3.7   < >  --    BILITOTAL  --   --   --  0.2 0.3 0.5   < >  --    ALT 19  --   --  26 19 24   < >  --    AST 32  --   --  25 11 24   < >  --    ALKPHOS  --   --   --  59 50 61   < >  --    PROTEIN  --  " Negative Negative  --   --   --   --  Negative    < > = values in this interval not displayed.         Assessment:  23 yo female with complex past medical history who presents with suicidal attempt by overdosing on gabapentin.  Longstanding history of probable gastroparesis, chronic constipation for which she is followed in our [facility] clinic.  Patient has received tap water enema, soap suds enema, standard bowel regimen without improvement in constipation this admission.  CT 1/26/23 showed a large amount of stool throughout the colon, several nonobstructing stones in the right kidney measuring 0.3 cm or smaller, and a left ovarian cystic lesion measuring 2.2 cm, most likely representing a dominant follicle.  Patient was given a gastrografin enema 1/27/23 with multiple loose stools after.  Lactulose increased to 20 mg TID.  NG tube running at goal rate of 40 mL/hr.  Poor oral intake.  Plan:  -  Continue NG feedings at goal rate.  Oral intake as tolerated.  Do not recommend placement of GJ tube.  -  Continue lactulose 20 mg TID.  Continue Linzess.  -  Compazine, Reglan as needed for nausea.  -  Patient currently refusing transfer to [facility].  Assured patient that if she does transfer to [facility], [facility] covers that hospital and would be able to see patient if needed.  -  Will sign off; no change in recommendations at this time.  Please do not hesitate to call at any time with questions.    Total Time Spent:  15 minutes    CATRACHITO Rinaldi  [facility]  Office:  [phone] call if needed after 12PM  Cell:  [phone], not available after 12PM at this number

## 2023-01-30 NOTE — PROGRESS NOTES
Reason for Admission: Suicidal Ideation  Cognitive/Mentation: A/Ox 4  Neuros/CMS: Intact with gen. weakness  VS: VSS on RA   GI: BS active, last BM 1/29- Pt requested suppository requested, given and effective. Continent.  : pantoja due to retention. Placed 1/28/2023 due to be replaced 2/27/2023.  Pulmonary: LS clear.  Pain: Stomach pain managed with Tylenol.   Drains/Lines: NJ running TF.  Skin: wounds TEDDY  Activity: Assist x1 when in the room but needs Ax2 when up walking the halls.  Diet: No lactose diet with thin liquids- poor intake. Takes pills whole at times or chews them in apple sauce. GI recommending that Pt take medications whole and no longer crushing in the tube. TF running at goal of 40 mL/hr with q4hr 60 mL flushes.  Therapies recs: Psy saw Pt this afternoon.   Discharge: pending placement.  Aggression Stoplight Tool: green.  End of shift summary: 1:1 sitter maintained. Pt continued to make suicidial threats. ANS is currently looking into medical mental health care facilities that have an open bed.
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
## 2023-01-30 NOTE — PROGRESS NOTES
CLINICAL NUTRITION SERVICES - REASSESSMENT NOTE      Recommendations Ordered by Registered Dietitian (RD):   No changes today   Pt wondering if  would have Gavi Lagou formula as this is the only formula she feels she could tolerate - addendum - yes they carry this product    Malnutrition: (1/19)  % Weight Loss:  Weight loss does not meet criteria for malnutrition - wt appears to fluctuate 110-120# over the past 3 months (suspect 2' to Anorexia and GI issues) - per 1/9 RD note  % Intake:  </= 50% for >/= 5 days (severe malnutrition) - continued, improving with TF  Subcutaneous Fat Loss:  None observed - per 1/12 RD note  Muscle Loss:  None observed - per 1/12 RD note  Fluid Retention:  None noted     Malnutrition Diagnosis: Patient does not meet two of the established criteria necessary for diagnosing malnutrition but is at risk for malnutrition       EVALUATION OF PROGRESS TOWARD GOALS   Diet: Low Lactose Diet     Nutrition Support:    Type of Feeding Tube: NJ  Enteral Frequency:  Continuous  Enteral Regimen: Gavi Farms 1.5 @ 40 mL/hr x24 hours (960 mL)  Total Enteral Provisions: 1440 kcal, 71 g protein, 133 g CHO, 74 g fat, 9 g fiber, 672 mL free water  Free Water Flush: 60 mL q 4 hours for tube patency     Intake/Tolerance:    Visited patient today   C/o stomach pain, small amounts of PO taken such as crackers and applesauce   Feels well on TF w/o complaints  Wondering if we can check if  has Gavi Lagou formula if she decides to transfer  No other nutrition questions/concerns today     Labs reviewed: K/Mg/Phos WNL   Medications reviewed: lactulose, reglan, compazine   Stooling:  - 1/27: BM x4  - 1/28: BM x1  - 1/29: no BM recorded   Wt: 121 lbs 7.58 oz  Vitals:    01/04/23 1723 01/08/23 0800 01/09/23 1300 01/17/23 0830   Weight: 54 kg (119 lb) 54.3 kg (119 lb 11.4 oz) 54 kg (119 lb) 53.6 kg (118 lb 2.7 oz)    01/23/23 0651   Weight: 55.1 kg (121 lb 7.6 oz)         ASSESSED NUTRITION NEEDS:  Dosing  Weight: 54.3 kg (1/8)  Estimated Energy Needs: 6203-1976+ (25-30 kcal/kg)  Justification: maintenance vs repletion   Estimated Protein Needs: 55-65+ (1-1.2+ g/kg)  Justification: preservation of lean body mass vs repletion      NEW FINDINGS:   [facility] signed off this AM  Noted to be refusing txr to [facility] for inpatient psych   Suicidal threats ongoing, sitter present     Previous Goals:   EN to meet estimated needs while po intake is minimal  Evaluation: Met    Previous Nutrition Diagnosis:   No nutrition diagnosis identified at this time as EN meeting nutrition needs while pt with minimal po intake  Evaluation: No change      CURRENT NUTRITION DIAGNOSIS  No nutrition diagnosis identified at this time as EN meeting nutrition needs while pt with minimal po intake    INTERVENTIONS  Recommendations / Nutrition Prescription  Continue TF as ordered     Implementation  None new     Goals  EN to meet % estimated needs while PO intake minimal       MONITORING AND EVALUATION:  Progress towards goals will be monitored and evaluated per protocol and Practice Guidelines      Cherry Borrego RD, LD  Clinical Dietitian   Units Gen Surg, Neuroscience, 88, Postpartum, L&D  Pager: [phone]
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
## 2023-01-30 NOTE — CONSULTS
Triage and Transition - Consult and Liaison      Thea Castle  January 26, 2023     Session start: 3:11 pm  Session end: 4:08 pm  Session duration in minutes: 57 min  CPT utilized: 77132 - Psychotherapy (with patient) - 60 (53+*) min  Patient was seen in person.      Diagnosis:   296.33 (F33.2) Major Depressive Disorder, Recurrent Episode, Severe _, by history;  301.83 (F60.3) Borderline Personality Disorder, by history;   F68.10 Factitious disorder imposed on self, RULE OUT     Plan/Recommendations:   1. Continue to refer to behavioral plan given to unit. She should have a copy of her safety plan we created. Discussed having sitter not socialize in with patient, do her hair, or play games, instead to encourage skills. Boundaries, setting firm limits, and utilizing behavior plan is going to be essential for this patient.    2. Patient provides inconsistent response to having sitter/suicidal thoughts.  I explained to her she would likely need transfer to inpatient mental health (if able to find place that accepts her medical needs) due to daily active suicidal thoughts she is reporting, as we would not feel safe for her to discharge if still needing 24/7 sitter. She states she refuses to go to inpatient psych and that she is not actively suicidal and is actually doing a lot better, I then explain that we would likely step back from having 1:1, and she states she has thoughts of killing herself in hospital. At this time, she is refusing to go to an inpatient facility and reports not feeling safe enough to discontinue the sitter. Plan to coordinate with [facility] and [facility] regarding possibility of this option.     3.. Appreciate any assistance social work can provide in referrals to residential placement that may allow feeding tube. Referrals placed to [facility] and [facility], appears neither would be able to accommodate tube. Patient set up intake for 7:45 on Tuesday with [facility].  Options that have been discussed but not placed yet- [facility], [facility].    Reason for consult: Thea is 22 year old White  female . Psychiatry consult was requested due to nonverbal yesterday, suicidal. Patient was seen by [facility] Consult & Liaison team.      Presenting problem: Patient admitted to the emergency room following an attempted overdose on 30 tablets of gabapentin. Patient recommended inpatient psych, however, due to feeding tube, she is not able to transfer, she is now convalescing at [facility] station 73. Please see initial [facility] Crisis Assessment completed by Kenyon Villanueva on 01/03/23 for complete assessment information. Patient has been evaluated by psychiatry daily.     Session Summary:   Reviewed records- patient chose to be non-verbal yesterday and would only communicate by typing in her phone. Patient became upset Friday when told that sitter would not braid her hair. When aide was able to get door open, patient was found on floor pulling at her hair/pulling hair out and crying. I discussed this episode with patient, she indicates she was told people weren't supposed to talk to her, so she just decided to stop talking. I again referred back to plan of decreasing reliance on sitter/staff so that we could look toward discharging to facility of her choice instead of inpatient unit. Patient states she refuses to go to inpatient mental health unit, even if one is found that accepts medical needs. I explained that if patient continues to report daily active SI, that we would need to transfer her to a psych unit, as we could not discharge her and her current unit is not appropriate for treatment of MH. She states she will only go to a mental health unit if it is the following: not locked, allow all of her belongings, no shared room, 1:1 sitter at all times, and reports she will not do group therapy. We seemed to go back and forth regarding inconsistencies in patient's  "report. Pointing out she would say she was not actively suicidal so she did not need to go inpatient and that she was \"doing better\", but then when I attempted to have discussion about removing sitter, she indicates she is suicidal.     We discussed relationship between eating disorder, mental health and physical health and how they may be overlapping. Patient able to somewhat acknowledge this but is adamant that her feeding tube has nothing to do with her eating disorder, all related to GI problems. I gently challenged patient in regard to possible anxiety/need for control- playing a role in symptoms, she again was reluctant to have conversation .    Attempted to have conversation regarding ,coping skills and utilizing DBT skills, however, patient was perseverating on possible recommendation to go to inpatient psych unit. She states she will not go, wants to wait to hear from [facility], which she has an interview with tuesday morning. I reiterated that current unit is not appropriate to manage her mental health and we need to seek more intensive level of care. She was open to me calling [facility] regarding timeline as well as [facility] to inquire about inpatient unit regarding her requests above.      Mental Status Exam   Affect: Appropriate  Appearance: Appropriate   Attention Span/Concentration: Attentive    Eye Contact: Engaged  Fund of Knowledge: Appropriate   Language /Speech Content: Fluent  Language /Speech Volume: Normal   Language /Speech Rate/Productions: Normal   Recent Memory: Intact  Remote Memory: Intact  Mood: Normal   Orientation:   Person: Yes   Place: Yes  Time of Day: Yes   Date: Yes   Situation (Do they understand why they are here?): Yes   Psychomotor Behavior: Normal   Thought Content: perseverating   Thought Form: Intact     Current medications:   Current Facility-Administered Medications   Medication     acetaminophen (TYLENOL) tablet 650 mg    Or     acetaminophen (TYLENOL) Suppository 650 mg     " ARIPiprazole (ABILIFY) tablet 15 mg     bisacodyl (DULCOLAX) suppository 10 mg     dextrose 10% infusion     diltiazem 2% in PLO gel 0.25 g     drospirenone-ethinyl estradiol (DIANA) 3-0.02 MG per tablet 1 tablet     DULoxetine (CYMBALTA) DR capsule 60 mg     hydrOXYzine (ATARAX) syrup 25 mg    Or     hydrOXYzine (ATARAX) tablet 25 mg     lactated ringers BOLUS 250 mL     lactulose (CHRONULAC) solution 20 g     lidocaine (LMX4) cream     lidocaine 1 % 0.1-1 mL     linaclotide (LINZESS) capsule 290 mcg     LORazepam (ATIVAN) tablet 1 mg     melatonin tablet 5 mg     metoclopramide (REGLAN) injection 5 mg     multivitamin w/minerals (THERA-VIT-M) tablet 1 tablet     nystatin (MYCOSTATIN) cream     ondansetron (ZOFRAN ODT) ODT tab 4 mg    Or     ondansetron (ZOFRAN) injection 4 mg     pantoprazole (PROTONIX) EC tablet 40 mg     polyethylene glycol (MIRALAX) Packet 17 g     prochlorperazine (COMPAZINE) tablet 5 mg     promethazine (PHENERGAN) half-tab 25 mg     QUEtiapine (SEROquel) half-tab 12.5 mg     senna-docusate (SENOKOT-S/PERICOLACE) 8.6-50 MG per tablet 1 tablet     sodium chloride (PF) 0.9% PF flush 3 mL     sodium chloride (PF) 0.9% PF flush 3 mL     traZODone (DESYREL) tablet 50 mg     vitamin C (ASCORBIC ACID) tablet 250 mg     Vitamin D3 (CHOLECALCIFEROL) tablet 25 mcg     witch hazel-glycerin (TUCKS) pad           MeasurableTreatment Goal(s) related to diagnosis / functional impairment(s)         Goal 1: Patient will alleviate the suicidal impulses/ideation and return to the highest level of previous daily functioning.      Objective #A                Patient will participate in therapy for an identified emotional problem resulting in suicidal thoughts.  Status: New as of January 10, 2023     Intervention(s)  LMHP will assess the severity of the level of impairment to the client s functioning to determine the appropriate level of care.        LMHP will encourage the patient to express feelings related to  their suicidal ideation in order to clarify them and increase insight as to the cause for them.       LMHP will assist the patient in developing coping strategies for suicidal ideation.        Goal 2: Patient will accept placement/referral to an appropriate level of care to safely address the suicidal crisis.      Objective #A                Patient will state the strength of the suicidal feeling, frequency of the thoughts and the detail of the plans   Status: New as of January 10, 2023     Intervention(s)  LMHP will facilitate conversations about options and encourage patient to engage in services.      Therapeutic intervention and progress:  Therapeutic intervention consisted of building therapeutic rapport, engaging in learning/practicing coping skills, active problem solving, stress relief practices and DBT concepts. Patient appears to have made some progress- is verbal and engaged today, forward focused on next step.s      Anne Melendez, [facility]   Triage and Transition - Consult and Liaison   [phone]

## 2023-01-30 NOTE — CONSULTS
Triage and Transition- Consult and Liaison     Thea Castle  January 30, 2023      Per patients request, attempted to gather information regarding possible placements.     -I called [facility] intake line that was provided by ANS on unit. They stated they are at capacity and would not take referrals from outside of their hospital. I did confirm they allow pantoja catheters and case by case allow NJ tubes. They would not allow me to place patient on this list. Unclear who ANS spoke with that stated patient would be able to transfer to this unit, as they have stated they will not take my referral.     - I called [facility] (patient's current first choice for placement), they do have openings and would be able to do intake. However, they do not accept patients with NJ tube, they only accept NG tube (which GI has stated patient would not require), they do allow pantoja catheters. It also appears they accept her insurance.     - Will provide this information to patient at next visit.     Anne Melendez, [facility]   Triage and Transition - Consult and Liaison   [phone]

## 2023-01-31 PROCEDURE — 250N000013 HC RX MED GY IP 250 OP 250 PS 637: Performed by: REGISTERED NURSE

## 2023-01-31 PROCEDURE — 250N000013 HC RX MED GY IP 250 OP 250 PS 637: Performed by: NURSE PRACTITIONER

## 2023-01-31 PROCEDURE — 250N000013 HC RX MED GY IP 250 OP 250 PS 637: Performed by: HOSPITALIST

## 2023-01-31 PROCEDURE — 120N000001 HC R&B MED SURG/OB

## 2023-01-31 PROCEDURE — 250N000013 HC RX MED GY IP 250 OP 250 PS 637: Performed by: INTERNAL MEDICINE

## 2023-01-31 PROCEDURE — 99233 SBSQ HOSP IP/OBS HIGH 50: CPT | Performed by: INTERNAL MEDICINE

## 2023-01-31 PROCEDURE — 250N000011 HC RX IP 250 OP 636: Performed by: INTERNAL MEDICINE

## 2023-01-31 PROCEDURE — 250N000013 HC RX MED GY IP 250 OP 250 PS 637

## 2023-01-31 RX ADMIN — ARIPIPRAZOLE 15 MG: 15 TABLET ORAL at 08:22

## 2023-01-31 RX ADMIN — Medication 0.25 G: at 14:40

## 2023-01-31 RX ADMIN — Medication 25 MCG: at 08:22

## 2023-01-31 RX ADMIN — MULTIPLE VITAMINS W/ MINERALS TAB 1 TABLET: TAB at 08:21

## 2023-01-31 RX ADMIN — ACETAMINOPHEN 650 MG: 325 TABLET, FILM COATED ORAL at 12:42

## 2023-01-31 RX ADMIN — LINACLOTIDE 290 MCG: 290 CAPSULE, GELATIN COATED ORAL at 08:23

## 2023-01-31 RX ADMIN — Medication 25 MG: at 08:22

## 2023-01-31 RX ADMIN — TRAZODONE HYDROCHLORIDE 50 MG: 50 TABLET ORAL at 20:30

## 2023-01-31 RX ADMIN — PROCHLORPERAZINE MALEATE 5 MG: 5 TABLET ORAL at 11:35

## 2023-01-31 RX ADMIN — LACTULOSE 20 G: 10 SOLUTION ORAL at 20:29

## 2023-01-31 RX ADMIN — Medication 0.25 G: at 08:23

## 2023-01-31 RX ADMIN — ACETAMINOPHEN 650 MG: 325 TABLET, FILM COATED ORAL at 18:23

## 2023-01-31 RX ADMIN — NYSTATIN: 100000 CREAM TOPICAL at 08:24

## 2023-01-31 RX ADMIN — ONDANSETRON 4 MG: 4 TABLET, ORALLY DISINTEGRATING ORAL at 08:23

## 2023-01-31 RX ADMIN — PANTOPRAZOLE SODIUM 40 MG: 40 TABLET, DELAYED RELEASE ORAL at 15:51

## 2023-01-31 RX ADMIN — LORAZEPAM 1 MG: 1 TABLET ORAL at 20:30

## 2023-01-31 RX ADMIN — LACTULOSE 20 G: 10 SOLUTION ORAL at 08:22

## 2023-01-31 RX ADMIN — HYDROXYZINE HYDROCHLORIDE 25 MG: 25 TABLET, FILM COATED ORAL at 00:58

## 2023-01-31 RX ADMIN — SENNOSIDES AND DOCUSATE SODIUM 1 TABLET: 8.6; 5 TABLET ORAL at 08:21

## 2023-01-31 RX ADMIN — DULOXETINE HYDROCHLORIDE 60 MG: 60 CAPSULE, DELAYED RELEASE ORAL at 08:21

## 2023-01-31 RX ADMIN — Medication 25 MG: at 20:30

## 2023-01-31 RX ADMIN — MELATONIN TAB 3 MG 5 MG: 3 TAB at 20:29

## 2023-01-31 RX ADMIN — Medication 250 MG: at 08:22

## 2023-01-31 RX ADMIN — PANTOPRAZOLE SODIUM 40 MG: 40 TABLET, DELAYED RELEASE ORAL at 08:22

## 2023-01-31 RX ADMIN — LACTULOSE 20 G: 10 SOLUTION ORAL at 15:51

## 2023-01-31 RX ADMIN — SENNOSIDES AND DOCUSATE SODIUM 1 TABLET: 8.6; 5 TABLET ORAL at 20:30

## 2023-01-31 RX ADMIN — Medication 12.5 MG: at 20:30

## 2023-01-31 ASSESSMENT — ACTIVITIES OF DAILY LIVING (ADL)
ADLS_ACUITY_SCORE: 28
ADLS_ACUITY_SCORE: 33
ADLS_ACUITY_SCORE: 28
ADLS_ACUITY_SCORE: 33
ADLS_ACUITY_SCORE: 33
ADLS_ACUITY_SCORE: 35
ADLS_ACUITY_SCORE: 33
ADLS_ACUITY_SCORE: 34
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
## 2023-01-31 NOTE — PLAN OF CARE
Patient can ambulate independantly.  There are 2 staff members with her in the halls when ambulating for safety due to fainting spells.  Patient has not had any fainting spells for 10 days now.  Staff at the hospital are extra cautious with falls and therefore have staff walk with patient.

## 2023-01-31 NOTE — PROGRESS NOTES
"SPIRITUAL HEALTH SERVICES Progress Note  [facility]    Saw pt Thea Castle per follow up.    Patient/Family Understanding of Illness and Goals of Care - Pt Thea was tearful throughout our conversation, and primarily discussed her impending discharge planning. Pt noted conversations about whether or not she could keep her feeding tube upon discharge.    Distress and Loss - Pt stated a preference to discharge to a residential treatment facility, and \"fear\" and \"anxiety\" about the prospect of \"in-patient psych.\" We reflected together about this fear, which pt named as feeling \"suffocating.\"    Strengths, Coping, and Resources - When discussing coping strategies, pt stated \"it helps to remind myself that I have made it through all of my worst days.\" I encouraged the use of her coping skills.     Meaning, Beliefs, and Spirituality - Pt stated \"I know that God's will is good, but I'm scared about what His will might be.\" We prayed together, integrating conversational themes.     Plan of Care - Spiritual Health to follow up on Thursday and Friday this week, and will plan to move down to two visits per week next week if pt is still admitted.     Sarah Johanson  Chaplain Resident  Spiritual Health Services  Pager: [phone]     [facility] available 24/7 for emergent requests/referrals, either by having the on-call  paged or by entering an ASAP/STAT consult in Epic (this will also page the on-call ).   "

## 2023-01-31 NOTE — PROGRESS NOTES
Care Management Follow Up    Length of Stay (days): 27    Expected Discharge Date:       Concerns to be Addressed:       Patient plan of care discussed at interdisciplinary rounds:     Anticipated Discharge Disposition: Inpatient Mental Health     Anticipated Discharge Services: Mental Health Resources  Anticipated Discharge DME: Other (see comment) (TBD - NG tube, pantoja catheter)      Additional Information:  MALA received a call from  Shabnam Cohn intake therapist with [facility] regarding follow up of intake meeting she had this am via video with pt.  She has requested records to be faxed to her at [phone]. Done.  SW returned call and left voice message ([phone]) with  This SW direct phone # for follow up on items she states would need to be addressed prior to pt being fully accepted for admission. SW awaiting return call .    SW called and left another message via voice mail at 11:07am.  MALA received a return call from  Shabnam intake therapist and she indicates that she received the clinical information and is currently reviewing with intake team to determine if pt's placement is appropriate for their program.    MALA received call from Kate with [facility] [phone]. She is calling from [place] and has questions regarding pt and the referral for their program. MALA returned call and left message requesting call back.    MALA received call from Marcia with [employer] (pt's insurance). They are concerned about pt adriana on a medical floor rather that in an impatient psych placement. They have had physicians from medical team and psychiatry review pt case and from their perspective, they feel that an in patient psychiatric hospital setting would be best for pt and [facility] is in network for pt.   They are concerned about pt care costs when she does not meet criteria for inpatient medical stay. They know that pt has been to other treatment centers  previously and  question if this is the best fit for pt's needs.  Marcia is pt's Care coordinator with [facility] at [phone] for questions/concerns; she is available.    SW met with pt to answer questions regarding referrals. Pt updated that no determinations have been made yet. Pt is expressing her preferences of treatment and asking if she will be sent to inpatient psychiatric care before she is discharged to treatment facility. SW updated her that no determination has been made yet on where she is discharging or when.     DIVYA Toussaint  [facility]  Care Transitions  [phone]

## 2023-01-31 NOTE — PLAN OF CARE
Patient ambulated in halls with 1 SBA with no problems or events.     Addendum  Per Nely ANGULO, Again no mental health beds in the [facility] system today.   See Anne Snell's note from 1/30 referring to inpt psych bed at [place].  Perhaps ANS could check daily on bed status if pt still here at [facility].
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
## 2023-02-01 PROCEDURE — 120N000001 HC R&B MED SURG/OB

## 2023-02-01 PROCEDURE — 250N000013 HC RX MED GY IP 250 OP 250 PS 637: Performed by: INTERNAL MEDICINE

## 2023-02-01 PROCEDURE — 250N000013 HC RX MED GY IP 250 OP 250 PS 637: Performed by: NURSE PRACTITIONER

## 2023-02-01 PROCEDURE — 250N000013 HC RX MED GY IP 250 OP 250 PS 637: Performed by: HOSPITALIST

## 2023-02-01 PROCEDURE — 99233 SBSQ HOSP IP/OBS HIGH 50: CPT | Performed by: INTERNAL MEDICINE

## 2023-02-01 PROCEDURE — 250N000013 HC RX MED GY IP 250 OP 250 PS 637: Performed by: REGISTERED NURSE

## 2023-02-01 PROCEDURE — 250N000013 HC RX MED GY IP 250 OP 250 PS 637

## 2023-02-01 RX ADMIN — LACTULOSE 20 G: 10 SOLUTION ORAL at 20:39

## 2023-02-01 RX ADMIN — LINACLOTIDE 290 MCG: 290 CAPSULE, GELATIN COATED ORAL at 07:59

## 2023-02-01 RX ADMIN — ARIPIPRAZOLE 15 MG: 15 TABLET ORAL at 07:50

## 2023-02-01 RX ADMIN — ACETAMINOPHEN 650 MG: 325 TABLET, FILM COATED ORAL at 08:51

## 2023-02-01 RX ADMIN — LORAZEPAM 1 MG: 1 TABLET ORAL at 20:39

## 2023-02-01 RX ADMIN — BISACODYL 10 MG: 10 SUPPOSITORY RECTAL at 13:30

## 2023-02-01 RX ADMIN — SENNOSIDES AND DOCUSATE SODIUM 1 TABLET: 8.6; 5 TABLET ORAL at 20:39

## 2023-02-01 RX ADMIN — ACETAMINOPHEN 650 MG: 325 TABLET, FILM COATED ORAL at 04:03

## 2023-02-01 RX ADMIN — NYSTATIN: 100000 CREAM TOPICAL at 20:45

## 2023-02-01 RX ADMIN — Medication 250 MG: at 07:50

## 2023-02-01 RX ADMIN — LACTULOSE 20 G: 10 SOLUTION ORAL at 15:00

## 2023-02-01 RX ADMIN — Medication 25 MG: at 20:39

## 2023-02-01 RX ADMIN — ACETAMINOPHEN 650 MG: 325 TABLET, FILM COATED ORAL at 17:52

## 2023-02-01 RX ADMIN — Medication 0.25 G: at 20:42

## 2023-02-01 RX ADMIN — PANTOPRAZOLE SODIUM 40 MG: 40 TABLET, DELAYED RELEASE ORAL at 07:50

## 2023-02-01 RX ADMIN — Medication 25 MCG: at 07:50

## 2023-02-01 RX ADMIN — Medication 0.25 G: at 08:01

## 2023-02-01 RX ADMIN — TRAZODONE HYDROCHLORIDE 50 MG: 50 TABLET ORAL at 20:39

## 2023-02-01 RX ADMIN — Medication 12.5 MG: at 20:39

## 2023-02-01 RX ADMIN — PANTOPRAZOLE SODIUM 40 MG: 40 TABLET, DELAYED RELEASE ORAL at 15:59

## 2023-02-01 RX ADMIN — LACTULOSE 20 G: 10 SOLUTION ORAL at 07:52

## 2023-02-01 RX ADMIN — MULTIPLE VITAMINS W/ MINERALS TAB 1 TABLET: TAB at 07:58

## 2023-02-01 RX ADMIN — DULOXETINE HYDROCHLORIDE 60 MG: 60 CAPSULE, DELAYED RELEASE ORAL at 07:49

## 2023-02-01 RX ADMIN — HYDROXYZINE HYDROCHLORIDE 25 MG: 25 TABLET, FILM COATED ORAL at 14:58

## 2023-02-01 RX ADMIN — HYDROXYZINE HYDROCHLORIDE 25 MG: 25 TABLET, FILM COATED ORAL at 04:03

## 2023-02-01 RX ADMIN — Medication 0.25 G: at 13:17

## 2023-02-01 RX ADMIN — Medication 25 MG: at 07:58

## 2023-02-01 ASSESSMENT — ACTIVITIES OF DAILY LIVING (ADL)
ADLS_ACUITY_SCORE: 28
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
## 2023-02-01 NOTE — PLAN OF CARE
Pt here with Suicidal ideation. A&Ox4. Neuros /CMS intact. VSS.  NG tube in place, running at 40 ml. Takes pills whole in applesauce. Up with SBA, Smith in for retention. Ponce to inner R thigh and buttock, WOC following. Denies pain. Pt scoring green on the Aggression Stop Light Tool.  Discharge pending.
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
## 2023-02-01 NOTE — PROGRESS NOTES
Care Management Follow Up    Length of Stay (days): 28    Expected Discharge Date:       Concerns to be Addressed:       Patient plan of care discussed at interdisciplinary rounds: Yes    Anticipated Discharge Disposition: Inpatient Mental Health     Anticipated Discharge Services: Mental Health Resources  Anticipated Discharge DME: Other (see comment) (TBD - NG tube, pantoja catheter)    Patient/family educated on Medicare website which has current facility and service quality ratings: no  Education Provided on the Discharge Plan:    Patient/Family in Agreement with the Plan: no    Referrals Placed by CM/SW: Durable Medical Equipment (DME)  Private pay costs discussed:     Additional Information:  Sw received call from intake at [facility] regarding pt's referral and they are declining pt as they do not accommodate tube feedings or pantoja cathadar.  SW returned call to Shabnam Juan with [facility] and answered a few questions. They have not made a determination yet but hope to by end of today. Shabnam states that pt has contacted her today already to question determination.  MALA just received call from Shabnam Juan with [facility] stating that she just got off the phone with pt to inform her that they are declining pt for admit to their program at this time. After their review, [facility] intake fees pt is best suited  for inpatient psychiatric care at this time. If she wants to be considered once stabilized, they will review with another referral. Hospitalist updated.  MALA stopped to speak with pt and she is on phone and did not want to talk.    DIVYA Toussaint  [facility]  Care Transitions  [phone]

## 2023-02-02 PROCEDURE — 250N000013 HC RX MED GY IP 250 OP 250 PS 637: Performed by: INTERNAL MEDICINE

## 2023-02-02 PROCEDURE — 120N000001 HC R&B MED SURG/OB

## 2023-02-02 PROCEDURE — 99233 SBSQ HOSP IP/OBS HIGH 50: CPT | Performed by: INTERNAL MEDICINE

## 2023-02-02 PROCEDURE — 250N000013 HC RX MED GY IP 250 OP 250 PS 637: Performed by: NURSE PRACTITIONER

## 2023-02-02 PROCEDURE — 250N000013 HC RX MED GY IP 250 OP 250 PS 637

## 2023-02-02 PROCEDURE — 250N000013 HC RX MED GY IP 250 OP 250 PS 637: Performed by: HOSPITALIST

## 2023-02-02 PROCEDURE — 250N000013 HC RX MED GY IP 250 OP 250 PS 637: Performed by: REGISTERED NURSE

## 2023-02-02 RX ADMIN — ACETAMINOPHEN 650 MG: 325 TABLET, FILM COATED ORAL at 14:34

## 2023-02-02 RX ADMIN — HYDROXYZINE HYDROCHLORIDE 25 MG: 25 TABLET, FILM COATED ORAL at 03:01

## 2023-02-02 RX ADMIN — MULTIPLE VITAMINS W/ MINERALS TAB 1 TABLET: TAB at 08:24

## 2023-02-02 RX ADMIN — Medication 25 MG: at 20:49

## 2023-02-02 RX ADMIN — PANTOPRAZOLE SODIUM 40 MG: 40 TABLET, DELAYED RELEASE ORAL at 15:58

## 2023-02-02 RX ADMIN — HYDROXYZINE HYDROCHLORIDE 25 MG: 25 TABLET, FILM COATED ORAL at 13:06

## 2023-02-02 RX ADMIN — Medication 12.5 MG: at 20:49

## 2023-02-02 RX ADMIN — Medication 0.25 G: at 10:09

## 2023-02-02 RX ADMIN — ACETAMINOPHEN 650 MG: 325 TABLET, FILM COATED ORAL at 10:35

## 2023-02-02 RX ADMIN — Medication 250 MG: at 08:24

## 2023-02-02 RX ADMIN — DULOXETINE HYDROCHLORIDE 60 MG: 60 CAPSULE, DELAYED RELEASE ORAL at 08:24

## 2023-02-02 RX ADMIN — LINACLOTIDE 290 MCG: 290 CAPSULE, GELATIN COATED ORAL at 10:09

## 2023-02-02 RX ADMIN — ACETAMINOPHEN 650 MG: 325 TABLET, FILM COATED ORAL at 20:49

## 2023-02-02 RX ADMIN — LACTULOSE 20 G: 10 SOLUTION ORAL at 15:58

## 2023-02-02 RX ADMIN — PANTOPRAZOLE SODIUM 40 MG: 40 TABLET, DELAYED RELEASE ORAL at 08:24

## 2023-02-02 RX ADMIN — ACETAMINOPHEN 650 MG: 325 TABLET, FILM COATED ORAL at 03:01

## 2023-02-02 RX ADMIN — ARIPIPRAZOLE 15 MG: 15 TABLET ORAL at 08:24

## 2023-02-02 RX ADMIN — LACTULOSE 20 G: 10 SOLUTION ORAL at 08:23

## 2023-02-02 RX ADMIN — LORAZEPAM 1 MG: 1 TABLET ORAL at 20:49

## 2023-02-02 RX ADMIN — HYDROXYZINE HYDROCHLORIDE 25 MG: 25 TABLET, FILM COATED ORAL at 08:24

## 2023-02-02 RX ADMIN — TRAZODONE HYDROCHLORIDE 50 MG: 50 TABLET ORAL at 20:49

## 2023-02-02 RX ADMIN — HYDROXYZINE HYDROCHLORIDE 25 MG: 25 TABLET, FILM COATED ORAL at 17:45

## 2023-02-02 RX ADMIN — LACTULOSE 20 G: 10 SOLUTION ORAL at 20:49

## 2023-02-02 RX ADMIN — Medication 25 MCG: at 08:24

## 2023-02-02 RX ADMIN — SENNOSIDES AND DOCUSATE SODIUM 1 TABLET: 8.6; 5 TABLET ORAL at 20:49

## 2023-02-02 RX ADMIN — Medication 25 MG: at 08:23

## 2023-02-02 RX ADMIN — SENNOSIDES AND DOCUSATE SODIUM 1 TABLET: 8.6; 5 TABLET ORAL at 08:24

## 2023-02-02 RX ADMIN — NYSTATIN: 100000 CREAM TOPICAL at 10:08

## 2023-02-02 ASSESSMENT — ACTIVITIES OF DAILY LIVING (ADL)
ADLS_ACUITY_SCORE: 35
ADLS_ACUITY_SCORE: 35
ADLS_ACUITY_SCORE: 30
ADLS_ACUITY_SCORE: 35
ADLS_ACUITY_SCORE: 35
ADLS_ACUITY_SCORE: 30
ADLS_ACUITY_SCORE: 35
ADLS_ACUITY_SCORE: 31
ADLS_ACUITY_SCORE: 35

## 2023-02-02 NOTE — PROGRESS NOTES
"SPIRITUAL HEALTH SERVICES Progress Note  [facility]    Saw pt Thea Castle per call from bedside nurse. Thea initially requested that the sitter leave the room. When it was explained that they were focusing on their work, not listening to the conversation, but are there to support her safety, she began to talk.       Patient/Family Understanding of Illness and Goals of Care - Understands the current plan is to be sent to an inpatient mental health unit. \"I don't want to go, but if I say I'll go voluntarily, they won't be able to keep me beyond 12 hours.\"      Distress and Loss -     Thea was tearful as she shared that she feels \"God has abandoned me; I don't feel him\" expressing she wants to speak with pastors from her own [employer] \"who will help to pray the lundy away.\"     She named feeling fear about going to an inpatient mental health unit. \"They won't be able to cure my depression.\"      \"I'm not suicidal. My mom will come and get me.\" When offered encouragement about transferring to inpatient Thea expressed her intention to \"go voluntarily so that they can't keep me for more than 12 hrs.\"      Strengths, Coping, and Resources - didn't name coping skills or resources during today's conversation      Meaning, Beliefs, and Spirituality - Thea asked for prayer, which I offered incorporating conversational themes and and reminder of God's ongoing presence and love.      Plan of Care - Updated bedside nurse. Plan for follow up visit tomorrow and then transition to visiting 2 times per week during her hospitalization at [facility].    Nichole Aguilera MDiv  Associate   Pager [phone]  [facility] pager [phone]  [facility] phone [phone]    [facility] available 24/7 for emergent requests/referrals, either by having the on-call  paged or by entering an ASAP/STAT consult in Epic (this will also page the on-call ).        "

## 2023-02-02 NOTE — PROGRESS NOTES
Care Management Follow Up    Length of Stay (days): 29    Expected Discharge Date:       Concerns to be Addressed:       Patient plan of care discussed at interdisciplinary rounds: Yes    Anticipated Discharge Disposition: Inpatient Mental Health     Anticipated Discharge Services: Mental Health Resources  Anticipated Discharge DME: Other (see comment) (TBD - NG tube, pantoja catheter)    Patient/family educated on Medicare website which has current facility and service quality ratings: no  Education Provided on the Discharge Plan:    Patient/Family in Agreement with the Plan: no    Referrals Placed by CM/SW: Durable Medical Equipment (DME)  Private pay costs discussed: Not applicable    Additional Information:  Patient on wait list for inpatient psych with plans to pull NJ prior to discharge. [facility] with [facility] to [facility] prior to discharge.    Addendum: SW informed patient is 22 on waiting list for [facility].    DIVYA Freeman    [facility]
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
## 2023-02-02 NOTE — PROGRESS NOTES
This writer contact [facility], spoke with Gerardo, patient is currently on the wait list for inpatient mental health. Have requested a doc to doc once patient is accepted, to discuss next steps for removal of NJ/Smith catheter and coordination of transfer. Updated Dr. Medrano by phone on next steps and process, in agreement with plan. Most likely, patient will need to be placed on a 72 hour hold prior to transfer, and will communicate plan with patient when accepted to inpatient mental health and transfer is scheduled with EMS.    Thank you,   Dante Evans [facility]  Clinical Manager,  C and L  [phone]

## 2023-02-03 ENCOUNTER — TELEPHONE (OUTPATIENT)
Dept: BEHAVIORAL HEALTH | Facility: CLINIC | Age: 23
End: 2023-02-03
Payer: COMMERCIAL

## 2023-02-03 PROCEDURE — 250N000013 HC RX MED GY IP 250 OP 250 PS 637: Performed by: HOSPITALIST

## 2023-02-03 PROCEDURE — 250N000013 HC RX MED GY IP 250 OP 250 PS 637

## 2023-02-03 PROCEDURE — 250N000013 HC RX MED GY IP 250 OP 250 PS 637: Performed by: INTERNAL MEDICINE

## 2023-02-03 PROCEDURE — 250N000013 HC RX MED GY IP 250 OP 250 PS 637: Performed by: NURSE PRACTITIONER

## 2023-02-03 PROCEDURE — 99232 SBSQ HOSP IP/OBS MODERATE 35: CPT | Performed by: INTERNAL MEDICINE

## 2023-02-03 PROCEDURE — 120N000001 HC R&B MED SURG/OB

## 2023-02-03 PROCEDURE — 250N000013 HC RX MED GY IP 250 OP 250 PS 637: Performed by: REGISTERED NURSE

## 2023-02-03 PROCEDURE — 250N000011 HC RX IP 250 OP 636: Performed by: INTERNAL MEDICINE

## 2023-02-03 RX ORDER — AMOXICILLIN 250 MG
1 CAPSULE ORAL 2 TIMES DAILY
Status: ON HOLD | DISCHARGE
Start: 2023-02-03 | End: 2023-02-08

## 2023-02-03 RX ORDER — NYSTATIN 100000 U/G
CREAM TOPICAL 2 TIMES DAILY
DISCHARGE
Start: 2023-02-03 | End: 2023-02-07

## 2023-02-03 RX ORDER — OLANZAPINE 5 MG/1
10 TABLET, ORALLY DISINTEGRATING ORAL 2 TIMES DAILY PRN
Status: DISCONTINUED | OUTPATIENT
Start: 2023-02-03 | End: 2023-02-07 | Stop reason: HOSPADM

## 2023-02-03 RX ORDER — LACTULOSE 10 G/15ML
20 SOLUTION ORAL 3 TIMES DAILY
DISCHARGE
Start: 2023-02-03 | End: 2023-02-07

## 2023-02-03 RX ORDER — OLANZAPINE 10 MG/2ML
10 INJECTION, POWDER, FOR SOLUTION INTRAMUSCULAR 2 TIMES DAILY PRN
Status: DISCONTINUED | OUTPATIENT
Start: 2023-02-03 | End: 2023-02-07 | Stop reason: HOSPADM

## 2023-02-03 RX ADMIN — Medication 250 MG: at 08:43

## 2023-02-03 RX ADMIN — LACTULOSE 20 G: 10 SOLUTION ORAL at 08:44

## 2023-02-03 RX ADMIN — HYDROXYZINE HYDROCHLORIDE 25 MG: 25 TABLET, FILM COATED ORAL at 09:04

## 2023-02-03 RX ADMIN — OLANZAPINE 10 MG: 5 TABLET, ORALLY DISINTEGRATING ORAL at 17:57

## 2023-02-03 RX ADMIN — Medication 0.25 G: at 08:57

## 2023-02-03 RX ADMIN — MELATONIN TAB 3 MG 5 MG: 3 TAB at 21:28

## 2023-02-03 RX ADMIN — PANTOPRAZOLE SODIUM 40 MG: 40 TABLET, DELAYED RELEASE ORAL at 16:20

## 2023-02-03 RX ADMIN — ARIPIPRAZOLE 15 MG: 15 TABLET ORAL at 08:43

## 2023-02-03 RX ADMIN — Medication 12.5 MG: at 21:28

## 2023-02-03 RX ADMIN — Medication 0.25 G: at 13:27

## 2023-02-03 RX ADMIN — Medication 25 MG: at 08:43

## 2023-02-03 RX ADMIN — LORAZEPAM 1 MG: 1 TABLET ORAL at 21:28

## 2023-02-03 RX ADMIN — TRAZODONE HYDROCHLORIDE 50 MG: 50 TABLET ORAL at 21:28

## 2023-02-03 RX ADMIN — HYDROXYZINE HYDROCHLORIDE 25 MG: 25 TABLET, FILM COATED ORAL at 13:27

## 2023-02-03 RX ADMIN — DULOXETINE HYDROCHLORIDE 60 MG: 60 CAPSULE, DELAYED RELEASE ORAL at 08:43

## 2023-02-03 RX ADMIN — HYDROXYZINE HYDROCHLORIDE 25 MG: 25 TABLET, FILM COATED ORAL at 17:24

## 2023-02-03 RX ADMIN — MULTIPLE VITAMINS W/ MINERALS TAB 1 TABLET: TAB at 08:43

## 2023-02-03 RX ADMIN — PANTOPRAZOLE SODIUM 40 MG: 40 TABLET, DELAYED RELEASE ORAL at 08:43

## 2023-02-03 RX ADMIN — ACETAMINOPHEN 650 MG: 325 TABLET, FILM COATED ORAL at 10:29

## 2023-02-03 RX ADMIN — LINACLOTIDE 290 MCG: 290 CAPSULE, GELATIN COATED ORAL at 08:44

## 2023-02-03 RX ADMIN — ACETAMINOPHEN 650 MG: 325 TABLET, FILM COATED ORAL at 18:11

## 2023-02-03 RX ADMIN — SENNOSIDES AND DOCUSATE SODIUM 1 TABLET: 8.6; 5 TABLET ORAL at 08:43

## 2023-02-03 RX ADMIN — Medication 25 MCG: at 08:43

## 2023-02-03 RX ADMIN — HYDROXYZINE HYDROCHLORIDE 25 MG: 25 TABLET, FILM COATED ORAL at 02:41

## 2023-02-03 RX ADMIN — ONDANSETRON 4 MG: 4 TABLET, ORALLY DISINTEGRATING ORAL at 10:29

## 2023-02-03 RX ADMIN — Medication 25 MG: at 21:28

## 2023-02-03 RX ADMIN — HYDROXYZINE HYDROCHLORIDE 25 MG: 25 TABLET, FILM COATED ORAL at 22:31

## 2023-02-03 ASSESSMENT — ACTIVITIES OF DAILY LIVING (ADL)
ADLS_ACUITY_SCORE: 30
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
## 2023-02-03 NOTE — TELEPHONE ENCOUNTER
0302 Bed Search Update:    [facility] [place]2 at 2337 to reflect there are no beds available.  [facility] [place], [place], [facility] -Website updated 2/2 at 2211 to reflect there are no beds available.  [facility] unit reporting they are at capacity.  Check back in AM.  [facility]-Website updated 2/3 at 0057 there are no beds available.    [facility]-Committed pts only.  Long wait list    Remains on wait list.

## 2023-02-03 NOTE — PROGRESS NOTES
SPIRITUAL HEALTH SERVICES Progress Note  [facility] 73    I had a brief visit with pt Thea before she got an expected phone call from her . I will plan to follow up with pt later this afternoon per her request.    Sarah Johanson  Chaplain Resident  Spiritual Health Services  Pager: [phone]     [facility] available 24/7 for emergent requests/referrals, either by having the on-call  paged or by entering an ASAP/STAT consult in Epic (this will also page the on-call ).
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
## 2023-02-03 NOTE — TELEPHONE ENCOUNTER
Updated Bed Search @ 9:30pm    Per chart review, intake can look in [place] for placement   [facility] has 0 appropriate beds available.   [facility] posting 0 available beds. Phone: [phone]  [facility] posting 0 available beds. Phone: [phone]- Per Edgardo, at capacity for the night  [facility] posting 0 available beds. Phone: [phone]  [facility] posting 0 available beds. Phone: [phone]- Per Edgardo, at capacity for the night  [facility] posting 0 available beds. Phone:[phone]  [facility] posting 0 available beds. Phone: [phone]- Per Edgardo, at capacity for the night  [facility] posting 0 available beds. Phone: [phone]   [facility] posting 0 available beds. Phone: [phone]-Per Edgardo, at capacity for the night    Pt remains on intake work list awaiting appropriate placement.

## 2023-02-03 NOTE — PROGRESS NOTES
"SPIRITUAL HEALTH SERVICES Progress Note  [facility]    Saw pt Thea Castle per follow up. We reflected together about pt's experience with fear and shame surrounding her impending discharge to an in-patent psychiatric unit.     Patient/Family Understanding of Illness and Goals of Care - Pt discussed her understanding that she will be discharging to [facility] for in-patient psych, but also stated that she \"doesn't know what the plan is right now.\"     Distress and Loss   - Pt stated that she feels \"scared\" that she will not receive \"the care [she] needs to heal\" at [facility].   - Pt stated \"I feel some shame because I haven't been to in-patient psych for over a year.\"     Strengths, Coping, and Resources - Pt shared that she had a \"good\" phone conversation with her  earlier today. I encouraged pt's use of coping tools and we reflected on the theme of 'courage' together.    Meaning, Beliefs, and Spirituality - Pt expressed finding meaning in '[employer] Breastplate Prayer', which was provided by another . I shared the 'Prayer of Good Courage' with pt. She requested prayer, which I offered incorporating conversational themes.    Plan of Care - Spiritual Health will plan to visit pt on Tuesday and Friday next week with alternating chaplains.    Sarah Johanson  Chaplain Resident  Spiritual Health Services  Pager: [phone]     [facility] available 24/7 for emergent requests/referrals, either by having the on-call  paged or by entering an ASAP/STAT consult in Epic (this will also page the on-call ).   "
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
## 2023-02-03 NOTE — PROVIDER NOTIFICATION
Patient asking if hospitalist would participate in phone call with her and her mother. Message sent to Dr Medrano.
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
## 2023-02-04 ENCOUNTER — TELEPHONE (OUTPATIENT)
Dept: BEHAVIORAL HEALTH | Facility: CLINIC | Age: 23
End: 2023-02-04
Payer: COMMERCIAL

## 2023-02-04 PROCEDURE — 250N000013 HC RX MED GY IP 250 OP 250 PS 637: Performed by: REGISTERED NURSE

## 2023-02-04 PROCEDURE — 250N000013 HC RX MED GY IP 250 OP 250 PS 637: Performed by: INTERNAL MEDICINE

## 2023-02-04 PROCEDURE — 250N000013 HC RX MED GY IP 250 OP 250 PS 637: Performed by: NURSE PRACTITIONER

## 2023-02-04 PROCEDURE — 120N000001 HC R&B MED SURG/OB

## 2023-02-04 PROCEDURE — 99232 SBSQ HOSP IP/OBS MODERATE 35: CPT | Performed by: INTERNAL MEDICINE

## 2023-02-04 PROCEDURE — 250N000013 HC RX MED GY IP 250 OP 250 PS 637

## 2023-02-04 PROCEDURE — 250N000011 HC RX IP 250 OP 636: Performed by: INTERNAL MEDICINE

## 2023-02-04 RX ORDER — LANOLIN ALCOHOL/MO/W.PET/CERES
3 CREAM (GRAM) TOPICAL
Status: DISCONTINUED | OUTPATIENT
Start: 2023-02-04 | End: 2023-02-06

## 2023-02-04 RX ADMIN — Medication 25 MCG: at 09:12

## 2023-02-04 RX ADMIN — DULOXETINE HYDROCHLORIDE 60 MG: 60 CAPSULE, DELAYED RELEASE ORAL at 09:12

## 2023-02-04 RX ADMIN — HYDROXYZINE HYDROCHLORIDE 25 MG: 25 TABLET, FILM COATED ORAL at 02:42

## 2023-02-04 RX ADMIN — HYDROXYZINE HYDROCHLORIDE 25 MG: 25 TABLET, FILM COATED ORAL at 13:40

## 2023-02-04 RX ADMIN — ONDANSETRON 4 MG: 4 TABLET, ORALLY DISINTEGRATING ORAL at 19:36

## 2023-02-04 RX ADMIN — MELATONIN TAB 3 MG 3 MG: 3 TAB at 03:13

## 2023-02-04 RX ADMIN — Medication 25 MG: at 09:11

## 2023-02-04 RX ADMIN — ACETAMINOPHEN 650 MG: 325 TABLET, FILM COATED ORAL at 18:22

## 2023-02-04 RX ADMIN — PANTOPRAZOLE SODIUM 40 MG: 40 TABLET, DELAYED RELEASE ORAL at 15:57

## 2023-02-04 RX ADMIN — HYDROXYZINE HYDROCHLORIDE 25 MG: 25 TABLET, FILM COATED ORAL at 21:29

## 2023-02-04 RX ADMIN — PROCHLORPERAZINE MALEATE 5 MG: 5 TABLET ORAL at 21:29

## 2023-02-04 RX ADMIN — HYDROXYZINE HYDROCHLORIDE 25 MG: 25 TABLET, FILM COATED ORAL at 09:12

## 2023-02-04 RX ADMIN — Medication 0.25 G: at 09:11

## 2023-02-04 RX ADMIN — MELATONIN TAB 3 MG 3 MG: 3 TAB at 21:12

## 2023-02-04 RX ADMIN — Medication 25 MG: at 21:12

## 2023-02-04 RX ADMIN — Medication 12.5 MG: at 21:12

## 2023-02-04 RX ADMIN — MULTIPLE VITAMINS W/ MINERALS TAB 1 TABLET: TAB at 09:12

## 2023-02-04 RX ADMIN — Medication 0.25 G: at 15:15

## 2023-02-04 RX ADMIN — TRAZODONE HYDROCHLORIDE 50 MG: 50 TABLET ORAL at 21:12

## 2023-02-04 RX ADMIN — PANTOPRAZOLE SODIUM 40 MG: 40 TABLET, DELAYED RELEASE ORAL at 09:11

## 2023-02-04 RX ADMIN — Medication 250 MG: at 09:11

## 2023-02-04 RX ADMIN — ACETAMINOPHEN 650 MG: 325 TABLET, FILM COATED ORAL at 12:54

## 2023-02-04 RX ADMIN — LORAZEPAM 1 MG: 1 TABLET ORAL at 21:12

## 2023-02-04 RX ADMIN — ARIPIPRAZOLE 15 MG: 15 TABLET ORAL at 09:12

## 2023-02-04 RX ADMIN — HYDROXYZINE HYDROCHLORIDE 25 MG: 25 TABLET, FILM COATED ORAL at 17:51

## 2023-02-04 ASSESSMENT — ACTIVITIES OF DAILY LIVING (ADL)
ADLS_ACUITY_SCORE: 30

## 2023-02-04 NOTE — TELEPHONE ENCOUNTER
[facility] Access Inpatient Bed Call Log 2/3/2023 @ 6:55PM    Facilities that have not updated websites in the last 12 hours have been called.      Adults:       [facility] is posting 0 beds.         [facility] is posting 0 beds. Negative covid.          [facility] has 0 beds.          [facility] is posting 0 beds.          [facility] are posting 0 beds.          [facility] is posting 0 beds. Negative COVID.         [facility] has 0 beds. Extensive wait List for committed patients.         [facility] is posting 0 beds.            [facility] is posting 0 beds. Mixed unit 12+. Low acuity only. Negative covid.          [facility] has 0 beds. No aggression.  Negative Covid.         [facility] is posting 0 beds.  Negative covid.         [facility] is posting 1 bed. Low acuity only.  Negative covid.          [facility] is posting 2 beds. Negative covid. Low acuity only.          [facility] has 0 beds. Low acuity. Negative covid.           [facility] is posting 2 beds. Low acuity. 72 HH hold preferred.          [facility] is posting 4 beds. Low acuity. Negative covid.          [facility] has 1 bed. Negative covid. Vol only, No history of aggression, violence, or assault. No sexual offenders. No 72 HH holds.          [facility] is posting 4 beds. Negative covid. (Must have the cognitive ability to do programming. No aggressive or violent behavior or recent HX in the last 2 yrs. MH must be primary.) Always low acuity.          [facility] has 0 beds. Negative Covid. Low acuity only. Violence and aggression capped.          [facility] is posting 0 beds. Low acuity, Negative Covid.          [facility] is posting 2 beds. Covid Negative. Vol only. Combined adolescent and adult unit. No aggressive or violent behavior. No registered sex offenders.          [facility] posting 1 bed. Negative covid.            [facility] is posting 16 beds. No covid test required.          [facility] 4 beds. Negative covid. (No lines, drains, or tubes, oxygen, CPAP, IV, etc.).     7:40pm There are no appropriate beds available at this time.
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
## 2023-02-04 NOTE — TELEPHONE ENCOUNTER
11:53am Intake received a call from provider Sagar reporting this pt cannot have a roommate because of her agitation. She would be too disruptive to have a roommate. So, this pt has been declined for a shared F bed.

## 2023-02-04 NOTE — PROVIDER NOTIFICATION
"Update to Dr Medrano  \" [facility] just informed me that she has now been declined due to their MDs saying she needs a private room and that is not available. \"  "

## 2023-02-04 NOTE — PLAN OF CARE
Goal Outcome Evaluation:                      Hospitalized with suicidal attempt/ideation. Patient started the day calm, asking appropriate questions and making appropriate requests. Crying after MD visit regarding 72-hour hold initiation this afternoon. Tylenol for headache, zofran for nausea. C/o feeling anxious about discharge plan in regards to eusebia pantoja, if psych will see her today. Atarax given PRN per patient request. Later in shift patient asking for ng tube to be taken out, call to Dr Medrano, he did not want it out yet. Patient standing in room, playing with tubes, not able to relax herself, slightly agitated about the situation, zyprexa 10mg given.  Patient did not express suicidal thoughts today. Patient requested to sign release for her father to talk with hospital about insurance, form signed and on patient chart. Up with SBA, suicide watch sitter at bedside. Patient is independent with tube feedings. Takes meds whole in applesauce. Central intake coordinating placement.

## 2023-02-04 NOTE — TELEPHONE ENCOUNTER
R: [facility], [facility]HH 02/03/23 02:59 PM    [facility] Access Inpatient Bed Call Log 2/4/2023 7:52 AM          Intake has called facilities that have not updated their bed status within the last 12 hours.           Adults:     [facility] is posting 0 beds.      [facility] is posting 0 beds.    [facility] is posting 0 beds   [facility] is posting 0 beds.    [facility] is posting 0 beds.    [facility] is posting 0 beds.    [facility] is posting 0 beds.   [facility] is posting 1 beds.          [facility] is posting 0 beds. Mixed unit 12+. Low acuity only.     [facility] is positing 0 beds. No aggression.     [facility] is posting 0 beds.   [facility] is posting 0 beds. Low acuity only. 7:57a Per Guadalupe @ cap   [facility] is posting 2 beds.    [facility] is posting 0 beds. Low acuity. 7:57a Per Guadalupe @ cap   [facility] is posting 1 beds. 72 hr hold required.    [facility] is posting 4 beds.  7:57a Per Guadalupe, open   [facility] is posting 1 beds. Vol only, No Hx of aggression, violence, or assault. No sexual offenders. No 72 hr holds.          [facility] is posting 3 beds. (Must have the cognitive ability to do programming. No aggressive or violent behavior or recent HX in the last 2 yrs. MH must be primary.)    [facility] is posting 0 beds. Low acuity only. Violence and aggression [facility] is posting 0 beds. Low acuity, Neg Covid.      [facility] is posting 2 beds. Covid neg. Vol only. Combined adolescent and adult unit. No aggressive or violent behavior. No registered sex offenders. 8:02a Per Michelle 1 bed available.    [facility] 1 bed.    [facility] is posting 16 beds. Call for details. 7:59a beds available   [facility] is posting 4 beds. 8:00a Per Teagan, beds available     No appropriate beds available, [facility] @ CAP.     11:19a Intake received call from MAL  (Candelaria) who informed that ED MD is requesting to speak with Medical Director and/or Intake to discuss pt. Dr Medrano, cell phone: [phone].     11:33a Intake paged Provider SHADI (Sagar). Intake awaiting response.     11:47a Intake called Ori Scales) to inquire about bed available. [facility] informed they can request if [facility] has capability of taking pt for IP MH. Intake gave brief clinical from recent HPI (Trey) from 1/24/23 at 2:34p.     11:53a Intake received notification that Provider SHADI (Sagar) called informing that pt cannot have roommate because of pt's agitation, pt would be too disruptive to have a roommate.     11:57a Intake called Dr Medrano to give update on pt. Intake informed that per work list pt was not appropriate for shared F bed and that pvt bed was needed for pt. Dr informed Intake that Provider (Tres) and LICSW (Cristina) were discussing pt and that Provider (Tres) will call Intake or further discussion of pt in regards to placement. Intake awaiting call.     12:01p Intake received call from Rehman (Guadalupe) who informed [facility] does not have medical capability to care for pt if medical complications arise and pt's acuity. Pt not appropriate for current bed available at low acuity unit.    1:42p Intake called [facility] (Marcia) to review pt for placement. [facility] informed they will review pt and CB.     2:49p Intake received notification [facility]  called informing that pt was declined for SDU/Low acuity bed due to needing an ICU bed. ICU bed not available at this time.

## 2023-02-04 NOTE — PLAN OF CARE
Pt here with suicidal ideation. A&Ox4. VSS on RA. Low lactulose diet, pt managing TF's, NJ currently clamped. Takes pills whole in apple sauce. Up with A1-2GB. Denies pain. Smith in place for retention. Pt scoring green on the Aggression Stop Light Tool. PRN atarax and melatonin adminstered. No reports of suicidal ideation. Currently on 72 hour hold. Plan to discharge to inpatient psych, will need to remove NJ and scarlett prior.

## 2023-02-04 NOTE — TELEPHONE ENCOUNTER
0157 Bed Search Update:    [facility] 2/3 at 2339 to reflect there are no beds available.  [facility] ([place], [facility], [place], [place], [facility]) -Website updated 2/3 at 2132 to reflect there are no beds available.  [facility]-0148 unit reporting they are at capacity.  Check back in AM.  [facility]-0149 [place] reported there are no beds available.    [facility]-Committed pts only.  Long wait list    Remains on wait list.

## 2023-02-05 ENCOUNTER — TELEPHONE (OUTPATIENT)
Dept: BEHAVIORAL HEALTH | Facility: CLINIC | Age: 23
End: 2023-02-05
Payer: COMMERCIAL

## 2023-02-05 LAB
ALBUMIN UR-MCNC: 100 MG/DL
APPEARANCE UR: ABNORMAL
BILIRUB UR QL STRIP: NEGATIVE
COLOR UR AUTO: ABNORMAL
GLUCOSE UR STRIP-MCNC: NEGATIVE MG/DL
HGB UR QL STRIP: NEGATIVE
KETONES UR STRIP-MCNC: NEGATIVE MG/DL
LEUKOCYTE ESTERASE UR QL STRIP: ABNORMAL
NITRATE UR QL: POSITIVE
PH UR STRIP: 8.5 [PH] (ref 5–7)
RBC URINE: <1 /HPF
SP GR UR STRIP: 1.02 (ref 1–1.03)
UROBILINOGEN UR STRIP-MCNC: NORMAL MG/DL
WBC URINE: 0 /HPF

## 2023-02-05 PROCEDURE — 250N000013 HC RX MED GY IP 250 OP 250 PS 637: Performed by: INTERNAL MEDICINE

## 2023-02-05 PROCEDURE — 250N000013 HC RX MED GY IP 250 OP 250 PS 637: Performed by: REGISTERED NURSE

## 2023-02-05 PROCEDURE — 250N000013 HC RX MED GY IP 250 OP 250 PS 637: Performed by: NURSE PRACTITIONER

## 2023-02-05 PROCEDURE — 99232 SBSQ HOSP IP/OBS MODERATE 35: CPT | Performed by: INTERNAL MEDICINE

## 2023-02-05 PROCEDURE — 250N000013 HC RX MED GY IP 250 OP 250 PS 637: Performed by: HOSPITALIST

## 2023-02-05 PROCEDURE — 87086 URINE CULTURE/COLONY COUNT: CPT | Performed by: INTERNAL MEDICINE

## 2023-02-05 PROCEDURE — 250N000013 HC RX MED GY IP 250 OP 250 PS 637

## 2023-02-05 PROCEDURE — 120N000001 HC R&B MED SURG/OB

## 2023-02-05 PROCEDURE — 81001 URINALYSIS AUTO W/SCOPE: CPT | Performed by: INTERNAL MEDICINE

## 2023-02-05 PROCEDURE — 250N000011 HC RX IP 250 OP 636: Performed by: INTERNAL MEDICINE

## 2023-02-05 RX ORDER — TAMSULOSIN HYDROCHLORIDE 0.4 MG/1
0.4 CAPSULE ORAL DAILY
Status: DISCONTINUED | OUTPATIENT
Start: 2023-02-05 | End: 2023-02-07 | Stop reason: HOSPADM

## 2023-02-05 RX ORDER — TAMSULOSIN HYDROCHLORIDE 0.4 MG/1
0.4 CAPSULE ORAL DAILY
Status: ON HOLD | DISCHARGE
Start: 2023-02-05 | End: 2023-02-08

## 2023-02-05 RX ADMIN — TRAZODONE HYDROCHLORIDE 50 MG: 50 TABLET ORAL at 20:17

## 2023-02-05 RX ADMIN — ACETAMINOPHEN 650 MG: 325 TABLET, FILM COATED ORAL at 10:20

## 2023-02-05 RX ADMIN — LACTULOSE 20 G: 10 SOLUTION ORAL at 20:17

## 2023-02-05 RX ADMIN — HYDROXYZINE HYDROCHLORIDE 25 MG: 25 TABLET, FILM COATED ORAL at 12:43

## 2023-02-05 RX ADMIN — NYSTATIN: 100000 CREAM TOPICAL at 20:17

## 2023-02-05 RX ADMIN — HYDROXYZINE HYDROCHLORIDE 25 MG: 25 TABLET, FILM COATED ORAL at 08:31

## 2023-02-05 RX ADMIN — Medication 25 MG: at 20:17

## 2023-02-05 RX ADMIN — TAMSULOSIN HYDROCHLORIDE 0.4 MG: 0.4 CAPSULE ORAL at 18:31

## 2023-02-05 RX ADMIN — ONDANSETRON 4 MG: 4 TABLET, ORALLY DISINTEGRATING ORAL at 16:54

## 2023-02-05 RX ADMIN — Medication 25 MCG: at 08:30

## 2023-02-05 RX ADMIN — LACTULOSE 20 G: 10 SOLUTION ORAL at 16:47

## 2023-02-05 RX ADMIN — LORAZEPAM 1 MG: 1 TABLET ORAL at 20:17

## 2023-02-05 RX ADMIN — Medication 25 MG: at 08:30

## 2023-02-05 RX ADMIN — ONDANSETRON 4 MG: 4 TABLET, ORALLY DISINTEGRATING ORAL at 09:12

## 2023-02-05 RX ADMIN — DULOXETINE HYDROCHLORIDE 60 MG: 60 CAPSULE, DELAYED RELEASE ORAL at 08:31

## 2023-02-05 RX ADMIN — PANTOPRAZOLE SODIUM 40 MG: 40 TABLET, DELAYED RELEASE ORAL at 08:30

## 2023-02-05 RX ADMIN — Medication 0.25 G: at 20:16

## 2023-02-05 RX ADMIN — Medication 250 MG: at 08:31

## 2023-02-05 RX ADMIN — PROCHLORPERAZINE MALEATE 5 MG: 5 TABLET ORAL at 18:38

## 2023-02-05 RX ADMIN — SENNOSIDES AND DOCUSATE SODIUM 1 TABLET: 8.6; 5 TABLET ORAL at 20:17

## 2023-02-05 RX ADMIN — MULTIPLE VITAMINS W/ MINERALS TAB 1 TABLET: TAB at 08:31

## 2023-02-05 RX ADMIN — HYDROXYZINE HYDROCHLORIDE 25 MG: 25 TABLET, FILM COATED ORAL at 03:20

## 2023-02-05 RX ADMIN — PANTOPRAZOLE SODIUM 40 MG: 40 TABLET, DELAYED RELEASE ORAL at 16:47

## 2023-02-05 RX ADMIN — Medication 12.5 MG: at 20:17

## 2023-02-05 RX ADMIN — MELATONIN TAB 3 MG 3 MG: 3 TAB at 20:27

## 2023-02-05 RX ADMIN — Medication 0.25 G: at 08:31

## 2023-02-05 RX ADMIN — ARIPIPRAZOLE 15 MG: 15 TABLET ORAL at 08:30

## 2023-02-05 RX ADMIN — HYDROXYZINE HYDROCHLORIDE 25 MG: 25 TABLET, FILM COATED ORAL at 16:47

## 2023-02-05 RX ADMIN — ACETAMINOPHEN 650 MG: 325 TABLET, FILM COATED ORAL at 20:17

## 2023-02-05 RX ADMIN — Medication 0.25 G: at 15:33

## 2023-02-05 ASSESSMENT — ACTIVITIES OF DAILY LIVING (ADL)
ADLS_ACUITY_SCORE: 27
ADLS_ACUITY_SCORE: 30
ADLS_ACUITY_SCORE: 30
ADLS_ACUITY_SCORE: 27
ADLS_ACUITY_SCORE: 27
ADLS_ACUITY_SCORE: 30
ADLS_ACUITY_SCORE: 27
ADLS_ACUITY_SCORE: 27
ADLS_ACUITY_SCORE: 30
ADLS_ACUITY_SCORE: 27
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
## 2023-02-05 NOTE — PLAN OF CARE
Goal Outcome Evaluation:                    Pt here with suicidal ideation. Patient calm & cooperative today, making reasonable requests. Atarax prn for anxiety and tylenol for headache. No suicidal ideations. NG tube feeding continues, patient not ordering food. Takes meds whole with applesauce. Up with SBA. Plan to discharge to [place] inpatient psych tomorrow or Monday without NG feeding tube or pantoja.

## 2023-02-05 NOTE — TELEPHONE ENCOUNTER
R: STATEWIDE FOR PLACEMENT, 72HH 02/03/23 02:59 PM    [facility] Access Inpatient Bed Call Log 2/5/2023 @ 7:29AM   Facilities that have not updated websites in the last 12 hours have been called.      Adults:     [facility] is posting 0 beds.     [facility] is posting 0 beds. Negative covid.      [facility] has 0 beds.      [facility] is posting 0 beds.      [facility] are posting 0 beds.      [facility] is posting 0 beds. Negative COVID.     [facility] has 0 beds. Extensive wait List for committed patients.     [facility] is posting 0 bed.           [facility] is posting 0 beds. Mixed unit 12+. Low acuity only. Negative covid.      [facility] has 0 beds. No aggression.  Negative Covid.     [facility] is posting 0 beds.  Negative covid.     [facility] is posting 1 bed. Low acuity only.  Negative covid.      [facility] is posting 4 beds. Negative covid. Low acuity only.      [facility] has 0 beds. Low acuity. Negative covid.       [facility] is posting 0 beds. Low acuity. 72 HH hold preferred.      [facility] is posting 4 beds. Low acuity. Negative covid.      [facility] has 0 beds. Negative covid. Vol only, No history of aggression, violence, or assault. No sexual offenders. No 72 HH holds.           [facility] is posting 3 beds. Negative covid. (Must have the cognitive ability to do programming. No aggressive or violent behavior or recent HX in the last 2 yrs. MH must be primary.) Always low acuity.      [facility] has 0 beds. Negative Covid. Low acuity only. Violence and aggression capped.      [facility] is posting 0 beds. Low acuity, Negative Covid.      [facility] is posting 2 beds. Covid Negative. Vol only. Combined adolescent and adult unit. No aggressive or violent behavior. No registered sex offenders. 7:34a Per Tory 1 bed available      [facility] posting 2 beds. Negative  covid.       [facility] is posting 16 beds. No covid test required. 7:31a No Answer     [facility] 4 beds. Negative covid. (No lines, drains, or tubes, oxygen, CPAP, IV, etc.).  7:33a only 1 bed available     [facility], no appropriate beds available. Pt remains on work list.

## 2023-02-05 NOTE — TELEPHONE ENCOUNTER
[facility] Access Inpatient Bed Call Log 2/4/2023 @ 10:40PM    Facilities that have not updated websites in the last 12 hours have been called.    Adults:     [facility] is posting 0 beds.     [facility] is posting 0 beds. Negative covid.      [facility] has 0 beds.      [facility] is posting 0 beds.      [facility] are posting 0 beds.      [facility] is posting 0 beds. Negative COVID.     [facility] has 0 beds. Extensive wait List for committed patients.     [facility] is posting 1 bed.        [facility] is posting 0 beds. Mixed unit 12+. Low acuity only. Negative covid.      [facility] has 0 beds. No aggression.  Negative Covid.     [facility] is posting 0 beds.  Negative covid.     [facility] is posting 1 bed. Low acuity only.  Negative covid.      [facility] is posting 4 beds. Negative covid. Low acuity only.      [facility] has 0 beds. Low acuity. Negative covid.       [facility] is posting 2 beds. Low acuity. 72 HH hold preferred.      [facility] is posting 4 beds. Low acuity. Negative covid.      [facility] has 0 beds. Negative covid. Vol only, No history of aggression, violence, or assault. No sexual offenders. No 72 HH holds.      [facility] is posting 4 beds. Negative covid. (Must have the cognitive ability to do programming. No aggressive or violent behavior or recent HX in the last 2 yrs. MH must be primary.) Always low acuity.      [facility] has 0 beds. Negative Covid. Low acuity only. Violence and aggression capped.      [facility] is posting 0 beds. Low acuity, Negative Covid.      [facility] is posting 2 beds. Covid Negative. Vol only. Combined adolescent and adult unit. No aggressive or violent behavior. No registered sex offenders.      [facility] posting 2 beds. Negative covid.       [facility] is posting 16 beds. No covid test required.      [facility] 3 beds. Negative covid. (No lines, drains, or tubes, oxygen, CPAP, IV, etc.).         Pt too acute for available beds in search. Pt remains on waitlist pending bed availability

## 2023-02-05 NOTE — TELEPHONE ENCOUNTER
R: STATEWIDE FOR PLACEMENT, 72HH 02/03/23 02:59 PM    [facility] Access Inpatient Bed Call Log 2/5/2023 @ 7:29AM   Facilities that have not updated websites in the last 12 hours have been called.      Adults:     [facility] is posting 0 beds.     [facility] is posting 0 beds. Negative covid.      [facility] has 0 beds.      [facility] is posting 0 beds.      [facility] are posting 0 beds.      [facility] is posting 0 beds. Negative COVID.     [facility] has 0 beds. Extensive wait List for committed patients.     [facility] is posting 0 bed.           [facility] is posting 0 beds. Mixed unit 12+. Low acuity only. Negative covid.      [facility] has 0 beds. No aggression.  Negative Covid.     [facility] is posting 0 beds.  Negative covid.     [facility] is posting 1 bed. Low acuity only.  Negative covid.      [facility] is posting 4 beds. Negative covid. Low acuity only.      [facility] has 0 beds. Low acuity. Negative covid.       [facility] is posting 0 beds. Low acuity. 72 HH hold preferred.      [facility] is posting 4 beds. Low acuity. Negative covid.      [facility] has 0 beds. Negative covid. Vol only, No history of aggression, violence, or assault. No sexual offenders. No 72 HH holds.           [facility] is posting 3 beds. Negative covid. (Must have the cognitive ability to do programming. No aggressive or violent behavior or recent HX in the last 2 yrs. MH must be primary.) Always low acuity.      [facility] has 0 beds. Negative Covid. Low acuity only. Violence and aggression capped.      [facility] is posting 0 beds. Low acuity, Negative Covid.      [facility] is posting 2 beds. Covid Negative. Vol only. Combined adolescent and adult unit. No aggressive or violent behavior. No registered sex offenders. 7:34a Per Tory 1 bed available      [facility] posting 2 beds. Negative  covid.       [facility] is posting 16 beds. No covid test required. 7:31a No Answer     [facility] 4 beds. Negative covid. (No lines, drains, or tubes, oxygen, CPAP, IV, etc.).  7:33a only 1 bed available     [facility], no appropriate beds available. Pt remains on work list.     9:47a

## 2023-02-05 NOTE — TELEPHONE ENCOUNTER
0203 Bed Search Update:    [facility]-Website updated 2/4 at 2351 to reflect there are no beds available.  [facility] [place], [place], [facility]) -Website updated 2/4 at 2105 to reflect there are no beds available.  [facility]-0005 unit reporting they are at capacity.  Check back in AM.  [facility] reported there are no beds available.    [facility]-Committed pts only.  Long wait list  [facility]- 0153 recorded message updated to reflect there are no beds available   [facility] ([place], [place], [place], [place], [place], [place])- Committed pts only.  Long wait list  [facility]-Meets exclusionary criteria.  No beds available.  Mixed unit adol/adult/sheila  [facility] ([place], [place], [place])-Meets exclusionary criteria for available bed  [facility]-Meets exclusionary criteria.  Voluntary/[place] only. No hx violence or sexual assault.  [facility]-Meets exclusionary criteria.  No recent hx violence/aggression. Must be able to program in groups.  [facility]- Meets exclusionary criteria.  [facility]- 0154 Unit Facility is on divert.  [facility]-Meets exclusionary criteria for available bed.  Low acuity and mixed milieu (adol/adults)     Remains on wait list.

## 2023-02-05 NOTE — PLAN OF CARE
Pt here with suicidal ideation. A&Ox4. VSS on RA. Low lactulose diet, TF's infusing with Q4H FWF of 60 mL. Takes pills whole in apple sauce. Up with A1-2GB. Denies pain. Pantoja in place for retention. Pt scoring green on the Aggression Stop Light Tool. PRN atarax and melatonin adminstered. No reports of suicidal ideation. Currently on 72 hour hold. PRN ondansetron, compazine and atarax administered. Plan to discharge to inpatient psych, will need to remove feeding tube and pantoja prior.

## 2023-02-06 ENCOUNTER — TELEPHONE (OUTPATIENT)
Dept: BEHAVIORAL HEALTH | Facility: CLINIC | Age: 23
End: 2023-02-06
Payer: COMMERCIAL

## 2023-02-06 LAB
ANION GAP SERPL CALCULATED.3IONS-SCNC: 9 MMOL/L (ref 7–15)
BUN SERPL-MCNC: 17.4 MG/DL (ref 6–20)
CALCIUM SERPL-MCNC: 9.4 MG/DL (ref 8.6–10)
CHLORIDE SERPL-SCNC: 103 MMOL/L (ref 98–107)
CREAT SERPL-MCNC: 0.87 MG/DL (ref 0.51–0.95)
DEPRECATED HCO3 PLAS-SCNC: 27 MMOL/L (ref 22–29)
GFR SERPL CREATININE-BSD FRML MDRD: >90 ML/MIN/1.73M2
GLUCOSE SERPL-MCNC: 121 MG/DL (ref 70–99)
MAGNESIUM SERPL-MCNC: 1.9 MG/DL (ref 1.7–2.3)
PHOSPHATE SERPL-MCNC: 4.2 MG/DL (ref 2.5–4.5)
POTASSIUM SERPL-SCNC: 4.3 MMOL/L (ref 3.4–5.3)
SODIUM SERPL-SCNC: 139 MMOL/L (ref 136–145)

## 2023-02-06 PROCEDURE — 250N000013 HC RX MED GY IP 250 OP 250 PS 637: Performed by: NURSE PRACTITIONER

## 2023-02-06 PROCEDURE — 83735 ASSAY OF MAGNESIUM: CPT | Performed by: INTERNAL MEDICINE

## 2023-02-06 PROCEDURE — 250N000013 HC RX MED GY IP 250 OP 250 PS 637: Performed by: INTERNAL MEDICINE

## 2023-02-06 PROCEDURE — 250N000013 HC RX MED GY IP 250 OP 250 PS 637: Performed by: REGISTERED NURSE

## 2023-02-06 PROCEDURE — 120N000001 HC R&B MED SURG/OB

## 2023-02-06 PROCEDURE — 250N000011 HC RX IP 250 OP 636: Performed by: INTERNAL MEDICINE

## 2023-02-06 PROCEDURE — 84100 ASSAY OF PHOSPHORUS: CPT | Performed by: INTERNAL MEDICINE

## 2023-02-06 PROCEDURE — 99233 SBSQ HOSP IP/OBS HIGH 50: CPT | Performed by: STUDENT IN AN ORGANIZED HEALTH CARE EDUCATION/TRAINING PROGRAM

## 2023-02-06 PROCEDURE — 250N000013 HC RX MED GY IP 250 OP 250 PS 637: Performed by: HOSPITALIST

## 2023-02-06 PROCEDURE — 99207 PR NO CHARGE LOS: CPT

## 2023-02-06 PROCEDURE — 80048 BASIC METABOLIC PNL TOTAL CA: CPT | Performed by: INTERNAL MEDICINE

## 2023-02-06 PROCEDURE — 36415 COLL VENOUS BLD VENIPUNCTURE: CPT | Performed by: INTERNAL MEDICINE

## 2023-02-06 PROCEDURE — 250N000013 HC RX MED GY IP 250 OP 250 PS 637: Performed by: STUDENT IN AN ORGANIZED HEALTH CARE EDUCATION/TRAINING PROGRAM

## 2023-02-06 PROCEDURE — 250N000013 HC RX MED GY IP 250 OP 250 PS 637

## 2023-02-06 RX ADMIN — NYSTATIN: 100000 CREAM TOPICAL at 09:24

## 2023-02-06 RX ADMIN — LORAZEPAM 1 MG: 1 TABLET ORAL at 21:12

## 2023-02-06 RX ADMIN — Medication 0.25 G: at 14:00

## 2023-02-06 RX ADMIN — HYDROXYZINE HYDROCHLORIDE 25 MG: 25 TABLET, FILM COATED ORAL at 15:33

## 2023-02-06 RX ADMIN — ACETAMINOPHEN 650 MG: 325 TABLET, FILM COATED ORAL at 17:15

## 2023-02-06 RX ADMIN — SENNOSIDES AND DOCUSATE SODIUM 1 TABLET: 8.6; 5 TABLET ORAL at 21:12

## 2023-02-06 RX ADMIN — PANTOPRAZOLE SODIUM 40 MG: 40 TABLET, DELAYED RELEASE ORAL at 09:13

## 2023-02-06 RX ADMIN — Medication 0.25 G: at 09:24

## 2023-02-06 RX ADMIN — LACTULOSE 20 G: 10 SOLUTION ORAL at 15:32

## 2023-02-06 RX ADMIN — Medication 250 MG: at 09:12

## 2023-02-06 RX ADMIN — Medication 25 MG: at 21:12

## 2023-02-06 RX ADMIN — PROCHLORPERAZINE MALEATE 5 MG: 5 TABLET ORAL at 13:03

## 2023-02-06 RX ADMIN — TRAZODONE HYDROCHLORIDE 50 MG: 50 TABLET ORAL at 21:12

## 2023-02-06 RX ADMIN — LACTULOSE 20 G: 10 SOLUTION ORAL at 09:13

## 2023-02-06 RX ADMIN — MELATONIN TAB 3 MG 5 MG: 3 TAB at 21:12

## 2023-02-06 RX ADMIN — MULTIPLE VITAMINS W/ MINERALS TAB 1 TABLET: TAB at 09:12

## 2023-02-06 RX ADMIN — ONDANSETRON 4 MG: 4 TABLET, ORALLY DISINTEGRATING ORAL at 10:52

## 2023-02-06 RX ADMIN — DULOXETINE HYDROCHLORIDE 60 MG: 60 CAPSULE, DELAYED RELEASE ORAL at 09:12

## 2023-02-06 RX ADMIN — PROCHLORPERAZINE MALEATE 5 MG: 5 TABLET ORAL at 18:37

## 2023-02-06 RX ADMIN — Medication 12.5 MG: at 21:12

## 2023-02-06 RX ADMIN — LACTULOSE 20 G: 10 SOLUTION ORAL at 21:12

## 2023-02-06 RX ADMIN — PANTOPRAZOLE SODIUM 40 MG: 40 TABLET, DELAYED RELEASE ORAL at 15:33

## 2023-02-06 RX ADMIN — TAMSULOSIN HYDROCHLORIDE 0.4 MG: 0.4 CAPSULE ORAL at 17:15

## 2023-02-06 RX ADMIN — Medication 25 MCG: at 09:12

## 2023-02-06 RX ADMIN — ACETAMINOPHEN 650 MG: 325 TABLET, FILM COATED ORAL at 10:52

## 2023-02-06 RX ADMIN — Medication 25 MG: at 09:12

## 2023-02-06 RX ADMIN — ARIPIPRAZOLE 15 MG: 15 TABLET ORAL at 09:12

## 2023-02-06 RX ADMIN — SENNOSIDES AND DOCUSATE SODIUM 1 TABLET: 8.6; 5 TABLET ORAL at 09:12

## 2023-02-06 RX ADMIN — HYDROXYZINE HYDROCHLORIDE 25 MG: 25 TABLET, FILM COATED ORAL at 00:39

## 2023-02-06 RX ADMIN — HYDROXYZINE HYDROCHLORIDE 25 MG: 25 TABLET, FILM COATED ORAL at 09:24

## 2023-02-06 RX ADMIN — Medication 0.25 G: at 21:17

## 2023-02-06 ASSESSMENT — ACTIVITIES OF DAILY LIVING (ADL)
ADLS_ACUITY_SCORE: 26
ADLS_ACUITY_SCORE: 27
ADLS_ACUITY_SCORE: 26
ADLS_ACUITY_SCORE: 27
ADLS_ACUITY_SCORE: 26
ADLS_ACUITY_SCORE: 27
ADLS_ACUITY_SCORE: 27
ADLS_ACUITY_SCORE: 26

## 2023-02-06 NOTE — TELEPHONE ENCOUNTER
[facility] Access Inpatient Bed Call Log 2/5/2023 @ 10:40PM   Facilities that have not updated websites in the last 12 hours have been called.    Adults:     [facility] is posting 0 beds.     [facility] is posting 0 beds. Negative covid.      [facility] has 0 beds.      [facility] is posting 0 beds.      [facility] are posting 0 beds.      [facility] is posting 0 beds. Negative COVID.     [facility] has 0 beds. Extensive wait List for committed patients.     [facility] is posting 0 bed.         [facility] is posting 0 beds. Mixed unit 12+. Low acuity only. Negative covid.      [facility] has 0 beds. No aggression.  Negative Covid.     [facility] is posting 0 beds.  Negative covid.     [facility] is posting 1 bed. Low acuity only.  Negative covid.      [facility] is posting 2 beds. Negative covid. Low acuity only.      [facility] has 0 beds. Low acuity. Negative covid.       [facility] is posting 0 beds. Low acuity. 72 HH hold preferred.      [facility] is posting 4 beds. Low acuity. Negative covid.      [facility] has 0 beds. Negative covid. Vol only, No history of aggression, violence, or assault. No sexual offenders. No 72 HH holds.         [facility] is posting 3 beds. Negative covid. (Must have the cognitive ability to do programming. No aggressive or violent behavior or recent HX in the last 2 yrs. MH must be primary.) Always low acuity.      [facility] has 0 beds. Negative Covid. Low acuity only. Violence and aggression capped.      [facility] is posting 0 beds. Low acuity, Negative Covid.      [facility] is posting 1 bed. Covid Negative. Vol only. Combined adolescent and adult unit. No aggressive or violent behavior. No registered sex offenders.      [facility] posting 0 beds. Negative covid.       [facility] is posting 6 beds. No covid test required.     [facility] 0 beds. Negative covid. (No lines, drains, or tubes, oxygen, CPAP, IV, etc.).       Pt is not appropriate for the above beds due to acuity. Pt remains on waitlist pending bed availability

## 2023-02-06 NOTE — PLAN OF CARE
Goal Outcome Evaluation:                    Pt here with suicidal ideation. No suicidal ideations today. Patient calm but c/o of feeling anxious and requesting atarax about every 4-5 hours. Patient tearful at 1345 after reading doctor notes on MyChart. Therapeutic listening. Psych to see today. C/o stomach pain, tylenol given. Zofran & compazine for nausea. TF running at goal. Ate 75+% of supper, carrots and chicken tenders, also 3 cups of applesauce today. Meds whole in applesauce. Showered. Up with SBA in halls, IND in room. Discharge to inpatient psych unit when bed is available. Currently on 72 hour hold. Patient okayed mother to visit tonight.

## 2023-02-06 NOTE — CONSULTS
Patient is currently under 72HH that started 2/3/23 at 1459, petition is also being pursued for MI Commitment (see Anne Melendez's note for details).     Plan:   --Patient to transfer to inpatient psychiatry. On Friday I ordered Zyprexa 10mg ODT linked to IM BID PRN for agitation and aggression (would premedicate with this prior to pulling NG and pantoja which will take place 30 minutes prior to transfer to IP psychiatry).     --Patient can not have her belongings on inpatient psych, mom to  her things. Until mom picks up belongings, patient okay to have belongings in room as there is no safety concern and patient has bedside sitter.       Irina Martins, PMMARINEP-BC  Consult/Liaison Psychiatry   [facility]
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
## 2023-02-06 NOTE — PLAN OF CARE
Pt here with suicidal ideation. A&Ox4. VSS on RA. Low lactulose diet, TF's infusing with Q4H FWF of 60 mL. Takes pills whole in apple sauce. Up with A1-2GB. PRN tylenol administered for pain, PRN atarax and melatonin adminstered. Pantoja in place for retention. Pt scoring green on the Aggression Stop Light Tool. No reports of suicidal ideation. Currently on 72 hour hold. Pt would like mom, Keshia, to be present for rounding today. Plan to discharge to inpatient psych, will need to remove feeding tube and pantoja prior.

## 2023-02-06 NOTE — CONSULTS
[facility] COURT- PROBATE/MENTAL HEALTH DIVISION  [facility] ([place])    _________________________________________________________________________  PETITION FOR JUDICIAL COMMITMENT    In the Matter of the Civil Commitment of:   Thea Castle  2000    D.C. File No. [id number]   C.A. File No. _____________     VICENTE Morejon , of [facility] is interested in the respondent as a Licensed Mental Health Professional, and to the best of his/her knowledge, information, and belief, petitioner respectfully represents:     1. Respondent was born on 2000    2. Respondent lives at [address] [place] [id number]  and has residence in [place], [place] for the purpose of judicial commitment;     3. Respondent s spouse and nearest everton are:   NAME/RELATIONSHIP: mother Maciel  CONTACT INFO:   [phone]    4. The observations of respondent s behavior showing that respondent is mentally ill and there is no   suitable alternative to involuntary commitment are set forth in Exhibit A and the examiner s statement   attached.     5. An examiner s statement supporting respondent s commitment is attached.       WHEREFORE, Petitioner prays the Court commit the respondent to the Commissioner of Human   Services, or other treatment facility according to law.     I declare under penalty of perjury under the laws of the [facility] that the foregoing is true and   correct.     Executed on February 5, 2023 in the [facility]  in the [facility].         Signature: ________________________________           Examiner s name: VICENTE Morejon          [facility] NEUROSCIENCE UNIT  [address] [place] [id number] [phone]  [phone]

## 2023-02-06 NOTE — CONSULTS
Civil Commitment Status    Current commitment status is: under 72 Hour Hold expiring 2/8/23 1500 and petition for civil commitment has been filed on 2/6/23    72hr-Hold Started: 2/3/23 at 1500 PM  72hr-Hold Conclude: 2/8/23 at 1500 PM    County of Responsibility:  [place]    Pre-petition screening paperwork has been sent to [facility] Pre-petition via Encrypted E-mail at  9:41 AM and includes:  PREPETITIONFORMS: Commitment Petition, Examiner's Statement, Exhibit A    Pre-Petitions forms have been sent to Intake and Stat to be scanned to the EMR.  Yes    Verbal report given to [facility] commitment intake team via phone  at 9:54 AM. Patient assigned to Tony Vines, [phone]    Next steps:   Patient being reviewed for placement at [facility]. [facility] will reach out to complete pre-petition screening.     Anne Melendez [facility]   [facility] NEUROSCIENCE UNIT  [address] [place] [address]  [phone]

## 2023-02-06 NOTE — TELEPHONE ENCOUNTER
0228 Bed Search Update:    [facility] updated 2/5 at 2358 to reflect there are no beds available.  [facility] [place], [place], [facility]) -Website updated 2/5 at 2117 to reflect there are no beds available.  [facility]-0023 unit reporting they are at capacity.  Check back in AM.  [facility]-Website update 2/6 at 0002 to reflect there are no beds available.    [facility]-Committed pts only.  Long wait list  [facility]- 0223 recorded message updated to reflect there are no beds available   [facility] ([place], [place], [place], [place], [place], [place])- Committed pts only.  Long wait list  [facility]-Website updated 2/5 at 2313 to reflect there are no beds available.  Mixed unit adol/adult/sheila  [facility] ([place], [place], [place])-Meets exclusionary criteria for available bed  [facility]Meets exclusionary criteria.  [facility]/[place] only. No hx violence or sexual assault.  [facility]-Meets exclusionary criteria.  No recent hx violence/aggression. Must be able to program in groups.  [facility]- Meets exclusionary criteria.  [facility]- 0224 Unit Facility is on divert.  [facility]-No beds available.  Low acuity and mixed milieu (adol/adults)     Remains on wait list.

## 2023-02-06 NOTE — CONSULTS
[facility] COURT- PROBATE/MENTAL HEALTH DIVISION  [facility] ([place])    _________________________________________________________________________________________________________________________________________________________     In the Matter of the Civil Commitment of: Thea Castle      EXAMINERS STATEMENT IN SUPPORT OF       PETITION FOR JUDICIAL COMMITMENT,       Respondent File No._________________________     I am a licensed physician, licensed psychologist, licensed mental health professional, licensed physician assistant or advanced practice registered nurse certified in mental health who is knowledgeable, trained and practicing in the diagnosis or assessment or in the treatment of the alleged impairment listed below:        X Mentally Ill       I have examined the above-named person within the last fifteen days, on  February 6, 2023  and the results of the examination are stated below:        Behavioral evidence to support commitment:      Patient has presented to the ED or urgent care over 30 times in the last 6 months due to various medical concerns. Patient presenting with a long list of complaints, had extensive work up and seen by different specialists. All findings have been normal.  Patient consistently requesting to see more providers from different specialities and if often upset by her reassuring results. Patient's harm to self has escalated during this time period, including intentionally causing rectal bleeding, pulling out her catheter, ingesting feeding tube requiring surgical removal, and suicide attempt by overdose. Patient repeatedly makes suicidal threats and refuses to leave the hospital, stating she will kill herself if discharged. Patient also has had several ED related visits due to complications from her eating disorder, including syncopal episodes and starvation ketoacidosis. As a consequence of her mental illness, patient  engages in grossly disturbed behavior and experiences faulty perceptions, and due to this impairment, she poses a substantial likelihood of causing physical harm.     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 She poses a risk to herself due to ongoing suicidal threats as well as risk of intentional harm due to self-imposed physical conditions. With support of commitment and , would avoid future unnecessary admissions, help limit her providers and decrease unwarranted testing and referrals.    _________________________________________________________________________  Will this person need neuroleptic medications?  No       Does this person have sufficient awareness of their situation and understanding of treatment with neuroleptics to make this decision for themselves? yes    **If you answered No, then you must provide either a [place] petition or a Request for a Substitute Decision Maker form.      I am of the opinion that the above-named person is in need of treatment and should be committed to a treatment facility for Mentally Ill     Patient is currently AcceptingNeuroleptic Medications.    (If mental illness is diagnosed) treatment with neuroleptic medication is:  NOT Recommended    The above-named person does not have capacity to make decisions regarding such  treatment.    Reasons for this opinion:    Patient's capacity impacted by mental health.     Dated:   February 6, 2023        Signature: ________________________________           Examiner s name: Anne Melendez [facility]   [facility] NEUROSCIENCE UNIT  [address] [place] [address]  [phone]  [phone]

## 2023-02-06 NOTE — PROGRESS NOTES
CLINICAL NUTRITION SERVICES - REASSESSMENT NOTE      Malnutrition: (1/19)  % Weight Loss:  Weight loss does not meet criteria for malnutrition - wt appears to fluctuate 110-120# over the past 3 months (suspect 2' to Anorexia and GI issues) - per 1/9 RD note  % Intake:  </= 50% for >/= 5 days (severe malnutrition) - continued, improving with TF  Subcutaneous Fat Loss:  None observed - per 1/12 RD note  Muscle Loss:  None observed - per 1/12 RD note  Fluid Retention:  None noted     Malnutrition Diagnosis: Patient does not meet two of the established criteria necessary for diagnosing malnutrition but is at risk for malnutrition       EVALUATION OF PROGRESS TOWARD GOALS   Diet:    Low Lactose diet    Nutrition Support:    Type of Feeding Tube: NJ  Enteral Frequency:  Continuous  Enteral Regimen: [employer] 1.5 @ 40 mL/hr x24 hours (960 mL)  Total Enteral Provisions: 1440 kcal, 71 g protein, 133 g CHO, 74 g fat, 9 g fiber, 672 mL free water  Free Water Flush: 60 mL q 4 hours for tube patency     Intake/Tolerance:    Chart reviewed  Po intake has been minimal (not ordering meals), however, pt consumed 75% dinner tray last evening (chicken tenders, carrots, fruit)  Tolerating TF at goal rate  BM x2 this am      ASSESSED NUTRITION NEEDS:  Dosing Weight: 54.3 kg (1/8)  Estimated Energy Needs: 7573-8215+ (25-30 kcal/kg)  Justification: maintenance vs repletion   Estimated Protein Needs: 55-65+ (1-1.2+ g/kg)  Justification: preservation of lean body mass vs repletion      NEW FINDINGS:     Discharge to inpatient psych unit when bed is available    No current wt    01/23/23 0651 55.1 kg (121 lb 7.6 oz) Standing scale   01/17/23 0830 53.6 kg (118 lb 2.7 oz) Bed scale   01/09/23 1300 54 kg (119 lb) --   01/08/23 0800 54.3 kg (119 lb 11.4 oz) Bed scale   01/04/23 1723 54 kg (119 lb)          Previous Goals (1/30):   EN to meet % estimated needs while PO intake minimal   Evaluation: Met     Previous Nutrition Diagnosis  (1/30):   No nutrition diagnosis identified at this time as EN meeting nutrition needs while pt with minimal po intake  Evaluation: No change        CURRENT NUTRITION DIAGNOSIS  No nutrition diagnosis identified at this time as EN meeting nutrition needs while pt with minimal po intake    INTERVENTIONS  Recommendations / Nutrition Prescription  Low lactose diet    Encourage po intake as pt tolerates  Will continue with current EN regimen for now    Goals  EN to meet % estimated needs while po intake is inadequate      MONITORING AND EVALUATION:  Progress towards goals will be monitored and evaluated per protocol and Practice Guidelines
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
## 2023-02-06 NOTE — TELEPHONE ENCOUNTER
[facility] Access Inpatient Bed Call Log 2/6/2023 @ 8:30AM   Facilities that have not updated websites in the last 12 hours have been called.    Adults:     [facility] is posting 0 beds.  Tried to call, no one answering phone.    [facility] is posting 0 beds. Negative covid.      [facility] has 0 beds.      [facility] is posting 0 beds.      [facility] are posting 0 beds.      [facility] is posting 0 beds. Negative COVID.     [facility] has 0 beds. Extensive wait List for committed patients.     [facility] is posting 0 bed.         [facility] is posting 0 beds. Mixed unit 12+. Low acuity only. Negative covid.      [facility] has 0 beds. No aggression.  Negative Covid.     [facility] is posting 0 beds.  Negative covid.     [facility] is posting 0 beds. Low acuity only.  Negative covid.      [facility] is posting 1 bed. Negative covid. Low acuity only.  Awaiting a call back.    [facility] has 0 beds. Low acuity. Negative covid.       [facility] is posting 0 beds. Low acuity. 72 HH hold preferred.      [facility] is posting 2 beds. Low acuity. Negative covid.      [facility] has 0 beds. Negative covid. Vol only, No history of aggression, violence, or assault. No sexual offenders. No 72 HH holds.         [facility] is posting 4 beds. Negative covid. (Must have the cognitive ability to do programming. No aggressive or violent behavior or recent HX in the last 2 yrs. MH must be primary.) Always low acuity.      [facility] has 0 beds. Negative Covid. Low acuity only. Violence and aggression capped.      [facility] is posting 0 beds. Low acuity, Negative Covid.      [facility] is posting 2 beds. Covid Negative. Vol only. Combined adolescent and adult unit. No aggressive or violent behavior. No registered sex offenders.     [facility] posting 2 beds. Negative covid.       [facility] is  posting 6 beds. No covid test required.     [facility] 0 beds.  Negative covid. (No lines, drains, or tubes, oxygen, CPAP, IV, etc.).  Per Jania, possible discharges later today.  No beds currently.     8:30am Dr. Tania Elias, pager: [phone].     8:35am Intake messaged MALA Rodríguez regarding pt's petition paperwork. Anne will send the proper paperwork with examiner statement when it's completed.     8:19am Intake paged provider Naegele.    8:21pm Intake received a call from provider Naegele. This provider will review the pt's chart with nursing to see if this pt is appropriate for the room available. However there is a pt that the provider is reviewing that is ahead of this pt.     11:40am Intake paged provider Naegele for an update.     11:53am Intake received a call from provider Naegele. The provider reports the pt's chart was reviewed and they are deciding to go with the pt ahead of this one. Provider also notes that this pt is too acute for unit 4A's current milieu.

## 2023-02-07 ENCOUNTER — HOSPITAL ENCOUNTER (INPATIENT)
Facility: CLINIC | Age: 23
LOS: 1 days | Discharge: HOME | DRG: 883 | End: 2023-02-08
Attending: PSYCHIATRY & NEUROLOGY | Admitting: PSYCHIATRY & NEUROLOGY
Payer: COMMERCIAL

## 2023-02-07 ENCOUNTER — TELEPHONE (OUTPATIENT)
Dept: BEHAVIORAL HEALTH | Facility: CLINIC | Age: 23
End: 2023-02-07
Payer: COMMERCIAL

## 2023-02-07 VITALS
TEMPERATURE: 98.5 F | OXYGEN SATURATION: 97 % | RESPIRATION RATE: 14 BRPM | SYSTOLIC BLOOD PRESSURE: 126 MMHG | WEIGHT: 121.47 LBS | BODY MASS INDEX: 20.74 KG/M2 | DIASTOLIC BLOOD PRESSURE: 78 MMHG | HEIGHT: 64 IN | HEART RATE: 108 BPM

## 2023-02-07 DIAGNOSIS — F50.019 ANOREXIA NERVOSA, RESTRICTING TYPE: ICD-10-CM

## 2023-02-07 DIAGNOSIS — F33.41 RECURRENT MAJOR DEPRESSIVE DISORDER, IN PARTIAL REMISSION (H): Primary | ICD-10-CM

## 2023-02-07 DIAGNOSIS — R33.9 URINARY RETENTION: ICD-10-CM

## 2023-02-07 DIAGNOSIS — R45.851 DEPRESSION WITH SUICIDAL IDEATION: ICD-10-CM

## 2023-02-07 DIAGNOSIS — F41.1 GAD (GENERALIZED ANXIETY DISORDER): ICD-10-CM

## 2023-02-07 DIAGNOSIS — F50.89 OTHER SPECIFIED EATING DISORDER: ICD-10-CM

## 2023-02-07 DIAGNOSIS — F32.A DEPRESSION WITH SUICIDAL IDEATION: ICD-10-CM

## 2023-02-07 LAB
BACTERIA UR CULT: NORMAL
SARS-COV-2 RNA RESP QL NAA+PROBE: NEGATIVE

## 2023-02-07 PROCEDURE — 250N000013 HC RX MED GY IP 250 OP 250 PS 637: Performed by: INTERNAL MEDICINE

## 2023-02-07 PROCEDURE — 250N000013 HC RX MED GY IP 250 OP 250 PS 637: Performed by: HOSPITALIST

## 2023-02-07 PROCEDURE — 124N000002 HC R&B MH UMMC

## 2023-02-07 PROCEDURE — 99239 HOSP IP/OBS DSCHRG MGMT >30: CPT | Performed by: STUDENT IN AN ORGANIZED HEALTH CARE EDUCATION/TRAINING PROGRAM

## 2023-02-07 PROCEDURE — 250N000011 HC RX IP 250 OP 636: Performed by: INTERNAL MEDICINE

## 2023-02-07 PROCEDURE — 250N000013 HC RX MED GY IP 250 OP 250 PS 637

## 2023-02-07 PROCEDURE — 250N000013 HC RX MED GY IP 250 OP 250 PS 637: Performed by: NURSE PRACTITIONER

## 2023-02-07 PROCEDURE — 250N000013 HC RX MED GY IP 250 OP 250 PS 637: Performed by: REGISTERED NURSE

## 2023-02-07 PROCEDURE — U0005 INFEC AGEN DETEC AMPLI PROBE: HCPCS

## 2023-02-07 RX ORDER — MULTIPLE VITAMINS W/ MINERALS TAB 9MG-400MCG
1 TAB ORAL DAILY
Status: DISCONTINUED | OUTPATIENT
Start: 2023-02-08 | End: 2023-02-08 | Stop reason: HOSPADM

## 2023-02-07 RX ORDER — BISACODYL 10 MG
10 SUPPOSITORY, RECTAL RECTAL DAILY PRN
Status: DISCONTINUED | OUTPATIENT
Start: 2023-02-07 | End: 2023-02-08 | Stop reason: HOSPADM

## 2023-02-07 RX ORDER — PROCHLORPERAZINE MALEATE 5 MG
5 TABLET ORAL EVERY 6 HOURS PRN
Status: DISCONTINUED | OUTPATIENT
Start: 2023-02-07 | End: 2023-02-08 | Stop reason: HOSPADM

## 2023-02-07 RX ORDER — LACTULOSE 10 G/15ML
20 SOLUTION ORAL 3 TIMES DAILY
Status: DISCONTINUED | OUTPATIENT
Start: 2023-02-07 | End: 2023-02-08 | Stop reason: HOSPADM

## 2023-02-07 RX ORDER — LORAZEPAM 0.5 MG/1
1 TABLET ORAL AT BEDTIME
Status: DISCONTINUED | OUTPATIENT
Start: 2023-02-07 | End: 2023-02-08 | Stop reason: HOSPADM

## 2023-02-07 RX ORDER — ACETAMINOPHEN 325 MG/1
650 TABLET ORAL EVERY 4 HOURS PRN
Status: DISCONTINUED | OUTPATIENT
Start: 2023-02-07 | End: 2023-02-08 | Stop reason: HOSPADM

## 2023-02-07 RX ORDER — PROMETHAZINE HYDROCHLORIDE 25 MG/1
25 TABLET ORAL 2 TIMES DAILY
Status: DISCONTINUED | OUTPATIENT
Start: 2023-02-07 | End: 2023-02-08 | Stop reason: HOSPADM

## 2023-02-07 RX ORDER — NYSTATIN 100000 U/G
CREAM TOPICAL 2 TIMES DAILY
Status: DISCONTINUED | OUTPATIENT
Start: 2023-02-07 | End: 2023-02-08 | Stop reason: HOSPADM

## 2023-02-07 RX ORDER — DULOXETIN HYDROCHLORIDE 60 MG/1
60 CAPSULE, DELAYED RELEASE ORAL DAILY
Status: DISCONTINUED | OUTPATIENT
Start: 2023-02-08 | End: 2023-02-08 | Stop reason: HOSPADM

## 2023-02-07 RX ORDER — VITAMIN B COMPLEX
25 TABLET ORAL DAILY
Status: DISCONTINUED | OUTPATIENT
Start: 2023-02-07 | End: 2023-02-08 | Stop reason: HOSPADM

## 2023-02-07 RX ORDER — TAMSULOSIN HYDROCHLORIDE 0.4 MG/1
0.4 CAPSULE ORAL DAILY
Status: DISCONTINUED | OUTPATIENT
Start: 2023-02-08 | End: 2023-02-08 | Stop reason: HOSPADM

## 2023-02-07 RX ORDER — POLYETHYLENE GLYCOL 3350 17 G/17G
17 POWDER, FOR SOLUTION ORAL DAILY PRN
Status: DISCONTINUED | OUTPATIENT
Start: 2023-02-07 | End: 2023-02-08 | Stop reason: HOSPADM

## 2023-02-07 RX ORDER — OLANZAPINE 10 MG/2ML
10 INJECTION, POWDER, FOR SOLUTION INTRAMUSCULAR DAILY PRN
Status: DISCONTINUED | OUTPATIENT
Start: 2023-02-07 | End: 2023-02-08 | Stop reason: HOSPADM

## 2023-02-07 RX ORDER — HYDROXYZINE HYDROCHLORIDE 25 MG/1
25 TABLET, FILM COATED ORAL EVERY 4 HOURS PRN
Status: DISCONTINUED | OUTPATIENT
Start: 2023-02-07 | End: 2023-02-08 | Stop reason: HOSPADM

## 2023-02-07 RX ORDER — OLANZAPINE 10 MG/1
10 TABLET ORAL 2 TIMES DAILY PRN
Status: DISCONTINUED | OUTPATIENT
Start: 2023-02-07 | End: 2023-02-08 | Stop reason: HOSPADM

## 2023-02-07 RX ORDER — ARIPIPRAZOLE 15 MG/1
15 TABLET ORAL DAILY
Status: DISCONTINUED | OUTPATIENT
Start: 2023-02-08 | End: 2023-02-08 | Stop reason: HOSPADM

## 2023-02-07 RX ORDER — ONDANSETRON 4 MG/1
4 TABLET, ORALLY DISINTEGRATING ORAL EVERY 6 HOURS PRN
Status: DISCONTINUED | OUTPATIENT
Start: 2023-02-07 | End: 2023-02-08 | Stop reason: HOSPADM

## 2023-02-07 RX ORDER — MINERAL OIL/HYDROPHIL PETROLAT
OINTMENT (GRAM) TOPICAL
Status: DISCONTINUED | OUTPATIENT
Start: 2023-02-07 | End: 2023-02-08 | Stop reason: HOSPADM

## 2023-02-07 RX ORDER — LANOLIN ALCOHOL/MO/W.PET/CERES
3 CREAM (GRAM) TOPICAL
Status: DISCONTINUED | OUTPATIENT
Start: 2023-02-07 | End: 2023-02-08 | Stop reason: HOSPADM

## 2023-02-07 RX ORDER — TRAZODONE HYDROCHLORIDE 50 MG/1
50 TABLET, FILM COATED ORAL AT BEDTIME
Status: DISCONTINUED | OUTPATIENT
Start: 2023-02-07 | End: 2023-02-08 | Stop reason: HOSPADM

## 2023-02-07 RX ORDER — MULTIVIT WITH MINERALS/LUTEIN
250 TABLET ORAL DAILY
Status: DISCONTINUED | OUTPATIENT
Start: 2023-02-08 | End: 2023-02-08 | Stop reason: HOSPADM

## 2023-02-07 RX ORDER — AMOXICILLIN 250 MG
1 CAPSULE ORAL 2 TIMES DAILY
Status: DISCONTINUED | OUTPATIENT
Start: 2023-02-07 | End: 2023-02-08 | Stop reason: HOSPADM

## 2023-02-07 RX ORDER — PANTOPRAZOLE SODIUM 40 MG/1
40 TABLET, DELAYED RELEASE ORAL 2 TIMES DAILY
Status: DISCONTINUED | OUTPATIENT
Start: 2023-02-08 | End: 2023-02-08 | Stop reason: HOSPADM

## 2023-02-07 RX ADMIN — Medication 25 MCG: at 09:42

## 2023-02-07 RX ADMIN — Medication 25 MG: at 09:42

## 2023-02-07 RX ADMIN — MULTIPLE VITAMINS W/ MINERALS TAB 1 TABLET: TAB at 09:42

## 2023-02-07 RX ADMIN — LACTULOSE 20 G: 20 SOLUTION ORAL at 20:49

## 2023-02-07 RX ADMIN — HYDROXYZINE HYDROCHLORIDE 25 MG: 25 TABLET, FILM COATED ORAL at 11:30

## 2023-02-07 RX ADMIN — ARIPIPRAZOLE 15 MG: 15 TABLET ORAL at 09:42

## 2023-02-07 RX ADMIN — HYDROXYZINE HYDROCHLORIDE 25 MG: 25 TABLET, FILM COATED ORAL at 03:35

## 2023-02-07 RX ADMIN — OLANZAPINE 10 MG: 5 TABLET, ORALLY DISINTEGRATING ORAL at 17:38

## 2023-02-07 RX ADMIN — TRAZODONE HYDROCHLORIDE 50 MG: 50 TABLET ORAL at 21:38

## 2023-02-07 RX ADMIN — PANTOPRAZOLE SODIUM 40 MG: 40 TABLET, DELAYED RELEASE ORAL at 17:13

## 2023-02-07 RX ADMIN — Medication 250 MG: at 09:42

## 2023-02-07 RX ADMIN — ACETAMINOPHEN 650 MG: 325 TABLET, FILM COATED ORAL at 03:27

## 2023-02-07 RX ADMIN — SENNOSIDES AND DOCUSATE SODIUM 1 TABLET: 8.6; 5 TABLET ORAL at 09:42

## 2023-02-07 RX ADMIN — LACTULOSE 20 G: 10 SOLUTION ORAL at 15:45

## 2023-02-07 RX ADMIN — TAMSULOSIN HYDROCHLORIDE 0.4 MG: 0.4 CAPSULE ORAL at 17:13

## 2023-02-07 RX ADMIN — PROCHLORPERAZINE MALEATE 5 MG: 5 TABLET ORAL at 18:12

## 2023-02-07 RX ADMIN — Medication 0.25 G: at 13:47

## 2023-02-07 RX ADMIN — LACTULOSE 20 G: 10 SOLUTION ORAL at 09:47

## 2023-02-07 RX ADMIN — LORAZEPAM 1 MG: 0.5 TABLET ORAL at 21:38

## 2023-02-07 RX ADMIN — SENNOSIDES AND DOCUSATE SODIUM 1 TABLET: 50; 8.6 TABLET ORAL at 20:49

## 2023-02-07 RX ADMIN — Medication 0.25 G: at 09:51

## 2023-02-07 RX ADMIN — DULOXETINE HYDROCHLORIDE 60 MG: 60 CAPSULE, DELAYED RELEASE ORAL at 09:42

## 2023-02-07 RX ADMIN — PANTOPRAZOLE SODIUM 40 MG: 40 TABLET, DELAYED RELEASE ORAL at 09:42

## 2023-02-07 RX ADMIN — Medication 12.5 MG: at 21:35

## 2023-02-07 RX ADMIN — ACETAMINOPHEN 650 MG: 325 TABLET, FILM COATED ORAL at 18:12

## 2023-02-07 RX ADMIN — CHOLECALCIFEROL TAB 25 MCG (1000 UNIT) 25 MCG: 25 TAB at 21:34

## 2023-02-07 RX ADMIN — PROMETHAZINE HYDROCHLORIDE 25 MG: 25 TABLET ORAL at 21:33

## 2023-02-07 RX ADMIN — ONDANSETRON 4 MG: 4 TABLET, ORALLY DISINTEGRATING ORAL at 13:47

## 2023-02-07 RX ADMIN — HYDROXYZINE HYDROCHLORIDE 25 MG: 25 TABLET, FILM COATED ORAL at 15:45

## 2023-02-07 RX ADMIN — LINACLOTIDE 290 MCG: 290 CAPSULE, GELATIN COATED ORAL at 11:30

## 2023-02-07 ASSESSMENT — ACTIVITIES OF DAILY LIVING (ADL)
ADLS_ACUITY_SCORE: 26
HYGIENE/GROOMING: INDEPENDENT
ADLS_ACUITY_SCORE: 26
ORAL_HYGIENE: INDEPENDENT
DRESS: INDEPENDENT
ADLS_ACUITY_SCORE: 50
ADLS_ACUITY_SCORE: 25
ADLS_ACUITY_SCORE: 25
ADLS_ACUITY_SCORE: 45
ADLS_ACUITY_SCORE: 25
ADLS_ACUITY_SCORE: 26
ADLS_ACUITY_SCORE: 50
LAUNDRY: WITH SUPERVISION
ADLS_ACUITY_SCORE: 26

## 2023-02-07 ASSESSMENT — LIFESTYLE VARIABLES
SKIP TO QUESTIONS 9-10: 1
AUDIT-C TOTAL SCORE: 1

## 2023-02-07 NOTE — PROGRESS NOTES
Clinical Nutrition Brief Note     See RD assessment dated 2/6 for more details  Chart reviewed - plan for inpatient psych ([place]) when bed available. Notes indicate that patient will need NGT removed p/t discharge (spoke with MALA who confirmed this is the plan in order for her to txr. SW described that patient may be able to transfer to a medical unit within [place] if additional medical care were to be needed again, including tube feedings).    Patient has not been taking more than bites of oral intake for the majority of her admission, c/o ongoing nausea and stomach pain with PO but not TF. On the evening of 2/5 she was able to consume ~75% of 1 meal that included chicken tenders, carrots and fruit. She tells writer today that she felt sick after eating this meal but was ultimately able to keep it down. Yesterday 2/6 she had ordered x2 Rice Krispies cereal w/ soy milk and so far this morning has had another bowl of Rice Krispies. Patient endorses nausea while eating this cereal during our visit     Discussed start of calorie counts with patient to obtain further information on PO p/t discharge. Encouraged patient to work on improving PO and she expresses that she is willing to try. Offered multiple bland food items to try later today, most of which she declines. She wishes to focus on taking more cereal today and ideally liquids. Eventually she was willing to try [employer] oral supplement drink as it is a similar composition to what she is receiving via tube.     Will continue to follow per protocol     Cherry Borrego, RD, LD  Clinical Dietitian   Units Gen Surg, Neuroscience, 88, Postpartum, L&D  Pager: [phone]

## 2023-02-07 NOTE — PROGRESS NOTES
Pt here with suicidal ideation. A&O x4. VSS. Low lactulose diet. TF running at 40ml/hr with 60ml flushes q4 hours. Takes pills whole with applesauce. Up with SBA. Pt scoring green on the Aggression Stop Light Tool. Pt performed straight cath x1 with RN support. C/o throat and ear pain, prn tylenol and atarax given overnight. On 72 hour hold with 1:1 sitter. Plan to transfer to inpatient psych when bed available.

## 2023-02-07 NOTE — PROGRESS NOTES
"SPIRITUAL HEALTH SERVICES Progress Note  [facility] Neuro Science    Saw pt Thea Castle per follow up visit prior to discharge.       Patient/Family Understanding of Illness and Goals of Care - Thea is preparing to discharge to inpatient mental health unit at [facility].       Distress and Loss     Thea shared her fear about the transfer to [facility] and the possibility of a civil commitment.    Thea named that she trusts God is at work \"but I'm not sure where he's up to\" in her life and through this hospitalization.      Strengths, Coping, and Resources - Thea named feelings of hope and reconciliation after reconnecting with her mother and her sister in the last few days.       Meaning, Beliefs, and Spirituality - Thea shared feeling comfort after talking with a friend in [place] who \"prayed over me\" and that she feels prayer is helpful. She welcomed me to pray the \"Prayer of Good Courage\" which was brought to her in a previous  visit.    I offered spiritual and emotional support through reflective listening that affirmed emotions, experience, and meaning. Encouraged Thea to connect with the spiritual health team at [facility] and to practice the coping skills she has learned and used during her hospitalization as resources that she carries with her.      Plan of Care - Nothing further at this time due to anticipated discharge.    Nichole Aguilera MDiv  Associate   Pager [phone]  [facility] pager [phone]  [facility] phone [phone]    [facility] available 24/7 for emergent requests/referrals, either by having the on-call  paged or by entering an ASAP/STAT consult in Epic (this will also page the on-call ).      "

## 2023-02-07 NOTE — PROGRESS NOTES
Pt here with suicidal ideation. A&O X4. VSS. Low lactulose diet, thin liquids. Takes pills whole in applesauce. Up with A1-2 GB. Several PRN pain medications. Smith pulled this afternoon. Pt has successfully straight cathed X2 with RN support. Pt zelalem Schmitt came this afternoon and brought all personal belongings home (blankets, stuffed animals, clothing, personal items and cell phone). Pt scoring greem on the Aggression Stop Light Tool. Currently on 72 hour started today 2/6. Discharge pending inpatient psych room availability. Pt feeding tube will need to be removed prior to transfer.

## 2023-02-07 NOTE — TELEPHONE ENCOUNTER
[facility] Access Inpatient Bed Call Log 2/6/2023 @ 7 PM   Facilities that have not updated websites in the last 12 hours have been called.      Adults:       [facility] is posting 0 beds.         [facility] is posting 0 beds. Negative covid.          [facility] has 0 beds.          [facility] is posting 0 beds.          [facility] are posting 0 beds.          [facility] is posting 0 beds. Negative COVID.         [facility] has 0 beds. Extensive wait List for committed patients.         [facility] is posting 0 bed.             [facility] is posting 0 beds. Mixed unit 12+. Low acuity only. Negative covid.          [facility] has 0 beds. No aggression.  Negative Covid.         [facility] is posting 0 beds.  Negative covid.         [facility] is posting 0 bed. Low acuity only.  Negative covid.          [facility] is posting 1 beds. Negative covid. Low acuity only.          [facility] has 0 beds. Low acuity. Negative covid.           [facility] is posting 0 beds. Low acuity. 72 HH hold preferred.          [facility] is posting 2 beds. Low acuity. Negative covid.          [facility] has 0 beds. Negative covid. Vol only, No history of aggression, violence, or assault. No sexual offenders. No 72 HH holds.             [facility] is posting 3 beds. Negative covid. (Must have the cognitive ability to do programming. No aggressive or violent behavior or recent HX in the last 2 yrs. MH must be primary.) Always low acuity.          [facility] has 0 beds. Negative Covid. Low acuity only. Violence and aggression capped.          [facility] is [facility] 0 beds. Low acuity, Negative Covid.          [facility] is posting 1 bed. Covid Negative. Vol only. Combined adolescent and adult unit. No aggressive or violent behavior. No registered sex offenders.          [facility] posting 0 beds. Negative covid.            [facility] is posting 4 beds. No covid test required.         [facility] 2 beds. Negative covid. (No lines, drains, or tubes, oxygen, CPAP, IV, etc.).       There are no appropriate beds available at this time.

## 2023-02-07 NOTE — TELEPHONE ENCOUNTER
11:27 AM Intake paged Trinidad for presentation onto station 20, awaiting response. .    11:53 AM Intake received call from Dr. Abdi, writer presented patient. Dr. Abdi will reach out to provider on [facility] unit for doc to doc report due to medical complexity. Dr. Abdi will call intake back after doc to doc report.     1:53 PM Intake received call from Dr. Abdi that the patient can be admitted as long as there is clarification if patient can self cath independently, if able, patient accepted.     1:54 PM Intake called [facility] and spoke to RN, per Sheyla on [facility], patient can self cath independently.     2:05 PM Intake called station 20 charge VU Strong with patient disposition, [facility].     2:06 PM Intake called Sheyla on [facility] that report can be called to station 20 when able.
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
## 2023-02-08 VITALS
WEIGHT: 123.2 LBS | DIASTOLIC BLOOD PRESSURE: 74 MMHG | HEIGHT: 64 IN | HEART RATE: 108 BPM | BODY MASS INDEX: 21.03 KG/M2 | RESPIRATION RATE: 16 BRPM | OXYGEN SATURATION: 98 % | TEMPERATURE: 98.8 F | SYSTOLIC BLOOD PRESSURE: 124 MMHG

## 2023-02-08 PROCEDURE — 250N000011 HC RX IP 250 OP 636

## 2023-02-08 PROCEDURE — 250N000013 HC RX MED GY IP 250 OP 250 PS 637

## 2023-02-08 PROCEDURE — 99238 HOSP IP/OBS DSCHRG MGMT 30/<: CPT | Mod: GC | Performed by: PSYCHIATRY & NEUROLOGY

## 2023-02-08 RX ORDER — LORAZEPAM 1 MG/1
1 TABLET ORAL AT BEDTIME
Qty: 30 TABLET | Refills: 0 | Status: SHIPPED | OUTPATIENT
Start: 2023-02-08 | End: 2023-04-14

## 2023-02-08 RX ORDER — QUETIAPINE FUMARATE 25 MG/1
12.5 TABLET, FILM COATED ORAL AT BEDTIME
Qty: 15 TABLET | Refills: 0 | Status: SHIPPED | OUTPATIENT
Start: 2023-02-08 | End: 2023-04-14

## 2023-02-08 RX ORDER — ARIPIPRAZOLE 15 MG/1
15 TABLET ORAL DAILY
Qty: 30 TABLET | Refills: 0 | Status: SHIPPED | OUTPATIENT
Start: 2023-02-09 | End: 2023-04-14

## 2023-02-08 RX ORDER — TAMSULOSIN HYDROCHLORIDE 0.4 MG/1
0.4 CAPSULE ORAL DAILY
Qty: 30 CAPSULE | Refills: 0 | Status: SHIPPED | OUTPATIENT
Start: 2023-02-09 | End: 2023-04-14

## 2023-02-08 RX ORDER — PANTOPRAZOLE SODIUM 40 MG/1
40 TABLET, DELAYED RELEASE ORAL 2 TIMES DAILY
Qty: 60 TABLET | Refills: 0 | Status: SHIPPED | OUTPATIENT
Start: 2023-02-08 | End: 2023-04-24

## 2023-02-08 RX ORDER — TRAZODONE HYDROCHLORIDE 50 MG/1
50 TABLET, FILM COATED ORAL AT BEDTIME
Qty: 30 TABLET | Refills: 0 | Status: SHIPPED | OUTPATIENT
Start: 2023-02-08 | End: 2023-04-14

## 2023-02-08 RX ORDER — HYDROXYZINE HYDROCHLORIDE 25 MG/1
25 TABLET, FILM COATED ORAL 2 TIMES DAILY PRN
Qty: 60 TABLET | Refills: 0 | Status: SHIPPED | OUTPATIENT
Start: 2023-02-08

## 2023-02-08 RX ORDER — PROMETHAZINE HYDROCHLORIDE 25 MG/1
25 TABLET ORAL 2 TIMES DAILY
Qty: 60 TABLET | Refills: 0 | Status: SHIPPED | OUTPATIENT
Start: 2023-02-08 | End: 2023-04-14

## 2023-02-08 RX ORDER — AMOXICILLIN 250 MG
1 CAPSULE ORAL 2 TIMES DAILY
Qty: 60 TABLET | Refills: 0 | Status: SHIPPED | OUTPATIENT
Start: 2023-02-08 | End: 2023-04-14

## 2023-02-08 RX ORDER — DULOXETIN HYDROCHLORIDE 60 MG/1
60 CAPSULE, DELAYED RELEASE ORAL DAILY
Qty: 30 CAPSULE | Refills: 0 | Status: SHIPPED | OUTPATIENT
Start: 2023-02-09 | End: 2023-07-12

## 2023-02-08 RX ADMIN — LACTULOSE 20 G: 20 SOLUTION ORAL at 08:02

## 2023-02-08 RX ADMIN — ACETAMINOPHEN 650 MG: 325 TABLET ORAL at 12:09

## 2023-02-08 RX ADMIN — CHOLECALCIFEROL TAB 25 MCG (1000 UNIT) 25 MCG: 25 TAB at 08:03

## 2023-02-08 RX ADMIN — LINACLOTIDE 290 MCG: 145 CAPSULE, GELATIN COATED ORAL at 08:03

## 2023-02-08 RX ADMIN — ONDANSETRON 4 MG: 4 TABLET, ORALLY DISINTEGRATING ORAL at 13:01

## 2023-02-08 RX ADMIN — PROMETHAZINE HYDROCHLORIDE 25 MG: 25 TABLET ORAL at 08:02

## 2023-02-08 RX ADMIN — Medication 250 MG: at 08:02

## 2023-02-08 RX ADMIN — HYDROXYZINE HYDROCHLORIDE 25 MG: 25 TABLET, FILM COATED ORAL at 11:38

## 2023-02-08 RX ADMIN — DULOXETINE HYDROCHLORIDE 60 MG: 60 CAPSULE, DELAYED RELEASE ORAL at 08:02

## 2023-02-08 RX ADMIN — TAMSULOSIN HYDROCHLORIDE 0.4 MG: 0.4 CAPSULE ORAL at 08:02

## 2023-02-08 RX ADMIN — ARIPIPRAZOLE 15 MG: 15 TABLET ORAL at 08:02

## 2023-02-08 RX ADMIN — MULTIPLE VITAMINS W/ MINERALS TAB 1 TABLET: TAB at 08:02

## 2023-02-08 RX ADMIN — HYDROXYZINE HYDROCHLORIDE 25 MG: 25 TABLET, FILM COATED ORAL at 05:22

## 2023-02-08 RX ADMIN — SENNOSIDES AND DOCUSATE SODIUM 1 TABLET: 50; 8.6 TABLET ORAL at 08:02

## 2023-02-08 RX ADMIN — LACTULOSE 20 G: 20 SOLUTION ORAL at 14:03

## 2023-02-08 RX ADMIN — PANTOPRAZOLE SODIUM 40 MG: 40 TABLET, DELAYED RELEASE ORAL at 08:02

## 2023-02-08 ASSESSMENT — ACTIVITIES OF DAILY LIVING (ADL)
HYGIENE/GROOMING: INDEPENDENT
ORAL_HYGIENE: INDEPENDENT
ADLS_ACUITY_SCORE: 50
DRESS: INDEPENDENT;SCRUBS (BEHAVIORAL HEALTH)
ADLS_ACUITY_SCORE: 50

## 2023-02-08 NOTE — PLAN OF CARE
Patient Belongings:     1 Meditation Journal   1 Optifoam Gentle   1 Mepilex border flex   1 New testament psalms   3 Message cards   1 [facility] Spiritual Card   1 Crayon Box   2 Guzman   1 Bar Soap   1 Aquaphor   7 papers   1 Moisturizer  1 Deodorant   1 Lip balm   1 Toothpaste   2 Shampoo   1 Ear plugs  1 Tooth brush   1 Hi Liter   1 Coloring Book  1 Deshawn card game box   1 Playing card box   1 Tissue box     A               Admission:  I am responsible for any personal items that are not sent to the safe or pharmacy.  [facility] is not responsible for loss, theft or damage of any property in my possession.    Signature:  _________________________________ Date: _______  Time: _____                                              Staff Signature:  ____________________________ Date: ________  Time: _____      2nd Staff person, if patient is unable/unwilling to sign:    Signature: ________________________________ Date: ________  Time: _____     Discharge:  [facility] has returned all of my personal belongings:    Signature: _________________________________ Date: ________  Time: _____                                          Staff Signature:  ____________________________ Date: ________  Time: _____

## 2023-02-08 NOTE — PLAN OF CARE
Patient sleeping most of the shift. Patient no complaints of pain and discomfort. Patient on SIO (1:1) for SI precautions and high risk for fall. Patient on seizure precaution, no seizure activity noted this shift. Patient woke up @ 0515. Patient provided with intermittent catheterization tray kit. Patient able to perform self-catheterization. The patient had 350 ml of clear yellow urine per her report. Used catheterization tray kit collected and disposed to utility room. The patient is requesting a chair she can use when performing self-catheterization.  Patient resented with flat affect, endorsed anxiety rate 6/10-atarax given. Patient went back in her room, ear plug provided per her request. Will continue to monitor the patient and provide therapeutic intervention as needed. Will continue with current plan of care. The patient appears to have slept for 6.5 hours.   Problem: Sleep Disturbance  Goal: Adequate Sleep/Rest  Outcome: Progressing
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
## 2023-02-08 NOTE — PHARMACY-ADMISSION MEDICATION HISTORY
Please see Admission Medication History note completed by a pharmacist on 01/04/2023 under previous encounter at [facility] for information regarding prior to admission medications.    Shanta Swan, PharmD, [facility]  Behavioral Health Pharmacist  [facility] Ascom: *[phone] or *[phone]
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
## 2023-02-08 NOTE — PLAN OF CARE
Assessment/Intervention/Current Symptoms and Care Coordination: Met with team. Reviewed patient's chart and progress notes.     - On 2/7 at 4:00 PM, Writer was informed by pre-petition screener, Leah Tomas, that [place] petition was not supported. Further, Leah asked if the hospital was planning on appealing the court's decision. Writer informed leah that patient was in transit to [facility] and was not in the unit yet. Writer contacted attending provider Dr. Abdi to inform him of the court's decision and court's request on a decision to appeal. Attending stated that he will need to examine patient first before making a recommendation to the court, but based on reviewing pt's chart he was inclined to NOT to appeal the court's decision. Further, Writer informed court screener of [facility] attending likely inclination on this case.     - Patient met with team. Today, patient denies any SI or plan, she reports willingness to attend DBT treatment and willingness to work with treatment team aftercare recommendations. Patient share with tx team her relapse prevention plan. Tx team contacted pt's mother and this reported no concerns in pt returning home if she were to be discharged today.     - Attending provider contacted the [facility] court representative in pt's case. Attending explained that based on examination and pt's willingness to engage in aftercare tx, discharged was recommended.     - Writer sent referral for DBT tx to DBT - PTSD specialist. Patient will be contacted within 1-2 business days. Writer sent referral to [facility] for Adult MH Case Management services to [facility].     - Writer schedule follow up psychiatry appointment with [facility] provider.       Discharge Plan or Goal: Will be discharged home and will follow up with outpatient providers          Barriers to Discharge: None

## 2023-02-08 NOTE — DISCHARGE INSTRUCTIONS
Behavioral Discharge Planning and Instructions    Summary: You were admitted on 2/7/2023  due to  SA .  You were treated by Dr. Abdi and discharged on 2/8/2023 from [facility] to Home    Main Diagnosis: anorexia nervosa, depression, OCD, PTSD and BPD       Health Care Follow-up:     [facility]  Appointment Date/Time: 3/15/2023 at 4:40 PM. This will a phone call appointment   Psychiatrist: Dr. Miranda Cortes  Phone: [phone]    [facility]- [place]  Appointment Date/Time: Dr De La O (pts primary medical MD)  Pt called for appt awaiting call back for appt      You were referred to:  [facility]  Address: [address] [place] [address] [address], [place] [address]  Phone: [phone]  Please Contact DBT - PTSD Specialist if you do not hear back from them in 1-2 business days.     Referral was sent to [facility] for Adult Mental Health Case Management Services. Please call [facility] at [phone] to follow up on your referral.     Additional DBT Resources  [facility]  Address: [address], [place] [address]  Phone: [phone]    [facility]  Address: [address], [place] [address]  Phone: [phone]    [facility],   Address: [address], [place] [address]  Phone: [phone]    [facility]  Address: [address], [place] [address]  Phone: [phone]    [facility]  Address: [address], [place] [address]  Phone: [phone]    [facility] with [facility]  Phone: [phone]         Attend all scheduled appointments with your outpatient providers. Call at least 24 hours in advance if you need to reschedule an appointment to ensure continued access to your outpatient providers.     Major Treatments, Procedures and Findings:  You were provided with: a psychiatric assessment, assessed for medical  "stability, medication evaluation and/or management, group therapy, and milieu management    Symptoms to Report: feeling more aggressive, increased confusion, losing more sleep, mood getting worse, or thoughts of suicide    Early warning signs can include: increased depression or anxiety sleep disturbances increased thoughts or behaviors of suicide or self-harm  increased unusual thinking, such as paranoia or hearing voices    Safety and Wellness:  Take all medicines as directed.  Make no changes unless your doctor suggests them. Follow treatment recommendations.  Refrain from alcohol and non-prescribed drugs.  Ask your support system to help you reduce your access to items that could harm yourself or others. If there is a concern for safety, call 911.    Resources:   Crisis Intervention: 712.523.9168 or 936-125-3232 (TTY: 740.767.5063).  Call anytime for help.  National [place] on Mental Illness (www.[place].celestine.org): 224.665.9495 or 216-555-6386.  Alcoholics Anonymous (www.alcoholics-anonymous.org): Check your phone book for your local chapter.  Suicide Awareness Voices of Education (SAVE) (www.save.org): 404-840-UAIJ (7583)  National Suicide Prevention Line (www.mentalhealthmn.org): 511-241-VGHL (5509)  Mental Health Consumer/Survivor Network of [place] (www.mhcsn.net): 345.491.6273 or 106-517-6709  Mental Health Association of [place] (www.mentalhealth.org): 165.561.1901 or 317-466-0329  [facility] Crisis (COPE) Response - Adult 705 416-0529  Text 4 Life: txt \"LIFE\" to 84060 for immediate support and crisis intervention  Crisis text line: Text \"[place]\" to 313917. Free, confidential, 24/7.    General Medication Instructions:   See your medication sheet(s) for instructions.   Take all medicines as directed.  Make no changes unless your doctor suggests them.   Go to all your doctor visits.  Be sure to have all your required lab tests. This way, your medicines can be refilled on time.  Do not use any drugs not prescribed by your " doctor.  Avoid alcohol.    Advance Directives:   Scanned document on file with [facility]? No scanned doc  Is document scanned? No. Copy Requested.  Honoring Choices Your Rights Handout: Informed and given  Was more information offered? Pt declined    The Treatment team has appreciated the opportunity to work with you. If you have any questions or concerns about your recent admission, you can contact the unit which can receive your call 24 hours a day, 7 days a week. They will be able to get in touch with a Provider if needed. The unit number is [phone].

## 2023-02-08 NOTE — PROGRESS NOTES
Pt transferred to [facility] inpatient unit. Reviewed careplan. Removed IV and NG tube. Pt tearful at times and anxious about transfer. PRN medications given. PT had already notified mother of transfer today.

## 2023-02-09 ENCOUNTER — PATIENT OUTREACH (OUTPATIENT)
Dept: CARE COORDINATION | Facility: CLINIC | Age: 23
End: 2023-02-09
Payer: COMMERCIAL

## 2023-02-09 NOTE — PROGRESS NOTES
Clinic Care Coordination Contact  [facility]: Post-Discharge Note  SITUATION                                                      Admission:    Admission Date: 02/07/23        Reason for Admission: suicide attempt by 20-30 tabs of gabapentin 300mg  Discharge:   Discharge Date: 02/08/23  Discharge Diagnosis: suicide attempt by 20-30 tabs of gabapentin 300mg    BACKGROUND                                                    Psychiatric Course:  Thea Castle was admitted to [facility] with attending Emma Abdi MD on a 72 hour mental health hold, but patient subsequently signed in voluntarily.     1. Medication/Treatment Trials and Changes : one sentence, medications changes and why  Medication Max Dose (mg) Date Started & Ended Reason Started Reason Discontinued   Risperdal 10  3/15/22-3/25/22  Chosen as this could be continued outpatient with potential to change to [place]; Helped reduce AH initially Pt had akathisia, intolerable so discontinued                               2. Legal Status:   Patient was on a 72-hour hold and outside provider requested a commitment, which was then denied. Our assessment on the day of discharge is that an appeal for this is not necessary and therefore the patient is voluntary.      3. Group Attendance:   N/A    4. Level of cooperation and Medication adherence:   Patient has been compliant with PTA medications here. Per report from outside ED she took medications without issue there as well. She did have a lot of specific requests related to [facility], but was able to be redirected away from chronic Smith use.     5. Change in psychiatric symptoms:   Patient denies any active suicidal ideation, has ongoing unchanged passive suicidal ideation and is able to contract for safety. Her interpersonal anxiety has improved per chart review. She is open to outpatient DBT, a change from prior refusals.     Medical Course: Thea Castle was medically cleared by the ED prior to  admission to the unit. PTA medications were continued.      Notable labs: N/A     Risk Assessment:      Today Thea Castle denies acute suicidal ideation. Patient has notable risk factors for self-harm, including single status, anxiety, recent loss of grandmother and previous suicide attempts. However, risk is mitigated by Yarsanism beliefs, ability to volunteer a safety plan and history of seeking help when needed. Therefore, based on all available evidence including the factors cited above, she does not appear to be at imminent risk for self-harm, does not meet criteria for a 72-hr hold, and therefore remains appropriate for ongoing outpatient level of care. Additional steps taken to minimize risk include: medication optimization, close psychiatric follow up and provision of crisis resources. Voluntary referral for DBT was offered, she accepted this offer.     Thea was released to home. During this admission, she did not participate in groups and was visible in the milieu, and her symptoms of depression and suicidal ideation improved. At the time of discharge she was determined to not be a danger to herself or others.      This document serves as a transfer of care to Thea Castle's outpatient providers.        ASSESSMENT           Discharge Assessment  How are you doing now that you are home?: Patient shares that she is doing really well. Has all upcoming appt's and medications. Declines needing resources or having questions at this time.  How are your symptoms? (Red Flag symptoms escalate to triage hotline per guidelines): Improved  Do you feel your condition is stable enough to be safe at home until your provider visit?: Yes  Does the patient have their discharge instructions? : Yes  Does the patient have questions regarding their discharge instructions? : No  Were you started on any new medications or were there changes to any of your previous medications? : Yes  Does the patient have  all of their medications?: Yes  Do you have questions regarding any of your medications? : No  Do you have all of your needed medical supplies or equipment (DME)?  (i.e. oxygen tank, CPAP, cane, etc.): Yes  Discharge follow-up appointment scheduled within 14 calendar days? : Yes  Discharge Follow Up Appointment Date: 02/15/23  Discharge Follow Up Appointment Scheduled with?: Specialty Care Provider    Post-op ([facility] Only)  If the patient had a surgery or procedure, do they have any questions for a nurse?: No    Post-op (Clinicians Only)  Did the patient have surgery or a procedure: No  Fever: No  Chills: No        PLAN                                                      Outpatient Plan:  [facility]  Appointment Date/Time: 3/15/2023 at 4:40 PM. This will a phone call appointment   Psychiatrist: Dr. Miranda Cortes  Phone: [phone]  Psychotherapy appointments: [facility]  Address: [address] [place] [address]  Phone: [phone]  Please Contact DBT - PTSD Specialist if you do not hear back from them in 1-2 business days.      Referral was sent to [facility] for Adult Mental Health Case Management Services. Please call [facility] at [phone] to follow up on your referral.    No future appointments.      For any urgent concerns, please contact our 24 hour nurse triage line: [phone] ([phone])         DIVYA Munoz

## 2023-04-13 LAB
ALBUMIN SERPL BCG-MCNC: 4.3 G/DL (ref 3.5–5.2)
ALP SERPL-CCNC: 69 U/L (ref 35–104)
ALT SERPL W P-5'-P-CCNC: 43 U/L (ref 10–35)
ANION GAP SERPL CALCULATED.3IONS-SCNC: 11 MMOL/L (ref 7–15)
APTT PPP: 27 SECONDS (ref 22–38)
AST SERPL W P-5'-P-CCNC: 37 U/L (ref 10–35)
BASOPHILS # BLD AUTO: 0 10E3/UL (ref 0–0.2)
BASOPHILS NFR BLD AUTO: 0 %
BILIRUB SERPL-MCNC: 0.5 MG/DL
BUN SERPL-MCNC: 13 MG/DL (ref 6–20)
CALCIUM SERPL-MCNC: 8.8 MG/DL (ref 8.6–10)
CHLORIDE SERPL-SCNC: 106 MMOL/L (ref 98–107)
CREAT SERPL-MCNC: 0.85 MG/DL (ref 0.51–0.95)
DEPRECATED HCO3 PLAS-SCNC: 23 MMOL/L (ref 22–29)
EOSINOPHIL # BLD AUTO: 0 10E3/UL (ref 0–0.7)
EOSINOPHIL NFR BLD AUTO: 0 %
ERYTHROCYTE [DISTWIDTH] IN BLOOD BY AUTOMATED COUNT: 12.7 % (ref 10–15)
GFR SERPL CREATININE-BSD FRML MDRD: >90 ML/MIN/1.73M2
GLUCOSE SERPL-MCNC: 106 MG/DL (ref 70–99)
HCG SERPL QL: NEGATIVE
HCT VFR BLD AUTO: 34 % (ref 35–47)
HGB BLD-MCNC: 11.6 G/DL (ref 11.7–15.7)
IMM GRANULOCYTES # BLD: 0 10E3/UL
IMM GRANULOCYTES NFR BLD: 0 %
INR PPP: 1.04 (ref 0.85–1.15)
LIPASE SERPL-CCNC: 19 U/L (ref 13–60)
LYMPHOCYTES # BLD AUTO: 2.4 10E3/UL (ref 0.8–5.3)
LYMPHOCYTES NFR BLD AUTO: 18 %
MCH RBC QN AUTO: 29.8 PG (ref 26.5–33)
MCHC RBC AUTO-ENTMCNC: 34.1 G/DL (ref 31.5–36.5)
MCV RBC AUTO: 87 FL (ref 78–100)
MONOCYTES # BLD AUTO: 0.6 10E3/UL (ref 0–1.3)
MONOCYTES NFR BLD AUTO: 5 %
NEUTROPHILS # BLD AUTO: 10.6 10E3/UL (ref 1.6–8.3)
NEUTROPHILS NFR BLD AUTO: 77 %
NRBC # BLD AUTO: 0 10E3/UL
NRBC BLD AUTO-RTO: 0 /100
PLATELET # BLD AUTO: 331 10E3/UL (ref 150–450)
POTASSIUM SERPL-SCNC: 3.7 MMOL/L (ref 3.4–5.3)
PROT SERPL-MCNC: 7 G/DL (ref 6.4–8.3)
RBC # BLD AUTO: 3.89 10E6/UL (ref 3.8–5.2)
SODIUM SERPL-SCNC: 140 MMOL/L (ref 136–145)
WBC # BLD AUTO: 13.7 10E3/UL (ref 4–11)

## 2023-04-13 PROCEDURE — 85025 COMPLETE CBC W/AUTO DIFF WBC: CPT | Performed by: EMERGENCY MEDICINE

## 2023-04-13 PROCEDURE — 85730 THROMBOPLASTIN TIME PARTIAL: CPT | Performed by: EMERGENCY MEDICINE

## 2023-04-13 PROCEDURE — 36415 COLL VENOUS BLD VENIPUNCTURE: CPT | Performed by: EMERGENCY MEDICINE

## 2023-04-13 PROCEDURE — 85610 PROTHROMBIN TIME: CPT | Performed by: EMERGENCY MEDICINE

## 2023-04-13 PROCEDURE — 99285 EMERGENCY DEPT VISIT HI MDM: CPT | Mod: 25

## 2023-04-13 PROCEDURE — 96361 HYDRATE IV INFUSION ADD-ON: CPT

## 2023-04-13 PROCEDURE — 83690 ASSAY OF LIPASE: CPT | Performed by: EMERGENCY MEDICINE

## 2023-04-13 PROCEDURE — 84703 CHORIONIC GONADOTROPIN ASSAY: CPT | Performed by: EMERGENCY MEDICINE

## 2023-04-13 PROCEDURE — 80053 COMPREHEN METABOLIC PANEL: CPT | Performed by: EMERGENCY MEDICINE

## 2023-04-13 PROCEDURE — 258N000003 HC RX IP 258 OP 636: Performed by: EMERGENCY MEDICINE

## 2023-04-13 RX ADMIN — SODIUM CHLORIDE 1000 ML: 9 INJECTION, SOLUTION INTRAVENOUS at 18:41

## 2023-04-13 NOTE — ED NOTES
Rapid Assessment Note    History:   Thea Castle is a 23 year old female with history of eating disorder and possibly gastroparesis who presents with sudden onset of left-sided abdominal pain and bright red blood per rectum at 0100 today.  She was seen at urgent care where she had hemoglobin of 12.7 and positive Hemoccult.  Since that visit she has had more bright and dark red blood per rectum (presents with a sample as photographed below) including dime size clots which prompted her visit.  She has had 8 total episodes.  She has had no vomiting.  She does have history of hematochezia and review of medical records indicates she has had colonoscopy September 2022 which was normal.  She denies use of suppositories and is no longer taking laxatives outside of [place].        Exam:   General:  Alert, interactive  Cardiovascular:  Well perfused  Lungs:  No respiratory distress, no accessory muscle use  Abdomen: I do not appreciate any abdominal tenderness on limited seated exam.  Neuro:  Moving all 4 extremities  Skin:  Warm, dry  Psych:  Normal affect    Plan of Care:   I evaluated the patient and developed an initial plan of care. I discussed this plan and explained that I, or one of my partners, would be returning to complete the evaluation.     23-year-old woman presenting with hematochezia and left-sided abdominal pain.  No reproducible tenderness.  Imaging deferred for now.  Laboratory studies including coagulation studies ordered.  Fluids ordered.    4/13/2023  [facility]       Inga Sanchez MD  04/13/23 6517

## 2023-04-13 NOTE — ED TRIAGE NOTES
Patient presents to ER for complaints of rectal bleeding. States that since 2 am has been having bloody bowel movements. Dry heaving. No thinners. Reports hx of anorexia and gastroparesis      Triage Assessment     Row Name 04/13/23 0903       Triage Assessment (Adult)    Airway WDL WDL       Respiratory WDL    Respiratory WDL WDL       Skin Circulation/Temperature WDL    Skin Circulation/Temperature WDL WDL       Cardiac WDL    Cardiac WDL WDL       Peripheral/Neurovascular WDL    Peripheral Neurovascular WDL WDL       Cognitive/Neuro/Behavioral WDL    Cognitive/Neuro/Behavioral WDL WDL

## 2023-04-14 ENCOUNTER — ANESTHESIA (OUTPATIENT)
Dept: GASTROENTEROLOGY | Facility: CLINIC | Age: 23
DRG: 394 | End: 2023-04-14
Payer: COMMERCIAL

## 2023-04-14 ENCOUNTER — ANESTHESIA EVENT (OUTPATIENT)
Dept: GASTROENTEROLOGY | Facility: CLINIC | Age: 23
DRG: 394 | End: 2023-04-14
Payer: COMMERCIAL

## 2023-04-14 ENCOUNTER — HOSPITAL ENCOUNTER (INPATIENT)
Facility: CLINIC | Age: 23
LOS: 3 days | Discharge: HOME | DRG: 394 | End: 2023-04-20
Attending: EMERGENCY MEDICINE | Admitting: INTERNAL MEDICINE
Payer: COMMERCIAL

## 2023-04-14 ENCOUNTER — APPOINTMENT (OUTPATIENT)
Dept: CT IMAGING | Facility: CLINIC | Age: 23
DRG: 394 | End: 2023-04-14
Attending: EMERGENCY MEDICINE
Payer: COMMERCIAL

## 2023-04-14 DIAGNOSIS — K62.89 PROCTITIS: Primary | ICD-10-CM

## 2023-04-14 DIAGNOSIS — K92.2 GASTROINTESTINAL HEMORRHAGE, UNSPECIFIED GASTROINTESTINAL HEMORRHAGE TYPE: ICD-10-CM

## 2023-04-14 PROBLEM — Z72.89 SELF-INJURIOUS BEHAVIOR: Status: ACTIVE | Noted: 2020-06-08

## 2023-04-14 PROBLEM — F60.3 BORDERLINE PERSONALITY DISORDER (H): Status: ACTIVE | Noted: 2019-05-08

## 2023-04-14 LAB
ABO/RH(D): NORMAL
ANTIBODY SCREEN: NEGATIVE
FLEXIBLE SIGMOIDOSCOPY: NORMAL
HCG SER QL IA.RAPID: NEGATIVE
HGB BLD-MCNC: 9.8 G/DL (ref 11.7–15.7)
HGB BLD-MCNC: 9.9 G/DL (ref 11.7–15.7)
SPECIMEN EXPIRATION DATE: NORMAL

## 2023-04-14 PROCEDURE — 96375 TX/PRO/DX INJ NEW DRUG ADDON: CPT

## 2023-04-14 PROCEDURE — 250N000009 HC RX 250: Performed by: EMERGENCY MEDICINE

## 2023-04-14 PROCEDURE — 250N000011 HC RX IP 250 OP 636: Performed by: EMERGENCY MEDICINE

## 2023-04-14 PROCEDURE — 250N000011 HC RX IP 250 OP 636: Performed by: INTERNAL MEDICINE

## 2023-04-14 PROCEDURE — 250N000013 HC RX MED GY IP 250 OP 250 PS 637: Performed by: NURSE PRACTITIONER

## 2023-04-14 PROCEDURE — G0378 HOSPITAL OBSERVATION PER HR: HCPCS

## 2023-04-14 PROCEDURE — 999N000010 HC STATISTIC ANES STAT CODE-CRNA PER MINUTE: Performed by: INTERNAL MEDICINE

## 2023-04-14 PROCEDURE — 36415 COLL VENOUS BLD VENIPUNCTURE: CPT | Performed by: INTERNAL MEDICINE

## 2023-04-14 PROCEDURE — 0DBP8ZX EXCISION OF RECTUM, VIA NATURAL OR ARTIFICIAL OPENING ENDOSCOPIC, DIAGNOSTIC: ICD-10-PCS | Performed by: INTERNAL MEDICINE

## 2023-04-14 PROCEDURE — 258N000003 HC RX IP 258 OP 636: Performed by: EMERGENCY MEDICINE

## 2023-04-14 PROCEDURE — 88305 TISSUE EXAM BY PATHOLOGIST: CPT | Mod: TC | Performed by: INTERNAL MEDICINE

## 2023-04-14 PROCEDURE — 250N000013 HC RX MED GY IP 250 OP 250 PS 637: Performed by: INTERNAL MEDICINE

## 2023-04-14 PROCEDURE — 88305 TISSUE EXAM BY PATHOLOGIST: CPT | Mod: 26 | Performed by: PATHOLOGY

## 2023-04-14 PROCEDURE — 250N000011 HC RX IP 250 OP 636: Performed by: ANESTHESIOLOGY

## 2023-04-14 PROCEDURE — 74177 CT ABD & PELVIS W/CONTRAST: CPT

## 2023-04-14 PROCEDURE — 370N000017 HC ANESTHESIA TECHNICAL FEE, PER MIN: Performed by: INTERNAL MEDICINE

## 2023-04-14 PROCEDURE — 36415 COLL VENOUS BLD VENIPUNCTURE: CPT | Performed by: EMERGENCY MEDICINE

## 2023-04-14 PROCEDURE — 250N000013 HC RX MED GY IP 250 OP 250 PS 637: Performed by: EMERGENCY MEDICINE

## 2023-04-14 PROCEDURE — C9113 INJ PANTOPRAZOLE SODIUM, VIA: HCPCS | Performed by: EMERGENCY MEDICINE

## 2023-04-14 PROCEDURE — 96376 TX/PRO/DX INJ SAME DRUG ADON: CPT

## 2023-04-14 PROCEDURE — 86850 RBC ANTIBODY SCREEN: CPT | Performed by: EMERGENCY MEDICINE

## 2023-04-14 PROCEDURE — 96361 HYDRATE IV INFUSION ADD-ON: CPT

## 2023-04-14 PROCEDURE — 84703 CHORIONIC GONADOTROPIN ASSAY: CPT

## 2023-04-14 PROCEDURE — 85018 HEMOGLOBIN: CPT | Performed by: INTERNAL MEDICINE

## 2023-04-14 PROCEDURE — 258N000003 HC RX IP 258 OP 636: Performed by: ANESTHESIOLOGY

## 2023-04-14 PROCEDURE — 250N000009 HC RX 250: Performed by: ANESTHESIOLOGY

## 2023-04-14 PROCEDURE — 99223 1ST HOSP IP/OBS HIGH 75: CPT | Mod: AI | Performed by: INTERNAL MEDICINE

## 2023-04-14 PROCEDURE — 96374 THER/PROPH/DIAG INJ IV PUSH: CPT | Mod: 59

## 2023-04-14 PROCEDURE — 45331 SIGMOIDOSCOPY AND BIOPSY: CPT | Performed by: INTERNAL MEDICINE

## 2023-04-14 RX ORDER — ACETAMINOPHEN 325 MG/1
650 TABLET ORAL EVERY 4 HOURS PRN
Status: DISCONTINUED | OUTPATIENT
Start: 2023-04-14 | End: 2023-04-20 | Stop reason: HOSPADM

## 2023-04-14 RX ORDER — ONDANSETRON 2 MG/ML
4 INJECTION INTRAMUSCULAR; INTRAVENOUS EVERY 6 HOURS PRN
Status: DISCONTINUED | OUTPATIENT
Start: 2023-04-14 | End: 2023-04-20 | Stop reason: HOSPADM

## 2023-04-14 RX ORDER — LORAZEPAM 0.5 MG/1
0.5 TABLET ORAL AT BEDTIME
Status: DISCONTINUED | OUTPATIENT
Start: 2023-04-14 | End: 2023-04-20 | Stop reason: HOSPADM

## 2023-04-14 RX ORDER — PROCHLORPERAZINE 25 MG
25 SUPPOSITORY, RECTAL RECTAL EVERY 12 HOURS PRN
Status: DISCONTINUED | OUTPATIENT
Start: 2023-04-14 | End: 2023-04-20 | Stop reason: HOSPADM

## 2023-04-14 RX ORDER — ONDANSETRON 2 MG/ML
4 INJECTION INTRAMUSCULAR; INTRAVENOUS ONCE
Status: COMPLETED | OUTPATIENT
Start: 2023-04-14 | End: 2023-04-14

## 2023-04-14 RX ORDER — LORAZEPAM 0.5 MG/1
0.5 TABLET ORAL AT BEDTIME
COMMUNITY

## 2023-04-14 RX ORDER — IOPAMIDOL 755 MG/ML
62 INJECTION, SOLUTION INTRAVASCULAR ONCE
Status: COMPLETED | OUTPATIENT
Start: 2023-04-14 | End: 2023-04-14

## 2023-04-14 RX ORDER — DIPHENHYDRAMINE HYDROCHLORIDE 50 MG/ML
25 INJECTION INTRAMUSCULAR; INTRAVENOUS ONCE
Status: COMPLETED | OUTPATIENT
Start: 2023-04-14 | End: 2023-04-14

## 2023-04-14 RX ORDER — ARIPIPRAZOLE 10 MG/1
10 TABLET ORAL DAILY
COMMUNITY

## 2023-04-14 RX ORDER — DEXMEDETOMIDINE HYDROCHLORIDE 4 UG/ML
INJECTION, SOLUTION INTRAVENOUS PRN
Status: DISCONTINUED | OUTPATIENT
Start: 2023-04-14 | End: 2023-04-14

## 2023-04-14 RX ORDER — DROSPIRENONE AND ETHINYL ESTRADIOL 0.02-3(28)
1 KIT ORAL DAILY
Status: DISCONTINUED | OUTPATIENT
Start: 2023-04-14 | End: 2023-04-14

## 2023-04-14 RX ORDER — PROMETHAZINE HYDROCHLORIDE 25 MG/1
25 TABLET ORAL 2 TIMES DAILY
Status: DISCONTINUED | OUTPATIENT
Start: 2023-04-14 | End: 2023-04-18

## 2023-04-14 RX ORDER — ONDANSETRON 4 MG/1
4 TABLET, ORALLY DISINTEGRATING ORAL EVERY 6 HOURS PRN
Status: DISCONTINUED | OUTPATIENT
Start: 2023-04-14 | End: 2023-04-20 | Stop reason: HOSPADM

## 2023-04-14 RX ORDER — PROMETHAZINE HYDROCHLORIDE 25 MG/1
25 TABLET ORAL EVERY 6 HOURS PRN
COMMUNITY

## 2023-04-14 RX ORDER — PANTOPRAZOLE SODIUM 40 MG/1
40 TABLET, DELAYED RELEASE ORAL 2 TIMES DAILY
Status: DISCONTINUED | OUTPATIENT
Start: 2023-04-14 | End: 2023-04-20 | Stop reason: HOSPADM

## 2023-04-14 RX ORDER — LIDOCAINE HYDROCHLORIDE 20 MG/ML
INJECTION, SOLUTION INFILTRATION; PERINEURAL PRN
Status: DISCONTINUED | OUTPATIENT
Start: 2023-04-14 | End: 2023-04-14

## 2023-04-14 RX ORDER — PROPOFOL 10 MG/ML
INJECTION, EMULSION INTRAVENOUS CONTINUOUS PRN
Status: DISCONTINUED | OUTPATIENT
Start: 2023-04-14 | End: 2023-04-14

## 2023-04-14 RX ORDER — HYDROXYZINE HYDROCHLORIDE 25 MG/1
25 TABLET, FILM COATED ORAL 2 TIMES DAILY PRN
Status: DISCONTINUED | OUTPATIENT
Start: 2023-04-14 | End: 2023-04-20 | Stop reason: HOSPADM

## 2023-04-14 RX ORDER — ARIPIPRAZOLE 10 MG/1
10 TABLET ORAL DAILY
Status: DISCONTINUED | OUTPATIENT
Start: 2023-04-14 | End: 2023-04-20 | Stop reason: HOSPADM

## 2023-04-14 RX ORDER — ACETAMINOPHEN 500 MG
1000 TABLET ORAL ONCE
Status: COMPLETED | OUTPATIENT
Start: 2023-04-14 | End: 2023-04-14

## 2023-04-14 RX ORDER — PROCHLORPERAZINE MALEATE 10 MG
10 TABLET ORAL EVERY 6 HOURS PRN
Status: DISCONTINUED | OUTPATIENT
Start: 2023-04-14 | End: 2023-04-20 | Stop reason: HOSPADM

## 2023-04-14 RX ORDER — LIDOCAINE 40 MG/G
CREAM TOPICAL
Status: DISCONTINUED | OUTPATIENT
Start: 2023-04-14 | End: 2023-04-14 | Stop reason: HOSPADM

## 2023-04-14 RX ORDER — SODIUM CHLORIDE, SODIUM LACTATE, POTASSIUM CHLORIDE, CALCIUM CHLORIDE 600; 310; 30; 20 MG/100ML; MG/100ML; MG/100ML; MG/100ML
INJECTION, SOLUTION INTRAVENOUS CONTINUOUS PRN
Status: DISCONTINUED | OUTPATIENT
Start: 2023-04-14 | End: 2023-04-14

## 2023-04-14 RX ORDER — ONDANSETRON 2 MG/ML
INJECTION INTRAMUSCULAR; INTRAVENOUS PRN
Status: DISCONTINUED | OUTPATIENT
Start: 2023-04-14 | End: 2023-04-14

## 2023-04-14 RX ORDER — DULOXETIN HYDROCHLORIDE 60 MG/1
60 CAPSULE, DELAYED RELEASE ORAL DAILY
Status: DISCONTINUED | OUTPATIENT
Start: 2023-04-14 | End: 2023-04-20 | Stop reason: HOSPADM

## 2023-04-14 RX ADMIN — SODIUM PHOSPHATE, DIBASIC AND SODIUM PHOSPHATE, MONOBASIC 1 ENEMA: 7; 19 ENEMA RECTAL at 12:23

## 2023-04-14 RX ADMIN — PROMETHAZINE HYDROCHLORIDE 25 MG: 25 TABLET ORAL at 21:30

## 2023-04-14 RX ADMIN — PANTOPRAZOLE SODIUM 40 MG: 40 TABLET, DELAYED RELEASE ORAL at 21:30

## 2023-04-14 RX ADMIN — ARIPIPRAZOLE 10 MG: 10 TABLET ORAL at 16:13

## 2023-04-14 RX ADMIN — SODIUM PHOSPHATE, DIBASIC AND SODIUM PHOSPHATE, MONOBASIC 1 ENEMA: 7; 19 ENEMA RECTAL at 11:33

## 2023-04-14 RX ADMIN — DULOXETINE HYDROCHLORIDE 60 MG: 60 CAPSULE, DELAYED RELEASE ORAL at 16:13

## 2023-04-14 RX ADMIN — PANTOPRAZOLE SODIUM 40 MG: 40 TABLET, DELAYED RELEASE ORAL at 16:13

## 2023-04-14 RX ADMIN — HYDROXYZINE HYDROCHLORIDE 25 MG: 25 TABLET, FILM COATED ORAL at 16:13

## 2023-04-14 RX ADMIN — ONDANSETRON 4 MG: 2 INJECTION INTRAMUSCULAR; INTRAVENOUS at 09:17

## 2023-04-14 RX ADMIN — PANTOPRAZOLE SODIUM 40 MG: 40 INJECTION, POWDER, FOR SOLUTION INTRAVENOUS at 04:58

## 2023-04-14 RX ADMIN — LINACLOTIDE 290 MCG: 290 CAPSULE, GELATIN COATED ORAL at 16:14

## 2023-04-14 RX ADMIN — LIDOCAINE HYDROCHLORIDE 50 MG: 20 INJECTION, SOLUTION INFILTRATION; PERINEURAL at 12:51

## 2023-04-14 RX ADMIN — IOPAMIDOL 62 ML: 755 INJECTION, SOLUTION INTRAVENOUS at 01:22

## 2023-04-14 RX ADMIN — SODIUM CHLORIDE 1000 ML: 9 INJECTION, SOLUTION INTRAVENOUS at 02:03

## 2023-04-14 RX ADMIN — ONDANSETRON 4 MG: 2 INJECTION INTRAMUSCULAR; INTRAVENOUS at 02:03

## 2023-04-14 RX ADMIN — SODIUM CHLORIDE, POTASSIUM CHLORIDE, SODIUM LACTATE AND CALCIUM CHLORIDE: 600; 310; 30; 20 INJECTION, SOLUTION INTRAVENOUS at 12:48

## 2023-04-14 RX ADMIN — ACETAMINOPHEN 1000 MG: 500 TABLET ORAL at 04:21

## 2023-04-14 RX ADMIN — DEXMEDETOMIDINE HYDROCHLORIDE 12 MCG: 200 INJECTION INTRAVENOUS at 12:48

## 2023-04-14 RX ADMIN — PHENYLEPHRINE HYDROCHLORIDE 100 MCG: 10 INJECTION INTRAVENOUS at 12:59

## 2023-04-14 RX ADMIN — DIPHENHYDRAMINE HYDROCHLORIDE 25 MG: 50 INJECTION, SOLUTION INTRAMUSCULAR; INTRAVENOUS at 02:03

## 2023-04-14 RX ADMIN — PROPOFOL 200 MCG/KG/MIN: 10 INJECTION, EMULSION INTRAVENOUS at 12:53

## 2023-04-14 RX ADMIN — SODIUM CHLORIDE 60 ML: 9 INJECTION, SOLUTION INTRAVENOUS at 01:23

## 2023-04-14 RX ADMIN — ONDANSETRON 4 MG: 4 TABLET, ORALLY DISINTEGRATING ORAL at 15:49

## 2023-04-14 RX ADMIN — Medication 1 MG: at 21:30

## 2023-04-14 RX ADMIN — PROMETHAZINE HYDROCHLORIDE 25 MG: 25 TABLET ORAL at 10:53

## 2023-04-14 RX ADMIN — PROCHLORPERAZINE EDISYLATE 10 MG: 5 INJECTION INTRAMUSCULAR; INTRAVENOUS at 19:37

## 2023-04-14 RX ADMIN — DEXMEDETOMIDINE HYDROCHLORIDE 8 MCG: 200 INJECTION INTRAVENOUS at 12:56

## 2023-04-14 RX ADMIN — ACETAMINOPHEN 650 MG: 325 TABLET, FILM COATED ORAL at 16:13

## 2023-04-14 RX ADMIN — PROCHLORPERAZINE EDISYLATE 10 MG: 5 INJECTION INTRAMUSCULAR; INTRAVENOUS at 10:12

## 2023-04-14 RX ADMIN — LORAZEPAM 0.5 MG: 0.5 TABLET ORAL at 21:30

## 2023-04-14 RX ADMIN — ONDANSETRON 4 MG: 2 INJECTION INTRAMUSCULAR; INTRAVENOUS at 12:58

## 2023-04-14 ASSESSMENT — ACTIVITIES OF DAILY LIVING (ADL)
ADLS_ACUITY_SCORE: 31
ADLS_ACUITY_SCORE: 33
ADLS_ACUITY_SCORE: 31
ADLS_ACUITY_SCORE: 35
ADLS_ACUITY_SCORE: 31
ADLS_ACUITY_SCORE: 35

## 2023-04-14 ASSESSMENT — LIFESTYLE VARIABLES: TOBACCO_USE: 0

## 2023-04-14 NOTE — ANESTHESIA PREPROCEDURE EVALUATION
"Anesthesia Pre-Procedure Evaluation    Patient: Thea Castle   MRN: [id number] : 2000        Procedure : Procedure(s):  Sigmoidoscopy flexible          Past Medical History:   Diagnosis Date     Anorexia      Anxiety      Depressive disorder      Gastroparesis      OCD (obsessive compulsive disorder)      PTSD (post-traumatic stress disorder)       Past Surgical History:   Procedure Laterality Date     ENT SURGERY        Allergies   Allergen Reactions     Penicillins Unknown     Mother unsure if patient or sister had a reaction. Mother reports she \"didn't think it was anaphylactic\".     Other Food Allergy GI Disturbance     Cilantro      Social History     Tobacco Use     Smoking status: Never     Smokeless tobacco: Never   Vaping Use     Vaping status: Not on file   Substance Use Topics     Alcohol use: Yes     Comment: 2 drinks a week      Wt Readings from Last 1 Encounters:   23 55.9 kg (123 lb 3.2 oz)        Anesthesia Evaluation   Pt has had prior anesthetic. Type: General.    No history of anesthetic complications       ROS/MED HX  ENT/Pulmonary:    (-) tobacco use   Neurologic:     (+) migraines,     Cardiovascular:     (+) -----Previous cardiac testing   Echo: Date: Results:    Stress Test: Date: Results:    ECG Reviewed: Date: 23 Results:  NSR  Cath: Date: Results:      METS/Exercise Tolerance:     Hematologic:       Musculoskeletal:       GI/Hepatic: Comment: H/o GI hemorrhage      Renal/Genitourinary:       Endo:       Psychiatric/Substance Use:     (+) psychiatric history depression, anxiety and other (comment) (PTSD; Anorexia; Borderline Personality)     Infectious Disease:       Malignancy:       Other:     (-) Any chance pregnant       Physical Exam    Airway        Mallampati: I   TM distance: > 3 FB   Neck ROM: full   Mouth opening: > 3 cm    Respiratory Devices and Support         Dental     Comment: Invisilign    (+) Minor Abnormalities - some fillings, tiny " chips      Cardiovascular          Rhythm and rate: regular and normal     Pulmonary           breath sounds clear to auscultation           OUTSIDE LABS:  CBC:   Lab Results   Component Value Date    WBC 13.7 (H) 04/13/2023    WBC 7.1 01/27/2023    HGB 11.6 (L) 04/13/2023    HGB 11.5 (L) 01/27/2023    HCT 34.0 (L) 04/13/2023    HCT 34.4 (L) 01/27/2023     04/13/2023     01/27/2023     BMP:   Lab Results   Component Value Date     04/13/2023     02/06/2023    POTASSIUM 3.7 04/13/2023    POTASSIUM 4.3 02/06/2023    CHLORIDE 106 04/13/2023    CHLORIDE 103 02/06/2023    CO2 23 04/13/2023    CO2 27 02/06/2023    BUN 13.0 04/13/2023    BUN 17.4 02/06/2023    CR 0.85 04/13/2023    CR 0.87 02/06/2023     (H) 04/13/2023     (H) 02/06/2023     COAGS:   Lab Results   Component Value Date    PTT 27 04/13/2023    INR 1.04 04/13/2023     POC:   Lab Results   Component Value Date     (H) 04/13/2021    HCG Negative 09/28/2021    HCGS Negative 04/13/2023     HEPATIC:   Lab Results   Component Value Date    ALBUMIN 4.3 04/13/2023    PROTTOTAL 7.0 04/13/2023    ALT 43 (H) 04/13/2023    AST 37 (H) 04/13/2023    ALKPHOS 69 04/13/2023    BILITOTAL 0.5 04/13/2023     OTHER:   Lab Results   Component Value Date    DENNIS 8.8 04/13/2023    PHOS 4.2 02/06/2023    MAG 1.9 02/06/2023    LIPASE 19 04/13/2023    TSH 1.80 04/14/2021    T4 0.82 03/23/2019    CRP 2.9 03/23/2019    SED 17 03/23/2019       Anesthesia Plan    ASA Status:  3   NPO Status:  NPO Appropriate    Anesthesia Type: MAC.     - Reason for MAC: straight local not clinically adequate              Consents    Anesthesia Plan(s) and associated risks, benefits, and realistic alternatives discussed. Questions answered and patient/representative(s) expressed understanding.    - Discussed:     - Discussed with:  Patient         Postoperative Care            Comments:                Octavio Dsouza MD

## 2023-04-14 NOTE — PLAN OF CARE
Orientation/Cognitive: A&O x4  Observation Goals (Met/ Not Met): Not met  Mobility Level/Assist Equipment: Ind/SBA  Fall Risk (Y/N): No  Behavior Concerns: None  Pain Management: Heat packs to LLQ Abd, PRN tylenol   Tele/VS/O2: VSS on RA  ABNL Lab/BG: Hgb 9.9  Diet: Clear liquid diet  Bowel/Bladder: Reports rectal bleeding  Skin Concerns: Wound to back R thigh: covered w/ bandaid  Drains/Devices: IV SL  Tests/Procedures for next shift: GI consult follow  Anticipated DC date & active delays: TBD  Patient Stated Goal for Today: To rest   Observation goals  PER UNIT ROUTINE        Comments:   Hemoglobin stable for 24 hrs: not met  Vitals stable: met    Safe disposition: not met

## 2023-04-14 NOTE — ED NOTES
"[facility]  ED Nurse Handoff Report    ED Chief complaint: Abdominal Pain (Patient arrived to triage desk with specimen cup of red substance. States she was sent from  and is supposed to show us her blood. )      ED Diagnosis:   Final diagnoses:   Gastrointestinal hemorrhage, unspecified gastrointestinal hemorrhage type       Code Status: Full Code    Allergies:   Allergies   Allergen Reactions    Penicillins Unknown     Mother unsure if patient or sister had a reaction. Mother reports she \"didn't think it was anaphylactic\".    Other Food Allergy GI Disturbance     Cilantro       Patient Story: Pt presents from home for abd pain and bloody stools.  Focused Assessment:  Pt hx of gastroparesis and recent dx of colitis.  Had bloody stools x6 in the ER. CT scan shows distal colitis. After CT, pt c/o throat itching so received benadryl and zofran. Symptoms resolved.  Pt had flex sig and it showed ulcerations in distal rectum.   Treatments and/or interventions provided:   multiple doses  of nausea medications  see above  Patient's response to treatments and/or interventions: see above    To be done/followed up on inpatient unit:  none pending    Does this patient have any cognitive concerns?: none    Activity level - Baseline/Home:  Independent  Activity Level - Current:   Independent    Patient's Preferred language: English   Needed?: No    Isolation: None  Infection: Not Applicable  Patient tested for COVID 19 prior to admission: NO  Bariatric?: No    Vital Signs:   Vitals:    04/14/23 1345 04/14/23 1415 04/14/23 1416 04/14/23 1430   BP:  107/59  104/68   Pulse: 64 69  74   Resp: 12      Temp:       TempSrc:       SpO2: 100%  100% 98%       Cardiac Rhythm:     Was the PSS-3 completed:   Yes  What interventions are required if any?               Family Comments: None present.  OBS brochure/video discussed/provided to patient/family: N/A              Name of person given brochure if not " patient: N/A              Relationship to patient: N/A    For the majority of the shift this patient's behavior was Green.   Behavioral interventions performed were information.    ED NURSE PHONE NUMBER: [phone]

## 2023-04-14 NOTE — PHARMACY-ADMISSION MEDICATION HISTORY
Pharmacist Admission Medication History    Admission medication history is complete. The information provided in this note is only as accurate as the sources available at the time of the update.    Medication reconciliation/reorder completed by provider prior to medication history? No    Information Source(s): Patient and CareEverywhere/SureScripts via in-person    Pertinent Information: None    Changes made to PTA medication list:    Added: None    Deleted: Quetiapine, Senna-Doc, Tamsulosin, Trazodone, hydroxyzine    Changed: Abilify, Lorazepam, Melatonin    Allergies reviewed with patient and updates made in EHR: yes    Medication History Completed By: Kris Kraus [facility] 4/14/2023 9:37 AM    PTA Med List   Medication Sig Last Dose     acetaminophen (TYLENOL) 325 MG tablet Take 2 tablets by mouth every 4 hours as needed Unknown at prn     ARIPiprazole (ABILIFY) 10 MG tablet Take 10 mg by mouth daily 4/13/2023 at AM     calcium carbonate (TUMS) 500 MG chewable tablet Take 1 tablet (500 mg) by mouth as needed for heartburn Unknown at prn     cholecalciferol (VITAMIN D3) 25 mcg (1000 units) capsule Take 1 capsule by mouth daily 4/13/2023 at AM     drospirenone-ethinyl estradiol (FELIPE) 3-0.02 MG tablet Take 1 tablet by mouth daily 4/13/2023 at AM     DULoxetine (CYMBALTA) 60 MG capsule Take 1 capsule (60 mg) by mouth daily 4/13/2023 at AM     hydrOXYzine (ATARAX) 25 MG tablet Take 1 tablet (25 mg) by mouth 2 times daily as needed for anxiety or other (sleep, melatonin augmentation or failure) Unknown at prn     lactobacillus rhamnosus, GG, (CULTURELL) capsule Take 1 capsule by mouth daily 4/13/2023 at AM     linaclotide (LINZESS) 290 MCG capsule Take 1 capsule (290 mcg) by mouth every morning (before breakfast) 4/13/2023 at AM     LORazepam (ATIVAN) 0.5 MG tablet Take 0.5 mg by mouth At Bedtime Past Week at HS     Melatonin 10 MG TABS tablet Take 10 mg by mouth nightly as needed for sleep Past Week at HS      multivitamin w/minerals (THERA-VIT-M) tablet Take 1 tablet by mouth daily 4/13/2023 at AM     pantoprazole (PROTONIX) 40 MG EC tablet Take 1 tablet (40 mg) by mouth 2 times daily 4/13/2023 at AM     vitamin C (ASCORBIC ACID) 250 MG tablet Take 250 mg by mouth daily 4/13/2023 at AM

## 2023-04-14 NOTE — H&P
[facility]    History and Physical - Hospitalist Service       Date of Admission:  4/14/2023    Assessment & Plan    Thea Castle is a 23 year old female with complex psychiatric history including borderline personality disorder, pseudoseizures, eating disorder, OCD, PTSD, anxiety, provisional diagnosis of factitious disorder and medical history of chronic anemia, anal fissure, gastroparesis, slow transit constipation, rectal prolapse, proctitis secondary to known attempts at manual disimpaction, urinary retention who was admitted to observation on 4/14/2023 for bright red blood per rectum.    Hematochezia  Chronic anemia  Laxative abuse history  H/o Rectal prolapse  H/o anal fissure  H/o proctitis secondary to manual disimpaction attempts  H/o Nasoduodenal feeding tube, self-removed with retained foreign body removed via EGD  Gastroparesis  Slow transit constipation on Linzess  Abrupt onset BRBPR including reports of dime-sized clots (patient brought in sample) without rectal pain, fevers, reported ingestions nor inserted foreign body. September 2022 colonoscopy normal. Electrolytes normal. Hemoglobin 11.6 on presentation which is above her baseline hemoglobin of around 10. CT abdomen/pelvis showed moderate thickening of distal sigmoid colon extending to rectum.  This description sounds similar to the CT scan from November 2022 at [facility] where she had CT evidence for proctitis and had reported finger insertion into her rectum for repeated manual disimpaction.  Upon review of outside records, patient was seen in primary care clinic for irregular vaginal bleeding 4/7/23 with unremarkable exam and labs.  Pelvic ultrasound showed no uterine abnormality but did comment on a large amount of stool present.  -admit to observation  -NPO  -serial hemoglobin checks q 8 hours x 3  -[facility] consultation. Flex sigmoidoscopy may be considered. Bowel regimen should be reviewed.  "    Borderline Personality Disorder  Factitious disorder, provisional diagnosis  Multiple recurrent somatic symptoms  Eating Disorder  OCD  PTSD  Anxiety  More recent prolonged hospitalization at [facility] from 1/3-2/7/2023 for suicide attempt by overdose and somatic complaints, sabotaging her own discharge transfer to inpatient psychiatry. After finally getting a bed in the inpatient psychiatry unit, she left the following day (1 day psych hospitalization). Per discharge summary: differential includes BPD, provisional diagnosis of factitious disorder, eating disorder, depression and anxiety. Of these diagnoses eating disorder, depression and anxiety are historical with symptoms present prior to hospitalization. They may still be contributing to symptoms the patient is experiencing. Interactions with staff at OSH seemed to support possible factitious disorder in the context of high healthcare utilization and multiple somatic symptoms.\" Per psychiatry note Nov 2022 at [facility]: \"Her primary problem is borderline personality d/o, definitive treatment of which will be continued DBT. RECOMMENDATIONS: Although each clinical encounter needs to be evaluated on its own merits, the patient has history of seeking respite in the Emergency Department secondary to chronic risk of maladaptive, potentially self-destructive behaviors that are exacerbated due to character pathology and there is no evidence that acute psychiatric hospitalization would mitigate those risks and likely would encourage further attempts to utilize the medical system inappropriately.\" Also recently had prolonged hospitalization at [facility] from   -resume PTA psychiatric medications once verified   -Encourage close outpatient psychiatric and psychologic follow-up    Pseudoseizure history  Noted hospitalization Jan 2023. EEG negative.      Diet:  NPO  DVT Prophylaxis: Pneumatic Compression Devices and Ambulate every shift  Smith Catheter: Not " present  Lines: None     Cardiac Monitoring: None  Code Status:   full    Clinically Significant Risk Factors Present on Admission                               Disposition Plan      Expected Discharge Date: 04/15/2023           Friday if ok with [facility] and hemoglobin is stable. History of multiple somatic complaints and secondary gain from prolonged hospitalization so favor outpatient evaluation of chronic issues and psychiatric concerns.        Renea Kimball MD  Hospitalist Service  [facility]  Securely message with [employer] (more info)  Text page via [employer] Paging/Directory     ______________________________________________________________________    Chief Complaint   bright red blood per rectum     History is obtained from the patient, electronic health record and emergency department physician    History of Present Illness   Thea Castle is a 23 year old female with complex psychiatric history including borderline personality disorder, pseudoseizures, eating disorder, OCD, PTSD, anxiety, provisional diagnosis of factitious disorder and medical history of chronic anemia, anal fissure, gastroparesis, slow transit constipation, rectal prolapse who presented with a sample of her own bloody stool in a container.  Reportedly 6-8 similar stools at home, at times including some clots.  No fevers, chills. Denies prolapse of rectum (as has happened in the past) and denies foreign body insertion. Follows as an outpatient also with [facility].  He has somewhat frequent outpatient IV fluid infusions per their discretion.  Hospitalized at [facility] in September 2022 at which time a CT scan there showed proctitis.  An EGD on 9/20/2022 and colonoscopy on the same day was reportedly normal.  CT of the abdomen and pelvis then in November 2022 showed a large amount of stool in the colon but no inflammatory changes.  In November 2023 discharge summary mentions that the  patient has a history of insertion of her finger into the rectum due to constipation which could be causing some irritation and bleeding.    Extensive chart review as she is a high utilizer of different healthcare facilities in the [facility]. She was hospitalized here at [facility] for greater than a month in January/February 2023 after an intentional overdose, discharge prolonged while awaiting inpatient psychiatry placement.  When patient finally had inpatient psychiatry placement on 2/7/2023, it appears she discharged herself from psychiatry at the next day on 2/8/2023. Upon other chart review, it appears she was seen in primary care clinic on 4/7/2023 for report of abnormal vaginal bleeding.  Pelvic ultrasound did not show any uterine abnormality.  However, it did show a large amount of stool throughout the colon as has been seen in prior imaging.  There are numerous other clinic and emergency department visits, hospitalizations at [facility] and in the [facility] system.      She has had extensive work-up at different hospitals for pseudoseizures and has had a normal MRI of the brain, video EEG where her shaking episodes did not correlate with any epileptic activity.  There was mention that there is no indication for benzodiazepines unless she were to develop significant altered mental status beyond the shaking    Case discussed with Dr. Lombardo from the ED. Labs and imaging reviewed.     Past Medical History    Past Medical History:   Diagnosis Date     Anorexia      Anxiety      Depressive disorder      Gastroparesis      OCD (obsessive compulsive disorder)      PTSD (post-traumatic stress disorder)        Past Surgical History   Past Surgical History:   Procedure Laterality Date     ENT SURGERY         Prior to Admission Medications   Prior to Admission Medications   Prescriptions Last Dose Informant Patient Reported? Taking?   ARIPiprazole (ABILIFY) 15 MG tablet   No No   Sig: Take 1 tablet (15 mg) by  mouth daily   DULoxetine (CYMBALTA) 60 MG capsule   No No   Sig: Take 1 capsule (60 mg) by mouth daily   LORazepam (ATIVAN) 1 MG tablet   No No   Sig: Take 1 tablet (1 mg) by mouth At Bedtime   QUEtiapine (SEROQUEL) 25 MG tablet   No No   Sig: Take 0.5 tablets (12.5 mg) by mouth At Bedtime   acetaminophen (TYLENOL) 325 MG tablet   Yes No   Sig: Take 2 tablets by mouth every 4 hours as needed   calcium carbonate (TUMS) 500 MG chewable tablet  Mother No No   Sig: Take 1 tablet (500 mg) by mouth as needed for heartburn   cholecalciferol (VITAMIN D3) 25 mcg (1000 units) capsule   Yes No   Sig: Take 1 capsule by mouth daily   drospirenone-ethinyl estradiol (FELIPE) 3-0.02 MG tablet   Yes No   Sig: Take 1 tablet by mouth daily   hydrOXYzine (ATARAX) 25 MG tablet   No No   Sig: Take 1 tablet (25 mg) by mouth 2 times daily as needed for anxiety or other (sleep, melatonin augmentation or failure)   lactobacillus rhamnosus, GG, (CULTURELL) capsule   Yes No   Sig: Take 1 capsule by mouth daily   linaclotide (LINZESS) 290 MCG capsule   No No   Sig: Take 1 capsule (290 mcg) by mouth every morning (before breakfast)   melatonin 5 MG tablet   No No   Sig: Take 1 tablet (5 mg) by mouth nightly as needed for sleep   multivitamin w/minerals (THERA-VIT-M) tablet  Mother No No   Sig: Take 1 tablet by mouth daily   pantoprazole (PROTONIX) 40 MG EC tablet   No No   Sig: Take 1 tablet (40 mg) by mouth 2 times daily   promethazine (PHENERGAN) 25 MG tablet   No No   Sig: Take 1 tablet (25 mg) by mouth 2 times daily   senna-docusate (SENOKOT-S/PERICOLACE) 8.6-50 MG tablet   No No   Si tablet by Oral or Feeding Tube route 2 times daily   tamsulosin (FLOMAX) 0.4 MG capsule   No No   Sig: Take 1 capsule (0.4 mg) by mouth daily   traZODone (DESYREL) 50 MG tablet   No No   Sig: Take 1 tablet (50 mg) by mouth At Bedtime   vitamin C (ASCORBIC ACID) 250 MG tablet   Yes No   Sig: Take 250 mg by mouth daily      Facility-Administered Medications:  None        Review of Systems    The 10 point Review of Systems is negative other than noted in the HPI or here.      Physical Exam   Vital Signs: Temp: 97.9  F (36.6  C) Temp src: Temporal BP: (!) 165/75 Pulse: 100   Resp: 20 SpO2: 100 % O2 Device: None (Room air)    Weight: 0 lbs 0 oz    General Appearance: Alert, no acute distress  Eyes: Sclera anicteric, conjunctiva normal  HEENT: Mucous membranes moist, head atraumatic  Respiratory: clear to auscultation, nonlabored  Cardiovascular: Regular rate and rhythm, no murmur  GI: Soft, tender in lower quadrants, hyperesthesia noted, no rebound tenderness  Skin: Dry, no acute rashes  Musculoskeletal: Moving all extremities  Neurologic: Alert and oriented x3, no tremors, speech normal, face symmetric  Psychiatric: Calm, cooperative    Medical Decision Making       MANAGEMENT DISCUSSED with the following over the past 24 hours: Dr. Lombardo   NOTE(S)/MEDICAL RECORDS REVIEWED over the past 24 hours: outside records from other hospitals, clinic visits, medication lists, consult notes  Tests ORDERED & REVIEWED in the past 24 hours:  - See lab/imaging results included in the data section of the note  Tests personally interpreted in the past 24 hours:  - ABDOMINAL CT showing distal sigmoid possible colitis vs other etiology      Data     I have personally reviewed the following data over the past 24 hrs:    13.7 (H)  \   11.6 (L)   / 331     140 106 13.0 /  106 (H)   3.7 23 0.85 \       ALT: 43 (H) AST: 37 (H) AP: 69 TBILI: 0.5   ALB: 4.3 TOT PROTEIN: 7.0 LIPASE: 19       INR:  1.04 PTT:  27   D-dimer:  N/A Fibrinogen:  N/A       Imaging results reviewed over the past 24 hrs:   Recent Results (from the past 24 hour(s))   CT Abdomen Pelvis w Contrast    Narrative    EXAM: CT ABDOMEN PELVIS W CONTRAST  LOCATION: [facility]  DATE/TIME: 4/14/2023 1:30 AM CDT    INDICATION: llq pain, rebound, brbpr  COMPARISON: 11/30/2022  TECHNIQUE: CT scan of the abdomen  and pelvis was performed following injection of IV contrast. Multiplanar reformats were obtained. Dose reduction techniques were used.  CONTRAST: 62mL Isovue 370    FINDINGS:   LOWER CHEST: Normal.    HEPATOBILIARY: Normal.    PANCREAS: Normal.    SPLEEN: Normal.    ADRENAL GLANDS: Normal.    KIDNEYS/BLADDER: There are 2 approximately 2 x 2 millimeter nonobstructive stone seen in the right kidney. Kidneys, ureters, bladder are otherwise normal.    BOWEL: There is moderate thickening seen involving the distal sigmoid colon extending to the rectum consistent with colitis with some stranding of the adjacent fat. There is no associated direct testicular cysts, abscess, or free air identified. The   bowel is otherwise normal to include the appendix.    LYMPH NODES: Normal.    VASCULATURE: Unremarkable.    PELVIC ORGANS: Normal.    MUSCULOSKELETAL: There are few scattered benign Schmorl's nodes involving the spine.      Impression    IMPRESSION:   1.  Since 11/30/2022 there are new findings of distal colitis. The prior seen Smith catheter has been removed. There are less nonobstructive stone seen in the kidneys. The prior seen stone in the left UPJ is no longer identified.

## 2023-04-14 NOTE — ANESTHESIA POSTPROCEDURE EVALUATION
Patient: Thea Castle    Procedure: Procedure(s):  Sigmoidoscopy flexible       Anesthesia Type:  MAC    Note:     Postop Pain Control: Uneventful            Sign Out: Well controlled pain   PONV: No   Neuro/Psych: Uneventful            Sign Out: Acceptable/Baseline neuro status   Airway/Respiratory: Uneventful            Sign Out: Acceptable/Baseline resp. status   CV/Hemodynamics: Uneventful            Sign Out: Acceptable CV status   Other NRE: NONE   DID A NON-ROUTINE EVENT OCCUR?            Last vitals:  Vitals Value Taken Time   /59 04/14/23 1414   Temp     Pulse 69 04/14/23 1414   Resp 15 04/14/23 1358   SpO2 99 % 04/14/23 1416   Vitals shown include unvalidated device data.    Electronically Signed By: Octavio Dsouza MD  April 14, 2023  2:17 PM

## 2023-04-14 NOTE — ANESTHESIA CARE TRANSFER NOTE
Patient: Thea Castle    Procedure: Procedure(s):  Sigmoidoscopy flexible       Diagnosis: Rectal bleeding [K62.5]  Diagnosis Additional Information: No value filed.    Anesthesia Type:   MAC     Note:    Oropharynx: oropharynx clear of all foreign objects and spontaneously breathing  Level of Consciousness: drowsy  Oxygen Supplementation: room air    Independent Airway: airway patency satisfactory and stable  Dentition: dentition unchanged  Vital Signs Stable: post-procedure vital signs reviewed and stable  Report to RN Given: handoff report given  Patient transferred to: PACU  Comments: Pt exhibits spontaneous respirations, all monitors and alarms on in PACU, VSS, patent IV, report and transfer of care to RN.    Handoff Report: Identifed the Patient, Identified the Reponsible Provider, Reviewed the pertinent medical history, Discussed the surgical course, Reviewed Intra-OP anesthesia mangement and issues during anesthesia, Set expectations for post-procedure period and Allowed opportunity for questions and acknowledgement of understanding      Vitals:  Vitals Value Taken Time   /56 04/14/23 1315   Temp     Pulse 60 04/14/23 1318   Resp 14 04/14/23 1318   SpO2 98 % 04/14/23 1318   Vitals shown include unvalidated device data.    Electronically Signed By: CONRAD Mcbride CRNA  April 14, 2023  1:20 PM
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
## 2023-04-14 NOTE — ED NOTES
Pt alerted RN that she was dry heaving and dizzy. When RN assessed pt, pt mentioned sore throat. Pt moved close to nursing station so vitals can be monitored. MD made aware, medications ordered and administered.

## 2023-04-14 NOTE — ED PROVIDER NOTES
History     Chief Complaint:  Abdominal pain and rectal bleeding     The history is provided by the patient.      Thea Castle is a 23 year old female with a history of anorexia, PTSD, and probable gastroparesis who presents with abdominal pain and rectal bleeding beginning about 24 hours ago. She was seen at urgent care where she had a hemoglobin of 12.7 and positive Hemoccult. She has seen bright red blood dripping from rectum, filling multiple small cups. Denies vaginal bleeding. Reports bruising of her knees. I noted shakiness on my exam. She reports this is abnormal for her. She has not slept in the past 24 hours, since bleeding began.    Independent Historian:   None - Patient Only    Review of External Notes: multiple Admissions, often with long hospitlizations    ROS:  10 point review of systems performed and is negative except as above and in HPI.     Allergies:  Penicillins  Other Food Allergy     Medications:    Abilify  Vitamin D   Kelley  Cymbalta  Atarax  Culturell  Linzess  Protonix  Phenergan  Seroquel  Flomax  Trazodone     Past Medical History:    Anorexia nervosa  Anxiety   Depression  Gastroparesis  OCD  PTSD  Borderline personality disorder      Past Surgical History:    Tonsil and adenoidectomy      Family History:    Father- heart disease   Mother- anorexia    Social History:  The patient presents to the ED alone via private vehicle.  PCP: Brittney Penny     Physical Exam     Patient Vitals for the past 24 hrs:   BP Temp Temp src Pulse Resp SpO2   04/14/23 0207 (!) 165/75 -- -- 100 -- 100 %   04/13/23 1829 (!) 144/86 97.9  F (36.6  C) Temporal 117 20 99 %        Physical Exam  General: Resting on the gurney, appears uncomfortable  Head:  The scalp, face, and head appear normal  Mouth/Throat: Mucus membranes are moist  CV:  Rapid but regular rate.    Normal S1 and S2  No pathological murmur   Resp:  Breath sounds clear and equal bilaterally    Non-labored, no retractions  or accessory muscle use    No coarseness    No wheezing   GI:  Abdomen is soft, no rigidity    Diffusely tender, worst on the left. Rebound present.  MS:  Normal motor assessment of all extremities.    Good capillary refill noted.  Skin:  No rash or lesions noted.  Neuro: Speech is normal and fluent. No apparent deficit.  Psych:  Awake. Alert.  Normal affect.      Appropriate interactions.    Emergency Department Course   Imaging:  CT Abdomen Pelvis w Contrast   Final Result   IMPRESSION:    1.  Since 11/30/2022 there are new findings of distal colitis. The prior seen Smith catheter has been removed. There are less nonobstructive stone seen in the kidneys. The prior seen stone in the left UPJ is no longer identified.         Report per radiology    Laboratory:  Labs Ordered and Resulted from Time of ED Arrival to Time of ED Departure   COMPREHENSIVE METABOLIC PANEL - Abnormal       Result Value    Sodium 140      Potassium 3.7      Chloride 106      Carbon Dioxide (CO2) 23      Anion Gap 11      Urea Nitrogen 13.0      Creatinine 0.85      Calcium 8.8      Glucose 106 (*)     Alkaline Phosphatase 69      AST 37 (*)     ALT 43 (*)     Protein Total 7.0      Albumin 4.3      Bilirubin Total 0.5      GFR Estimate >90     CBC WITH PLATELETS AND DIFFERENTIAL - Abnormal    WBC Count 13.7 (*)     RBC Count 3.89      Hemoglobin 11.6 (*)     Hematocrit 34.0 (*)     MCV 87      MCH 29.8      MCHC 34.1      RDW 12.7      Platelet Count 331      % Neutrophils 77      % Lymphocytes 18      % Monocytes 5      % Eosinophils 0      % Basophils 0      % Immature Granulocytes 0      NRBCs per 100 WBC 0      Absolute Neutrophils 10.6 (*)     Absolute Lymphocytes 2.4      Absolute Monocytes 0.6      Absolute Eosinophils 0.0      Absolute Basophils 0.0      Absolute Immature Granulocytes 0.0      Absolute NRBCs 0.0     INR - Normal    INR 1.04     PARTIAL THROMBOPLASTIN TIME - Normal    aPTT 27     LIPASE - Normal    Lipase 19     HCG  QUALITATIVE PREGNANCY - Normal    hCG Serum Qualitative Negative     ISTAT HCG QUALITATIVE PREGNANCY POCT - Normal    HCG Qualitative POCT Negative     ABO/RH TYPE AND SCREEN      Emergency Department Course & Assessments:       Interventions:  Medications   pantoprazole (PROTONIX) IV push injection 40 mg (has no administration in time range)   0.9% sodium chloride BOLUS (0 mLs Intravenous Stopped 4/14/23 0354)   iopamidol (ISOVUE-370) solution 62 mL (62 mLs Intravenous $Given 4/14/23 0122)   SalineFlush (60 mLs Intravenous $Given 4/14/23 0123)   diphenhydrAMINE (BENADRYL) injection 25 mg (25 mg Intravenous $Given 4/14/23 0203)   ondansetron (ZOFRAN) injection 4 mg (4 mg Intravenous $Given 4/14/23 0203)   0.9% sodium chloride BOLUS (0 mLs Intravenous Stopped 4/14/23 0354)   acetaminophen (TYLENOL) tablet 1,000 mg (1,000 mg Oral $Given 4/14/23 0421)      Assessments/Consultations/Discussion of Management or Tests:  ED Course as of 04/14/23 0453   Fri Apr 14, 2023   0014 I obtained the history and examined the patient as noted above.    0441 I consulted with Dr. LAUREN Kimball, of the hospitalist team, regarding the patient. They accepted the patient for admission.        Social Determinants of Health affecting care:   None    Disposition:  The patient was admitted to the hospital under the care of Dr. LAUREN Kimball.     Impression & Plan    Medical Decision Making:  Thea Castle is a 23 year old female presents for evaluation of bright red blood per rectum. This began 24 hours prior to arrival. Upon arrival to the emergency department, the patient's vital signs showed no tachycardia and normal blood pressure. IV placed and a type and screen was obtained, as well as standard labs. The patient's hemoglobin was 11.6. Therefore, transfusion was not initiated. The patient remained hemodynamically stable while in the emergency department. The patient was discussed with the hospitalist for admission, and will be discussed  with GI by the hospitalist.     The bleeding is most likely lower GI in nature, and further workup will be initiated by GI.     She has colitis on ct, which is likely the cause of her bleeding.    Diagnosis:    ICD-10-CM    1. Gastrointestinal hemorrhage, unspecified gastrointestinal hemorrhage type  K92.2          Scribe Disclosure:  Any DALY, am serving as a scribe at 12:26 AM on 4/14/2023 to document services personally performed by Lakia Lombardo MD based on my observations and the provider's statements to me.     4/14/2023   Lakia Lombardo MD Debroux, Karah M, MD  04/14/23 0775

## 2023-04-14 NOTE — INTERVAL H&P NOTE
I have reviewed the surgical (or preoperative) H&P that is linked to this encounter, and examined the patient. There are no significant changes    Clinical Conditions Present on Arrival:  Clinically Significant Risk Factors Present on Admission                       
Normal

## 2023-04-14 NOTE — PROGRESS NOTES
Patient admitted early hours of this morning.  See excellent H&P by Dr. Kimball.  Patient was seen and examined in the ED with bedside RN as chaperone    Feels pain is better but continues to have diarrhea mixed with blood    Waiting for flex sig    -Appreciate GI review.  -We will wait for flex sig and continue conservative management for now.  -Monitor overnight along with hemoglobin    -If cleared by GI and hemoglobin stable next 24 hours.  Likely discharge tomorrow morning    Non billable    Jerrell Daniels MD, FACP

## 2023-04-14 NOTE — PROVIDER NOTIFICATION
"MD Notification    Notified Person: MD    Notified Person Name: Lydia    Notification Date/Time: 04/14/23 6:48 PM     Notification Interaction: Text page    Purpose of Notification: \"550Courtney Bailon: KULDIP Hgb 9.9 down from 11.6 Thanks! Yumiko WOMACK, [phone]\"    Orders Received:    Comments:    "

## 2023-04-15 ENCOUNTER — HEALTH MAINTENANCE LETTER (OUTPATIENT)
Age: 23
End: 2023-04-15

## 2023-04-15 LAB
ANION GAP SERPL CALCULATED.3IONS-SCNC: 13 MMOL/L (ref 7–15)
BUN SERPL-MCNC: 8.2 MG/DL (ref 6–20)
CALCIUM SERPL-MCNC: 8 MG/DL (ref 8.6–10)
CHLORIDE SERPL-SCNC: 106 MMOL/L (ref 98–107)
CREAT SERPL-MCNC: 0.8 MG/DL (ref 0.51–0.95)
DEPRECATED HCO3 PLAS-SCNC: 18 MMOL/L (ref 22–29)
ERYTHROCYTE [DISTWIDTH] IN BLOOD BY AUTOMATED COUNT: 12.9 % (ref 10–15)
GFR SERPL CREATININE-BSD FRML MDRD: >90 ML/MIN/1.73M2
GLUCOSE BLDC GLUCOMTR-MCNC: 128 MG/DL (ref 70–99)
GLUCOSE BLDC GLUCOMTR-MCNC: 130 MG/DL (ref 70–99)
GLUCOSE BLDC GLUCOMTR-MCNC: 60 MG/DL (ref 70–99)
GLUCOSE BLDC GLUCOMTR-MCNC: 67 MG/DL (ref 70–99)
GLUCOSE BLDC GLUCOMTR-MCNC: 88 MG/DL (ref 70–99)
GLUCOSE SERPL-MCNC: 52 MG/DL (ref 70–99)
HCT VFR BLD AUTO: 30.3 % (ref 35–47)
HGB BLD-MCNC: 10 G/DL (ref 11.7–15.7)
MCH RBC QN AUTO: 29.8 PG (ref 26.5–33)
MCHC RBC AUTO-ENTMCNC: 33 G/DL (ref 31.5–36.5)
MCV RBC AUTO: 90 FL (ref 78–100)
PLATELET # BLD AUTO: 255 10E3/UL (ref 150–450)
POTASSIUM SERPL-SCNC: 3.6 MMOL/L (ref 3.4–5.3)
RBC # BLD AUTO: 3.36 10E6/UL (ref 3.8–5.2)
SODIUM SERPL-SCNC: 137 MMOL/L (ref 136–145)
WBC # BLD AUTO: 6.4 10E3/UL (ref 4–11)

## 2023-04-15 PROCEDURE — 36415 COLL VENOUS BLD VENIPUNCTURE: CPT | Performed by: INTERNAL MEDICINE

## 2023-04-15 PROCEDURE — 999N000128 HC STATISTIC PERIPHERAL IV START W/O US GUIDANCE

## 2023-04-15 PROCEDURE — 250N000013 HC RX MED GY IP 250 OP 250 PS 637: Performed by: INTERNAL MEDICINE

## 2023-04-15 PROCEDURE — 85027 COMPLETE CBC AUTOMATED: CPT | Performed by: INTERNAL MEDICINE

## 2023-04-15 PROCEDURE — 250N000011 HC RX IP 250 OP 636: Performed by: INTERNAL MEDICINE

## 2023-04-15 PROCEDURE — 82962 GLUCOSE BLOOD TEST: CPT

## 2023-04-15 PROCEDURE — 99233 SBSQ HOSP IP/OBS HIGH 50: CPT | Performed by: INTERNAL MEDICINE

## 2023-04-15 PROCEDURE — G0378 HOSPITAL OBSERVATION PER HR: HCPCS

## 2023-04-15 PROCEDURE — 80048 BASIC METABOLIC PNL TOTAL CA: CPT | Performed by: INTERNAL MEDICINE

## 2023-04-15 PROCEDURE — 96375 TX/PRO/DX INJ NEW DRUG ADDON: CPT

## 2023-04-15 PROCEDURE — 258N000003 HC RX IP 258 OP 636: Performed by: INTERNAL MEDICINE

## 2023-04-15 RX ORDER — ONDANSETRON 4 MG/1
4 TABLET, ORALLY DISINTEGRATING ORAL EVERY 6 HOURS PRN
Qty: 30 TABLET | Refills: 0 | Status: SHIPPED | OUTPATIENT
Start: 2023-04-15

## 2023-04-15 RX ORDER — PETROLATUM,WHITE/LANOLIN
OINTMENT (GRAM) TOPICAL 4 TIMES DAILY PRN
Status: DISCONTINUED | OUTPATIENT
Start: 2023-04-15 | End: 2023-04-20 | Stop reason: HOSPADM

## 2023-04-15 RX ADMIN — ACETAMINOPHEN 650 MG: 325 TABLET, FILM COATED ORAL at 08:55

## 2023-04-15 RX ADMIN — PROCHLORPERAZINE MALEATE 10 MG: 10 TABLET ORAL at 15:19

## 2023-04-15 RX ADMIN — ACETAMINOPHEN 650 MG: 325 TABLET, FILM COATED ORAL at 18:00

## 2023-04-15 RX ADMIN — Medication: at 12:54

## 2023-04-15 RX ADMIN — PANTOPRAZOLE SODIUM 40 MG: 40 TABLET, DELAYED RELEASE ORAL at 20:28

## 2023-04-15 RX ADMIN — ONDANSETRON 4 MG: 4 TABLET, ORALLY DISINTEGRATING ORAL at 04:47

## 2023-04-15 RX ADMIN — PROMETHAZINE HYDROCHLORIDE 25 MG: 25 TABLET ORAL at 20:27

## 2023-04-15 RX ADMIN — PROCHLORPERAZINE MALEATE 10 MG: 10 TABLET ORAL at 09:08

## 2023-04-15 RX ADMIN — PROCHLORPERAZINE MALEATE 10 MG: 10 TABLET ORAL at 21:40

## 2023-04-15 RX ADMIN — Medication 1 MG: at 21:40

## 2023-04-15 RX ADMIN — LORAZEPAM 0.5 MG: 0.5 TABLET ORAL at 21:40

## 2023-04-15 RX ADMIN — ARIPIPRAZOLE 10 MG: 10 TABLET ORAL at 08:52

## 2023-04-15 RX ADMIN — ONDANSETRON 4 MG: 4 TABLET, ORALLY DISINTEGRATING ORAL at 18:00

## 2023-04-15 RX ADMIN — ACETAMINOPHEN 650 MG: 325 TABLET, FILM COATED ORAL at 12:51

## 2023-04-15 RX ADMIN — DEXTROSE AND SODIUM CHLORIDE: 5; 900 INJECTION, SOLUTION INTRAVENOUS at 16:42

## 2023-04-15 RX ADMIN — PANTOPRAZOLE SODIUM 40 MG: 40 TABLET, DELAYED RELEASE ORAL at 08:52

## 2023-04-15 RX ADMIN — PROMETHAZINE HYDROCHLORIDE 25 MG: 25 TABLET ORAL at 08:52

## 2023-04-15 RX ADMIN — ONDANSETRON 4 MG: 4 TABLET, ORALLY DISINTEGRATING ORAL at 11:21

## 2023-04-15 RX ADMIN — LINACLOTIDE 290 MCG: 290 CAPSULE, GELATIN COATED ORAL at 08:51

## 2023-04-15 RX ADMIN — DULOXETINE HYDROCHLORIDE 60 MG: 60 CAPSULE, DELAYED RELEASE ORAL at 08:52

## 2023-04-15 ASSESSMENT — ACTIVITIES OF DAILY LIVING (ADL)
ADLS_ACUITY_SCORE: 31
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
## 2023-04-15 NOTE — PROGRESS NOTES
Observation Goals:  -diagnostic tests and consults completed and resulted: Not met  -vital signs normal or at patient baseline: Met  Nurse to notify provider when observation goals have been met and patient is ready for discharge.

## 2023-04-15 NOTE — PROVIDER NOTIFICATION
MD Notification    Notified Person: MD    Notified Person Name: Dr. Daniels    Notification Date/Time: 4/15/23 2398    Notification Interaction: cheko paged    Purpose of Notification:  Pt has now had 3 emesis episodes today follow each time she has something to eat/drink. Despite zofran/compazine. Feels dizzy. Do you want to cancel d/c?     Orders Received: yes, keep her, IVFs added

## 2023-04-15 NOTE — PROVIDER NOTIFICATION
"MD Notification    Notified Person: MD    Notified Person Name: Dr. Daniels    Notification Date/Time: 4/15/23 1167    Notification Interaction: cheko paged    Purpose of Notification: FYI BG check with labs 52, checked on unit: 60, gave 240cc apple juice, rechecked: 67, gave more juice and will recheck again. No hx of DM. Assymptomatic.     Orders Received: no new orders, \"thanks\"    "
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
## 2023-04-15 NOTE — PROGRESS NOTES
Observation Goals:  -diagnostic tests and consults completed and resulted: met  -vital signs normal or at patient baseline: Met    Waiting to see if pt can tolerate a diet before discharge. Emesis following breakfast and lunch.     Nurse to notify provider when observation goals have been met and patient is ready for discharge.

## 2023-04-16 LAB
ANION GAP SERPL CALCULATED.3IONS-SCNC: 9 MMOL/L (ref 7–15)
BUN SERPL-MCNC: 4 MG/DL (ref 6–20)
CALCIUM SERPL-MCNC: 8.5 MG/DL (ref 8.6–10)
CHLORIDE SERPL-SCNC: 110 MMOL/L (ref 98–107)
CREAT SERPL-MCNC: 0.8 MG/DL (ref 0.51–0.95)
DEPRECATED HCO3 PLAS-SCNC: 24 MMOL/L (ref 22–29)
ERYTHROCYTE [DISTWIDTH] IN BLOOD BY AUTOMATED COUNT: 12.9 % (ref 10–15)
GFR SERPL CREATININE-BSD FRML MDRD: >90 ML/MIN/1.73M2
GLUCOSE SERPL-MCNC: 125 MG/DL (ref 70–99)
HCT VFR BLD AUTO: 30.5 % (ref 35–47)
HGB BLD-MCNC: 10.3 G/DL (ref 11.7–15.7)
MCH RBC QN AUTO: 30.3 PG (ref 26.5–33)
MCHC RBC AUTO-ENTMCNC: 33.8 G/DL (ref 31.5–36.5)
MCV RBC AUTO: 90 FL (ref 78–100)
PLATELET # BLD AUTO: 279 10E3/UL (ref 150–450)
POTASSIUM SERPL-SCNC: 3.6 MMOL/L (ref 3.4–5.3)
RBC # BLD AUTO: 3.4 10E6/UL (ref 3.8–5.2)
SODIUM SERPL-SCNC: 143 MMOL/L (ref 136–145)
WBC # BLD AUTO: 5.2 10E3/UL (ref 4–11)

## 2023-04-16 PROCEDURE — 96376 TX/PRO/DX INJ SAME DRUG ADON: CPT

## 2023-04-16 PROCEDURE — 250N000011 HC RX IP 250 OP 636: Performed by: INTERNAL MEDICINE

## 2023-04-16 PROCEDURE — G0378 HOSPITAL OBSERVATION PER HR: HCPCS

## 2023-04-16 PROCEDURE — 258N000003 HC RX IP 258 OP 636: Performed by: INTERNAL MEDICINE

## 2023-04-16 PROCEDURE — 250N000013 HC RX MED GY IP 250 OP 250 PS 637: Performed by: INTERNAL MEDICINE

## 2023-04-16 PROCEDURE — 36415 COLL VENOUS BLD VENIPUNCTURE: CPT | Performed by: INTERNAL MEDICINE

## 2023-04-16 PROCEDURE — 80048 BASIC METABOLIC PNL TOTAL CA: CPT | Performed by: INTERNAL MEDICINE

## 2023-04-16 PROCEDURE — 96375 TX/PRO/DX INJ NEW DRUG ADDON: CPT

## 2023-04-16 PROCEDURE — 99233 SBSQ HOSP IP/OBS HIGH 50: CPT | Performed by: INTERNAL MEDICINE

## 2023-04-16 PROCEDURE — 85027 COMPLETE CBC AUTOMATED: CPT | Performed by: INTERNAL MEDICINE

## 2023-04-16 RX ORDER — METOCLOPRAMIDE HYDROCHLORIDE 5 MG/ML
5 INJECTION INTRAMUSCULAR; INTRAVENOUS 2 TIMES DAILY
Status: DISCONTINUED | OUTPATIENT
Start: 2023-04-16 | End: 2023-04-20 | Stop reason: HOSPADM

## 2023-04-16 RX ADMIN — PROCHLORPERAZINE MALEATE 10 MG: 10 TABLET ORAL at 05:35

## 2023-04-16 RX ADMIN — ACETAMINOPHEN 650 MG: 325 TABLET, FILM COATED ORAL at 19:00

## 2023-04-16 RX ADMIN — DEXTROSE AND SODIUM CHLORIDE: 5; 900 INJECTION, SOLUTION INTRAVENOUS at 18:59

## 2023-04-16 RX ADMIN — LORAZEPAM 0.5 MG: 0.5 TABLET ORAL at 21:45

## 2023-04-16 RX ADMIN — ONDANSETRON 4 MG: 4 TABLET, ORALLY DISINTEGRATING ORAL at 15:46

## 2023-04-16 RX ADMIN — ONDANSETRON 4 MG: 4 TABLET, ORALLY DISINTEGRATING ORAL at 08:49

## 2023-04-16 RX ADMIN — DULOXETINE HYDROCHLORIDE 60 MG: 60 CAPSULE, DELAYED RELEASE ORAL at 08:55

## 2023-04-16 RX ADMIN — DEXTROSE AND SODIUM CHLORIDE: 5; 900 INJECTION, SOLUTION INTRAVENOUS at 06:52

## 2023-04-16 RX ADMIN — ARIPIPRAZOLE 10 MG: 10 TABLET ORAL at 08:54

## 2023-04-16 RX ADMIN — PROMETHAZINE HYDROCHLORIDE 25 MG: 25 TABLET ORAL at 20:37

## 2023-04-16 RX ADMIN — PROCHLORPERAZINE MALEATE 10 MG: 10 TABLET ORAL at 12:30

## 2023-04-16 RX ADMIN — ONDANSETRON 4 MG: 4 TABLET, ORALLY DISINTEGRATING ORAL at 01:23

## 2023-04-16 RX ADMIN — PANTOPRAZOLE SODIUM 40 MG: 40 TABLET, DELAYED RELEASE ORAL at 20:37

## 2023-04-16 RX ADMIN — ACETAMINOPHEN 650 MG: 325 TABLET, FILM COATED ORAL at 09:54

## 2023-04-16 RX ADMIN — PROCHLORPERAZINE MALEATE 10 MG: 10 TABLET ORAL at 19:00

## 2023-04-16 RX ADMIN — METOCLOPRAMIDE 5 MG: 5 INJECTION, SOLUTION INTRAMUSCULAR; INTRAVENOUS at 11:39

## 2023-04-16 RX ADMIN — Medication 1 MG: at 21:45

## 2023-04-16 RX ADMIN — ACETAMINOPHEN 650 MG: 325 TABLET, FILM COATED ORAL at 14:29

## 2023-04-16 RX ADMIN — PANTOPRAZOLE SODIUM 40 MG: 40 TABLET, DELAYED RELEASE ORAL at 08:55

## 2023-04-16 RX ADMIN — ACETAMINOPHEN 650 MG: 325 TABLET, FILM COATED ORAL at 05:34

## 2023-04-16 RX ADMIN — LINACLOTIDE 290 MCG: 290 CAPSULE, GELATIN COATED ORAL at 08:55

## 2023-04-16 RX ADMIN — PROMETHAZINE HYDROCHLORIDE 25 MG: 25 TABLET ORAL at 08:54

## 2023-04-16 RX ADMIN — METOCLOPRAMIDE 5 MG: 5 INJECTION, SOLUTION INTRAMUSCULAR; INTRAVENOUS at 20:37

## 2023-04-16 RX ADMIN — Medication: at 08:56

## 2023-04-16 ASSESSMENT — ACTIVITIES OF DAILY LIVING (ADL)
ADLS_ACUITY_SCORE: 31

## 2023-04-16 NOTE — PROGRESS NOTES
Observation Goals:  -diagnostic tests and consults completed and resulted: met  -vital signs normal or at patient baseline: Met     Pt still not tolerating diet, plan for enema and reglan then try diet again before ready for discharge.     Nurse to notify provider when observation goals have been met and patient is ready for discharge.

## 2023-04-16 NOTE — PROGRESS NOTES
[facility] GASTROENTEROLOGY PROGRESS NOTE      Summary: Patient admitted to [facility]  with rectal bleeding.  Consultation done by [facility] that day as well as flex sig. She had ulceration in her distal rectum, consistent with stercoral ulcers, however IBD or infectious not completely ruled out, biopsies pending.  GI signed off at that time, patient was due to discharge yesterday but has not been able to due to persistent nausea and vomiting and GI was asked to see the patient again for those symptoms.    Patient has been seen at [facility] and [facility] for chronic symptoms of nausea, vomiting and constipation.  Past medial history significant for OCD, PTSD, suicide attempt 2023.  Last colonoscopy 2022 at [facility], reports indicates it was normal with normal colon biopseis.  EGD done at [facility] 22 was normal, EGD repeated 23 when part of feeding tube broke off, gastritis noted but otherwise normal.    Patient had a prolonged hospitalization in 2023 following a suicide attempt. NJ was placed at that time due to persistent nausea and vomiting with eating.  Tube was eventually pulled. At home patient takes Linzess 290mcg daily, she was also on Motegrity in the past but that was stopped due to concern for suicidal ideation.  She also uses Promethazine 25mg twice daily.  She has a bowel movement about every 5-7 days. She has a past history of eating disorder and laxative abuse, so she hesitates to use additional laxative.      She has an upcoming first appointment with Dr. Mota in May.    SUBJECTIVE:  Ongoing nausea, unable to tolerate even much liquid.  Back on IV fluids. No BM in 4-5 days.  Lower abdominal discomfort.  Open to NJ feedings again if needed.    OBJECTIVE:    /75 (BP Location: Left arm)   Pulse 84   Temp 98.8  F (37.1  C) (Oral)   Resp 16   SpO2 98%   Temp (24hrs), Av.3  F (36.8  C), Min:97.8  F (36.6  C), Max:98.8  F (37.1  C)    No data  found.    Intake/Output Summary (Last 24 hours) at 4/16/2023 1030  Last data filed at 4/16/2023 0851  Gross per 24 hour   Intake 420 ml   Output 880 ml   Net -460 ml         PHYSICAL EXAM    Constitutional: NAD, comfortable  Cardiovascular: RRR, normal S1, S2   Respiratory: CTAB  Abdomen: soft, non-tender, nondistended  Neuro: alert and oriented        Additional Comments:  ROS, FH, SH: See initial GI consult for details.    I have reviewed the patient's new clinical lab results:    Recent Labs   Lab Test 04/15/23  0542 04/14/23  2229 04/14/23  1824 04/13/23  1838 01/27/23  1258   WBC 6.4  --   --  13.7* 7.1   HGB 10.0* 9.8* 9.9* 11.6* 11.5*   MCV 90  --   --  87 90     --   --  331 251   INR  --   --   --  1.04  --      Recent Labs   Lab Test 04/15/23  0542 04/13/23 1838 02/06/23  0825    140 139   POTASSIUM 3.6 3.7 4.3   CHLORIDE 106 106 103   CO2 18* 23 27   BUN 8.2 13.0 17.4   CR 0.80 0.85 0.87   ANIONGAP 13 11 9   DENNIS 8.0* 8.8 9.4     Recent Labs   Lab Test 04/13/23 1838 02/05/23  0423 01/27/23  1258 01/27/23  1059 01/14/23  1803 01/03/23  1427 04/14/21  0727   ALBUMIN 4.3  --   --   --   --  3.4 3.0*   BILITOTAL 0.5  --   --   --   --  0.2 0.3   ALT 43*  --  19  --   --  26 19   AST 37*  --  32  --   --  25 11   ALKPHOS 69  --   --   --   --  59 50   PROTEIN  --  100*  --  Negative Negative  --   --    LIPASE 19  --   --   --   --   --   --          Principal Problem:    Rectal Ulcers    Assessment: Normal colonoscopy [facility] 9/22 with normal fecal calprotectin.  Distal colitis on imaging this admission, done for rectal bleeding. Flex sig 4/14/23 noted ulcers, possible stercoral colitis v. Infectious less likely IBD, biopsies pending.  Long history of constipation, on Linzess 290mcg at home.  No BM in many days.    Plan:   - await pathology    Nausea  Constipation  Assessment: This has been a chronic issue for the patient, she required an NJ feeding tube for a month in 1/23.  She had  unremarkable EGDs in the [facility] system in 11/22 and 1/23.  Uses promethazine at home, 25mg BID.  Unable to tolerate PO this far this admission.  Could not complete gastric emptying study in the recent past due to vomiting.  Constipation is likely contributing as well.  - continue promethezine  - start scheduled Reglan  - enema today, patient stated in the past soap noemi enema has worked the best so that was ordered  - if no improvement consider gastrograffin enema tomorrow.  - patient is open to NJ feeding tube as well  - Keep May 2023 GI clinic appointment with Dr. Nakul Serrano  [facility]  Office:  [phone]    Approximately 45 minutes of total time was spent providing patient care, including patient evaluation, reviewing documentation/test results, and .

## 2023-04-16 NOTE — PROGRESS NOTES
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     Hematochezia  Chronic anemia  Laxative abuse history  H/o Rectal prolapse  H/o anal fissure  H/o proctitis secondary to manual disimpaction attempts  H/o Nasoduodenal feeding tube, self-removed with retained foreign body removed via EGD  Gastroparesis  Slow transit constipation on Linzess  Abrupt onset BRBPR including reports of dime-sized clots (patient brought in sample) without rectal pain, fevers, reported ingestions nor inserted foreign body. September 2022 colonoscopy normal. Electrolytes normal. Hemoglobin 11.6 on presentation which is above her baseline hemoglobin of around 10. CT abdomen/pelvis showed moderate thickening of distal sigmoid colon extending to rectum.  This description sounds similar to the CT scan from November 2022 at [facility] where she had CT evidence for proctitis and had reported finger insertion into her rectum for repeated manual disimpaction.  Upon review of outside records, patient was seen in primary care clinic for irregular vaginal bleeding 4/7/23 with unremarkable exam and labs.  Pelvic ultrasound showed no uterine abnormality but did comment on a large amount of stool present.  -serial hemoglobin checks q 8 hours x 3: Stable  -[facility] consultation. Flex sigmoidoscopy report reviewed and discussed with Dr. Saha    -4/15: Continue conservative measures  "and plan for close follow-up with GI as outpatient  Initial plan for discharge canceled.  Continue regular antibiotics and start IV fluids.  Monitor orthostatic BP  4/16: Requested repeat GI team input.  Previous complicated psychiatric history makes assessment difficult    Borderline Personality Disorder  Factitious disorder, provisional diagnosis  Multiple recurrent somatic symptoms  Eating Disorder  OCD  PTSD  Anxiety  More recent prolonged hospitalization at [facility] from 1/3-2/7/2023 for suicide attempt by overdose and somatic complaints, sabotaging her own discharge transfer to inpatient psychiatry. After finally getting a bed in the inpatient psychiatry unit, she left the following day (1 day psych hospitalization). Per discharge summary: differential includes BPD, provisional diagnosis of factitious disorder, eating disorder, depression and anxiety. Of these diagnoses eating disorder, depression and anxiety are historical with symptoms present prior to hospitalization. They may still be contributing to symptoms the patient is experiencing. Interactions with staff at OSH seemed to support possible factitious disorder in the context of high healthcare utilization and multiple somatic symptoms.\" Per psychiatry note Nov 2022 at [facility]: \"Her primary problem is borderline personality d/o, definitive treatment of which will be continued DBT. RECOMMENDATIONS: Although each clinical encounter needs to be evaluated on its own merits, the patient has history of seeking respite in the Emergency Department secondary to chronic risk of maladaptive, potentially self-destructive behaviors that are exacerbated due to character pathology and there is no evidence that acute psychiatric hospitalization would mitigate those risks and likely would encourage further attempts to utilize the medical system inappropriately.\" Also recently had prolonged hospitalization at [facility] from   -resume PTA psychiatric " medications  -Encourage close outpatient psychiatric and psychologic follow-up     Pseudoseizure history  Noted hospitalization Jan 2023. EEG negative.         Diet: Regular Diet Adult  Diet    DVT Prophylaxis: Ambulate every shift  Smith Catheter: Not present  Lines: None     Cardiac Monitoring: None  Code Status: Full Code      Clinically Significant Risk Factors Present on Admission                               Disposition Plan      Expected Discharge Date: 04/17/2023,  3:00 PM      Discharge Comments: GI signed off.  Recs made.  Monitoring hemoglobin.          Jerrell Daniels MD  Hospitalist Service  [facility]  Securely message with [employer] (more info)  Text page via [employer] Paging/Directory   ______________________________________________________________________    Interval History   3 episodes of vomiting this morning as per RN.  Orthostatic blood pressure negative but continues to complain of dizziness and inability to take oral intake.    Physical Exam   /75 (BP Location: Left arm)   Pulse 88   Temp 98.6  F (37  C) (Oral)   Resp 16   SpO2 95%   HEENT- NAD, SANTIAGO  Neck- supple  CVS- I+II+ no m/r/g  RS- CTABS  Abdo- soft, no tenderness . No g/r/r  Ext- no edema     Medical Decision Making       51 MINUTES SPENT BY ME on the date of service doing chart review, history, exam, documentation & further activities per the note.      Data   ------------------------- PAST 24 HR DATA REVIEWED -----------------------------------------------    I have personally reviewed the following data over the past 24 hrs:    5.2  \   10.3 (L)   / 279     143 110 (H) 4.0 (L) /  125 (H)   3.6 24 0.80 \       Imaging results reviewed over the past 24 hrs:   No results found for this or any previous visit (from the past 24 hour(s)).

## 2023-04-16 NOTE — PROVIDER NOTIFICATION
"MD Notification    Notified Person: MD    Notified Person Name:  Frances    Notification Date/Time: 4/16/23 1010    Notification Interaction: cheko paged.    Purpose of Notification: FYI soap suds enema with no results. Increased cramping but no BM. Sounds like GI's note says if no improvement gastrografin enema tomorrow.     Orders Received: \"yes, monitor for now, if no results, will need gastrografin enema tomorrow.\"        "

## 2023-04-16 NOTE — PROGRESS NOTES
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     Hematochezia  Chronic anemia  Laxative abuse history  H/o Rectal prolapse  H/o anal fissure  H/o proctitis secondary to manual disimpaction attempts  H/o Nasoduodenal feeding tube, self-removed with retained foreign body removed via EGD  Gastroparesis  Slow transit constipation on Linzess  Abrupt onset BRBPR including reports of dime-sized clots (patient brought in sample) without rectal pain, fevers, reported ingestions nor inserted foreign body. September 2022 colonoscopy normal. Electrolytes normal. Hemoglobin 11.6 on presentation which is above her baseline hemoglobin of around 10. CT abdomen/pelvis showed moderate thickening of distal sigmoid colon extending to rectum.  This description sounds similar to the CT scan from November 2022 at [facility] where she had CT evidence for proctitis and had reported finger insertion into her rectum for repeated manual disimpaction.  Upon review of outside records, patient was seen in primary care clinic for irregular vaginal bleeding 4/7/23 with unremarkable exam and labs.  Pelvic ultrasound showed no uterine abnormality but did comment on a large amount of stool present.  -serial hemoglobin checks q 8 hours x 3: Stable  -[facility] consultation. Flex sigmoidoscopy report reviewed and discussed with Dr. Saha    Continue conservative measures and  "plan for close follow-up with GI as outpatient  Initial plan for discharge canceled.  Continue regular antibiotics and start IV fluids.  Monitor orthostatic BP    Borderline Personality Disorder  Factitious disorder, provisional diagnosis  Multiple recurrent somatic symptoms  Eating Disorder  OCD  PTSD  Anxiety  More recent prolonged hospitalization at [facility] from 1/3-2/7/2023 for suicide attempt by overdose and somatic complaints, sabotaging her own discharge transfer to inpatient psychiatry. After finally getting a bed in the inpatient psychiatry unit, she left the following day (1 day psych hospitalization). Per discharge summary: differential includes BPD, provisional diagnosis of factitious disorder, eating disorder, depression and anxiety. Of these diagnoses eating disorder, depression and anxiety are historical with symptoms present prior to hospitalization. They may still be contributing to symptoms the patient is experiencing. Interactions with staff at OSH seemed to support possible factitious disorder in the context of high healthcare utilization and multiple somatic symptoms.\" Per psychiatry note Nov 2022 at [facility]: \"Her primary problem is borderline personality d/o, definitive treatment of which will be continued DBT. RECOMMENDATIONS: Although each clinical encounter needs to be evaluated on its own merits, the patient has history of seeking respite in the Emergency Department secondary to chronic risk of maladaptive, potentially self-destructive behaviors that are exacerbated due to character pathology and there is no evidence that acute psychiatric hospitalization would mitigate those risks and likely would encourage further attempts to utilize the medical system inappropriately.\" Also recently had prolonged hospitalization at [facility] from   -resume PTA psychiatric medications  -Encourage close outpatient psychiatric and psychologic follow-up     Pseudoseizure history  Noted " hospitalization Jan 2023. EEG negative.         Diet: Regular Diet Adult  Diet    DVT Prophylaxis: Ambulate every shift  Smith Catheter: Not present  Lines: None     Cardiac Monitoring: None  Code Status: Full Code      Clinically Significant Risk Factors Present on Admission                               Disposition Plan      Expected Discharge Date: 04/17/2023,  3:00 PM      Discharge Comments: GI signed off.  Recs made.  Monitoring hemoglobin.          Jerrell Daniels MD  Hospitalist Service  [facility]  Securely message with [employer] (more info)  Text page via [employer] Paging/Directory   ______________________________________________________________________    Interval History   Seen multiple times during the day.  Had flex sig this morning and results were discussed with the patient.  Continues to feel nauseous and initial plan to discharge was canceled once RN mentioned that she is not able to tolerate anything p.o. and is orthostatic and dizzy    Physical Exam   /75 (BP Location: Left arm)   Pulse 88   Temp 98.6  F (37  C) (Oral)   Resp 16   SpO2 95%   HEENT- NAD, SANTIAGO  Neck- supple  CVS- I+II+ no m/r/g  RS- CTABS  Abdo- soft, no tenderness . No g/r/r  Ext- no edema     Medical Decision Making       51 MINUTES SPENT BY ME on the date of service doing chart review, history, exam, documentation & further activities per the note.      Data   ------------------------- PAST 24 HR DATA REVIEWED -----------------------------------------------    I have personally reviewed the following data over the past 24 hrs:    5.2  \   10.3 (L)   / 279     143 110 (H) 4.0 (L) /  125 (H)   3.6 24 0.80 \       Imaging results reviewed over the past 24 hrs:   No results found for this or any previous visit (from the past 24 hour(s)).

## 2023-04-16 NOTE — PLAN OF CARE
Shift Summary: 1900-0730  Orientation/Cognitive: A&O x4  Observation Goals (Met/ Not Met): Not met  Mobility Level/Assist Equipment: SBA, Feely dizzy and nauseous   Fall Risk (Y/N): No  Behavior Concerns: None. Denies thoughts/urges of self harm and thoughts of not wanting to be alive.   Pain Management: Denies pain   Tele/VS/O2: VSS on RA  ABNL Lab/BG:   Diet: Reg diet ordered, not tolerating   Bowel/Bladder: Continent   Skin Concerns: Wound to back R thigh: Dressing CDI  Drains/Devices: IV Infusing D5&NS 75ml/hr  Tests/Procedures for next shift: TBD  Anticipated DC date & active delays: TBD  Patient Stated Goal for Today: To rest

## 2023-04-16 NOTE — PROGRESS NOTES
Observation Goals:  -diagnostic tests and consults completed and resulted: Met  -vital signs normal or at patient baseline: Met     Pt still not tolerating diet, edema without result, per GI note will probably need gastrografin enema tomorrow.      Nurse to notify provider when observation goals have been met and patient is ready for discharge.

## 2023-04-17 ENCOUNTER — APPOINTMENT (OUTPATIENT)
Dept: GENERAL RADIOLOGY | Facility: CLINIC | Age: 23
DRG: 394 | End: 2023-04-17
Attending: PHYSICIAN ASSISTANT
Payer: COMMERCIAL

## 2023-04-17 LAB
GLUCOSE BLDC GLUCOMTR-MCNC: 106 MG/DL (ref 70–99)
GLUCOSE BLDC GLUCOMTR-MCNC: 85 MG/DL (ref 70–99)
PATH REPORT.COMMENTS IMP SPEC: NORMAL
PATH REPORT.FINAL DX SPEC: NORMAL
PATH REPORT.GROSS SPEC: NORMAL
PATH REPORT.MICROSCOPIC SPEC OTHER STN: NORMAL
PATH REPORT.RELEVANT HX SPEC: NORMAL
PHOTO IMAGE: NORMAL

## 2023-04-17 PROCEDURE — 250N000013 HC RX MED GY IP 250 OP 250 PS 637: Performed by: PHYSICIAN ASSISTANT

## 2023-04-17 PROCEDURE — 96376 TX/PRO/DX INJ SAME DRUG ADON: CPT

## 2023-04-17 PROCEDURE — 250N000011 HC RX IP 250 OP 636: Performed by: INTERNAL MEDICINE

## 2023-04-17 PROCEDURE — 999N000128 HC STATISTIC PERIPHERAL IV START W/O US GUIDANCE

## 2023-04-17 PROCEDURE — 250N000013 HC RX MED GY IP 250 OP 250 PS 637: Performed by: INTERNAL MEDICINE

## 2023-04-17 PROCEDURE — 74283 THER NMA RDCTJ INTUS/OBSTRCJ: CPT

## 2023-04-17 PROCEDURE — G0378 HOSPITAL OBSERVATION PER HR: HCPCS

## 2023-04-17 PROCEDURE — 120N000001 HC R&B MED SURG/OB

## 2023-04-17 PROCEDURE — 99232 SBSQ HOSP IP/OBS MODERATE 35: CPT | Performed by: HOSPITALIST

## 2023-04-17 PROCEDURE — 258N000003 HC RX IP 258 OP 636: Performed by: INTERNAL MEDICINE

## 2023-04-17 PROCEDURE — 82962 GLUCOSE BLOOD TEST: CPT

## 2023-04-17 RX ORDER — LACTULOSE 10 G/15ML
10 SOLUTION ORAL 3 TIMES DAILY
Status: DISCONTINUED | OUTPATIENT
Start: 2023-04-17 | End: 2023-04-20 | Stop reason: HOSPADM

## 2023-04-17 RX ADMIN — PROCHLORPERAZINE MALEATE 10 MG: 10 TABLET ORAL at 03:25

## 2023-04-17 RX ADMIN — PROCHLORPERAZINE EDISYLATE 10 MG: 5 INJECTION INTRAMUSCULAR; INTRAVENOUS at 16:21

## 2023-04-17 RX ADMIN — PANTOPRAZOLE SODIUM 40 MG: 40 TABLET, DELAYED RELEASE ORAL at 08:51

## 2023-04-17 RX ADMIN — DIATRIZOATE MEGLUMINE AND DIATRIZOATE SODIUM 480 ML: 660; 100 SOLUTION ORAL; RECTAL at 14:50

## 2023-04-17 RX ADMIN — ACETAMINOPHEN 650 MG: 325 TABLET, FILM COATED ORAL at 10:29

## 2023-04-17 RX ADMIN — Medication 1 MG: at 21:02

## 2023-04-17 RX ADMIN — LORAZEPAM 0.5 MG: 0.5 TABLET ORAL at 21:02

## 2023-04-17 RX ADMIN — DULOXETINE HYDROCHLORIDE 60 MG: 60 CAPSULE, DELAYED RELEASE ORAL at 08:51

## 2023-04-17 RX ADMIN — ACETAMINOPHEN 650 MG: 325 TABLET, FILM COATED ORAL at 04:05

## 2023-04-17 RX ADMIN — METOCLOPRAMIDE 5 MG: 5 INJECTION, SOLUTION INTRAMUSCULAR; INTRAVENOUS at 08:51

## 2023-04-17 RX ADMIN — ONDANSETRON 4 MG: 2 INJECTION INTRAMUSCULAR; INTRAVENOUS at 10:36

## 2023-04-17 RX ADMIN — PROMETHAZINE HYDROCHLORIDE 25 MG: 25 TABLET ORAL at 20:12

## 2023-04-17 RX ADMIN — DEXTROSE AND SODIUM CHLORIDE: 5; 900 INJECTION, SOLUTION INTRAVENOUS at 08:56

## 2023-04-17 RX ADMIN — DEXTROSE AND SODIUM CHLORIDE: 5; 900 INJECTION, SOLUTION INTRAVENOUS at 23:21

## 2023-04-17 RX ADMIN — ONDANSETRON 4 MG: 4 TABLET, ORALLY DISINTEGRATING ORAL at 23:50

## 2023-04-17 RX ADMIN — ONDANSETRON 4 MG: 2 INJECTION INTRAMUSCULAR; INTRAVENOUS at 17:25

## 2023-04-17 RX ADMIN — LACTULOSE 10 G: 10 SOLUTION ORAL at 15:32

## 2023-04-17 RX ADMIN — LINACLOTIDE 290 MCG: 290 CAPSULE, GELATIN COATED ORAL at 10:30

## 2023-04-17 RX ADMIN — LACTULOSE 10 G: 10 SOLUTION ORAL at 20:12

## 2023-04-17 RX ADMIN — ACETAMINOPHEN 650 MG: 325 TABLET, FILM COATED ORAL at 16:21

## 2023-04-17 RX ADMIN — PROMETHAZINE HYDROCHLORIDE 25 MG: 25 TABLET ORAL at 08:51

## 2023-04-17 RX ADMIN — METOCLOPRAMIDE 5 MG: 5 INJECTION, SOLUTION INTRAMUSCULAR; INTRAVENOUS at 20:12

## 2023-04-17 RX ADMIN — PANTOPRAZOLE SODIUM 40 MG: 40 TABLET, DELAYED RELEASE ORAL at 20:12

## 2023-04-17 RX ADMIN — ARIPIPRAZOLE 10 MG: 10 TABLET ORAL at 08:51

## 2023-04-17 ASSESSMENT — ACTIVITIES OF DAILY LIVING (ADL)
ADLS_ACUITY_SCORE: 33
ADLS_ACUITY_SCORE: 31
ADLS_ACUITY_SCORE: 33
ADLS_ACUITY_SCORE: 31
ADLS_ACUITY_SCORE: 33
ADLS_ACUITY_SCORE: 31

## 2023-04-17 NOTE — PLAN OF CARE
Goal Outcome Evaluation:      Plan of Care Reviewed With: patient    Orientation/Cognitive: AOx4  Observation Goals (Met/ Not Met): Inpatient  Mobility Level/Assist Equipment: SBA/IND  Fall Risk (Y/N): N  Behavior Concerns: Green  Pain Management: C/o abdominal pain, tylenol and heat packs given  Tele/VS/O2: VSS on RA  ABNL Lab/BG: Hgb 10.3  Diet: Regular, decreased appetite, c/o continuous nausea, PRN meds given with little relief, 1 small emesis throughout shift  Bowel/Bladder: Continent, no BM  Skin Concerns: Wound to R inner thigh, dressing changed, CDI  Drains/Devices: PIV infusing D5 NS @ 75  Tests/Procedures for next shift: GI following   Anticipated DC date & active delays: TBD  Patient Stated Goal for Today: Rest

## 2023-04-17 NOTE — CONSULTS
CLINICAL NUTRITION SERVICES  -  ASSESSMENT NOTE      RECOMMENDATIONS FOR MD/PROVIDER TO ORDER:   Nutrition to be addressed in the next 24-48 hrs (TF if inadequate oral intake).   Recommendations Ordered by Registered Dietitian (RD):   Trial chocolate Gavi Farms 1.0 and talavera Gelatein Plus for dinner tonight  Chocolate Gavi Farms 1.0 BID between meals (10 am and 2 pm)   Future/Additional Recommendations:   If TF employed, recommend eventual goal TF Gavi Farms Peptide 1.5 at 40 mL/hr = 1440 kcals (26 kcal/kg), 71 g protein (1.3 gm/kg), 133 g CHO, 74 g fat, 9 g fiber, 672 mL free water.   Malnutrition:  % Weight Loss:  Unable to determine without current weight  % Intake:  </= 50% for >/= 5 days (severe malnutrition) - On 4/18  Subcutaneous Fat Loss:  None observed  Muscle Loss:  None observed  Fluid Retention:  None noted    Malnutrition Diagnosis: Unable to determine due to lack of current weight        REASON FOR ASSESSMENT  Thea Castle is a 23 year old female seen by Registered Dietitian for Patient/Family Request      NUTRITION HISTORY  - Information obtained from patient and Epic records.  - Patient is on a Regular diet at home.  - Typical food/fluid intake is variable (vague historian).  - Diet history: Am familiar with patient from recent [facility] admission when she received multiple oral liquid nutritional supplements (Gavi Farms 1.0, Ensure Clear, Gelatein Plus, and Boost Soothe) in attempts to avoid FT for TF. When oral intake failed, TF was employed via NJ feeding tube using Osmolite 1.5 at first and then switching to Gavi Farms Peptide 1.5 at eventual goal 40 mL/hr = 1440 kcals, 71 g protein, 133 g CHO, 74 g fat, 9 g fiber, 672 mL free water. This tube was eventually pulled.  - Intolerance to Cilantro noted (causes GI disturbance).    CURRENT NUTRITION ORDERS  Diet Order:     Regular     Current Intake/Tolerance:  Decreased nutritional intake since admission x 4 days.  Yesterday she had emesis x4  after consuming CL/FL and bites of crackers.  Overnight she had 2 popsicles.  Today so far she had 1 bite of vanilla ice cream.  She would like to trial a couple of different supplements over the next 24 hrs.    4/14:  Abd CT = Since 11/30/2022 there are new findings of distal colitis.    4/14:  Flex sig --> Multiple ulcers in the distal rectum, bx pending.     NUTRITION FOCUSED PHYSICAL ASSESSMENT FOR DIAGNOSING MALNUTRITION)  Yes                Observed:    Pale    Obtained from Chart/Interdisciplinary Team:  None noted    ANTHROPOMETRICS  Height: 5 ft 4 in  Admit Wt:  No record  Last recorded weight:  55.9 kg (2/7)  There is no height or weight on file to calculate BMI.  Previous BMI:  21 (103% IBW)  Weight Status:  Normal BMI (appears to be)  IBW: 54.5 kg  % IBW: unknown   Weight History:   - Unknown recent weight history.  - Pt suspects she has lost weight recently.  - She has previously reported -120 lbs.    Wt Readings from Last 10 Encounters:   02/07/23 55.9 kg (123 lb 3.2 oz)   01/23/23 55.1 kg (121 lb 7.6 oz)   09/27/21 55.8 kg (123 lb 1.6 oz)   09/30/20 47.6 kg (105 lb)   09/24/20 47.6 kg (105 lb)   04/25/19 54.5 kg (120 lb 2.4 oz) (37 %, Z= -0.33)*   03/25/19 57.6 kg (127 lb) (51 %, Z= 0.03)*   03/07/19 55.3 kg (122 lb) (42 %, Z= -0.21)*     * Growth percentiles are based on CDC (Girls, 2-20 Years) data.     LABS  Labs reviewed  BUN 4 (L) - indicated poor protein intake    MEDICATIONS  Medications reviewed  IVF D5 NaCl at 75 mL/hr = 90 gm CHO, 306 kcals  Lactulose  Linzess  Ativan  Reglan    ASSESSED NUTRITION NEEDS PER APPROVED PRACTICE GUIDELINES:    Dosing Weight:  55.9 kg (2/7, last recorded weight)  Estimated Energy Needs: 5319-7075 kcals (25-30 Kcal/Kg)  Justification: maintenance  Estimated Protein Needs: 67-84 grams protein (1.2-1.5 g pro/Kg)  Justification: hypercatabolism with acute illness and preservation of lean body mass  Estimated Fluid Needs: 8913-8550 mL (1  mL/Kcal)  Justification: maintenance    MALNUTRITION:  % Weight Loss:  Unable to determine without current weight  % Intake:  </= 50% for >/= 5 days (severe malnutrition) - On 4/18  Subcutaneous Fat Loss:  None observed  Muscle Loss:  None observed  Fluid Retention:  None noted    Malnutrition Diagnosis: Unable to determine due to lack of current weight    NUTRITION DIAGNOSIS:  Inadequate oral intake related to altered GI function (N/V, constipation, and poor appetite as evidenced by poor oral intake since admission x 4 days.    NUTRITION INTERVENTIONS  Recommendations / Nutrition Prescription  Trial chocolate Gavi Farms 1.0 and talavera Gelatein Plus for dinner tonight    Chocolate Gavi Farms 1.0 BID between meals (10 am and 2 pm)    If TF employed, recommend eventual goal TF Gavi Farms Peptide 1.5 at 40 mL/hr = 1440 kcals (26 kcal/kg), 71 g protein (1.3 gm/kg), 133 g CHO, 74 g fat, 9 g fiber, 672 mL free water.    Implementation  Nutrition education: Not appropriate at this time due to patient condition  Medical Food Supplement - Ordered above in EPIC    Nutrition Goals  Nutrition to be addressed in the next 24-48 hrs (TF if inadequate oral intake).    MONITORING AND EVALUATION:  Progress towards goals will be monitored and evaluated per protocol and Practice Guidelines    Agnieszka Dupont, RD, LD, CNSC

## 2023-04-17 NOTE — PROGRESS NOTES
[facility]    Medicine Progress Note - Hospitalist Service    Date of Admission:  4/14/2023    Assessment & Plan   Thea Castle is a 23 year old female with complex psychiatric history including borderline personality disorder, pseudoseizures, eating disorder, OCD, PTSD, anxiety, provisional diagnosis of factitious disorder and medical history of chronic anemia, anal fissure, gastroparesis, slow transit constipation, rectal prolapse, proctitis secondary to known attempts at manual disimpaction, urinary retention who was admitted to observation on 4/14/2023 for bright red blood per rectum.     Hematochezia  Chronic anemia  Laxative abuse history  H/o Rectal prolapse  H/o anal fissure  H/o proctitis secondary to manual disimpaction attempts  H/o Nasoduodenal feeding tube, self-removed with retained foreign body removed via EGD  Gastroparesis  Slow transit constipation on Linzess  Abrupt onset BRBPR including reports of dime-sized clots (patient brought in sample) without rectal pain, fevers, reported ingestions nor inserted foreign body. September 2022 colonoscopy normal. Electrolytes normal. Hemoglobin 11.6 on presentation which is above her baseline hemoglobin of around 10. CT abdomen/pelvis showed moderate thickening of distal sigmoid colon extending to rectum.  This description sounds similar to the CT scan from November 2022 at [facility] where she had CT evidence for proctitis and had reported finger insertion into her rectum for repeated manual disimpaction.  Upon review of outside records, patient was seen in primary care clinic for irregular vaginal bleeding 4/7/23 with unremarkable exam and labs.  Pelvic ultrasound showed no uterine abnormality but did comment on a large amount of stool present.  -serial hemoglobin checks q 8 hours x 3: Stable  -[facility] consultation. Flex sigmoidoscopy report reviewed and discussed with Dr. Saha     -4/15: Continue conservative measures  "and plan for close follow-up with GI as outpatient  Initial plan for discharge canceled.  Continue regular antibiotics and start IV fluids.  Monitor orthostatic BP  4/16: Requested repeat GI team input.  Previous complicated psychiatric history makes assessment difficult  4/17: Appreciate GI input for nausea and vomiting. Gastrograffin study today and possible placment of NJ tube if patient not able to tolerate a diet. Also, starting on lactulose for better bowel regimen.      Borderline Personality Disorder  Factitious disorder, provisional diagnosis  Multiple recurrent somatic symptoms  Eating Disorder  OCD  PTSD  Anxiety  More recent prolonged hospitalization at [facility] from 1/3-2/7/2023 for suicide attempt by overdose and somatic complaints, sabotaging her own discharge transfer to inpatient psychiatry. After finally getting a bed in the inpatient psychiatry unit, she left the following day (1 day psych hospitalization). Per discharge summary: differential includes BPD, provisional diagnosis of factitious disorder, eating disorder, depression and anxiety. Of these diagnoses eating disorder, depression and anxiety are historical with symptoms present prior to hospitalization. They may still be contributing to symptoms the patient is experiencing. Interactions with staff at OSH seemed to support possible factitious disorder in the context of high healthcare utilization and multiple somatic symptoms.\" [facility] \"Her primary problem is borderline personality d/o, definitive treatment of which will be continued DBT. RECOMMENDATIONS: Although each clinical encounter needs to be evaluated on its own merits, the patient has history of seeking respite in the Emergency Department secondary to chronic risk of maladaptive, potentially self-destructive behaviors that are exacerbated due to character pathology and there is no evidence that acute psychiatric hospitalization would " "mitigate those risks and likely would encourage further attempts to utilize the medical system inappropriately.\" Also recently had prolonged hospitalization at [facility] from   -resume PTA psychiatric medications  -Encourage close outpatient psychiatric and psychologic follow-up     Pseudoseizure history  Noted hospitalization Jan 2023. EEG negative.        Diet: Regular Diet Adult  Diet    DVT Prophylaxis: Pneumatic Compression Devices  Smith Catheter: Not present  Lines: None     Cardiac Monitoring: None  Code Status: Full Code      Clinically Significant Risk Factors Present on Admission                               Disposition Plan      Expected Discharge Date: 04/19/2023        Discharge Comments: GI reconsulted today.  Enema not successful.  Poss. GG enema 4/17/23.  Med  recs made.  Monitoring hemoglobin.          Pura Lopez MD  Hospitalist Service  [facility]  Securely message with [employer] (more info)  Text page via [employer] Paging/Directory   ______________________________________________________________________    Interval History   Patient attempting to eat but still complaining of nausea.     Physical Exam   Vital Signs: Temp: 98.3  F (36.8  C) Temp src: Oral BP: 116/78 Pulse: 99   Resp: 18 SpO2: 99 % O2 Device: None (Room air)    Weight: 0 lbs 0 oz    General Appearance: Well appearing for stated age.  Respiratory: CTAB, no rales or ronchi  Cardiovascular: S1, S2 normal, no murmurs  GI: non-tender on palpation, BS present      Medical Decision Making       55 MINUTES SPENT BY ME on the date of service doing chart review, history, exam, documentation & further activities per the note.      Data         Imaging results reviewed over the past 24 hrs:   No results found for this or any previous visit (from the past 24 hour(s)).  "

## 2023-04-17 NOTE — PROGRESS NOTES
"SPIRITUAL HEALTH SERVICES Progress Note  [facility] 55    Summary: Saw pt Thea Castle per pt request. Pt is well known to me from a previous admission and shared about her life since discharging a few months ago. Pt reflected on her nadeen, support network, and some concerns she has about her current illness.    Patient/Family Understanding of Illness and Goals of Care - Pt shared that she came in for \"GI issues,\" and expects to have another procedure later today. She also noted that she has not been able to keep food down and that there has been some discussion about placing a feeding tube.    Distress and Loss Pt discussed that this admission feels like a \"set back.\" She shared that she is \"nervous\" about the possibility of placing a feeding tube, stating \"I hope it is just a short term solution, I don't want it to set me back.\" We reflected on these emotions together.    Strengths, Coping, and Resources - Pt reflected that she \"has been doing a lot better with her mental health,\" and noted the support of her family, friends, psychiatrist, and coworkers. She expressed excitement about her new job and about returning to school soon.     Meaning, Beliefs, and Spirituality   - Pt is [employer] and appreciated prayer together. She also expressed feeling that \"the improvement in [her] mental health is a miracle from God.\"   - Pt is looking forward to returning to school and shared her goal of becoming a hospital . She expressed finding meaning in using her experience with mental and physical illness to help others.    Plan of Care - Pt welcomes follow up visits, will update unit . Spiritual Health remains available for spiritual and emotional support as needed. Please consult should any immediate needs arise.    Sarah Johanson  Chaplain Resident  Spiritual Health Services  Pager: [phone]     [facility] available 24/7 for emergent requests/referrals, either by having the on-call "  paged or by entering an ASAP/STAT consult in Epic (this will also page the on-call ).

## 2023-04-17 NOTE — PLAN OF CARE
Shift Summary  Orientation/Cognitive: A&Ox4  Observation Goals (Met/ Not Met): met (still not tolerating diet)  Mobility Level/Assist Equipment: SBA, c/o dizziness, negative orthostatic BPs.  Fall Risk (Y/N): yes  Behavior Concerns: none  Pain Management: PRN tylenol & Heat packs for cramping   Tele/VS/O2: VSS. RA.  ABNL Lab/BG: n/a, Hgb stable 10.3  Diet: Regular. Ate 2 popsicles overnight      Bowel/Bladder: Soap suds enema given with no results.  Skin Concerns: R thigh wound- dressing CDI  Drains/Devices: PIV infusing D5 & NS @75/hr  Tests/Procedures for next shift: possible gastrografin enema tomorrow (GI following)  Anticipated DC date & active delays: pending when pt is able to tolerate diet  Patient Stated Goal for Today: to rest

## 2023-04-17 NOTE — PLAN OF CARE
Orientation/Cognitive: A&Ox4  Observation Goals (Met/ Not Met): met (except still not tolerating diet)  Mobility Level/Assist Equipment: SBA, c/o dizziness, negative orthostatic BPs, ambulated in halls with staff  Fall Risk (Y/N): yes  Behavior Concerns: none  Pain Management: Abd cramping pain managed with tylenol and heat  Tele/VS/O2: VSS. RA.  ABNL Lab/BG: n/a, Hgb stable 10.3  Diet: regular diet ordered- only attempted clears/ fulls and crackers, unwitnessed vomiting episodes x4. Emesis contents were cool with no odor, had appearances of water, broth and jello. All clear in color despite ice cream eaten before 3rd episode. Amount greater than patient has been eating, although she is on IVFs.   Bowel/Bladder: voiding in BR, soap suds enema given with increased abd cramps but No BM.   Skin Concerns: R thigh wound- dressing changed, A&D ointment applied.  Drains/Devices: PIV infusing  Tests/Procedures for next shift: possible gastrografin enema tomorrow (GI following)  Anticipated DC date & active delays: pending when pt is able to tolerate diet  Patient Stated Goal for Today: improved abd pain and able to keep food down- unfortunately not able to be met today.

## 2023-04-17 NOTE — PROGRESS NOTES
Observation Goals:  -diagnostic tests and consults completed and resulted: Met  -vital signs normal or at patient baseline: Met     Nurse to notify provider when observation goals have been met and patient is ready for discharge.

## 2023-04-17 NOTE — PROGRESS NOTES
[facility]  [facility]  Gastroenterology Progress note    Interval History:      Pt did not move bowels with soap suds enema.  Reports severe nausea.  Not tolerating po intake.      Vital Signs:      /74 (BP Location: Left arm)   Pulse 96   Temp 98.2  F (36.8  C) (Oral)   Resp 16   SpO2 99%   Temp (24hrs), Av.2  F (36.8  C), Min:97.7  F (36.5  C), Max:98.8  F (37.1  C)    No data found.    Intake/Output Summary (Last 24 hours) at 2023 0902  Last data filed at 2023 1748  Gross per 24 hour   Intake 370 ml   Output 650 ml   Net -280 ml         Constitutional: NAD, comfortable  Cardiovascular: RRR, normal S1, S2   Respiratory: CTAB  Abdomen: soft, non-tender, nondistended    Additional Comments:  ROS, FH, SH: See initial GI consult for details.    Laboratory Data:  Recent Labs   Lab Test 23  1146 04/15/23  0542 23  2229 23  1824 23  1838   WBC 5.2 6.4  --   --  13.7*   HGB 10.3* 10.0* 9.8*   < > 11.6*   MCV 90 90  --   --  87    255  --   --  331   INR  --   --   --   --  1.04    < > = values in this interval not displayed.     Recent Labs   Lab Test 23  1146 04/15/23  0542 23  183    137 140   POTASSIUM 3.6 3.6 3.7   CHLORIDE 110* 106 106   CO2 24 18* 23   BUN 4.0* 8.2 13.0   CR 0.80 0.80 0.85   ANIONGAP 9 13 11   DENNIS 8.5* 8.0* 8.8     Recent Labs   Lab Test 23  1838 23  0423 23  1258 23  1059 23  1803 23  1427 21  0727   ALBUMIN 4.3  --   --   --   --  3.4 3.0*   BILITOTAL 0.5  --   --   --   --  0.2 0.3   ALT 43*  --  19  --   --  26 19   AST 37*  --  32  --   --  25 11   ALKPHOS 69  --   --   --   --  59 50   PROTEIN  --  100*  --  Negative Negative  --   --    LIPASE 19  --   --   --   --   --   --          Assessment:  24 yo female admitted  with rectal bleeding.  Flex sig with ulceration in the distal rectum consistent with stercoral ulcers, however, IBD or  infectious etiology not ruled out.  Biopsies pending.  GI signed off at that time but was re consulted for nausea and vomiting.  Patient has been seen at [facility] and [facility] for chronic symptoms of nausea, vomiting, and constipation. Past medical history includes OCD, PTSD, suicide attempt 1/23.  Last colonoscopy 9/22 at [facility] was normal with normal biopsies.  EGD at [facility] 11/11/22 was normal.  EGD repeated 1/2/23 when part of feeding tube broke off, gastritis noted.  Patient with prolonged hospitalization 1/23 following a suicide attempt.  NJ placed at that time for persistent nausea/vomiting.  Patient on Linzess 290 mcg daily at home.  Previously on Motegrity but was stopped due to suicidal ideation.  She uses promethazine 25 mg twice daily.  She has a BM every 5-7 days.  History of eating disorder and laxative abuse.  Soap suds enema 4/16 with no results, just increased cramping.  She has an upcoming appointment with Dr. Mota at [facility] in May.  Plan:  -  Biopsies pending.  -  Continue promethazine.  -  Scheduled Reglan.  -  Gastrografin enema today.  Patient reports good success with lactulose in the past.  Will plan on restarting TID after GG enema.  Titrate as needed.  -  Patient may need placement of NJ for feeding if no improvement in next 24 hours.   -  Important to keep scheduled appointment in May with Dr. Mota.    Total Time Spent: 15 minutes    CATRACHITO Rinaldi  [facility]  Office:  [phone] call if needed after 11:30AM  Cell:  [phone], not available after 11:30AM at this number

## 2023-04-18 LAB
ALBUMIN SERPL BCG-MCNC: 3.9 G/DL (ref 3.5–5.2)
ALP SERPL-CCNC: 55 U/L (ref 35–104)
ALT SERPL W P-5'-P-CCNC: 39 U/L (ref 10–35)
ANION GAP SERPL CALCULATED.3IONS-SCNC: 8 MMOL/L (ref 7–15)
AST SERPL W P-5'-P-CCNC: 37 U/L (ref 10–35)
BILIRUB SERPL-MCNC: 0.2 MG/DL
BUN SERPL-MCNC: 1.6 MG/DL (ref 6–20)
CALCIUM SERPL-MCNC: 8.6 MG/DL (ref 8.6–10)
CHLORIDE SERPL-SCNC: 107 MMOL/L (ref 98–107)
CREAT SERPL-MCNC: 0.78 MG/DL (ref 0.51–0.95)
DEPRECATED HCO3 PLAS-SCNC: 27 MMOL/L (ref 22–29)
GFR SERPL CREATININE-BSD FRML MDRD: >90 ML/MIN/1.73M2
GLUCOSE BLDC GLUCOMTR-MCNC: 115 MG/DL (ref 70–99)
GLUCOSE BLDC GLUCOMTR-MCNC: 86 MG/DL (ref 70–99)
GLUCOSE BLDC GLUCOMTR-MCNC: 95 MG/DL (ref 70–99)
GLUCOSE SERPL-MCNC: 102 MG/DL (ref 70–99)
POTASSIUM SERPL-SCNC: 3.4 MMOL/L (ref 3.4–5.3)
PROT SERPL-MCNC: 6.3 G/DL (ref 6.4–8.3)
SODIUM SERPL-SCNC: 142 MMOL/L (ref 136–145)

## 2023-04-18 PROCEDURE — 99232 SBSQ HOSP IP/OBS MODERATE 35: CPT | Performed by: HOSPITALIST

## 2023-04-18 PROCEDURE — 250N000013 HC RX MED GY IP 250 OP 250 PS 637: Performed by: INTERNAL MEDICINE

## 2023-04-18 PROCEDURE — 258N000003 HC RX IP 258 OP 636: Performed by: INTERNAL MEDICINE

## 2023-04-18 PROCEDURE — 80053 COMPREHEN METABOLIC PANEL: CPT | Performed by: HOSPITALIST

## 2023-04-18 PROCEDURE — 250N000011 HC RX IP 250 OP 636: Performed by: INTERNAL MEDICINE

## 2023-04-18 PROCEDURE — 36415 COLL VENOUS BLD VENIPUNCTURE: CPT | Performed by: HOSPITALIST

## 2023-04-18 PROCEDURE — 999N000127 HC STATISTIC PERIPHERAL IV START W US GUIDANCE

## 2023-04-18 PROCEDURE — 250N000013 HC RX MED GY IP 250 OP 250 PS 637: Performed by: PHYSICIAN ASSISTANT

## 2023-04-18 PROCEDURE — 120N000001 HC R&B MED SURG/OB

## 2023-04-18 RX ORDER — PROMETHAZINE HYDROCHLORIDE 25 MG/1
25 TABLET ORAL 3 TIMES DAILY
Status: DISCONTINUED | OUTPATIENT
Start: 2023-04-18 | End: 2023-04-20 | Stop reason: HOSPADM

## 2023-04-18 RX ADMIN — PROCHLORPERAZINE MALEATE 10 MG: 10 TABLET ORAL at 19:37

## 2023-04-18 RX ADMIN — ACETAMINOPHEN 650 MG: 325 TABLET, FILM COATED ORAL at 16:47

## 2023-04-18 RX ADMIN — PANTOPRAZOLE SODIUM 40 MG: 40 TABLET, DELAYED RELEASE ORAL at 19:37

## 2023-04-18 RX ADMIN — PANTOPRAZOLE SODIUM 40 MG: 40 TABLET, DELAYED RELEASE ORAL at 08:49

## 2023-04-18 RX ADMIN — PROMETHAZINE HYDROCHLORIDE 25 MG: 25 TABLET ORAL at 08:49

## 2023-04-18 RX ADMIN — PROCHLORPERAZINE EDISYLATE 10 MG: 5 INJECTION INTRAMUSCULAR; INTRAVENOUS at 04:23

## 2023-04-18 RX ADMIN — DULOXETINE HYDROCHLORIDE 60 MG: 60 CAPSULE, DELAYED RELEASE ORAL at 08:49

## 2023-04-18 RX ADMIN — ONDANSETRON 4 MG: 2 INJECTION INTRAMUSCULAR; INTRAVENOUS at 09:19

## 2023-04-18 RX ADMIN — MELATONIN TAB 3 MG 10 MG: 3 TAB at 21:26

## 2023-04-18 RX ADMIN — ACETAMINOPHEN 650 MG: 325 TABLET, FILM COATED ORAL at 12:35

## 2023-04-18 RX ADMIN — PROMETHAZINE HYDROCHLORIDE 25 MG: 25 TABLET ORAL at 19:37

## 2023-04-18 RX ADMIN — LACTULOSE 10 G: 10 SOLUTION ORAL at 13:35

## 2023-04-18 RX ADMIN — DEXTROSE AND SODIUM CHLORIDE: 5; 900 INJECTION, SOLUTION INTRAVENOUS at 13:36

## 2023-04-18 RX ADMIN — LORAZEPAM 0.5 MG: 0.5 TABLET ORAL at 21:09

## 2023-04-18 RX ADMIN — METOCLOPRAMIDE 5 MG: 5 INJECTION, SOLUTION INTRAMUSCULAR; INTRAVENOUS at 21:09

## 2023-04-18 RX ADMIN — LACTULOSE 10 G: 10 SOLUTION ORAL at 19:37

## 2023-04-18 RX ADMIN — METOCLOPRAMIDE 5 MG: 5 INJECTION, SOLUTION INTRAMUSCULAR; INTRAVENOUS at 08:49

## 2023-04-18 RX ADMIN — LACTULOSE 10 G: 10 SOLUTION ORAL at 08:49

## 2023-04-18 RX ADMIN — ONDANSETRON 4 MG: 4 TABLET, ORALLY DISINTEGRATING ORAL at 16:47

## 2023-04-18 RX ADMIN — PROMETHAZINE HYDROCHLORIDE 25 MG: 25 TABLET ORAL at 13:35

## 2023-04-18 RX ADMIN — LINACLOTIDE 290 MCG: 290 CAPSULE, GELATIN COATED ORAL at 08:51

## 2023-04-18 RX ADMIN — ACETAMINOPHEN 650 MG: 325 TABLET, FILM COATED ORAL at 04:23

## 2023-04-18 RX ADMIN — ARIPIPRAZOLE 10 MG: 10 TABLET ORAL at 08:49

## 2023-04-18 ASSESSMENT — ACTIVITIES OF DAILY LIVING (ADL)
CONCENTRATING,_REMEMBERING_OR_MAKING_DECISIONS_DIFFICULTY: NO
CHANGE_IN_FUNCTIONAL_STATUS_SINCE_ONSET_OF_CURRENT_ILLNESS/INJURY: NO
ADLS_ACUITY_SCORE: 33
ADLS_ACUITY_SCORE: 33
ADLS_ACUITY_SCORE: 20
ADLS_ACUITY_SCORE: 33
ADLS_ACUITY_SCORE: 33
ADLS_ACUITY_SCORE: 20
WALKING_OR_CLIMBING_STAIRS_DIFFICULTY: NO
ADLS_ACUITY_SCORE: 20
DRESSING/BATHING_DIFFICULTY: NO
DIFFICULTY_EATING/SWALLOWING: NO
DOING_ERRANDS_INDEPENDENTLY_DIFFICULTY: NO
ADLS_ACUITY_SCORE: 20
FALL_HISTORY_WITHIN_LAST_SIX_MONTHS: NO
WEAR_GLASSES_OR_BLIND: NO
ADLS_ACUITY_SCORE: 33
TOILETING_ISSUES: NO
ADLS_ACUITY_SCORE: 33

## 2023-04-18 NOTE — PLAN OF CARE
Orientation/Cognitive: A&O x4  Observation Goals (Met/ Not Met): inpatient  Mobility Level/Assist Equipment: independent in room. Calls appropriate if help is needed  Fall Risk (Y/N): no  Behavior Concerns: none  Pain Management: c/o abdominal discomfort/crampimg, some relief with heating pack and prn tylenol  Tele/VS/O2: VSS, afebrile on RA, orthostatic BP: negative  ABNL Lab/BG: Hgb 10.0   Diet: regular diet, decreased appetite. Tolerated well a Popsicle from previous shift. On scheduled Reglan, c/o nausea at midnight - no emesis, some relief with sublingual Zofran. Early morning c/o nause no emesis prn IV compazine  Bowel/Bladder: continent of B&B. Reported 1x BM during shift  Skin Concerns: right wound to inner thigh/ dressing in place cdi  Drains/Devices: PIV infusing D5 NS @ 75 ml/hr  Tests/Procedures for next shift: GI following  Anticipated DC date & active delays:  Pending improvement  Patient Stated Goal for Today: get some sleep    Other: per GI note, possible NJ for feeding if no improvement

## 2023-04-18 NOTE — PROGRESS NOTES
[facility]    Medicine Progress Note - Hospitalist Service    Date of Admission:  4/14/2023    Assessment & Plan   Thea Castle is a 23 year old female with complex psychiatric history including borderline personality disorder, pseudoseizures, eating disorder, OCD, PTSD, anxiety, provisional diagnosis of factitious disorder and medical history of chronic anemia, anal fissure, gastroparesis, slow transit constipation, rectal prolapse, proctitis secondary to known attempts at manual disimpaction, urinary retention who was admitted to observation on 4/14/2023 for bright red blood per rectum.     Hematochezia  Chronic anemia  Laxative abuse history  H/o Rectal prolapse  H/o anal fissure  H/o proctitis secondary to manual disimpaction attempts  H/o Nasoduodenal feeding tube, self-removed with retained foreign body removed via EGD  Gastroparesis  Slow transit constipation on Linzess  Abrupt onset BRBPR including reports of dime-sized clots (patient brought in sample) without rectal pain, fevers, reported ingestions nor inserted foreign body. September 2022 colonoscopy normal. Electrolytes normal. Hemoglobin 11.6 on presentation which is above her baseline hemoglobin of around 10. CT abdomen/pelvis showed moderate thickening of distal sigmoid colon extending to rectum.  This description sounds similar to the CT scan from November 2022 at [facility] where she had CT evidence for proctitis and had reported finger insertion into her rectum for repeated manual disimpaction.  Upon review of outside records, patient was seen in primary care clinic for irregular vaginal bleeding 4/7/23 with unremarkable exam and labs.  Pelvic ultrasound showed no uterine abnormality but did comment on a large amount of stool present.  -serial hemoglobin checks q 8 hours x 3: Stable  -[facility] consultation. Flex sigmoidoscopy report reviewed and discussed with Dr. Saha     -4/15: Continue conservative measures  "and plan for close follow-up with GI as outpatient  Initial plan for discharge canceled.  Continue regular antibiotics and start IV fluids.  Monitor orthostatic BP  4/16: Requested repeat GI team input.  Previous complicated psychiatric history makes assessment difficult  4/17: Appreciate GI input for nausea and vomiting. Gastrograffin study today and possible placment of NJ tube if patient not able to tolerate a diet. Also, starting on lactulose for better bowel regimen.    4/18: GI now signed off.  Does not recommend a feeding tube.  Patient unhappy about this decision requesting to speak to GI again.  Discussed with patient to follow alternate plan which would involve attempting diet on her own.  Patient initially requesting to discharge if feeding tube is not to be placed however discussed that we would need to monitor especially her blood glucose levels since she has been on D5W infusion up until today.    Borderline Personality Disorder  Factitious disorder, provisional diagnosis  Multiple recurrent somatic symptoms  Eating Disorder  OCD  PTSD  Anxiety  More recent prolonged hospitalization at [facility] from 1/3-2/7/2023 for suicide attempt by overdose and somatic complaints, sabotaging her own discharge transfer to inpatient psychiatry. After finally getting a bed in the inpatient psychiatry unit, she left the following day (1 day psych hospitalization). Per discharge summary: differential includes BPD, provisional diagnosis of factitious disorder, eating disorder, depression and anxiety. Of these diagnoses eating disorder, depression and anxiety are historical with symptoms present prior to hospitalization. They may still be contributing to symptoms the patient is experiencing. Interactions with staff at OSH seemed to support possible factitious disorder in the context of high healthcare utilization and multiple somatic symptoms.\" Per psychiatry note Nov 2022 at [facility]: \"Her primary problem is " "borderline personality d/o, definitive treatment of which will be continued DBT. RECOMMENDATIONS: Although each clinical encounter needs to be evaluated on its own merits, the patient has history of seeking respite in the Emergency Department secondary to chronic risk of maladaptive, potentially self-destructive behaviors that are exacerbated due to character pathology and there is no evidence that acute psychiatric hospitalization would mitigate those risks and likely would encourage further attempts to utilize the medical system inappropriately.\" Also recently had prolonged hospitalization at [facility]   Plan  -resume PTA psychiatric medications  -Encourage close outpatient psychiatric and psychologic follow-up     Pseudoseizure history  Noted hospitalization Jan 2023. EEG negative.        Diet: Regular Diet Adult  Diet  Snacks/Supplements Adult: [employer] Standard Oral Supplement; Between Meals    DVT Prophylaxis: Pneumatic Compression Devices  Smith Catheter: Not present  Lines: None     Cardiac Monitoring: None  Code Status: Full Code      Clinically Significant Risk Factors                                 Disposition Plan      Expected Discharge Date: 04/19/2023        Discharge Comments: Stop IV fluids.  monitor overnight.  (blood sugars)          Pura Lopez MD  Hospitalist Service  [facility]  Securely message with [employer] (more info)  Text page via [employer] Paging/Directory   ______________________________________________________________________    Interval History   Patient upset at the decision not to place feeding tube.  We will monitor overnight, although she was asking to discharge-I encouraged she should stay and be monitored especially since would just be discontinuing her D5W infusion today.    Physical Exam   Vital Signs: Temp: 98.7  F (37.1  C) Temp src: Oral BP: (!) 129/91 Pulse: 81   Resp: 18 SpO2: 100 % O2 Device: None (Room air)    Weight: 128 lbs 14.4 oz    General " Appearance: Well appearing for stated age.  Respiratory: CTAB, no rales or ronchi  Cardiovascular: S1, S2 normal, no murmurs  GI: non-tender on palpation, BS present      Medical Decision Making       55 MINUTES SPENT BY ME on the date of service doing chart review, history, exam, documentation & further activities per the note.      Data     I have personally reviewed the following data over the past 24 hrs:    N/A  \   N/A   / N/A     142 107 1.6 (L) /  102 (H)   3.4 27 0.78 \       ALT: 39 (H) AST: 37 (H) AP: 55 TBILI: 0.2   ALB: 3.9 TOT PROTEIN: 6.3 (L) LIPASE: N/A       Imaging results reviewed over the past 24 hrs:   No results found for this or any previous visit (from the past 24 hour(s)).

## 2023-04-18 NOTE — PROGRESS NOTES
[facility] Progress Note     IMPRESSION:  Nausea improved with BMs returning  Stercoral ulcer  Having BMs with lactulose  Albumin 4.3    The patient previously did well with temporary NJ feeding, she is currently able to take oral liquids such as lactulose which would correspond to the ability to also take oral nutrition.  This could certainly be predominantly liquids for short period of time, and then advancing to solids if she is able to tolerate.  I would not support placement of a feeding tube presently.    RECOMMENDATIONS:  Continue lactulose and MiraLAX for bowel regimen.  It sounds as if she does well when lactulose is scheduled, and though this may contribute to abdominal bloating she is preferential to this therapy.  Clearly her constipation contributes to her nausea and inability to tolerate oral feeds.      Promethazine TID, continue linzess, reglan TID 30-60mins prior to meals, but will continue to carry risk for EPS.      Okay from our perspective to start planning dispo, has follow up with our  Neurogastromotility clinic .      Discussed with medicine team.  Will sign off.       Total time spent in chart review, direct medical discussion, examination, and documentation was 25 minutes    Alden Robert DO   [facility]  Cell [phone]    ________________________________________________________________________      SUBJECTIVE:  Feeling better today, 3 small stools after starting lactulose.  No blood in the stool.  Asking about feeding tube.   Discussed this in detail today.  Reminded her about her upcoming appmt  with Dr. Mota in Neurogastromotility clinic.     OBJECTIVE:  /70 (BP Location: Left arm)   Pulse 79   Temp 98  F (36.7  C) (Oral)   Resp 16   SpO2 99%   Temp (24hrs), Av.2  F (36.8  C), Min:97.7  F (36.5  C), Max:98.6  F (37  C)    No data found.    Intake/Output Summary (Last 24 hours) at 2023 0862  Last data filed at 2023 1100  Gross  per 24 hour   Intake 180 ml   Output --   Net 180 ml        PHYSICAL EXAM  GEN: Alert, oriented x3, communicative and in NAD.    ABD: ND, +BS, no guarding or pain to palpation, no rebound, no HSM.    Additional Data:  I have reviewed the patient's new clinical lab results:     Recent Labs   Lab Test 04/16/23  1146 04/15/23  0542 04/14/23  2229 04/14/23  1824 04/13/23  1838   WBC 5.2 6.4  --   --  13.7*   HGB 10.3* 10.0* 9.8*   < > 11.6*   MCV 90 90  --   --  87    255  --   --  331   INR  --   --   --   --  1.04    < > = values in this interval not displayed.     Recent Labs   Lab Test 04/16/23  1146 04/15/23  0542 04/13/23  1838   POTASSIUM 3.6 3.6 3.7   CHLORIDE 110* 106 106   CO2 24 18* 23   BUN 4.0* 8.2 13.0   ANIONGAP 9 13 11     Recent Labs   Lab Test 04/13/23  1838 02/05/23  0423 01/27/23  1258 01/27/23  1059 01/14/23  1803 01/03/23  1427 04/14/21  0727   ALBUMIN 4.3  --   --   --   --  3.4 3.0*   BILITOTAL 0.5  --   --   --   --  0.2 0.3   ALT 43*  --  19  --   --  26 19   AST 37*  --  32  --   --  25 11   PROTEIN  --  100*  --  Negative Negative  --   --    LIPASE 19  --   --   --   --   --   --

## 2023-04-18 NOTE — PLAN OF CARE
Goal Outcome Evaluation:         Orientation/Cognitive: A&Ox4    Observation Goals (Met/ Not Met): met (still not tolerating diet)    Mobility Level/Assist Equipment: SBA, c/o intermittent nausea, negative orthostatic BPs.    Fall Risk (Y/N): yes    Behavior Concerns: none    Pain Management: PRN Tylenol given x1    Tele/VS/O2: VSS on RA.    ABNL Lab/BG: BG:     Diet: Regular, poor intake      Bowel/Bladder:     Skin Concerns: R thigh wound- dressing CDI    Drains/Devices: PIV right arm SL    Tests/Procedures for next shift: none    Patient Stated Goal for Today: to rest

## 2023-04-18 NOTE — PLAN OF CARE
Goal Outcome Evaluation:     Observation goals  PRIOR TO DISCHARGE        Comments: -diagnostic tests and consults completed and resulted - not met  -vital signs normal or at patient baseline - met  Nurse to notify provider when observation goals have been met and patient is ready for discharge.

## 2023-04-18 NOTE — PROVIDER NOTIFICATION
MD Notification    Notified Person: MD    Notified Person Name: Pura Farida BOJORQUEZ    Notification Date/Time:  4/18/23 1536    Notification Interaction: [employer] Messaging    Purpose of Notification: Pt concerned about her lab values. Specifically BUN and LFT's. Wanted to speak to provider.    Orders Received: AM lab re-draw. Provider to follow up in morning.    Comments:

## 2023-04-18 NOTE — PLAN OF CARE
[id number]  Orientation/Cognitive: AOx4  Observation Goals (Met/ Not Met): Inpatient  Mobility Level/Assist Equipment: SBA/IND  Fall Risk (Y/N): N  Behavior Concerns: Green  Pain Management: C/o abdominal pain/cramping, heat packs given.  Tele/VS/O2: VSS on RA  ABNL Lab/BG: Hgb 10.3  Diet: Regular, decreased appetite, c/o continuous nausea, scheduled Reglan given with little relief. Pt reports compazine is most effective.  Bowel/Bladder: Continent, 3 large loose stools. Brown, no blood noted.  Skin Concerns: Wound to R inner thigh, dressing CDI  Drains/Devices: PIV infusing D5 NS @ 75  Tests/Procedures for next shift: GI following   Anticipated DC date & active delays: TBD  Patient Stated Goal for Today: Rest  Other: Per GI, NG tube if nausea not improving in next 24 hours.

## 2023-04-19 LAB
ALBUMIN SERPL BCG-MCNC: 3.9 G/DL (ref 3.5–5.2)
ALP SERPL-CCNC: 54 U/L (ref 35–104)
ALT SERPL W P-5'-P-CCNC: 40 U/L (ref 10–35)
ANION GAP SERPL CALCULATED.3IONS-SCNC: 10 MMOL/L (ref 7–15)
AST SERPL W P-5'-P-CCNC: 45 U/L (ref 10–35)
BILIRUB DIRECT SERPL-MCNC: <0.2 MG/DL (ref 0–0.3)
BILIRUB SERPL-MCNC: 0.2 MG/DL
BUN SERPL-MCNC: 3.5 MG/DL (ref 6–20)
CALCIUM SERPL-MCNC: 8.7 MG/DL (ref 8.6–10)
CHLORIDE SERPL-SCNC: 105 MMOL/L (ref 98–107)
CREAT SERPL-MCNC: 0.85 MG/DL (ref 0.51–0.95)
DEPRECATED HCO3 PLAS-SCNC: 29 MMOL/L (ref 22–29)
GFR SERPL CREATININE-BSD FRML MDRD: >90 ML/MIN/1.73M2
GLUCOSE BLDC GLUCOMTR-MCNC: 131 MG/DL (ref 70–99)
GLUCOSE BLDC GLUCOMTR-MCNC: 81 MG/DL (ref 70–99)
GLUCOSE BLDC GLUCOMTR-MCNC: 85 MG/DL (ref 70–99)
GLUCOSE SERPL-MCNC: 85 MG/DL (ref 70–99)
POTASSIUM SERPL-SCNC: 3.5 MMOL/L (ref 3.4–5.3)
PROT SERPL-MCNC: 6.2 G/DL (ref 6.4–8.3)
SODIUM SERPL-SCNC: 144 MMOL/L (ref 136–145)

## 2023-04-19 PROCEDURE — 99233 SBSQ HOSP IP/OBS HIGH 50: CPT | Performed by: HOSPITALIST

## 2023-04-19 PROCEDURE — 250N000013 HC RX MED GY IP 250 OP 250 PS 637: Performed by: INTERNAL MEDICINE

## 2023-04-19 PROCEDURE — 250N000013 HC RX MED GY IP 250 OP 250 PS 637: Performed by: PHYSICIAN ASSISTANT

## 2023-04-19 PROCEDURE — 80053 COMPREHEN METABOLIC PANEL: CPT | Performed by: HOSPITALIST

## 2023-04-19 PROCEDURE — 36415 COLL VENOUS BLD VENIPUNCTURE: CPT | Performed by: HOSPITALIST

## 2023-04-19 PROCEDURE — 82248 BILIRUBIN DIRECT: CPT | Performed by: HOSPITALIST

## 2023-04-19 PROCEDURE — 250N000011 HC RX IP 250 OP 636: Performed by: INTERNAL MEDICINE

## 2023-04-19 PROCEDURE — 120N000001 HC R&B MED SURG/OB

## 2023-04-19 PROCEDURE — 82310 ASSAY OF CALCIUM: CPT | Performed by: HOSPITALIST

## 2023-04-19 RX ADMIN — LINACLOTIDE 290 MCG: 290 CAPSULE, GELATIN COATED ORAL at 09:01

## 2023-04-19 RX ADMIN — ARIPIPRAZOLE 10 MG: 10 TABLET ORAL at 08:12

## 2023-04-19 RX ADMIN — PROCHLORPERAZINE MALEATE 10 MG: 10 TABLET ORAL at 20:50

## 2023-04-19 RX ADMIN — LACTULOSE 10 G: 10 SOLUTION ORAL at 13:52

## 2023-04-19 RX ADMIN — PANTOPRAZOLE SODIUM 40 MG: 40 TABLET, DELAYED RELEASE ORAL at 08:13

## 2023-04-19 RX ADMIN — PROMETHAZINE HYDROCHLORIDE 25 MG: 25 TABLET ORAL at 13:52

## 2023-04-19 RX ADMIN — PROMETHAZINE HYDROCHLORIDE 25 MG: 25 TABLET ORAL at 20:02

## 2023-04-19 RX ADMIN — LACTULOSE 10 G: 10 SOLUTION ORAL at 20:02

## 2023-04-19 RX ADMIN — ONDANSETRON 4 MG: 4 TABLET, ORALLY DISINTEGRATING ORAL at 10:29

## 2023-04-19 RX ADMIN — PROCHLORPERAZINE MALEATE 10 MG: 10 TABLET ORAL at 13:52

## 2023-04-19 RX ADMIN — MELATONIN TAB 3 MG 10 MG: 3 TAB at 21:36

## 2023-04-19 RX ADMIN — METOCLOPRAMIDE 5 MG: 5 INJECTION, SOLUTION INTRAMUSCULAR; INTRAVENOUS at 08:13

## 2023-04-19 RX ADMIN — LORAZEPAM 0.5 MG: 0.5 TABLET ORAL at 21:36

## 2023-04-19 RX ADMIN — ACETAMINOPHEN 650 MG: 325 TABLET, FILM COATED ORAL at 12:43

## 2023-04-19 RX ADMIN — PROMETHAZINE HYDROCHLORIDE 25 MG: 25 TABLET ORAL at 08:12

## 2023-04-19 RX ADMIN — PANTOPRAZOLE SODIUM 40 MG: 40 TABLET, DELAYED RELEASE ORAL at 20:02

## 2023-04-19 RX ADMIN — ONDANSETRON 4 MG: 4 TABLET, ORALLY DISINTEGRATING ORAL at 18:03

## 2023-04-19 RX ADMIN — ACETAMINOPHEN 650 MG: 325 TABLET, FILM COATED ORAL at 08:12

## 2023-04-19 RX ADMIN — PROCHLORPERAZINE MALEATE 10 MG: 10 TABLET ORAL at 04:46

## 2023-04-19 RX ADMIN — DULOXETINE HYDROCHLORIDE 60 MG: 60 CAPSULE, DELAYED RELEASE ORAL at 08:12

## 2023-04-19 ASSESSMENT — ACTIVITIES OF DAILY LIVING (ADL)
ADLS_ACUITY_SCORE: 20

## 2023-04-19 NOTE — PROGRESS NOTES
Orientation/Cognitive: A&Ox4  Observation Goals (Met/ Not Met): Inpatient  Mobility Level/Assist Equipment: Ind  Fall Risk (Y/N): N  Behavior Concerns: None  Pain Management: PRN tylenol  Tele/VS/O2: VSS on RA  ABNL Lab/BG: BG- 86, BUN- 1.6, ALT- 39, AST- 37  Diet: Reg  Bowel/Bladder: Cont. B&B  Skin Concerns: Wound to right thigh posteriorly  Drains/Devices: NA  Tests/Procedures for next shift: AM lab draw  Anticipated DC date & active delays: 4/19/23  Patient Stated Goal for Today: None

## 2023-04-19 NOTE — PROGRESS NOTES
[facility]    Medicine Progress Note - Hospitalist Service    Date of Admission:  4/14/2023    Assessment & Plan   Thea Castle is a 23 year old female with complex psychiatric history including borderline personality disorder, pseudoseizures, eating disorder, OCD, PTSD, anxiety, provisional diagnosis of factitious disorder and medical history of chronic anemia, anal fissure, gastroparesis, slow transit constipation, rectal prolapse, proctitis secondary to known attempts at manual disimpaction, urinary retention who was admitted to observation on 4/14/2023 for bright red blood per rectum.     Hematochezia  Chronic anemia  Laxative abuse history  H/o Rectal prolapse  H/o anal fissure  H/o proctitis secondary to manual disimpaction attempts  H/o Nasoduodenal feeding tube, self-removed with retained foreign body removed via EGD  Gastroparesis  Slow transit constipation on Linzess  Abrupt onset BRBPR including reports of dime-sized clots (patient brought in sample) without rectal pain, fevers, reported ingestions nor inserted foreign body. September 2022 colonoscopy normal. Electrolytes normal. Hemoglobin 11.6 on presentation which is above her baseline hemoglobin of around 10. CT abdomen/pelvis showed moderate thickening of distal sigmoid colon extending to rectum.  This description sounds similar to the CT scan from November 2022 at [facility] where she had CT evidence for proctitis and had reported finger insertion into her rectum for repeated manual disimpaction.  Upon review of outside records, patient was seen in primary care clinic for irregular vaginal bleeding 4/7/23 with unremarkable exam and labs.  Pelvic ultrasound showed no uterine abnormality but did comment on a large amount of stool present.  -hemoglobin has remained stable stable  -[facility] consultation. Flex sigmoidoscopy report reviewed and discussed with Dr. Saha     -4/15: Continue conservative measures and plan  for close follow-up with GI as outpatient  Initial plan for discharge canceled.  Continue regular antibiotics and start IV fluids.  Monitor orthostatic BP  4/16: Requested repeat GI team input.  Previous complicated psychiatric history makes assessment difficult  4/17: Appreciate GI input for nausea and vomiting. Gastrograffin study today and possible placment of NJ tube if patient not able to tolerate a diet. Also, starting on lactulose for better bowel regimen.    4/18: GI now signed off.  Does not recommend a feeding tube.  Patient unhappy about this decision requesting to speak to GI again.  Discussed with patient to follow alternate plan which would involve attempting to increase her diet without a NG tube aid.  Patient initially requesting to discharge if feeding tube is not to be placed however discussed that we would need to monitor especially her blood glucose levels since she has been on D5W infusion up until today.  4/19: Patient's blood glucose stable off D5W.  Psychiatry consulted today to assist with patient's mental stability given her underlying psych history and eating disorder as well as her  Push for NG tube despite it not being recommended by GI.  Patient was upset with this provider for consulting psychiatry.  Psychotherapist saw patient today  (please see note). A psychiatry MD or medication  to see patient tomorrow.      Patient involved patient relations today as she states she is not being listened to or being heard by her providers.  She complains that nobody cares about her not eating.  There is concern that patient is pouring Ensure into her puke bag and stating that she is still vomiting. There has never been a witness to her vomiting. She also complains of dizziness and is requesting IV fluids be reinstated.  She did not want me to discontinue the IV fluids yesterday.  I Adamantly stated I will not reinstated unless she tests positive for orthostatic hypotension.  She also  "monitors her labs on mychart as well as her progress notes and complains about what is being written about her to which she gets uncontrollably emotional. She is complaining about her BUN being low which I describe is due to her low intake of protein and which will be increased if she continues to try to eat more.  She is concerned also about her mild elevation of LFTs which are clearly mildly elevated to which I state will be followed up serially.  Patient insists that GI be re-consulted for reevaluation of an NG tube.  She goes back and forth with wanting to discharge and then states she does not want to discharge anymore until she clearly can eat.  At this time I do not see any medical need for patient to continue to stay in the hospital as she is able to tolerate p.o. meds and some amount of oral intake of her nutrition.  Patient is requesting a different provider at this time as she is unhappy with the care being provided by me, GI and the previous hospitalist before me.      Borderline Personality Disorder   Factitious disorder, provisional diagnosis  Multiple recurrent somatic symptoms  Eating Disorder  OCD  PTSD  Anxiety  More recent prolonged hospitalization at [facility] from 1/3-2/7/2023 for suicide attempt by overdose and somatic complaints, sabotaging her own discharge transfer to inpatient psychiatry. After finally getting a bed in the inpatient psychiatry unit, she left the following day (1 day psych hospitalization). Per discharge summary: differential includes BPD, provisional diagnosis of factitious disorder, eating disorder, depression and anxiety. Of these diagnoses eating disorder, depression and anxiety are historical with symptoms present prior to hospitalization. They may still be contributing to symptoms the patient is experiencing. Interactions with staff at OSH seemed to support possible factitious disorder in the context of high healthcare utilization and multiple somatic symptoms.\" Per " "psychiatry note Nov 2022 at [facility]: \"Her primary problem is borderline personality d/o, definitive treatment of which will be continued DBT. RECOMMENDATIONS: Although each clinical encounter needs to be evaluated on its own merits, the patient has history of seeking respite in the Emergency Department secondary to chronic risk of maladaptive, potentially self-destructive behaviors that are exacerbated due to character pathology and there is no evidence that acute psychiatric hospitalization would mitigate those risks and likely would encourage further attempts to utilize the medical system inappropriately.\" Also recently had prolonged hospitalization at [facility]   Plan  -resume PTA psychiatric medications  -Encourage close outpatient psychiatric and psychologic follow-up     Pseudoseizure history  Noted hospitalization Jan 2023. EEG negative.        Diet: Regular Diet Adult  Diet  Snacks/Supplements Adult: [employer] Standard Oral Supplement; Between Meals    DVT Prophylaxis: Pneumatic Compression Devices  Smith Catheter: Not present  Lines: None     Cardiac Monitoring: None  Code Status: Full Code      Clinically Significant Risk Factors                                 Disposition Plan      Expected Discharge Date: 04/19/2023,  6:00 PM      Discharge Comments: Psych consulted today.          Pura Lopez MD  Hospitalist Service  [facility]  Securely message with [employer] (more info)  Text page via [employer] Paging/Directory   ______________________________________________________________________    Interval History   Patient upset at the decision not to place feeding tube.  We will monitor overnight, although she was asking to discharge-I encouraged she should stay and be monitored especially since would just be discontinuing her D5W infusion today.    Physical Exam   Vital Signs: Temp: 98.5  F (36.9  C) Temp src: Oral BP: 111/66 Pulse: 98   Resp: 18 SpO2: 99 % O2 Device: None (Room " air)    Weight: 128 lbs 14.4 oz    General Appearance: Well appearing for stated age.  Respiratory: CTAB, no rales or ronchi  Cardiovascular: S1, S2 normal, no murmurs  GI: non-tender on palpation, BS present      Medical Decision Making       55 MINUTES SPENT BY ME on the date of service doing chart review, history, exam, documentation & further activities per the note.      Data     I have personally reviewed the following data over the past 24 hrs:    N/A  \   N/A   / N/A     144 105 3.5 (L) /  85   3.5 29 0.85 \       ALT: 40 (H) AST: 45 (H) AP: 54 TBILI: 0.2   ALB: 3.9 TOT PROTEIN: 6.2 (L) LIPASE: N/A       Imaging results reviewed over the past 24 hrs:   No results found for this or any previous visit (from the past 24 hour(s)).

## 2023-04-19 NOTE — PROVIDER NOTIFICATION
MD Notification    Notified Person: MD    Notified Person Name: Abena Mayahim MD    Notification Date/Time: 4/18/23 2116    Notification Interaction: [employer]    Purpose of Notification: RE: 508 C.DONATO- Pt was asking whether she can have 10mg dose of melatonin? She normally takes 10mg at home.    Orders Received: Pending    Comments:

## 2023-04-19 NOTE — CONSULTS
"Triage and Transition - Consult and Liaison     Thea Castle  April 19, 2023    Session start: 1:00 pm  Session end: 1:33 pm  Session duration in minutes: 33  CPT utilized: 81881 - Brief diagnostic assessment (modifier 52)  Patient was seen virtually ([employer] cart or other teleconferencing device).    Diagnosis:   300.02 (F41.1) Generalized Anxiety Disorder, present and by hx.;  307.1 Anorexia Nervosa  (F50.01) Restricting type, present and by history;  301.4 (F60.5) Obsessive-Compulsive Personality Disorder, by history  301.83 (F60.3) Borderline Personality Disorder, by history;   (F43.10) Post Traumatic Stress Disorder, by hx    Plan/Recommendations:     Will ask psychiatry provider to follow up given many medical, lab reviews, and psych complexities, left message.    It is this writer role to address sx, hx, dx, before psychiatric provider sees pt.    Pt inquiring why psych consult \"why did my doctor put this in, for what reason?\" \"I am here for medical issues not mental health\". many times.    Pt talks about \"AFT and proteins low, labs\" \"fear of malnutrition\" states \"cannot keep food down\".    Given the pt is on many psych meds, pt requesting lab reviews, psychiatry provider to follow up with pt.      Maintain current transition plan per care team. Next steps include: Psychiatry provider follow up.        Reason for consult: Psychiatry consult was requested due to \"pt with complex psychiatric history, eating disorder, borderline personality disorder, presented with eating disorder, upset GI not recommending feeding tube, please assist with management\". Patient was seen by Triage and Transition Consult & Liaison team.     Identifying information: Thea is 23 year old White  female   followed related to  \"pt with complex psychiatric history, eating disorder, borderline personality disorder, presented with eating disorder, upset GI not recommending feeding tube, please assist with management\". " "    Summary of Patient Situation  Pt sitting on side of bed, immediately asks \"why did my doctor have you come in, what is the reason they asked?\".  Talked about complex psych hx.  She states she is agreeable to meet via  today.  Pt states she is here at the hospital for \"rectal bleeding colitis, proctitis\".  She does continue to state \"I am her for medical issues, not my mental health\".  Per chart review pt with extensive psych hx including borderline personality disorder, PTSD, OCD, MAT, and eating disorder, with information available in ehr at the time of this note.    Today, pt presents with anxiety sx including excessive worry, difficulty controlling worry, apprehensive expectation.  She denies any depression sx today.  She talks at length about \"my mental health is good right now, I have been doing well, I am here for medical reasons\"  \"I am calling pt relations about my mental health being brought up in everything when I am here for medical issues\".  Pt seen by , noting indicates similar info shared with them \"improvement in mental health, excitement for new job, school, increase in support system\".  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   Pt with most recent psych hosp hx 2/7/23.  Pt denies any SI, HI, AH/VH at this time.  Pt states she feels safe.  Pt then goes on to talk about concern medically for her \"AFT and proteins and the ability for my meds to metabolize\".  Pt states \"my eating disorder is in remission\".      Pt talks about having adequate supports including \"family, friends, psychiatrist, co-workers\".  Pt talks about historical coping skills including distraction and Taoist music.  Pt visiting with spiritual health this encounter.  Clinician psychoeducated on additional DBT coping strategies including mindfulness, distress tolerance, self soothe.    Pt reports she sees a psychiatrist \"Dr. Miranda Cortes at " "[facility]\".  Pt reports she is on psych meds including abilify, cymbalta, ativan, atarax, linzess, and protonix, appears congruent with chart.  Given the pt is on many psych meds, concerned for labs, many medical issues, will ask psychiatry provider to follow-up with pt, left message.           Brief Psychosocial History  Pt reports with excitement that she has a new job at a retail store, and also is attending college.  Pt reports she has had additional support lately including family, friends and co-workers, she reports this as helpful.  Pt has been visiting with spiritual health during this encounter, per chart noting         Significant Clinical History  Per chart review, with information available in epic ehr at the time of this assessment, pt with a hx of PTSD, OCD, Borderline, MAT, and eating disorder.  Pt with extensive tx hx most recently 1/2023-2/7/2023, per chart review.  Pt with medical complexities as well, per chart review.      Collateral information:   Reviewed chart, coordinated with care team, and coordinated with psychiatry provider for pt follow up, left message.      Mental Status Exam   Affect: Appropriate  Appearance: Appropriate   Attention Span/Concentration: Attentive    Eye Contact: Engaged  Fund of Knowledge: Appropriate   Language /Speech Content: Fluent  Language /Speech Volume: Normal   Language /Speech Rate/Productions: Normal   Recent Memory: Intact  Remote Memory: Intact  Mood: Normal   Orientation:   Person: Yes   Place: Yes  Time of Day: Yes   Date: Yes   Situation (Do they understand why they are here?): Yes   Psychomotor Behavior: Normal   Thought Content: Clear denies SI, HI, AH/VH at time of this note.  Thought Form: Intact    Current medications: Per EHR at time of this note.  Current Facility-Administered Medications   Medication     acetaminophen (TYLENOL) tablet 650 mg     ARIPiprazole (ABILIFY) tablet 10 mg     DULoxetine (CYMBALTA) DR capsule 60 mg     hydrOXYzine " (ATARAX) tablet 25 mg     lactulose (CHRONULAC) solution 10 g     linaclotide (LINZESS) capsule 290 mcg     LORazepam (ATIVAN) tablet 0.5 mg     melatonin tablet 10 mg     metoclopramide (REGLAN) injection 5 mg     ondansetron (ZOFRAN ODT) ODT tab 4 mg    Or     ondansetron (ZOFRAN) injection 4 mg     pantoprazole (PROTONIX) EC tablet 40 mg     prochlorperazine (COMPAZINE) injection 10 mg    Or     prochlorperazine (COMPAZINE) tablet 10 mg    Or     prochlorperazine (COMPAZINE) suppository 25 mg     promethazine (PHENERGAN) tablet 25 mg     vitamin A & D (BABY) ointment        Therapeutic intervention and progress:  Therapeutic intervention consisted of building therapeutic rapport, CBT, re-framing, active listening, validation.         Tamra Castro Psychotherapist Trainee  Triage and Transition - Consult and Liaison   [phone]

## 2023-04-19 NOTE — PROGRESS NOTES
MD Notification    Notified Person: MD    Notified Person Name: Tanisha     Notification Date/Time:  4/19/23 at 6:38 pm    Notification Interaction: [employer]    Purpose of Notification: Discontinue discharge orders please? Pt not leaving until tomorrow. Thank you.     Orders Received: Pending     Comments:

## 2023-04-19 NOTE — PLAN OF CARE
Goal Outcome Evaluation:      Plan of Care Reviewed With: patient    Orientation/Cognitive: AOx4  Observation Goals (Met/ Not Met): Inpatient  Mobility Level/Assist Equipment: SBA/IND  Fall Risk (Y/N): N  Behavior Concerns: Green  Pain Management: C/o abdominal pain, tylenol and heat packs given  Tele/VS/O2: VSS on RA  ABNL Lab/BG: See results  Diet: Regular, decreased appetite, c/o intermittent nausea, PRN meds given with little relief, 1 small emesis throughout shift  Bowel/Bladder: Continent, 1 small loose stool this am  Skin Concerns: Wound to R inner thigh, dressing changed, CDI  Drains/Devices: PIV SL  Tests/Procedures for next shift: Psych consult for tomorrow   Anticipated DC date & active delays: 4/20 to home  Patient Stated Goal for Today: Rest

## 2023-04-19 NOTE — CARE PLAN
Orientation/Cognitive: AXO4  Observation Goals (Met/ Not Met):Inpatient  Mobility Level/Assist Equipment: Steady/independent   Fall Risk (Y/N): N  Behavior Concerns: None  Pain Management: PRN Compazine for nausea  Tele/VS/O2: VSS on RA  ABNL Lab/BG: BG 81  Diet: Regular  Bowel/Bladder: Continent   Skin Concerns: wound to the right inner thigh, Mepilex applied  Drains/Devices: None  Tests/Procedures for next shift: BMP  Anticipated DC date & active delays: possible discharge 4/19/23  Patient Stated Goal for Today: Rest

## 2023-04-20 VITALS
SYSTOLIC BLOOD PRESSURE: 128 MMHG | TEMPERATURE: 98.4 F | OXYGEN SATURATION: 92 % | BODY MASS INDEX: 21.46 KG/M2 | WEIGHT: 125 LBS | DIASTOLIC BLOOD PRESSURE: 73 MMHG | HEART RATE: 86 BPM | RESPIRATION RATE: 16 BRPM

## 2023-04-20 PROCEDURE — 250N000013 HC RX MED GY IP 250 OP 250 PS 637: Performed by: PHYSICIAN ASSISTANT

## 2023-04-20 PROCEDURE — 99239 HOSP IP/OBS DSCHRG MGMT >30: CPT | Performed by: INTERNAL MEDICINE

## 2023-04-20 PROCEDURE — 250N000013 HC RX MED GY IP 250 OP 250 PS 637: Performed by: INTERNAL MEDICINE

## 2023-04-20 PROCEDURE — 250N000011 HC RX IP 250 OP 636: Performed by: INTERNAL MEDICINE

## 2023-04-20 RX ORDER — LACTULOSE 10 G/15ML
10 SOLUTION ORAL 3 TIMES DAILY
Qty: 946 ML | Refills: 0 | Status: SHIPPED | OUTPATIENT
Start: 2023-04-20 | End: 2023-05-11

## 2023-04-20 RX ADMIN — LACTULOSE 10 G: 10 SOLUTION ORAL at 08:55

## 2023-04-20 RX ADMIN — ACETAMINOPHEN 650 MG: 325 TABLET, FILM COATED ORAL at 03:01

## 2023-04-20 RX ADMIN — ONDANSETRON 4 MG: 4 TABLET, ORALLY DISINTEGRATING ORAL at 08:55

## 2023-04-20 RX ADMIN — LINACLOTIDE 290 MCG: 290 CAPSULE, GELATIN COATED ORAL at 08:55

## 2023-04-20 RX ADMIN — DULOXETINE HYDROCHLORIDE 60 MG: 60 CAPSULE, DELAYED RELEASE ORAL at 08:55

## 2023-04-20 RX ADMIN — PROMETHAZINE HYDROCHLORIDE 25 MG: 25 TABLET ORAL at 08:55

## 2023-04-20 RX ADMIN — Medication: at 09:12

## 2023-04-20 RX ADMIN — ONDANSETRON 4 MG: 4 TABLET, ORALLY DISINTEGRATING ORAL at 03:01

## 2023-04-20 RX ADMIN — PANTOPRAZOLE SODIUM 40 MG: 40 TABLET, DELAYED RELEASE ORAL at 08:55

## 2023-04-20 RX ADMIN — ARIPIPRAZOLE 10 MG: 10 TABLET ORAL at 08:55

## 2023-04-20 ASSESSMENT — ACTIVITIES OF DAILY LIVING (ADL)
ADLS_ACUITY_SCORE: 20

## 2023-04-20 NOTE — DISCHARGE SUMMARY
[facility]  Hospitalist Discharge Summary      Date of Admission:  4/14/2023  Date of Discharge:  4/20/2023  Discharging Provider: Geovanny Medrano DO  Discharge Service: Hospitalist Service    Discharge Diagnoses      Hematochezia, likely 2/2 ulceration of the distal rectum consistent with stercoral ulcers   Chronic anemia  Laxative abuse history  H/o Rectal prolapse  H/o anal fissure  H/o proctitis secondary to manual disimpaction attempts  H/o Nasoduodenal feeding tube, self-removed with retained foreign body removed via EGD  Gastroparesis  Slow transit constipation on Linzess   Borderline Personality Disorder   Factitious disorder, provisional diagnosis  Multiple recurrent somatic symptoms  Eating Disorder  OCD  PTSD   Anxiety  Pseudoseizure history    Follow-ups Needed After Discharge   Follow-up Appointments     Follow-up and recommended labs and tests       Has upcoming follow up in [facility] clinic at [facility]-           Unresulted Labs Ordered in the Past 30 Days of this Admission     No orders found from 3/15/2023 to 4/15/2023.        Discharge Disposition   Discharged to home  Condition at discharge: Stable    Hospital Course   Thea Castle is a 23 year old female with complex psychiatric history including borderline personality disorder, pseudoseizures, eating disorder, OCD, PTSD, anxiety, provisional diagnosis of factitious disorder and medical history of chronic anemia, anal fissure, gastroparesis, slow transit constipation, rectal prolapse, proctitis secondary to known attempts at manual disimpaction, urinary retention who was admitted to observation on 4/14/2023 for bright red blood per rectum.     Hematochezia, likely 2/2 ulceration of the distal rectum consistent with stercoral ulcers   Chronic anemia  Laxative abuse history  H/o Rectal prolapse  H/o anal fissure  H/o proctitis secondary to manual disimpaction attempts  H/o Nasoduodenal feeding tube, self-removed with  retained foreign body removed via EGD  Gastroparesis  Slow transit constipation on Linzess  Abrupt onset BRBPR including reports of dime-sized clots (patient brought in sample) without rectal pain, fevers, reported ingestions nor inserted foreign body. September 2022 colonoscopy normal. Electrolytes normal. Hemoglobin 11.6 on presentation which is above her baseline hemoglobin of around 10. CT abdomen/pelvis showed moderate thickening of distal sigmoid colon extending to rectum.  This description sounds similar to the CT scan from November 2022 at [facility] where she had CT evidence for proctitis and had reported finger insertion into her rectum for repeated manual disimpaction.  Upon review of outside records, patient was seen in primary care clinic for irregular vaginal bleeding 4/7/23 with unremarkable exam and labs.  Pelvic ultrasound showed no uterine abnormality but did comment on a large amount of stool present.  -Hemoglobin has remained stable stable  -[facility] consultation. Flex sigmoidoscopy report reviewed and discussed with Dr. Saha  4/15: Continue conservative measures and plan for close follow-up with GI as outpatient.  Initial plan for discharge canceled.  Continue regular antibiotics and start IV fluids.  Monitor orthostatic BP  4/16: Requested repeat GI team input.  Previous complicated psychiatric history makes assessment difficult  4/17: Appreciate GI input for nausea and vomiting. Gastrograffin study today and possible placment of NJ tube if patient not able to tolerate a diet. Also, starting on lactulose for better bowel regimen.    4/18: GI now signed off.  Does not recommend a feeding tube.  Patient unhappy about this decision requesting to speak to GI again.  Discussed with patient to follow alternate plan which would involve attempting to increase her diet without a NG tube aid.  Patient initially requesting to discharge if feeding tube is not to be placed however discussed that we would  need to monitor especially her blood glucose levels since she has been on D5W infusion up until today.  4/19: Patient's blood glucose stable off D5W.  Psychiatry consulted today to assist with patient's mental stability given her underlying psych history and eating disorder as well as her  Push for NG tube despite it not being recommended by GI.  Patient was upset with this provider for consulting psychiatry.  Psychotherapist saw patient today  (please see note). A psychiatry MD or medication  to see patient tomorrow.       On 4/19 patient involved patient relations as she stated she is not being listened to or being heard by her providers.  She complained that nobody cares about her not eating.  There is concern that patient is pouring Ensure into her emesis bag and bedding to make it appears as if she was still vomiting.  There has never been a witness to her vomiting. She also complained of dizziness and was requesting IV fluids be reinstated.  She did not want previous provider to discontinue the IV fluids a day prior. That physician adamantly stated they will not be reinstated unless she tested positive for orthostatic hypotension.  She also monitors her labs on mychart as well as her progress notes and complained about what was being written about her to which she gets uncontrollably emotional. She is complaining about her BUN being low which  they described is due to her low intake of protein and which will be increased if she continues to try to eat more.  Despite this her albumin has been WNL.  She was also concerned about her mild elevation of LFTs which are clearly mildly elevated to which the provider stated will be followed up serially.  Patient insisted that GI be re-consulted for reevaluation of an NG tube.  She had been going back and forth with wanting to be discharged and then stated she does not want to discharge anymore until she clearly can eat.  At that time we do not see any medical  "need for patient to continue to stay in the hospital as she is able to tolerate p.o. meds and some amount of oral intake of her nutrition.  Patient then requested a different provider at that time as she is unhappy with the care being provided by that provider, GI and the previous hospitalist before me.      4/20:  On day of discharge the discharging provider had another long conversation with patient regarding her readiness to discharge.  She continues to endorse dissatisfaction with the plan to discharge stating she is still nauseous and not eating.  Discussed with her that she is still able to take PO and recommended she try small frequent meals which has been suggested for gastroparesis in the past.  She stated that this was not acceptable to her.  I reiterated that her vitals are stable and she is not orthostat positive.  I also reviewed previous labs and given they were stable or improving (BUN already rising) did not feel we needed to recheck those either.  Ultimately told patient that given all this she was still stable for discharge.  She already has close follow up with GI on 5/4 and was instructed to keep that.  Also prescribed scheduled lactulose at their recommendation  I also had a discussion with patient that her mental health is likely contributing to her symptoms and we have had difficulty managing that in the past. The patient expressed concerns with our mental health team here and not having consistency.  I suggested that in future it may be best for her to present to the [place] ED in non-emergent situations as they have a more dedicated mental health providers than here and could better suit her needs.  I did reiterate though we would be more than happy to continue to care for her at [facility] as well but patient expressed displeasure with our services stating we \"failed her.\"  Lastly, I did tell the patient that there was still a chance her symptoms do not improve or worsen and she " "may need to return to the ED.  I discussed with her though that all patients carry that risk.  I again redirected the conversation that she has been medically stable since the day prior and there was nothing else we could meaningfully improve with continued hospitalization.      Borderline Personality Disorder   Factitious disorder, provisional diagnosis  Multiple recurrent somatic symptoms  Eating Disorder  OCD  PTSD  Anxiety  More recent prolonged hospitalization at [facility] from 1/3-2/7/2023 for suicide attempt by overdose and somatic complaints, sabotaging her own discharge transfer to inpatient psychiatry. After finally getting a bed in the inpatient psychiatry unit, she left the following day (1 day psych hospitalization). Per discharge summary: differential includes BPD, provisional diagnosis of factitious disorder, eating disorder, depression and anxiety. Of these diagnoses eating disorder, depression and anxiety are historical with symptoms present prior to hospitalization. They may still be contributing to symptoms the patient is experiencing. Interactions with staff at OSH seemed to support possible factitious disorder in the context of high healthcare utilization and multiple somatic symptoms.\"   Per psychiatry note Nov 2022 at [facility]: \"Her primary problem is borderline personality d/o, definitive treatment of which will be continued DBT. RECOMMENDATIONS: Although each clinical encounter needs to be evaluated on its own merits, the patient has history of seeking respite in the Emergency Department secondary to chronic risk of maladaptive, potentially self-destructive behaviors that are exacerbated due to character pathology and there is no evidence that acute psychiatric hospitalization would mitigate those risks and likely would encourage further attempts to utilize the medical system inappropriately.\" Also recently had prolonged hospitalization at [facility]   -PTA psychiatric " "medications  -Encourage close outpatient psychiatric and psychologic follow-up.  This was reiterated with the patient on day of discharge but she stated she was \"in a good place\" and refused this.  I tried discussing with her that despite her feeling well at this time things could potentially worsen and it was a good resource.  I also discussed with her that      Pseudoseizure history  Noted hospitalization Jan 2023. EEG negative.            Consultations This Hospital Stay   GASTROENTEROLOGY IP CONSULT  NUTRITION SERVICES ADULT IP CONSULT  VASCULAR ACCESS ADULT IP CONSULT  GASTROENTEROLOGY IP CONSULT  VASCULAR ACCESS ADULT IP CONSULT  VASCULAR ACCESS ADULT IP CONSULT  PSYCHIATRY IP CONSULT    Code Status   Full Code    Time Spent on this Encounter   I, Geovanny Medrano DO, personally saw the patient today and spent greater than 30 minutes discharging this patient.       Geovanny Medrano DO  [facility]  [address] [address]  Phone: [phone]  ______________________________________________________________________    Physical Exam   Vital Signs: Temp: 98.4  F (36.9  C) Temp src: Oral BP: 128/73 Pulse: 86   Resp: 16 SpO2: 92 % O2 Device: None (Room air)    Weight: 125 lbs .01 oz  General Appearance: Resting comfortably. NAD   Respiratory: Clear to auscultation.  No respiratory distress  Cardiovascular: RRR.  Appears well perfused  GI: Non-distended  Skin: No obvious rashes or cyanosis  Other: Alert and oriented         Primary Care Physician   Brittney Penny    Discharge Orders      Reason for your hospital stay    Thea Castle is a 23 year old female with complex psychiatric history including borderline personality disorder, pseudoseizures, eating disorder, OCD, PTSD, anxiety, provisional diagnosis of factitious disorder and medical history of chronic anemia, anal fissure, gastroparesis, slow transit constipation, " rectal prolapse, proctitis secondary to known attempts at manual disimpaction, urinary retention who was admitted to observation on 4/14/2023 for bright red blood per rectum.     Follow-up and recommended labs and tests     Has upcoming follow up in [facility] at [facility]-     Activity    Your activity upon discharge: activity as tolerated     When to contact your care team    Call your primary doctor / [facility] if you have any of the following: temperature greater than 100.4 or less than 96, increased pain, or rectal bleeding.     Diet    Follow this diet upon discharge: Orders Placed This Encounter      Full Liquid Diet, advance slowly as tolerated       Significant Results and Procedures   Most Recent 3 CBC's:Recent Labs   Lab Test 04/16/23  1146 04/15/23  0542 04/14/23  2229 04/14/23  1824 04/13/23  1838   WBC 5.2 6.4  --   --  13.7*   HGB 10.3* 10.0* 9.8*   < > 11.6*   MCV 90 90  --   --  87    255  --   --  331    < > = values in this interval not displayed.     Most Recent 3 BMP's:Recent Labs   Lab Test 04/19/23  1743 04/19/23  1443 04/19/23  0644 04/18/23  2107 04/18/23  1427 04/17/23  0756 04/16/23  1146   NA  --   --  144  --  142  --  143   POTASSIUM  --   --  3.5  --  3.4  --  3.6   CHLORIDE  --   --  105  --  107  --  110*   CO2  --   --  29  --  27  --  24   BUN  --   --  3.5*  --  1.6*  --  4.0*   CR  --   --  0.85  --  0.78  --  0.80   ANIONGAP  --   --  10  --  8  --  9   DENNIS  --   --  8.7  --  8.6  --  8.5*   * 85 85   < > 102*   < > 125*    < > = values in this interval not displayed.     Most Recent 2 LFT's:Recent Labs   Lab Test 04/19/23  0644 04/18/23  1427   AST 45* 37*   ALT 40* 39*   ALKPHOS 54 55   BILITOTAL 0.2 0.2   ,   Results for orders placed or performed during the hospital encounter of 04/14/23   CT Abdomen Pelvis w Contrast    Narrative    EXAM: CT ABDOMEN PELVIS W CONTRAST  LOCATION: [facility]  DATE/TIME: 4/14/2023 1:30 AM CDT    INDICATION: llq  pain, rebound, brbpr  COMPARISON: 11/30/2022  TECHNIQUE: CT scan of the abdomen and pelvis was performed following injection of IV contrast. Multiplanar reformats were obtained. Dose reduction techniques were used.  CONTRAST: 62mL Isovue 370    FINDINGS:   LOWER CHEST: Normal.    HEPATOBILIARY: Normal.    PANCREAS: Normal.    SPLEEN: Normal.    ADRENAL GLANDS: Normal.    KIDNEYS/BLADDER: There are 2 approximately 2 x 2 millimeter nonobstructive stone seen in the right kidney. Kidneys, ureters, bladder are otherwise normal.    BOWEL: There is moderate thickening seen involving the distal sigmoid colon extending to the rectum consistent with colitis with some stranding of the adjacent fat. There is no associated direct testicular cysts, abscess, or free air identified. The   bowel is otherwise normal to include the appendix.    LYMPH NODES: Normal.    VASCULATURE: Unremarkable.    PELVIC ORGANS: Normal.    MUSCULOSKELETAL: There are few scattered benign Schmorl's nodes involving the spine.      Impression    IMPRESSION:   1.  Since 11/30/2022 there are new findings of distal colitis. The prior seen Smith catheter has been removed. There are less nonobstructive stone seen in the kidneys. The prior seen stone in the left UPJ is no longer identified.   XR Colon Water Soluble    Narrative    XR COLON WATER SOLUBLE THERAPEUTIC 4/17/2023 2:55 PM     HISTORY: Severe constipation  TECHNIQUE: 0.8 minutes fluoroscopy. Two spot images.  COMPARISON: 1/27/2023    FINDINGS: Gastrografin contrast flowed retrograde from the rectum to  the cecum. Normal amount of stool in the colon. No obstruction.    WILNER VALLADARES MD         SYSTEM ID:  [id number]       Discharge Medications   Current Discharge Medication List      START taking these medications    Details   lactulose (CHRONULAC) 10 GM/15ML solution Take 15 mLs (10 g) by mouth 3 times daily for 21 days  Qty: 946 mL, Refills: 0    Comments: Future refills by PCP Dr. Brittney DAVIS  [place] with phone number [phone].  Associated Diagnoses: Proctitis      ondansetron (ZOFRAN ODT) 4 MG ODT tab Take 1 tablet (4 mg) by mouth every 6 hours as needed for nausea or vomiting  Qty: 30 tablet, Refills: 0    Associated Diagnoses: Proctitis         CONTINUE these medications which have NOT CHANGED    Details   acetaminophen (TYLENOL) 325 MG tablet Take 2 tablets by mouth every 4 hours as needed      ARIPiprazole (ABILIFY) 10 MG tablet Take 10 mg by mouth daily      calcium carbonate (TUMS) 500 MG chewable tablet Take 1 tablet (500 mg) by mouth as needed for heartburn  Qty: 30 tablet, Refills: 0    Associated Diagnoses: Indigestion      cholecalciferol (VITAMIN D3) 25 mcg (1000 units) capsule Take 1 capsule by mouth daily      drospirenone-ethinyl estradiol (FELIPE) 3-0.02 MG tablet Take 1 tablet by mouth daily      DULoxetine (CYMBALTA) 60 MG capsule Take 1 capsule (60 mg) by mouth daily  Qty: 30 capsule, Refills: 0    Associated Diagnoses: Recurrent major depressive disorder, in partial remission (H)      hydrOXYzine (ATARAX) 25 MG tablet Take 1 tablet (25 mg) by mouth 2 times daily as needed for anxiety or other (sleep, melatonin augmentation or failure)  Qty: 60 tablet, Refills: 0    Associated Diagnoses: MAT (generalized anxiety disorder)      lactobacillus rhamnosus, GG, (CULTURELL) capsule Take 1 capsule by mouth daily      linaclotide (LINZESS) 290 MCG capsule Take 1 capsule (290 mcg) by mouth every morning (before breakfast)  Qty: 30 capsule, Refills: 0    Associated Diagnoses: Anorexia nervosa, restricting type      LORazepam (ATIVAN) 0.5 MG tablet Take 0.5 mg by mouth At Bedtime      Melatonin 10 MG TABS tablet Take 10 mg by mouth nightly as needed for sleep      multivitamin w/minerals (THERA-VIT-M) tablet Take 1 tablet by mouth daily  Qty: 30 tablet, Refills: 0    Associated Diagnoses: Vitamin deficiency      pantoprazole (PROTONIX) 40 MG EC tablet Take 1 tablet (40 mg) by mouth 2  "times daily  Qty: 60 tablet, Refills: 0    Associated Diagnoses: Anorexia nervosa, restricting type      promethazine (PHENERGAN) 25 MG tablet Take 25 mg by mouth 2 times daily      vitamin C (ASCORBIC ACID) 250 MG tablet Take 250 mg by mouth daily           Allergies   Allergies   Allergen Reactions     Penicillins Unknown     Mother unsure if patient or sister had a reaction. Mother reports she \"didn't think it was anaphylactic\".     Other Food Allergy GI Disturbance     Cilantro     "

## 2023-04-20 NOTE — PROGRESS NOTES
Pt adequate for discharge. A&Ox4. VSS on RA. Orthostatic Bps (-). Abdominal pain controlled with heat packs. Cont B/B. Regular diet, poor appetite. Encouraged sips of clears, ice chips and ginger ale given. PRN zofran x1. No IV. R inner thigh mepilex changed. Discharge paperwork explained to pt. Follow up appointments scheduled. All questions answered. No further questions. Pt wants to leave.

## 2023-04-20 NOTE — PLAN OF CARE
Goal Outcome Evaluation:    4/19/23 [id number]    Orientation/Cognitive: A&Ox4  Observation Goals (Met/ Not Met): Inpatient  Mobility Level/Assist Equipment: SBA  Fall Risk (Y/N): Y  Behavior Concerns: Green  Pain Management: C/o abdominal pain- tylenol, zofran and heat pack given at 0300  Tele/VS/O2: VSS on RA, soft BPs  ABNL Lab/BG: See results  Diet: Regular, decreased appetite, c/o intermittent nausea, PRN zofran given x 1 dose and PRN compazine given x 1 dose this shift. 2 reported emesis  Bowel/Bladder: Continent, last bm at 0300  Skin Concerns: Wound to R inner thigh, dressing changed after shower last night  Drains/Devices: NA  Tests/Procedures for next shift: NA  Anticipated DC date & active delays: 4/20 to home

## 2023-04-21 ENCOUNTER — PATIENT OUTREACH (OUTPATIENT)
Dept: CARE COORDINATION | Facility: CLINIC | Age: 23
End: 2023-04-21
Payer: COMMERCIAL

## 2023-04-21 NOTE — PROGRESS NOTES
Clinic Care Coordination Contact  [facility]: Post-Discharge Note  SITUATION                                                      Admission:    Admission Date: 04/14/23   Reason for Admission: Hematochezia, likely 2/2 ulceration of the distal rectum consistent with stercoral ulcers   Chronic anemia  Laxative abuse history  H/o Rectal prolapse  H/o anal fissure  H/o proctitis secondary to manual disimpaction attempts  H/o Nasoduodenal feeding tube, self-removed with retained foreign body removed via EGD  Gastroparesis  Slow transit constipation on Linzess   Borderline Personality Disorder   Factitious disorder, provisional diagnosis  Multiple recurrent somatic symptoms  Eating Disorder  OCD  PTSD   Anxiety  Pseudoseizure history  Discharge:   Discharge Date: 04/20/23  Discharge Diagnosis: Hematochezia, likely 2/2 ulceration of the distal rectum consistent with stercoral ulcers   Chronic anemia  Laxative abuse history  H/o Rectal prolapse  H/o anal fissure  H/o proctitis secondary to manual disimpaction attempts  H/o Nasoduodenal feeding tube, self-removed with retained foreign body removed via EGD  Gastroparesis  Slow transit constipation on Linzess   Borderline Personality Disorder   Factitious disorder, provisional diagnosis  Multiple recurrent somatic symptoms  Eating Disorder  OCD  PTSD   Anxiety  Pseudoseizure history    BACKGROUND                                                      Thea Castle is a 23 year old female with complex psychiatric history including borderline personality disorder, pseudoseizures, eating disorder, OCD, PTSD, anxiety, provisional diagnosis of factitious disorder and medical history of chronic anemia, anal fissure, gastroparesis, slow transit constipation, rectal prolapse, proctitis secondary to known attempts at manual disimpaction, urinary retention who was admitted to observation on 4/14/2023 for bright red blood per rectum.     Hematochezia, likely 2/2 ulceration  of the distal rectum consistent with stercoral ulcers   Chronic anemia  Laxative abuse history  H/o Rectal prolapse  H/o anal fissure  H/o proctitis secondary to manual disimpaction attempts  H/o Nasoduodenal feeding tube, self-removed with retained foreign body removed via EGD  Gastroparesis  Slow transit constipation on Linzess  Abrupt onset BRBPR including reports of dime-sized clots (patient brought in sample) without rectal pain, fevers, reported ingestions nor inserted foreign body. September 2022 colonoscopy normal. Electrolytes normal. Hemoglobin 11.6 on presentation which is above her baseline hemoglobin of around 10. CT abdomen/pelvis showed moderate thickening of distal sigmoid colon extending to rectum.  This description sounds similar to the CT scan from November 2022 at [facility] where she had CT evidence for proctitis and had reported finger insertion into her rectum for repeated manual disimpaction.  Upon review of outside records, patient was seen in primary care clinic for irregular vaginal bleeding 4/7/23 with unremarkable exam and labs.  Pelvic ultrasound showed no uterine abnormality but did comment on a large amount of stool present.  -Hemoglobin has remained stable stable  -[facility] consultation. Flex sigmoidoscopy report reviewed and discussed with Dr. Saha  4/15: Continue conservative measures and plan for close follow-up with GI as outpatient.  Initial plan for discharge canceled.  Continue regular antibiotics and start IV fluids.  Monitor orthostatic BP  4/16: Requested repeat GI team input.  Previous complicated psychiatric history makes assessment difficult  4/17: Appreciate GI input for nausea and vomiting. Gastrograffin study today and possible placment of NJ tube if patient not able to tolerate a diet. Also, starting on lactulose for better bowel regimen.    4/18: GI now signed off.  Does not recommend a feeding tube.  Patient unhappy about this decision requesting to speak to GI  again.  Discussed with patient to follow alternate plan which would involve attempting to increase her diet without a NG tube aid.  Patient initially requesting to discharge if feeding tube is not to be placed however discussed that we would need to monitor especially her blood glucose levels since she has been on D5W infusion up until today.  4/19: Patient's blood glucose stable off D5W.  Psychiatry consulted today to assist with patient's mental stability given her underlying psych history and eating disorder as well as her  Push for NG tube despite it not being recommended by GI.  Patient was upset with this provider for consulting psychiatry.  Psychotherapist saw patient today  (please see note). A psychiatry MD or medication  to see patient tomorrow.       On 4/19 patient involved patient relations as she stated she is not being listened to or being heard by her providers.  She complained that nobody cares about her not eating.  There is concern that patient is pouring Ensure into her emesis bag and bedding to make it appears as if she was still vomiting.  There has never been a witness to her vomiting. She also complained of dizziness and was requesting IV fluids be reinstated.  She did not want previous provider to discontinue the IV fluids a day prior. That physician adamantly stated they will not be reinstated unless she tested positive for orthostatic hypotension.  She also [facility] as well as her progress notes and complained about what was being written about her to which she gets uncontrollably emotional. She is complaining about her BUN being low which  they described is due to her low intake of protein and which will be increased if she continues to try to eat more.  Despite this her albumin has been WNL.  She was also concerned about her mild elevation of LFTs which are clearly mildly elevated to which the provider stated will be followed up serially.  Patient insisted  that GI be re-consulted for reevaluation of an NG tube.  She had been going back and forth with wanting to be discharged and then stated she does not want to discharge anymore until she clearly can eat.  At that time we do not see any medical need for patient to continue to stay in the hospital as she is able to tolerate p.o. meds and some amount of oral intake of her nutrition.  Patient then requested a different provider at that time as she is unhappy with the care being provided by that provider, GI and the previous hospitalist before me.       4/20:  On day of discharge the discharging provider had another long conversation with patient regarding her readiness to discharge.  She continues to endorse dissatisfaction with the plan to discharge stating she is still nauseous and not eating.  Discussed with her that she is still able to take PO and recommended she try small frequent meals which has been suggested for gastroparesis in the past.  She stated that this was not acceptable to her.  I reiterated that her vitals are stable and she is not orthostat positive.  I also reviewed previous labs and given they were stable or improving (BUN already rising) did not feel we needed to recheck those either.  Ultimately told patient that given all this she was still stable for discharge.  She already has close follow up with GI on 5/4 and was instructed to keep that.  Also prescribed scheduled lactulose at their recommendation  I also had a discussion with patient that her mental health is likely contributing to her symptoms and we have had difficulty managing that in the past. The patient expressed concerns with our mental health team here and not having consistency.  I suggested that in future it may be best for her to present to the [place] ED in non-emergent situations as they have a more dedicated mental health providers than here and could better suit her needs.  I did reiterate though we would be more than happy  "to continue to care for her at [facility] as well but patient expressed displeasure with our services stating we \"failed her.\"  Lastly, I did tell the patient that there was still a chance her symptoms do not improve or worsen and she may need to return to the ED.  I discussed with her though that all patients carry that risk.  I again redirected the conversation that she has been medically stable since the day prior and there was nothing else we could meaningfully improve with continued hospitalization.      Borderline Personality Disorder   Factitious disorder, provisional diagnosis  Multiple recurrent somatic symptoms  Eating Disorder  OCD  PTSD  Anxiety  More recent prolonged hospitalization at [facility] from 1/3-2/7/2023 for suicide attempt by overdose and somatic complaints, sabotaging her own discharge transfer to inpatient psychiatry. After finally getting a bed in the inpatient psychiatry unit, she left the following day (1 day psych hospitalization). Per discharge summary: differential includes BPD, provisional diagnosis of factitious disorder, eating disorder, depression and anxiety. Of these diagnoses eating disorder, depression and anxiety are historical with symptoms present prior to hospitalization. They may still be contributing to symptoms the patient is experiencing. Interactions with staff at OSH seemed to support possible factitious disorder in the context of high healthcare utilization and multiple somatic symptoms.\"   Per psychiatry note Nov 2022 at [facility]: \"Her primary problem is borderline personality d/o, definitive treatment of which will be continued DBT. RECOMMENDATIONS: Although each clinical encounter needs to be evaluated on its own merits, the patient has history of seeking respite in the Emergency Department secondary to chronic risk of maladaptive, potentially self-destructive behaviors that are exacerbated due to character pathology and there is no evidence that " "acute psychiatric hospitalization would mitigate those risks and likely would encourage further attempts to utilize the medical system inappropriately.\" Also recently had prolonged hospitalization at [facility]   -PTA psychiatric medications  -Encourage close outpatient psychiatric and psychologic follow-up.  This was reiterated with the patient on day of discharge but she stated she was \"in a good place\" and refused this.  I tried discussing with her that despite her feeling well at this time things could potentially worsen and it was a good resource.  I also discussed with her that      Pseudoseizure history  Noted hospitalization Jan 2023. EEG negative.           ASSESSMENT           Discharge Assessment  How are you doing now that you are home?: Patient shares that she is doing ok, saw GI doctor today and was told they shouldn't have been discharged from the hospital. Shares that tube placement was discussed if vomitin/nausea continues. Patient feels like she was not treated well in the hospital. Writer apologizes for this. No other concerns/needs  How are your symptoms? (Red Flag symptoms escalate to triage hotline per guidelines): Improved  Do you feel your condition is stable enough to be safe at home until your provider visit?: Yes  Does the patient have their discharge instructions? : Yes  Does the patient have questions regarding their discharge instructions? : No  Were you started on any new medications or were there changes to any of your previous medications? : Yes  Does the patient have all of their medications?: Yes  Do you have questions regarding any of your medications? : No  Do you have all of your needed medical supplies or equipment (DME)?  (i.e. oxygen tank, CPAP, cane, etc.): Yes  Discharge follow-up appointment scheduled within 14 calendar days? : Yes  Discharge Follow Up Appointment Date: 04/21/23  Discharge Follow Up Appointment Scheduled with?: Specialty Care Provider (GI)    Post-op (CHW " CTA Only)  If the patient had a surgery or procedure, do they have any questions for a nurse?: No    Post-op (Clinicians Only)  Did the patient have surgery or a procedure: No  Fever: No  Chills: No        PLAN                                                      Outpatient Plan:     Call your primary doctor / [facility] if you have any of the following: temperature greater than 100.4 or less than 96, increased pain, or rectal bleeding.          No future appointments.      For any urgent concerns, please contact our 24 hour nurse triage line: [phone] ([phone])         DIVYA Munoz

## 2023-04-24 ENCOUNTER — HOSPITAL ENCOUNTER (EMERGENCY)
Facility: CLINIC | Age: 23
Discharge: HOME | End: 2023-04-24
Attending: EMERGENCY MEDICINE | Admitting: EMERGENCY MEDICINE
Payer: COMMERCIAL

## 2023-04-24 ENCOUNTER — MEDICAL CORRESPONDENCE (OUTPATIENT)
Dept: HEALTH INFORMATION MANAGEMENT | Facility: CLINIC | Age: 23
End: 2023-04-24

## 2023-04-24 ENCOUNTER — HOSPITAL ENCOUNTER (EMERGENCY)
Facility: CLINIC | Age: 23
Discharge: HOME | End: 2023-04-25
Attending: EMERGENCY MEDICINE | Admitting: EMERGENCY MEDICINE
Payer: OTHER MISCELLANEOUS

## 2023-04-24 ENCOUNTER — APPOINTMENT (OUTPATIENT)
Dept: CT IMAGING | Facility: CLINIC | Age: 23
End: 2023-04-24
Attending: EMERGENCY MEDICINE
Payer: OTHER MISCELLANEOUS

## 2023-04-24 VITALS
WEIGHT: 125 LBS | HEART RATE: 92 BPM | RESPIRATION RATE: 20 BRPM | DIASTOLIC BLOOD PRESSURE: 71 MMHG | BODY MASS INDEX: 21.34 KG/M2 | SYSTOLIC BLOOD PRESSURE: 128 MMHG | HEIGHT: 64 IN | TEMPERATURE: 98.7 F | OXYGEN SATURATION: 100 %

## 2023-04-24 DIAGNOSIS — S09.90XA CLOSED HEAD INJURY, INITIAL ENCOUNTER: ICD-10-CM

## 2023-04-24 DIAGNOSIS — Z87.19 HISTORY OF HEMATEMESIS: ICD-10-CM

## 2023-04-24 DIAGNOSIS — K92.0 HEMATEMESIS, UNSPECIFIED WHETHER NAUSEA PRESENT: ICD-10-CM

## 2023-04-24 DIAGNOSIS — K62.5 BRIGHT RED BLOOD PER RECTUM: ICD-10-CM

## 2023-04-24 DIAGNOSIS — F50.019 ANOREXIA NERVOSA, RESTRICTING TYPE: ICD-10-CM

## 2023-04-24 LAB
ALBUMIN SERPL BCG-MCNC: 4.1 G/DL (ref 3.5–5.2)
ALBUMIN SERPL BCG-MCNC: 4.4 G/DL (ref 3.5–5.2)
ALP SERPL-CCNC: 60 U/L (ref 35–104)
ALP SERPL-CCNC: 63 U/L (ref 35–104)
ALT SERPL W P-5'-P-CCNC: 29 U/L (ref 10–35)
ALT SERPL W P-5'-P-CCNC: 30 U/L (ref 10–35)
ANION GAP SERPL CALCULATED.3IONS-SCNC: 8 MMOL/L (ref 7–15)
ANION GAP SERPL CALCULATED.3IONS-SCNC: 9 MMOL/L (ref 7–15)
AST SERPL W P-5'-P-CCNC: 27 U/L (ref 10–35)
AST SERPL W P-5'-P-CCNC: 34 U/L (ref 10–35)
BASOPHILS # BLD AUTO: 0 10E3/UL (ref 0–0.2)
BASOPHILS # BLD AUTO: 0 10E3/UL (ref 0–0.2)
BASOPHILS NFR BLD AUTO: 1 %
BASOPHILS NFR BLD AUTO: 1 %
BILIRUB SERPL-MCNC: 0.2 MG/DL
BILIRUB SERPL-MCNC: 0.3 MG/DL
BUN SERPL-MCNC: 6.5 MG/DL (ref 6–20)
BUN SERPL-MCNC: 9.5 MG/DL (ref 6–20)
CALCIUM SERPL-MCNC: 8.8 MG/DL (ref 8.6–10)
CALCIUM SERPL-MCNC: 9.3 MG/DL (ref 8.6–10)
CHLORIDE SERPL-SCNC: 103 MMOL/L (ref 98–107)
CHLORIDE SERPL-SCNC: 104 MMOL/L (ref 98–107)
CREAT SERPL-MCNC: 0.69 MG/DL (ref 0.51–0.95)
CREAT SERPL-MCNC: 0.78 MG/DL (ref 0.51–0.95)
DEPRECATED HCO3 PLAS-SCNC: 28 MMOL/L (ref 22–29)
DEPRECATED HCO3 PLAS-SCNC: 29 MMOL/L (ref 22–29)
EOSINOPHIL # BLD AUTO: 0.1 10E3/UL (ref 0–0.7)
EOSINOPHIL # BLD AUTO: 0.1 10E3/UL (ref 0–0.7)
EOSINOPHIL NFR BLD AUTO: 1 %
EOSINOPHIL NFR BLD AUTO: 1 %
ERYTHROCYTE [DISTWIDTH] IN BLOOD BY AUTOMATED COUNT: 13 % (ref 10–15)
ERYTHROCYTE [DISTWIDTH] IN BLOOD BY AUTOMATED COUNT: 13.1 % (ref 10–15)
GFR SERPL CREATININE-BSD FRML MDRD: >90 ML/MIN/1.73M2
GFR SERPL CREATININE-BSD FRML MDRD: >90 ML/MIN/1.73M2
GLUCOSE SERPL-MCNC: 118 MG/DL (ref 70–99)
GLUCOSE SERPL-MCNC: 97 MG/DL (ref 70–99)
HCG SERPL QL: NEGATIVE
HCG SERPL QL: NEGATIVE
HCT VFR BLD AUTO: 33.8 % (ref 35–47)
HCT VFR BLD AUTO: 34.4 % (ref 35–47)
HGB BLD-MCNC: 11 G/DL (ref 11.7–15.7)
HGB BLD-MCNC: 11.6 G/DL (ref 11.7–15.7)
IMM GRANULOCYTES # BLD: 0 10E3/UL
IMM GRANULOCYTES # BLD: 0 10E3/UL
IMM GRANULOCYTES NFR BLD: 0 %
IMM GRANULOCYTES NFR BLD: 0 %
LYMPHOCYTES # BLD AUTO: 1.6 10E3/UL (ref 0.8–5.3)
LYMPHOCYTES # BLD AUTO: 2 10E3/UL (ref 0.8–5.3)
LYMPHOCYTES NFR BLD AUTO: 25 %
LYMPHOCYTES NFR BLD AUTO: 25 %
MAGNESIUM SERPL-MCNC: 1.9 MG/DL (ref 1.7–2.3)
MCH RBC QN AUTO: 30 PG (ref 26.5–33)
MCH RBC QN AUTO: 30.4 PG (ref 26.5–33)
MCHC RBC AUTO-ENTMCNC: 32.5 G/DL (ref 31.5–36.5)
MCHC RBC AUTO-ENTMCNC: 33.7 G/DL (ref 31.5–36.5)
MCV RBC AUTO: 90 FL (ref 78–100)
MCV RBC AUTO: 92 FL (ref 78–100)
MONOCYTES # BLD AUTO: 0.4 10E3/UL (ref 0–1.3)
MONOCYTES # BLD AUTO: 0.5 10E3/UL (ref 0–1.3)
MONOCYTES NFR BLD AUTO: 6 %
MONOCYTES NFR BLD AUTO: 6 %
NEUTROPHILS # BLD AUTO: 4.2 10E3/UL (ref 1.6–8.3)
NEUTROPHILS # BLD AUTO: 5.2 10E3/UL (ref 1.6–8.3)
NEUTROPHILS NFR BLD AUTO: 67 %
NEUTROPHILS NFR BLD AUTO: 67 %
NRBC # BLD AUTO: 0 10E3/UL
NRBC # BLD AUTO: 0 10E3/UL
NRBC BLD AUTO-RTO: 0 /100
NRBC BLD AUTO-RTO: 0 /100
PLATELET # BLD AUTO: 288 10E3/UL (ref 150–450)
PLATELET # BLD AUTO: 301 10E3/UL (ref 150–450)
POTASSIUM SERPL-SCNC: 3.8 MMOL/L (ref 3.4–5.3)
POTASSIUM SERPL-SCNC: 4 MMOL/L (ref 3.4–5.3)
PROT SERPL-MCNC: 6.6 G/DL (ref 6.4–8.3)
PROT SERPL-MCNC: 7 G/DL (ref 6.4–8.3)
RBC # BLD AUTO: 3.67 10E6/UL (ref 3.8–5.2)
RBC # BLD AUTO: 3.82 10E6/UL (ref 3.8–5.2)
SODIUM SERPL-SCNC: 140 MMOL/L (ref 136–145)
SODIUM SERPL-SCNC: 141 MMOL/L (ref 136–145)
TROPONIN T SERPL HS-MCNC: 6 NG/L
WBC # BLD AUTO: 6.2 10E3/UL (ref 4–11)
WBC # BLD AUTO: 7.8 10E3/UL (ref 4–11)

## 2023-04-24 PROCEDURE — 96374 THER/PROPH/DIAG INJ IV PUSH: CPT

## 2023-04-24 PROCEDURE — 84484 ASSAY OF TROPONIN QUANT: CPT | Performed by: EMERGENCY MEDICINE

## 2023-04-24 PROCEDURE — 258N000003 HC RX IP 258 OP 636: Performed by: EMERGENCY MEDICINE

## 2023-04-24 PROCEDURE — 84703 CHORIONIC GONADOTROPIN ASSAY: CPT | Performed by: EMERGENCY MEDICINE

## 2023-04-24 PROCEDURE — 36415 COLL VENOUS BLD VENIPUNCTURE: CPT | Performed by: EMERGENCY MEDICINE

## 2023-04-24 PROCEDURE — 99284 EMERGENCY DEPT VISIT MOD MDM: CPT | Mod: 25

## 2023-04-24 PROCEDURE — 96361 HYDRATE IV INFUSION ADD-ON: CPT

## 2023-04-24 PROCEDURE — 250N000011 HC RX IP 250 OP 636: Performed by: EMERGENCY MEDICINE

## 2023-04-24 PROCEDURE — 80053 COMPREHEN METABOLIC PANEL: CPT | Performed by: EMERGENCY MEDICINE

## 2023-04-24 PROCEDURE — 85025 COMPLETE CBC W/AUTO DIFF WBC: CPT | Performed by: EMERGENCY MEDICINE

## 2023-04-24 PROCEDURE — C9113 INJ PANTOPRAZOLE SODIUM, VIA: HCPCS | Performed by: EMERGENCY MEDICINE

## 2023-04-24 PROCEDURE — 70450 CT HEAD/BRAIN W/O DYE: CPT

## 2023-04-24 PROCEDURE — 83735 ASSAY OF MAGNESIUM: CPT | Performed by: EMERGENCY MEDICINE

## 2023-04-24 RX ORDER — ONDANSETRON 2 MG/ML
4 INJECTION INTRAMUSCULAR; INTRAVENOUS EVERY 30 MIN PRN
Status: DISCONTINUED | OUTPATIENT
Start: 2023-04-24 | End: 2023-04-25 | Stop reason: HOSPADM

## 2023-04-24 RX ORDER — PROMETHAZINE HYDROCHLORIDE 25 MG/1
25 SUPPOSITORY RECTAL EVERY 6 HOURS PRN
Qty: 30 SUPPOSITORY | Refills: 0 | Status: SHIPPED | OUTPATIENT
Start: 2023-04-24

## 2023-04-24 RX ORDER — SODIUM CHLORIDE 9 MG/ML
INJECTION, SOLUTION INTRAVENOUS CONTINUOUS
Status: DISCONTINUED | OUTPATIENT
Start: 2023-04-24 | End: 2023-04-24 | Stop reason: HOSPADM

## 2023-04-24 RX ORDER — SUCRALFATE 1 G/1
1 TABLET ORAL 4 TIMES DAILY
Qty: 56 TABLET | Refills: 0 | Status: SHIPPED | OUTPATIENT
Start: 2023-04-24 | End: 2023-05-08

## 2023-04-24 RX ORDER — PANTOPRAZOLE SODIUM 40 MG/1
40 TABLET, DELAYED RELEASE ORAL 2 TIMES DAILY
Qty: 60 TABLET | Refills: 0 | Status: SHIPPED | OUTPATIENT
Start: 2023-04-24

## 2023-04-24 RX ORDER — ACETAMINOPHEN 325 MG/1
650 TABLET ORAL ONCE
Status: DISCONTINUED | OUTPATIENT
Start: 2023-04-24 | End: 2023-04-25 | Stop reason: HOSPADM

## 2023-04-24 RX ADMIN — SODIUM CHLORIDE 1000 ML: 9 INJECTION, SOLUTION INTRAVENOUS at 07:32

## 2023-04-24 RX ADMIN — PANTOPRAZOLE SODIUM 40 MG: 40 INJECTION, POWDER, FOR SOLUTION INTRAVENOUS at 07:37

## 2023-04-24 ASSESSMENT — ACTIVITIES OF DAILY LIVING (ADL): ADLS_ACUITY_SCORE: 35

## 2023-04-24 NOTE — Clinical Note
Thea Castle was seen and treated in our emergency department on 4/24/2023.  She may return to work on 04/27/2023.       If you have any questions or concerns, please don't hesitate to call.      Michoacano Mcpherson MD

## 2023-04-24 NOTE — ED PROVIDER NOTES
History   Chief Complaint:  Rectal Bleeding     HPI   Thea Castle is a 23 year old female with complex past medical history per below who presents with concern for hematemesis and bright red blood per rectum.  Patient reports history of intermittent vomiting as well as constipation.  Since discharge from the hospital 4 days ago, she had been doing okay until last night when she noticed bright red blood per rectum.  There was no stool associated with this.  She also had 3 episodes of blood-tinged emesis.  She has ongoing intermittent abdominal discomfort.  No reports of fever, cough, other symptoms at this time.  She denies taking any PPI or Carafate.    Flex sig 4/14/23:     Impression:               - Perianal skin tags found on perianal exam.                  - Multiple ulcers in the distal rectum. Biopsied. Differentials include traumatic, infectious,   stercoral, appearance not usual for that of IBD, isolated ischemic ulcers in distal rectum would be rare. The examination was otherwise normal.     Review of External Notes: I reviewed the hospital discharge summary from 4/20/2023.  Patient was admitted with hematochezia of the distal rectum consistent with stercoral coral ulcers.  She has history of laxative abuse, rectal prolapse, anal fissure, proctitis secondary to manual disimpaction attempts, slow transit constipation, borderline personality disorder, factitious disorder, eating disorder, pseudoseizure history, history of nasoduodenal feeding tube previously, gastroparesis, recurrent somatic symptoms.    ROS:  Review of Systems  A 10 point ROS was obtained and negative except as noted here and in HPI    Allergies:  Penicillins     Medications:    Abilify   Cymbalta   Kelley   Atarax  Linzess   Ativan   Zofran  Protonix   Phenergan     Past Medical History:    Anorexia  Anxiety   Depressive disorder  Gastroparesis   OCD  PTSD    Past Surgical History:    ENT surgery      Family History:    family  "history includes Suicide in an other family member.    Social History:   reports that she has never smoked. She has never used smokeless tobacco. She reports current alcohol use. She reports that she does not use drugs.  PCP: Brittney Penny     Physical Exam     Patient Vitals for the past 24 hrs:   BP Temp Temp src Pulse Resp SpO2 Height Weight   04/24/23 0906 128/71 -- -- 92 20 100 % -- --   04/24/23 0706 137/83 98.7  F (37.1  C) Oral 110 16 100 % 1.626 m (5' 4\") 56.7 kg (125 lb)      Physical Exam  VS: Reviewed per above  HENT: normal speech  EYES: sclera anicteric  CV: Rate as noted, regular rhythm.   RESP: Effort normal. Breath sounds are normal bilaterally.  GI: no tenderness/rebound/guarding, not distended.  Chaperoned MICHELE: Scant pink-tinged mucus.  NEURO: Alert, moving all extremities  MSK: No deformity of the extremities  SKIN: Warm and dry      Emergency Department Course       Laboratory:  Labs Ordered and Resulted from Time of ED Arrival to Time of ED Departure   COMPREHENSIVE METABOLIC PANEL - Abnormal       Result Value    Sodium 141      Potassium 3.8      Chloride 103      Carbon Dioxide (CO2) 29      Anion Gap 9      Urea Nitrogen 9.5      Creatinine 0.78      Calcium 8.8      Glucose 118 (*)     Alkaline Phosphatase 60      AST 27      ALT 29      Protein Total 6.6      Albumin 4.1      Bilirubin Total 0.3      GFR Estimate >90     CBC WITH PLATELETS AND DIFFERENTIAL - Abnormal    WBC Count 7.8      RBC Count 3.82      Hemoglobin 11.6 (*)     Hematocrit 34.4 (*)     MCV 90      MCH 30.4      MCHC 33.7      RDW 13.0      Platelet Count 301      % Neutrophils 67      % Lymphocytes 25      % Monocytes 6      % Eosinophils 1      % Basophils 1      % Immature Granulocytes 0      NRBCs per 100 WBC 0      Absolute Neutrophils 5.2      Absolute Lymphocytes 2.0      Absolute Monocytes 0.5      Absolute Eosinophils 0.1      Absolute Basophils 0.0      Absolute Immature Granulocytes 0.0      " Absolute NRBCs 0.0     HCG QUALITATIVE PREGNANCY - Normal    hCG Serum Qualitative Negative          Emergency Department Course & Assessments:     Interventions:  Medications   pantoprazole (PROTONIX) IV push injection 40 mg (40 mg Intravenous $Given 4/24/23 0792)   0.9% sodium chloride BOLUS (0 mLs Intravenous Stopped 4/24/23 0835)        Consultations/Discussion of Management or Tests:     ED Course as of 04/24/23 1125   Mon Apr 24, 2023   0722 Completed chaperoned MICHELE- scant pink tinged mucus appreciated       Disposition:  The patient was discharged to home.     Impression & Plan        Medical Decision Making:  Patient presents to the ER for evaluation of hematemesis, bright red blood per rectum.  Vital signs reassuring.  Abdominal exam is benign.  Low suspicion for sinister surgical intra-abdominal process.  On MICHELE there is scant pink-tinged mucus.  Hemoglobin is stable compared to recent values.  No signs of brisk GI bleed at this juncture.  No recurrent hematemesis appreciated here in the ER.  I reviewed recent hospitalization for hematochezia where she was found to have proctitis on flex sigmoidoscopy.  Patient is interested in emergent GI consultation.  We discussed that given reassuring labs and exam, I do not see indication for emergent GI intervention but that prompt follow-up would be helpful.  Patient seemed agreeable to contacting her GI team to facilitate this.  I will also prescribe Carafate and (refill previously prescribed) Protonix in the event of occult peptic ulcer disease or gastritis.  I also prescribed Phenergan suppositories to assist with her ongoing nausea to use in place of oral Phenergan if she cannot tolerate oral pills.  Return precautions discussed prior to discharge.      Diagnosis:    ICD-10-CM    1. Hematemesis, unspecified whether nausea present  K92.0       2. Bright red blood per rectum  K62.5       3. Anorexia nervosa, restricting type  F50.01 pantoprazole (PROTONIX) 40 MG  EC tablet           Discharge Medications:  Discharge Medication List as of 4/24/2023  8:58 AM      START taking these medications    Details   promethazine (PHENERGAN) 25 MG suppository Place 1 suppository (25 mg) rectally every 6 hours as needed for nausea (take instead of oral dose if you cannot keep the pills down), Disp-30 suppository, R-0, E-Prescribe      sucralfate (CARAFATE) 1 GM tablet Take 1 tablet (1 g) by mouth 4 times daily for 14 days, Disp-56 tablet, R-0, E-Prescribe            Scribe Disclosure:  I, Ligia Chase, am serving as a scribe at 7:31 AM on 4/24/2023 to document services personally performed by Josue Rubi MD based on my observations and the provider's statements to me.     4/24/2023   Josue Rubi MD Lindenbaum, Elan, MD  04/24/23 1124

## 2023-04-24 NOTE — ED TRIAGE NOTES
States recent admission for rectal bleeding, states started having rectal bleeding again last night at 5pm and now has had 5 emesis with blood.      Triage Assessment     Row Name 04/24/23 0705       Triage Assessment (Adult)    Airway WDL WDL       Respiratory WDL    Respiratory WDL WDL       Skin Circulation/Temperature WDL    Skin Circulation/Temperature WDL WDL       Cardiac WDL    Cardiac WDL WDL       Peripheral/Neurovascular WDL    Peripheral Neurovascular WDL WDL       Cognitive/Neuro/Behavioral WDL    Cognitive/Neuro/Behavioral WDL WDL

## 2023-04-24 NOTE — ED NOTES
Rapid Assessment Note    History:   Thea Castle is a 23 year old female who presents via EMS after a fall with altered mental status. Patient states that she was seen in the ER this morning for hematemesis and bloody stool.  She also was recently admitted to the hospital for those symptoms.  She says that she went to work this morning and fell, hitting her head.  She is unsure exactly what happened and whether or not she may have passed out.  Apparently she hit her head twice today.  Patient is unsure what she hit her head on but endorses loss of conciousness. She endorses symptoms of headache, neck pain on the sides, ongoing abdominal pain, nausea, hematemesis, blood in her stools, and confusion. Patient denies use of drugs. She notes taking medications though is unsure which ones. Patient is tearful upon arrival.       Exam:   General:  Alert, interactive  Cardiovascular:  Well perfused  Lungs:  No respiratory distress, no accessory muscle use  Neuro:  Moving all 4 extremities  Skin:  Warm, dry  Psych:  Normal affect      Plan of Care:   I evaluated the patient and developed an initial plan of care. I discussed this plan and explained that I, or one of my partners, would be returning to complete the evaluation.  Laboratory work-up and CT scan of the head ordered.  Zofran and Tylenol also ordered for her.    I, Wendy Brown, am serving as a scribe to document services personally performed by Darian Vergara MD, based on my observations and the provider's statements to me.    4/24/2023  Darian Vergara MD    Portions of this medical record were completed by a scribe. UPON MY REVIEW AND AUTHENTICATION BY ELECTRONIC SIGNATURE, this confirms (a) I performed the applicable clinical services, and (b) the record is accurate.      Darian Vergara MD  04/24/23 1920

## 2023-04-24 NOTE — ED TRIAGE NOTES
Pt brought to ER via ems, has been dealing with bloody stools and vomiting blood for awhile, was seen in the ER today. Pt doesn't remember being here today. Pt fell and hit her head twice at work today, had a loc

## 2023-04-25 VITALS
RESPIRATION RATE: 22 BRPM | SYSTOLIC BLOOD PRESSURE: 130 MMHG | TEMPERATURE: 98.7 F | OXYGEN SATURATION: 99 % | BODY MASS INDEX: 22.15 KG/M2 | HEART RATE: 95 BPM | HEIGHT: 63 IN | DIASTOLIC BLOOD PRESSURE: 85 MMHG | WEIGHT: 125 LBS

## 2023-04-25 ASSESSMENT — ENCOUNTER SYMPTOMS
VOMITING: 1
WOUND: 0

## 2023-04-25 ASSESSMENT — ACTIVITIES OF DAILY LIVING (ADL): ADLS_ACUITY_SCORE: 35

## 2023-04-25 NOTE — ED PROVIDER NOTES
History     Chief Complaint:  Altered Mental Status and Head Injury       HPI   Thea Castle is a 23 year old female with a history of borderline personality disoder who presents with head injury and emesis. Thea states that after being discharged from the ED earlier this morning for hematemesis she went to work where she reports falling and hitting her head on the edge of a dumpster. During evaluation, Thea states that she doesn't feel well and has had some emesis while here in the ED. She denies any wound to her head.       Independent Historian:   None - Patient Only    Review of External Notes: I reviewed the patient's discharge summary from 4/20/23, see below.      [facility]  Hospitalist Discharge Summary       Date of Admission:  4/14/2023  Date of Discharge:  4/20/2023  Discharging Provider: Geovanny Medrano DO  Discharge Service: Hospitalist Service        Discharge Diagnoses     Hematochezia, likely 2/2 ulceration of the distal rectum consistent with stercoral ulcers   Chronic anemia  Laxative abuse history  H/o Rectal prolapse  H/o anal fissure  H/o proctitis secondary to manual disimpaction attempts  H/o Nasoduodenal feeding tube, self-removed with retained foreign body removed via EGD  Gastroparesis  Slow transit constipation on Linzess   Borderline Personality Disorder   Factitious disorder, provisional diagnosis  Multiple recurrent somatic symptoms  Eating Disorder  OCD  PTSD   Anxiety  Pseudoseizure history       ROS:  Review of Systems   Gastrointestinal: Positive for vomiting.   Skin: Negative for wound.   Neurological:        (+) head trauma   All other systems reviewed and are negative.      Allergies:  Penicillins     Medications:    Aripiprazole  Kelley birth control  Duloxetine  Hydroxyzine  Lactulose  Linzess   Lorazepam  Pantoprazole     Past Medical History:    Migraine  GI hemorrhage  Ulcerative colitis  Depression  Anorexia   Borderline personality  "disorder  MAT  OCD  PTSD  Gastroparesis     Past Surgical History:    Unspecified ENT surgery      Social History:  Patient arrived via EMS.  Patient is unaccompanied in the ED.  Patient works at [employer].    Physical Exam     Patient Vitals for the past 24 hrs:   BP Temp Temp src Pulse Resp SpO2 Height Weight   04/25/23 0035 -- -- -- 95 -- 99 % -- --   04/24/23 1844 130/85 98.7  F (37.1  C) Oral 118 22 99 % 1.6 m (5' 3\") 56.7 kg (125 lb)        Physical Exam  Physical Exam   Nursing note and vitals reviewed.  General: Oriented to person, place, and time. Appears well-developed and well-nourished.   Head: No signs of trauma. Patient reports that she had swelling over her left parietal scalp, but this has since resolved.   Mouth/Throat: Oropharynx is clear and moist.   Eyes: Conjunctivae are normal. Pupils are equal, round, and reactive to light.   Neck: Normal range of motion. No nuchal rigidity.   Cardiovascular: Normal rate and regular rhythm.    Respiratory: Effort normal and breath sounds normal. No respiratory distress.   Abdominal: Soft. There is no tenderness. There is no guarding.   Musculoskeletal: Normal range of motion. no edema.   Neurological: The patient is alert and oriented to person, place, and time.  PERRLA, EOMI, visual fields intact, strength in upper/lower extremities normal and symmetrical.   Sensation normal. Speech normal  GCS eye subscore is 4. GCS verbal subscore is 5. GCS motor subscore is 6.   Skin: Skin is warm and dry. No rash noted.   Psychiatric: normal mood and affect. behavior is normal.     Emergency Department Course     Imaging:  CT Head w/o Contrast   Final Result   IMPRESSION:   1.  Unremarkable head CT with no acute intracranial abnormality.         Report per radiology    Laboratory:  Labs Ordered and Resulted from Time of ED Arrival to Time of ED Departure   CBC WITH PLATELETS AND DIFFERENTIAL - Abnormal       Result Value    WBC Count 6.2      RBC Count 3.67 (*)     " Hemoglobin 11.0 (*)     Hematocrit 33.8 (*)     MCV 92      MCH 30.0      MCHC 32.5      RDW 13.1      Platelet Count 288      % Neutrophils 67      % Lymphocytes 25      % Monocytes 6      % Eosinophils 1      % Basophils 1      % Immature Granulocytes 0      NRBCs per 100 WBC 0      Absolute Neutrophils 4.2      Absolute Lymphocytes 1.6      Absolute Monocytes 0.4      Absolute Eosinophils 0.1      Absolute Basophils 0.0      Absolute Immature Granulocytes 0.0      Absolute NRBCs 0.0     COMPREHENSIVE METABOLIC PANEL - Normal    Sodium 140      Potassium 4.0      Chloride 104      Carbon Dioxide (CO2) 28      Anion Gap 8      Urea Nitrogen 6.5      Creatinine 0.69      Calcium 9.3      Glucose 97      Alkaline Phosphatase 63      AST 34      ALT 30      Protein Total 7.0      Albumin 4.4      Bilirubin Total 0.2      GFR Estimate >90     MAGNESIUM - Normal    Magnesium 1.9     TROPONIN T, HIGH SENSITIVITY - Normal    Troponin T, High Sensitivity 6     HCG QUALITATIVE PREGNANCY - Normal    hCG Serum Qualitative Negative          Procedures   none    Emergency Department Course & Assessments:    Interventions:  Medications - No data to display     Assessments:  0009 I obtained history and examined the patient as noted above. At this time, the patient was deemed safe to discharge home and she agreed to the plan of care.    Independent Interpretation (X-rays, CTs, rhythm strip):  Agree head CT is negative    Consultations/Discussion of Management or Tests:  None        Social Determinants of Health affecting care:   Healthcare Access/Compliance and Stress/Adjustment Disorders    Disposition:  The patient was discharged to home.     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  The patient has recently been evaluated in the ED (just this morning) concerns for for hematemesis.   Patient is evaluated here in the ED and head CT is negative for intercerebral hemorrhage or skull fracture.  The patient is awake and alert and speaking.  She was observed in the ED for several hours and is hemodynamically stable.  Her hemoglobin is relatively stable going from 11.7 to 11.0.  Patient will be given instructions for concussion.  She will be given a work note for 2 days.  She will return to the ER with worsening symptoms and follow-up as planned with gastroenterology regarding her hematemesis in the setting of a history of anorexia nervosa.    Diagnosis:    ICD-10-CM    1. Closed head injury, initial encounter  S09.90XA       2. History of hematemesis  Z87.19            Discharge Medications:  There are no discharge medications for this patient.       Scribe Disclosure:  I, Vonda Bautista, am serving as a scribe at 12:19 AM on 4/25/2023 to document services personally performed by Michoacano Mcpherson MD based on my observations and the provider's statements to me.     4/24/2023   Michoacano Mcpherson MD Joing, Todd Roger, MD  04/25/23 0651

## 2023-04-25 NOTE — ED NOTES
This RN called pt's mother as pt was asking for mom. Mother was aware pt was in the hospital and updated on POC. Pt spoke with mother.

## 2023-04-25 NOTE — ED NOTES
Pt used her call light to inform staff that she wanted her work note, because her mom told her to get one.  Note was reprinted and given to Pt.

## 2023-04-25 NOTE — ED NOTES
Pt given discharge paperwork and attempted to get a set of VS.  Pt was uncooperative with BP and refused the pulse ox.  Explained that writer needed to at least obtain a new HR before Pt could leave and Pt consented.  Pt claimed she did not need her work note, because she intends to go to work tomorrow.  Confirmed with Pt that she no longer wanted the work note and she reiterated that she doesn't need it.  Pt then became upset because she claims she didn't have access to her lab work.  RN showed Pt where lab results were in her discharge paperwork.  However, Pt wanted the results to show up in her MyChart.  Writer explained that they didn't know how why that would occur, or how they could fix it for the Pt.

## 2023-04-28 ENCOUNTER — HOSPITAL ENCOUNTER (OUTPATIENT)
Dept: GENERAL RADIOLOGY | Facility: CLINIC | Age: 23
Discharge: HOME | End: 2023-04-28
Attending: INTERNAL MEDICINE | Admitting: INTERNAL MEDICINE
Payer: COMMERCIAL

## 2023-04-28 DIAGNOSIS — R63.4 LOSS OF WEIGHT: ICD-10-CM

## 2023-04-28 DIAGNOSIS — R11.2 NAUSEA WITH VOMITING: ICD-10-CM

## 2023-04-28 PROCEDURE — 250N000009 HC RX 250: Performed by: PHYSICIAN ASSISTANT

## 2023-04-28 PROCEDURE — 44500 INTRO GASTROINTESTINAL TUBE: CPT

## 2023-04-28 RX ORDER — LIDOCAINE HYDROCHLORIDE 20 MG/ML
JELLY TOPICAL ONCE
Status: COMPLETED | OUTPATIENT
Start: 2023-04-28 | End: 2023-04-28

## 2023-04-28 RX ADMIN — LIDOCAINE HYDROCHLORIDE 6 ML: 20 JELLY TOPICAL at 13:34

## 2023-04-28 NOTE — PROCEDURES
[facility]    Procedure: NJ tube placement    Date/Time: 4/28/2023 2:19 PM    Performed by: Aquilino Bush PA-C  Authorized by: Aquilino Bush PA-C      UNIVERSAL PROTOCOL   Site Marked: Yes  Prior Images Obtained and Reviewed:  Yes  Required items: Required blood products, implants, devices and special equipment available    Patient identity confirmed:  Verbally with patient  Patient was reevaluated immediately before administering moderate or deep sedation or anesthesia  Confirmation Checklist:  Patient's identity using two indicators, relevant allergies, procedure was appropriate and matched the consent or emergent situation and correct equipment/implants were available  Time out: Immediately prior to the procedure a time out was called    Universal Protocol: the Joint Commission Universal Protocol was followed    Preparation: Patient was prepped and draped in usual sterile fashion       ANESTHESIA    Local Anesthetic: Lidocaine 1% without epinephrine      SEDATION    Patient Sedated: No    See dictated procedure note for full details.    PROCEDURE  Describe Procedure: 10 Turkish pediatric tube placed since bulb on adult tube could not be passed by nasal turbinates.    Tip at ligament of trietz. Used glide wire to help advance to the right location.   Patient Tolerance:  Patient tolerated the procedure well with no immediate complications  Length of time physician/provider present for 1:1 monitoring during sedation: 0

## 2023-05-22 ENCOUNTER — MEDICAL CORRESPONDENCE (OUTPATIENT)
Dept: HEALTH INFORMATION MANAGEMENT | Facility: CLINIC | Age: 23
End: 2023-05-22
Payer: COMMERCIAL

## 2023-05-30 ENCOUNTER — APPOINTMENT (OUTPATIENT)
Dept: INTERVENTIONAL RADIOLOGY/VASCULAR | Facility: CLINIC | Age: 23
End: 2023-05-30
Attending: NURSE PRACTITIONER
Payer: COMMERCIAL

## 2023-05-30 ENCOUNTER — HOSPITAL ENCOUNTER (OUTPATIENT)
Facility: CLINIC | Age: 23
Discharge: HOME | End: 2023-05-30
Admitting: RADIOLOGY
Payer: COMMERCIAL

## 2023-05-30 VITALS
RESPIRATION RATE: 16 BRPM | HEART RATE: 97 BPM | TEMPERATURE: 97.9 F | SYSTOLIC BLOOD PRESSURE: 105 MMHG | DIASTOLIC BLOOD PRESSURE: 59 MMHG | OXYGEN SATURATION: 99 %

## 2023-05-30 DIAGNOSIS — E63.9 NUTRITIONAL DISORDER: ICD-10-CM

## 2023-05-30 DIAGNOSIS — R11.2 NAUSEA AND VOMITING IN ADULT: ICD-10-CM

## 2023-05-30 DIAGNOSIS — K59.09 CHRONIC CONSTIPATION: ICD-10-CM

## 2023-05-30 LAB — INR PPP: 1.04 (ref 0.85–1.15)

## 2023-05-30 PROCEDURE — 250N000011 HC RX IP 250 OP 636: Performed by: RADIOLOGY

## 2023-05-30 PROCEDURE — C1769 GUIDE WIRE: HCPCS

## 2023-05-30 PROCEDURE — 250N000009 HC RX 250: Performed by: PHYSICIAN ASSISTANT

## 2023-05-30 PROCEDURE — 999N000128 HC STATISTIC PERIPHERAL IV START W/O US GUIDANCE

## 2023-05-30 PROCEDURE — 272N000187 HC ACCESSORY CR11

## 2023-05-30 PROCEDURE — 250N000011 HC RX IP 250 OP 636: Performed by: PHYSICIAN ASSISTANT

## 2023-05-30 PROCEDURE — 36591 DRAW BLOOD OFF VENOUS DEVICE: CPT

## 2023-05-30 PROCEDURE — 49440 PLACE GASTROSTOMY TUBE PERC: CPT

## 2023-05-30 PROCEDURE — 85610 PROTHROMBIN TIME: CPT | Performed by: PHYSICIAN ASSISTANT

## 2023-05-30 PROCEDURE — 272N000116 HC CATH CR1

## 2023-05-30 PROCEDURE — 999N000163 HC STATISTIC SIMPLE TUBE INSERTION/CHARGE, PORT, CATH, FISTULOGRAM

## 2023-05-30 PROCEDURE — 36415 COLL VENOUS BLD VENIPUNCTURE: CPT | Performed by: PHYSICIAN ASSISTANT

## 2023-05-30 RX ORDER — HYDROCODONE BITARTRATE AND ACETAMINOPHEN 5; 325 MG/1; MG/1
1 TABLET ORAL ONCE
Status: DISCONTINUED | OUTPATIENT
Start: 2023-05-30 | End: 2023-05-30 | Stop reason: HOSPADM

## 2023-05-30 RX ORDER — FLUMAZENIL 0.1 MG/ML
0.2 INJECTION, SOLUTION INTRAVENOUS
Status: DISCONTINUED | OUTPATIENT
Start: 2023-05-30 | End: 2023-05-30 | Stop reason: HOSPADM

## 2023-05-30 RX ORDER — FENTANYL CITRATE 50 UG/ML
25-50 INJECTION, SOLUTION INTRAMUSCULAR; INTRAVENOUS EVERY 5 MIN PRN
Status: DISCONTINUED | OUTPATIENT
Start: 2023-05-30 | End: 2023-05-30 | Stop reason: HOSPADM

## 2023-05-30 RX ORDER — NALOXONE HYDROCHLORIDE 0.4 MG/ML
0.4 INJECTION, SOLUTION INTRAMUSCULAR; INTRAVENOUS; SUBCUTANEOUS
Status: DISCONTINUED | OUTPATIENT
Start: 2023-05-30 | End: 2023-05-30 | Stop reason: HOSPADM

## 2023-05-30 RX ORDER — NALOXONE HYDROCHLORIDE 0.4 MG/ML
0.2 INJECTION, SOLUTION INTRAMUSCULAR; INTRAVENOUS; SUBCUTANEOUS
Status: DISCONTINUED | OUTPATIENT
Start: 2023-05-30 | End: 2023-05-30 | Stop reason: HOSPADM

## 2023-05-30 RX ORDER — KETOROLAC TROMETHAMINE 30 MG/ML
30 INJECTION, SOLUTION INTRAMUSCULAR; INTRAVENOUS ONCE
Status: COMPLETED | OUTPATIENT
Start: 2023-05-30 | End: 2023-05-30

## 2023-05-30 RX ADMIN — MIDAZOLAM 0.5 MG: 1 INJECTION INTRAMUSCULAR; INTRAVENOUS at 15:28

## 2023-05-30 RX ADMIN — MIDAZOLAM 1 MG: 1 INJECTION INTRAMUSCULAR; INTRAVENOUS at 15:14

## 2023-05-30 RX ADMIN — MIDAZOLAM 0.5 MG: 1 INJECTION INTRAMUSCULAR; INTRAVENOUS at 15:23

## 2023-05-30 RX ADMIN — FENTANYL CITRATE 25 MCG: 50 INJECTION, SOLUTION INTRAMUSCULAR; INTRAVENOUS at 15:26

## 2023-05-30 RX ADMIN — KETOROLAC TROMETHAMINE 30 MG: 30 INJECTION, SOLUTION INTRAMUSCULAR at 16:21

## 2023-05-30 RX ADMIN — FENTANYL CITRATE 50 MCG: 50 INJECTION, SOLUTION INTRAMUSCULAR; INTRAVENOUS at 15:18

## 2023-05-30 RX ADMIN — LIDOCAINE HYDROCHLORIDE 11 ML: 10 INJECTION, SOLUTION INFILTRATION; PERINEURAL at 15:36

## 2023-05-30 RX ADMIN — FENTANYL CITRATE 25 MCG: 50 INJECTION, SOLUTION INTRAMUSCULAR; INTRAVENOUS at 15:30

## 2023-05-30 ASSESSMENT — ACTIVITIES OF DAILY LIVING (ADL)
ADLS_ACUITY_SCORE: 35

## 2023-05-30 NOTE — DISCHARGE INSTRUCTIONS
G-Tube Insertion (New) Discharge Instructions     After you go home:    You should not eat anything or use the tube for 6 hours  You may resume your normal diet after 6 hours  Have an adult stay with you for 6 hours if you received sedation       For 24 hours - due to the sedation you received:  Relax and take it easy  Do NOT make any important or legal decisions  Do NOT drive or operate machines at home or at work  Do NOT drink alcohol    Care of Insertion Site:    For the first 48 hrs, check your puncture site every couple hours while you are awake   You may remove/change the dressing tomorrow  You may shower tomorrow  No tub baths, whirlpools or swimming until your puncture site has fully healed     Activity     You may go back to normal activity in 24 hours   Wait 48 hours before lifting, straining, exercise or other strenuous activity    Medicines:    You may resume all medications  Resume your Warfarin/Coumadin at your regular dose today. Follow up with your provider to have your INR rechecked  Resume your Platelet Inhibitors and Aspirin tomorrow at your regular dose  For minor pain, you may take Acetaminophen (Tylenol) or Ibuprofen (Advil)                 Call the provider who ordered this procedure if:    Blood or fluid is draining from the site  The site is red, swollen, hot, tender or there is foul-smelling drainage  Chills or a fever greater than 101 F (38 C)  Increased pain at the site  Any questions or concerns    Call  911 or go to the Emergency Room if:    Severe pain or trouble breathing  Bleeding that you cannot control        If you have questions call:          [facility] Radiology Dept @ [phone]        The provider who performed your procedure was _________DR. Grande________.        Caring for your G-tube    G-tube is sutured in place. Sutures (button) should dissolve within 2 weeks & button will fall off. Gently pull back on the tube & slide the disc down until it is snug against  the skin, but too tight.    Please call us at [phone] if you need assistance or further clarification.    Tube Maintenance:    For ENFit Tubes:    Do NOT over tighten the connections (the purple end & hub connection).    Clean the connecting ends (especially the hub) every day.    If you are unable to disconnect the tubing ends, soak the connection in warm water (or wrap in a wet warm washcloth) to try and loosen the connection.    Possible problems with your tube may include:    Clogged with medications or feedings - most obstructions can be cleared with a small (3cc) syringe and warm water. This may be repeated until the tube is unclogged. This can be prevented by frequently flushing the tube with water (60cc) during the day and always after medications & feedings.    Tube pulls out or falls out -cover the opening with gauze & tape. Call [phone] for further instructions    Skin breakdown and/or yeast infections at the insertion site - use of skin barrier ointments and anti-fungal powders can treat most site irritations.  Ask your pharmacist or provider for assistance (a prescription is not necessary).    In general, tube problems (including pulled tubes) are NOT emergency situations. Unless the pulling out of a tube is accompanied by uncontrollable bleeding, please DO NOT GO TO THE EMERGENCY ROOM!  Call [phone] with problems.    Tube Care:    Change the gauze dressing every 24 hours and if soiled (dirty).  Stabilize all tubes securely by using gauze and tape.  Clean tube site with soap & water using a cotton applicator (Q-tip) as needed to prevent irritation.  Flush feeding tube with 60cc of warm or room temperature water before and after meds.  To prevent the tube from clogging, ask your provider or pharmacist if liquid forms of your medications are available. If not, crush the pills well & be sure to flush the tube before & after all medications.  Flush feeding tube a minimum of every 4 hours and  when feeding is completed with 60cc of water to keep the tube clear and avoid clogging.  Pt may use an abdominal (waist) binder to protect the G-tube.    If there is continued oozing or bleeding, redness, yellow/green/foul smelling drainage    STOP the feedings & use of the tube immediately if there is:    Continued oozing or bleeding at the site  Redness  Yellow/green or foul smelling drainage at the site  Uncontrolled stomach pain    Many of the supplies mentioned above can be purchased at your local pharmacy      For issues with your tube, please call:    [facility] Interventional Radiology Dept at [phone]

## 2023-05-30 NOTE — IR NOTE
Interventional Radiology Intra-procedural Nursing Note    Patient Name: Thea Castle  Medical Record Number: [id number]  Today's Date: May 30, 2023    Procedure: GJ Tube Placement  Start time: 1517  End time: 1540  Report provided to: Shanta WOMACK    Note: Patient entered Interventional Radiology Suite number 1 via cart. Patient awake, alert and orientated. Assisted onto procedural table in supine position. Prepped and draped.  Dr. Grande in room. Time out and procedure started. Vital signs stable. Telemetry reading NSR.    Procedure well tolerated by patient without complications. Procedure end with debrief by Dr. Grande.    Pt was unaffected by medication dosage by end of procedure, would advise consideration of increased dosages for future procedures.        Administered medication totals:  Lidocaine 1% 11 mL Intradermal  Versed 2 mg IVP  Fentanyl 100 mcg IVP    Last dose of sedation administered at 1530.

## 2023-05-30 NOTE — PRE-PROCEDURE
GENERAL PRE-PROCEDURE:   Procedure:  Gastrojejunostomy tube placement  Date/Time:  5/30/2023 3:14 PM    Written consent obtained?: Yes    Risks and benefits: Risks, benefits and alternatives were discussed    Consent given by:  Patient  Patient states understanding of procedure being performed: Yes    Patient's understanding of procedure matches consent: Yes    Procedure consent matches procedure scheduled: Yes    Expected level of sedation:  Moderate  Appropriately NPO:  Yes  ASA Class:  2  Mallampati  :  Grade 2- soft palate, base of uvula, tonsillar pillars, and portion of posterior pharyngeal wall visible  Lungs:  Lungs clear with good breath sounds bilaterally  Heart:  Normal heart sounds and rate  History & Physical reviewed:  History and physical reviewed and no updates needed  Statement of review:  I have reviewed the lab findings, diagnostic data, medications, and the plan for sedation

## 2023-05-30 NOTE — PROGRESS NOTES
Care Suites Admission Nursing Note    Patient Information  Name: Thea Castle  Age: 23 year old  Reason for admission: GJ Tube placement  Care Suites arrival time: 1230    Visitor Information  Name: NA     Patient Admission/Assessment   Pre-procedure assessment complete: Yes  If abnormal assessment/labs, provider notified: N/A  NPO: Yes  Medications held per instructions/orders: N/A  Consent: deferred  If applicable, pregnancy test status: deferred  Patient oriented to room: Yes  Education/questions answered: Yes  Plan/other: proceed as scheduled    Discharge Planning  Discharge name/phone number: [phone]  Overnight post sedation caregiver: mom  Discharge location: home    Nisreen Stout RN

## 2023-05-30 NOTE — PROGRESS NOTES
Care Suites Discharge Nursing Note    Patient Information  Name: Thea Castle  Age: 23 year old    Discharge Education:  Discharge instructions reviewed: Yes  Additional education/resources provided: n/a  Patient/patient representative verbalizes understanding: Yes  Patient discharging on new medications: No  Medication education completed: N/A    Discharge Plans:   Discharge location: home  Discharge ride contacted: Yes  Approximate discharge time: 1700    Discharge Criteria:  Discharge criteria met and vital signs stable: Yes    Patient Belongs:  Patient belongings returned to patient: Yes    Shanta Dennis RN

## 2023-05-31 ENCOUNTER — TELEPHONE (OUTPATIENT)
Dept: INTERVENTIONAL RADIOLOGY/VASCULAR | Facility: CLINIC | Age: 23
End: 2023-05-31
Payer: COMMERCIAL

## 2023-05-31 NOTE — TELEPHONE ENCOUNTER
"Contacted by patient with complaints of pain at new gastrostomy tube site and requests for pain medication prescription.    Patient is POD#1 from gastrostomy tube placement.  Patient states site is painful, andshe wants to know how much tylenol she can take in 1 day.  I informed the patient that 4000 mg is the daily maximum dose of acetaminophen.    I informed patient that discomfort is to be expected 1 day post placement of this type of gastrostomy tube.  I advised patient that she can continue to take her chosen OTC pain medications (she stated that she can tolerate both tylenol and ibuprofen) per instructions on the medication bottle and alternating each medication.  I also recommended interval ice pack to abdomen to help in decreasing inflammation and aiding in comfort.      Patient informed me that she has instructions to instill into gastrostomy tube \"20 ml...  But I had to stop because it hurt.\"      I have provided Thea with [facility] contact number to follow up with IR team/providers for further evaluation.    Jada GAY  Interventional Radiology RN   [phone]        "

## 2023-05-31 NOTE — TELEPHONE ENCOUNTER
POST CALL    Spoke with: Thea Johnson  Call attempt: 1  Any pain: No  Any fever: No  Any redness/swelling/ abnormal drainage around puncture site: No  Were you instructed well enough to take care of yourself at home: Yes  Are you satisfied with the care you received: Yes  Any additional concerns or questions: No  IR nurse triage line number provided: Yes    Post call completed.   May 31, 2023 10:46 AM  Gavi Perez RN

## 2023-06-01 ENCOUNTER — TELEPHONE (OUTPATIENT)
Dept: INTERVENTIONAL RADIOLOGY/VASCULAR | Facility: CLINIC | Age: 23
End: 2023-06-01
Payer: COMMERCIAL

## 2023-06-01 NOTE — TELEPHONE ENCOUNTER
Interventional Radiology - Progress Note  Inpatient - [facility]  6/1/2023    IR Brief Note    Patient called with question about her GJ tube placed 5/30/23. Patient has no pain or leaking at insertion site. States when she flushes G port she gets some back flow out of the J port and vice-versa. She has had no problems with running TFs through pump. Reassured patient this is not alarming and to continue flushing tube. Advised patient to contact her home health team to come assess the tube. GJ tube exchange is inadvisable within first 4-6 weeks of placement. Patient verbalized understanding of these instructions.     Cayetano Manrique PA-C  Interventional Radiology  [phone] (IR)  [phone] ([facility] Office)

## 2023-06-04 ENCOUNTER — OFFICE VISIT (OUTPATIENT)
Dept: URGENT CARE | Facility: URGENT CARE | Age: 23
End: 2023-06-04
Payer: COMMERCIAL

## 2023-06-04 VITALS
DIASTOLIC BLOOD PRESSURE: 83 MMHG | SYSTOLIC BLOOD PRESSURE: 127 MMHG | HEART RATE: 126 BPM | OXYGEN SATURATION: 98 % | BODY MASS INDEX: 23.63 KG/M2 | TEMPERATURE: 98.7 F | WEIGHT: 133.4 LBS

## 2023-06-04 DIAGNOSIS — K62.5 RECTAL BLEEDING: Primary | ICD-10-CM

## 2023-06-04 PROCEDURE — 99215 OFFICE O/P EST HI 40 MIN: CPT | Performed by: FAMILY MEDICINE

## 2023-06-04 NOTE — PROGRESS NOTES
Assessment & Plan     Rectal bleeding  23-year-old female presented with rectal bleeding along with lower abdominal pain which started 2 days ago, bleeding bright red-colored, associated with lower abdominal pain, lightheadedness and sweating.  Past medical history significant for gastroparesis, G tube in situ, depression, anxiety, borderline personality disorder and obsessive-compulsive disorder.  Physical examination remarkable for tachycardia.  Differentials discussed in detail including but not limited to gastroenteritis, inflammatory bowel disease, internal hemorrhoids, colon polyp and psychological etiology.  Needs comprehensive evaluation to delineate specific diagnosis.  Recommended to go ER for further evaluation and management.  Patient transferred to ER by ambulance.  All questions answered.      Anil Powell MD  [facility]   Thae is a 23 year old, presenting for the following health issues:  Rectal Problem and Abdominal Pain (Since yesterday. )         View : No data to display.              HPI   Bleeding and sweating. Sx started yesterday. Dime/quarter sized blood clots from rectum. No bowel movements for the last couple days.     Concern -   Onset: 2 days   Description: rectal bleeding, lower abdomina pain and lightheaded, was sweating earlier  Intensity: moderate  Progression of Symptoms:  same  Previous history of similar problem: h/o gastroparesis   Therapies tried and outcome: none        Review of Systems   Constitutional, HEENT, cardiovascular, pulmonary, GI, , musculoskeletal, neuro, skin, endocrine and psych systems are negative, except as otherwise noted.      Objective    /83 (BP Location: Right arm, Cuff Size: Adult Regular)   Pulse (!) 126   Temp 98.7  F (37.1  C) (Tympanic)   Wt 60.5 kg (133 lb 6.4 oz)   SpO2 98%   BMI 23.63 kg/m    Body mass index is 23.63 kg/m .  Physical Exam   GENERAL: alert and no distress  EYES: Eyes  grossly normal to inspection, PERRL and conjunctivae and sclerae normal  HENT: normal cephalic/atraumatic, nose and mouth without ulcers or lesions, oropharynx clear and oral mucous membranes moist  NECK: no adenopathy, no asymmetry, masses, or scars and thyroid normal to palpation  RESP: lungs clear to auscultation - no rales, rhonchi or wheezes  CV: regular rate and rhythm, normal S1 S2, no S3 or S4, no murmur, click or rub, no peripheral edema and peripheral pulses strong  ABDOMEN: Soft, moderate tender lower abdomen, G-tube in situ, no guarding or rigidity noted  MS: no gross musculoskeletal defects noted, no edema  NEURO: Grossly intact

## 2023-06-26 ENCOUNTER — HOSPITAL ENCOUNTER (EMERGENCY)
Facility: CLINIC | Age: 23
Discharge: HOME | End: 2023-06-26
Attending: EMERGENCY MEDICINE | Admitting: EMERGENCY MEDICINE
Payer: COMMERCIAL

## 2023-06-26 VITALS
TEMPERATURE: 98 F | BODY MASS INDEX: 21.68 KG/M2 | RESPIRATION RATE: 18 BRPM | OXYGEN SATURATION: 99 % | WEIGHT: 127 LBS | SYSTOLIC BLOOD PRESSURE: 117 MMHG | DIASTOLIC BLOOD PRESSURE: 80 MMHG | HEART RATE: 97 BPM | HEIGHT: 64 IN
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
## 2023-06-26 DIAGNOSIS — R56.9 SEIZURE-LIKE ACTIVITY (H): ICD-10-CM

## 2023-06-26 LAB
ALBUMIN SERPL BCG-MCNC: 4.2 G/DL (ref 3.5–5.2)
ALBUMIN UR-MCNC: NEGATIVE MG/DL
ALP SERPL-CCNC: 79 U/L (ref 35–104)
ALT SERPL W P-5'-P-CCNC: 15 U/L (ref 0–50)
ANION GAP SERPL CALCULATED.3IONS-SCNC: 14 MMOL/L (ref 7–15)
APPEARANCE UR: CLEAR
AST SERPL W P-5'-P-CCNC: 25 U/L (ref 0–45)
ATRIAL RATE - MUSE: 96 BPM
BACTERIA #/AREA URNS HPF: ABNORMAL /HPF
BASOPHILS # BLD AUTO: 0 10E3/UL (ref 0–0.2)
BASOPHILS NFR BLD AUTO: 1 %
BILIRUB SERPL-MCNC: 0.4 MG/DL
BILIRUB UR QL STRIP: NEGATIVE
BUN SERPL-MCNC: 7.8 MG/DL (ref 6–20)
CALCIUM SERPL-MCNC: 9.3 MG/DL (ref 8.6–10)
CHLORIDE SERPL-SCNC: 103 MMOL/L (ref 98–107)
COLOR UR AUTO: ABNORMAL
CREAT SERPL-MCNC: 0.78 MG/DL (ref 0.51–0.95)
DEPRECATED HCO3 PLAS-SCNC: 22 MMOL/L (ref 22–29)
DIASTOLIC BLOOD PRESSURE - MUSE: NORMAL MMHG
EOSINOPHIL # BLD AUTO: 0.1 10E3/UL (ref 0–0.7)
EOSINOPHIL NFR BLD AUTO: 1 %
ERYTHROCYTE [DISTWIDTH] IN BLOOD BY AUTOMATED COUNT: 13.2 % (ref 10–15)
GFR SERPL CREATININE-BSD FRML MDRD: >90 ML/MIN/1.73M2
GLUCOSE SERPL-MCNC: 89 MG/DL (ref 70–99)
GLUCOSE UR STRIP-MCNC: NEGATIVE MG/DL
HCT VFR BLD AUTO: 33.6 % (ref 35–47)
HGB BLD-MCNC: 10.9 G/DL (ref 11.7–15.7)
HGB UR QL STRIP: NEGATIVE
HYALINE CASTS: 3 /LPF
IMM GRANULOCYTES # BLD: 0 10E3/UL
IMM GRANULOCYTES NFR BLD: 0 %
INTERPRETATION ECG - MUSE: NORMAL
KETONES UR STRIP-MCNC: NEGATIVE MG/DL
LEUKOCYTE ESTERASE UR QL STRIP: NEGATIVE
LYMPHOCYTES # BLD AUTO: 2.2 10E3/UL (ref 0.8–5.3)
LYMPHOCYTES NFR BLD AUTO: 26 %
MCH RBC QN AUTO: 29.3 PG (ref 26.5–33)
MCHC RBC AUTO-ENTMCNC: 32.4 G/DL (ref 31.5–36.5)
MCV RBC AUTO: 90 FL (ref 78–100)
MONOCYTES # BLD AUTO: 0.4 10E3/UL (ref 0–1.3)
MONOCYTES NFR BLD AUTO: 5 %
MUCOUS THREADS #/AREA URNS LPF: PRESENT /LPF
NEUTROPHILS # BLD AUTO: 5.6 10E3/UL (ref 1.6–8.3)
NEUTROPHILS NFR BLD AUTO: 67 %
NITRATE UR QL: NEGATIVE
NRBC # BLD AUTO: 0 10E3/UL
NRBC BLD AUTO-RTO: 0 /100
P AXIS - MUSE: 73 DEGREES
PH UR STRIP: 6.5 [PH] (ref 5–7)
PLATELET # BLD AUTO: 275 10E3/UL (ref 150–450)
POTASSIUM SERPL-SCNC: 3.7 MMOL/L (ref 3.4–5.3)
PR INTERVAL - MUSE: 126 MS
PROT SERPL-MCNC: 6.9 G/DL (ref 6.4–8.3)
QRS DURATION - MUSE: 88 MS
QT - MUSE: 354 MS
QTC - MUSE: 447 MS
R AXIS - MUSE: 72 DEGREES
RBC # BLD AUTO: 3.72 10E6/UL (ref 3.8–5.2)
RBC URINE: 0 /HPF
SODIUM SERPL-SCNC: 139 MMOL/L (ref 136–145)
SP GR UR STRIP: 1.01 (ref 1–1.03)
SYSTOLIC BLOOD PRESSURE - MUSE: NORMAL MMHG
T AXIS - MUSE: 45 DEGREES
TRANSITIONAL EPI: <1 /HPF
UROBILINOGEN UR STRIP-MCNC: NORMAL MG/DL
VENTRICULAR RATE- MUSE: 96 BPM
WBC # BLD AUTO: 8.4 10E3/UL (ref 4–11)
WBC URINE: 2 /HPF

## 2023-06-26 PROCEDURE — 93010 ELECTROCARDIOGRAM REPORT: CPT | Performed by: EMERGENCY MEDICINE

## 2023-06-26 PROCEDURE — 99284 EMERGENCY DEPT VISIT MOD MDM: CPT | Mod: 25 | Performed by: EMERGENCY MEDICINE

## 2023-06-26 PROCEDURE — 85025 COMPLETE CBC W/AUTO DIFF WBC: CPT | Performed by: NURSE PRACTITIONER

## 2023-06-26 PROCEDURE — 250N000011 HC RX IP 250 OP 636: Mod: JZ | Performed by: NURSE PRACTITIONER

## 2023-06-26 PROCEDURE — 96361 HYDRATE IV INFUSION ADD-ON: CPT | Performed by: EMERGENCY MEDICINE

## 2023-06-26 PROCEDURE — 36415 COLL VENOUS BLD VENIPUNCTURE: CPT | Performed by: NURSE PRACTITIONER

## 2023-06-26 PROCEDURE — 93005 ELECTROCARDIOGRAM TRACING: CPT | Performed by: EMERGENCY MEDICINE

## 2023-06-26 PROCEDURE — 999N000127 HC STATISTIC PERIPHERAL IV START W US GUIDANCE

## 2023-06-26 PROCEDURE — 96374 THER/PROPH/DIAG INJ IV PUSH: CPT | Performed by: EMERGENCY MEDICINE

## 2023-06-26 PROCEDURE — 81001 URINALYSIS AUTO W/SCOPE: CPT | Performed by: NURSE PRACTITIONER

## 2023-06-26 PROCEDURE — 258N000003 HC RX IP 258 OP 636: Performed by: NURSE PRACTITIONER

## 2023-06-26 PROCEDURE — 80053 COMPREHEN METABOLIC PANEL: CPT | Performed by: NURSE PRACTITIONER

## 2023-06-26 RX ORDER — ONDANSETRON 2 MG/ML
4 INJECTION INTRAMUSCULAR; INTRAVENOUS ONCE
Status: COMPLETED | OUTPATIENT
Start: 2023-06-26 | End: 2023-06-26

## 2023-06-26 RX ADMIN — ONDANSETRON 4 MG: 2 INJECTION INTRAMUSCULAR; INTRAVENOUS at 19:34

## 2023-06-26 RX ADMIN — SODIUM CHLORIDE, POTASSIUM CHLORIDE, SODIUM LACTATE AND CALCIUM CHLORIDE 1000 ML: 600; 310; 30; 20 INJECTION, SOLUTION INTRAVENOUS at 19:37

## 2023-06-26 ASSESSMENT — ACTIVITIES OF DAILY LIVING (ADL)
ADLS_ACUITY_SCORE: 35
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
## 2023-06-26 NOTE — LETTER
Transition Communication Hand-off for Care Transitions to Next Level of Care Provider    Name: Thea Castle  : 2000  MRN #: [id number]  Primary Care Provider: Bridgette Foss     Primary Clinic: [facility] [place] [address]     Reason for Hospitalization:  No admission diagnoses are documented for this encounter.  Admit Date/Time: 2023  4:00 PM  Discharge Date: 23  Payor Source: Payor: [employer] PPO / Plan: [employer] / Product Type: PPO /          Reason for Communication Hand-off Referral: No support or lacking a support system  Other Challenging psychosocial situation    Discharge Plan: Discharge to friend     Concern for non-adherence with plan of care:   Y/N N  Discharge Needs Assessment: Clinic       Already enrolled in Tele-monitoring program and name of program:  N/A  Follow-up specialty is recommended: N/A  Follow-up plan:  No future appointments.    Any outstanding tests or procedures:              Key Recommendations:      Pt would greatly benefit from having ongoing care coordination from a clinic  who would be able to help her navigate her new psychosocial situation. Would you be able to make a referral for a clinic  to establish a relationship and provide support if your clinic has such a program?    MARILUZ BhagatSW
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
## 2023-06-27 NOTE — DISCHARGE INSTRUCTIONS
TODAY'S VISIT:  You were seen today for concern for seizures.  - you had labs, IV fluids, and met with social work who provided you with additional resources while you were here.  - If you had any labs or imaging/radiology tests performed today, you should also discuss these tests with your usual provider.     FOLLOW-UP:  Please make an appointment to follow up with:  - Your Primary Care Provider. If you do not have a PCP, please call the Primary Care Center (phone: [phone]) for an appointment.  Referral for urgent establishment of care with a primary care provider so you may also establish a relationship with outpatient  and also ensure you are able to get your supplies for tube feeding and catheterizing so you can care for yourself at home.    - Have your provider review the results from today's visit with you again to make sure no further follow-up or additional testing is needed based on those results.     PRESCRIPTIONS / MEDICATIONS:  - Continue taking your medications as prescribed.  Follow-up with your prescriber for concerns.    OTHER INSTRUCTIONS:  -It is the law that you avoid driving for 6 months after a seizure event, I recommend you follow this for your safety as well as the safety of others on the road.    RETURN TO THE EMERGENCY DEPARTMENT  Return to the Emergency Department at any time for any new or worsening symptoms or any concerns.

## 2023-06-29 ENCOUNTER — APPOINTMENT (OUTPATIENT)
Dept: RADIOLOGY | Facility: HOSPITAL | Age: 23
End: 2023-06-29
Attending: FAMILY MEDICINE
Payer: COMMERCIAL

## 2023-06-29 ENCOUNTER — HOSPITAL ENCOUNTER (EMERGENCY)
Facility: HOSPITAL | Age: 23
Discharge: HOME | End: 2023-06-29
Attending: FAMILY MEDICINE | Admitting: FAMILY MEDICINE
Payer: COMMERCIAL

## 2023-06-29 VITALS
DIASTOLIC BLOOD PRESSURE: 81 MMHG | OXYGEN SATURATION: 99 % | RESPIRATION RATE: 18 BRPM | TEMPERATURE: 98.1 F | HEART RATE: 105 BPM | SYSTOLIC BLOOD PRESSURE: 128 MMHG

## 2023-06-29 DIAGNOSIS — Z01.89 ENCOUNTER FOR IMAGING STUDY TO CONFIRM GASTROJEJUNAL (GJ) TUBE PLACEMENT: ICD-10-CM

## 2023-06-29 DIAGNOSIS — R56.9 SEIZURE-LIKE ACTIVITY (H): ICD-10-CM

## 2023-06-29 DIAGNOSIS — R33.9 URINARY RETENTION: ICD-10-CM

## 2023-06-29 LAB
ALBUMIN SERPL BCG-MCNC: 3.9 G/DL (ref 3.5–5.2)
ALBUMIN UR-MCNC: NEGATIVE MG/DL
ALP SERPL-CCNC: 72 U/L (ref 35–104)
ALT SERPL W P-5'-P-CCNC: 16 U/L (ref 0–50)
AMPHETAMINES UR QL SCN: ABNORMAL
ANION GAP SERPL CALCULATED.3IONS-SCNC: 10 MMOL/L (ref 7–15)
APPEARANCE UR: CLEAR
AST SERPL W P-5'-P-CCNC: 24 U/L (ref 0–45)
BARBITURATES UR QL SCN: ABNORMAL
BASOPHILS # BLD AUTO: 0 10E3/UL (ref 0–0.2)
BASOPHILS NFR BLD AUTO: 1 %
BENZODIAZ UR QL SCN: ABNORMAL
BILIRUB DIRECT SERPL-MCNC: <0.2 MG/DL (ref 0–0.3)
BILIRUB SERPL-MCNC: <0.2 MG/DL
BILIRUB UR QL STRIP: NEGATIVE
BUN SERPL-MCNC: 12 MG/DL (ref 6–20)
BZE UR QL SCN: ABNORMAL
CALCIUM SERPL-MCNC: 9 MG/DL (ref 8.6–10)
CANNABINOIDS UR QL SCN: ABNORMAL
CHLORIDE SERPL-SCNC: 107 MMOL/L (ref 98–107)
COLOR UR AUTO: COLORLESS
CREAT SERPL-MCNC: 0.63 MG/DL (ref 0.51–0.95)
DEPRECATED HCO3 PLAS-SCNC: 23 MMOL/L (ref 22–29)
EOSINOPHIL # BLD AUTO: 0.1 10E3/UL (ref 0–0.7)
EOSINOPHIL NFR BLD AUTO: 1 %
ERYTHROCYTE [DISTWIDTH] IN BLOOD BY AUTOMATED COUNT: 13.4 % (ref 10–15)
GFR SERPL CREATININE-BSD FRML MDRD: >90 ML/MIN/1.73M2
GLUCOSE SERPL-MCNC: 95 MG/DL (ref 70–99)
GLUCOSE UR STRIP-MCNC: NEGATIVE MG/DL
HCT VFR BLD AUTO: 31.9 % (ref 35–47)
HGB BLD-MCNC: 10.5 G/DL (ref 11.7–15.7)
HGB UR QL STRIP: NEGATIVE
HOLD SPECIMEN: NORMAL
IMM GRANULOCYTES # BLD: 0 10E3/UL
IMM GRANULOCYTES NFR BLD: 0 %
KETONES UR STRIP-MCNC: NEGATIVE MG/DL
LACTATE SERPL-SCNC: 1.6 MMOL/L (ref 0.7–2)
LEUKOCYTE ESTERASE UR QL STRIP: NEGATIVE
LYMPHOCYTES # BLD AUTO: 1.4 10E3/UL (ref 0.8–5.3)
LYMPHOCYTES NFR BLD AUTO: 25 %
MAGNESIUM SERPL-MCNC: 1.7 MG/DL (ref 1.7–2.3)
MCH RBC QN AUTO: 29.3 PG (ref 26.5–33)
MCHC RBC AUTO-ENTMCNC: 32.9 G/DL (ref 31.5–36.5)
MCV RBC AUTO: 89 FL (ref 78–100)
MONOCYTES # BLD AUTO: 0.3 10E3/UL (ref 0–1.3)
MONOCYTES NFR BLD AUTO: 6 %
MUCOUS THREADS #/AREA URNS LPF: PRESENT /LPF
NEUTROPHILS # BLD AUTO: 3.8 10E3/UL (ref 1.6–8.3)
NEUTROPHILS NFR BLD AUTO: 67 %
NITRATE UR QL: NEGATIVE
NRBC # BLD AUTO: 0 10E3/UL
NRBC BLD AUTO-RTO: 0 /100
OPIATES UR QL SCN: ABNORMAL
PCP QUAL URINE (ROCHE): ABNORMAL
PH UR STRIP: 5.5 [PH] (ref 5–7)
PLATELET # BLD AUTO: 249 10E3/UL (ref 150–450)
POTASSIUM SERPL-SCNC: 4 MMOL/L (ref 3.4–5.3)
PROLACTIN SERPL 3RD IS-MCNC: 6 NG/ML (ref 5–23)
PROT SERPL-MCNC: 6.5 G/DL (ref 6.4–8.3)
RBC # BLD AUTO: 3.58 10E6/UL (ref 3.8–5.2)
RBC URINE: 0 /HPF
SODIUM SERPL-SCNC: 140 MMOL/L (ref 136–145)
SP GR UR STRIP: 1.01 (ref 1–1.03)
UROBILINOGEN UR STRIP-MCNC: <2 MG/DL
WBC # BLD AUTO: 5.6 10E3/UL (ref 4–11)
WBC URINE: 1 /HPF

## 2023-06-29 PROCEDURE — 82248 BILIRUBIN DIRECT: CPT | Performed by: FAMILY MEDICINE

## 2023-06-29 PROCEDURE — 250N000009 HC RX 250: Performed by: FAMILY MEDICINE

## 2023-06-29 PROCEDURE — 250N000011 HC RX IP 250 OP 636: Performed by: FAMILY MEDICINE

## 2023-06-29 PROCEDURE — 83735 ASSAY OF MAGNESIUM: CPT | Performed by: FAMILY MEDICINE

## 2023-06-29 PROCEDURE — 74018 RADEX ABDOMEN 1 VIEW: CPT

## 2023-06-29 PROCEDURE — 81001 URINALYSIS AUTO W/SCOPE: CPT | Mod: XU | Performed by: FAMILY MEDICINE

## 2023-06-29 PROCEDURE — 80307 DRUG TEST PRSMV CHEM ANLYZR: CPT | Performed by: FAMILY MEDICINE

## 2023-06-29 PROCEDURE — 36415 COLL VENOUS BLD VENIPUNCTURE: CPT | Performed by: FAMILY MEDICINE

## 2023-06-29 PROCEDURE — 96366 THER/PROPH/DIAG IV INF ADDON: CPT

## 2023-06-29 PROCEDURE — 93005 ELECTROCARDIOGRAM TRACING: CPT | Mod: RTG

## 2023-06-29 PROCEDURE — 82310 ASSAY OF CALCIUM: CPT | Performed by: FAMILY MEDICINE

## 2023-06-29 PROCEDURE — 84146 ASSAY OF PROLACTIN: CPT | Performed by: FAMILY MEDICINE

## 2023-06-29 PROCEDURE — 258N000003 HC RX IP 258 OP 636: Performed by: FAMILY MEDICINE

## 2023-06-29 PROCEDURE — 99285 EMERGENCY DEPT VISIT HI MDM: CPT | Mod: 25

## 2023-06-29 PROCEDURE — 83605 ASSAY OF LACTIC ACID: CPT | Performed by: FAMILY MEDICINE

## 2023-06-29 PROCEDURE — 71045 X-RAY EXAM CHEST 1 VIEW: CPT

## 2023-06-29 PROCEDURE — 85025 COMPLETE CBC W/AUTO DIFF WBC: CPT | Performed by: FAMILY MEDICINE

## 2023-06-29 PROCEDURE — 96365 THER/PROPH/DIAG IV INF INIT: CPT

## 2023-06-29 RX ORDER — DIAZEPAM 10 MG/2G
10 GEL RECTAL EVERY 10 MIN PRN
Qty: 1 EACH | Refills: 0 | Status: SHIPPED | OUTPATIENT
Start: 2023-06-29

## 2023-06-29 RX ADMIN — FOLIC ACID: 5 INJECTION, SOLUTION INTRAMUSCULAR; INTRAVENOUS; SUBCUTANEOUS at 14:45

## 2023-06-29 ASSESSMENT — ENCOUNTER SYMPTOMS
FEVER: 0
COUGH: 0
SEIZURES: 1
VOMITING: 1
DIARRHEA: 0

## 2023-06-29 ASSESSMENT — ACTIVITIES OF DAILY LIVING (ADL)
ADLS_ACUITY_SCORE: 35
ADLS_ACUITY_SCORE: 35

## 2023-06-29 NOTE — ED NOTES
Patient brought to [facility] and laid on floor, security advised patient to get up off the floor.  Patient is currently on phone and awaiting her cab ride.
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
## 2023-06-29 NOTE — ED NOTES
Catheter in right arm also removed per MD and [facility].  Alycia was called to return patient back to [facility] in [place] which is where she requested to go.  Patient is upset that we are not setting up a neurology appt for her due to her having 5 seizures.  Advised patient that this is something that needs follow up outpatient.  Patient then upset that ED MD was not sending her home with oral medications.  Again advised patient to follow up outpatient.
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
## 2023-06-29 NOTE — DISCHARGE INSTRUCTIONS
Continue your current medications.    Do not drive, climb ladders, bicycle, swim, or participate in other activities that would put you at danger if you were to fall or have decreased responsiveness.  You may resume these activities when you are cleared by your neurologist or when you have no seizures for 6 months.    The neurology clinic will call you to schedule a follow-up appointment.
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
## 2023-07-03 ENCOUNTER — HOSPITAL ENCOUNTER (EMERGENCY)
Facility: CLINIC | Age: 23
Discharge: HOME | End: 2023-07-04
Attending: EMERGENCY MEDICINE | Admitting: EMERGENCY MEDICINE
Payer: COMMERCIAL

## 2023-07-03 DIAGNOSIS — R56.9 SEIZURE-LIKE ACTIVITY (H): ICD-10-CM

## 2023-07-03 LAB
ATRIAL RATE - MUSE: 94 BPM
DIASTOLIC BLOOD PRESSURE - MUSE: NORMAL MMHG
INTERPRETATION ECG - MUSE: NORMAL
P AXIS - MUSE: 68 DEGREES
PR INTERVAL - MUSE: 150 MS
QRS DURATION - MUSE: 90 MS
QT - MUSE: 342 MS
QTC - MUSE: 427 MS
R AXIS - MUSE: 51 DEGREES
SYSTOLIC BLOOD PRESSURE - MUSE: NORMAL MMHG
T AXIS - MUSE: 42 DEGREES
VENTRICULAR RATE- MUSE: 94 BPM

## 2023-07-03 PROCEDURE — 36415 COLL VENOUS BLD VENIPUNCTURE: CPT | Performed by: EMERGENCY MEDICINE

## 2023-07-03 PROCEDURE — 96360 HYDRATION IV INFUSION INIT: CPT

## 2023-07-03 PROCEDURE — 85025 COMPLETE CBC W/AUTO DIFF WBC: CPT | Performed by: EMERGENCY MEDICINE

## 2023-07-03 PROCEDURE — 93005 ELECTROCARDIOGRAM TRACING: CPT

## 2023-07-03 PROCEDURE — 84146 ASSAY OF PROLACTIN: CPT | Performed by: EMERGENCY MEDICINE

## 2023-07-03 PROCEDURE — 82077 ASSAY SPEC XCP UR&BREATH IA: CPT | Performed by: EMERGENCY MEDICINE

## 2023-07-03 PROCEDURE — 99284 EMERGENCY DEPT VISIT MOD MDM: CPT | Mod: 25

## 2023-07-03 PROCEDURE — 80053 COMPREHEN METABOLIC PANEL: CPT | Performed by: EMERGENCY MEDICINE

## 2023-07-04 ENCOUNTER — APPOINTMENT (OUTPATIENT)
Dept: GENERAL RADIOLOGY | Facility: CLINIC | Age: 23
End: 2023-07-04
Attending: INTERNAL MEDICINE
Payer: COMMERCIAL

## 2023-07-04 ENCOUNTER — HOSPITAL ENCOUNTER (OUTPATIENT)
Facility: CLINIC | Age: 23
Setting detail: OBSERVATION
Discharge: HOME | End: 2023-07-06
Attending: EMERGENCY MEDICINE | Admitting: EMERGENCY MEDICINE
Payer: COMMERCIAL

## 2023-07-04 ENCOUNTER — APPOINTMENT (OUTPATIENT)
Dept: CT IMAGING | Facility: CLINIC | Age: 23
End: 2023-07-04
Attending: EMERGENCY MEDICINE
Payer: COMMERCIAL

## 2023-07-04 ENCOUNTER — HOSPITAL ENCOUNTER (OUTPATIENT)
Dept: NEUROLOGY | Facility: CLINIC | Age: 23
Setting detail: OBSERVATION
Discharge: HOME | End: 2023-07-04
Attending: PSYCHIATRY & NEUROLOGY
Payer: COMMERCIAL

## 2023-07-04 VITALS
RESPIRATION RATE: 21 BRPM | HEART RATE: 112 BPM | OXYGEN SATURATION: 100 % | TEMPERATURE: 98 F | SYSTOLIC BLOOD PRESSURE: 110 MMHG | DIASTOLIC BLOOD PRESSURE: 72 MMHG

## 2023-07-04 DIAGNOSIS — R33.9 URINARY RETENTION: ICD-10-CM

## 2023-07-04 DIAGNOSIS — R56.9 OBSERVED SEIZURE-LIKE ACTIVITY (H): ICD-10-CM

## 2023-07-04 DIAGNOSIS — F50.89 OTHER SPECIFIED EATING DISORDER: Primary | ICD-10-CM

## 2023-07-04 LAB
ALBUMIN SERPL BCG-MCNC: 4 G/DL (ref 3.5–5.2)
ALBUMIN UR-MCNC: NEGATIVE MG/DL
ALP SERPL-CCNC: 72 U/L (ref 35–104)
ALT SERPL W P-5'-P-CCNC: 18 U/L (ref 0–50)
ANION GAP SERPL CALCULATED.3IONS-SCNC: 13 MMOL/L (ref 7–15)
ANION GAP SERPL CALCULATED.3IONS-SCNC: 21 MMOL/L (ref 7–15)
APAP SERPL-MCNC: <5 UG/ML (ref 10–30)
APPEARANCE UR: CLEAR
AST SERPL W P-5'-P-CCNC: 21 U/L (ref 0–45)
BASOPHILS # BLD AUTO: 0 10E3/UL (ref 0–0.2)
BASOPHILS NFR BLD AUTO: 1 %
BILIRUB SERPL-MCNC: <0.2 MG/DL
BILIRUB UR QL STRIP: NEGATIVE
BUN SERPL-MCNC: 11.5 MG/DL (ref 6–20)
BUN SERPL-MCNC: 13.8 MG/DL (ref 6–20)
CALCIUM SERPL-MCNC: 9 MG/DL (ref 8.6–10)
CALCIUM SERPL-MCNC: 9.1 MG/DL (ref 8.6–10)
CHLORIDE SERPL-SCNC: 103 MMOL/L (ref 98–107)
CHLORIDE SERPL-SCNC: 105 MMOL/L (ref 98–107)
COLOR UR AUTO: NORMAL
CREAT SERPL-MCNC: 0.69 MG/DL (ref 0.51–0.95)
CREAT SERPL-MCNC: 0.74 MG/DL (ref 0.51–0.95)
DEPRECATED HCO3 PLAS-SCNC: 16 MMOL/L (ref 22–29)
DEPRECATED HCO3 PLAS-SCNC: 23 MMOL/L (ref 22–29)
EOSINOPHIL # BLD AUTO: 0.2 10E3/UL (ref 0–0.7)
EOSINOPHIL NFR BLD AUTO: 2 %
ERYTHROCYTE [DISTWIDTH] IN BLOOD BY AUTOMATED COUNT: 13.7 % (ref 10–15)
ETHANOL SERPL-MCNC: <0.01 G/DL
ETHANOL SERPL-MCNC: <0.01 G/DL
GFR SERPL CREATININE-BSD FRML MDRD: >90 ML/MIN/1.73M2
GFR SERPL CREATININE-BSD FRML MDRD: >90 ML/MIN/1.73M2
GLUCOSE BLDC GLUCOMTR-MCNC: 91 MG/DL (ref 70–99)
GLUCOSE SERPL-MCNC: 105 MG/DL (ref 70–99)
GLUCOSE SERPL-MCNC: 107 MG/DL (ref 70–99)
GLUCOSE UR STRIP-MCNC: NEGATIVE MG/DL
HCG UR QL: NEGATIVE
HCT VFR BLD AUTO: 32.4 % (ref 35–47)
HGB BLD-MCNC: 10.8 G/DL (ref 11.7–15.7)
HGB UR QL STRIP: NEGATIVE
HOLD SPECIMEN: NORMAL
IMM GRANULOCYTES # BLD: 0 10E3/UL
IMM GRANULOCYTES NFR BLD: 0 %
KETONES UR STRIP-MCNC: NEGATIVE MG/DL
LACTATE SERPL-SCNC: 1.1 MMOL/L (ref 0.7–2)
LACTATE SERPL-SCNC: 7.1 MMOL/L (ref 0.7–2)
LEUKOCYTE ESTERASE UR QL STRIP: NEGATIVE
LYMPHOCYTES # BLD AUTO: 2.4 10E3/UL (ref 0.8–5.3)
LYMPHOCYTES NFR BLD AUTO: 28 %
MCH RBC QN AUTO: 29.8 PG (ref 26.5–33)
MCHC RBC AUTO-ENTMCNC: 33.3 G/DL (ref 31.5–36.5)
MCV RBC AUTO: 90 FL (ref 78–100)
MONOCYTES # BLD AUTO: 0.7 10E3/UL (ref 0–1.3)
MONOCYTES NFR BLD AUTO: 8 %
NEUTROPHILS # BLD AUTO: 5.2 10E3/UL (ref 1.6–8.3)
NEUTROPHILS NFR BLD AUTO: 61 %
NITRATE UR QL: NEGATIVE
NRBC # BLD AUTO: 0 10E3/UL
NRBC BLD AUTO-RTO: 0 /100
PH UR STRIP: 7 [PH] (ref 5–7)
PLATELET # BLD AUTO: 316 10E3/UL (ref 150–450)
POTASSIUM SERPL-SCNC: 3.7 MMOL/L (ref 3.4–5.3)
POTASSIUM SERPL-SCNC: 3.7 MMOL/L (ref 3.4–5.3)
PROLACTIN SERPL 3RD IS-MCNC: 8 NG/ML (ref 5–23)
PROT SERPL-MCNC: 6.5 G/DL (ref 6.4–8.3)
RBC # BLD AUTO: 3.62 10E6/UL (ref 3.8–5.2)
RBC URINE: <1 /HPF
SALICYLATES SERPL-MCNC: <0.3 MG/DL
SODIUM SERPL-SCNC: 139 MMOL/L (ref 136–145)
SODIUM SERPL-SCNC: 142 MMOL/L (ref 136–145)
SP GR UR STRIP: 1.01 (ref 1–1.03)
SQUAMOUS EPITHELIAL: <1 /HPF
UROBILINOGEN UR STRIP-MCNC: NORMAL MG/DL
WBC # BLD AUTO: 8.4 10E3/UL (ref 4–11)
WBC URINE: 1 /HPF

## 2023-07-04 PROCEDURE — G0378 HOSPITAL OBSERVATION PER HR: HCPCS

## 2023-07-04 PROCEDURE — 99223 1ST HOSP IP/OBS HIGH 75: CPT | Mod: AI | Performed by: INTERNAL MEDICINE

## 2023-07-04 PROCEDURE — 51702 INSERT TEMP BLADDER CATH: CPT

## 2023-07-04 PROCEDURE — 96374 THER/PROPH/DIAG INJ IV PUSH: CPT | Mod: 59

## 2023-07-04 PROCEDURE — 70450 CT HEAD/BRAIN W/O DYE: CPT

## 2023-07-04 PROCEDURE — 250N000011 HC RX IP 250 OP 636

## 2023-07-04 PROCEDURE — 96361 HYDRATE IV INFUSION ADD-ON: CPT

## 2023-07-04 PROCEDURE — 51798 US URINE CAPACITY MEASURE: CPT

## 2023-07-04 PROCEDURE — 82962 GLUCOSE BLOOD TEST: CPT

## 2023-07-04 PROCEDURE — 258N000003 HC RX IP 258 OP 636: Performed by: EMERGENCY MEDICINE

## 2023-07-04 PROCEDURE — 83605 ASSAY OF LACTIC ACID: CPT | Performed by: EMERGENCY MEDICINE

## 2023-07-04 PROCEDURE — 74018 RADEX ABDOMEN 1 VIEW: CPT

## 2023-07-04 PROCEDURE — 96360 HYDRATION IV INFUSION INIT: CPT

## 2023-07-04 PROCEDURE — 81025 URINE PREGNANCY TEST: CPT | Performed by: EMERGENCY MEDICINE

## 2023-07-04 PROCEDURE — 250N000013 HC RX MED GY IP 250 OP 250 PS 637: Performed by: PHYSICIAN ASSISTANT

## 2023-07-04 PROCEDURE — 36415 COLL VENOUS BLD VENIPUNCTURE: CPT | Performed by: INTERNAL MEDICINE

## 2023-07-04 PROCEDURE — 99207 PR NO CHARGE LOS: CPT

## 2023-07-04 PROCEDURE — 80179 DRUG ASSAY SALICYLATE: CPT | Performed by: INTERNAL MEDICINE

## 2023-07-04 PROCEDURE — 80143 DRUG ASSAY ACETAMINOPHEN: CPT | Performed by: INTERNAL MEDICINE

## 2023-07-04 PROCEDURE — 99232 SBSQ HOSP IP/OBS MODERATE 35: CPT | Performed by: HOSPITALIST

## 2023-07-04 PROCEDURE — 80048 BASIC METABOLIC PNL TOTAL CA: CPT | Performed by: EMERGENCY MEDICINE

## 2023-07-04 PROCEDURE — 82077 ASSAY SPEC XCP UR&BREATH IA: CPT | Performed by: EMERGENCY MEDICINE

## 2023-07-04 PROCEDURE — 36415 COLL VENOUS BLD VENIPUNCTURE: CPT | Performed by: EMERGENCY MEDICINE

## 2023-07-04 PROCEDURE — 250N000013 HC RX MED GY IP 250 OP 250 PS 637: Performed by: HOSPITALIST

## 2023-07-04 PROCEDURE — 250N000013 HC RX MED GY IP 250 OP 250 PS 637

## 2023-07-04 PROCEDURE — 258N000003 HC RX IP 258 OP 636: Performed by: INTERNAL MEDICINE

## 2023-07-04 PROCEDURE — 250N000011 HC RX IP 250 OP 636: Performed by: INTERNAL MEDICINE

## 2023-07-04 PROCEDURE — 81001 URINALYSIS AUTO W/SCOPE: CPT | Performed by: EMERGENCY MEDICINE

## 2023-07-04 PROCEDURE — 99285 EMERGENCY DEPT VISIT HI MDM: CPT | Mod: 25

## 2023-07-04 PROCEDURE — 95816 EEG AWAKE AND DROWSY: CPT

## 2023-07-04 RX ORDER — PROCHLORPERAZINE 25 MG
25 SUPPOSITORY, RECTAL RECTAL EVERY 12 HOURS PRN
Status: DISCONTINUED | OUTPATIENT
Start: 2023-07-04 | End: 2023-07-04

## 2023-07-04 RX ORDER — ARIPIPRAZOLE 10 MG/1
10 TABLET ORAL DAILY
Status: DISCONTINUED | OUTPATIENT
Start: 2023-07-04 | End: 2023-07-06 | Stop reason: HOSPADM

## 2023-07-04 RX ORDER — LUBIPROSTONE 24 UG/1
24 CAPSULE ORAL 2 TIMES DAILY WITH MEALS
COMMUNITY

## 2023-07-04 RX ORDER — ONDANSETRON 2 MG/ML
4 INJECTION INTRAMUSCULAR; INTRAVENOUS EVERY 6 HOURS PRN
Status: DISCONTINUED | OUTPATIENT
Start: 2023-07-04 | End: 2023-07-06 | Stop reason: HOSPADM

## 2023-07-04 RX ORDER — SODIUM CHLORIDE 9 MG/ML
INJECTION, SOLUTION INTRAVENOUS CONTINUOUS
Status: DISCONTINUED | OUTPATIENT
Start: 2023-07-04 | End: 2023-07-06 | Stop reason: HOSPADM

## 2023-07-04 RX ORDER — LUBIPROSTONE 24 UG/1
24 CAPSULE ORAL 2 TIMES DAILY WITH MEALS
Status: DISCONTINUED | OUTPATIENT
Start: 2023-07-04 | End: 2023-07-06 | Stop reason: HOSPADM

## 2023-07-04 RX ORDER — PROCHLORPERAZINE MALEATE 10 MG
10 TABLET ORAL EVERY 6 HOURS PRN
Status: DISCONTINUED | OUTPATIENT
Start: 2023-07-04 | End: 2023-07-04

## 2023-07-04 RX ORDER — ACETAMINOPHEN 650 MG/1
650 SUPPOSITORY RECTAL EVERY 6 HOURS PRN
Status: DISCONTINUED | OUTPATIENT
Start: 2023-07-04 | End: 2023-07-04

## 2023-07-04 RX ORDER — DROSPIRENONE AND ETHINYL ESTRADIOL 0.02-3(28)
1 KIT ORAL DAILY
Status: DISCONTINUED | OUTPATIENT
Start: 2023-07-04 | End: 2023-07-06 | Stop reason: HOSPADM

## 2023-07-04 RX ORDER — PROCHLORPERAZINE MALEATE 10 MG
10 TABLET ORAL EVERY 6 HOURS PRN
Status: DISCONTINUED | OUTPATIENT
Start: 2023-07-04 | End: 2023-07-06 | Stop reason: HOSPADM

## 2023-07-04 RX ORDER — PANTOPRAZOLE SODIUM 40 MG/1
40 TABLET, DELAYED RELEASE ORAL 2 TIMES DAILY
Status: DISCONTINUED | OUTPATIENT
Start: 2023-07-04 | End: 2023-07-05

## 2023-07-04 RX ORDER — PROCHLORPERAZINE MALEATE 10 MG
10 TABLET ORAL EVERY 8 HOURS PRN
COMMUNITY

## 2023-07-04 RX ORDER — ACETAMINOPHEN 650 MG/1
650 SUPPOSITORY RECTAL EVERY 6 HOURS PRN
Status: DISCONTINUED | OUTPATIENT
Start: 2023-07-04 | End: 2023-07-06 | Stop reason: HOSPADM

## 2023-07-04 RX ORDER — LORAZEPAM 0.5 MG/1
0.5 TABLET ORAL AT BEDTIME
Status: DISCONTINUED | OUTPATIENT
Start: 2023-07-04 | End: 2023-07-06 | Stop reason: HOSPADM

## 2023-07-04 RX ORDER — ACETAMINOPHEN 325 MG/1
650 TABLET ORAL EVERY 6 HOURS PRN
Status: DISCONTINUED | OUTPATIENT
Start: 2023-07-04 | End: 2023-07-06 | Stop reason: HOSPADM

## 2023-07-04 RX ORDER — LACTOBACILLUS RHAMNOSUS GG 10B CELL
1 CAPSULE ORAL DAILY
Status: DISCONTINUED | OUTPATIENT
Start: 2023-07-04 | End: 2023-07-06 | Stop reason: HOSPADM

## 2023-07-04 RX ORDER — HYDROXYZINE HYDROCHLORIDE 25 MG/1
25 TABLET, FILM COATED ORAL 2 TIMES DAILY PRN
Status: DISCONTINUED | OUTPATIENT
Start: 2023-07-04 | End: 2023-07-06 | Stop reason: HOSPADM

## 2023-07-04 RX ORDER — ONDANSETRON 4 MG/1
4 TABLET, ORALLY DISINTEGRATING ORAL EVERY 6 HOURS PRN
Status: DISCONTINUED | OUTPATIENT
Start: 2023-07-04 | End: 2023-07-06 | Stop reason: HOSPADM

## 2023-07-04 RX ORDER — PROCHLORPERAZINE 25 MG
25 SUPPOSITORY, RECTAL RECTAL EVERY 12 HOURS PRN
Status: DISCONTINUED | OUTPATIENT
Start: 2023-07-04 | End: 2023-07-06 | Stop reason: HOSPADM

## 2023-07-04 RX ORDER — LORAZEPAM 2 MG/ML
2 INJECTION INTRAMUSCULAR
Status: DISCONTINUED | OUTPATIENT
Start: 2023-07-04 | End: 2023-07-06 | Stop reason: HOSPADM

## 2023-07-04 RX ORDER — ACETAMINOPHEN 325 MG/1
650 TABLET ORAL EVERY 6 HOURS PRN
Status: DISCONTINUED | OUTPATIENT
Start: 2023-07-04 | End: 2023-07-04

## 2023-07-04 RX ORDER — DULOXETIN HYDROCHLORIDE 30 MG/1
120 CAPSULE, DELAYED RELEASE ORAL DAILY
Status: DISCONTINUED | OUTPATIENT
Start: 2023-07-04 | End: 2023-07-06 | Stop reason: HOSPADM

## 2023-07-04 RX ORDER — DEXTROSE MONOHYDRATE 100 MG/ML
INJECTION, SOLUTION INTRAVENOUS CONTINUOUS PRN
Status: DISCONTINUED | OUTPATIENT
Start: 2023-07-04 | End: 2023-07-06 | Stop reason: HOSPADM

## 2023-07-04 RX ORDER — PROMETHAZINE HYDROCHLORIDE 25 MG/1
25 TABLET ORAL 2 TIMES DAILY
Status: DISCONTINUED | OUTPATIENT
Start: 2023-07-04 | End: 2023-07-06 | Stop reason: HOSPADM

## 2023-07-04 RX ADMIN — PROMETHAZINE HYDROCHLORIDE 25 MG: 25 TABLET ORAL at 19:49

## 2023-07-04 RX ADMIN — ACETAMINOPHEN 650 MG: 325 TABLET ORAL at 17:54

## 2023-07-04 RX ADMIN — ARIPIPRAZOLE 10 MG: 10 TABLET ORAL at 17:54

## 2023-07-04 RX ADMIN — SODIUM CHLORIDE 1000 ML: 9 INJECTION, SOLUTION INTRAVENOUS at 00:40

## 2023-07-04 RX ADMIN — LUBIPROSTONE 24 MCG: 24 CAPSULE, GELATIN COATED ORAL at 17:55

## 2023-07-04 RX ADMIN — PROCHLORPERAZINE EDISYLATE 10 MG: 5 INJECTION INTRAMUSCULAR; INTRAVENOUS at 21:18

## 2023-07-04 RX ADMIN — LORAZEPAM 0.5 MG: 0.5 TABLET ORAL at 21:52

## 2023-07-04 RX ADMIN — SODIUM CHLORIDE 1000 ML: 9 INJECTION, SOLUTION INTRAVENOUS at 03:01

## 2023-07-04 RX ADMIN — DULOXETINE HYDROCHLORIDE 120 MG: 30 CAPSULE, DELAYED RELEASE ORAL at 17:55

## 2023-07-04 RX ADMIN — Medication 1 CAPSULE: at 17:55

## 2023-07-04 RX ADMIN — PANTOPRAZOLE SODIUM 40 MG: 40 TABLET, DELAYED RELEASE ORAL at 19:49

## 2023-07-04 RX ADMIN — SODIUM CHLORIDE: 9 INJECTION, SOLUTION INTRAVENOUS at 14:03

## 2023-07-04 RX ADMIN — ONDANSETRON 4 MG: 4 TABLET, ORALLY DISINTEGRATING ORAL at 17:51

## 2023-07-04 RX ADMIN — MELATONIN 5 MG TABLET 10 MG: at 21:52

## 2023-07-04 ASSESSMENT — COLUMBIA-SUICIDE SEVERITY RATING SCALE - C-SSRS
2. HAVE YOU ACTUALLY HAD ANY THOUGHTS OF KILLING YOURSELF IN THE PAST MONTH?: NO
4. HAVE YOU HAD THESE THOUGHTS AND HAD SOME INTENTION OF ACTING ON THEM?: NO
1. IN THE PAST MONTH, HAVE YOU WISHED YOU WERE DEAD OR WISHED YOU COULD GO TO SLEEP AND NOT WAKE UP?: NO
6. HAVE YOU EVER DONE ANYTHING, STARTED TO DO ANYTHING, OR PREPARED TO DO ANYTHING TO END YOUR LIFE?: NO
5. HAVE YOU STARTED TO WORK OUT OR WORKED OUT THE DETAILS OF HOW TO KILL YOURSELF? DO YOU INTEND TO CARRY OUT THIS PLAN?: NO
3. HAVE YOU BEEN THINKING ABOUT HOW YOU MIGHT KILL YOURSELF?: NO

## 2023-07-04 ASSESSMENT — ACTIVITIES OF DAILY LIVING (ADL)
ADLS_ACUITY_SCORE: 31
ADLS_ACUITY_SCORE: 35
ADLS_ACUITY_SCORE: 31
ADLS_ACUITY_SCORE: 35
ADLS_ACUITY_SCORE: 31
ADLS_ACUITY_SCORE: 35

## 2023-07-04 NOTE — PROGRESS NOTES
Patient tube feed started at 1700 today. Rate started was at 20 ml/hr. Flush @ 30 ml every 4 hours. Using the middle lumen of her PEG tube.     Feeding to be increased to 30 ml/hr @ 0500 on July 5th if feeding is tolerated. One carton out of the four is still closed and on the window sill.
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
## 2023-07-04 NOTE — ED NOTES
"Patient was given snack box to try to eat some food. Patient states she usually has tube feeding but hasn't been able to get it since \"my mother kicked me out and the  wouldn't let me get my supplies\". Patient states she hasn't had nutrition or much to drink in the last few days. Patient then puts light on stating that \"I threw up, like 15 min after I tried the applesauce, it came back up\". Patient had only applesauce on gown, no emesis seen. Told patient to drink water slowly and wait on solids until she can see nutrition upstairs.   "
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
## 2023-07-04 NOTE — PROVIDER NOTIFICATION
MD Notification    Notified Person: MD    Notified Person Name:  Harrison    Notification Date/Time: 07/04/2023 @ 1300    Notification Interaction: Malaika    Purpose of Notification: patient just transferred from ED, Xray called, wondering whether contrast needed for GJ tube placement.    Orders Received:  Plain abdominal Xray per hospitalist, updated ER Xray.     Comments:

## 2023-07-04 NOTE — ED NOTES
Pt reporting urinary retention, bladder scan showed over 999mL. Straight cath performed with 1,000 output. MD notified.

## 2023-07-04 NOTE — ED NOTES
"[facility]  ED Nurse Handoff Report    ED Chief complaint: Seizures      ED Diagnosis:   Final diagnoses:   Observed seizure-like activity (H)       Code Status: Full Code    Allergies:   Allergies   Allergen Reactions     Amoxicillin Rash     Penicillins Unknown     Mother unsure if patient or sister had a reaction. Mother reports she \"didn't think it was anaphylactic\".     Lactose Nausea, GI Disturbance, Nausea and Vomiting and Other (See Comments)     Other reaction(s): Gastrointestinal  Fells sick with milk, but can have yogurt   Fells sick with milk, but can have yogurt   Fells sick with milk, but can have yogurt   Other reaction(s): Gastrointestinal  Fells sick with milk, but can have yogurt   Fells sick with milk, but can have yogurt   Other reaction(s): Gastrointestinal  Fells sick with milk, but can have yogurt   Fells sick with milk, but can have yogurt   Other reaction(s): Gastrointestinal  Fells sick with milk, but can have yogurt        Levofloxacin Muscle Pain (Myalgia)     Developed myalgia on levofloxacin 500 mg/d (11/28/22) after 1 day and requested to switch antibiotics     Other Food Allergy GI Disturbance     Cilantro       Patient Story: Patient had been discharged from ED and subsequently had a fall in the stairwell.     RRT called on patient after she had a witnessed fall in stairwell A. Per witness who is the patient's friend the patient went down 2-3 steps then fell and appeared to actively attempt to catch herself followed by rolling down the final 2 steps. An obvious head strike was not observed. Luckily an RN was in the stairwell at the time and was able to activate the rapid response team. The patient had 4 witnessed shaking episodes where all 4 extremities were shaking and eyes were fluttering. Patient was placed in a C-collar. I assessed spine and no step-offs or tenderness noted. Patient did not verbally respond but would make eye contact. SAAD. Patient was placed " on backboard and put on cart and then transferred to ED.  Focused Assessment:  A & Ox4, no cognitive concerns. G-tube in place. Urinary retention, straight cath performed. 18g L AC.     Labs Ordered and Resulted from Time of ED Arrival to Time of ED Departure   LACTIC ACID WHOLE BLOOD - Abnormal       Result Value    Lactic Acid 7.1 (*)    BASIC METABOLIC PANEL - Abnormal    Sodium 142      Potassium 3.7      Chloride 105      Carbon Dioxide (CO2) 16 (*)     Anion Gap 21 (*)     Urea Nitrogen 11.5      Creatinine 0.74      Calcium 9.0      Glucose 105 (*)     GFR Estimate >90     ETHYL ALCOHOL LEVEL - Normal    Alcohol ethyl <0.01     ROUTINE UA WITH MICROSCOPIC - Normal    Color Urine Straw      Appearance Urine Clear      Glucose Urine Negative      Bilirubin Urine Negative      Ketones Urine Negative      Specific Gravity Urine 1.008      Blood Urine Negative      pH Urine 7.0      Protein Albumin Urine Negative      Urobilinogen Urine Normal      Nitrite Urine Negative      Leukocyte Esterase Urine Negative      RBC Urine <1      WBC Urine 1      Squamous Epithelials Urine <1     HCG QUALITATIVE URINE - Normal    hCG Urine Qualitative Negative     LACTIC ACID WHOLE BLOOD     Head CT w/o contrast   Final Result   IMPRESSION:   1.  No acute intracranial process.            To be done/followed up on inpatient unit:  Admitting MD orders    Does this patient have any cognitive concerns?: none    Activity level - Baseline/Home:  Independent  Activity Level - Current:   Independent    Patient's Preferred language: English   Needed?: No    Isolation: None  Infection: Not Applicable  Patient tested for COVID 19 prior to admission: NO  Bariatric?: No    Vital Signs:   Vitals:    07/04/23 0310 07/04/23 0315 07/04/23 0325 07/04/23 0421   BP: 111/82 (!) 103/91 113/76 118/79   Pulse: 98 99 96 93   Resp: 15 15 14 12   Temp:       SpO2: 99% 100% 100% 99%       Cardiac Rhythm:Cardiac Rhythm: Normal sinus rhythm    Was  the PSS-3 completed:   No -Altered Mental status  What interventions are required if any?               Family Comments: Friend at bedside.  OBS brochure/video discussed/provided to patient/family: No         For the majority of the shift this patient's behavior was Green.   Behavioral interventions performed were Frequent rounding.    ED NURSE PHONE NUMBER: [phone]

## 2023-07-04 NOTE — CONSULTS
"MD Consult - Registered Dietitian to order TF per Medical Nutrition Therapy Guidelines    Pt is OBSERVATION status    Chart reviewed  Pt is known from previous admit    Diet: Regular  Allergy/Intolerance: lactose, cilantro    Pt has a GJ tube for nutrition  Hx gastroparesis     Ht: 5'4\"  7/4: 57.6 kg  IBW: 54.5 kg  Wt hx:  Wt Readings from Last 10 Encounters:   07/04/23 57.6 kg (127 lb)   06/26/23 57.6 kg (127 lb)   06/04/23 60.5 kg (133 lb 6.4 oz)   04/24/23 56.7 kg (125 lb)   04/24/23 56.7 kg (125 lb)   04/20/23 56.7 kg (125 lb)   02/07/23 55.9 kg (123 lb 3.2 oz)   01/23/23 55.1 kg (121 lb 7.6 oz)   09/27/21 55.8 kg (123 lb 1.6 oz)   09/30/20 47.6 kg (105 lb)       Visited with pt this afternoon  Tells me that she hasn't had any TF for ~1 week - \"had a falling out with my mom and all of my supplies are at the house\"  She is not able to tolerate any po - \"usually throw up ~10 minutes after I eat\"  Her TF regimen is G[employer] Peptide 1.5 @ 40 mL/hr x24 hrs  She has been tolerating this well  Notes that she does have trouble with large water flushes - can tolerate 10 mL, but would like to try bigger flushes before being d/c'd  \"I was getting IVF 3 times per week.  It was LR and my doctor stopped it because he thought it might be contributing to my increased heart rate.\"  Pt states that she hasn't had a BM for 8 days - her usual pattern if every 5 days    PLAN:  Gavi [employer] peptide 1.5 @ 40 mL/hr continuous = 1440 cals (25 cals/kg), 71 gm pro (1.2 gm/kg), 9 gm fiber, 672 mL free water  Will start TF at 20 mL/hr.  Increase by 10 mL every 12 hrs to goal rate of 40 mL/hr.  Water flushes of 30 mL every 4 hrs    Paula Coats RD, LD  Clinical Dietitian - [facility]   Pager - [phone]  Weekend - [phone]    "

## 2023-07-04 NOTE — ED NOTES
DATE/TIME OF CALL RECEIVED FROM LAB:  07/04/23 at 3:00 AM   LAB TEST:  Lactic acid  LAB VALUE:  71  PROVIDER NOTIFIED?: Yes  PROVIDER NAME: Dr Mcintosh  DATE/TIME LAB VALUE REPORTED TO PROVIDER: 3:01 AM  MECHANISM OF PROVIDER NOTIFICATION: Face-To-Face  PROVIDER RESPONSE: new orders received
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
## 2023-07-04 NOTE — CODE/RAPID RESPONSE
Patient had been discharged from ED and subsequently had a fall in the stairwell.    RRT called on patient after she had a witnessed fall in stairwell A. Per witness who is the patient's friend the patient went down 2-3 steps then fell and appeared to actively attempt to catch herself followed by rolling down the final 2 steps. An obvious head strike was not observed. Luckily an RN was in the stairwell at the time and was able to activate the rapid response team. The patient had 4 witnessed shaking episodes where all 4 extremities were shaking and eyes were fluttering. Patient was placed in a C-collar. I assessed spine and no step-offs or tenderness noted. Patient did not verbally respond but would make eye contact. PERRLA. Patient was placed on backboard and put on cart and then transferred to ED.    CONRAD Martin, CNP  Hospitalist - [facility]  Text me on the [employer] [facility]am for a textback  Text page with web based paging for a callback

## 2023-07-04 NOTE — PHARMACY-ADMISSION MEDICATION HISTORY
Pharmacist Admission Medication History    Admission medication history is complete. The information provided in this note is only as accurate as the sources available at the time of the update.    Medication reconciliation/reorder completed by provider prior to medication history? Yes    Information Source(s): Patient and CareEverywhere/SureScripts via in-person    Pertinent Information:    Duloxetine 60 mg caps filled 4/20 x30ds, trulance 3mg  filled 5/24 x30ds, linzess 290 mcg filled 3/17 x90d    Changes made to PTA medication list:    Added: lubiprostone, linzess, prochlorperazine     Deleted: None    Changed: Duloxetine 60 mg --> 120 mg    Allergies reviewed with patient and updates made in EHR: yes    Medication History Completed By: Carissa Robb RPH 7/4/2023 9:12 AM    Prior to Admission medications    Medication Sig Last Dose Taking? Auth Provider Long Term End Date   acetaminophen (TYLENOL) 325 MG tablet Take 2 tablets by mouth every 4 hours as needed  Yes Reported, Patient No    ARIPiprazole (ABILIFY) 10 MG tablet Take 10 mg by mouth daily  Yes Unknown, Entered By History No    calcium carbonate (TUMS) 500 MG chewable tablet Take 1 tablet (500 mg) by mouth as needed for heartburn  Yes Александр Hull MD     cholecalciferol (VITAMIN D3) 25 mcg (1000 units) capsule Take 1 capsule by mouth daily  Yes Reported, Patient     diazepam (DIASTAT ACUDIAL) 10 MG GEL rectal gel Place 10 mg rectally every 10 minutes as needed for seizures  Yes Louis Wong MD Yes    drospirenone-ethinyl estradiol (FELIPE) 3-0.02 MG tablet Take 1 tablet by mouth daily  Yes Unknown, Entered By History No    DULoxetine (CYMBALTA) 60 MG capsule Take 1 capsule (60 mg) by mouth daily  Patient taking differently: Take 120 mg by mouth daily  Yes Emma Abdi MD Yes    hydrOXYzine (ATARAX) 25 MG tablet Take 1 tablet (25 mg) by mouth 2 times daily as needed for anxiety or other (sleep, melatonin augmentation or failure)  Yes  Emma Abdi MD No    lactobacillus rhamnosus, GG, (CULTURELL) capsule Take 1 capsule by mouth daily  Yes Unknown, Entered By History     linaclotide (LINZESS) 290 MCG capsule Take 290 mcg by mouth every morning (before breakfast)  Yes Unknown, Entered By History No    LORazepam (ATIVAN) 0.5 MG tablet Take 0.5 mg by mouth At Bedtime  Yes Unknown, Entered By History     lubiprostone (AMITIZA) 24 MCG capsule Take 24 mcg by mouth 2 times daily (with meals)  Yes Unknown, Entered By History     Melatonin 10 MG TABS tablet Take 10 mg by mouth At Bedtime  Yes Unknown, Entered By History No    multivitamin w/minerals (THERA-VIT-M) tablet Take 1 tablet by mouth daily  Yes Александр Hull MD     ondansetron (ZOFRAN ODT) 4 MG ODT tab Take 1 tablet (4 mg) by mouth every 6 hours as needed for nausea or vomiting  Yes Jerrell Daniels MD     pantoprazole (PROTONIX) 40 MG EC tablet Take 1 tablet (40 mg) by mouth 2 times daily  Yes Josue Rubi MD No    plecanatide (TRULANCE) 3 MG tablet Take 3 mg by mouth daily  Yes Unknown, Entered By History     prochlorperazine (COMPAZINE) 10 MG tablet Take 10 mg by mouth every 8 hours as needed for nausea or vomiting  Yes Unknown, Entered By History     promethazine (PHENERGAN) 25 MG tablet Take 25 mg by mouth 2 times daily  Yes Unknown, Entered By History     vitamin C (ASCORBIC ACID) 250 MG tablet Take 250 mg by mouth daily  Yes Unknown, Entered By History     promethazine (PHENERGAN) 25 MG suppository Place 1 suppository (25 mg) rectally every 6 hours as needed for nausea (take instead of oral dose if you cannot keep the pills down)   Josue Rubi MD

## 2023-07-04 NOTE — PROGRESS NOTES
RECEIVING UNIT ED HANDOFF REVIEW    ED Nurse Handoff Report was reviewed by: Cornelius Waite RN on July 4, 2023 at 11:58 AM

## 2023-07-04 NOTE — PROGRESS NOTES
[facility]    Medicine Progress Note - Hospitalist Service    Date of Admission:  7/4/2023    Assessment & Plan   Thea Castle is a 23 year old female with a history of depression, anxiety, borderline personality disorder, gastroparesis with GJ tube, recurrent spells and possible pseudoseizures, recurrent urinary retention who is admitted on 7/4/2023 with concern for a seizure.     Fall down the stairs with seizure like activity  Multiple spells prior to presentation  Recurrent spells and possible pseudoseizures  Elevated lactic acid - 7.1  Friend at bedside notes that patient had shaking after she fell in the stairs tonight. Also note history of recurrent spells with negative EEG and neurology work up in January, unclear if they are seizures vs pseudoseizures. Note long term ativan use though this has been constant recently at 0.5mg at bedtime without missed doses so benzo withdrawal seems unlikely. Unclear if her multiple psychiatric diagnoses are playing a role here but with the elevated lactic acid noted during her 2nd ED visit it does seem like she may have actually seized. Head CT in the ED negative.   - Registered to observation.  - Neurology consulted, appreciate their assistance.  - Seizure precautions.  - PRN ativan if seizure activity.  - Continue IVF.  - Repeat lactic acid within normal limits.    Chronic abdominal pain  Gastroparesis  Slow transit constipation  GJ tube  Recent admission for ileus ([facility] 6/12-21)  This appears to be stable though patient does wonder if she could have dislodged her GJ tube in the fall.  - Abdominal x-ray shows GJ tube in place.  - Nutrition consulted for tube feeds.  - Resume PTA bowel regimen.  - PRN tap water enema ordered, states enema usually work well for her constipation.    Depression  Anxiety  OCD  PTSD  Borderline Personality Disorder  Anorexia Nervosa  - Continue PTA regimen.  - Consider psychiatry consult  tomorrow.    Recurrent urinary retention  Smith catheter placed in ED with 1000mL out. Scheduled to see urology in [place] on 7/7.   - Continue Smith catheter today, reassess tomorrow.       Diet: Regular Diet Adult  Adult Formula Drip Feeding: Continuous G[employer] Peptide 1.5; Jejunostomy; Goal Rate: 40; mL/hr; start TF @ 20 mL/hr.  Increase by 10 mL every 12 hrs to goal rate of 40 mL/hr    DVT Prophylaxis: Ambulate every shift  Smith Catheter: PRESENT, indication: Retention  Lines: None     Cardiac Monitoring: None  Code Status: Full Code      Clinically Significant Risk Factors Present on Admission             # Anion Gap Metabolic Acidosis: Highest Anion Gap = 21 mmol/L in last 2 days, will monitor and treat as appropriate                    Disposition Plan      Expected Discharge Date: 07/05/2023                  Ambrosio Terry MD  Hospitalist Service  [facility]  Securely message with [employer] (more info)  Text page via [employer] Paging/Directory   ______________________________________________________________________    Interval History   Theairina Castle was seen this afternoon. Feels tired. Some generalized aches and pains. Constipated. No further seizures/pseudoseizures since admission.    Physical Exam   Vital Signs: Temp: 98.7  F (37.1  C) Temp src: Oral BP: 118/74 Pulse: 80   Resp: 17 SpO2: 99 % O2 Device: None (Room air)    Weight: 127 lbs 0 oz    Constitutional: awake, alert, cooperative, no apparent distress, laying in the hospital bed  Respiratory: no increased work of breathing, clear to auscultation bilaterally, no crackles or wheezing  Cardiovascular: regular rate and rhythm, normal S1 and S2, no murmur noted  GI: normal bowel sounds, soft, non-distended, mild tenderness, G/J tube in place  Neurologic: awake, alert, answers questions appropriately, moves all extremities    Medical Decision Making       40 MINUTES SPENT BY ME on the date of service doing chart review,  history, exam, documentation & further activities per the note.      Data     I have personally reviewed the following data over the past 24 hrs:    8.4  \   10.8 (L)   / 316     142 105 11.5 /  105 (H)   3.7 16 (L) 0.74 \       ALT: 18 AST: 21 AP: 72 TBILI: <0.2   ALB: 4.0 TOT PROTEIN: 6.5 LIPASE: N/A       Procal: N/A CRP: N/A Lactic Acid: 1.1         Imaging results reviewed over the past 24 hrs:   Recent Results (from the past 24 hour(s))   Head CT w/o contrast    Narrative    EXAM: CT HEAD W/O CONTRAST  LOCATION: [facility]  DATE: 7/4/2023    INDICATION: Head trauma 2 weeks ago, multiple seizure like episodes today  COMPARISON: 06/06/2023  TECHNIQUE: Routine CT Head without IV contrast. Multiplanar reformats. Dose reduction techniques were used.    FINDINGS:  INTRACRANIAL CONTENTS: No intracranial hemorrhage, extraaxial collection, or mass effect.  No CT evidence of acute infarct. Normal parenchymal attenuation. Normal ventricles and sulci.     VISUALIZED ORBITS/SINUSES/MASTOIDS: No intraorbital abnormality. No paranasal sinus mucosal disease. No middle ear or mastoid effusion.    BONES/SOFT TISSUES: No acute abnormality.      Impression    IMPRESSION:  1.  No acute intracranial process.   XR Abdomen 1 View    Narrative    EXAM: XR ABDOMEN 1 VIEW  LOCATION: [facility]  DATE: 7/4/2023    INDICATION: fall, ensure placement of GJ tube  COMPARISON: 06/29/2023      Impression    IMPRESSION: Gastrojejunostomy tube remains in good position tip in the proximal jejunum. Bowel gas pattern is nonobstructed.

## 2023-07-04 NOTE — ED TRIAGE NOTES
Pt was discharged from the ED department and was found having seizures in stairway. Pt did attempt to break her fall gong down seizing. Pt's friend witnessed the seizure activity.

## 2023-07-05 LAB
ANION GAP SERPL CALCULATED.3IONS-SCNC: 8 MMOL/L (ref 7–15)
BUN SERPL-MCNC: 6.2 MG/DL (ref 6–20)
CALCIUM SERPL-MCNC: 8.3 MG/DL (ref 8.6–10)
CHLORIDE SERPL-SCNC: 107 MMOL/L (ref 98–107)
CREAT SERPL-MCNC: 0.62 MG/DL (ref 0.51–0.95)
DEPRECATED HCO3 PLAS-SCNC: 22 MMOL/L (ref 22–29)
ERYTHROCYTE [DISTWIDTH] IN BLOOD BY AUTOMATED COUNT: 13.7 % (ref 10–15)
GFR SERPL CREATININE-BSD FRML MDRD: >90 ML/MIN/1.73M2
GLUCOSE SERPL-MCNC: 101 MG/DL (ref 70–99)
HCT VFR BLD AUTO: 30.4 % (ref 35–47)
HGB BLD-MCNC: 10 G/DL (ref 11.7–15.7)
MCH RBC QN AUTO: 29.4 PG (ref 26.5–33)
MCHC RBC AUTO-ENTMCNC: 32.9 G/DL (ref 31.5–36.5)
MCV RBC AUTO: 89 FL (ref 78–100)
PLATELET # BLD AUTO: 223 10E3/UL (ref 150–450)
POTASSIUM SERPL-SCNC: 4 MMOL/L (ref 3.4–5.3)
RBC # BLD AUTO: 3.4 10E6/UL (ref 3.8–5.2)
SODIUM SERPL-SCNC: 137 MMOL/L (ref 136–145)
WBC # BLD AUTO: 5.6 10E3/UL (ref 4–11)

## 2023-07-05 PROCEDURE — 258N000003 HC RX IP 258 OP 636: Performed by: INTERNAL MEDICINE

## 2023-07-05 PROCEDURE — 250N000011 HC RX IP 250 OP 636: Performed by: INTERNAL MEDICINE

## 2023-07-05 PROCEDURE — 250N000013 HC RX MED GY IP 250 OP 250 PS 637

## 2023-07-05 PROCEDURE — 99232 SBSQ HOSP IP/OBS MODERATE 35: CPT | Performed by: HOSPITALIST

## 2023-07-05 PROCEDURE — G0378 HOSPITAL OBSERVATION PER HR: HCPCS

## 2023-07-05 PROCEDURE — 250N000013 HC RX MED GY IP 250 OP 250 PS 637: Performed by: HOSPITALIST

## 2023-07-05 PROCEDURE — 85027 COMPLETE CBC AUTOMATED: CPT | Performed by: HOSPITALIST

## 2023-07-05 PROCEDURE — 250N000013 HC RX MED GY IP 250 OP 250 PS 637: Performed by: PHYSICIAN ASSISTANT

## 2023-07-05 PROCEDURE — 36415 COLL VENOUS BLD VENIPUNCTURE: CPT | Performed by: HOSPITALIST

## 2023-07-05 PROCEDURE — 80048 BASIC METABOLIC PNL TOTAL CA: CPT | Performed by: HOSPITALIST

## 2023-07-05 PROCEDURE — 96361 HYDRATE IV INFUSION ADD-ON: CPT

## 2023-07-05 PROCEDURE — 258N000003 HC RX IP 258 OP 636: Performed by: HOSPITALIST

## 2023-07-05 RX ADMIN — LINACLOTIDE 290 MCG: 290 CAPSULE, GELATIN COATED ORAL at 09:38

## 2023-07-05 RX ADMIN — PROMETHAZINE HYDROCHLORIDE 25 MG: 25 TABLET ORAL at 08:09

## 2023-07-05 RX ADMIN — PROCHLORPERAZINE MALEATE 10 MG: 10 TABLET ORAL at 10:19

## 2023-07-05 RX ADMIN — DULOXETINE HYDROCHLORIDE 120 MG: 30 CAPSULE, DELAYED RELEASE ORAL at 08:10

## 2023-07-05 RX ADMIN — LUBIPROSTONE 24 MCG: 24 CAPSULE, GELATIN COATED ORAL at 18:42

## 2023-07-05 RX ADMIN — ARIPIPRAZOLE 10 MG: 10 TABLET ORAL at 08:09

## 2023-07-05 RX ADMIN — ONDANSETRON 4 MG: 4 TABLET, ORALLY DISINTEGRATING ORAL at 09:38

## 2023-07-05 RX ADMIN — LORAZEPAM 0.5 MG: 0.5 TABLET ORAL at 21:24

## 2023-07-05 RX ADMIN — PANTOPRAZOLE SODIUM 40 MG: 40 TABLET, DELAYED RELEASE ORAL at 08:10

## 2023-07-05 RX ADMIN — PROMETHAZINE HYDROCHLORIDE 25 MG: 25 TABLET ORAL at 20:45

## 2023-07-05 RX ADMIN — SODIUM CHLORIDE: 9 INJECTION, SOLUTION INTRAVENOUS at 09:40

## 2023-07-05 RX ADMIN — MELATONIN 5 MG TABLET 10 MG: at 20:45

## 2023-07-05 RX ADMIN — PANTOPRAZOLE SODIUM 40 MG: 40 TABLET, DELAYED RELEASE ORAL at 17:07

## 2023-07-05 RX ADMIN — Medication 1 CAPSULE: at 08:10

## 2023-07-05 RX ADMIN — SODIUM CHLORIDE: 9 INJECTION, SOLUTION INTRAVENOUS at 18:42

## 2023-07-05 RX ADMIN — ACETAMINOPHEN 650 MG: 325 TABLET ORAL at 08:10

## 2023-07-05 RX ADMIN — ONDANSETRON 4 MG: 4 TABLET, ORALLY DISINTEGRATING ORAL at 16:04

## 2023-07-05 RX ADMIN — LUBIPROSTONE 24 MCG: 24 CAPSULE, GELATIN COATED ORAL at 08:10

## 2023-07-05 RX ADMIN — ACETAMINOPHEN 650 MG: 325 TABLET ORAL at 20:45

## 2023-07-05 RX ADMIN — SODIUM CHLORIDE: 9 INJECTION, SOLUTION INTRAVENOUS at 00:58

## 2023-07-05 ASSESSMENT — ACTIVITIES OF DAILY LIVING (ADL)
ADLS_ACUITY_SCORE: 31
ADLS_ACUITY_SCORE: 33
ADLS_ACUITY_SCORE: 31
ADLS_ACUITY_SCORE: 33
ADLS_ACUITY_SCORE: 33
ADLS_ACUITY_SCORE: 31
ADLS_ACUITY_SCORE: 33
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
## 2023-07-05 NOTE — PLAN OF CARE
Notified provider about indwelling pantoja catheter discussed removal or continued need.    Did provider choose to remove indwelling pantoja catheter? No    Provider's pantoja indication for keeping indwelling pantoja catheter: Retention.    Is there an order for indwelling pantoja catheter? Yes    *If there is a plan to keep pantoja catheter in place at discharge daily notification with provider is not necessary, but please add a notation in the treatment team sticky note that the patient will be discharging with the catheter.

## 2023-07-05 NOTE — PROGRESS NOTES
Observation goals  PRIOR TO DISCHARGE        Comments:   Neuro consult: Not met   Work up complete: Not met   Safe dispo arranged: not met    Nurse to notify provider when observation goals have been met and patient is ready for discharge.

## 2023-07-05 NOTE — PROGRESS NOTES
Dr. Mendez received call from ER physician who had previously seen patient and recommended tylenol level and ASA level. Will add onto labs.   Also recommend ask patient if taking any benadryl or other OTC meds.   Dr. Bernie Mendez    As per discussion with ER provider asked patient about benadryl. Takes only 2 tablets at night at home. Denied over dose of meds  Tylenol < 5 and salicylate neg   coyiz

## 2023-07-05 NOTE — PROGRESS NOTES
[facility]    Medicine Progress Note - Hospitalist Service    Date of Admission:  7/4/2023    Assessment & Plan   Thea Castle is a 23 year old female with a history of depression, anxiety, borderline personality disorder, gastroparesis with GJ tube, recurrent spells and possible pseudoseizures, recurrent urinary retention who is admitted on 7/4/2023 with concern for a seizure.     Fall down the stairs with seizure like activity  Multiple spells prior to presentation  Recurrent spells and possible pseudoseizures  Elevated lactic acid - 7.1  Friend at bedside notes that patient had shaking after she fell in the stairs tonight. Also note history of recurrent spells with negative EEG and neurology work up in January, unclear if they are seizures vs pseudoseizures. Note long term ativan use though this has been constant recently at 0.5mg at bedtime without missed doses so benzo withdrawal seems unlikely. Unclear if her multiple psychiatric diagnoses are playing a role here but with the elevated lactic acid noted during her 2nd ED visit it does seem like she may have actually seized. Head CT in the ED negative.   - Registered to observation.  - Neurology consulted, appreciate their assistance. Evaluation consistent with non-epileptic spells, do not recommend anti-epileptic medication.  - Seizure precautions.  - PRN ativan available, but as above do not suspect actual seizure activity.  - Repeat lactic acid within normal limits.  - Patient would like to see a different Neurologist, however do not think we can accommodate this in the hospital at this time. She has follow-up with an outpatient Neurology on 7/15/23.  - Possible discharge tomorrow with outpatient follow-up.    Orthostatic hypotension  Orthostatic vitals positive today. She gets lightheaded/dizzy when she gets up.  - Will increase IV fluids today and continue care in the hospital overnight.    Chronic abdominal  pain  Gastroparesis  Slow transit constipation  GJ tube  Recent admission for ileus ([facility] 6/12-21)  This appears to be stable though patient does wonder if she could have dislodged her GJ tube in the fall.  - Abdominal x-ray on 7/4/23 showed GJ tube in place.  - Nutrition consulted for tube feeds.  - Continue PTA bowel regimen.  - PRN tap water enema available.    Depression  Anxiety  OCD  PTSD  Borderline Personality Disorder  Anorexia Nervosa  - Continue PTA regimen.  - Discussed with psychiatry, recommend patient follow-up with her established outpatient psychiatrist - she has an appointment with them within the next week.    Recurrent urinary retention  Pantoja catheter placed in ED with 1000mL out. Scheduled to see urology in [place] on 7/7.   - Continue Pantoja catheter until follow-up with Urology. Has previously straight cathed at home, but states she had significant difficulty with it and she prefers to keep the Pantoja catheter in place until follow-up with Urology.       Diet: Regular Diet Adult  Adult Formula Drip Feeding: Continuous [employer] Peptide 1.5; Jejunostomy; Goal Rate: 40; mL/hr; start TF @ 20 mL/hr.  Increase by 10 mL every 12 hrs to goal rate of 40 mL/hr    DVT Prophylaxis: Ambulate every shift  Pantoja Catheter: PRESENT, indication: Retention  Lines: None     Cardiac Monitoring: None  Code Status: Full Code      Clinically Significant Risk Factors Present on Admission          # Hypocalcemia: Lowest Ca = 8.3 mg/dL in last 2 days, will monitor and replace as appropriate    # Anion Gap Metabolic Acidosis: Highest Anion Gap = 21 mmol/L in last 2 days, will monitor and treat as appropriate                    Disposition Plan      Expected Discharge Date: 07/06/2023        Discharge Comments: neuro following  TF @ 30cc   CC/SW  pantoja placed.          Ambrosio Terry MD  Hospitalist Service  [facility]  Securely message with [employer] (more info)  Text page via [employer]  [place]/Directory   ______________________________________________________________________    Interval History   Thae Castle was seen today. She doesn't feel great. Some lightheadedness/dizzines when she stands up. Denies any further spells/seizures since admission. Had multiple bowel movements last night. She met with Neurology this morning, was not happy with Neurology, she feels like she needs an antiepileptic medication.    Physical Exam   Vital Signs: Temp: 98.3  F (36.8  C) Temp src: Oral BP: 109/62 Pulse: 93   Resp: 20 SpO2: 98 % O2 Device: None (Room air)    Weight: 127 lbs 0 oz    Constitutional: awake, alert, cooperative, no apparent distress, laying in the hospital bed with the head of the bed elevated  Respiratory: no increased work of breathing, clear to auscultation bilaterally, no crackles or wheezing  Cardiovascular: regular rate and rhythm, normal S1 and S2, no murmur noted  GI: normal bowel sounds, soft, non-distended, non-tender, GJ tube in place  Skin: warm, dry  Musculoskeletal: no lower extremity pitting edema present  Neurologic: awake, alert, answers questions appropriately, moves all extremities    Medical Decision Making       40 MINUTES SPENT BY ME on the date of service doing chart review, history, exam, documentation & further activities per the note.      Data     I have personally reviewed the following data over the past 24 hrs:    5.6  \   10.0 (L)   / 223     137 107 6.2 /  101 (H)   4.0 22 0.62 \

## 2023-07-05 NOTE — PROGRESS NOTES
Observation goals  PRIOR TO DISCHARGE        Comments:   -Neuro consult and work up complete: met  -Safe dispo arranged: not met  Nurse to notify provider when observation goals have been met and patient is ready for discharge.     Orientation/Cognitive: A&Ox4  Observation Goals (Met/ Not Met): not met  Mobility Level/Assist Equipment: Ax1 walker  Fall Risk (Y/N): yes  Behavior Concerns: none  Tele/VS/O2: none  Diet: regular  Bowel/Bladder: pantoja in place  Drains/Devices: pantoja  Tests/Procedures for next shift: SW following  Anticipated DC date & active delays: 7/6  Patient Stated Goal for Today: rest

## 2023-07-06 VITALS
SYSTOLIC BLOOD PRESSURE: 122 MMHG | OXYGEN SATURATION: 98 % | WEIGHT: 127 LBS | HEART RATE: 106 BPM | TEMPERATURE: 97.4 F | BODY MASS INDEX: 21.68 KG/M2 | DIASTOLIC BLOOD PRESSURE: 67 MMHG | HEIGHT: 64 IN | RESPIRATION RATE: 16 BRPM

## 2023-07-06 PROCEDURE — 250N000013 HC RX MED GY IP 250 OP 250 PS 637

## 2023-07-06 PROCEDURE — 258N000003 HC RX IP 258 OP 636: Performed by: HOSPITALIST

## 2023-07-06 PROCEDURE — 96361 HYDRATE IV INFUSION ADD-ON: CPT

## 2023-07-06 PROCEDURE — 99239 HOSP IP/OBS DSCHRG MGMT >30: CPT | Performed by: HOSPITALIST

## 2023-07-06 PROCEDURE — G0378 HOSPITAL OBSERVATION PER HR: HCPCS

## 2023-07-06 PROCEDURE — 250N000013 HC RX MED GY IP 250 OP 250 PS 637: Performed by: HOSPITALIST

## 2023-07-06 PROCEDURE — 250N000011 HC RX IP 250 OP 636

## 2023-07-06 PROCEDURE — 96376 TX/PRO/DX INJ SAME DRUG ADON: CPT

## 2023-07-06 RX ADMIN — SODIUM CHLORIDE: 9 INJECTION, SOLUTION INTRAVENOUS at 00:39

## 2023-07-06 RX ADMIN — LINACLOTIDE 290 MCG: 290 CAPSULE, GELATIN COATED ORAL at 06:38

## 2023-07-06 RX ADMIN — SODIUM CHLORIDE: 9 INJECTION, SOLUTION INTRAVENOUS at 07:35

## 2023-07-06 RX ADMIN — LUBIPROSTONE 24 MCG: 24 CAPSULE, GELATIN COATED ORAL at 09:14

## 2023-07-06 RX ADMIN — ACETAMINOPHEN 650 MG: 325 TABLET ORAL at 09:15

## 2023-07-06 RX ADMIN — PROCHLORPERAZINE EDISYLATE 10 MG: 5 INJECTION INTRAMUSCULAR; INTRAVENOUS at 00:36

## 2023-07-06 RX ADMIN — ARIPIPRAZOLE 10 MG: 10 TABLET ORAL at 09:15

## 2023-07-06 RX ADMIN — DULOXETINE HYDROCHLORIDE 120 MG: 30 CAPSULE, DELAYED RELEASE ORAL at 09:16

## 2023-07-06 RX ADMIN — PROCHLORPERAZINE EDISYLATE 10 MG: 5 INJECTION INTRAMUSCULAR; INTRAVENOUS at 06:36

## 2023-07-06 RX ADMIN — PROMETHAZINE HYDROCHLORIDE 25 MG: 25 TABLET ORAL at 09:16

## 2023-07-06 RX ADMIN — Medication 1 CAPSULE: at 09:15

## 2023-07-06 RX ADMIN — PANTOPRAZOLE SODIUM 40 MG: 40 TABLET, DELAYED RELEASE ORAL at 06:38

## 2023-07-06 ASSESSMENT — ACTIVITIES OF DAILY LIVING (ADL)
ADLS_ACUITY_SCORE: 33

## 2023-07-06 NOTE — PLAN OF CARE
Summary: 4784-0995 7/5/23 seizure like activity   Orientation: A/Ox4   Observation Goals (met & not met): not met  Activity Level: stand by to Ax1  Fall Risk: yes  Behavior & Aggression Tool Color: green, flat affect   Pain Management: PRN Tylenol given for pain  ABNL VS/O2: VSS on RA  ABNL Lab/BG: n/a  Diet: reg  Bowel/Bladder: pantoja cath in place for retention, no BM this shift  Drains/Devices: PIV infusing, JG tube rate 40   Tests/Procedures for next shift: n/a  Anticipated DC date: TBD  Other Important Info: took PRN Zofran and Melatonin
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
## 2023-07-06 NOTE — PROVIDER NOTIFICATION
MD Notification    Notified Person: MD    Notified Person Name:Negra Terrycyndy Blue MD    Notification Date/Time:1135    Notification Interaction:vocera    Purpose of Notification: Pt concerned insurance will not cover visit with psychogenic non-epileptic seizures, Pt is req reason for hospital stay changed on AVS. Also fyi pt stating she wants to drive home. Education provided. Encouraged to find transport.   Orders Received:    Comments:

## 2023-07-06 NOTE — PROGRESS NOTES
Observation goals  PRIOR TO DISCHARGE        Comments: Neuro consult and work up complete- met    safe dispo arranged-not met   Nurse to notify provider when observation goals have been met and patient is ready for discharge.

## 2023-07-06 NOTE — DISCHARGE SUMMARY
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  No follow up labs or test are needed.  Follow up with psychiatry, neurology, and urology as previously arranged.              Discharge Disposition   Discharged to home  Condition at discharge: Stable    Hospital Course   Thea Castle is a 23 year old female with a history of depression, anxiety, borderline personality disorder, gastroparesis with GJ tube, recurrent spells and possible pseudoseizures, recurrent urinary retention who is admitted on 7/4/2023 with concern for a seizure.     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 Also note history of recurrent spells with negative EEG and neurology work up in January, unclear if they are seizures vs pseudoseizures. Note long term ativan use though this has been constant recently at 0.5mg at bedtime without missed doses so benzo  withdrawal seems unlikely. Unclear if her multiple psychiatric diagnoses are playing a role here but with the elevated lactic acid noted during her 2nd ED visit it does seem like she may have actually seized. Head CT in the ED negative.   - Registered to observation.  - Neurology consulted, appreciate their assistance. Evaluation consistent with non-epileptic spells, do not recommend anti-epileptic medication.  - Seizure precautions were in place during her hospitalization.  - PRN ativan available, but as above do not suspect actual seizure activity.  - Repeat lactic acid within normal limits.  - She did not have any further episodes in the hospital.  - Patient requested to see a different Neurologist, however unable to accommodate this in the hospital at this time. She has follow-up with an outpatient Neurology next week and is considering going to [facility] for another opinion.  - She requested to be discharged this morning.  - Discharge home today.  - Follow-up with PCP, psychiatry, neurology, and urology.  - Discussed reasons to seek medical attention prior to follow-up appointments.  - Recommended that she not drive until her spells are further addressed through outpatient psychiatry/psychology.    Orthostatic hypotension  Orthostatic vitals positive 7/5/23. She complained of lightheadedness/dizziness when she got up.  - Increased IV fluids. Unable to get repeat orthostatic vitals today or assess symptoms with mobility.  - Discussed repeating orthostatic vitals today, however she just wanted to leave the hospital.    Chronic abdominal pain  Gastroparesis  Slow transit constipation  GJ tube  Recent admission for ileus ([facility] 6/12-21)  This appears to be stable though patient does wonder if she could have dislodged her GJ tube in the fall.  - Abdominal x-ray on 7/4/23 showed GJ tube in place.  - Nutrition consulted for tube feeds.  - Continue PTA bowel regimen.  - PRN tap water enema available in the  hospital, had good results from enema on 7/4/23.    Depression  Anxiety  OCD  PTSD  Borderline Personality Disorder  Anorexia Nervosa  - Continued PTA regimen.  - Discussed with psychiatry on 7/5/23, recommend patient follow-up with her established outpatient psychiatrist - she has an appointment with them within the next week.    Recurrent urinary retention  Smith catheter placed in ED with 1000mL out. Scheduled to see urology in [place] on 7/7.   - Has previously straight cathed at home, but states she had significant difficulty with it and she prefers to keep the Smith catheter in place until follow-up with Urology despite the risks of infection.  - Continue Smith catheter until follow-up with Urology.    Consultations This Hospital Stay   NEUROLOGY IP CONSULT  NUTRITION SERVICES ADULT IP CONSULT  CARE MANAGEMENT / SOCIAL WORK IP CONSULT  PHARMACY IP CONSULT  SOCIAL WORK IP CONSULT  SOCIAL WORK IP CONSULT    Code Status   Full Code    Time Spent on this Encounter   I, Ambrosio Terry MD, personally saw the patient today and spent greater than 30 minutes discharging this patient.       Ambrosio Terry MD  [facility] EXTENDED RECOVERY AND SHORT STAY  [address] [place] [id number]  Phone: [phone]  ______________________________________________________________________    Physical Exam   Vital Signs: Temp: 97.4  F (36.3  C) Temp src: Oral BP: 122/67 Pulse: 106   Resp: 16 SpO2: 98 % O2 Device: None (Room air)    Weight: 127 lbs 0 oz  Constitutional: awake, alert, cooperative, no apparent distress, laying in the hospital bed with the head of the bed elevated  Respiratory: no increased work of breathing, clear to auscultation bilaterally, no crackles or wheezing  Cardiovascular: regular rate and rhythm, normal S1 and S2, no murmur noted  GI: normal bowel sounds, soft, non-distended, non-tender, GJ tube in place  : Smith catheter in place  Skin: warm, dry  Musculoskeletal:  no lower extremity pitting edema present  Neurologic: awake, alert, answers questions appropriately, moves all extremities       Primary Care Physician   Bridgette Foss    Discharge Orders      Home Infusion Referral      Follow-up and recommended labs and tests     Follow up with primary care provider, Bridgette Foss, within 7 days for hospital follow- up.  No follow up labs or test are needed.  Follow up with psychiatry, neurology, and urology as previously arranged.     Activity    Your activity upon discharge: activity as tolerated. Recommend no driving and avoid situations that could cause you harm if you have another spell.     Continue indwelling urinary catheter (Smith)    Smith catheter to remain in place until follow-up with Urology on Monday.     Reason for your hospital stay    Fall     Diet    Follow this diet upon discharge: continue tube feedings as previously ordered, regular diet as tolerated     Significant Results and Procedures   Most Recent 3 CBC's:Recent Labs   Lab Test 07/05/23  0644 07/03/23  2337 06/29/23  1329   WBC 5.6 8.4 5.6   HGB 10.0* 10.8* 10.5*   MCV 89 90 89    316 249     Most Recent 3 BMP's:Recent Labs   Lab Test 07/05/23  0644 07/04/23  2134 07/04/23  0245 07/03/23  2337     --  142 139   POTASSIUM 4.0  --  3.7 3.7   CHLORIDE 107  --  105 103   CO2 22  --  16* 23   BUN 6.2  --  11.5 13.8   CR 0.62  --  0.74 0.69   ANIONGAP 8  --  21* 13   DENNIS 8.3*  --  9.0 9.1   * 91 105* 107*     Results for orders placed or performed during the hospital encounter of 07/04/23   Head CT w/o contrast    Narrative    EXAM: CT HEAD W/O CONTRAST  LOCATION: [facility]  DATE: 7/4/2023    INDICATION: Head trauma 2 weeks ago, multiple seizure like episodes today  COMPARISON: 06/06/2023  TECHNIQUE: Routine CT Head without IV contrast. Multiplanar reformats. Dose reduction techniques were used.    FINDINGS:  INTRACRANIAL CONTENTS: No intracranial  hemorrhage, extraaxial collection, or mass effect.  No CT evidence of acute infarct. Normal parenchymal attenuation. Normal ventricles and sulci.     VISUALIZED ORBITS/SINUSES/MASTOIDS: No intraorbital abnormality. No paranasal sinus mucosal disease. No middle ear or mastoid effusion.    BONES/SOFT TISSUES: No acute abnormality.      Impression    IMPRESSION:  1.  No acute intracranial process.   XR Abdomen 1 View    Narrative    EXAM: XR ABDOMEN 1 VIEW  LOCATION: [facility]  DATE: 7/4/2023    INDICATION: fall, ensure placement of GJ tube  COMPARISON: 06/29/2023      Impression    IMPRESSION: Gastrojejunostomy tube remains in good position tip in the proximal jejunum. Bowel gas pattern is nonobstructed.     Discharge Medications   Current Discharge Medication List      CONTINUE these medications which have NOT CHANGED    Details   acetaminophen (TYLENOL) 325 MG tablet Take 2 tablets by mouth every 4 hours as needed      ARIPiprazole (ABILIFY) 10 MG tablet Take 10 mg by mouth daily      calcium carbonate (TUMS) 500 MG chewable tablet Take 1 tablet (500 mg) by mouth as needed for heartburn  Qty: 30 tablet, Refills: 0    Associated Diagnoses: Indigestion      cholecalciferol (VITAMIN D3) 25 mcg (1000 units) capsule Take 1 capsule by mouth daily      diazepam (DIASTAT ACUDIAL) 10 MG GEL rectal gel Place 10 mg rectally every 10 minutes as needed for seizures  Qty: 1 each, Refills: 0      drospirenone-ethinyl estradiol (FELIPE) 3-0.02 MG tablet Take 1 tablet by mouth daily      DULoxetine (CYMBALTA) 60 MG capsule Take 1 capsule (60 mg) by mouth daily  Qty: 30 capsule, Refills: 0    Associated Diagnoses: Recurrent major depressive disorder, in partial remission (H)      hydrOXYzine (ATARAX) 25 MG tablet Take 1 tablet (25 mg) by mouth 2 times daily as needed for anxiety or other (sleep, melatonin augmentation or failure)  Qty: 60 tablet, Refills: 0    Associated Diagnoses: MAT (generalized anxiety  "disorder)      lactobacillus rhamnosus, GG, (CULTURELL) capsule Take 1 capsule by mouth daily      linaclotide (LINZESS) 290 MCG capsule Take 290 mcg by mouth every morning (before breakfast)      LORazepam (ATIVAN) 0.5 MG tablet Take 0.5 mg by mouth At Bedtime      lubiprostone (AMITIZA) 24 MCG capsule Take 24 mcg by mouth 2 times daily (with meals)      Melatonin 10 MG TABS tablet Take 10 mg by mouth At Bedtime      multivitamin w/minerals (THERA-VIT-M) tablet Take 1 tablet by mouth daily  Qty: 30 tablet, Refills: 0    Associated Diagnoses: Vitamin deficiency      ondansetron (ZOFRAN ODT) 4 MG ODT tab Take 1 tablet (4 mg) by mouth every 6 hours as needed for nausea or vomiting  Qty: 30 tablet, Refills: 0    Associated Diagnoses: Proctitis      pantoprazole (PROTONIX) 40 MG EC tablet Take 1 tablet (40 mg) by mouth 2 times daily  Qty: 60 tablet, Refills: 0    Associated Diagnoses: Anorexia nervosa, restricting type      plecanatide (TRULANCE) 3 MG tablet Take 3 mg by mouth daily      prochlorperazine (COMPAZINE) 10 MG tablet Take 10 mg by mouth every 8 hours as needed for nausea or vomiting      promethazine (PHENERGAN) 25 MG tablet Take 25 mg by mouth 2 times daily      vitamin C (ASCORBIC ACID) 250 MG tablet Take 250 mg by mouth daily      promethazine (PHENERGAN) 25 MG suppository Place 1 suppository (25 mg) rectally every 6 hours as needed for nausea (take instead of oral dose if you cannot keep the pills down)  Qty: 30 suppository, Refills: 0           Allergies   Allergies   Allergen Reactions     Amoxicillin Rash     Penicillins Unknown     Mother unsure if patient or sister had a reaction. Mother reports she \"didn't think it was anaphylactic\".     Lactose Nausea, GI Disturbance, Nausea and Vomiting and Other (See Comments)     Other reaction(s): Gastrointestinal  Fells sick with milk, but can have yogurt   Fells sick with milk, but can have yogurt   Fells sick with milk, but can have yogurt   Other " reaction(s): Gastrointestinal  Fells sick with milk, but can have yogurt   Fells sick with milk, but can have yogurt   Other reaction(s): Gastrointestinal  Fells sick with milk, but can have yogurt   Fells sick with milk, but can have yogurt   Other reaction(s): Gastrointestinal  Fells sick with milk, but can have yogurt        Levofloxacin Muscle Pain (Myalgia)     Developed myalgia on levofloxacin 500 mg/d (11/28/22) after 1 day and requested to switch antibiotics     Other Food Allergy GI Disturbance     Cilantro

## 2023-07-08 ENCOUNTER — HOSPITAL ENCOUNTER (EMERGENCY)
Facility: CLINIC | Age: 23
Discharge: HOME | End: 2023-07-09
Attending: EMERGENCY MEDICINE | Admitting: EMERGENCY MEDICINE
Payer: COMMERCIAL

## 2023-07-08 VITALS
SYSTOLIC BLOOD PRESSURE: 149 MMHG | DIASTOLIC BLOOD PRESSURE: 100 MMHG | BODY MASS INDEX: 23.04 KG/M2 | OXYGEN SATURATION: 98 % | RESPIRATION RATE: 14 BRPM | WEIGHT: 130 LBS | HEIGHT: 63 IN | TEMPERATURE: 99.9 F | HEART RATE: 111 BPM

## 2023-07-08 DIAGNOSIS — Z59.00 HOMELESS: ICD-10-CM

## 2023-07-08 PROCEDURE — 99283 EMERGENCY DEPT VISIT LOW MDM: CPT | Performed by: EMERGENCY MEDICINE

## 2023-07-08 RX ORDER — ONDANSETRON 4 MG/1
4 TABLET, ORALLY DISINTEGRATING ORAL ONCE
Status: DISCONTINUED | OUTPATIENT
Start: 2023-07-08 | End: 2023-07-09 | Stop reason: HOSPADM

## 2023-07-08 SDOH — ECONOMIC STABILITY - HOUSING INSECURITY: HOMELESSNESS UNSPECIFIED: Z59.00

## 2023-07-08 ASSESSMENT — ACTIVITIES OF DAILY LIVING (ADL): ADLS_ACUITY_SCORE: 35

## 2023-07-09 VITALS
BODY MASS INDEX: 23.03 KG/M2 | RESPIRATION RATE: 16 BRPM | OXYGEN SATURATION: 98 % | SYSTOLIC BLOOD PRESSURE: 118 MMHG | DIASTOLIC BLOOD PRESSURE: 88 MMHG | WEIGHT: 130 LBS | HEART RATE: 90 BPM | TEMPERATURE: 98.1 F

## 2023-07-09 DIAGNOSIS — R45.851 SUICIDAL IDEATION: ICD-10-CM

## 2023-07-09 LAB
ALBUMIN SERPL BCG-MCNC: 3.9 G/DL (ref 3.5–5.2)
ALBUMIN SERPL BCG-MCNC: 4.6 G/DL (ref 3.5–5.2)
ALP SERPL-CCNC: 73 U/L (ref 35–104)
ALP SERPL-CCNC: 81 U/L (ref 35–104)
ALT SERPL W P-5'-P-CCNC: 23 U/L (ref 0–50)
ALT SERPL W P-5'-P-CCNC: 25 U/L (ref 0–50)
ANION GAP SERPL CALCULATED.3IONS-SCNC: 12 MMOL/L (ref 7–15)
ANION GAP SERPL CALCULATED.3IONS-SCNC: 14 MMOL/L (ref 7–15)
APAP SERPL-MCNC: <5 UG/ML (ref 10–30)
AST SERPL W P-5'-P-CCNC: 28 U/L (ref 0–45)
AST SERPL W P-5'-P-CCNC: 33 U/L (ref 0–45)
ATRIAL RATE - MUSE: 93 BPM
BASOPHILS # BLD AUTO: 0 10E3/UL (ref 0–0.2)
BASOPHILS # BLD AUTO: 0 10E3/UL (ref 0–0.2)
BASOPHILS NFR BLD AUTO: 0 %
BASOPHILS NFR BLD AUTO: 1 %
BILIRUB SERPL-MCNC: 0.2 MG/DL
BILIRUB SERPL-MCNC: 0.2 MG/DL
BUN SERPL-MCNC: 11 MG/DL (ref 6–20)
BUN SERPL-MCNC: 9.4 MG/DL (ref 6–20)
CALCIUM SERPL-MCNC: 10 MG/DL (ref 8.6–10)
CALCIUM SERPL-MCNC: 9.2 MG/DL (ref 8.6–10)
CHLORIDE SERPL-SCNC: 105 MMOL/L (ref 98–107)
CHLORIDE SERPL-SCNC: 105 MMOL/L (ref 98–107)
CREAT SERPL-MCNC: 0.64 MG/DL (ref 0.51–0.95)
CREAT SERPL-MCNC: 0.71 MG/DL (ref 0.51–0.95)
DEPRECATED HCO3 PLAS-SCNC: 22 MMOL/L (ref 22–29)
DEPRECATED HCO3 PLAS-SCNC: 25 MMOL/L (ref 22–29)
DIASTOLIC BLOOD PRESSURE - MUSE: 75 MMHG
EOSINOPHIL # BLD AUTO: 0.1 10E3/UL (ref 0–0.7)
EOSINOPHIL # BLD AUTO: 0.1 10E3/UL (ref 0–0.7)
EOSINOPHIL NFR BLD AUTO: 1 %
EOSINOPHIL NFR BLD AUTO: 1 %
ERYTHROCYTE [DISTWIDTH] IN BLOOD BY AUTOMATED COUNT: 13.7 % (ref 10–15)
ERYTHROCYTE [DISTWIDTH] IN BLOOD BY AUTOMATED COUNT: 13.9 % (ref 10–15)
ETHANOL SERPL-MCNC: <0.01 G/DL
GFR SERPL CREATININE-BSD FRML MDRD: >90 ML/MIN/1.73M2
GFR SERPL CREATININE-BSD FRML MDRD: >90 ML/MIN/1.73M2
GLUCOSE SERPL-MCNC: 108 MG/DL (ref 70–99)
GLUCOSE SERPL-MCNC: 140 MG/DL (ref 70–99)
HCG SERPL QL: NEGATIVE
HCT VFR BLD AUTO: 32.6 % (ref 35–47)
HCT VFR BLD AUTO: 36.7 % (ref 35–47)
HGB BLD-MCNC: 10.8 G/DL (ref 11.7–15.7)
HGB BLD-MCNC: 11.6 G/DL (ref 11.7–15.7)
HOLD SPECIMEN: NORMAL
HOLD SPECIMEN: NORMAL
IMM GRANULOCYTES # BLD: 0 10E3/UL
IMM GRANULOCYTES # BLD: 0 10E3/UL
IMM GRANULOCYTES NFR BLD: 0 %
IMM GRANULOCYTES NFR BLD: 0 %
INTERPRETATION ECG - MUSE: NORMAL
LYMPHOCYTES # BLD AUTO: 2.1 10E3/UL (ref 0.8–5.3)
LYMPHOCYTES # BLD AUTO: 2.1 10E3/UL (ref 0.8–5.3)
LYMPHOCYTES NFR BLD AUTO: 30 %
LYMPHOCYTES NFR BLD AUTO: 33 %
MCH RBC QN AUTO: 28.6 PG (ref 26.5–33)
MCH RBC QN AUTO: 29.6 PG (ref 26.5–33)
MCHC RBC AUTO-ENTMCNC: 31.6 G/DL (ref 31.5–36.5)
MCHC RBC AUTO-ENTMCNC: 33.1 G/DL (ref 31.5–36.5)
MCV RBC AUTO: 89 FL (ref 78–100)
MCV RBC AUTO: 91 FL (ref 78–100)
MONOCYTES # BLD AUTO: 0.4 10E3/UL (ref 0–1.3)
MONOCYTES # BLD AUTO: 0.4 10E3/UL (ref 0–1.3)
MONOCYTES NFR BLD AUTO: 6 %
MONOCYTES NFR BLD AUTO: 6 %
NEUTROPHILS # BLD AUTO: 3.9 10E3/UL (ref 1.6–8.3)
NEUTROPHILS # BLD AUTO: 4.3 10E3/UL (ref 1.6–8.3)
NEUTROPHILS NFR BLD AUTO: 59 %
NEUTROPHILS NFR BLD AUTO: 63 %
NRBC # BLD AUTO: 0 10E3/UL
NRBC # BLD AUTO: 0 10E3/UL
NRBC BLD AUTO-RTO: 0 /100
NRBC BLD AUTO-RTO: 0 /100
P AXIS - MUSE: 80 DEGREES
PLATELET # BLD AUTO: 308 10E3/UL (ref 150–450)
PLATELET # BLD AUTO: 331 10E3/UL (ref 150–450)
POTASSIUM SERPL-SCNC: 3.6 MMOL/L (ref 3.4–5.3)
POTASSIUM SERPL-SCNC: 3.6 MMOL/L (ref 3.4–5.3)
PR INTERVAL - MUSE: 146 MS
PROT SERPL-MCNC: 6.8 G/DL (ref 6.4–8.3)
PROT SERPL-MCNC: 7.5 G/DL (ref 6.4–8.3)
QRS DURATION - MUSE: 88 MS
QT - MUSE: 360 MS
QTC - MUSE: 447 MS
R AXIS - MUSE: 79 DEGREES
RBC # BLD AUTO: 3.65 10E6/UL (ref 3.8–5.2)
RBC # BLD AUTO: 4.05 10E6/UL (ref 3.8–5.2)
SALICYLATES SERPL-MCNC: <0.3 MG/DL
SODIUM SERPL-SCNC: 141 MMOL/L (ref 136–145)
SODIUM SERPL-SCNC: 142 MMOL/L (ref 136–145)
SYSTOLIC BLOOD PRESSURE - MUSE: 123 MMHG
T AXIS - MUSE: 59 DEGREES
VENTRICULAR RATE- MUSE: 93 BPM
WBC # BLD AUTO: 6.5 10E3/UL (ref 4–11)
WBC # BLD AUTO: 6.8 10E3/UL (ref 4–11)

## 2023-07-09 PROCEDURE — 99285 EMERGENCY DEPT VISIT HI MDM: CPT | Mod: 25

## 2023-07-09 PROCEDURE — 80143 DRUG ASSAY ACETAMINOPHEN: CPT | Performed by: EMERGENCY MEDICINE

## 2023-07-09 PROCEDURE — 80179 DRUG ASSAY SALICYLATE: CPT | Performed by: EMERGENCY MEDICINE

## 2023-07-09 PROCEDURE — 36415 COLL VENOUS BLD VENIPUNCTURE: CPT | Performed by: EMERGENCY MEDICINE

## 2023-07-09 PROCEDURE — 85025 COMPLETE CBC W/AUTO DIFF WBC: CPT | Performed by: EMERGENCY MEDICINE

## 2023-07-09 PROCEDURE — 93005 ELECTROCARDIOGRAM TRACING: CPT | Mod: RTG

## 2023-07-09 PROCEDURE — 84155 ASSAY OF PROTEIN SERUM: CPT | Performed by: EMERGENCY MEDICINE

## 2023-07-09 PROCEDURE — 80053 COMPREHEN METABOLIC PANEL: CPT | Performed by: EMERGENCY MEDICINE

## 2023-07-09 PROCEDURE — 82077 ASSAY SPEC XCP UR&BREATH IA: CPT | Performed by: EMERGENCY MEDICINE

## 2023-07-09 PROCEDURE — 93005 ELECTROCARDIOGRAM TRACING: CPT

## 2023-07-09 PROCEDURE — 90791 PSYCH DIAGNOSTIC EVALUATION: CPT

## 2023-07-09 PROCEDURE — 84703 CHORIONIC GONADOTROPIN ASSAY: CPT | Performed by: EMERGENCY MEDICINE

## 2023-07-09 ASSESSMENT — COLUMBIA-SUICIDE SEVERITY RATING SCALE - C-SSRS
TOTAL  NUMBER OF ABORTED OR SELF INTERRUPTED ATTEMPTS LIFETIME: NO
1. IN THE PAST MONTH, HAVE YOU WISHED YOU WERE DEAD OR WISHED YOU COULD GO TO SLEEP AND NOT WAKE UP?: YES
REASONS FOR IDEATION PAST MONTH: MOSTLY TO END OR STOP THE PAIN (YOU COULDN'T GO ON LIVING WITH THE PAIN OR HOW YOU WERE FEELING)
4. HAVE YOU HAD THESE THOUGHTS AND HAD SOME INTENTION OF ACTING ON THEM?: YES
MOST LETHAL DATE: 66664
LETHALITY/MEDICAL DAMAGE CODE FIRST POTENTIAL ATTEMPT: BEHAVIOR NOT LIKELY TO RESULT IN INJURY
LETHALITY/MEDICAL DAMAGE CODE FIRST ACTUAL ATTEMPT: NO PHYSICAL DAMAGE OR VERY MINOR PHYSICAL DAMAGE
LETHALITY/MEDICAL DAMAGE CODE MOST RECENT ACTUAL ATTEMPT: NO PHYSICAL DAMAGE OR VERY MINOR PHYSICAL DAMAGE
LETHALITY/MEDICAL DAMAGE CODE MOST LETHAL ACTUAL ATTEMPT: NO PHYSICAL DAMAGE OR VERY MINOR PHYSICAL DAMAGE
2. HAVE YOU ACTUALLY HAD ANY THOUGHTS OF KILLING YOURSELF?: YES
FIRST ATTEMPT DATE: 66476
2. HAVE YOU ACTUALLY HAD ANY THOUGHTS OF KILLING YOURSELF?: YES
ATTEMPT LIFETIME: YES
5. HAVE YOU STARTED TO WORK OUT OR WORKED OUT THE DETAILS OF HOW TO KILL YOURSELF? DO YOU INTEND TO CARRY OUT THIS PLAN?: YES
REASONS FOR IDEATION LIFETIME: DOES NOT APPLY
LETHALITY/MEDICAL DAMAGE CODE MOST RECENT POTENTIAL ATTEMPT: BEHAVIOR NOT LIKELY TO RESULT IN INJURY
LETHALITY/MEDICAL DAMAGE CODE MOST LETHAL POTENTIAL ATTEMPT: BEHAVIOR NOT LIKELY TO RESULT IN INJURY
MOST RECENT DATE: 66664
6. HAVE YOU EVER DONE ANYTHING, STARTED TO DO ANYTHING, OR PREPARED TO DO ANYTHING TO END YOUR LIFE?: NO
TOTAL  NUMBER OF INTERRUPTED ATTEMPTS LIFETIME: NO
3. HAVE YOU BEEN THINKING ABOUT HOW YOU MIGHT KILL YOURSELF?: YES
TOTAL  NUMBER OF ACTUAL ATTEMPTS LIFETIME: 2
4. HAVE YOU HAD THESE THOUGHTS AND HAD SOME INTENTION OF ACTING ON THEM?: YES
1. HAVE YOU WISHED YOU WERE DEAD OR WISHED YOU COULD GO TO SLEEP AND NOT WAKE UP?: YES
5. HAVE YOU STARTED TO WORK OUT OR WORKED OUT THE DETAILS OF HOW TO KILL YOURSELF? DO YOU INTEND TO CARRY OUT THIS PLAN?: YES
TOTAL  NUMBER OF ACTUAL ATTEMPTS PAST 3 MONTHS: 1
ATTEMPT PAST THREE MONTHS: YES

## 2023-07-09 ASSESSMENT — ACTIVITIES OF DAILY LIVING (ADL)
ADLS_ACUITY_SCORE: 35
ADLS_ACUITY_SCORE: 33

## 2023-07-09 NOTE — DISCHARGE INSTRUCTIONS
"Appts:  Continue with established providers.    Aftercare Plan  If I am feeling unsafe or I am in a crisis, I will:   Contact my established care providers   Call the National Suicide Prevention Lifeline: 988  Go to the nearest emergency room   Call 911     Warning signs that I or other people might notice when a crisis is developing for me:   Depressed mood, isolating behavior, increased SI.    Things I am able to do on my own to cope or help me feel better:   Listen to music, take a walk, call a friend.    Things that I am able to do with others to cope or help me better:   Hang out, travel, play games.    Things I can use or do for distraction:   Music, social media, magazines     Changes I can make to support my mental health and wellness:   Improve my sleep patterns, eat better, stay on my meds.    People in my life that I can ask for help:   My aunt Mulu, friend Shabnam, and friend Iwona.    Your [facility] has a mental health crisis team you can call 24/7: [facility] Mobile Crisis  [phone] (adults)  [phone] (children)    Other things that are important when I'm in crisis: NA     Additional resources and information: NA        Crisis Lines  Crisis Text Line  Text 527183  You will be connected with a trained live crisis counselor to provide support.    Por espanol, texto  KIMMIE a 527449 o texto a 442-AYUDAME en WhatsApp    [employer] (LGBTQ Youth Crisis Line)  7.660.548.0918  text START to 298-227      Community Resources  Fast Tracker  Linking people to mental health and substance use disorder resources  Clarity Payment SolutionstrackFitlyn.org     [place] Mental Health Warm Line  Peer to peer support  Monday thru Saturday, 12 pm to 10 pm  200.682.7130 or 1.971.817.0035  Text \"Support\" to 68887    [employer] [place] [employer] Mental Illness (PATRICK)  744.572.6137 or 1.888.PATRICK.HELPS      Mental Health Apps  My3  https://my3app.org/    VirtualHopeBox  " https://[employer]/apps/virtual-hope-box/      Additional Information  Today you were seen by a licensed mental health professional through Triage and Transition services, [facility] (P)  for a crisis assessment in the Emergency Department at [facility].  It is recommended that you follow up with your established providers (psychiatrist, mental health therapist, and/or primary care doctor - as relevant) as soon as possible. Coordinators from [facility] will be calling you in the next 24-48 hours to ensure that you have the resources you need.  You can also contact [facility] coordinators directly at [phone]. You may have been scheduled for or offered an appointment with a mental health provider. [facility] maintains an extensive network of licensed behavioral health providers to connect patients with the services they need.  We do not charge providers a fee to participate in our referral network.  We match patients with providers based on a patient's specific needs, insurance coverage, and location.  Our first effort will be to refer you to a provider within your care system, and will utilize providers outside your care system as needed.
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
## 2023-07-09 NOTE — ED PROVIDER NOTES
"    [place] EMERGENCY DEPARTMENT ([facility])    7/08/23       ED PROVIDER NOTE      History     Chief Complaint   Patient presents with     Altered Mental Status     Pseudo-seizure     HPI  Thea Castle is a 23 year old female who has a past medical history of depression, anxiety, suicidal ideations, borderline personality disorder, gastroparesis with GJ tube, recurrent spells and possible pseudoseizures, and recurrent urinary retention presents to the ED via EMS with seizure-like activity.  Per EMS,  they picked her up from her car and that she told them she gets an aura before having a seizure. Per EMS, patient then began shaking which they describe as \"kicking her legs\" and this lasted approximately 7 seconds. EMS asked patient if this was a seizure while she was shaking and she responded by nodding her head. Patient denied drugs or alcohol and was unable to respond to orientation questions, however carried a logical conversation with writer. On examination, patient states that she wants to go leave. She states that she does not have a home to go to and her parents kicked her out. She states that she wants to talk to a . She states that she is not suicidal or homicidal right now.     Patient was recently admitted at [facility] (7/4/2023-7/6/2023) for observed seizure-like activity.  Friend at bedside noted that patient had shaking after she fell in the stairs tonight. Also note history of recurrent spells with negative EEG and neurology work up in January, unclear if they are seizures vs pseudoseizures. Note long term ativan use though this has been constant recently at 0.5mg at bedtime without missed doses so benzo withdrawal seems unlikely. Unclear if her multiple psychiatric diagnoses are playing a role here but with the elevated lactic acid noted during her 2nd ED visit it does seem like she may have actually seized. Head CT in the ED negative.     CT HEAD W/O CONTRAST Impression:  1.  " "No acute intracranial process.      Past Medical History  Past Medical History:   Diagnosis Date     Anorexia      Anxiety      Depressive disorder      Gastroparesis      OCD (obsessive compulsive disorder)      PTSD (post-traumatic stress disorder)      Slow transit constipation      Ulcerative colitis (H)      Urinary retention      Past Surgical History:   Procedure Laterality Date     ENT SURGERY       IR GASTRO JEJUNOSTOMY TUBE PLACEMENT  5/30/2023     acetaminophen (TYLENOL) 325 MG tablet  ARIPiprazole (ABILIFY) 10 MG tablet  calcium carbonate (TUMS) 500 MG chewable tablet  cholecalciferol (VITAMIN D3) 25 mcg (1000 units) capsule  diazepam (DIASTAT ACUDIAL) 10 MG GEL rectal gel  drospirenone-ethinyl estradiol (FELIPE) 3-0.02 MG tablet  DULoxetine (CYMBALTA) 60 MG capsule  hydrOXYzine (ATARAX) 25 MG tablet  lactobacillus rhamnosus, GG, (CULTURELL) capsule  linaclotide (LINZESS) 290 MCG capsule  LORazepam (ATIVAN) 0.5 MG tablet  lubiprostone (AMITIZA) 24 MCG capsule  Melatonin 10 MG TABS tablet  multivitamin w/minerals (THERA-VIT-M) tablet  ondansetron (ZOFRAN ODT) 4 MG ODT tab  pantoprazole (PROTONIX) 40 MG EC tablet  plecanatide (TRULANCE) 3 MG tablet  prochlorperazine (COMPAZINE) 10 MG tablet  promethazine (PHENERGAN) 25 MG suppository  promethazine (PHENERGAN) 25 MG tablet  vitamin C (ASCORBIC ACID) 250 MG tablet      Allergies   Allergen Reactions     Amoxicillin Rash     Penicillins Unknown     Mother unsure if patient or sister had a reaction. Mother reports she \"didn't think it was anaphylactic\".     Lactose Nausea, GI Disturbance, Nausea and Vomiting and Other (See Comments)     Other reaction(s): Gastrointestinal  Fells sick with milk, but can have yogurt   Fells sick with milk, but can have yogurt   Fells sick with milk, but can have yogurt   Other reaction(s): Gastrointestinal  Fells sick with milk, but can have yogurt   Fells sick with milk, but can have yogurt   Other reaction(s): " "Gastrointestinal  Fells sick with milk, but can have yogurt   Fells sick with milk, but can have yogurt   Other reaction(s): Gastrointestinal  Fells sick with milk, but can have yogurt        Levofloxacin Muscle Pain (Myalgia)     Developed myalgia on levofloxacin 500 mg/d (11/28/22) after 1 day and requested to switch antibiotics     Other Food Allergy GI Disturbance     Cilantro     Family History  Family History   Problem Relation Age of Onset     Suicide Other      Social History   Social History     Tobacco Use     Smoking status: Never     Smokeless tobacco: Never   Vaping Use     Vaping Use: Never used   Substance Use Topics     Alcohol use: Not Currently     Comment: 2 drinks a week     Drug use: Never         A medically appropriate review of systems was performed with pertinent positives and negatives noted in the HPI, and all other systems negative.    Physical Exam   BP: (!) 149/100  Pulse: 111  Temp: 99.9  F (37.7  C)  Resp: 14  Height: 160 cm (5' 3\")  Weight: 59 kg (130 lb)  SpO2: 98 %  Physical Exam  Constitutional:       General: She is not in acute distress.     Appearance: She is not diaphoretic.   HENT:      Head: Normocephalic and atraumatic.      Right Ear: External ear normal.      Left Ear: External ear normal.      Nose: Nose normal.   Eyes:      Extraocular Movements: Extraocular movements intact.      Conjunctiva/sclera: Conjunctivae normal.   Cardiovascular:      Rate and Rhythm: Normal rate and regular rhythm.      Heart sounds: Normal heart sounds.   Pulmonary:      Effort: Pulmonary effort is normal. No respiratory distress.      Breath sounds: Normal breath sounds.   Abdominal:      General: There is no distension.      Palpations: Abdomen is soft.      Tenderness: There is no abdominal tenderness.   Musculoskeletal:         General: No swelling or deformity.      Cervical back: Normal range of motion and neck supple.   Skin:     General: Skin is warm and dry.   Neurological:      " Comments: Alert, responding to occasional questions   Psychiatric:         Mood and Affect: Mood normal.         Behavior: Behavior normal.         ED Course, Procedures, & Data     9:29 PM  The patient was seen and examined by Jett Orozco in Room ED20.       Procedures                     Results for orders placed or performed during the hospital encounter of 07/08/23   [place] Draw     Status: None ()    Narrative    The following orders were created for panel order [place] Draw.  Procedure                               Abnormality         Status                     ---------                               -----------         ------                     Extra Green Top (Lithium...[[id number]]                                                 Extra Purple Top Tube[[id number]]                                                         Please view results for these tests on the individual orders.   Extra Tube ([place] Draw)     Status: None (In process)    Narrative    The following orders were created for panel order Extra Tube ([place] Draw).  Procedure                               Abnormality         Status                     ---------                               -----------         ------                     Extra Blue Top Tube[[id number]]                              In process                 Extra Red Top Tube[[id number]]                               In process                   Please view results for these tests on the individual orders.   CBC with platelets and differential     Status: Abnormal   Result Value Ref Range    WBC Count 6.8 4.0 - 11.0 10e3/uL    RBC Count 4.05 3.80 - 5.20 10e6/uL    Hemoglobin 11.6 (L) 11.7 - 15.7 g/dL    Hematocrit 36.7 35.0 - 47.0 %    MCV 91 78 - 100 fL    MCH 28.6 26.5 - 33.0 pg    MCHC 31.6 31.5 - 36.5 g/dL    RDW 13.9 10.0 - 15.0 %    Platelet Count 331 150 - 450 10e3/uL    % Neutrophils 63 %    % Lymphocytes 30 %    % Monocytes 6 %    % Eosinophils 1 %    % Basophils 0  %    % Immature Granulocytes 0 %    NRBCs per 100 WBC 0 <1 /100    Absolute Neutrophils 4.3 1.6 - 8.3 10e3/uL    Absolute Lymphocytes 2.1 0.8 - 5.3 10e3/uL    Absolute Monocytes 0.4 0.0 - 1.3 10e3/uL    Absolute Eosinophils 0.1 0.0 - 0.7 10e3/uL    Absolute Basophils 0.0 0.0 - 0.2 10e3/uL    Absolute Immature Granulocytes 0.0 <=0.4 10e3/uL    Absolute NRBCs 0.0 10e3/uL   CBC with platelets differential *Canceled*     Status: None ()    Narrative    The following orders were created for panel order CBC with platelets differential.  Procedure                               Abnormality         Status                     ---------                               -----------         ------                       Please view results for these tests on the individual orders.   CBC with platelets differential     Status: Abnormal    Narrative    The following orders were created for panel order CBC with platelets differential.  Procedure                               Abnormality         Status                     ---------                               -----------         ------                     CBC with platelets and d...[[id number]]  Abnormal            Final result                 Please view results for these tests on the individual orders.     Medications   ondansetron (ZOFRAN ODT) ODT tab 4 mg (has no administration in time range)   ondansetron (ZOFRAN ODT) ODT tab 4 mg (has no administration in time range)     Labs Ordered and Resulted from Time of ED Arrival to Time of ED Departure   CBC WITH PLATELETS AND DIFFERENTIAL - Abnormal       Result Value    WBC Count 6.8      RBC Count 4.05      Hemoglobin 11.6 (*)     Hematocrit 36.7      MCV 91      MCH 28.6      MCHC 31.6      RDW 13.9      Platelet Count 331      % Neutrophils 63      % Lymphocytes 30      % Monocytes 6      % Eosinophils 1      % Basophils 0      % Immature Granulocytes 0      NRBCs per 100 WBC 0      Absolute Neutrophils 4.3      Absolute Lymphocytes  2.1      Absolute Monocytes 0.4      Absolute Eosinophils 0.1      Absolute Basophils 0.0      Absolute Immature Granulocytes 0.0      Absolute NRBCs 0.0     COMPREHENSIVE METABOLIC PANEL     No orders to display          Medical Decision Making  The patient's presentation is strongly suggestive of moderate complexity (a chronic illness mild to moderate exacerbation, progression, or side effect of treatment).    The patient's evaluation involved:  review of external note(s) from 3+ sources (Most recent H&P in addition to clinic and ED note)  review of 2 test result(s) ordered prior to this encounter (Most recent BMP and CBC)    The patient's management involved only low risk treatment.      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  She does have a history of pseudoseizures as well.  Upon arrival today initially she was not speaking much.  She did answer to say that she did not want any labs done and she had time to stated she wants to leave and then she wants to stay.  She denies suicidal homicidal ideation.  She did want to talk to a DEC .  Patient's friend later arrived.  At that point the patient was staring forward not make any eye contact.  The patient's friend stated she was concerned that she was catatonic.  The patient then stated that she wanted her friend to wait outside and to be alone.  She continued to stare straight forward.  Subsequently when her friend was out of [place] eyesight the patient was observed from the side of the curtain in the room typing on her phone.  Patient did subsequently agree to have labs done.  She is also requesting to speak with social work about housing.  She again changed her mind shortly after this and requested discharge.  She is alert oriented and able to ambulate without issues and so she was discharged at her request.    I have reviewed the nursing notes. I  have reviewed the findings, diagnosis, plan and need for follow up with the patient.    New Prescriptions    No medications on file       Final diagnoses:   Homeless   I, JESSY TENORIO am serving as a trained medical scribe to document services personally performed by Jett Orozco MD, based on the provider's statements to me.      IJett MD, was physically present and have reviewed and verified the accuracy of this note documented by JESSY TENORIO.       Jett Orozco MD  [facility] EMERGENCY DEPARTMENT  7/8/2023     Jett Orozco,   07/09/23 0046

## 2023-07-09 NOTE — ED NOTES
"Pt on discharge states , \" I hate you guys \"  Pt pulled her iv out on her own- against rn advice  Writer put a bandage on it  Pt then threw the discharge paper work on the floor and stomped on it   [facility]   Pt was given patient rep card and escorted to triage   Pt states, \" I hate the fucking place\"  Pt then punched the triage   "

## 2023-07-09 NOTE — ED NOTES
Pt attempted BSC but could not void. Pt is steady on feet, SBA due to meds taken. Denies pain. Pt aware of video monitoring in progress.

## 2023-07-09 NOTE — ED PROVIDER NOTES
Patient signed out after being medically cleared for potential overdose of reported melatonin Gummies.  However she was noted to not be somnolent here and had been discharged from [place] emergency department less than 1 hour prior with a diagnosis of homelessness.  This in the setting of chronic medical problems including gastroparesis tube feeds chronic urinary retention with new Smith catheter placed today.  She was seen by DEC who cleared her, we did have  set [facility]- for shelter information and she was discharged.    Raymundo Noland MD  [employer]  12:08 PM 07/09/23        Raymundo Noland MD  07/09/23 1500

## 2023-07-09 NOTE — ED NOTES
Pt came to nursing desk, stated she had decided to leave rather than wait to speak with social work. Per attending MD, pt is decisional and ok for patient to leave AMA if she desires. Declined discharge vitals, alert, ambulatory and in no acute distress upon leaving. MD informed, pt left department at 0039.

## 2023-07-09 NOTE — ED TRIAGE NOTES
"Pt BIBA via [facility]. EMS reports that they picked her up from her car and that she told them she gets an aura before having a seizure. Per EMS pt then began shaking which they describe as \"kicking her legs\" and this lasted approximately 7 seconds. EMS asked pt if this was a seizure while she was shaking and she responded by nodding her head. Pt denies drugs or alcohol and is unable to respond to orientation questions, however carries a logical conversation with writer. VSS on RA x mildly tachy and hypertensive.     Triage Assessment     Row Name 07/08/23 2118       Triage Assessment (Adult)    Airway WDL WDL       Respiratory WDL    Respiratory WDL WDL       Skin Circulation/Temperature WDL    Skin Circulation/Temperature WDL WDL       Cardiac WDL    Cardiac WDL X  mildly tachycardic and hypertensive       Peripheral/Neurovascular WDL    Peripheral Neurovascular WDL WDL       Cognitive/Neuro/Behavioral WDL    Cognitive/Neuro/Behavioral WDL X;speech    Speech clear;logical;slow    Mood/Behavior flat affect              "

## 2023-07-09 NOTE — ED PROVIDER NOTES
History     Chief Complaint:  Suicidal       HPI   Thea Castle is a 23 year old female with a history of anxiety, depression, and borderline personality disorder who presents via EMS for evaluation of suicidal ideation. Earlier tonight the patient was seen at [facility] for spells that were felt to be due to pseudoseizures and she was discharged at 0114 this morning.  Patient reports going to a store and taking about 50- 5 mg tablets of Melatonin (gummies) for the purpose of committing suicide. She then flagged down a  and they brought her into the ED for evaluation. Here in the ED she reports feeling nauseous but she has not vomited since the ingestion. She denies any abdominal pain. Notably she has a GJ tube.  She also has a Smith catheter that was removed because of a leak earlier today by herself.    Independent Historian:   None - Patient Only    Review of External Notes:   Reviewed the recent hospitalization for possible seizures and urinary retention    Medications:    Tylenol  Abilify  TUMS  Diazepam  Kelley   Cymbalta  Hydroxyzine  Linzess  Ativan  Amitiza  Melatonin  Zofran ODT   Protonix  Trulance  Compazine   Phenergan    Past Medical History:    Anorexia  Anxiety  Depression  Gastroparesis  OCD   PTSD   Ulcerative colitis   Borderline personality disorder     Past Surgical History:    ENT surgery  IR gastro jejunostomy tube placement      Physical Exam     Patient Vitals for the past 24 hrs:   BP Temp Temp src Pulse Resp SpO2 Weight   07/09/23 0528 126/78 98.1  F (36.7  C) -- 99 16 -- --   07/09/23 0222 (!) 142/85 98.1  F (36.7  C) Temporal 95 16 98 % 59 kg (130 lb)        Physical Exam  General: Sitting up in bed  Eyes:  The pupils are equal and round    Conjunctivae and sclerae are normal  ENT:    Wearing a mask  Neck:  Normal range of motion  CV:  Regular rate, regular rhythm     Skin warm and well perfused   Resp:  Non labored breathing on room air    No tachypnea    No  cough heard  GI:  Abdomen is soft, there is no rigidity    No distension    No rebound tenderness     No abdominal tenderness    GJ tube in place  MS:  Normal muscular tone  Skin:  No rash or acute skin lesions noted  Neuro:   Awake, alert.      Speech is normal and fluent.    Face is symmetric.     Moves all extremities equally  Psych: Normal affect.  Appropriate interactions.    Emergency Department Course   ECG  ECG taken at 4:42:59, ECG read at 0458  Normal sinus rhythm, Normal ECG.  Rate 80 bpm. TX interval 162 ms. QRS duration 88 ms. QT/QTc 372/429 ms. P-R-T axes 76 75 68.     Laboratory:  Labs Ordered and Resulted from Time of ED Arrival to Time of ED Departure   COMPREHENSIVE METABOLIC PANEL - Abnormal       Result Value    Sodium 141      Potassium 3.6      Chloride 105      Carbon Dioxide (CO2) 22      Anion Gap 14      Urea Nitrogen 9.4      Creatinine 0.64      Calcium 9.2      Glucose 140 (*)     Alkaline Phosphatase 73      AST 28      ALT 23      Protein Total 6.8      Albumin 3.9      Bilirubin Total 0.2      GFR Estimate >90     ACETAMINOPHEN LEVEL - Abnormal    Acetaminophen <5.0 (*)    CBC WITH PLATELETS AND DIFFERENTIAL - Abnormal    WBC Count 6.5      RBC Count 3.65 (*)     Hemoglobin 10.8 (*)     Hematocrit 32.6 (*)     MCV 89      MCH 29.6      MCHC 33.1      RDW 13.7      Platelet Count 308      % Neutrophils 59      % Lymphocytes 33      % Monocytes 6      % Eosinophils 1      % Basophils 1      % Immature Granulocytes 0      NRBCs per 100 WBC 0      Absolute Neutrophils 3.9      Absolute Lymphocytes 2.1      Absolute Monocytes 0.4      Absolute Eosinophils 0.1      Absolute Basophils 0.0      Absolute Immature Granulocytes 0.0      Absolute NRBCs 0.0     ETHYL ALCOHOL LEVEL - Normal    Alcohol ethyl <0.01     HCG QUALITATIVE PREGNANCY - Normal    hCG Serum Qualitative Negative     SALICYLATE LEVEL - Normal    Salicylate <0.3        Emergency Department Course & Assessments:        Assessments:  0300: The patient was seen and evaluated.     0541: Multiple calls to poison control were placed without answer.     Consultations/Discussion of Management or Tests:  Patient will be evaluated by DEC    Social Determinants of Health affecting care:   Stress/Adjustment Disorders    Disposition:  Patient signed out to Dr. Noland pending DEC assessment    Impression & Plan      Medical Decision Making:  Thea Castle is a 23-year-old female who presented to the emergency department with suicidal ideation.  She was just discharged from [place] emergency department and then apparently went to a store and bought melatonin Gummies and ate them.  She does not feel suicidal right now.  It seems unlikely that she ingested this many Gummies in a very short period of time especially as she did not seem as drowsy as I would expect from this ingestion.  Laboratory studies unremarkable.  New catheter was placed because she did have urinary retention in the emergency department.  She refused to have a straight cath done.  DEC will evaluate the patient and final disposition per their assessment.     Diagnosis:    ICD-10-CM    1. Suicidal ideation  R45.851            Scribe Disclosure:  I, Johnathon Bronson, am serving as a scribe at 3:11 AM on 7/9/2023 to document services personally performed by Anni Gaming MD based on my observations and the provider's statements to me.   7/9/2023   Anni Gaming MD Goertz, Maria Kristine, MD  07/09/23 0816

## 2023-07-09 NOTE — CONSULTS
"Diagnostic Evaluation Consultation  Crisis Assessment    Patient was assessed: In Person  Patient location: declocations: [facility] Emergency Department  Was a release of information signed: Yes. Providers included on the release: Psychiatrist and PMD      Referral Data and Chief Complaint  Thea Castle is a 23 year old, who uses she/her pronouns, and presents to the ED via EMS. Patient is referred to the ED by other: Police. Patient is presenting to the ED for the following concerns: Depressed mood and SI with possible ingestion.        Informed Consent and Assessment Methods     Patient is her own guardian. Writer met with patient and explained the crisis assessment process, including applicable information disclosures and limits to confidentiality, assessed understanding of the process, and obtained consent to proceed with the assessment. Patient was observed to be able to participate in the assessment as evidenced by by her verbal participation. Assessment methods included conducting a formal interview with patient, review of medical records, collaboration with medical staff, and obtaining relevant collateral information from family and community providers when available..     Over the course of this crisis assessment provided reassurance, offered validation and worked with patient on safety and aftercare planning. Patient's response to interventions was guarded with intent to control outcomes.     Summary of Patient Situation     Pt presents to the ED this morning at 2:00 am following a 1:30 am discharge from [facility].  Pt states she was staying with  a friend in [facility] for the past two weeks after being \"kicked out\" of her mother's home.  Pt reports she informed her mother two weeks ago that she is lundy and her mother told her to move out.  Pt states moved in with a friend in [facility] who she met in 2019 while at [facility] for an eating disorder.  Pt reports over the past two weeks she started to feel " like a burden to her friend.  Last night the Pt explains she started to feel confused and drift in and out of consciousness prompting her to call 911 and report the onset of a seizure.  Pt states EMS arrived and took her to [facility].  While at [facility] the The Pt was noted to present as catatonic while staff were in the room with her and then be on her phone texting after staff would leave.   Pt also reported she is homeless during that ED visit and requested assistance finding housing.  Pt was discharged at roughly 1:30 am and took an Uber to [facility].  Pt reports at around 2:00 am she started to feel overwhelmed and suicidal prompting her to buy 50 5mg Melatonin gummies to ingest with intent to die.  Pt states she sat down on the curb and ingested her gummies all while noticing a  car in the parking lot.  Pt states she immediately started to feel nauseous and when th  car drove by she informed them of her ingestion.    While in the ED the Pt has repeatedly prefaced conversations with nursing, DEC and other staff that she does not want to be committed.    Brief Psychosocial History     Pt states she is currently homeless.  Pt reports she worked with a  at [facility] and has an appointment this Monday with a navigator to work on getting insurance and housing.  Pt is unemployed and is currently attempting to apply for services.  Pt is not a .  Pt denies any legal issues.  Pt states she is Zoroastrian and she identifies with the LGBTQ community.    Significant Clinical History     Pt has a hx of depression, anxiety, suicidal ideations, borderline personality disorder, gastroparesis with GJ tube, recurrent spells and possible pseudoseizures, and recurrent urinary retention.  Pt has been hospitalized x 1 in the past at [facility] in January 2023 following an overdose.  Pt is followed by psychiatry.       Collateral Information    The following information was received from  "Mulu Powell  whose relationship to the patient is aunt. Information was obtained via phone. Their phone number is [phone] and they last had contact with patient on this pat Friday.    What happened today: \"I last saw her this past Friday when we went out for waffles and coffee.  She shared at this time that she came out as being lundy to her parents and that they did not take it very well.  She told me she is homeless and talked about her hospitalization in January.\"      What is different about patient's functioning: \"I can't really say.  I have known her her whole life, but I have learned more about her in the past two weeks then the rest of her life\".      Concern about alcohol/drug use: No    What do you think the patient needs: \"I know she needs support and I want to be there for her\".    Has patient made comments about wanting to kill themselves/others:  No    If d/c is recommended, can they take part in safety/aftercare planning: Yes by phone    Other information: NA         Risk Assessment  Mathias Suicide Severity Rating Scale Full Clinical Version:  Suicidal Ideation  1. Wish to be Dead (Lifetime): Yes  1. Wish to be Dead (Past 1 Month): Yes  2. Non-Specific Active Suicidal Thoughts (Lifetime): Yes  2. Non-Specific Active Suicidal Thoughts (Past 1 Month): Yes  3. Active Suicidal Ideation with any Methods (Not Plan) Without Intent to Act (Lifetime): Yes  3. Active Suicidal Ideation with any Methods (Not Plan) Without Intent to Act (Past 1 Month): Yes  4. Active Suicidal Ideation with Some Intent to Act, Without Specific Plan (Lifetime): Yes  4. Active Suicidal Ideation with Some Intent to Act, Without Specific Plan (Past 1 Month): Yes  5. Active Suicidal Ideation with Specific Plan and Intent (Lifetime): Yes  5. Active Suicidal Ideation with Specific Plan and Intent (Past 1 Month): Yes  Intensity of Ideation  Most Severe Ideation Rating (Lifetime): 3  Most Severe Ideation Rating (Past 1 Month): " 2  Frequency (Lifetime): Daily or almost daily  Frequency (Past 1 Month): Once a week  Duration (Lifetime): 1-4 hours/a lot of time  Duration (Past 1 Month): 1-4 hours/a lot of time  Controllability (Lifetime): Can control thoughts with little difficulty  Controllability (Past 1 Month): Can control thoughts with little difficulty  Deterrents (Lifetime): Deterrents definitely stopped you from attempting suicide  Deterrents (Past 1 Month): Deterrents definitely stopped you from attempting suicide  Reasons for Ideation (Lifetime): Does not apply  Reasons for Ideation (Past 1 Month): Mostly to end or stop the pain (You couldn't go on living with the pain or how you were feeling)  Suicidal Behavior  Actual Attempt (Lifetime): Yes  Total Number of Actual Attempts (Lifetime): 2  Actual Attempt Description (Lifetime): ingestrion  Actual Attempt (Past 3 Months): Yes  Total Number of Actual Attempts (Past 3 Months): 1  Actual Attempt Description (Past 3 Months): possible ingestion  Has subject engaged in non-suicidal self-injurious behavior? (Lifetime): No  Interrupted Attempts (Lifetime): No  Aborted or Self-Interrupted Attempt (Lifetime): No  Preparatory Acts or Behavior (Lifetime): No  C-SSRS Risk (Lifetime/Recent)  Calculated C-SSRS Risk Score (Lifetime/Recent): High Risk    Searcy Suicide Severity Rating Scale Since Last Contact:         Actual/Potential Lethality (Most Lethal Attempt)  Most Lethal Attempt Date: 07/09/23  Actual Lethality/Medical Damage Code (Most Lethal Attempt): No physical damage or very minor physical damage  Potential Lethality Code (Most Lethal Attempt): Behavior not likely to result in injury       Validity of evaluation is not impacted by presenting factors during interview .   Comments regarding subjective versus objective responses to Searcy tool: None  Environmental or Psychosocial Events: threats to a prized relationship and challenging interpersonal relationships  Chronic Risk Factors:  "history of suicide attempts (possibly 2), history of psychiatric hospitalization and LGBTQ+ orientation    Warning Signs: seeking access to means to hurt or kill self, talking or writing about death, dying, or suicide, hopelessness, feeling trapped, like there is no way out and anxiety, agitation, unable to sleep, sleeping all the time  Protective Factors: good treatment engagement, help seeking and cultural, spiritual , or Methodist beliefs associated with meaning and value in life  Interpretation of Risk Scoring, Risk Mitigation Interventions and Safety Plan:  Pt presents here today in her second ED visit.  Pts first Presented to [facility] for a \"pseudo seizure\" where at that time she accessed social work for housing and health care insurance.  Pt was noted during that ED visit to present as catatonic while staff were in the room and then to be on her phone texting or social media when staff left the room.  Pt was discharged from that ED at 1:30 am and immediately presented to this ED as suicidal where she noted she was not happy with her housing options.  Pts reason for this ED visit was ingestion of 50 5mg Melatonin gummies with intent to die. It should be noted that  the ED physician explained the Pt did not present as tired or lethargic prompting questions if the Pt actually ingested any Melatonin.  During this ED visit and DEC assessment the Pt focused on access to housing, and attending her appt on Monday with a Navigator to obtain healthcare insurance.  In addition, once the pt was informed social work would be meeting with her regarding shelters Pt asked to be discharged stating she is no longer suicidal.         Does the patient have thoughts of harming others? No     Is the patient engaging in sexually inappropriate behavior?  no        Current Substance Abuse     Is there recent substance abuse? no     Was a urine drug screen or blood alcohol level obtained: No       Mental Status Exam     Affect: " Appropriate   Appearance: Appropriate    Attention Span/Concentration: Attentive  Eye Contact: Engaged   Fund of Knowledge: Appropriate    Language /Speech Content: Fluent   Language /Speech Volume: Normal    Language /Speech Rate/Productions: Normal    Recent Memory: Intact   Remote Memory: Intact   Mood: Normal and Sad    Orientation to Person: Yes    Orientation to Place: Yes   Orientation to Time of Day: Yes    Orientation to Date: Yes    Situation (Do they understand why they are here?): Yes    Psychomotor Behavior: Normal    Thought Content: Clear at time of this assessment  Thought Form: Intact      History of commitment: No       Medication  No current facility-administered medications for this encounter.     Current Outpatient Medications   Medication     acetaminophen (TYLENOL) 325 MG tablet     ARIPiprazole (ABILIFY) 10 MG tablet     calcium carbonate (TUMS) 500 MG chewable tablet     cholecalciferol (VITAMIN D3) 25 mcg (1000 units) capsule     diazepam (DIASTAT ACUDIAL) 10 MG GEL rectal gel     drospirenone-ethinyl estradiol (FELIPE) 3-0.02 MG tablet     DULoxetine (CYMBALTA) 60 MG capsule     hydrOXYzine (ATARAX) 25 MG tablet     lactobacillus rhamnosus, GG, (CULTURELL) capsule     linaclotide (LINZESS) 290 MCG capsule     LORazepam (ATIVAN) 0.5 MG tablet     lubiprostone (AMITIZA) 24 MCG capsule     Melatonin 10 MG TABS tablet     multivitamin w/minerals (THERA-VIT-M) tablet     ondansetron (ZOFRAN ODT) 4 MG ODT tab     pantoprazole (PROTONIX) 40 MG EC tablet     plecanatide (TRULANCE) 3 MG tablet     prochlorperazine (COMPAZINE) 10 MG tablet     promethazine (PHENERGAN) 25 MG suppository     promethazine (PHENERGAN) 25 MG tablet     vitamin C (ASCORBIC ACID) 250 MG tablet       Psychotropic medications: Yes, see above  Medication changes made in the last two weeks: No       Current Care Team    Primary Care Provider: Christine Staviz MD [facility]  Psychiatrist: Miranda Crawford  "[address] [facility]  Therapist: No  : No     CTSS or ARMHS: No  ACT Team: No  Other: No      Diagnosis       301.9 (F60.9) Unspecified Personality Disorder - primary   Adjustment Disorders  309.28 (F43.23) With mixed anxiety and depressed mood  - .      Clinical Summary and Substantiation of Recommendations      Pt presents here today in her second ED visit.  Pts first Presented to [facility] for a \"pseudo seizure\" where at that time she accessed social work for housing and health care insurance.  Pt was noted during that ED visit to present as catatonic while staff were in the room and then to be on her phone texting or social media when staff left the room.  Pt was discharged from that ED at 1:30 am and immediately presented to this ED as suicidal where she noted she was not happy with her housing options.  Pts reason for this ED visit was ingestion of 50 5mg Melatonin gummies with intent to die. It should be noted that  the ED physician explained the Pt did not present as tired or lethargic prompting questions if the Pt actually ingested any Melatonin.  During this ED visit and DEC assessment the Pt focused on access to housing, and attending her appt on Monday with a Navigator to obtain healthcare insurance.  In addition, once the pt was informed social work would be meeting with her regarding shelters Pt asked to be discharged stating she is no longer suicidal.    Disposition    Recommended disposition: Medication Management and crisis therapy     Reviewed case and recommendations with attending provider. Attending Name: Ludin       Attending concurs with disposition: Yes       Patient and/or validated legal guardian concurs with disposition: Yes       Final disposition: Medication management. Crisis therapy referral      Outpatient Details (if applicable):   Aftercare plan and appointments placed in the AVS and provided to patient: Yes. Given to patient by ED staff    Was lethal means " counseling provided as a part of aftercare planning? Yes; Pt was educated on the dangers of ingestion.      Assessment Details    Patient interview started at: 10:09 and completed at: 11:15.     Total time spent with the patient or their family: 1.0 hrs      CPT code(s) utilized: 86662 - Psychotherapy for Crisis - 60 (30-74*) min       Chetan Christiansen, LICSW, MSW, LICSW, Psychotherapist  DEC - Triage & Transition Services  Callback: [phone]      Aftercare Plan  If I am feeling unsafe or I am in a crisis, I will:   Contact my established care providers   Call the National Suicide Prevention Lifeline: 988  Go to the nearest emergency room   Call 911     Warning signs that I or other people might notice when a crisis is developing for me:   Depressed mood, isolating behavior, increased SI.    Things I am able to do on my own to cope or help me feel better:   Listen to music, take a walk, call a friend.    Things that I am able to do with others to cope or help me better:   Hang out, travel, play games.    Things I can use or do for distraction:   Music, social media, magazines     Changes I can make to support my mental health and wellness:   Improve my sleep patterns, eat better, stay on my meds.    People in my life that I can ask for help:   My aunt Mulu, friend Shabnam, and friend Iwona.    Your [facility] has a mental health crisis team you can call 24/7: [facility] Mobile Crisis  [phone] (adults)  [phone] (children)    Other things that are important when I'm in crisis: NA     Additional resources and information: NA        Crisis Lines  Crisis Text Line  Text 692253  You will be connected with a trained live crisis counselor to provide support.    Por espanol, texto  KIMMIE a 271307 o texto a 442-AYUDAME en WhatsApp    [employer] (LGBTQ Youth Crisis Line)  3.496.606.8027  text START to 853-858      Community Resources  Fast Tracker  Linking people to mental health and substance use  "disorder resources  fasttrackermn.org     [place] Mental Health Warm Line  Peer to peer support  Monday thru Saturday, 12 pm to 10 pm  700.495.5032 or 0.118.480.3726  Text \"Support\" to 70862    National [place] on Mental Illness (PATRICK)  610.698.2373 or 1.888.PATRICK.HELPS      Mental Health Apps  My3  https://Branchly.org/    VirtualHopeBox  https://BearTail/apps/virtual-hope-box/      Additional Information  Today you were seen by a licensed mental health professional through Triage and Transition services, Behavioral Healthcare Providers ([facility])  for a crisis assessment in the Emergency Department at [facility].  It is recommended that you follow up with your established providers (psychiatrist, mental health therapist, and/or primary care doctor - as relevant) as soon as possible. Coordinators from [facility] will be calling you in the next 24-48 hours to ensure that you have the resources you need.  You can also contact [facility] coordinators directly at [phone]. You may have been scheduled for or offered an appointment with a mental health provider. [facility] maintains an extensive network of licensed behavioral health providers to connect patients with the services they need.  We do not charge providers a fee to participate in our referral network.  We match patients with providers based on a patient's specific needs, insurance coverage, and location.  Our first effort will be to refer you to a provider within your care system, and will utilize providers outside your care system as needed.                  "

## 2023-07-09 NOTE — ED NOTES
Pt appears to be resting in the bed. Respiration regular and unlabored. Video monitoring in progress.

## 2023-07-09 NOTE — ED NOTES
Bed: ED20  Expected date:   Expected time:   Means of arrival:   Comments:  SPF23  23F  AMS   Pseudo seizures  Vss

## 2023-07-09 NOTE — ED NOTES
Pt requested a bladder scan. Scan showed 495mL of fluid retention. Pt states supposed to have pantoja catheter in place until 7/11

## 2023-07-09 NOTE — ED NOTES
Bed: [facility]  Expected date:   Expected time:   Means of arrival:   Comments:  No staff at 2300

## 2023-07-11 ENCOUNTER — HOSPITAL ENCOUNTER (OUTPATIENT)
Facility: CLINIC | Age: 23
Setting detail: OBSERVATION
Discharge: HOME | End: 2023-07-12
Attending: EMERGENCY MEDICINE | Admitting: EMERGENCY MEDICINE
Payer: COMMERCIAL

## 2023-07-11 DIAGNOSIS — Z59.00 HOMELESSNESS: ICD-10-CM

## 2023-07-11 DIAGNOSIS — R56.9 SEIZURE (H): ICD-10-CM

## 2023-07-11 DIAGNOSIS — R45.851 SUICIDAL IDEATION: ICD-10-CM

## 2023-07-11 DIAGNOSIS — Z93.1 GASTROSTOMY STATUS (H): Primary | ICD-10-CM

## 2023-07-11 LAB
ALBUMIN SERPL BCG-MCNC: 4.2 G/DL (ref 3.5–5.2)
ALP SERPL-CCNC: 82 U/L (ref 35–104)
ALT SERPL W P-5'-P-CCNC: 27 U/L (ref 0–50)
ANION GAP SERPL CALCULATED.3IONS-SCNC: 19 MMOL/L (ref 7–15)
AST SERPL W P-5'-P-CCNC: 39 U/L (ref 0–45)
BILIRUB SERPL-MCNC: 0.9 MG/DL
BUN SERPL-MCNC: 11.2 MG/DL (ref 6–20)
CALCIUM SERPL-MCNC: 9.1 MG/DL (ref 8.6–10)
CHLORIDE SERPL-SCNC: 101 MMOL/L (ref 98–107)
CREAT SERPL-MCNC: 0.69 MG/DL (ref 0.51–0.95)
DEPRECATED HCO3 PLAS-SCNC: 18 MMOL/L (ref 22–29)
ETHANOL SERPL-MCNC: <0.01 G/DL
GFR SERPL CREATININE-BSD FRML MDRD: >90 ML/MIN/1.73M2
GLUCOSE SERPL-MCNC: 112 MG/DL (ref 70–99)
MAGNESIUM SERPL-MCNC: 1.8 MG/DL (ref 1.7–2.3)
POTASSIUM SERPL-SCNC: 3.9 MMOL/L (ref 3.4–5.3)
PROT SERPL-MCNC: 6.8 G/DL (ref 6.4–8.3)
SODIUM SERPL-SCNC: 138 MMOL/L (ref 136–145)

## 2023-07-11 PROCEDURE — 83735 ASSAY OF MAGNESIUM: CPT | Performed by: EMERGENCY MEDICINE

## 2023-07-11 PROCEDURE — 80179 DRUG ASSAY SALICYLATE: CPT | Performed by: EMERGENCY MEDICINE

## 2023-07-11 PROCEDURE — 36415 COLL VENOUS BLD VENIPUNCTURE: CPT | Performed by: EMERGENCY MEDICINE

## 2023-07-11 PROCEDURE — 99285 EMERGENCY DEPT VISIT HI MDM: CPT | Mod: 25 | Performed by: EMERGENCY MEDICINE

## 2023-07-11 PROCEDURE — 80053 COMPREHEN METABOLIC PANEL: CPT | Performed by: EMERGENCY MEDICINE

## 2023-07-11 PROCEDURE — 80143 DRUG ASSAY ACETAMINOPHEN: CPT | Performed by: EMERGENCY MEDICINE

## 2023-07-11 PROCEDURE — 99291 CRITICAL CARE FIRST HOUR: CPT | Performed by: EMERGENCY MEDICINE

## 2023-07-11 PROCEDURE — 82077 ASSAY SPEC XCP UR&BREATH IA: CPT | Performed by: EMERGENCY MEDICINE

## 2023-07-11 SDOH — ECONOMIC STABILITY - HOUSING INSECURITY: HOMELESSNESS UNSPECIFIED: Z59.00

## 2023-07-11 ASSESSMENT — ACTIVITIES OF DAILY LIVING (ADL): ADLS_ACUITY_SCORE: 35

## 2023-07-12 ENCOUNTER — HOSPITAL ENCOUNTER (OUTPATIENT)
Facility: CLINIC | Age: 23
Setting detail: OBSERVATION
Discharge: HOME | End: 2023-07-13
Attending: EMERGENCY MEDICINE | Admitting: HOSPITALIST
Payer: COMMERCIAL

## 2023-07-12 ENCOUNTER — TELEPHONE (OUTPATIENT)
Dept: BEHAVIORAL HEALTH | Facility: CLINIC | Age: 23
End: 2023-07-12

## 2023-07-12 VITALS
RESPIRATION RATE: 12 BRPM | DIASTOLIC BLOOD PRESSURE: 73 MMHG | HEIGHT: 64 IN | TEMPERATURE: 97.6 F | WEIGHT: 130.07 LBS | SYSTOLIC BLOOD PRESSURE: 113 MMHG | BODY MASS INDEX: 22.21 KG/M2 | HEART RATE: 98 BPM | OXYGEN SATURATION: 99 %

## 2023-07-12 DIAGNOSIS — R45.851 SUICIDAL IDEATION: ICD-10-CM

## 2023-07-12 DIAGNOSIS — K31.84 GASTROPARESIS: ICD-10-CM

## 2023-07-12 DIAGNOSIS — R33.9 URINARY RETENTION: ICD-10-CM

## 2023-07-12 PROBLEM — R56.9 SEIZURE (H): Status: ACTIVE | Noted: 2023-07-12

## 2023-07-12 LAB
ALBUMIN UR-MCNC: 10 MG/DL
AMPHETAMINES UR QL SCN: ABNORMAL
ANION GAP SERPL CALCULATED.3IONS-SCNC: 13 MMOL/L (ref 7–15)
APAP SERPL-MCNC: <5 UG/ML (ref 10–30)
APPEARANCE UR: CLEAR
BACTERIA #/AREA URNS HPF: ABNORMAL /HPF
BARBITURATES UR QL SCN: ABNORMAL
BENZODIAZ UR QL SCN: ABNORMAL
BILIRUB UR QL STRIP: NEGATIVE
BUN SERPL-MCNC: 8 MG/DL (ref 6–20)
BZE UR QL SCN: ABNORMAL
CALCIUM SERPL-MCNC: 9 MG/DL (ref 8.6–10)
CANNABINOIDS UR QL SCN: ABNORMAL
CHLORIDE SERPL-SCNC: 105 MMOL/L (ref 98–107)
COLOR UR AUTO: ABNORMAL
CREAT SERPL-MCNC: 0.67 MG/DL (ref 0.51–0.95)
DEPRECATED HCO3 PLAS-SCNC: 21 MMOL/L (ref 22–29)
GFR SERPL CREATININE-BSD FRML MDRD: >90 ML/MIN/1.73M2
GLUCOSE SERPL-MCNC: 87 MG/DL (ref 70–99)
GLUCOSE UR STRIP-MCNC: NEGATIVE MG/DL
HGB UR QL STRIP: ABNORMAL
HOLD SPECIMEN: NORMAL
KETONES UR STRIP-MCNC: 10 MG/DL
LEUKOCYTE ESTERASE UR QL STRIP: ABNORMAL
MUCOUS THREADS #/AREA URNS LPF: PRESENT /LPF
NITRATE UR QL: NEGATIVE
OPIATES UR QL SCN: ABNORMAL
PH UR STRIP: 6.5 [PH] (ref 5–7)
POTASSIUM SERPL-SCNC: 3.7 MMOL/L (ref 3.4–5.3)
RBC URINE: 5 /HPF
SALICYLATES SERPL-MCNC: <0.3 MG/DL
SODIUM SERPL-SCNC: 139 MMOL/L (ref 136–145)
SP GR UR STRIP: 1.02 (ref 1–1.03)
SQUAMOUS EPITHELIAL: 6 /HPF
UROBILINOGEN UR STRIP-MCNC: NORMAL MG/DL
WBC URINE: 4 /HPF

## 2023-07-12 PROCEDURE — 250N000013 HC RX MED GY IP 250 OP 250 PS 637: Performed by: EMERGENCY MEDICINE

## 2023-07-12 PROCEDURE — 250N000011 HC RX IP 250 OP 636: Performed by: EMERGENCY MEDICINE

## 2023-07-12 PROCEDURE — 258N000003 HC RX IP 258 OP 636: Performed by: EMERGENCY MEDICINE

## 2023-07-12 PROCEDURE — 99245 OFF/OP CONSLTJ NEW/EST HI 55: CPT

## 2023-07-12 PROCEDURE — 36415 COLL VENOUS BLD VENIPUNCTURE: CPT | Performed by: EMERGENCY MEDICINE

## 2023-07-12 PROCEDURE — 99285 EMERGENCY DEPT VISIT HI MDM: CPT | Mod: 25

## 2023-07-12 PROCEDURE — G0378 HOSPITAL OBSERVATION PER HR: HCPCS

## 2023-07-12 PROCEDURE — 90791 PSYCH DIAGNOSTIC EVALUATION: CPT

## 2023-07-12 PROCEDURE — 80307 DRUG TEST PRSMV CHEM ANLYZR: CPT | Performed by: EMERGENCY MEDICINE

## 2023-07-12 PROCEDURE — 81001 URINALYSIS AUTO W/SCOPE: CPT | Mod: XU | Performed by: EMERGENCY MEDICINE

## 2023-07-12 PROCEDURE — 96361 HYDRATE IV INFUSION ADD-ON: CPT

## 2023-07-12 PROCEDURE — 99223 1ST HOSP IP/OBS HIGH 75: CPT | Mod: AI | Performed by: HOSPITALIST

## 2023-07-12 PROCEDURE — 120N000001 HC R&B MED SURG/OB

## 2023-07-12 PROCEDURE — 96360 HYDRATION IV INFUSION INIT: CPT

## 2023-07-12 PROCEDURE — 80048 BASIC METABOLIC PNL TOTAL CA: CPT | Performed by: EMERGENCY MEDICINE

## 2023-07-12 PROCEDURE — 99235 HOSP IP/OBS SAME DATE MOD 70: CPT | Performed by: EMERGENCY MEDICINE

## 2023-07-12 RX ORDER — CYPROHEPTADINE HYDROCHLORIDE 4 MG/1
4 TABLET ORAL 3 TIMES DAILY
COMMUNITY

## 2023-07-12 RX ORDER — ONDANSETRON 4 MG/1
4 TABLET, ORALLY DISINTEGRATING ORAL EVERY 6 HOURS PRN
Status: DISCONTINUED | OUTPATIENT
Start: 2023-07-12 | End: 2023-07-13

## 2023-07-12 RX ORDER — HYDROXYZINE HYDROCHLORIDE 25 MG/1
25 TABLET, FILM COATED ORAL 2 TIMES DAILY PRN
Status: DISCONTINUED | OUTPATIENT
Start: 2023-07-12 | End: 2023-07-12 | Stop reason: HOSPADM

## 2023-07-12 RX ORDER — ARIPIPRAZOLE 10 MG/1
10 TABLET ORAL DAILY
Status: DISCONTINUED | OUTPATIENT
Start: 2023-07-12 | End: 2023-07-12 | Stop reason: HOSPADM

## 2023-07-12 RX ORDER — ONDANSETRON 2 MG/ML
4 INJECTION INTRAMUSCULAR; INTRAVENOUS EVERY 6 HOURS PRN
Status: DISCONTINUED | OUTPATIENT
Start: 2023-07-12 | End: 2023-07-13 | Stop reason: HOSPADM

## 2023-07-12 RX ORDER — SODIUM CHLORIDE 9 MG/ML
INJECTION, SOLUTION INTRAVENOUS CONTINUOUS
Status: DISCONTINUED | OUTPATIENT
Start: 2023-07-12 | End: 2023-07-13 | Stop reason: HOSPADM

## 2023-07-12 RX ORDER — FAMOTIDINE 20 MG/1
20 TABLET, FILM COATED ORAL DAILY
COMMUNITY

## 2023-07-12 RX ORDER — MULTIVIT WITH MINERALS/LUTEIN
250 TABLET ORAL DAILY
Status: DISCONTINUED | OUTPATIENT
Start: 2023-07-13 | End: 2023-07-13 | Stop reason: HOSPADM

## 2023-07-12 RX ORDER — MULTIPLE VITAMINS W/ MINERALS TAB 9MG-400MCG
1 TAB ORAL DAILY
Status: DISCONTINUED | OUTPATIENT
Start: 2023-07-13 | End: 2023-07-13 | Stop reason: ALTCHOICE

## 2023-07-12 RX ORDER — ASPIRIN 81 MG
100 TABLET, DELAYED RELEASE (ENTERIC COATED) ORAL DAILY
COMMUNITY

## 2023-07-12 RX ORDER — ACETAMINOPHEN 325 MG/1
650 TABLET ORAL EVERY 6 HOURS PRN
Status: DISCONTINUED | OUTPATIENT
Start: 2023-07-12 | End: 2023-07-13 | Stop reason: HOSPADM

## 2023-07-12 RX ORDER — DULOXETIN HYDROCHLORIDE 30 MG/1
60 CAPSULE, DELAYED RELEASE ORAL DAILY
COMMUNITY

## 2023-07-12 RX ORDER — DULOXETIN HYDROCHLORIDE 30 MG/1
120 CAPSULE, DELAYED RELEASE ORAL DAILY
Status: DISCONTINUED | OUTPATIENT
Start: 2023-07-12 | End: 2023-07-12 | Stop reason: HOSPADM

## 2023-07-12 RX ORDER — ACETAMINOPHEN 650 MG/1
650 SUPPOSITORY RECTAL EVERY 6 HOURS PRN
Status: DISCONTINUED | OUTPATIENT
Start: 2023-07-12 | End: 2023-07-13 | Stop reason: HOSPADM

## 2023-07-12 RX ORDER — LORAZEPAM 0.5 MG/1
0.5 TABLET ORAL AT BEDTIME
Status: DISCONTINUED | OUTPATIENT
Start: 2023-07-12 | End: 2023-07-12 | Stop reason: HOSPADM

## 2023-07-12 RX ORDER — ONDANSETRON 4 MG/1
4 TABLET, ORALLY DISINTEGRATING ORAL EVERY 6 HOURS PRN
Status: DISCONTINUED | OUTPATIENT
Start: 2023-07-12 | End: 2023-07-12 | Stop reason: HOSPADM

## 2023-07-12 RX ORDER — ACETAMINOPHEN 500 MG
1000 TABLET ORAL ONCE
Status: COMPLETED | OUTPATIENT
Start: 2023-07-12 | End: 2023-07-12

## 2023-07-12 RX ORDER — DOCUSATE SODIUM 100 MG/1
100 CAPSULE, LIQUID FILLED ORAL DAILY
Status: DISCONTINUED | OUTPATIENT
Start: 2023-07-13 | End: 2023-07-13 | Stop reason: ALTCHOICE

## 2023-07-12 RX ORDER — ONDANSETRON 4 MG/1
4 TABLET, ORALLY DISINTEGRATING ORAL EVERY 6 HOURS PRN
Status: DISCONTINUED | OUTPATIENT
Start: 2023-07-12 | End: 2023-07-13 | Stop reason: HOSPADM

## 2023-07-12 RX ADMIN — ONDANSETRON 4 MG: 4 TABLET, ORALLY DISINTEGRATING ORAL at 11:11

## 2023-07-12 RX ADMIN — LORAZEPAM 0.5 MG: 0.5 TABLET ORAL at 02:03

## 2023-07-12 RX ADMIN — SODIUM CHLORIDE 1000 ML: 9 INJECTION, SOLUTION INTRAVENOUS at 01:04

## 2023-07-12 RX ADMIN — DULOXETINE HYDROCHLORIDE 120 MG: 30 CAPSULE, DELAYED RELEASE ORAL at 08:15

## 2023-07-12 RX ADMIN — ACETAMINOPHEN 1000 MG: 500 TABLET ORAL at 02:05

## 2023-07-12 RX ADMIN — ARIPIPRAZOLE 10 MG: 10 TABLET ORAL at 08:15

## 2023-07-12 ASSESSMENT — COLUMBIA-SUICIDE SEVERITY RATING SCALE - C-SSRS
1. SINCE LAST CONTACT, HAVE YOU WISHED YOU WERE DEAD OR WISHED YOU COULD GO TO SLEEP AND NOT WAKE UP?: YES
REASONS FOR IDEATION SINCE LAST CONTACT: EQUALLY TO GET ATTENTION, REVENGE, OR A REACTION FROM OTHERS AND TO END/STOP THE PAIN
TOTAL  NUMBER OF ACTUAL ATTEMPTS SINCE LAST CONTACT: 1
ATTEMPT SINCE LAST CONTACT: NO
TOTAL  NUMBER OF ABORTED OR SELF INTERRUPTED ATTEMPTS SINCE LAST CONTACT: NO
REASONS FOR IDEATION SINCE LAST CONTACT: EQUALLY TO GET ATTENTION, REVENGE, OR A REACTION FROM OTHERS AND TO END/STOP THE PAIN
2. HAVE YOU ACTUALLY HAD ANY THOUGHTS OF KILLING YOURSELF?: YES
TOTAL  NUMBER OF ABORTED OR SELF INTERRUPTED ATTEMPTS SINCE LAST CONTACT: NO
SUICIDE, SINCE LAST CONTACT: NO
TOTAL  NUMBER OF INTERRUPTED ATTEMPTS SINCE LAST CONTACT: NO
6. HAVE YOU EVER DONE ANYTHING, STARTED TO DO ANYTHING, OR PREPARED TO DO ANYTHING TO END YOUR LIFE?: YES
TOTAL  NUMBER OF PREPARATORY ACTS SINCE LAST CONTACT: 1
5. HAVE YOU STARTED TO WORK OUT OR WORKED OUT THE DETAILS OF HOW TO KILL YOURSELF? DO YOU INTEND TO CARRY OUT THIS PLAN?: YES
SUICIDE, SINCE LAST CONTACT: NO
1. SINCE LAST CONTACT, HAVE YOU WISHED YOU WERE DEAD OR WISHED YOU COULD GO TO SLEEP AND NOT WAKE UP?: YES
TOTAL  NUMBER OF INTERRUPTED ATTEMPTS SINCE LAST CONTACT: NO
6. HAVE YOU EVER DONE ANYTHING, STARTED TO DO ANYTHING, OR PREPARED TO DO ANYTHING TO END YOUR LIFE?: NO
6. HAVE YOU EVER DONE ANYTHING, STARTED TO DO ANYTHING, OR PREPARED TO DO ANYTHING TO END YOUR LIFE?: N
5. HAVE YOU STARTED TO WORK OUT OR WORKED OUT THE DETAILS OF HOW TO KILL YOURSELF? DO YOU INTEND TO CARRY OUT THIS PLAN?: YES
2. HAVE YOU ACTUALLY HAD ANY THOUGHTS OF KILLING YOURSELF?: YES
ATTEMPT SINCE LAST CONTACT: YES

## 2023-07-12 ASSESSMENT — ACTIVITIES OF DAILY LIVING (ADL)
ADLS_ACUITY_SCORE: 35

## 2023-07-12 NOTE — ED NOTES
"The following information was received from Keshia whose relationship to the patient is mother. Information was obtained via phone. Their phone number is [phone] and they last had contact with patient today.    What happened today: \"So we (her dad and I) are on vacation out of state in [place], so we have not seen her recently. Today, she sent us a video this afternoon of her sobbing uncontrollably that she was going to take her life and she also sent the video to other people. Her dad and I went into action and tried to find her within our means of friends / family / supports and were finally able to find her and her  got a hold of her. The last few weeks her mental health has been all over the place. She's trying to look for a job, and then something happens and then she goes downhill again. Earlier this week had swallowed a bunch of melatonin not realizing that it wasn't going to do a whole lot. She hasn't been living with us either, so we have a loose grasp on her mental health but we know she isn't doing well.     She was living with a friend, and then it turned out to not be a safe situation, she felt the friend was interested in her sexually and then she tried to find other arrangements, and she left her meds at the house, which has been part of the problem. Nobody will give her anything new because she technically has them.      What is different about patient's functioning: \"Her mental health has been up and down and she hasn't been on meds for about 5 days\".    Concern about alcohol/drug use: No \"She is actually pretty wary of taking certain medications\"    What do you think the patient needs: \"Some sort of residential treatment. She's gone to [facility] in the past, but I don't think that is her primary problem right now\".    Has patient made comments about wanting to kill themselves/others:  Yes Today via video and before today, she last made a comment to want to kill herself in " January where she was at [facility].  No concerns of HI.    If d/c is recommended, can they take part in safety/aftercare planning: Yes Mother would like to be updated with plan of care and if patient discharges if possible    NIDIA Soriano  DEC - Triage & Transition Services  Callback: [phone]

## 2023-07-12 NOTE — DISCHARGE INSTRUCTIONS
Aftercare Plan  If I am feeling unsafe or I am in a crisis, I will:   Contact my established care providers   Call the National Suicide Prevention Lifeline: 988  Go to the nearest emergency room   Call 911     1-Follow up with outpatient psychiatry for medication management  With previously established providers.     2-Consider follow up with outpatient mental health therapy on an ongoing basis.     3- Continue to follow up with case management services through [facility] and [facility].     Warning signs that I or other people might notice when a crisis is developing for me: thoughts of suicide and intent and planning to act on these thoughts.     Things I am able to do on my own to cope or help me feel better: Reduce Extreme Emotion  QUICKLY:  Changing Your Body Chemistry      T:  Change your body Temperature to change your autonomic nervous system   Use Ice Water to calm yourself down FAST   Put your face in a bowl of ice water (this is the best way; have the person keep his/her face in ice water for 30-45 seconds - initial research is showing that the longer s/he can hold her/his face in the water, the better the response), or   Splash ice water on your face, or hold an ice pack on your face      I:  Intensely exercise to calm down a body revved up by emotion   Examples: running, walking fast, jumping, playing basketball, weight lifting, swimming, calisthenics, etc.   Engage in exercises that DO NOT include violent behaviors. Exercises that utilize violent behaviors tend to function as  behavioral rehearsal,  and rather than calming the person down, may actually  rev  the person up more, increasing the likelihood of violence, and lessening the likelihood that they will  burn off  energy     P:  Progressively relax your muscles   Starting with your hands, moving to your forearms, upper arms, shoulders, neck, forehead, eyes, cheeks and lips, tongue and teeth, chest, upper back, stomach, buttocks, thighs, calves,  "ankles, feet   Tense (10 seconds,   of the way), then relax each muscle (all the way)   Notice the tension   Notice the difference when relaxed (by tensing first, and then relaxing, you are able to get a more thorough relaxation than by simply relaxing)     P: Paced breathing to relax   The standard technique is to begin with counting the number of steps one takes for a typical inhale, then counting the steps one takes for a typical exhale, and then lengthening the amount of steps for the exhalation by one or two steps.  OR  Repeat this pattern for 1-2 minutes  Inhale for four (4) seconds   Exhale for six (6) to eight (8) seconds   Research demonstrated that one can change one's overall level of anxiety by doing this exercise for even a few minutes per day      Things I can use or do for distraction: music, go for a walk     Changes I can make to support my mental health and wellness: maintain a regular sleeping and eating schedule, follow up with outpatient providers.      People in my life that I can ask for help: , psychiatry provider, parents, friends.      Your [facility] has a mental health crisis team you can call 24/7: [facility] Mobile Crisis  [phone] (adults)  [phone] (children)      Additional resources and information:         Crisis Lines  Crisis Text Line  Text 910767  You will be connected with a trained live crisis counselor to provide support.    Por espanol, texto  KIMMIE a 215555 o texto a 442-AYUDAME en WhatsApp    [employer] (LGBTQ Youth Crisis Line)  2.168.787.1446  text START to 862-055      Community Resources  Fast Tracker  Linking people to mental health and substance use disorder resources  fasttrackermn.org     [place] Mental [facility] Warm Line  Peer to peer support  Monday thru Saturday, 12 pm to 10 pm  774.759.4287 or 6.131.620.0526  Text \"Support\" to 20338    National [place] [employer] Mental [employer]ness (PATRICK)  562.676.5380 or " 1.888.PATRICK.HELPS      Mental Health Apps  My3  https://myOsteoplasticspp.org/    VirtualHopeBox  https://[email or web address]/apps/virtual-hope-box/      Additional Information  Today you were seen by a licensed mental health professional through Triage and Transition services, [facility]  for a crisis assessment in the Emergency Department at [facility].  It is recommended that you follow up with your established providers (psychiatrist, mental health therapist, and/or primary care doctor - as relevant) as soon as possible. Coordinators from [facility] will be calling you in the next 24-48 hours to ensure that you have the resources you need.  You can also contact [facility] coordinators directly at [phone]. You may have been scheduled for or offered an appointment with a mental health provider. [facility] maintains an extensive network of licensed behavioral health providers to connect patients with the services they need.  We do not charge providers a fee to participate in our referral network.  We match patients with providers based on a patient's specific needs, insurance coverage, and location.  Our first effort will be to refer you to a provider within your care system, and will utilize providers outside your care system as needed.
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
## 2023-07-12 NOTE — ED TRIAGE NOTES
Pt with MH hx presents for SI, g-tube site pain, urinary retention. Pt states that she has also been having seizure like activity. Pt has been seen at several hospitals today. She also attempted to jump off a ledge in order to harm herself, but was stopped by Muslim security. Pt has been off all her medications for the last 5 days. Pt did have a pantoja, but she accidentally stepped on it and pulled it out.      Triage Assessment       Row Name 07/11/23 8814       Respiratory WDL    Respiratory WDL WDL       Skin Circulation/Temperature WDL    Skin Circulation/Temperature WDL WDL       Cardiac WDL    Cardiac WDL WDL       Peripheral/Neurovascular WDL    Peripheral Neurovascular WDL WDL       Cognitive/Neuro/Behavioral WDL    Cognitive/Neuro/Behavioral WDL WDL       B[place] Coma Scale    Best Eye Response 4-->(E4) spontaneous    Best Motor Response 6-->(M6) obeys commands    Best Verbal Response 5-->(V5) oriented    Bridgette Coma Scale Score 15

## 2023-07-12 NOTE — ED NOTES
Bed: ED19  Expected date:   Expected time:   Means of arrival:   Comments:  [facility] [place]

## 2023-07-12 NOTE — CONSULTS
"      Initial Psychiatric Consult   Consult date: July 12, 2023         Reason for Consult, requesting source:    SI  Requesting source: Shawna White    Labs and imaging reviewed. Patient seen and evaluated by CONRAD Streeter CNP          HPI:   Per initial ED provider note at [facility] 7/11: Thea Castle is a 23 year old female who presented to the pediatric emergency department with multiple complaints including feeling as though she was going to have a seizure.  She had an event over there that was witnessed and did not seem typical of a generalized seizure in many respects and seemed consistent with past events patient has been described as having in multiple different emergency department visits at different emergency departments.  This had resolved by the time the patient came to the emergency department on the adult side, she stated that she was here because she was having suicidal ideation.  She stated that she tried to overdose on melatonin last week and tried to jump off a building today after being discharged from another emergency department.  This is her fifth emergency department visit of the day and she states that nobody wants to admit her because she has too many medical problems, however no ED admitted her due to primary problem being personality and behavior driven.     Per past medical records, there is strong evidence of factitious disorder and PNES. Per this admission, Pt did have a pantoja, but reported \"she accidentally stepped on it and pulled it out.\" She has been more focused on receiving certain medications, asking for her seizures medications which are \"valium daily\" and \"versed as needed.\" During this ED visit, she continues to have multiple somatic complaints including being unable to void, having multiple bowel movements, hot flashes etc.     I met with patient along with the therapist and patient reported she felt very suicidal with thoughts on overdosing. Of note, she " has never had a substantial suicide attempt and protective factors include her Yarsani and upcoming job of being a live-in . She changes her story multiple times during the interview, at times wanting to safety plan and wanting to discharge, and later rescinding ROIs and becoming agitated with safety planning as that meant discharge was imminent. She denies SIB, HI, AVH.Her affect was full range and smiled and laughed at appropriate times.         Past Psychiatric History:   Prior diagnoses: borderline, MDD, OCD, PTSD, MAT, factitious   History of 1 petition for commitment that was not supported by Nataliya   Multiple different medication trials. Currently on Abilify and Cymbalta. Has psychaitrist thru [facility]    Please note care plan:    At each Emergency Department visit, we will evaluate Thea to determine if an emergency medical condition is present.    Thea will be evaluated and treated as per [place] Policy.    Per inpatient psych consult and liaison team 4/25/23:    There should be a high threshold for admission (medical or psychiatric). Please consider a period of monitoring and re-evaluation when they present to the ED.    Boundaries and strict limits are essential in working with this patient    If 1:1 is deemed necessary, remind patient they are there to provide supervision and ensure patient is safe, not for social visit or to play games. The attendant should engage in minimum needed socialization to meet patient s needs.     Patient is to engage and perform her ADLs as independently as possible when she is hospitalized. It is requested that the 1:1 (or any staff) do not braid or brush patient s hair    Discourage allowing patient to  play favorite  by selecting what staff work with her or requesting certain staff to come into her room to see her. If possible, only staff assigned to patient should be working with patient or coming into her room.   It is appropriate to honor patient s  wishes of preference of female provider due to trauma history.     Hospitalization Management:     Concerns for factitious disorder     History of using ensure in emesis bag to show she is sick. History of intentionally placing finger in rectum to cause bleeding. History of pulling on catheter to cause irritation/bleeding.     A Tamper guard should be placed on any IV Access placed.    If tamper guards are not available, utilize wrapping line in coban and putting a solid strip over all with sharpie to assess if line has been tampered with. This is not something that can be negotiated to be removed.    A reminder to [facility] care team to check that the supply cart is locked and should ensure supplies are not left in her room. If possible, the supply cart should be placed outside of [facility] room.     Total ED visits / Hospital Admissions in 12 months prior to initiation of Care Plan: 26 ED visits,   13 urgent care visits, and 6 admission        Substance Use and History:   Denies        Past Medical History:   PAST MEDICAL HISTORY:   Past Medical History:   Diagnosis Date     Anorexia      Anxiety      Depressive disorder      Gastroparesis      OCD (obsessive compulsive disorder)      PTSD (post-traumatic stress disorder)      Slow transit constipation      Ulcerative colitis (H)      Urinary retention        PAST SURGICAL HISTORY:   Past Surgical History:   Procedure Laterality Date     ENT SURGERY       IR GASTRO JEJUNOSTOMY TUBE PLACEMENT  5/30/2023             Family History:   FAMILY HISTORY:   Family History   Problem Relation Age of Onset     Suicide Other            Social History:   SOCIAL HISTORY:   Social History     Tobacco Use     Smoking status: Never     Smokeless tobacco: Never   Substance Use Topics     Alcohol use: Not Currently     Comment: 2 drinks a week     Living in her car          Physical ROS:   The 10 point Review of Systems is negative other than noted in the HPI or here.            "Medications:       ARIPiprazole  10 mg Oral Daily     DULoxetine  120 mg Oral Daily     LORazepam  0.5 mg Oral At Bedtime              Allergies:     Allergies   Allergen Reactions     Amoxicillin Rash     Penicillins Unknown     Mother unsure if patient or sister had a reaction. Mother reports she \"didn't think it was anaphylactic\".     Lactose Nausea, GI Disturbance, Nausea and Vomiting and Other (See Comments)     Other reaction(s): Gastrointestinal  Fells sick with milk, but can have yogurt   Fells sick with milk, but can have yogurt   Fells sick with milk, but can have yogurt   Other reaction(s): Gastrointestinal  Fells sick with milk, but can have yogurt   Fells sick with milk, but can have yogurt   Other reaction(s): Gastrointestinal  Fells sick with milk, but can have yogurt   Fells sick with milk, but can have yogurt   Other reaction(s): Gastrointestinal  Fells sick with milk, but can have yogurt        Levofloxacin Muscle Pain (Myalgia)     Developed myalgia on levofloxacin 500 mg/d (11/28/22) after 1 day and requested to switch antibiotics     Other Food Allergy GI Disturbance     Cilantro          Labs:     Recent Results (from the past 48 hour(s))   Extra Red Top Tube    Collection Time: 07/11/23 10:44 PM   Result Value Ref Range    Hold Specimen JIC    Extra Green Top (Lithium Heparin) Tube    Collection Time: 07/11/23 10:44 PM   Result Value Ref Range    Hold Specimen JIC    Extra Purple Top Tube    Collection Time: 07/11/23 10:44 PM   Result Value Ref Range    Hold Specimen JIC    Comprehensive metabolic panel    Collection Time: 07/11/23 10:44 PM   Result Value Ref Range    Sodium 138 136 - 145 mmol/L    Potassium 3.9 3.4 - 5.3 mmol/L    Chloride 101 98 - 107 mmol/L    Carbon Dioxide (CO2) 18 (L) 22 - 29 mmol/L    Anion Gap 19 (H) 7 - 15 mmol/L    Urea Nitrogen 11.2 6.0 - 20.0 mg/dL    Creatinine 0.69 0.51 - 0.95 mg/dL    Calcium 9.1 8.6 - 10.0 mg/dL    Glucose 112 (H) 70 - 99 mg/dL    Alkaline " Phosphatase 82 35 - 104 U/L    AST 39 0 - 45 U/L    ALT 27 0 - 50 U/L    Protein Total 6.8 6.4 - 8.3 g/dL    Albumin 4.2 3.5 - 5.2 g/dL    Bilirubin Total 0.9 <=1.2 mg/dL    GFR Estimate >90 >60 mL/min/1.73m2   Magnesium    Collection Time: 07/11/23 10:44 PM   Result Value Ref Range    Magnesium 1.8 1.7 - 2.3 mg/dL   Ethyl Alcohol Level    Collection Time: 07/11/23 10:44 PM   Result Value Ref Range    Alcohol ethyl <0.01 <=0.01 g/dL   Acetaminophen level    Collection Time: 07/11/23 10:44 PM   Result Value Ref Range    Acetaminophen <5.0 (L) 10.0 - 30.0 ug/mL   Salicylate level    Collection Time: 07/11/23 10:44 PM   Result Value Ref Range    Salicylate <0.3   mg/dL   UA with Microscopic reflex to Culture    Collection Time: 07/12/23  6:14 AM    Specimen: Urine, Smith Catheter   Result Value Ref Range    Color Urine Light Yellow Colorless, Straw, Light Yellow, Yellow    Appearance Urine Clear Clear    Glucose Urine Negative Negative mg/dL    Bilirubin Urine Negative Negative    Ketones Urine 10 (A) Negative mg/dL    Specific Gravity Urine 1.024 1.003 - 1.035    Blood Urine Large (A) Negative    pH Urine 6.5 5.0 - 7.0    Protein Albumin Urine 10 (A) Negative mg/dL    Urobilinogen Urine Normal Normal, 2.0 mg/dL    Nitrite Urine Negative Negative    Leukocyte Esterase Urine Small (A) Negative    Bacteria Urine Few (A) None Seen /HPF    Mucus Urine Present (A) None Seen /LPF    RBC Urine 5 (H) <=2 /HPF    WBC Urine 4 <=5 /HPF    Squamous Epithelials Urine 6 (H) <=1 /HPF   Drug abuse screen 1 urine (ED)    Collection Time: 07/12/23  6:14 AM   Result Value Ref Range    Amphetamines Urine Screen Negative Screen Negative    Barbituates Urine Screen Negative Screen Negative    Benzodiazepine Urine Screen Positive (A) Screen Negative    Cannabinoids Urine Screen Negative Screen Negative    Cocaine Urine Screen Negative Screen Negative    Opiates Urine Screen Negative Screen Negative          Physical and Psychiatric  "Examination:     /73   Pulse 98   Temp 97.6  F (36.4  C) (Axillary)   Resp 12   Ht 1.626 m (5' 4\")   Wt 59 kg (130 lb 1.1 oz)   LMP  (LMP Unknown)   SpO2 99%   BMI 22.33 kg/m    Weight is 130 lbs 1.14 oz  Body mass index is 22.33 kg/m .    Physical Exam:  I have reviewed the physical exam as documented by by the medical team and agree with findings and assessment and have no additional findings to add at this time.    Mental Status Exam:    Appearance: disheveled   Attitude:  cooperative  Eye Contact:  fair  Mood:  \"fine:  Affect:  appropriate and in normal range, mood congruent and labile  Speech:  clear, coherent  Language: Fluent in english   Psychomotor Behavior:  no evidence of tardive dyskinesia, dystonia, or tics  Thought Process:  logical and goal oriented  Associations:  no loose associations  Thought Content:  passive SI present, no AVH, no HI  Insight:  partial  Judgement:  limited  Oriented to:  time, person, and place  Attention Span and Concentration:  intact  Recent and Remote Memory:  intact  Fund of Knowledge: Appropriate   Gait and Station: baseline                DSM-5 Diagnosis:   Principal problem: borderline personality disorder  Factitious disorder           Assessment:   Thea is a 23 year old female with history of above diagnoses who presents with suicidal ideation and multiple medical complaints after multiple different EDs discharged her. Not responding to internal stimuli. Pt is cognitively intact, alert, oriented, with linear and goal oriented thought process. Patient's suicidal ideation developed right after discharge from hospital and again agitation and suicidality developed right after plan to discharge here at [facility], indicative of secondary gain. This is similar to her prior presentations and would threaten suicide around discharge, however when we were working to transfer her to [facility] during her last extended [facility] admission, her suicidality immediately " dissipated, again indicating secondary gain thru suicidal statements.     Mitigating factors for safety: supportive parents, , Sikh, future-oriented looking forward to upcoming job, best friend marisol, no prior serious suicide attempts, medication complaint, able to seek emergency medical care when needed      Disposition:   Recommend AGAINST inpatient psychiatry/medical admission as it will reinforce avoidant and maladaptive coping strategies. She strongly desires to be in the hospital. Given her personality disorder this would likely be highly counterproductive and even worsen her condition.          Irina Martins, PMMARINEP-BC  Consult/Liaison Psychiatry   [facility]
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
## 2023-07-12 NOTE — ED PROVIDER NOTES
"  History     Chief Complaint   Patient presents with     Gtube Problem     Seizures     Suicidal     Urinary Retention     HPI    History limited by The patient being \"unresponsive\".    Thea is a(n) 23 year old who presents at 10:34 PM triage station for suicidal ideation, possible seizures today.  While we are getting rated escort the patient over to the adult ED, the patient had a \"seizure\".  I witnessed the seizure and that lasted about 60 seconds.  No oral cyanosis noted.  Seizure can be described as generalized tonic-clonic of the upper extremities and upper body shaking, with neck shaking.  Did not appreciate eye deviation.    Patient was transferred to room 1 where the seizure stopped.  I try to evaluate the pupillary response and the patient was refusing to open up her eyelids.  There is no notable postictal phase.        PMHx:  Past Medical History:   Diagnosis Date     Anorexia      Anxiety      Depressive disorder      Gastroparesis      OCD (obsessive compulsive disorder)      PTSD (post-traumatic stress disorder)      Slow transit constipation      Ulcerative colitis (H)      Urinary retention      Past Surgical History:   Procedure Laterality Date     ENT SURGERY       IR GASTRO JEJUNOSTOMY TUBE PLACEMENT  5/30/2023     These were reviewed with the patient/family.    MEDICATIONS were reviewed and are as follows:   No current facility-administered medications for this encounter.     Current Outpatient Medications   Medication     acetaminophen (TYLENOL) 325 MG tablet     ARIPiprazole (ABILIFY) 10 MG tablet     calcium carbonate (TUMS) 500 MG chewable tablet     cholecalciferol (VITAMIN D3) 25 mcg (1000 units) capsule     diazepam (DIASTAT ACUDIAL) 10 MG GEL rectal gel     drospirenone-ethinyl estradiol (FELIPE) 3-0.02 MG tablet     DULoxetine (CYMBALTA) 60 MG capsule     hydrOXYzine (ATARAX) 25 MG tablet     lactobacillus rhamnosus, GG, (CULTURELL) capsule     linaclotide (LINZESS) 290 MCG capsule     " "LORazepam (ATIVAN) 0.5 MG tablet     lubiprostone (AMITIZA) 24 MCG capsule     Melatonin 10 MG TABS tablet     multivitamin w/minerals (THERA-VIT-M) tablet     ondansetron (ZOFRAN ODT) 4 MG ODT tab     pantoprazole (PROTONIX) 40 MG EC tablet     plecanatide (TRULANCE) 3 MG tablet     prochlorperazine (COMPAZINE) 10 MG tablet     promethazine (PHENERGAN) 25 MG suppository     promethazine (PHENERGAN) 25 MG tablet     vitamin C (ASCORBIC ACID) 250 MG tablet       ALLERGIES:  Amoxicillin, Penicillins, Lactose, Levofloxacin, and Other food allergy         Physical Exam   BP: (!) 133/95  Pulse: 87  Temp: 99.3  F (37.4  C)  Resp: 20  Height: 162.6 cm (5' 4\")  Weight: 59 kg (130 lb 1.1 oz)  SpO2: 97 %     When I attempted to elevate the patient's eyelids to do appropriate exam, the patient was refusing to open her eyelids.  Patient apologized.    Physical Exam  Constitutional:       Appearance: She is ill-appearing.      Comments: Unresponsive     HENT:      Nose: Nose normal.      Mouth/Throat:      Mouth: Mucous membranes are moist.   Eyes:      General:         Right eye: No discharge.         Left eye: No discharge.      Pupils: Pupils are equal, round, and reactive to light.      Comments: Pupils are about 4 mm bilaterally and symmetric.  Nystagmus noted.   Cardiovascular:      Rate and Rhythm: Normal rate.      Pulses: Normal pulses.   Pulmonary:      Effort: Pulmonary effort is normal.   Abdominal:      General: Abdomen is flat.   Musculoskeletal:      Cervical back: Normal range of motion and neck supple. No rigidity.   Lymphadenopathy:      Cervical: No cervical adenopathy.   Skin:     General: Skin is warm.      Capillary Refill: Capillary refill takes less than 2 seconds. No obvious petechiae or purpura on exposed skin surfaces.  Neurological:      Cranial Nerves: No cranial nerve deficit.      Comments: Patient was unresponsive during her evaluation patient although, as noted above, the patient was able to " verbalize and apologize for keeping her eyes shut.           ED Course     Because of the adult presenting to the ED in possible seizure, an adult code was overhead paged.    When the patient was in room 1, the resuscitation room, we dressed the patient.    IV access was obtained and a CG 4, CG 8 was obtained    Patient is vitals are within normal limits and the airway was patent.    I am aware that the patient did have IV blood draw but I do not have laboratory results.  I suspect that the labs/syringes went to the adult ED.    I spoke to the adult ED provider and they accepted the patient for transfer to their unit for further evaluation.    Using care everywhere, I was able to review 2 other emergency department visits that happened today.  One note from [facility], another note from [facility].    Given that the patient was seizing during my evaluation, the independent historian was the triage nurse.    Critical care time was approximately 30 minutes.  This time was me being in the room for 30 minutes, reviewing airway, breathing, circulation, mental status.  This time was also used to discuss the patient's clinical presentation to the entire adult code team.  We discussed the clinical presentation and ED management.  We agreed that the patient was stable enough to be transferred to the adult emergency department for further evaluation.    Mental Health Risk Assessment      PSS-3    Date and Time Over the past 2 weeks have you felt down, depressed, or hopeless? Over the past 2 weeks have you had thoughts of killing yourself? Have you ever attempted to kill yourself? When did this last happen? User   07/11/23 2223 yes yes yes within the last 24 hours (including today) [facility]      C-SSRS ([place])    Date and Time Q1 Wished to be Dead (Past Month) Q2 Suicidal Thoughts (Past Month) Q3 Suicidal Thought Method Q4 Suicidal Intent without Specific Plan Q5 Suicide Intent with Specific Plan Q6 Suicide Behavior  (Lifetime) Within the Past 3 Months? RETIRED: Level of Risk per Screen Screening Not Complete User   07/11/23 2227 yes yes yes yes yes yes -- -- -- HMD              Suicide assessment completed by mental health (D.ENoelleC., LCSW, etc.)       Procedures    Results for orders placed or performed during the hospital encounter of 07/11/23   [place] Draw     Status: None (In process)    Narrative    The following orders were created for panel order [place] Draw.  Procedure                               Abnormality         Status                     ---------                               -----------         ------                     Extra Red Top Tube[[id number]]                               In process                 Extra Green Top (Lithium...[[id number]]                      In process                 Extra Purple Top Tube[[id number]]                            In process                   Please view results for these tests on the individual orders.       Medications - No data to display    Critical care time:  none        Medical Decision Making  The patient's presentation was of high complexity (an acute health issue posing potential threat to life or bodily function).    The patient's evaluation involved:  an assessment requiring an independent historian (see separate area of note for details)  review of external note(s) from 2 sources (see separate area of note for details)  ordering and/or review of 2 test(s) in this encounter (see separate area of note for details)  strong consideration of a test (see separate area of note for details) that was ultimately deferred    The patient's management necessitated high risk (a decision regarding hospitalization).        Assessment & Plan   Thea is a(n) 23 year old history of visiting other ED today, suicidal ideation where, per triage, she stated that she tried or thought about jumping off a two-story building.  Patient has a complex medical history as she has a G-tube and  "wears a diaper.    As noted above, as we are transferring the patient to the adult ED, the patient had a \"generalized tonic-clonic seizure\" with no notable postictal phase.    Appreciate adult coat team arriving to help manage the patient.      New Prescriptions    No medications on file       Final diagnoses:   Seizure (H)   Suicidal ideation            Portions of this note may have been created using voice recognition software. Please excuse transcription errors.     7/11/2023   [facility] EMERGENCY DEPARTMENT     Nir Winter MD  07/11/23 6396    "

## 2023-07-12 NOTE — ED PROVIDER NOTES
ED Provider Note/observation admission note  [facility]      History     Chief Complaint   Patient presents with     Gtube Problem     Seizures     Suicidal     Urinary Retention     HPI  Thea Castle is a 23 year old female who presented to the pediatric emergency department with multiple complaints including feeling as though she was going to have a seizure.  She had an event over there that was witnessed and did not seem typical of a generalized seizure in many respects and seemed consistent with past events patient has been described as having in multiple different emergency department visits at different emergency departments.  This had resolved by the time the patient came to the emergency department on the adult side, she stated that she was here because she was having suicidal ideation.  She stated that she tried to overdose on melatonin last week and tried to jump off a building today after being discharged from another emergency department.  This is about her fourth emergency department visit of the day and she states that nobody wants to admit her because she has too many medical problems.  Please refer to those notes.    Past Medical History  Past Medical History:   Diagnosis Date     Anorexia      Anxiety      Depressive disorder      Gastroparesis      OCD (obsessive compulsive disorder)      PTSD (post-traumatic stress disorder)      Slow transit constipation      Ulcerative colitis (H)      Urinary retention      Past Surgical History:   Procedure Laterality Date     ENT SURGERY       IR GASTRO JEJUNOSTOMY TUBE PLACEMENT  5/30/2023     acetaminophen (TYLENOL) 325 MG tablet  ARIPiprazole (ABILIFY) 10 MG tablet  calcium carbonate (TUMS) 500 MG chewable tablet  cholecalciferol (VITAMIN D3) 25 mcg (1000 units) capsule  diazepam (DIASTAT ACUDIAL) 10 MG GEL rectal gel  drospirenone-ethinyl estradiol (FELIPE) 3-0.02 MG tablet  DULoxetine (CYMBALTA) 60 MG capsule  hydrOXYzine  "(ATARAX) 25 MG tablet  lactobacillus rhamnosus, GG, (CULTURELL) capsule  linaclotide (LINZESS) 290 MCG capsule  LORazepam (ATIVAN) 0.5 MG tablet  lubiprostone (AMITIZA) 24 MCG capsule  Melatonin 10 MG TABS tablet  multivitamin w/minerals (THERA-VIT-M) tablet  ondansetron (ZOFRAN ODT) 4 MG ODT tab  pantoprazole (PROTONIX) 40 MG EC tablet  plecanatide (TRULANCE) 3 MG tablet  prochlorperazine (COMPAZINE) 10 MG tablet  promethazine (PHENERGAN) 25 MG suppository  promethazine (PHENERGAN) 25 MG tablet  vitamin C (ASCORBIC ACID) 250 MG tablet      Allergies   Allergen Reactions     Amoxicillin Rash     Penicillins Unknown     Mother unsure if patient or sister had a reaction. Mother reports she \"didn't think it was anaphylactic\".     Lactose Nausea, GI Disturbance, Nausea and Vomiting and Other (See Comments)     Other reaction(s): Gastrointestinal  Fells sick with milk, but can have yogurt   Fells sick with milk, but can have yogurt   Fells sick with milk, but can have yogurt   Other reaction(s): Gastrointestinal  Fells sick with milk, but can have yogurt   Fells sick with milk, but can have yogurt   Other reaction(s): Gastrointestinal  Fells sick with milk, but can have yogurt   Fells sick with milk, but can have yogurt   Other reaction(s): Gastrointestinal  Fells sick with milk, but can have yogurt        Levofloxacin Muscle Pain (Myalgia)     Developed myalgia on levofloxacin 500 mg/d (11/28/22) after 1 day and requested to switch antibiotics     Other Food Allergy GI Disturbance     Cilantro     Family History  Family History   Problem Relation Age of Onset     Suicide Other      Social History   Social History     Tobacco Use     Smoking status: Never     Smokeless tobacco: Never   Vaping Use     Vaping Use: Never used   Substance Use Topics     Alcohol use: Not Currently     Comment: 2 drinks a week     Drug use: Never      Past medical history, past surgical history, medications, allergies, family history, and " "social history were reviewed with the patient. No additional pertinent items.      A medically appropriate review of systems was performed with pertinent positives and negatives noted in the HPI, and all other systems negative.    Physical Exam   BP: (!) 133/95  Pulse: 87  Temp: 99.3  F (37.4  C)  Resp: 20  Height: 162.6 cm (5' 4\")  Weight: 59 kg (130 lb 1.1 oz)  SpO2: 97 %  Physical Exam  Vitals and nursing note reviewed.   Constitutional:       General: She is not in acute distress.     Appearance: She is well-developed. She is not ill-appearing, toxic-appearing or diaphoretic.      Comments: Patient is awake and alert, she is mentating normally and protecting her airway without difficulty.   HENT:      Head: Normocephalic and atraumatic.      Mouth/Throat:      Lips: Pink.      Mouth: Mucous membranes are moist.      Pharynx: Oropharynx is clear. No oropharyngeal exudate.      Comments: Oral mucosa is moist, no intraoral or lingular injuries noted.  Eyes:      General: Lids are normal. No scleral icterus.     Extraocular Movements: Extraocular movements intact.      Right eye: No nystagmus.      Left eye: No nystagmus.      Conjunctiva/sclera: Conjunctivae normal.      Pupils: Pupils are equal, round, and reactive to light.   Neck:      Thyroid: No thyromegaly.      Vascular: No JVD.      Trachea: No tracheal deviation.   Cardiovascular:      Rate and Rhythm: Normal rate and regular rhythm.      Pulses: Normal pulses.      Heart sounds: Normal heart sounds. No murmur heard.     No friction rub. No gallop.   Pulmonary:      Effort: Pulmonary effort is normal. No respiratory distress.      Breath sounds: Normal breath sounds.   Abdominal:      General: Bowel sounds are normal. There is no distension.      Palpations: Abdomen is soft. There is no mass.      Tenderness: There is no abdominal tenderness. There is no guarding or rebound.      Comments: G-tube site without bleeding or discharge.   Musculoskeletal:       "   General: No tenderness. Normal range of motion.      Cervical back: Normal range of motion and neck supple. No erythema or rigidity.      Right lower leg: No edema.      Left lower leg: No edema.   Lymphadenopathy:      Cervical: No cervical adenopathy.   Skin:     General: Skin is warm and dry.      Capillary Refill: Capillary refill takes less than 2 seconds.      Coloration: Skin is not pale.      Findings: No erythema or rash.   Neurological:      Mental Status: She is alert and oriented to person, place, and time.      Cranial Nerves: No cranial nerve deficit.      Sensory: No sensory deficit.      Motor: Motor function is intact.   Psychiatric:         Mood and Affect: Mood and affect normal.         Speech: Speech normal.         Behavior: Behavior normal.           ED Course, Procedures, & Data      Procedures                     Results for orders placed or performed during the hospital encounter of 07/11/23   [place] Draw     Status: None    Narrative    The following orders were created for panel order [place] Draw.  Procedure                               Abnormality         Status                     ---------                               -----------         ------                     Extra Red Top Tube[[id number]]                               Final result               Extra Green Top (Lithium...[[id number]]                      Final result               Extra Purple Top Tube[[id number]]                            Final result                 Please view results for these tests on the individual orders.   Extra Red Top Tube     Status: None   Result Value Ref Range    Hold Specimen JIC    Extra Green Top (Lithium Heparin) Tube     Status: None   Result Value Ref Range    Hold Specimen JIC    Extra Purple Top Tube     Status: None   Result Value Ref Range    Hold Specimen JIC    Comprehensive metabolic panel     Status: Abnormal   Result Value Ref Range    Sodium 138 136 - 145 mmol/L    Potassium 3.9  3.4 - 5.3 mmol/L    Chloride 101 98 - 107 mmol/L    Carbon Dioxide (CO2) 18 (L) 22 - 29 mmol/L    Anion Gap 19 (H) 7 - 15 mmol/L    Urea Nitrogen 11.2 6.0 - 20.0 mg/dL    Creatinine 0.69 0.51 - 0.95 mg/dL    Calcium 9.1 8.6 - 10.0 mg/dL    Glucose 112 (H) 70 - 99 mg/dL    Alkaline Phosphatase 82 35 - 104 U/L    AST 39 0 - 45 U/L    ALT 27 0 - 50 U/L    Protein Total 6.8 6.4 - 8.3 g/dL    Albumin 4.2 3.5 - 5.2 g/dL    Bilirubin Total 0.9 <=1.2 mg/dL    GFR Estimate >90 >60 mL/min/1.73m2   Magnesium     Status: Normal   Result Value Ref Range    Magnesium 1.8 1.7 - 2.3 mg/dL   Ethyl Alcohol Level     Status: Normal   Result Value Ref Range    Alcohol ethyl <0.01 <=0.01 g/dL     Medications   0.9% sodium chloride BOLUS (has no administration in time range)     Labs Ordered and Resulted from Time of ED Arrival to Time of ED Departure - No data to display  No orders to display              Assessment & Plan      This patient presented to the emergency department complaining of suicidal ideation.  She has had multiple different emergency department visits today.  I did review these visits.  She has never demonstrated any EEG evidence of epilepsy, but does have a prescription for Valium to be used as a rescue medication.  Event described in the pediatric emergency department did not seem consistent with a generalized epileptic event as there was no significant postictal period and patient was noted to be volitionally closing her eyes.  Nonetheless, she did bump her lactate after this event.  No significant electrolyte abnormalities are noted on blood work.  She will be given a liter of fluid and lactate will be rechecked.  According to past records from earlier today she had a similar presentation at another emergency department with resolution of the elevated lactate level.  Patient was seen by the mental health  and she disclosed to him that she had tried to kill herself by overdosing on melatonin tonight after  her last emergency department visit.  She states that if she goes home, she has 70 pills at home that she will take to kill herself.  She is stating that she needs a day or so to try to get her head straight.  Plan at this point will be to have patient under observation in the emergency department, obtain a psychiatric consult on the patient and look for social placement as she is currently homeless.      I have reviewed the nursing notes. I have reviewed the findings, diagnosis, plan and need for follow up with the patient.    New Prescriptions    No medications on file       Final diagnoses:   Seizure (H)   Suicidal ideation       Jarad Ortiz  [facility] EMERGENCY DEPARTMENT  7/11/2023     Jarad Ortiz MD  07/12/23 0109       Jarad Ortiz MD  07/12/23 0110

## 2023-07-12 NOTE — ED PROVIDER NOTES
"ED Observation Progress Note  [facility]  Note Date: 7/12/2023    Thea Castle MRN: [id number]   Age: 23 year old YOB: 2000     Interval History   About the same today.  Vitals signs stable.  Tolerating medications and treatment plan without significant side effects or problems.  Has gastroparesis, does not take oral meds or intake.  I have ordered the tube feeding formula that she is indicated that she typically uses.  Still reporting suicidal ideation and wants to be admitted.    Physical Exam   /73   Pulse 98   Temp 97.6  F (36.4  C) (Axillary)   Resp 12   Ht 1.626 m (5' 4\")   Wt 59 kg (130 lb 1.1 oz)   LMP  (LMP Unknown)   SpO2 99%   BMI 22.33 kg/m    Physical Exam  Awake, normal speech, normal respiratory rate and effort, normal heart rate.   Results     Assessments & Plan (with Medical Decision Making)   Thea Castle is a 23 year old female admitted to the ED Observation Unit with suicidal ideation. She has this chronically and [facility]EC and psych consult do not recommend admission at this time.     Services consulted during the observation course: psych constult. Notable consultant recommendations include: discharge with outpatient follow up      --  Shawna White MD  [facility] EMERGENCY DEPARTMENT  7/12/2023   "

## 2023-07-12 NOTE — ED TRIAGE NOTES
"Patient arrives via EMS for suicidal ideation. Was here over the weekend for suicide attempt by overdosing on Melatonin. Yesterday, jumped off a bridge and went to [facility]. She reports they discharged her stating \"she has many mental health resources in place\" per patient. Patient states she has not received her tube feedings in 6 days due to being in and out of hospital. Is also on Smith catheter for urinary retention. Chronic medical problem list. Currently homeless after disagreement with parents.    "

## 2023-07-12 NOTE — PLAN OF CARE
"Thea Castle  July 12, 2023  Plan of Care Hand-off Note     Patient Care Path: Observation    Plan for Care:     Pt presents to ED for seizures, G tube problems, and SI. Pt has been seen at numerous ED's today all recommending discharge. Upon discharging, pt has increased her risky behaviors and engaged in actions that can cause harm to patient. After discharge from [facility], pt attempted to jump from a ledge to break her legs and was stopped by security. Post discharge from [facility], pt took 25 melatonin as a suicide attempt. Pt exhibiting cluster B traits influencing her actions. Pt has impaired decision making and functionality. Pt exclusionary from [facility] due to G tube. Per care plan, pt will benefit from observational disposition to start meds again in a safe environment. Pt continues to report active SI with plan to take pills. States, \"I will do it if you discharge me tonight\". Pt feels she can stabilize with medications over 1-2 days per her report. Pt engaged with  Shawna Ferrera with [facility] and she is familiar with pt's case. May be a valuable resource for establishing discharge resources and safety planning. Fredrick' phone number is  (personal) and  (work).     Critical Safety Issues: hx of staff splitting. See care plan.     Overview:  This patient is a child/adolescent: No    This patient has additional special visitor precautions: YES: Do not permit calls or visits from mother (per pt's request)     Legal Status: Voluntary    Contacts:   , Fredrick Renteria' phone number is  (personal) and  (work).     Psychiatry Consult:  Adult Psychiatry Consult requested related to increased SI and impulsivity. Psychiatry IP Consult Order Placed: Yes    Updated RN and Attending Provider regarding plan of care.    NIDIA Hendrix    "

## 2023-07-12 NOTE — ED NOTES
Triage RN finished triage and pt then began to have full body convulsions. Pt was sitting up during convulsions and was able to maintain her airway. Pt was then immediately transferred to room 1 for stabilization. Pt was stabilized and sent to [place] for further evaluation.

## 2023-07-12 NOTE — ED NOTES
Patient unable to void. Sitter at bedside assisted patient back to bed and notified RN of patient request to speak with RN. RN to bedside, patient requesting to speak to physician. ER MD Lynch notified.

## 2023-07-12 NOTE — ED NOTES
Keshia Tin ([phone]) called [facility] stating she is pt's mother and provided her name and number for DEC collateral.

## 2023-07-12 NOTE — DISCHARGE SUMMARY
"ED Observation Discharge Summary  [facility]  Discharge Date: 7/12/2023    Thea Castle MRN: [id number]   Age: 23 year old YOB: 2000     Brief HPI & Initial ED Course     Chief Complaint   Patient presents with     Gtube Problem     Seizures     Suicidal     Urinary Retention     HPI  Thea Castle is a 23 year old female with PMH notable for pseudoseizures, OCD, borderline personality disorder, self-injurious behavior, depression with suicidal ideation who presented to the ED with a psychiatric concern.  Is reporting depression, suicidal thoughts.      The patient was evaluated in the emergency department by a physician and DEC behavioral health . The DEC  recommended overnight observation, reevaluate tomorrow. See separate DEC note from this encounter for details on the assessment. The patient's psychiatric state was such that she would benefit from ongoing monitoring. Observation care was initiated with the plan including serial assessments of psychiatric condition, potential administration of medications if indicated, and further disposition pending the patient's psychiatric course during the monitoring period.     See ED Observation H&P for further details on the patient's presenting history and initial evaluation.     Physical Exam   BP: (!) 133/95  Pulse: 87  Temp: 99.3  F (37.4  C)  Resp: 20  Height: 162.6 cm (5' 4\")  Weight: 59 kg (130 lb 1.1 oz)  SpO2: 97 %    Physical Exam  General: . Appears stated age.   HENT: MMM, no oropharyngeal lesions  Eyes: PERRL, normal sclerae   Cardio: Regular rate, extremities well perfused  Resp: Normal work of breathing, normal respiratory rate  Neuro: alert and fully oriented. CN II-XII grossly intact. Grossly normal strength and sensation in all extremities.   MSK: no deformities.   Integumentary/Skin: no rash visualized, normal color  Psych: Is reporting suicidal ideation with a plan to overdose on " melatonin    Results      Procedures         Patient was reassessed today by DEC as well as inpatient psychiatry.  She has many ED visits just within the first 12 days of July.  Has never had a serious suicide attempt in the past.  Has chronic suicidal ideation.  Psychiatry recognizes, knows that she has chronic suicidal ideation and they were recommending discharge.  They do not think she would benefit from inpatient hospitalization.  Patient is objecting to this.  She does have outpatient , therapist, services in place.       Labs Ordered and Resulted from Time of ED Arrival to Time of ED Departure - No data to display         Observation Course   The patient was found to have a psychiatric condition that would benefit from an observation stay in the emergency department for further psychiatric stabilization and/or coordination of a safe disposition. The plan upon observation admission included serial assessments of psychiatric condition, potential administration of medications if indicated, further disposition pending the patient's psychiatric course during the monitoring period.     Serial assessments of the patient's psychiatric condition were performed. Nursing notes were reviewed. During the observation period, the patient did not require medications for agitation, and did not require restraints/seclusion for patient and/or provider safety.        After the period in observation care, the patient's circumstances and mental state were safe for outpatient management. After counseling on the diagnosis, work-up, and treatment plan, the patient was discharged. Close follow-up with a psychiatrist and/or therapist was recommended and community psychiatric resources were provided. Patient is to return to the ED if any urgent or potentially life-threatening concerns.      Discharge Diagnoses:   Final diagnoses:   Seizure (H) - Suspect nonepileptic event   Suicidal ideation       --  Shawna Loza  MD Cindy  [facility] EMERGENCY DEPARTMENT  7/12/2023      Shawna White MD  07/12/23 0775

## 2023-07-12 NOTE — ED NOTES
Patient reported increased bladder discomfort; requested bladder scan due to history of chronic urinary retention. RN performed. Patient chose to attempt voiding in BSC to collect urine sample. RN assisted patient to BSC.

## 2023-07-12 NOTE — ED PROVIDER NOTES
History     Chief Complaint:  Suicidal       The history is provided by the patient.      Thea Castle is a 23 year old female with a history of depression, suicidal ideations, and pseudoseizures, urinary retention with Smith catheter, gastroparesis with a G-tube in place, who presents with suicidal ideations. Yesterday, the patient presented to presented to [facility] and was transferred to [facility] due to the patient making a statements to jump off a bridge. She was then discharged and the presented to [facility] and kept overnight for psych observation and discharged today. The patient reports chronic suicidal ideations. She states that she plans to take pills, jump off a large. The last time she attempted to her herself occurred 24 hours ago in which she attempted to overdose by taking 25 melatonin. She does follow with psychiatrist and last saw them on 6/20 with no changes in her mediations.  She states she was kicked out of her mom's house 2 weeks ago as she told her parents she was lundy.  The patient states that she was called by [facility] today as they stated that because she was homeless they wanted to take the G-tube out as it was not safe.  This was very upsetting to the patient.    The patient's mother was contacted by DEC.  The patient's mother states that she was not kicked out of the house.  The mother states that she was giving her IV fluids a couple of weeks ago when the patient cut the IV line and ran out of the house.  She has not been home since.  She did not bring any of her supplies for tube feeding or any of her medications.  The patient states she has been off of her medications for 5 days but it may have been longer than that being that she has not had them.    EMERGENCY CARE PLAN   At each Emergency Department visit, we will evaluate Thea to determine if an emergency medical condition is present.    Thea will be evaluated and treated as per [facility] Policy.    Per inpatient  psych consult and liaison team 4/25/23:    There should be a high threshold for admission (medical or psychiatric). Please consider a period of monitoring and re-evaluation when they present to the ED.    Boundaries and strict limits are essential in working with this patient    If 1:1 is deemed necessary, remind patient they are there to provide supervision and ensure patient is safe, not for social visit or to play games. The attendant should engage in minimum needed socialization to meet patient s needs.     Patient is to engage and perform her ADLs as independently as possible when she is hospitalized. It is requested that the 1:1 (or any staff) do not braid or brush patient s hair    Discourage allowing patient to  play favorite  by selecting what staff work with her or requesting certain staff to come into her room to see her. If possible, only staff assigned to patient should be working with patient or coming into her room.   It is appropriate to honor patient s wishes of preference of female provider due to trauma history.     Hospitalization Management:     Concerns for factitious disorder     History of using ensure in emesis bag to show she is sick. History of intentionally placing finger in rectum to cause bleeding. History of pulling on catheter to cause irritation/bleeding.     A Tamper guard should be placed on any IV Access placed.    If tamper guards are not available, utilize wrapping line in coban and putting a solid strip over all with sharpie to assess if line has been tampered with. This is not something that can be negotiated to be removed.    A reminder to [facility] care team to check that the supply cart is locked and should ensure supplies are not left in her room. If possible, the supply cart should be placed outside of [facility] room.    Comprehensive Metabolic Panel - 7/11/23      Ref Range & Units 1 d ago  (7/11/23) 3 d ago  (7/9/23) 3 d ago  (7/9/23) 7 d ago  (7/5/23) 8 d ago  (7/4/23) 9  d ago  (7/3/23) 13 d ago  (6/29/23) 13 d ago  (6/29/23)    Sodium 136 - 145 mmol/L 138  141  142  137  142  139   140    Potassium 3.4 - 5.3 mmol/L 3.9  3.6  3.6  4.0  3.7  3.7   4.0    Chloride 98 - 107 mmol/L 101  105  105  107  105  103   107    Carbon Dioxide (CO2) 22 - 29 mmol/L 18 Low   22  25  22  16 Low   23   23    Anion Gap 7 - 15 mmol/L 19 High   14  12  8  21 High   13   10    Urea Nitrogen 6.0 - 20.0 mg/dL 11.2  9.4  11.0  6.2  11.5  13.8   12.0    Creatinine 0.51 - 0.95 mg/dL 0.69  0.64  0.71  0.62  0.74  0.69   0.63    Calcium 8.6 - 10.0 mg/dL 9.1  9.2  10.0  8.3 Low   9.0  9.1   9.0    Glucose 70 - 99 mg/dL 112 High   140 High   108 High   101 High   105 High   107 High    95    Alkaline Phosphatase 35 - 104 U/L 82  73  81    72  72     AST 0 - 45 U/L 39  28 CM  33 CM    21 CM  24 CM     ALT 0 - 50 U/L 27  23 CM  25 CM    18 CM  16 CM     Protein Total 6.4 - 8.3 g/dL 6.8  6.8  7.5    6.5  6.5     Albumin 3.5 - 5.2 g/dL 4.2  3.9  4.6    4.0  3.9     Bilirubin Total <=1.2 mg/dL 0.9  0.2  0.2    <0.2  <0.2     GFR Estimate >60 mL/min/1.73m2 >90  >90  >90  >90  >90  >90   >90      UA - 7/11/23     Ethyl Alcohol Level - 7/11/23   Alcohol ethyl <=0.01 g/dL <0.01         Ref Range & Units 1 d ago   Urine Culture Comment  Urinalysis results do not meet criteria for urine culture reflex.    Urine Color  Yellow    Urine Clarity Clear Turbid Abnormal     Specific Gravity, Urine <1.030 1.023    PH Urine 5.0 - 8.0 5.5    Protein, Urine Qual (mg/dL) Negative, 10 , 20  20    Glucose Urine Qual (mg/dL) Normal (Negative), 30 , 50  Normal (Negative)    Ketones, Urine (mg/dL) Negative, Trace Trace    Urobilinogen, Urine (EU/dL) Normal (Negative) Normal (Negative)    Bilirubin Urine (mg/dL) Negative Negative    Blood, Urine (mg/dL) Negative, 0.03 (Trace) 0.06 (Small) Abnormal     Nitrite Urine Negative Negative    Leukocyte Esterase, Urine (Siobhan/uL) Negative, 25 (Trace) Negative    Red Blood Cells 0 - 3 /HPF 78 High      White Blood Cells 0 - 5 /HPF 7 High     Bacteria None Seen /HPF Occasional Abnormal     Squamous Epithelial Cells None Seen, Occasional, Few /HPF Occasional    Mucus None Seen /HPF Present Abnormal     Hyaline Casts <=2 /LPF 1    Urine Source  Clean Catch        Independent Historian:    No independent historian     Review of External Notes:  I reviewed the ED note from yesterday at [facility] from Dr. White for a G-tube problem, suicidal ideations, and urinary retention. She was observed overnight for psych stabilization. I reviewed the ED note from yesterday at [facility] from . I also reviewed there [facility] note from yesterday in which the patient was transferred from [facility] for a psych evaluation due to threatening to jump from a ledge when she was discharged.  Of note they change her Smith catheter yesterday    Medications:    Abilify   Diazepam   Cymbalta   Atarax   Linzess   Ativan   Amitiza   Protonix   Trulance   Compazine   Phenergan   Gabapentin   Flomax   Motegrity   Trazodone     Past Medical History:    Anorexia   Anxiety   Depression   Gastroparesis   OCD   PTSD   Ulcerative Colitis   Suicidal ideations   Self-injurious behavior   GI hemorrhage   Proctitis   Pseudoseizure  Anemia     Past Surgical History:    GJ tube placement  ENT surgery      Physical Exam   No data found.     Physical Exam    Physical Exam   Constitutional:  Patient is oriented to person, place, and time. They appear well-developed and well-nourished. Mild distress secondary to feeling suicidal  HENT:   Mouth/Throat:   Oropharynx is clear and moist.   Eyes:    Conjunctivae normal and EOM are normal. Pupils are 5 mm equal, round, and reactive to light.   Neck:    Normal range of motion.     Abdominal:   Patient is thin she has a G-tube in place there is mild granulation tissue around the opening but otherwise it is clean dry intact no erythema.  Smith catheter in place  Musculoskeletal:  Normal range of motion. Patient exhibits no  edema.   Neurological:   Patient is alert and oriented to person, place, and time. Patient has normal strength. No cranial nerve deficit or sensory deficit. GCS 15  Skin:   Skin is warm and dry. No rash noted. No erythema.   Psychiatric:   Patient tells me she feels suicidal with a plan to overdose or jump off a ledge.  She denies wanting to hurt anybody else.        Emergency Department Course     Emergency Department Course & Assessments:    Assessments:  1819 I obtained history and examined the patient as noted above.   2119 I rechecked the patient and informed her regarding hold and admission to the hospital. The patient reported that she has not taken her medications in the past 5 days.     Independent Interpretation (X-rays, CTs, rhythm strip):  None    Consultations/Discussion of Management or Tests:  2102 I spoke with DEC regarding the patient's history and presentation in the Emergency Department today.   2139 I spoke with DEC regarding the patient's presentation in the Emergency Department. The patient will not be admitted to inpatient   2146 I spoke with Dr. Bonilla of the hospitalist team regarding the patient, who accepted the patient for admission.     Social Determinants of Health affecting care:  Homelessness/Housing Insecurity     Disposition:  The patient was admitted to the hospital under the care of Dr. Bonilla.     Impression & Plan    CMS Diagnoses: None    Medical Decision Making:  Micheal Castle is a 23-year-old female presenting to the emergency department after she called the ambulance for suicidal ideation.  She is chronically suicidal however has felt more suicidal recently.  She apparently left her home with her parents where she was living 2 weeks ago rather abruptly.  She is likely not been on her medications for that amount of time.  She frequents the emergency department has had mental health evaluations.  She was observed overnight last [facility] and sent back to the streets.   She really has no acute medical complaints other than her chronic complaints.  I did see her care plan.  I requested a DEC assessment.  DEC states that they feel that she is not safe to be discharged.  After speaking with her mom they feel that she would most likely benefit from being in a group home.  However to be in a group home they would likely have to commit her.  Her mother does not want to be her guardian.  But the mother feels that she needs to be in hospital to get stabilization and get back on her medications.  The patient tried to elope when going to the bathroom so I did place her on a 72-hour hold.  I feel given her suicidality her attempted overdose a day ago that she is an imminent harm to herself especially in the setting of her telling multiple providers that she has a plan.  Because of her past medical history and the fact that she does do tube feeds through her G-tube and that she has a Pantoja catheter [facility] intake has said no to placing her into an inpatient mental health bed.  We then have to bring her into a medical bed.  Central intake stated that they will set up a care conference tomorrow to get all parties involved to develop a plan for her.  At this time admit to hospitalist with a sitter.    Diagnosis:    ICD-10-CM    1. Suicidal ideation  R45.851       2. Gastroparesis  K31.84     feeding tube      3. Urinary retention  R33.9     pantoja catheter         Scribe Disclosure:  I, Nasrin Sadler, am serving as a scribe at 7:41 PM on 7/12/2023 to document services personally performed by Chani Meza MD based on my observations and the provider's statements to me.    7/12/2023   Chani Meza MD Bochert, Michelle Ann, MD  07/12/23 4730

## 2023-07-13 ENCOUNTER — HOSPITAL ENCOUNTER (OUTPATIENT)
Facility: CLINIC | Age: 23
Setting detail: OBSERVATION
Discharge: HOME | End: 2023-07-17
Attending: PSYCHIATRY & NEUROLOGY | Admitting: STUDENT IN AN ORGANIZED HEALTH CARE EDUCATION/TRAINING PROGRAM
Payer: COMMERCIAL

## 2023-07-13 VITALS
DIASTOLIC BLOOD PRESSURE: 79 MMHG | SYSTOLIC BLOOD PRESSURE: 136 MMHG | TEMPERATURE: 99.4 F | RESPIRATION RATE: 18 BRPM | OXYGEN SATURATION: 100 % | HEART RATE: 101 BPM | WEIGHT: 126.8 LBS | BODY MASS INDEX: 21.77 KG/M2

## 2023-07-13 DIAGNOSIS — F23 ACUTE PSYCHOSIS (H): ICD-10-CM

## 2023-07-13 DIAGNOSIS — R45.851 THREATENING SUICIDE: ICD-10-CM

## 2023-07-13 LAB
ALBUMIN SERPL BCG-MCNC: 4 G/DL (ref 3.5–5.2)
ALBUMIN SERPL BCG-MCNC: 4.3 G/DL (ref 3.5–5.2)
ALBUMIN UR-MCNC: 10 MG/DL
ALBUMIN UR-MCNC: NEGATIVE MG/DL
ALP SERPL-CCNC: 77 U/L (ref 35–104)
ALP SERPL-CCNC: 85 U/L (ref 35–104)
ALT SERPL W P-5'-P-CCNC: 23 U/L (ref 0–50)
ALT SERPL W P-5'-P-CCNC: 29 U/L (ref 0–50)
AMPHETAMINES UR QL SCN: ABNORMAL
ANION GAP SERPL CALCULATED.3IONS-SCNC: 14 MMOL/L (ref 7–15)
ANION GAP SERPL CALCULATED.3IONS-SCNC: 15 MMOL/L (ref 7–15)
APAP SERPL-MCNC: <5 UG/ML (ref 10–30)
APPEARANCE UR: CLEAR
APPEARANCE UR: CLEAR
AST SERPL W P-5'-P-CCNC: 28 U/L (ref 0–45)
AST SERPL W P-5'-P-CCNC: ABNORMAL U/L
BACTERIA #/AREA URNS HPF: ABNORMAL /HPF
BARBITURATES UR QL SCN: ABNORMAL
BASOPHILS # BLD AUTO: 0 10E3/UL (ref 0–0.2)
BASOPHILS # BLD AUTO: 0 10E3/UL (ref 0–0.2)
BASOPHILS NFR BLD AUTO: 1 %
BASOPHILS NFR BLD AUTO: 1 %
BENZODIAZ UR QL SCN: ABNORMAL
BILIRUB DIRECT SERPL-MCNC: <0.2 MG/DL (ref 0–0.3)
BILIRUB SERPL-MCNC: 0.4 MG/DL
BILIRUB SERPL-MCNC: 0.4 MG/DL
BILIRUB UR QL STRIP: NEGATIVE
BILIRUB UR QL STRIP: NEGATIVE
BUN SERPL-MCNC: 10 MG/DL (ref 6–20)
BUN SERPL-MCNC: 10.2 MG/DL (ref 6–20)
BZE UR QL SCN: ABNORMAL
CALCIUM SERPL-MCNC: 8.8 MG/DL (ref 8.6–10)
CALCIUM SERPL-MCNC: 9.4 MG/DL (ref 8.6–10)
CANNABINOIDS UR QL SCN: ABNORMAL
CHLORIDE SERPL-SCNC: 103 MMOL/L (ref 98–107)
CHLORIDE SERPL-SCNC: 104 MMOL/L (ref 98–107)
COLOR UR AUTO: YELLOW
COLOR UR AUTO: YELLOW
CREAT SERPL-MCNC: 0.67 MG/DL (ref 0.51–0.95)
CREAT SERPL-MCNC: 0.68 MG/DL (ref 0.51–0.95)
DEPRECATED HCO3 PLAS-SCNC: 20 MMOL/L (ref 22–29)
DEPRECATED HCO3 PLAS-SCNC: 21 MMOL/L (ref 22–29)
EOSINOPHIL # BLD AUTO: 0 10E3/UL (ref 0–0.7)
EOSINOPHIL # BLD AUTO: 0.1 10E3/UL (ref 0–0.7)
EOSINOPHIL NFR BLD AUTO: 0 %
EOSINOPHIL NFR BLD AUTO: 2 %
ERYTHROCYTE [DISTWIDTH] IN BLOOD BY AUTOMATED COUNT: 13.6 % (ref 10–15)
ERYTHROCYTE [DISTWIDTH] IN BLOOD BY AUTOMATED COUNT: 13.8 % (ref 10–15)
FOLATE SERPL-MCNC: 29 NG/ML (ref 4.6–34.8)
GFR SERPL CREATININE-BSD FRML MDRD: >90 ML/MIN/1.73M2
GFR SERPL CREATININE-BSD FRML MDRD: >90 ML/MIN/1.73M2
GLUCOSE BLDC GLUCOMTR-MCNC: 75 MG/DL (ref 70–99)
GLUCOSE SERPL-MCNC: 109 MG/DL (ref 70–99)
GLUCOSE SERPL-MCNC: 83 MG/DL (ref 70–99)
GLUCOSE UR STRIP-MCNC: NEGATIVE MG/DL
GLUCOSE UR STRIP-MCNC: NEGATIVE MG/DL
HCG UR QL: NEGATIVE
HCT VFR BLD AUTO: 31.4 % (ref 35–47)
HCT VFR BLD AUTO: 33.7 % (ref 35–47)
HGB BLD-MCNC: 10.4 G/DL (ref 11.7–15.7)
HGB BLD-MCNC: 11.5 G/DL (ref 11.7–15.7)
HGB UR QL STRIP: ABNORMAL
HGB UR QL STRIP: NEGATIVE
IMM GRANULOCYTES # BLD: 0 10E3/UL
IMM GRANULOCYTES # BLD: 0 10E3/UL
IMM GRANULOCYTES NFR BLD: 0 %
IMM GRANULOCYTES NFR BLD: 0 %
KETONES UR STRIP-MCNC: 150 MG/DL
KETONES UR STRIP-MCNC: 80 MG/DL
LEUKOCYTE ESTERASE UR QL STRIP: ABNORMAL
LEUKOCYTE ESTERASE UR QL STRIP: ABNORMAL
LYMPHOCYTES # BLD AUTO: 2.2 10E3/UL (ref 0.8–5.3)
LYMPHOCYTES # BLD AUTO: 2.6 10E3/UL (ref 0.8–5.3)
LYMPHOCYTES NFR BLD AUTO: 32 %
LYMPHOCYTES NFR BLD AUTO: 39 %
MCH RBC QN AUTO: 29.8 PG (ref 26.5–33)
MCH RBC QN AUTO: 30 PG (ref 26.5–33)
MCHC RBC AUTO-ENTMCNC: 33.1 G/DL (ref 31.5–36.5)
MCHC RBC AUTO-ENTMCNC: 34.1 G/DL (ref 31.5–36.5)
MCV RBC AUTO: 88 FL (ref 78–100)
MCV RBC AUTO: 90 FL (ref 78–100)
MONOCYTES # BLD AUTO: 0.4 10E3/UL (ref 0–1.3)
MONOCYTES # BLD AUTO: 0.5 10E3/UL (ref 0–1.3)
MONOCYTES NFR BLD AUTO: 6 %
MONOCYTES NFR BLD AUTO: 7 %
MUCOUS THREADS #/AREA URNS LPF: PRESENT /LPF
MUCOUS THREADS #/AREA URNS LPF: PRESENT /LPF
NEUTROPHILS # BLD AUTO: 2.9 10E3/UL (ref 1.6–8.3)
NEUTROPHILS # BLD AUTO: 5.1 10E3/UL (ref 1.6–8.3)
NEUTROPHILS NFR BLD AUTO: 51 %
NEUTROPHILS NFR BLD AUTO: 61 %
NITRATE UR QL: NEGATIVE
NITRATE UR QL: NEGATIVE
NRBC # BLD AUTO: 0 10E3/UL
NRBC # BLD AUTO: 0 10E3/UL
NRBC BLD AUTO-RTO: 0 /100
NRBC BLD AUTO-RTO: 0 /100
OPIATES UR QL SCN: ABNORMAL
PH UR STRIP: 5.5 [PH] (ref 5–7)
PH UR STRIP: 6 [PH] (ref 5–7)
PLATELET # BLD AUTO: 276 10E3/UL (ref 150–450)
PLATELET # BLD AUTO: 391 10E3/UL (ref 150–450)
POTASSIUM SERPL-SCNC: 3.2 MMOL/L (ref 3.4–5.3)
POTASSIUM SERPL-SCNC: 4.3 MMOL/L (ref 3.4–5.3)
PROT SERPL-MCNC: 6.4 G/DL (ref 6.4–8.3)
PROT SERPL-MCNC: 7.5 G/DL (ref 6.4–8.3)
RBC # BLD AUTO: 3.49 10E6/UL (ref 3.8–5.2)
RBC # BLD AUTO: 3.83 10E6/UL (ref 3.8–5.2)
RBC URINE: 4 /HPF
RBC URINE: 6 /HPF
SALICYLATES SERPL-MCNC: <0.3 MG/DL
SODIUM SERPL-SCNC: 138 MMOL/L (ref 136–145)
SODIUM SERPL-SCNC: 139 MMOL/L (ref 136–145)
SP GR UR STRIP: 1.02 (ref 1–1.03)
SP GR UR STRIP: 1.02 (ref 1–1.03)
SQUAMOUS EPITHELIAL: 2 /HPF
SQUAMOUS EPITHELIAL: <1 /HPF
TSH SERPL DL<=0.005 MIU/L-ACNC: 1.25 UIU/ML (ref 0.3–4.2)
UROBILINOGEN UR STRIP-MCNC: NORMAL MG/DL
UROBILINOGEN UR STRIP-MCNC: NORMAL MG/DL
VIT B12 SERPL-MCNC: 630 PG/ML (ref 232–1245)
WBC # BLD AUTO: 5.6 10E3/UL (ref 4–11)
WBC # BLD AUTO: 8.3 10E3/UL (ref 4–11)
WBC URINE: 17 /HPF
WBC URINE: 6 /HPF

## 2023-07-13 PROCEDURE — 82248 BILIRUBIN DIRECT: CPT | Performed by: HOSPITALIST

## 2023-07-13 PROCEDURE — 80179 DRUG ASSAY SALICYLATE: CPT | Performed by: PSYCHIATRY & NEUROLOGY

## 2023-07-13 PROCEDURE — 82962 GLUCOSE BLOOD TEST: CPT

## 2023-07-13 PROCEDURE — 99254 IP/OBS CNSLTJ NEW/EST MOD 60: CPT

## 2023-07-13 PROCEDURE — 84443 ASSAY THYROID STIM HORMONE: CPT | Performed by: HOSPITALIST

## 2023-07-13 PROCEDURE — 36415 COLL VENOUS BLD VENIPUNCTURE: CPT | Performed by: PSYCHIATRY & NEUROLOGY

## 2023-07-13 PROCEDURE — 120N000002 HC R&B MED SURG/OB UMMC

## 2023-07-13 PROCEDURE — 36415 COLL VENOUS BLD VENIPUNCTURE: CPT | Performed by: HOSPITALIST

## 2023-07-13 PROCEDURE — 80053 COMPREHEN METABOLIC PANEL: CPT | Performed by: HOSPITALIST

## 2023-07-13 PROCEDURE — 250N000011 HC RX IP 250 OP 636: Performed by: HOSPITALIST

## 2023-07-13 PROCEDURE — 258N000003 HC RX IP 258 OP 636: Performed by: HOSPITALIST

## 2023-07-13 PROCEDURE — 250N000013 HC RX MED GY IP 250 OP 250 PS 637: Performed by: HOSPITALIST

## 2023-07-13 PROCEDURE — 99223 1ST HOSP IP/OBS HIGH 75: CPT | Performed by: STUDENT IN AN ORGANIZED HEALTH CARE EDUCATION/TRAINING PROGRAM

## 2023-07-13 PROCEDURE — 80143 DRUG ASSAY ACETAMINOPHEN: CPT | Performed by: PSYCHIATRY & NEUROLOGY

## 2023-07-13 PROCEDURE — 99239 HOSP IP/OBS DSCHRG MGMT >30: CPT | Performed by: HOSPITALIST

## 2023-07-13 PROCEDURE — 81001 URINALYSIS AUTO W/SCOPE: CPT | Performed by: EMERGENCY MEDICINE

## 2023-07-13 PROCEDURE — 99285 EMERGENCY DEPT VISIT HI MDM: CPT | Mod: 25 | Performed by: PSYCHIATRY & NEUROLOGY

## 2023-07-13 PROCEDURE — 81025 URINE PREGNANCY TEST: CPT | Performed by: PSYCHIATRY & NEUROLOGY

## 2023-07-13 PROCEDURE — 85025 COMPLETE CBC W/AUTO DIFF WBC: CPT | Performed by: HOSPITALIST

## 2023-07-13 PROCEDURE — 82746 ASSAY OF FOLIC ACID SERUM: CPT | Performed by: HOSPITALIST

## 2023-07-13 PROCEDURE — 90791 PSYCH DIAGNOSTIC EVALUATION: CPT

## 2023-07-13 PROCEDURE — 85025 COMPLETE CBC W/AUTO DIFF WBC: CPT | Performed by: PSYCHIATRY & NEUROLOGY

## 2023-07-13 PROCEDURE — 99285 EMERGENCY DEPT VISIT HI MDM: CPT | Performed by: PSYCHIATRY & NEUROLOGY

## 2023-07-13 PROCEDURE — 80307 DRUG TEST PRSMV CHEM ANLYZR: CPT | Performed by: PSYCHIATRY & NEUROLOGY

## 2023-07-13 PROCEDURE — 84155 ASSAY OF PROTEIN SERUM: CPT | Performed by: PSYCHIATRY & NEUROLOGY

## 2023-07-13 PROCEDURE — G0378 HOSPITAL OBSERVATION PER HR: HCPCS

## 2023-07-13 PROCEDURE — 81001 URINALYSIS AUTO W/SCOPE: CPT | Performed by: PSYCHIATRY & NEUROLOGY

## 2023-07-13 PROCEDURE — 87088 URINE BACTERIA CULTURE: CPT | Performed by: PSYCHIATRY & NEUROLOGY

## 2023-07-13 PROCEDURE — 82607 VITAMIN B-12: CPT | Performed by: HOSPITALIST

## 2023-07-13 PROCEDURE — 96361 HYDRATE IV INFUSION ADD-ON: CPT

## 2023-07-13 PROCEDURE — 96360 HYDRATION IV INFUSION INIT: CPT

## 2023-07-13 RX ORDER — VITAMIN B COMPLEX
50 TABLET ORAL DAILY
Status: DISCONTINUED | OUTPATIENT
Start: 2023-07-14 | End: 2023-07-14

## 2023-07-13 RX ORDER — HALOPERIDOL 5 MG/1
5 TABLET ORAL ONCE
Status: DISCONTINUED | OUTPATIENT
Start: 2023-07-13 | End: 2023-07-14

## 2023-07-13 RX ORDER — GUAIFENESIN 600 MG/1
15 TABLET, EXTENDED RELEASE ORAL DAILY
Status: DISCONTINUED | OUTPATIENT
Start: 2023-07-14 | End: 2023-07-13 | Stop reason: HOSPADM

## 2023-07-13 RX ORDER — DULOXETIN HYDROCHLORIDE 60 MG/1
60 CAPSULE, DELAYED RELEASE ORAL DAILY
Status: DISCONTINUED | OUTPATIENT
Start: 2023-07-14 | End: 2023-07-17 | Stop reason: HOSPADM

## 2023-07-13 RX ORDER — ARIPIPRAZOLE 5 MG/1
10 TABLET ORAL EVERY EVENING
Status: DISCONTINUED | OUTPATIENT
Start: 2023-07-13 | End: 2023-07-14

## 2023-07-13 RX ORDER — PROCHLORPERAZINE MALEATE 10 MG
10 TABLET ORAL EVERY 8 HOURS PRN
Status: DISCONTINUED | OUTPATIENT
Start: 2023-07-13 | End: 2023-07-14

## 2023-07-13 RX ORDER — DOCUSATE SODIUM 100 MG/1
100 CAPSULE, LIQUID FILLED ORAL DAILY
Status: DISCONTINUED | OUTPATIENT
Start: 2023-07-13 | End: 2023-07-13 | Stop reason: HOSPADM

## 2023-07-13 RX ORDER — ZIPRASIDONE MESYLATE 20 MG/ML
20 INJECTION, POWDER, LYOPHILIZED, FOR SOLUTION INTRAMUSCULAR EVERY 12 HOURS PRN
Status: DISCONTINUED | OUTPATIENT
Start: 2023-07-13 | End: 2023-07-13

## 2023-07-13 RX ORDER — CALCIUM CARBONATE 500 MG/1
500 TABLET, CHEWABLE ORAL 2 TIMES DAILY PRN
Status: DISCONTINUED | OUTPATIENT
Start: 2023-07-13 | End: 2023-07-17 | Stop reason: HOSPADM

## 2023-07-13 RX ORDER — DEXTROSE MONOHYDRATE 100 MG/ML
INJECTION, SOLUTION INTRAVENOUS CONTINUOUS PRN
Status: DISCONTINUED | OUTPATIENT
Start: 2023-07-13 | End: 2023-07-13 | Stop reason: HOSPADM

## 2023-07-13 RX ORDER — ACETAMINOPHEN 325 MG/1
650 TABLET ORAL EVERY 6 HOURS PRN
Status: DISCONTINUED | OUTPATIENT
Start: 2023-07-13 | End: 2023-07-14

## 2023-07-13 RX ORDER — HYDROXYZINE HYDROCHLORIDE 25 MG/1
25 TABLET, FILM COATED ORAL 2 TIMES DAILY PRN
Status: DISCONTINUED | OUTPATIENT
Start: 2023-07-13 | End: 2023-07-14

## 2023-07-13 RX ORDER — PANTOPRAZOLE SODIUM 40 MG/1
40 TABLET, DELAYED RELEASE ORAL 2 TIMES DAILY
Status: DISCONTINUED | OUTPATIENT
Start: 2023-07-13 | End: 2023-07-17 | Stop reason: HOSPADM

## 2023-07-13 RX ORDER — CHOLECALCIFEROL (VITAMIN D3) 125 MCG
6000 CAPSULE ORAL
Status: DISCONTINUED | OUTPATIENT
Start: 2023-07-13 | End: 2023-07-13 | Stop reason: HOSPADM

## 2023-07-13 RX ORDER — MULTIVIT WITH MINERALS/LUTEIN
250 TABLET ORAL DAILY
Status: DISCONTINUED | OUTPATIENT
Start: 2023-07-14 | End: 2023-07-14

## 2023-07-13 RX ORDER — FAMOTIDINE 20 MG/1
20 TABLET, FILM COATED ORAL DAILY
Status: DISCONTINUED | OUTPATIENT
Start: 2023-07-14 | End: 2023-07-14

## 2023-07-13 RX ORDER — LORAZEPAM 0.5 MG/1
0.5 TABLET ORAL AT BEDTIME
Status: DISCONTINUED | OUTPATIENT
Start: 2023-07-13 | End: 2023-07-14

## 2023-07-13 RX ORDER — LACTOBACILLUS RHAMNOSUS GG 10B CELL
1 CAPSULE ORAL DAILY
Status: DISCONTINUED | OUTPATIENT
Start: 2023-07-14 | End: 2023-07-17 | Stop reason: HOSPADM

## 2023-07-13 RX ORDER — DROSPIRENONE AND ETHINYL ESTRADIOL 0.02-3(28)
1 KIT ORAL DAILY
Status: DISCONTINUED | OUTPATIENT
Start: 2023-07-14 | End: 2023-07-17 | Stop reason: HOSPADM

## 2023-07-13 RX ORDER — LUBIPROSTONE 24 UG/1
24 CAPSULE ORAL 2 TIMES DAILY WITH MEALS
Status: DISCONTINUED | OUTPATIENT
Start: 2023-07-14 | End: 2023-07-17 | Stop reason: HOSPADM

## 2023-07-13 RX ORDER — MULTIPLE VITAMINS W/ MINERALS TAB 9MG-400MCG
1 TAB ORAL DAILY
Status: DISCONTINUED | OUTPATIENT
Start: 2023-07-14 | End: 2023-07-14

## 2023-07-13 RX ORDER — HALOPERIDOL 5 MG/ML
5 INJECTION INTRAMUSCULAR ONCE
Status: DISCONTINUED | OUTPATIENT
Start: 2023-07-13 | End: 2023-07-14

## 2023-07-13 RX ORDER — POTASSIUM CHLORIDE 20MEQ/15ML
40 LIQUID (ML) ORAL ONCE
Status: COMPLETED | OUTPATIENT
Start: 2023-07-13 | End: 2023-07-13

## 2023-07-13 RX ORDER — ONDANSETRON 4 MG/1
4 TABLET, ORALLY DISINTEGRATING ORAL EVERY 6 HOURS PRN
Status: DISCONTINUED | OUTPATIENT
Start: 2023-07-13 | End: 2023-07-17 | Stop reason: HOSPADM

## 2023-07-13 RX ADMIN — POTASSIUM CHLORIDE 40 MEQ: 20 SOLUTION ORAL at 08:58

## 2023-07-13 RX ADMIN — ONDANSETRON 4 MG: 4 TABLET, ORALLY DISINTEGRATING ORAL at 09:17

## 2023-07-13 RX ADMIN — SODIUM CHLORIDE: 9 INJECTION, SOLUTION INTRAVENOUS at 05:20

## 2023-07-13 RX ADMIN — MULTIPLE VITAMINS W/ MINERALS TAB 1 TABLET: TAB at 08:58

## 2023-07-13 RX ADMIN — Medication 250 MG: at 08:58

## 2023-07-13 RX ADMIN — ACETAMINOPHEN 650 MG: 325 TABLET, FILM COATED ORAL at 03:32

## 2023-07-13 ASSESSMENT — COLUMBIA-SUICIDE SEVERITY RATING SCALE - C-SSRS
SUICIDE, SINCE LAST CONTACT: NO
ATTEMPT SINCE LAST CONTACT: NO
2. HAVE YOU ACTUALLY HAD ANY THOUGHTS OF KILLING YOURSELF?: NO
6. HAVE YOU EVER DONE ANYTHING, STARTED TO DO ANYTHING, OR PREPARED TO DO ANYTHING TO END YOUR LIFE?: NO
1. SINCE LAST CONTACT, HAVE YOU WISHED YOU WERE DEAD OR WISHED YOU COULD GO TO SLEEP AND NOT WAKE UP?: YES
TOTAL  NUMBER OF INTERRUPTED ATTEMPTS SINCE LAST CONTACT: NO
TOTAL  NUMBER OF ABORTED OR SELF INTERRUPTED ATTEMPTS SINCE LAST CONTACT: NO

## 2023-07-13 ASSESSMENT — ACTIVITIES OF DAILY LIVING (ADL)
ADLS_ACUITY_SCORE: 31
ADLS_ACUITY_SCORE: 35
ADLS_ACUITY_SCORE: 35
ADLS_ACUITY_SCORE: 33
ADLS_ACUITY_SCORE: 35
ADLS_ACUITY_SCORE: 35
ADLS_ACUITY_SCORE: 31
ADLS_ACUITY_SCORE: 35
ADLS_ACUITY_SCORE: 35
ADLS_ACUITY_SCORE: 29
ADLS_ACUITY_SCORE: 35
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
## 2023-07-13 NOTE — DISCHARGE SUMMARY
[facility]  Hospitalist Discharge Summary      Date of Admission:  7/12/2023  Date of Discharge:  7/13/2023  2:05 PM  Discharging Provider: Froylan Titus MD  Discharge Service: Hospitalist Service    Discharge Diagnoses   Suicidal ideation, chronic  History of depression  Borderline personality disorder  History of self-injurious behaviors  Generalized anxiety disorder  Obsessive-compulsive disorder  Posttraumatic stress disorder  History of pseudoseizures  Atypical anorexia nervosa  Multiple recent ED visits  Gastroparesis with indwelling G-J tube, on tube feedings  History of slow transit constipation  Chronic urinary retention, indwelling Smith catheter on admission  Anemia, normocytic  Extremely mild hypokalemia, replaced      Clinically Significant Risk Factors          Follow-ups Needed After Discharge   Follow-up Appointments     Follow-up and recommended labs and tests       Follow up with primary outpatient care team and specialists that you have   been working with in the past.            Unresulted Labs Ordered in the Past 30 Days of this Admission       No orders found for last 31 day(s).        These results will be followed up by NA    Discharge Disposition   Discharged to home  Condition at discharge: Stable    Hospital Course   This is a 23 year old female with past medical history of depression, suicidal ideation, pseudoseizures, borderline personality disorder, self-injurious behaviors, generalized anxiety disorder, obsessive-compulsive disorder, posttraumatic stress disorder, atypical anorexia nervosa, secondary amenorrhea, homelessness, gastroparesis with G-tube and prior tube feedings, and chronic urinary retention with Smith catheter.  Admitted with suicidal ideation with multiple emergency room visits in the past 24 hours and several more in the week prior to this.     Suicidal ideation, chronic  History of probable factitious disorder  History of  depression  Borderline personality disorder  History of self-injurious behaviors  Generalized anxiety disorder  Obsessive-compulsive disorder  Posttraumatic stress disorder  History of pseudoseizures  Atypical anorexia nervosa  Multiple recent ED visits  Complex psychiatric history with 5 separate emergency room visits in the past 24 hours.  Psychiatric assessment at [facility] ED on evening of admission, see consult note of LITO Soliz, for details and summary of recent events and psychiatric concerns.  Initially, hospital admission observation with 72-hour hold was in place for concerns of active suicidal ideation and recent self-injurious behaviors.  Psychiatry consult for suicidal ideation, depression, reestablishing medication regimen, and multiple psychiatric diagnoses noted on admission; appreciate help  72-hour hold placed on admission by ER provider, continued until discontinued per psychiatry on day of discharge 7/13 - see their detailed note(s).  Social work consult  One-to-one sitter, continuous, for safety and supervision in the setting of suicidal ideation and recent self-injurious behaviors  Pharmacy consult for medication review and reconciliation, complex psychiatric medication regimen; AM rounding provider to please review with psychiatry consult input regarding restarting prior to admission meds  Cardiac telemetry, reassess daily  Review medical clearance for possible inpatient psychiatry admission once deemed necessary and bed confirmed; admission labs reviewed, see below  7/13 - Discussed in detail and at length with Psychiatry team. Their group has escalated the case, discussed and reviewed it thoroughly, and medical director of inpatient psychiatry agrees patient should be discharged from hospital as there is no medical indication.  She does not have a medical indication for admission. She is directed to follow up with prior outpatient care team and specialists.    Please refer to lengthy and detailed excellent documentation per psychiatry team for further details. The expertise and recommendations of the psychiatry/mental health team guided the plan for discharge.  Medications were unchanged and security/behavioral health assisted her in discharging     Gastroparesis with indwelling G-J tube, on tube feedings  History of slow transit constipation  G-J-tube present on admission, history of gastroparesis, on tube feedings up until 6 days prior to admission  IV fluids 0.9 NS on admission, reassess daily  Nutrition consult to restart tube feedings, assess nutritional status, education  Continue to follow with outpatient GI team - no inpatient needs currently.     Mild hypokalemia  K 3.2. Replaced per RN protocol.    Chronic urinary retention, indwelling Smith catheter on admission  Continue Smith catheter, recently replaced by report, monitor. Patient has reportedly seemed to forcibly remove this in the past?.  Urology follow-up for Smith catheter, inpatient vs outpatient     Anemia, normocytic  Admission Hb 10.8; recent historic baseline 9.8 to 11.0  Concerns of malnutrition with current tube feedings prior, none 6 days prior to admission.  Monitor Hb, AM CBC   Check B12, folic acid, TSH  Consider additional iron studies, peripheral blood smear, reticulocyte count with close follow-up with primary clinic provider          Consultations This Hospital Stay   DIAGNOSTIC EVALUATION CENTER (DEC) ASSESSMENT ORDER  NUTRITION SERVICES ADULT IP CONSULT  PSYCHIATRY IP CONSULT  CARE MANAGEMENT / SOCIAL WORK IP CONSULT  PHARMACY IP CONSULT    Code Status   Full Code    Time Spent on this Encounter   I, Froylan Titus MD, personally saw the patient today and spent greater than 30 minutes discharging this patient.       Froylan Titus MD  [place] [address] [facility]  [address] [place] [address] [address] [address]  Phone:  [phone]  ______________________________________________________________________    Physical Exam   Vital Signs: Temp: 99.4  F (37.4  C) Temp src: Oral BP: 136/79 Pulse: 101   Resp: 18 SpO2: 100 % O2 Device: None (Room air)    Weight: 126 lbs 12.8 oz    Gen: NAD, pleasant during my visit  HEENT: EOMI, MMM  Resp: no focal crackles, no wheezes, no increased work of resp  CV: S1S2 heard, reg rhythm, reg rate  Abdo: appears nondistended, declined examination  Ext: calves nontender, well perfused  Neuro: aa, conversant, moving ext, CN grossly intact, no facial asymmetry  Conversation was not always clear/consistent - she reported not being able to have any oral intake yet requested lactaid from multiple staff members in order to be able to eat ice cream.         Primary Care Physician   Bridgette Foss    Discharge Orders      Reason for your hospital stay    Suicidal ideation     Follow-up and recommended labs and tests     Follow up with primary outpatient care team and specialists that you have been working with in the past.     Activity    Your activity upon discharge: activity as tolerated     Diet    Follow this diet upon discharge: Orders Placed This Encounter      Adult Formula Drip Feeding: Continuous [employer] Peptide 1.5; Jejunostomy; Goal Rate: 40; mL/hr; ok to resume TF at 40 mL/hr      Regular Diet Adult       Significant Results and Procedures   Most Recent 3 CBC's:  Recent Labs   Lab Test 07/13/23  0607 07/09/23  0421 07/09/23  0007   WBC 5.6 6.5 6.8   HGB 10.4* 10.8* 11.6*   MCV 90 89 91    308 331     Most Recent 3 BMP's:  Recent Labs   Lab Test 07/13/23  0607 07/12/23  2217 07/11/23  2244    139 138   POTASSIUM 3.2* 3.7 3.9   CHLORIDE 103 105 101   CO2 21* 21* 18*   BUN 10.0 8.0 11.2   CR 0.68 0.67 0.69   ANIONGAP 14 13 19*   DENNIS 8.8 9.0 9.1   * 87 112*     Most Recent Urinalysis:  Recent Labs   Lab Test 07/13/23  0041   COLOR Yellow   APPEARANCE Clear   URINEGLC Negative    URINEBILI Negative   URINEKETONE 80*   SG 1.022   UBLD Negative   URINEPH 6.0   PROTEIN Negative   NITRITE Negative   LEUKEST Small*   RBCU 4*   WBCU 6*   ,   Results for orders placed or performed during the hospital encounter of 07/04/23   Head CT w/o contrast    Narrative    EXAM: CT HEAD W/O CONTRAST  LOCATION: [facility]  DATE: 7/4/2023    INDICATION: Head trauma 2 weeks ago, multiple seizure like episodes today  COMPARISON: 06/06/2023  TECHNIQUE: Routine CT Head without IV contrast. Multiplanar reformats. Dose reduction techniques were used.    FINDINGS:  INTRACRANIAL CONTENTS: No intracranial hemorrhage, extraaxial collection, or mass effect.  No CT evidence of acute infarct. Normal parenchymal attenuation. Normal ventricles and sulci.     VISUALIZED ORBITS/SINUSES/MASTOIDS: No intraorbital abnormality. No paranasal sinus mucosal disease. No middle ear or mastoid effusion.    BONES/SOFT TISSUES: No acute abnormality.      Impression    IMPRESSION:  1.  No acute intracranial process.   XR Abdomen 1 View    Narrative    EXAM: XR ABDOMEN 1 VIEW  LOCATION: [facility]  DATE: 7/4/2023    INDICATION: fall, ensure placement of GJ tube  COMPARISON: 06/29/2023      Impression    IMPRESSION: Gastrojejunostomy tube remains in good position tip in the proximal jejunum. Bowel gas pattern is nonobstructed.       Discharge Medications   Discharge Medication List as of 7/13/2023  1:57 PM        CONTINUE these medications which have NOT CHANGED    Details   ARIPiprazole (ABILIFY) 10 MG tablet Take 10 mg by mouth daily, Historical      calcium carbonate (TUMS) 500 MG chewable tablet Take 1 tablet (500 mg) by mouth as needed for heartburn, Disp-30 tablet, R-0, E-Prescribe      cholecalciferol 50 MCG (2000 UT) tablet Take 1 capsule by mouth daily, Historical      cyproheptadine (PERIACTIN) 4 MG tablet Take 4 mg by mouth 3 times daily, Historical      diazepam (DIASTAT ACUDIAL)  10 MG GEL rectal gel Place 10 mg rectally every 10 minutes as needed for seizures, Disp-1 each, R-0, E-Prescribe      docusate sodium (COLACE) 100 MG tablet Take 100 mg by mouth daily, Historical      drospirenone-ethinyl estradiol (FELIPE) 3-0.02 MG tablet Take 1 tablet by mouth daily, Historical      DULoxetine (CYMBALTA) 30 MG capsule Take 60 mg by mouth daily, Historical      famotidine (PEPCID) 20 MG tablet Take 20 mg by mouth daily, Historical      linaclotide (LINZESS) 290 MCG capsule Take 290 mcg by mouth every morning (before breakfast), Historical      LORazepam (ATIVAN) 0.5 MG tablet Take 0.5 mg by mouth At Bedtime, Historical      lubiprostone (AMITIZA) 24 MCG capsule Take 24 mcg by mouth 2 times daily (with meals), Historical      melatonin 5 MG tablet Take 5 mg by mouth At Bedtime, Historical      ondansetron (ZOFRAN ODT) 4 MG ODT tab Take 1 tablet (4 mg) by mouth every 6 hours as needed for nausea or vomiting, Disp-30 tablet, R-0, E-Prescribe      pantoprazole (PROTONIX) 40 MG EC tablet Take 1 tablet (40 mg) by mouth 2 times daily, Disp-60 tablet, R-0, E-Prescribe      prochlorperazine (COMPAZINE) 10 MG tablet Take 10 mg by mouth every 8 hours as needed for nausea or vomiting, Historical      promethazine (PHENERGAN) 25 MG suppository Place 1 suppository (25 mg) rectally every 6 hours as needed for nausea (take instead of oral dose if you cannot keep the pills down), Disp-30 suppository, R-0, E-Prescribe      promethazine (PHENERGAN) 25 MG tablet Take 25 mg by mouth every 6 hours as needed for nausea or vomiting, Historical      vitamin C (ASCORBIC ACID) 250 MG tablet Take 250 mg by mouth daily, Historical      acetaminophen (TYLENOL) 325 MG tablet Take 2 tablets by mouth every 4 hours as needed, Historical      hydrOXYzine (ATARAX) 25 MG tablet Take 1 tablet (25 mg) by mouth 2 times daily as needed for anxiety or other (sleep, melatonin augmentation or failure), Disp-60 tablet, R-0, E-Prescribe     "  lactobacillus rhamnosus, GG, (CULTURELL) capsule Take 1 capsule by mouth daily, Historical      multivitamin w/minerals (THERA-VIT-M) tablet Take 1 tablet by mouth daily, Disp-30 tablet, R-0, E-Prescribe      plecanatide (TRULANCE) 3 MG tablet Take 3 mg by mouth daily, Historical           Allergies   Allergies   Allergen Reactions    Amoxicillin Rash    Penicillins Unknown     Mother unsure if patient or sister had a reaction. Mother reports she \"didn't think it was anaphylactic\".    Levofloxacin Muscle Pain (Myalgia)     Developed myalgia on levofloxacin 500 mg/d (11/28/22) after 1 day and requested to switch antibiotics    Other Food Allergy GI Disturbance     Cilantro         "

## 2023-07-13 NOTE — H&P
[facility]    History and Physical - Hospitalist Service       Date of Admission:  7/12/2023    Assessment & Plan      23 year old female  Past medical history of depression, suicidal ideation, pseudoseizures, borderline personality disorder, self-injurious behaviors, generalized anxiety disorder, obsessive-compulsive disorder, posttraumatic stress disorder, atypical anorexia nervosa, secondary amenorrhea, homelessness, gastroparesis with G-tube and prior tube feedings, and chronic urinary retention with Smith catheter.  Admitted with suicidal ideation with multiple emergency room visits in the past 24 hours.    Suicidal ideation  History of depression  Borderline personality disorder  History of self-injurious behaviors  Generalized anxiety disorder  Obsessive-compulsive disorder  Posttraumatic stress disorder  History of pseudoseizures  Atypical anorexia nervosa  Multiple recent ED visits  Complex psychiatric history with 5 separate emergency room visits in the past 24 hours.  Psychiatric assessment at [facility] ED on evening of admission, see consult note of LITO Soliz, for details and summary of recent events and psychiatric concerns.  Hospital admission observation with 72-hour hold in place for concerns of active suicidal ideation and recent self-injurious behaviors.      Admit observation    Psychiatry consult for suicidal ideation, depression, reestablishing medication regimen, and multiple psychiatric diagnoses noted on admission; appreciate help    72-hour hold placed on admission by ER provider, continue    Social work consult    One-to-one sitter, continuous, for safety and supervision in the setting of suicidal ideation and recent self-injurious behaviors    Pharmacy consult for medication review and reconciliation, complex psychiatric medication regimen; AM rounding provider to please review with psychiatry consult input regarding restarting  prior to admission meds    Cardiac telemetry, reassess daily    Review medical clearance for possible inpatient psychiatry admission once deemed necessary and bed confirmed; admission labs reviewed, see below    AM labs as ordered with CBC, basic, liver profile    Gastroparesis with indwelling G-J tube, on tube feedings  History of slow transit constipation    G-J-tube present on admission, history of gastroparesis, on tube feedings up until 6 days prior to admission    IV fluids 0.9 NS on admission, reassess daily    Nutrition consult to restart tube feedings, assess nutritional status, education    GI consult follow-up, inpatient versus outpatient, pending clinical course    Chronic urinary retention, indwelling Smith catheter on admission    Continue Smith catheter, recently replaced by report, monitor    Urology follow-up for Smith catheter, inpatient vs outpatient    Anemia, normocytic  Admission Hb 10.8; recent historic baseline 9.8 to 11.0  Concerns of malnutrition with current tube feedings prior, none 6 days prior to admission.    Monitor Hb, AM CBC     Check B12, folic acid, TSH    Consider additional iron studies, peripheral blood smear, reticulocyte count    Close follow-up with primary clinic provider    Recent Labs   Lab 07/09/23  0421 07/09/23  0007   HGB 10.8* 11.6*     Disposition    Estimated length of stay 48 to 72 hours; of note, on 72-hour hold on admission    Anticipated discharge to inpatient psychiatry unit versus group home versus other    Social work consult for complex discharge planning, complex psychosocial and mental health issues, risk for suicide, and homelessness    Change in hospitalist provider tomorrow with one of my [facility] hospitalist colleagues assuming care.       Diet:  Regular, tube feedings  DVT Prophylaxis: Pneumatic Compression Devices  Smith Catheter: Not present  Lines: None     Cardiac Monitoring: None  Code Status:   Full    Clinically Significant Risk  "Factors Present on Admission             # Anion Gap Metabolic Acidosis: Highest Anion Gap = 19 mmol/L in last 2 days, will monitor and treat as appropriate                    Disposition Plan      Expected Discharge Date: 07/14/2023                  Louis Bonilla MD  Hospitalist Service  [facility]  Securely message with [employer] (more info)  Text page via [employer] Paging/Directory     ______________________________________________________________________    Chief Complaint   Suicidal ideation, depression, multiple emergency room visits in the past 24 hours, homelessness, psychiatry evaluation    History is obtained from the patient, ER provider, review of online medical record     History of Present Illness   Thea Castle is a 23 year old female who presents with suicidal ideation and a very complex psychiatric history, outlined above.  Five separate emergency room visits reported in the past 24 hours by ER report, see online notes.  When asked why she is in the hospital she stated, \"I am on a 72-hour hold\".  She goes on to say \"I tried to jump off a bridge\" yesterday and also attempted to take an overdose of \"melatonin\" yesterday while at an outside hospital.  Reports behavior of walking herself in the bathroom when able.  When asked about immediate suicidal ideation she denies this and states \"I am pretty happy right now\".  This can change quickly.  Prior to 2 weeks ago living with her parents reports she was \"kicked out of the house\" and and she is now homeless.  Last tube feedings reported 6 days ago.  No medications for at least 5 or 6 days, left at a friend's house with no access or desire to return there.  Mental health evaluation at [facility] this evening, see note of LITO Soliz, for details.  Patient discharged from [facility] earlier this afternoon at 2 PM and returned to the emergency room at [facility] for evaluation.  Five " separate emergency room department visits today for concerns regarding medical and mental health problems, reportedly discharged because unclear if she was truly having suicidal thoughts.  At [facility] earlier today and they recommended discharge, at that time apparently attempted to jump from a ledge to break her legs or hurt her self but security stopped her and discharge occurred.  Later seen at [facility] and discharged, reportedly took 25 melatonin pills as a suicide attempt and prior to taking pills reportedly recorded a video of herself saying a bite or a family members and that she was sorry that life was going to be ending.  Video sent and then took the pills.  Patient later contacted EMS and transported to [facility], reported seizure on arrival to the emergency room.  History of pseudoseizures.  They reports been 5 weeks on neuro unit at [facility] in the past.  Past history of high utilization of emergency room visits and high rate of admissions to mental health unit.  G-tube-feeding tube placed proximally 2 months ago, tube feedings up until nearly 1 week ago.  Homeless for the past couple of weeks, previously lived with her parents, patient stating they kicked her out because she was a lesbian.  At [facility] ongoing concerns of active suicidal ideation, management of G-tube and lack of tube feedings, and lack of medications with ongoing psychiatric concerns.  Admitted observation for psychiatry consultation and care conference in addition to reestablishing tube feedings, medication management, and issues of urinary retention with indwelling Smith.  Patient denies smoking, alcohol use, or illicit drug use.  One-to-one sitter at the bedside during interview and exam evening of admission.  72-hour hold placed by emergency room provider.    In the emergency room, afebrile and hemodynamically stable.  Recent laboratory studies reviewed from 7/11/2023 with satisfactory electrolyte profile, mildly elevated  glucose, and previously normal CBC on 7/9/2023 with the exception of mild anemia, hemoglobin 10.8, normocytic.  hCG negative on 7/9/2023.  Urinalysis on this admission showed few bacteria, minimal white cells, small leukocyte esterase, negative nitrite.  Drug screen positive for benzodiazepines, negative for amphetamines, opiates, cannabinoids, and barbiturates.  Previous noncontrast head CT 7/4/2023 negative for acute intracranial process.  Abdominal x-ray on 7/4/2023 showed gastrojejunostomy tube in good position with tip in the proximal jejunum.  Bowel gas pattern nonobstructed.  Hospital admission for concerns of active suicidal ideation, depression, and multiple psychiatric diagnoses necessitating formal psychiatry consultation, medication management, and assistance with placement.  Medical issues include feeding tube and need to reestablish tube feedings as well as management of Smith catheter.    Past Medical History    Past Medical History:   Diagnosis Date     Anorexia      Anxiety      Depressive disorder      Gastroparesis      OCD (obsessive compulsive disorder)      PTSD (post-traumatic stress disorder)      Slow transit constipation      Ulcerative colitis (H)      Urinary retention        Past Surgical History   Past Surgical History:   Procedure Laterality Date     ENT SURGERY       IR GASTRO JEJUNOSTOMY TUBE PLACEMENT  5/30/2023       Prior to Admission Medications   Prior to Admission Medications   Prescriptions Last Dose Informant Patient Reported? Taking?   ARIPiprazole (ABILIFY) 10 MG tablet   Yes No   Sig: Take 10 mg by mouth daily   DULoxetine (CYMBALTA) 30 MG capsule   Yes No   Sig: Take 60 mg by mouth daily   LORazepam (ATIVAN) 0.5 MG tablet   Yes No   Sig: Take 0.5 mg by mouth At Bedtime   Melatonin 10 MG TABS tablet   Yes No   Sig: Take 10 mg by mouth At Bedtime   acetaminophen (TYLENOL) 325 MG tablet   Yes No   Sig: Take 2 tablets by mouth every 4 hours as needed   calcium carbonate  (TUMS) 500 MG chewable tablet  Mother No No   Sig: Take 1 tablet (500 mg) by mouth as needed for heartburn   cholecalciferol (VITAMIN D3) 25 mcg (1000 units) capsule   Yes No   Sig: Take 1 capsule by mouth daily   cyproheptadine (PERIACTIN) 4 MG tablet   Yes No   Sig: Take 4 mg by mouth 3 times daily   diazepam (DIASTAT ACUDIAL) 10 MG GEL rectal gel   No No   Sig: Place 10 mg rectally every 10 minutes as needed for seizures   docusate sodium (COLACE) 100 MG tablet   Yes No   Sig: Take 100 mg by mouth daily   drospirenone-ethinyl estradiol (FELIPE) 3-0.02 MG tablet   Yes No   Sig: Take 1 tablet by mouth daily   hydrOXYzine (ATARAX) 25 MG tablet   No No   Sig: Take 1 tablet (25 mg) by mouth 2 times daily as needed for anxiety or other (sleep, melatonin augmentation or failure)   lactobacillus rhamnosus, GG, (CULTURELL) capsule   Yes No   Sig: Take 1 capsule by mouth daily   linaclotide (LINZESS) 290 MCG capsule   Yes No   Sig: Take 290 mcg by mouth every morning (before breakfast)   lubiprostone (AMITIZA) 24 MCG capsule   Yes No   Sig: Take 24 mcg by mouth 2 times daily (with meals)   multivitamin w/minerals (THERA-VIT-M) tablet  Mother No No   Sig: Take 1 tablet by mouth daily   ondansetron (ZOFRAN ODT) 4 MG ODT tab   No No   Sig: Take 1 tablet (4 mg) by mouth every 6 hours as needed for nausea or vomiting   pantoprazole (PROTONIX) 40 MG EC tablet   No No   Sig: Take 1 tablet (40 mg) by mouth 2 times daily   plecanatide (TRULANCE) 3 MG tablet   Yes No   Sig: Take 3 mg by mouth daily   prochlorperazine (COMPAZINE) 10 MG tablet   Yes No   Sig: Take 10 mg by mouth every 8 hours as needed for nausea or vomiting   promethazine (PHENERGAN) 25 MG suppository   No No   Sig: Place 1 suppository (25 mg) rectally every 6 hours as needed for nausea (take instead of oral dose if you cannot keep the pills down)   promethazine (PHENERGAN) 25 MG tablet   Yes No   Sig: Take 25 mg by mouth 2 times daily   vitamin C (ASCORBIC ACID) 250  "MG tablet   Yes No   Sig: Take 250 mg by mouth daily      Facility-Administered Medications: None        Review of Systems    Review of systems otherwise negative and per HPI above    Social History   I have reviewed this patient's social history and updated it with pertinent information if needed.  Social History     Tobacco Use     Smoking status: Never     Smokeless tobacco: Never   Vaping Use     Vaping Use: Never used   Substance Use Topics     Alcohol use: Not Currently     Comment: 2 drinks a week     Drug use: Never     Denies smoking or alcohol use.  Currently homeless, lives with her parents 2 and half weeks ago and later \"kicked out\"    Family History   I have reviewed this patient's family history and updated it with pertinent information if needed.  Family History   Problem Relation Age of Onset     Suicide Other        Allergies   Allergies   Allergen Reactions     Amoxicillin Rash     Penicillins Unknown     Mother unsure if patient or sister had a reaction. Mother reports she \"didn't think it was anaphylactic\".     Lactose Other (See Comments), Nausea, Nausea and Vomiting and GI Disturbance     Other reaction(s): Gastrointestinal  Fells sick with milk, but can have yogurt     Levofloxacin Muscle Pain (Myalgia)     Developed myalgia on levofloxacin 500 mg/d (11/28/22) after 1 day and requested to switch antibiotics     Other Food Allergy GI Disturbance     Cilantro        Physical Exam   Vital Signs:                    Weight: 0 lbs 0 oz    GENERAL awake and alert, talkative and interactive, answering questions and following simple commands  HEAD normocephalic, atraumatic  EYES pupils equal, round, reactive to light; no conjunctival injection or jaundice  ENT mucous membranes moist; no lesions or exudates  LYMPH no cervical, submandibular, supraclavicular, or axillary adenopathy  BACK normal  LUNGS moderate inspiratory effort; breath sounds symmetric; no wheezes, crackles, or rhonchi  HEART S1,S2 with " regular rate and rhythm; no rubs or gallops  ABDOMEN soft, non-tender to palpation; no guarding, rebound, or rigidity; no palpable masses or organomegaly; G-J tube in left upper abdomen, intact, without drainage, erythema, or signs of infection  MUSCULOSKELETAL range of motion of joints intact upper and lower extremities; no gross joint deformities; no calf pain or tenderness on palpation; extremities without edema  PULSES dorsalis pedis pulses present, symmetric  SKIN warm and dry  NEURO moves upper and lower extremities spontaneously and to command; no focal weakness appreciated  PSYCHIATRIC awake and alert; oriented to person, place, and time    Medical Decision Making             Data     I have personally reviewed the following data over the past 24 hrs:    N/A  \   N/A   / N/A     138 101 11.2 /  112 (H)   3.9 18 (L) 0.69 \       ALT: 27 AST: 39 AP: 82 TBILI: 0.9   ALB: 4.2 TOT PROTEIN: 6.8 LIPASE: N/A       Imaging results reviewed over the past 24 hrs:   No results found for this or any previous visit (from the past 24 hour(s)).

## 2023-07-13 NOTE — ED NOTES
Patient is resting on bed. Unlabored breathing observed. Patient is on continuous video monitoring.

## 2023-07-13 NOTE — ED NOTES
Bed: ED09  Expected date: 7/13/23  Expected time: 4:00 PM  Means of arrival:   Comments:  23[facility]

## 2023-07-13 NOTE — UTILIZATION REVIEW
"  Admission Status; Secondary Review Determination         Under the authority of the Utilization Management Committee, the utilization review process indicated a secondary review on the above patient.  The review outcome is based on review of the medical records, discussions with staff, and applying clinical experience noted on the date of the review.          (x) Observation Status Appropriate - This patient does not meet hospital inpatient criteria and is placed in observation status. If this patient's primary payer is Medicare and was admitted as an inpatient, Condition Code 44 should be used and patient status changed to \"observation\".     RATIONALE FOR DETERMINATION    23-year-old female with a complex psychiatric history has been admitted to the hospital due to suicidal ideation and multiple recent emergency room visits. She has a past medical history of depression, borderline personality disorder, self-injurious behaviors, generalized anxiety disorder, obsessive-compulsive disorder, posttraumatic stress disorder, pseudoseizures, atypical anorexia nervosa, secondary amenorrhea, homelessness, gastroparesis with a G-tube, and chronic urinary retention with a Smith catheter. The patient is currently on a 72-hour hold for active suicidal ideation and recent self-injurious behaviors. She is being closely monitored with a one-to-one sitter for safety. A psychiatry consult has been requested to address suicidal ideation, depression, and reestablish medication regimen. Medication review and reconciliation are being conducted by the pharmacy. The patient's gastroparesis with a G-J tube is being managed, with plans to restart tube feedings and involve a nutrition consult. Further evaluation is pending, including a cardiac telemetry assessment and medical clearance for possible inpatient psychiatry admission.  The severity of illness, intensity of service provided, expected LOS and risk for adverse outcome make the care " appropriate for further observation; however, doesn't meet criteria for hospital inpatient admission. Dr Titus notified of this determination.    This document was produced using voice recognition software.      The information on this document is developed by the utilization review team in order for the business office to ensure compliance.  This only denotes the appropriateness of proper admission status and does not reflect the quality of care rendered.         The definitions of Inpatient Status and Observation Status used in making the determination above are those provided in the CMS Coverage Manual, Chapter 1 and Chapter 6, section 70.4.      Sincerely,     TRISTAN PEREA MD    System Medical Director  Utilization Management  [facility].

## 2023-07-13 NOTE — CONSULTS
Triage and Transition - Consult and Liaison     Thea Castle  July 13, 2023    Psychiatry consult acknowledged.     Consult to be completed today.     Care Plan [facility] from [facility] from February that seem to be relevant.    EMERGENCY CARE PLAN   At each Emergency Department visit, we will evaluate Thea to determine if an emergency medical condition is present.    Thea will be evaluated and treated as per [place] Policy.    Per inpatient psych consult and liaison team 4/25/23:    There should be a high threshold for admission (medical or psychiatric). Please consider a period of monitoring and re-evaluation when they present to the ED.    Boundaries and strict limits are essential in working with this patient    If 1:1 is deemed necessary, remind patient they are there to provide supervision and ensure patient is safe, not for social visit or to play games. The attendant should engage in minimum needed socialization to meet patient s needs.     Patient is to engage and perform her ADLs as independently as possible when she is hospitalized. It is requested that the 1:1 (or any staff) do not braid or brush patient s hair    Discourage allowing patient to  play favorite  by selecting what staff work with her or requesting certain staff to come into her room to see her. If possible, only staff assigned to patient should be working with patient or coming into her room.   It is appropriate to honor patient s wishes of preference of female provider due to trauma history.     Hospitalization Management:     Concerns for factitious disorder     History of using ensure in emesis bag to show she is sick. History of intentionally placing finger in rectum to cause bleeding. History of pulling on catheter to cause irritation/bleeding.     A Tamper guard should be placed on any IV Access placed.    If tamper guards are not available, utilize wrapping line in coban and  putting a solid strip over all with sharpie to assess if line has been tampered with. This is not something that can be negotiated to be removed.    A reminder to [facility] care team to check that the supply cart is locked and should ensure supplies are not left in her room. If possible, the supply cart should be placed outside of [facility] s room.    Anne Melendez, [facility]   Triage and Transition - Consult and Liaison   [phone]

## 2023-07-13 NOTE — CONSULTS
[facility] Same Day Brief Assessment      Thea Castle was discharged from [facility] Earlier today at 2PM and returned to the ED at [facility]. See the initial consult note from the previous encounter dated 7/12/23 at 1:30 AM by Gucci Gibson for full assessment details.     Presenting issue that brought initially brought patient to the ED: Pt presents to ED for concerns of seizures, g tube problem, and SI. Pt has been seen at 5 separate emergency departments today for various concerns including medical and mental health problems. Pt states she was discharged from those facilities because they believed that she was faking her suicidal thoughts. Pt was seen at [facility] earlier today and they recommended discharge. At that time, pt attempted to jump from a ledge to break her legs or hurt herself but security stopped her and continued with discharge. Pt then seen at [facility] and was discharged. PT reports at that time, she took 25 melatonin pills as a suicide attempt. Prior to taking the pills, pt recorded a video of herself saying goodbye to her family members and that she was sorry that her life was going to be ending now. Pt sent the video and then took the pills. Pt contacted EMS who transported her to [place]. Pt has seizure upon arrival to ED. Per history, these are pseudoseizures. Pt spent 5 weeks on Neuro unit at [facility] and they did not have answers for her, per pt report. Pt has care plan in place due to high utilization of ED's and high rate of admissions to mental health unit. Pt had G tube placed 2 months ago which makes her exclusionary from in patient MH. Pt has been homeless for past two weeks after her mother kicked her out of their house because she came out as lesbian. Pt has not taken her medications for 5 days due to lack of access, although the medications are filled and waiting to be picked up from [facility] Pt has hx of depression, anxiety, PTSD, OCD. Pt has a borderline personality  "diagnosis which appears to be consistent with pt's decision making, although pt reports her mother forced this diagnosis upon her and she \"doesn't really have it\". Pt is working with her  Shawna Ferrera from front door. Reportedly pt received $20, a blanket, and a two night stay at a local hotel room. Two days ago, Shawna brought pt to hotel room and pt had a seizure there. Pt believes her belongings are still at the hotel as she stated, \"If I leave, I can just go back to the hotel and take 70 pills\". Pt continued to endorse active SI and making threats that she will harm herself if she is discharged from facility. Pt has exhibited high risk behaviors and continues to escalate her actions from one ED visit to the next. Attending and  concerned for pt's lack of insight and judgement as there is fear pt could escalate further and harm herself significantly. Care plan recommends pt be placed in observational status. Pt informed of this option as a disposition and states, \"I think just one or two days of getting my meds starting again would be enough to help me feel better\". Pt is future focused in her thinking as she informs  about her upcoming summer job where she will be a live in [facility] [place] in [facility]. Denies HI, NSSIB, psychotic     Summary of patient's initial course of treatment: Patient was seen at 5 emergency rooms over the course of 24 hours ([facility], [facility] (2x), [facility]) and now is at [facility]. Patient was seen by psychiatry given a dose of marc medications and DCed to a shelter.     Rationale for earlier discharge readiness: Patient has borderline personality disorder and per Psychiatry and other Mental health professionals, they did not want to admit her and did not feel she was a danger to herself.     Circumstances that led to the patient returning the same day: Patient continues to endorse SI with several plans. She is currently homeless and has several medical issues " such as tube feeding.  was able to speak with patient and mom together to get more information. Patient is looking to get help getting back on her medications but endorses several suicidal plans for overdosing and or jumping off a bridge.     What service engagement or coping methods did the patient attempt before returning to the ED: Patient does not appear to be capable to following up with providers, caring for herself or making medical choices. Patient is difficult ot interview as she is explosive, labile, and manipulative.     Changes in presentation since initial visit: None    What are the patient's hopes for this visit: she initially wanted to be admitted but then attempted to leave when being assessed by this provider.     Current risk to self or others? Yes Patient reports several suicidal plan, Hx of non-fatal attempts.     Risk Assessment  [place] Suicide Severity Rating Scale Since Last Contact: High risk 7/12/  Suicidal Ideation (Since Last Contact)  1. Wish to be Dead (Since Last Contact): Yes  2. Non-Specific Active Suicidal Thoughts (Since Last Contact): Yes  3. Active Suicidal Ideation with any Methods (Not Plan) Without Intent to Act (Since Last Contact): Yes  4. Active Suicidal Ideation with Some Intent to Act, Without Specific Plan (Since Last Contact): Yes  5. Active Suicidal Ideation with Specific Plan and Intent (Since Last Contact): Yes  Suicidal Behavior (Since Last Contact)  Actual Attempt (Since Last Contact): Yes  Total Number of Actual Attempts (Since Last Contact): 1  Actual Attempt Description (Since Last Contact): melatonin overdose, took 25  Has subject engaged in non-suicidal self-injurious behavior? (Since Last Contact): No  Interrupted Attempts (Since Last Contact): No  Aborted or Self-Interrupted Attempt (Since Last Contact): No  Preparatory Acts or Behavior (Since Last Contact): Yes  Total Number of Preparatory Acts (Since Last Contact): 1  Preparatory Acts or Behavior  Description (Since Last Contact): took video of herself saying she was going to kill herself and sent it to parents and friends  Suicide (Since Last Contact): No  Actual/Potential Lethality (Most Lethal Attempt)  Most Lethal Attempt Date:  (unknown)  Actual Lethality/Medical Damage Code (Most Lethal Attempt):  (unknown)  C-SSRS Risk (Since Last Contact)  Calculated C-SSRS Risk Score (Since Last Contact): High Risk     Validity of evaluation is not impacted by presenting factors during interview.   Comments regarding subjective versus objective responses to [place] tool: n/a  Environmental or Psychosocial Events: threats to a prized relationship, challenging interpersonal relationships, helplessness/hopelessness, impulsivity/recklessness, unstable housing, homelessness, other life stressors and worsening chronic illness  Chronic Risk Factors: history of suicide attempts (today ), history of psychiatric hospitalization, history of abuse or neglect, chronic health problems, LGBTQ+ orientation  and serious, persistent mental illness   Warning Signs: seeking access to means to hurt or kill self, talking or writing about death, dying, or suicide, hopelessness, acting reckless or engaging in risky activities, withdrawing from friends, family, and society, anxiety, agitation, unable to sleep, sleeping all the time and recent discharges from emergency department or inpatient psychiatric care  Protective Factors: responsibilities and duties to others, including pets and children, good treatment engagement, supportive ongoing medical and mental health care relationships and help seeking  Interpretation of Risk Scoring, Risk Mitigation Interventions and Safety Plan:  High risk is valid. Although, high risk is likely baseline as pt's SI is chronic with hx of numerous attempts that were non-fatal.        Mental Status:     Appearance:   Appropriate    Eye Contact:   Good    Psychomotor Behavior: Agitated    Attitude:   Guarded   Suspicious  Uncooperative    Orientation:   All   Speech    Rate / Production: Emotional Talkative    Volume:  Loud    Mood:    Anxious  Irritable    Affect:    Labile    Thought Content:  Paranoia  Suicidal Impulsive   Thought Form:  Tangential    Insight:    Poor       Additional collateral information: The following information was received from Keshia whose relationship to the patient is [phone]. Information was obtained via phone with the Patient on the video call. Their phone number is [phone] and they last had contact with patient on a few weeks ago.    What happened today: She accussed me of doing things to her when I wasn't even home, I was in [place]. She left the home on her own accord but then I found out about the lies. She was living for free with a transgender biological male turned female. Sarath reported that she started feeling like the roommate wanted sexual payment to stay there for free. She left all her stuff there and walked out. Mom reports that she is normally more stable on her medications but has not been on them in the past 6 days. She feels Patient needs to be committed and then have guardianship. Patient needs intensive DBT but refuses to accept that she has borderline personality disorder. Patient has been struggling for 9 years with her mental health and family did not even realize how traumatized they all have been by her behavior until she left 2 weeks ago. Mom does not feel she is able to make choices for herself and that she needs stabilization.     What is different about patient's functioning: Patient has been declining for a few weeks but worse in the last 6 days.     Concern about alcohol/drug use: No    What do you think the patient needs: Mom does not feel she is able to make choices for herself and that she needs stabilization and commitment.     Has patient made comments about wanting to kill themselves/others:  Yes she has attempted a few times and called us  yesterday on a video call telling us she was going to overdose. we encouraged her to seek ED care    If d/c is recommended, can they take part in safety/aftercare planning: Yes Mom would like to assist Patient in discharge planning..    Other information: mom and dad are out of town until the 22nd. Mom reported that Patient could return to there home if she was discharged so she would have a safe place to go.       Disposition: inpatient mental health (will go medical due to tube feed)    Rationale for disposition: Patient has been in 5 EDs in 24 hours and DCed to a shelter only to return to another ED. Patient is reporting a desire to get her medications but also a desire to overdoes on her medication. She has several medical issues and it is unclear what is psychosomatic and what is not. She reports needing tube feeding but has not had any tube feeding for 6 days. She frequently tells un-truths in order to manipulate people so it is unclear what is accurate. She has a care plan in [facility] due to her frequent ED visits and her desire for admission. Patient does not appear capable of caring for herself, making medical choices that are positive. Patient has a Hx of making herself medically ill as well. She appears very mentally ill during this assessment and in need of better aftercare planning then discharge to a shelter given her SI comments and her continuous failure to function in the community.     Duration of face to face time with patient in minutes: .50 hrs   Time spent on the phone with Family and Provider: 65 Minutes.     Reviewed assessment with attending provider: Dr. Meza     Total time spent on assessment: .50 hrs     CPT code: 58222      Nilsa Montero, [facility]          HTN (hypertension)

## 2023-07-13 NOTE — PHARMACY-ADMISSION MEDICATION HISTORY
"Pharmacist Admission Medication History    Admission medication history is complete. The information provided in this note is only as accurate as the sources available at the time of the update.    Medication reconciliation/reorder completed by provider prior to medication history? No    Information Source(s): Hospital records and CareEverywhere/[employer] via N/A    Pertinent Information: Patient has not taken meds in the last 5 days. List confirmed using info in CareEverywhere and meds that had recent refills were marked as \"taking.\"    Changes made to PTA medication list:    Added: cyproheptadine, docusate    Deleted: None    Changed:   o Cymbalta 120mg daily --> 60mg daily  o Melatonin 10mg at bedtime --> 5mg QHS    Medication Affordability:  Not including over the counter (OTC) medications, was there a time in the past 3 months when you did not take your medications as prescribed because of cost?: Unable to Assess    Allergies reviewed with patient and updates made in EHR: unable to assess    Medication History Completed By: Karlene You [facility] 7/12/2023 10:22 PM    Prior to Admission medications    Medication Sig Last Dose Taking? Auth Provider Long Term End Date   ARIPiprazole (ABILIFY) 10 MG tablet Take 10 mg by mouth daily  Yes Unknown, Entered By History No    calcium carbonate (TUMS) 500 MG chewable tablet Take 1 tablet (500 mg) by mouth as needed for heartburn  Yes Александр Hull MD     cholecalciferol 50 MCG (2000 UT) tablet Take 1 capsule by mouth daily  Yes Reported, Patient     cyproheptadine (PERIACTIN) 4 MG tablet Take 4 mg by mouth 3 times daily  Yes Unknown, Entered By History     diazepam (DIASTAT ACUDIAL) 10 MG GEL rectal gel Place 10 mg rectally every 10 minutes as needed for seizures  Yes Louis Wong MD Yes    docusate sodium (COLACE) 100 MG tablet Take 100 mg by mouth daily  Yes Unknown, Entered By History     drospirenone-ethinyl estradiol (FELIPE) 3-0.02 MG tablet Take 1 " tablet by mouth daily  Yes Unknown, Entered By History No    DULoxetine (CYMBALTA) 30 MG capsule Take 60 mg by mouth daily  Yes Unknown, Entered By History No    famotidine (PEPCID) 20 MG tablet Take 20 mg by mouth daily  Yes Unknown, Entered By History     linaclotide (LINZESS) 290 MCG capsule Take 290 mcg by mouth every morning (before breakfast)  Yes Unknown, Entered By History No    LORazepam (ATIVAN) 0.5 MG tablet Take 0.5 mg by mouth At Bedtime  Yes Unknown, Entered By History     lubiprostone (AMITIZA) 24 MCG capsule Take 24 mcg by mouth 2 times daily (with meals)  Yes Unknown, Entered By History     melatonin 5 MG tablet Take 5 mg by mouth At Bedtime  Yes Unknown, Entered By History No    ondansetron (ZOFRAN ODT) 4 MG ODT tab Take 1 tablet (4 mg) by mouth every 6 hours as needed for nausea or vomiting  Yes Jerrell Daniels MD     pantoprazole (PROTONIX) 40 MG EC tablet Take 1 tablet (40 mg) by mouth 2 times daily  Yes Josue Rubi MD No    prochlorperazine (COMPAZINE) 10 MG tablet Take 10 mg by mouth every 8 hours as needed for nausea or vomiting  Yes Unknown, Entered By History     promethazine (PHENERGAN) 25 MG suppository Place 1 suppository (25 mg) rectally every 6 hours as needed for nausea (take instead of oral dose if you cannot keep the pills down)  Yes Josue Rubi MD     promethazine (PHENERGAN) 25 MG tablet Take 25 mg by mouth every 6 hours as needed for nausea or vomiting  Yes Unknown, Entered By History     vitamin C (ASCORBIC ACID) 250 MG tablet Take 250 mg by mouth daily  Yes Unknown, Entered By History     acetaminophen (TYLENOL) 325 MG tablet Take 2 tablets by mouth every 4 hours as needed   Reported, Patient No    hydrOXYzine (ATARAX) 25 MG tablet Take 1 tablet (25 mg) by mouth 2 times daily as needed for anxiety or other (sleep, melatonin augmentation or failure)   Emma Abdi MD No    lactobacillus rhamnosus, GG, (CULTURELL) capsule Take 1 capsule by mouth daily   Unknown,  Entered By History     multivitamin w/minerals (THERA-VIT-M) tablet Take 1 tablet by mouth daily   Александр Hull MD     plecanatide (TRULANCE) 3 MG tablet Take 3 mg by mouth daily   Unknown, Entered By History

## 2023-07-13 NOTE — ED TRIAGE NOTES
Patient presents via EMS after being discharged from this ED yesterday, then being discharged from two other ED's since. Patient reports wanting to be admitted for ongoing suicidal thinking.     Triage Assessment     Row Name 07/13/23 9741       Triage Assessment (Adult)    Airway WDL WDL       Respiratory WDL    Respiratory WDL WDL       Skin Circulation/Temperature WDL    Skin Circulation/Temperature WDL WDL       Cardiac WDL    Cardiac WDL WDL       Peripheral/Neurovascular WDL    Peripheral Neurovascular WDL WDL       Cognitive/Neuro/Behavioral WDL    Cognitive/Neuro/Behavioral WDL WDL

## 2023-07-13 NOTE — DISCHARGE INSTRUCTIONS
Aftercare Plan  If I am feeling unsafe or I am in a crisis, I will:   Contact my established care providers   Call the National Suicide Prevention Lifeline: 988  Go to the nearest emergency room   Call 911      1-Follow up with outpatient psychiatry for medication management  With previously established providers.      2-Consider follow up with outpatient mental health therapy on an ongoing basis.      3- Continue to follow up with case management services through [facility] and [facility].      Warning signs that I or other people might notice when a crisis is developing for me: thoughts of suicide and intent and planning to act on these thoughts.      Things I am able to do on my own to cope or help me feel better: Reduce Extreme Emotion  QUICKLY:  Changing Your Body Chemistry      T:  Change your body Temperature to change your autonomic nervous system   Use Ice Water to calm yourself down FAST   Put your face in a bowl of ice water (this is the best way; have the person keep his/her face in ice water for 30-45 seconds - initial research is showing that the longer s/he can hold her/his face in the water, the better the response), or   Splash ice water on your face, or hold an ice pack on your face      I:  Intensely exercise to calm down a body revved up by emotion   Examples: running, walking fast, jumping, playing basketball, weight lifting, swimming, calisthenics, etc.   Engage in exercises that DO NOT include violent behaviors. Exercises that utilize violent behaviors tend to function as  behavioral rehearsal,  and rather than calming the person down, may actually  rev  the person up more, increasing the likelihood of violence, and lessening the likelihood that they will  burn off  energy     P:  Progressively relax your muscles   Starting with your hands, moving to your forearms, upper arms, shoulders, neck, forehead, eyes, cheeks and lips, tongue and teeth, chest, upper back, stomach, buttocks, thighs,  "calves, ankles, feet   Tense (10 seconds,   of the way), then relax each muscle (all the way)   Notice the tension   Notice the difference when relaxed (by tensing first, and then relaxing, you are able to get a more thorough relaxation than by simply relaxing)      P: Paced breathing to relax   The standard technique is to begin with counting the number of steps one takes for a typical inhale, then counting the steps one takes for a typical exhale, and then lengthening the amount of steps for the exhalation by one or two steps.  OR  Repeat this pattern for 1-2 minutes  Inhale for four (4) seconds   Exhale for six (6) to eight (8) seconds   Research demonstrated that one can change one's overall level of anxiety by doing this exercise for even a few minutes per day       Things I can use or do for distraction: music, go for a walk      Changes I can make to support my mental health and wellness: maintain a regular sleeping and eating schedule, follow up with outpatient providers.       People in my life that I can ask for help: , psychiatry provider, parents, friends.       Your [facility] has a mental health crisis team you can call 24/7: [facility]  [phone] (adults)  [phone] (children)          Crisis Lines  Crisis Text Line  Text 982678  You will be connected with a trained live crisis counselor to provide support.     [employer] (LGBTQ Youth Crisis Line)  6.878.966.7516  text START to 865-366     [facility] Crisis Line Number: 900-916-9171     Community Resources  Fast Tracker  Linking people to mental health and substance use disorder resources  fasttrackermn.org      [place] Mental Health Warm Line  Peer to peer support  Monday thru Saturday, 12 pm to 10 pm  865.282.3562 or 0.068.206.2986  Text \"Support\" to 76509     National [place] on Mental Illness (PATRICK)  558.872.4517 or 1.888.PATRICK.HELPS        Mental Health Apps  My3  https://my3app.org/   "   VirtualHopeBox  https://[employer]/apps/virtual-hope-box/        Additional Information  Today you were seen by a licensed mental health professional through Triage and Transition services, [facility] (P)  for a crisis assessment in the Emergency Department at [facility].  It is recommended that you follow up with your established providers (psychiatrist, mental health therapist, and/or primary care doctor - as relevant) as soon as possible. Coordinators from [facility] will be calling you in the next 24-48 hours to ensure that you have the resources you need.  You can also contact [facility] coordinators directly at [phone]. You may have been scheduled for or offered an appointment with a mental health provider. [facility] maintains an extensive network of licensed behavioral health providers to connect patients with the services they need.  We do not charge providers a fee to participate in our referral network.  We match patients with providers based on a patient's specific needs, insurance coverage, and location.  Our first effort will be to refer you to a provider within your care system, and will utilize providers outside your care system as needed.         Emergency Shelter Services      [facility] Hotline: 1-636.477.3846    [facility] sponsors the First Call for Help referral services; they have listings for a wide variety of community support services and can be reached at 211.       [facility]  Overnight emergency shelter for men and women is provided at [facility] [place]  Address:  [facility]  [address] [place] [address]  [phone]      [address]  Phone: [phone]  Emergency Housing: Provides shelter, personal hygiene items and meals for homeless single adults who are receiving GA, social security or other benefits. Access through [facility] Shelter Team at [address] [place], [place] [address]jan[place]  (5-11 pm daily) or [facility]. Open to men and women.    [facility]: A secure shelter for single homeless women. Beds are available first come, first serve each night, starting at 4 pm. Open to women only.    [facility]  [address] [facility] [address]  Phone: [phone]  Provides: Overnight shelter for adult men (18 and up). Doors open at 7:15 pm and close at 8:00 am.  Remarks: Men need to have resided in [facility] for the past 30 days to be eligible. They need to have no financial resources to house themselves. There is a total of 20 beds in the shelter.  Resources: Independent Living Skills classes, Computer Lab access, meal, laundry    [facility], [phone]  [address] [place] [address]  9a-5:30p Monday through Friday or 1p-5:30p on Saturdays and Sundays.  Must bring a photo ID.  Staff will help with a community card and assign a shelter.    [facility] ([employer])  During the day call [phone] or after 5pm call [phone] (men), [phone] (women)  [address] [place] [address] [facility] ([employer])  [phone] ext. [phone]  [address] [facility] ([employer]), [phone]  [address] [facility]      Housing Resources by [facility]:  Call 211 to inquire about openings.  https://www.[email or web address]/  [phone]  [address] [place] [address] [address], [place] [address]  Emergency and transitional housing for homeless adults; no alcohol or drugs; charge of 30% of income up to $28/night but will be considered if no income      [place]    [place] [facility] (ages 18-24 only) - [place] open Monday - Friday 9am-5pm  [address] [facility], [facility] - [place] Open M-F 9am-4pm in 0 degrees or colder  [address] [facility],  [facility] available weekdays from 8:30 a.m.-4:30 p.m.  [address], [place] ([phone])    [facility]    www.detoxone.org  [phone]    [facility] [address], [place]   A sober homeless shelter for those men ages 18 & up, who have recently completed chemical dependency treatment, but are not ready to enter day to day living experiences; men being released from half-way or penitentiary; men who have found themselves unable to find employment or have recently lost a job; men who are on probation.      [facility] ([place])  www.[facility].org  [phone]  [facility] for families    [facility] available weekdays from 8:30 a.m.-4:30 p.m.  [address] [facility] ([phone])      [facility]  [address] [facility]  [phone]  Physical Health:  urgent care, health screenings, primary care referrals, prescriptions and medication management.  Human Services:  housing, cash, and food support, parenting education, employment resources, disability and veterans resources.  Mental Health & Addiction:  mental health screening and diagnostic assessments, comprehensive screening for addiction disorders, case management and care coordination, support from people with lived experience, help in a crisis including 3-10 day stay at the crisis stabilization program, withdrawal support from drugs and alcohol.    [facility] ([place])  www.ccspm.org  116.534.1716  [address] [place] [address]  Drop in center for homeless adults; supports basic needs, personal empowerment and transitional services; see web site for hours    [facility] Pay-for-Stay Shelter ([place])  www.[facility].org  [phone]  [address] [facility]  for men, both programs offer light breakfast and supper and showers; Pay-for-Stay program also has lockers, charges $6/night which is held in escrow for deposit on permanent housing    [facility] ([employer])  www.[facility].org  [phone]  [address] [facility]  The [facility] offers 88 units of single room occupancy permanent housing for independent, single adults experiencing homelessness. Facilities are available for 72 men and 16 women, who each pay $360 a month. Workshops on money management and empowerment help residents develop their potential. Optional spiritual guidance, onsite Alcoholics Anonymous meetings and a men's support group help residents confront their barriers to self-sufficiency.    [facility] ([employer])  www.[facility].org  688-038-2063  [address] [place] [address]  The [facility] provides low-cost single-room occupancy transitional housing to single men and women in [facility]. Residents work toward permanent housing by using the site's supportive services, including empowerment groups and case management to meet residents' mental health and emotional needs. Residents establish a rental history and can stay in the program for up to two years; $350 monthly rent for transitional housing units; all other units covered under Group Residential Housing Plan assistance; Alcohol and chemical use prohibited     [facility] ([employer])    www.[facility].org   [phone]   [address] [facility] offers 80 units of permanent housing to late-stage, chronic alcoholics, based on a model known as harm reduction. Many low-income, homeless, chronic alcoholics have struggled to attain sobriety. [facility] serves chronic alcoholics in [facility] with repeated admissions to detoxification centers and a history of failure in traditional chemical dependency treatment programs. At  least 75 percent of residents have mental health issues that contribute to the cycle of alcoholism [facility] provides residential care in a homelike environment for its tenants, regardless of level of intoxication, although alcohol is not allowed in the building. This cost-effective and compassionate housing option costs $47.25 a night. A large portion of the rent can be subsidized.     [facility] ([employer]), [phone]   www.[facility].org  [address] [facility]  Housing for single, homeless men and women; both short term and long term (maximum 2 years) cost 30% of income, income cannot exceed $26,800/year; see program requirements for waiting list rules.    [place], [phone]  https://[facility].org/  [address], [place]   Transitional housing for single women and women with children for up to 2 years; must be unemployed and not in school, no more than 4 children ages 10 and under; fees determined by .    [facility] ([facility]), [phone]  www.ShareThis.org  [address] [facility]  [facility] provides intake and assessment services for any family seeking emergency shelter in [facility]. This address is a day program, will bus those needing overnight shelter to a [facility], location of overnight housing changes monthly.    [facility] ([employer]), [phone]  www.[facility].org  [address] [facility] provides permanent housing with supportive services to 70 single men and women who commit to building better lives for themselves. [facility] offers a critical stepping stone for people with low incomes, allowing them to grow, advance in skills and reach self sufficiency.    [facility] ([employer]), [phone]  www.[facility].org  [address] [facility]  For single men and women; Portion of  lodging expenses may be subsidized, Applicants must meet the definition of long-term homelessness and have an annual income of $14,800 or less, Desire to live in an alcohol- and drug-free environment.    [facility] ([employer]), [phone]  www.[facility].org  [facility] provides a fresh start for single mothers and their children with a supportive, respectful, affordable housing situation. [facility] contains 16 two- and three-bedroom apartment units in a convenient [place] location.    [facility] ([employer])    www.[facility].org   [phone]   [address] [facility] provides permanent housing for late-stage chronic alcoholic men in [facility] with repeated admissions to detoxification centers and a history of failure in traditional chemical dependency treatment programs. This cost-effective and compassionate housing option costs less than $50 a night.

## 2023-07-13 NOTE — ED NOTES
"[facility]  ED Nurse Handoff Report    ED Chief complaint: Suicidal      ED Diagnosis:   Final diagnoses:   Suicidal ideation   Gastroparesis - feeding tube   Urinary retention - pantoja catheter       Code Status: To be ordered    Allergies:   Allergies   Allergen Reactions     Amoxicillin Rash     Penicillins Unknown     Mother unsure if patient or sister had a reaction. Mother reports she \"didn't think it was anaphylactic\".     Lactose Other (See Comments), Nausea, Nausea and Vomiting and GI Disturbance     Other reaction(s): Gastrointestinal  Fells sick with milk, but can have yogurt     Levofloxacin Muscle Pain (Myalgia)     Developed myalgia on levofloxacin 500 mg/d (11/28/22) after 1 day and requested to switch antibiotics     Other Food Allergy GI Disturbance     Cilantro       Patient Story: Thea Castle is a 23 year old female with a history of depression, suicidal ideations, and pseudoseizures who presents with suicidal ideations. Yesterday, the patient presented to presented to [facility] and was transferred to [facility] due to the patient making a statements to jump off a bridge. She was then discharged and the presented to [facility] and kept overnight and discharged today. The patient reports chronic suicidal ideations. She states that she plans to take pills. The last time she attempted to her herself occurred 24 hours ago in which she attempted to overdose by taking 25 melatonin. She does follow with psychiatrist and last saw them on 6/20 with no changes in her mediations.       ~~~~~~~~~~~~~~~~~~~~~~~~~~~~~       EMERGENCY CARE PLAN   At each Emergency Department visit, we will evaluate Thea to determine if an emergency medical condition is present.    Thea will be evaluated and treated as per [facility] Policy.    Per inpatient psych consult and liaison team 4/25/23:    There should be a high threshold for admission (medical or psychiatric). Please consider a period of monitoring and " re-evaluation when they present to the ED.    Boundaries and strict limits are essential in working with this patient    If 1:1 is deemed necessary, remind patient they are there to provide supervision and ensure patient is safe, not for social visit or to play games. The attendant should engage in minimum needed socialization to meet patient s needs.     Patient is to engage and perform her ADLs as independently as possible when she is hospitalized. It is requested that the 1:1 (or any staff) do not braid or brush patient s hair    Discourage allowing patient to  play favorite  by selecting what staff work with her or requesting certain staff to come into her room to see her. If possible, only staff assigned to patient should be working with patient or coming into her room.   It is appropriate to honor patient s wishes of preference of female provider due to trauma history.     Hospitalization Management:     Concerns for factitious disorder     History of using ensure in emesis bag to show she is sick. History of intentionally placing finger in rectum to cause bleeding. History of pulling on catheter to cause irritation/bleeding.     A Tamper guard should be placed on any IV Access placed.    If tamper guards are not available, utilize wrapping line in coban and putting a solid strip over all with sharpie to assess if line has been tampered with. This is not something that can be negotiated to be removed.    A reminder to [facility] care team to check that the supply cart is locked and should ensure supplies are not left in her room. If possible, the supply cart should be placed outside of [facility] s room.      ~~~~~~~~~~~~~~~~~~~~~~~~~~~~~         Focused Assessment:  Cooperative upon arrival, but attempted to elope after iPad mental health assessment. After discussion with staff and having 1:1 sitter, patient more cooperative and agreeable to admission.    Treatments and/or interventions provided: BMP, UA, 72 HH,  DEC assessment/consult with   Patient's response to treatments and/or interventions: Agitation with iPad DEC assessment    To be done/followed up on inpatient unit:  Inpatient orders, 1:1 sitter    Does this patient have any cognitive concerns?: Behavioral diagnoses (see PMHx)    Activity level - Baseline/Home:  Independent  Activity Level - Current:   Independent    Patient's Preferred language: English   Needed?: No    Isolation: None  Infection: Not Applicable  Patient tested for COVID 19 prior to admission: NO  Bariatric?: No      For the majority of the shift this patient's behavior was Yellow.   Behavioral interventions performed were Empathetic listening, team goal-making, reassurance.    ED NURSE PHONE NUMBER: *[id number]

## 2023-07-13 NOTE — CONSULTS
"CLINICAL NUTRITION SERVICES  -  ASSESSMENT NOTE      Recommendations Ordered by Registered Dietitian (RD):     Nutrition Support Enteral:  Type of Feeding Tube: GJ tube  Enteral Frequency:  Continuous  Enteral Regimen: [employer]ms Peptide 1.5 @ 40 mL/hr  Total Enteral Provisions: 1440 cals (25 cals/kg), 71 gm pro (1.2 gm/kg), 9 gm fiber, 672 mL free water  Free Water Flush: 60 mL every 4 hrs       Malnutrition:   % Weight Loss:  None noted - wt has been stable per records  % Intake:  <75% for > 7 days (moderate malnutrition) - assume pt has been taking some po (despite noting that she doesn't elmo eating), states no TF for 6 days  Subcutaneous Fat Loss:  None observed  Muscle Loss:  None observed  Fluid Retention:  None noted    Malnutrition Diagnosis: Patient does not meet two of the above criteria necessary for diagnosing malnutrition          REASON FOR ASSESSMENT  Thea Castle is a 23 year old female seen by Registered Dietitian for   1) Admission Nutrition Risk Screen:  Have you recently lost weight without trying? \"2-13#\"  Have you been eating poorly because of a decreased appetite? \"YES\"  2) Provider Order - Registered Dietitian to Assess and Order TF per Medical Nutrition Therapy Protocol      NUTRITION HISTORY  Pt is known to Nutrition from previous admits - last visited with her on 7/4/23  Pt has a GJ tube  (5/30/23: an 18 Urdu, 45 cm, gastrojejunostomy tube was advanced until the tip was in the proximal jejunum)  Previous regimen - Gavi [employer]ms peptide 1.5 @ 40 mL/hr continuous = 1440 cals (25 cals/kg), 71 gm pro (1.2 gm/kg), 9 gm fiber, 672 mL free water  Pt has previously noted that she doesn't tolerate any po intake - \"I throw up right after I eat\"    Per chart review, pt states she hasn't had TF for 6 days (d/c'd from [facility] on 7/6)    7/9: MD Note - Per Pt's Aunt,  \"I last saw her this past Friday (7/7) when we went out for waffles and coffee.\"    Visited with pt this morning   Tells me that she " "hasn't had her TF for 6 days - \"STEPHEN wants to stop my TF because I am homeless.  Can you help me find a new GI doctor?\"  Pt notes that she is lactose intol -\"I don't drink milk, but I can eat ice cream with lactaid\"      CURRENT NUTRITION ORDERS  Diet Order:     Regular    Pt thinks she has food poisoning - \"I have a meal in the ED and now I have pooped 16 times\"  Asking for anti-diarrheal - \"although, I haven't gone in 9 days, so maybe this is good\"  Pt also asking for lactaid to be ordered - \"I want some ice cream, but need lactaid\"  She states that she had a popsicle, but is \"draining\" it out her G-tube    She is agreeable to restarting her TF      NUTRITION FOCUSED PHYSICAL ASSESSMENT FOR DIAGNOSING MALNUTRITION)  Yes              Observed:    No nutrition-related physical findings observed and Pt appeared shaky during visit    Obtained from Chart/Interdisciplinary Team:  Suicidal ideation    ANTHROPOMETRICS  Height:5'4\"  Weight:(7/13) 57.5 kg / 126 lbs 12.8 oz  Body mass index is 21.77 kg/m .  Weight Status:  Normal BMI  IBW: 54.5 kg  % IBW: 106%  Weight History: Wt appears stable  Wt Readings from Last 10 Encounters:   07/13/23 57.5 kg (126 lb 12.8 oz)   07/11/23 59 kg (130 lb 1.1 oz)   07/09/23 59 kg (130 lb)   07/08/23 59 kg (130 lb)   07/04/23 57.6 kg (127 lb)   06/26/23 57.6 kg (127 lb)   06/04/23 60.5 kg (133 lb 6.4 oz)   04/24/23 56.7 kg (125 lb)   04/24/23 56.7 kg (125 lb)   04/20/23 56.7 kg (125 lb)     02/07/23 55.9 kg (123 lb 3.2 oz)   01/23/23 55.1 kg (121 lb 7.6 oz)    LABS  Labs reviewed    MEDICATIONS  IVF @ 75 mL/hr  Colace  Multivitamin w/miinerals  Vit C      ASSESSED NUTRITION NEEDS PER APPROVED PRACTICE GUIDELINES:    Dosing Weight 57.5 kg  Estimated Energy Needs: 1456-4826 kcals (25-30 Kcal/Kg)  Justification: maintenance  Estimated Protein Needs: 58-70 grams protein (1-1.2 g pro/Kg)  Justification: maintenance  Estimated Fluid Needs: 0715-9343  mL (1 mL/Kcal)  Justification: " maintenance    MALNUTRITION:  % Weight Loss:  None noted - wt has been stable per records  % Intake:  <75% for > 7 days (moderate malnutrition) - assume pt has been taking some po (despite noting that she doesn't elmo eating), states no TF for 6 days  Subcutaneous Fat Loss:  None observed  Muscle Loss:  None observed  Fluid Retention:  None noted    Malnutrition Diagnosis: Patient does not meet two of the above criteria necessary for diagnosing malnutrition      NUTRITION DIAGNOSIS:  No nutrition diagnosis identified at this time         NUTRITION INTERVENTIONS  Recommendations / Nutrition Prescription  Regular diet  .      Implementation  Nutrition education ---> Reviewed plan to resume TF    Nutrition Support Enteral:  Type of Feeding Tube: GJ tube  Enteral Frequency:  Continuous  Enteral Regimen: Gavi [employer]ms Peptide 1.5 @ 40 mL/hr  Total Enteral Provisions: 1440 cals (25 cals/kg), 71 gm pro (1.2 gm/kg), 9 gm fiber, 672 mL free water  Free Water Flush: 60 mL every 4 hrs  .      Nutrition Goals  EN to meet nutrition needs  .      MONITORING AND EVALUATION:  Progress towards goals will be monitored and evaluated per protocol and Practice Guidelines

## 2023-07-13 NOTE — CONSULTS
"Diagnostic Evaluation Consultation  Crisis Assessment    Patient was assessed: In Person  Patient location: declocations: [facility] Adult Emergency Department  Was a release of information signed: No. Reason: keith      Referral Data and Chief Complaint  Thea is a 23 year old female, who uses she/her pronouns, and presents to the ED via police. Patient is referred to the ED by self. Patient is presenting to the ED for the following concerns: pt was discharged from [facility] today, but then refused to leave thus security was brought in and eventually engaged police who put pt on a \" emergency evaluation\" due to reported as pt stated that she would overdose if she didn't have a place to go.  Then pt wanted to use the restroom and police reports she had a bungie cord around her waist.       Informed Consent and Assessment Methods     Patient is her own guardian. Writer met with patient and explained the crisis assessment process, including applicable information disclosures and limits to confidentiality, assessed understanding of the process, and obtained consent to proceed with the assessment. Patient was observed to be able to participate in the assessment as evidenced by ability and willingness to engage.. Assessment methods included conducting a formal interview with patient, review of medical records, collaboration with medical staff, and obtaining relevant collateral information from family and community providers when available.  .     Over the course of this crisis assessment offered validation, engaged patient in problem solving and disposition planning, worked with patient on safety and aftercare planning and assisted in processing patient's thoughts and feeling relating to need to focus on coping skills vs sx, as she has future orientation.. Patient's response to interventions was variable. Engaged when she was talking about the people she thought were helpful in the past few days and " then defensive if any of her medical issues or dx were questioned.      Summary of Patient Situation  Pt has gone from [facility] to [facility] to [facility] to [facility] and back to [facility] ED/ facilities over the past 3 days.  She reports being suicidal with si as well as various medical complaints, then after some time is discharged or asks to be released.  Today it appears that after a nursing change, pt felt an urge to leave and then was discharged.  Then changed her mind again and refused to leave    Pt is anxious and contradicts her own stories, is disorganized in reporting of sx and events.  Tends to fixate on medical issues, germs etc.  Hx of reporting emesis by disguising it with ensure. Pt seems to be harming herself by her pursuit of medical issues, and lacks insight to this.  After going to the restroom here she started getting concerned she might have been exposed to the hepatitis or hiv.  When questioned about her mental health she reports plan to overdose on melatonin.  When asked if she had melatonin in her possession she replied no, because she took it all the other day before she tried to jump off bridge (which was more of a ledge) at [facility].  Pt reports poor sleep, says she is manic.  Then reports that she can't eat and needs to use her feeding tube.  Mom notes that she had been without her feedings since she left old apt about 2 weeks ago, so mom assumes she has been eating orally.  Pt seems very attached to her tube and catheter, when questioned pt refuses to discuss having it removed. Unable to process any secondary gains for being sick.  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  Pt smiled when talking about the $500 a week she will make.  Although pt reports having coping skills of listening to music, pt does not seem to access her ability to manage sx instead she goes to the ED.  Pt thinks that 3 days in the ED  and seeing psychiatry and gettting 'stabilized' on meds is what she needs.  At one time is open to doing PHP program, then admits that she will tell them about her si and they will just send her back to ED.  Pt also reports a plan to return to [facility] program.       Brief Psychosocial History  Had been living with parents in [place], [place] until late June when she moved out to live with a friend in [facility].  Pt later found out that this friend was trans and after going to [employer] event pt felt that the friend had romatic interests and this made her feel uncomfortable.  As a result pt left that apt about 3 wks ago.  Mom reports that belief that she left her tube feeding packets and her pump at the house, unsure about her medications.  Since parents are out of town, they agreed to help  pt's belongings at the apt when they return.  Pt makes references to Rastafarian and liking those providers she thinks are 'Taoist'.  Pt also expresses belief that dying will be peaceful because she will be with God.      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  There is a care plan in place.    Past Dx include:  Suicidal ideation, chronic  History of depression  Borderline personality disorder  History of self-injurious behaviors  Generalized anxiety disorder  Obsessive-compulsive disorder  Posttraumatic stress disorder  History of pseudoseizures  Atypical anorexia nervosa  Multiple recent ED visits  Gastroparesis with indwelling G-J tube, on tube feedings  History of slow transit constipation  Chronic urinary retention, indwelling Smith catheter on admission  Anemia, normocytic  Extremely mild hypokalemia, replaced     Collateral Information  Spoke with mother Keshia on the phone, [phone] with pt's permission and while she was away on vacation. Mom notes an increase in sx when they are out of town  currently and historically.  They also note confusion about why pt has a catheter again as that was all worked up this winter and had been removed.  Also unsure why she has a g tube.  And expresses belief that each time she went to an Eating Disorder program she got worse, as she seems to want to be the 'worst one in the room'.  Pt's sx started in college when experiencing ED sx.     Risk Assessment  [place] Suicide Severity Rating Scale Since Last Contact:   Suicidal Ideation (Since Last Contact)  1. Wish to be Dead (Since Last Contact): Yes  Wish to be Dead Description (Since Last Contact): to be at peace with God  2. Non-Specific Active Suicidal Thoughts (Since Last Contact): No  Suicidal Behavior (Since Last Contact)  Actual Attempt (Since Last Contact): No  Has subject engaged in non-suicidal self-injurious behavior? (Since Last Contact): Yes  Interrupted Attempts (Since Last Contact): No  Aborted or Self-Interrupted Attempt (Since Last Contact): No  Preparatory Acts or Behavior (Since Last Contact): No  Suicide (Since Last Contact): No     C-SSRS Risk (Since Last Contact)  Calculated C-SSRS Risk Score (Since Last Contact): Low Risk    Validity of evaluation is impacted by presenting factors during interview .disorganized, changes reports and has shown poor judgement over the past week   Comments regarding subjective versus objective responses to [place] tool: see narrative  Environmental or Psychosocial Events: bullied/abused, challenging interpersonal relationships, helplessness/hopelessness, impulsivity/recklessness, unemployment/underemployment, unstable housing, homelessness and worsening chronic illness  Chronic Risk Factors: history of suicide attempts (overdose on melatonin 7/4/23), history of psychiatric hospitalization, history of abuse or neglect and history of Non-Suicidal Self Injury (NSSI)   Warning Signs: talking or writing about death, dying, or suicide, acting reckless or engaging in risky  activities, dramatic changes in mood and recent discharges from emergency department or inpatient psychiatric care  Protective Factors: strong bond to family unit, community support, or employment, sense of importance of health and wellness, supportive ongoing medical and mental health care relationships and cultural, spiritual , or Congregational beliefs associated with meaning and value in life  Interpretation of Risk Scoring, Risk Mitigation Interventions and Safety Plan:  Pt can verbalize future goals and plans, yet continues to sabotage improvements in mental health.  Pt is obsessed with physical sx and potential exposure to illnesses, thus blocking her ability to cope with MH concerns.  Pt has hx of overdose and self harming via cuts and physically increasing risk for infection with catheter/ tube management.  Thus viewed as being at higher risk due to poor insight and judgement.       Does the patient have thoughts of harming others? No     Is the patient engaging in sexually inappropriate behavior?  no        Current Substance Abuse     Is there recent substance abuse? no, came to [facility] from [facility] ED     Was a urine drug screen or blood alcohol level obtained: Yes pt's sample was collected but utox results not yet available       Mental Status Exam     Affect: Appropriate and Dramatic   Appearance: Appropriate    Attention Span/Concentration: Attentive  Eye Contact: Engaged   Fund of Knowledge: Appropriate    Language /Speech Content: Fluent   Language /Speech Volume: Normal    Language /Speech Rate/Productions: Normal    Recent Memory: Intact and Variable   Remote Memory: Variable   Mood: Anxious, Depressed and Irritable    Orientation to Person: Yes    Orientation to Place: Yes   Orientation to Time of Day: Yes    Orientation to Date: Yes    Situation (Do they understand why they are here?): Answer: wants to be in hospital for 3 days and get 'stabilized'    Psychomotor Behavior: Agitated    Thought Content:  Suicidal   Thought Form: Obsessive/Perseverative and Other: disorganized      History of commitment: No  Hx of considering commitment at [facility] in Feb 6, 2023, not supported at that time       Medication    Psychotropic medications: Abilify, cymbalta, hydroxyzine, ativan, vitamins, protonix  Medication changes made in the last two weeks: Yes: pt report missing doses due to being at various ED and staying at a cousins house       Current Care Team    Primary Care Provider: Christine Staviz MD [facility]  Psychiatrist: Kimberly Fitch [facility]  Therapist: No  : Yes. Shawna Ferrera @ [phone] , work number is .      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  However her pursuit of medical dx may in fact be putting her at risk for infection and other issues; consistent with factitious disorder. Hx of self inflicted trauma to urethra and rectum.  If discharge is discussed today pt reports having suicidal plan to overdose on melatonin. She appears anxious and presents as disorganized.  Pt gives mixed message on willingness to pursue MH php program, then adds that the program will just send her to the ED due to her chronic si anyway.  Pt is intelligent but struggling with judgement at this time. She does not think she has borderline personality, because she doesn't have colored hair or tattoos.  Pt vascilates with sx and needs, expressing suicidal  plan one minute and asking to leave the next, thus Dr Hernandez placed on a hold at this time.    Recommend admission for MH to pursue guardianship or commitment.  However pt's g-j tube (was placed in June 2023) may be an exclusion for MH admission. Thus Dr Hernandez to order GI consult regarding need for g-j tube.   Admission to Inpatient Level of Care is indicated due to:    1. Patient risk of severity of behavioral health disorder is appropriate to proposed level of care as indicated by:    Imminent Risk of Harm: Current plan for suicide or serious harm to self is present  And/or:  Behavioral health disorder is present and appropriate for inpatient care with both of the following:     Severe psychiatric, behavioral or other comorbid conditions are appropriate for management at inpatient mental health as indicated by at least one of the following:   o Depressive symptoms, Anxiety, Personality disorders and Other psychiatric symptoms related to underlying psychiatric disorder and Impaired impulse control, judgement, or insight    Severe dysfunction in daily living is present as indicated by at least one of the following:   o Complete inability to maintain any appropriate aspect of personal responsibility in any adult roles    2. Inpatient mental health services are necessary to meet patient needs and at least one of the following:  Specific condition related to admission diagnosis is present and judged likely to deteriorate in absence of treatment at proposed level of care    3. Situation and expectations are appropriate for inpatient care, as indicated by one of the following:   Patient is unwilling to participate in treatment voluntarily and requires treatment and Biopsychosocial stresses potentially contributing to clinical presentation (co morbidities) have been assessed and are absent or manageable at proposed level of care    Disposition    Recommended disposition: Medical Admission:  hold, pt had G-J tube placed  in June and thus is unable to go to MH unit       Reviewed case and recommendations with attending provider. Attending Name: Dr Les Hernandez      Attending concurs with disposition: Yes       Patient and/or validated legal guardian concurs with disposition: No: pt keeps changing mind on what care plan they agree to, thus placed on a hold       Final disposition: Medical admission: MH admit requested, but has G-J tube thus not able to go to psych unit .     Inpatient Details (if applicable):   Is patient admitted voluntarily:No, 72 hr hold. Hold start date/time: 7/13/23      Patient aware of potential for transfer if there is not appropriate placement? NA       Patient is willing to travel outside of the [facility] for placement? NA      Behavioral Intake Notified? Yes: Date: 7/13/23 Time: 8:40pm; Vesta reports J tube is exclusion for MH unit.       Assessment Details    Patient interview started at: 5:15pm and completed at: 5:55pm.     Total time spent with the patient or their family: 1.0 hrs      CPT code(s) utilized: 24182 - Psychotherapy for Crisis - 60 (30-74*) min       Chani Khan, Riverview Psychiatric CenterSW, MSW, LICSW, Psychotherapist  DEC - Triage & Transition Services  Callback: [phone]

## 2023-07-13 NOTE — CARE PLAN
"Thea Castle  July 12, 2023  Plan of Care Hand-off Note     Patient Care Path: Inpatient Medical    Plan for Care:     Patient has been in 5 EDs in 24 hours and DCed to a shelter only to return to another ED. Patient is reporting a desire to get her medications but also a desire to overdoes on her medication. She has several medical issues and it is unclear what is psychosomatic and what is not. She reports needing tube feeding but has not had any tube feeding for 6 days. She frequently tells un-truths in order to manipulate people so it is unclear what is accurate. She has a care plan in [facility] due to her frequent ED visits and her desire for admission. Patient does not appear capable of caring for herself, making medical choices that are positive. Patient has a Hx of making herself medically ill as well. She appears very mentally ill during this assessment and in need of better aftercare planning then discharge to a shelter given her SI comments and her continuous failure to function in the community.     This  spoke with on-call Hoda and the ED provider at length about this case give Patient care plan, medical Hx, and diagnosis. Patient needs a better discharge plan then to go to a shelter and or return to another ED tonight. Patients mother has a lot of information and may be helpful in discharge planning after patient is more stable on her medication. Patient also has adult mental health CM services through [facility] but would not give this  the phone number but would allow us to call them together. (we got voice mails and patient would not allow this  to leave a message). Patient is very fixated on \"not getting care\".       Critical Safety Issues: SI with a plan to jump off a bridge (listed 3 she knew of) and overdose on medication.     Overview:  This patient is a child/adolescent: No    This patient has additional special visitor precautions: No    Legal Status: Ordered: " 07/12/23 2111    Contacts:   Mother Keshia [phone]    Psychiatry Consult:  Adult Psychiatry Consult requested related to inpatient psych consult. Psychiatry IP Consult Order Placed: Yes    Updated Attending Provider regarding plan of care.    Nilas Montero, MARILUZSW

## 2023-07-13 NOTE — PLAN OF CARE
Summary: Hx of depression, borderline personality disorder, self-injury, OCD, PTSD, pseudoseizures, anorexia nervosa. On 72 hour hold.   Primary Diagnosis: Suicidal ideation with multiple emergency room visits in the past 24 hours.   Orientation: AO x4. 1:1 sit for safety and supervision to reduce risk of suicide ideation and recent self-injurious behaviors.  Vital signs: VSS on RA. Tele: NSR. Seizure precautions in place d/t pseudoseizures.  Aggression Stop Light: Green   Mobility: SBA   Pain Management: tylenol   Diet: NPO   Bowel/Bladder: Chronic pantoja in place for retention. G-tube in place d/t gastroparesis. Serous drainage noted.   Abnormal Lab/Assessments: n/a  Drain/Device/Wound: LPIV infusing NS @ 75 ml/hr.  Consults: Psych, SW, pharmacy consult   D/C Day/Goals/Place: Discharge pending.

## 2023-07-13 NOTE — PROGRESS NOTES
RECEIVING UNIT ED HANDOFF REVIEW    ED Nurse Handoff Report was reviewed by: Anderson Montes RN on July 13, 2023 at 3:05 AM

## 2023-07-13 NOTE — CONSULTS
Initial Psychiatric Consult   Consult date: July 13, 2023         Reason for Consult, requesting source:    Suicidal ideation  Requesting source: Froylan Titus    Labs and imaging reviewed. Discussed with nursing.        HPI:   Thea Castle is a 23 year old female with a medical history of gastroparesis with indwelling G-J tube, chronic urinary retention with indwelling pantoja catheter, and psychiatric history most notable for factitious disorder, as well as psychogenic seizures, anorexia nervosa, borderline personality disorder, major depressive disorder, generalized anxiety disorder, posttraumatic stress disorder, and obsessive compulsive disorder who was admitted to [facility] on 7/12/23 for suicidal ideation- due to presence of chronic indwelling pantoja catheter and G-J tube she is not able to go to  psychiatry unit so was instead admitted medically. She was discharged from the [facility] ED just a couple hours prior to coming to [facility]. She went to 5 different EDs on 7/11 with various complaints:    She had a recent brief admission to [facility] 7/4/23-7/6/23 for seizure like activity.    7/8/2-7/9/23: [facility] ED for seizure like activity and homelessness, discharged at her request. Denied suicidal ideation at time of discharge.  7/9/23: [facility] ED less than one hour after discharge from [facility] ED presented with suicidal ideation, stating that in that time she had purchased and ate 50 gummies of melatonin 5mg (per ED physician note, seems unlikely that she would have had time to do this and did not present as drowsy). Denied suicidal ideation at time of discharge.  7/9/23: [place] ED visit for homelessness, was referred to community resources.  7/10/23: [facility] ED for seizure like activity, cited various reasons for not being able to obtain diazepam prescription which was just recently dispensed on 6/30/23, became tearful and demanded to leave when new  "prescription was denied.  7/11/23: [facility] ED about 5 hours after discharge from [facility], present with anxiety, homelessness, seizure like activity. Again asking for diazepam, eloped from ED after this was denied.  7/11/23: [facility] ED for seizure like activity, again asking for diazepam, when denied and being discharged she was verbally aggressive toward staff.  7/11/23: Acute Psychiatric Services at [facility] ED for psych eval after threatening self harm when discharged from [facility] ED, continuing to request diazepam. Suicidal threats felt to be in context of emotional dysregulation.  \"Patient seems to identify strongly with the illness role, and has a great deal of affinity for medical intervention. She strongly desires to be in the hospital. Given her personality disorder this would likely be highly counterproductive and even worsen her condition.\"  7/11/23: [facility] ED from APS \"because she felt as if she should be on a hold due to suicidal ideation\", stating she tried to \"jump off of a ledge at [facility] today\". Threatened to fernando if she was not admitted to hospital. Discharged from the ED.  7/11/23-7/12/23: [facility] ED for seizure like activity, then claimed she was there for suicidal ideation. Evaluated by psychiatry consult service NP Irina Martins, who recommended against inpatient admission. She was discharged from the ED.        7/13/23: I met with Thea for assessment today. She is seen sitting her bed. She is calm and cooperative. States \"this is the 7th hospital I've been to in the past 3 days\" and that \"no one else would admit me\". Details these various ED visits as described above, though she gives a narrative of being a victim and not being appropriately treated at these hospitals. She states that she has been having suicidal ideation with recent attempts of overdosing on melatonin, and \"jumping\" after she was discharged from [facility] ED: she describes this event as attempting to \"jump off a " "picnic table to break my legs\", and required security to be called and she got down after \"they threatened to tase me\". She states that she was at the [facility] ED and told them that she would overdose on OTC sleeping pills if they discharged her, but she states she did not actually do this because \"they wouldn't let me stop at the pharmacy on the way out\". She states she is actually scheduled for intake at the [facility] next week, and only wants to get her medications adjusted prior to this intake. She states she still has her medications, but they are split between a friend's house and a cousin's house and does not feel safe going to get them. I asked if someone else could get them for her, she stated \"yeah probably\" but then adds \"but I'm here until Monday because I'm on a hold\". We discussed that she would likely not benefit from inpatient psychiatric admission, based on past presentations and the fact that she has chronic suicidal ideation which is unlikely to be changed by admission. Also discussed that her GJ tube and pantoja would exclude her from IP psych admission. With this discussion, she began to increasingly threaten that she would kill herself if discharged, that she would overdose on OTC sleeping pills, and that she would fernando all of us. It should be noted that plans to kill herself and to fernando the hospital are contradictory. She then stood up without saying a word, walking towards the door, then carefully lowered herself to the floor and then began banging her head on the floor. I asked if she was done speaking with me, she responded \"leave\" which I did at that time.         Past Psychiatric History:   Prior diagnoses: borderline, MDD, OCD, PTSD, MAT, factitious   History of 1 petition for commitment that was not supported by Nataliya   Multiple different medication trials. Currently on Abilify and Cymbalta. Has psychaitrist thru [facility]     Please note care plan:    At each Emergency Department " visit, we will evaluate Thea to determine if an emergency medical condition is present.    Thea will be evaluated and treated as per [facility] Policy.    Per inpatient psych consult and liaison team 4/25/23:    There should be a high threshold for admission (medical or psychiatric). Please consider a period of monitoring and re-evaluation when they present to the ED.    Boundaries and strict limits are essential in working with this patient    If 1:1 is deemed necessary, remind patient they are there to provide supervision and ensure patient is safe, not for social visit or to play games. The attendant should engage in minimum needed socialization to meet patient s needs.     Patient is to engage and perform her ADLs as independently as possible when she is hospitalized. It is requested that the 1:1 (or any staff) do not braid or brush patient s hair    Discourage allowing patient to  play favorite  by selecting what staff work with her or requesting certain staff to come into her room to see her. If possible, only staff assigned to patient should be working with patient or coming into her room.   It is appropriate to honor patient s wishes of preference of female provider due to trauma history.     Hospitalization Management:     Concerns for factitious disorder     History of using ensure in emesis bag to show she is sick. History of intentionally placing finger in rectum to cause bleeding. History of pulling on catheter to cause irritation/bleeding.     A Tamper guard should be placed on any IV Access placed.    If tamper guards are not available, utilize wrapping line in coban and putting a solid strip over all with sharpie to assess if line has been tampered with. This is not something that can be negotiated to be removed.    A reminder to Thea s care team to check that the supply cart is locked and should ensure supplies are not left in her room. If possible, the supply cart should be placed outside of  Thea s room.     Total ED visits / Hospital Admissions in 12 months prior to initiation of Care Plan: 26 ED visits,   13 urgent care visits, and 6 admission        Substance Use and History:   Denies. Recent ED admissions concerning for benzo misuse/drug seeking behavior.        Past Medical History:   PAST MEDICAL HISTORY:   Past Medical History:   Diagnosis Date     Anorexia      Anxiety      Depressive disorder      Gastroparesis      OCD (obsessive compulsive disorder)      PTSD (post-traumatic stress disorder)      Slow transit constipation      Ulcerative colitis (H)      Urinary retention        PAST SURGICAL HISTORY:   Past Surgical History:   Procedure Laterality Date     ENT SURGERY       IR GASTRO JEJUNOSTOMY TUBE PLACEMENT  5/30/2023             Family History:   FAMILY HISTORY:   Family History   Problem Relation Age of Onset     Suicide Other            Social History:   SOCIAL HISTORY:   Social History     Tobacco Use     Smoking status: Never     Smokeless tobacco: Never   Substance Use Topics     Alcohol use: Not Currently     Comment: 2 drinks a week     Homeless, states she is living on the streets for the past two weeks after being kicked out of her parents home.         Physical ROS:   The 10 point Review of Systems is negative other than noted in the HPI or here.    Review Of Systems  Skin: negative  Eyes: negative  Ears/Nose/Throat: negative  Respiratory: No shortness of breath, dyspnea on exertion, cough, or hemoptysis  Cardiovascular: negative  Gastrointestinal: GJ tube, diarrhea  Genitourinary: pantoja catheter  Musculoskeletal: negative  Neurologic: negative  Psychiatric: thoughts of self-harm  Hematologic/Lymphatic/Immunologic: negative  Endocrine: negative          Medications:       docusate sodium  100 mg Oral Daily     multivitamin w/minerals  1 tablet Oral Daily     vitamin C  250 mg Oral Daily              Allergies:     Allergies   Allergen Reactions     Amoxicillin Rash      "Penicillins Unknown     Mother unsure if patient or sister had a reaction. Mother reports she \"didn't think it was anaphylactic\".     Lactose Other (See Comments), Nausea, Nausea and Vomiting and GI Disturbance     Other reaction(s): Gastrointestinal  Fells sick with milk, but can have yogurt     Levofloxacin Muscle Pain (Myalgia)     Developed myalgia on levofloxacin 500 mg/d (11/28/22) after 1 day and requested to switch antibiotics     Other Food Allergy GI Disturbance     Cilantro          Labs:     Recent Results (from the past 48 hour(s))   Extra Red Top Tube    Collection Time: 07/11/23 10:44 PM   Result Value Ref Range    Hold Specimen JIC    Extra Green Top (Lithium Heparin) Tube    Collection Time: 07/11/23 10:44 PM   Result Value Ref Range    Hold Specimen JIC    Extra Purple Top Tube    Collection Time: 07/11/23 10:44 PM   Result Value Ref Range    Hold Specimen JIC    Comprehensive metabolic panel    Collection Time: 07/11/23 10:44 PM   Result Value Ref Range    Sodium 138 136 - 145 mmol/L    Potassium 3.9 3.4 - 5.3 mmol/L    Chloride 101 98 - 107 mmol/L    Carbon Dioxide (CO2) 18 (L) 22 - 29 mmol/L    Anion Gap 19 (H) 7 - 15 mmol/L    Urea Nitrogen 11.2 6.0 - 20.0 mg/dL    Creatinine 0.69 0.51 - 0.95 mg/dL    Calcium 9.1 8.6 - 10.0 mg/dL    Glucose 112 (H) 70 - 99 mg/dL    Alkaline Phosphatase 82 35 - 104 U/L    AST 39 0 - 45 U/L    ALT 27 0 - 50 U/L    Protein Total 6.8 6.4 - 8.3 g/dL    Albumin 4.2 3.5 - 5.2 g/dL    Bilirubin Total 0.9 <=1.2 mg/dL    GFR Estimate >90 >60 mL/min/1.73m2   Magnesium    Collection Time: 07/11/23 10:44 PM   Result Value Ref Range    Magnesium 1.8 1.7 - 2.3 mg/dL   Ethyl Alcohol Level    Collection Time: 07/11/23 10:44 PM   Result Value Ref Range    Alcohol ethyl <0.01 <=0.01 g/dL   Acetaminophen level    Collection Time: 07/11/23 10:44 PM   Result Value Ref Range    Acetaminophen <5.0 (L) 10.0 - 30.0 ug/mL   Salicylate level    Collection Time: 07/11/23 10:44 PM   Result " Value Ref Range    Salicylate <0.3   mg/dL   UA with Microscopic reflex to Culture    Collection Time: 07/12/23  6:14 AM    Specimen: Urine, Smith Catheter   Result Value Ref Range    Color Urine Light Yellow Colorless, Straw, Light Yellow, Yellow    Appearance Urine Clear Clear    Glucose Urine Negative Negative mg/dL    Bilirubin Urine Negative Negative    Ketones Urine 10 (A) Negative mg/dL    Specific Gravity Urine 1.024 1.003 - 1.035    Blood Urine Large (A) Negative    pH Urine 6.5 5.0 - 7.0    Protein Albumin Urine 10 (A) Negative mg/dL    Urobilinogen Urine Normal Normal, 2.0 mg/dL    Nitrite Urine Negative Negative    Leukocyte Esterase Urine Small (A) Negative    Bacteria Urine Few (A) None Seen /HPF    Mucus Urine Present (A) None Seen /LPF    RBC Urine 5 (H) <=2 /HPF    WBC Urine 4 <=5 /HPF    Squamous Epithelials Urine 6 (H) <=1 /HPF   Drug abuse screen 1 urine (ED)    Collection Time: 07/12/23  6:14 AM   Result Value Ref Range    Amphetamines Urine Screen Negative Screen Negative    Barbituates Urine Screen Negative Screen Negative    Benzodiazepine Urine Screen Positive (A) Screen Negative    Cannabinoids Urine Screen Negative Screen Negative    Cocaine Urine Screen Negative Screen Negative    Opiates Urine Screen Negative Screen Negative   Basic metabolic panel    Collection Time: 07/12/23 10:17 PM   Result Value Ref Range    Sodium 139 136 - 145 mmol/L    Potassium 3.7 3.4 - 5.3 mmol/L    Chloride 105 98 - 107 mmol/L    Carbon Dioxide (CO2) 21 (L) 22 - 29 mmol/L    Anion Gap 13 7 - 15 mmol/L    Urea Nitrogen 8.0 6.0 - 20.0 mg/dL    Creatinine 0.67 0.51 - 0.95 mg/dL    Calcium 9.0 8.6 - 10.0 mg/dL    Glucose 87 70 - 99 mg/dL    GFR Estimate >90 >60 mL/min/1.73m2   UA with Microscopic reflex to Culture    Collection Time: 07/13/23 12:41 AM    Specimen: Urine, Catheter   Result Value Ref Range    Color Urine Yellow Colorless, Straw, Light Yellow, Yellow    Appearance Urine Clear Clear    Glucose Urine  Negative Negative mg/dL    Bilirubin Urine Negative Negative    Ketones Urine 80 (A) Negative mg/dL    Specific Gravity Urine 1.022 1.003 - 1.035    Blood Urine Negative Negative    pH Urine 6.0 5.0 - 7.0    Protein Albumin Urine Negative Negative mg/dL    Urobilinogen Urine Normal Normal, 2.0 mg/dL    Nitrite Urine Negative Negative    Leukocyte Esterase Urine Small (A) Negative    Mucus Urine Present (A) None Seen /LPF    RBC Urine 4 (H) <=2 /HPF    WBC Urine 6 (H) <=5 /HPF    Squamous Epithelials Urine <1 <=1 /HPF   Basic metabolic panel    Collection Time: 07/13/23  6:07 AM   Result Value Ref Range    Sodium 138 136 - 145 mmol/L    Potassium 3.2 (L) 3.4 - 5.3 mmol/L    Chloride 103 98 - 107 mmol/L    Carbon Dioxide (CO2) 21 (L) 22 - 29 mmol/L    Anion Gap 14 7 - 15 mmol/L    Urea Nitrogen 10.0 6.0 - 20.0 mg/dL    Creatinine 0.68 0.51 - 0.95 mg/dL    Calcium 8.8 8.6 - 10.0 mg/dL    Glucose 109 (H) 70 - 99 mg/dL    GFR Estimate >90 >60 mL/min/1.73m2   Hepatic panel    Collection Time: 07/13/23  6:07 AM   Result Value Ref Range    Protein Total 6.4 6.4 - 8.3 g/dL    Albumin 4.0 3.5 - 5.2 g/dL    Bilirubin Total 0.4 <=1.2 mg/dL    Alkaline Phosphatase 77 35 - 104 U/L    AST 28 0 - 45 U/L    ALT 23 0 - 50 U/L    Bilirubin Direct <0.20 0.00 - 0.30 mg/dL   TSH with free T4 reflex    Collection Time: 07/13/23  6:07 AM   Result Value Ref Range    TSH 1.25 0.30 - 4.20 uIU/mL   Folate    Collection Time: 07/13/23  6:07 AM   Result Value Ref Range    Folic Acid 29.0 4.6 - 34.8 ng/mL   CBC with platelets and differential    Collection Time: 07/13/23  6:07 AM   Result Value Ref Range    WBC Count 5.6 4.0 - 11.0 10e3/uL    RBC Count 3.49 (L) 3.80 - 5.20 10e6/uL    Hemoglobin 10.4 (L) 11.7 - 15.7 g/dL    Hematocrit 31.4 (L) 35.0 - 47.0 %    MCV 90 78 - 100 fL    MCH 29.8 26.5 - 33.0 pg    MCHC 33.1 31.5 - 36.5 g/dL    RDW 13.6 10.0 - 15.0 %    Platelet Count 276 150 - 450 10e3/uL    % Neutrophils 51 %    % Lymphocytes 39 %    %  Monocytes 7 %    % Eosinophils 2 %    % Basophils 1 %    % Immature Granulocytes 0 %    NRBCs per 100 WBC 0 <1 /100    Absolute Neutrophils 2.9 1.6 - 8.3 10e3/uL    Absolute Lymphocytes 2.2 0.8 - 5.3 10e3/uL    Absolute Monocytes 0.4 0.0 - 1.3 10e3/uL    Absolute Eosinophils 0.1 0.0 - 0.7 10e3/uL    Absolute Basophils 0.0 0.0 - 0.2 10e3/uL    Absolute Immature Granulocytes 0.0 <=0.4 10e3/uL    Absolute NRBCs 0.0 10e3/uL          Physical and Psychiatric Examination:     /81 (BP Location: Left arm)   Pulse 95   Temp 98.4  F (36.9  C) (Oral)   Resp 18   Wt 57.5 kg (126 lb 12.8 oz)   LMP  (LMP Unknown)   SpO2 97%   BMI 21.77 kg/m    Weight is 126 lbs 12.8 oz  Body mass index is 21.77 kg/m .    Physical Exam:  I have reviewed the physical exam as documented by by the medical team and agree with findings and assessment and have no additional findings to add at this time.    Mental Status Exam:    Appearance: awake, alert and adequately groomed  Attitude:  cooperative  Eye Contact:  good  Mood:  depressed  Affect:  mood congruent and intensity is blunted  Speech:  clear, coherent  Psychomotor Behavior:  no evidence of tardive dyskinesia, dystonia, or tics  Thought Process:  logical, linear and goal oriented  Associations:  no loose associations  Thought Content:  no evidence of psychotic thought, active suicidal ideation present and plan for suicide present  Insight:  limited  Judgement:  limited  Oriented to:  time, person, and place  Attention Span and Concentration:  intact  Recent and Remote Memory:  intact                 DSM-5 Diagnosis:   Borderline personality disorder  Factitious disorder   R/O benzodiazepine misuse          Assessment:   Thea Castle is a 23 year old female with history notable for factitious disorder and borderline personality disorder who presented with suicidal ideation. She is continuing to endorse suicidal ideation with a plan to overdose on OTC sleeping pills if she is  discharged. She provides contradictory report of both a plan to kill herself, and a plan to fernando the hospital if she is discharged which is reflective of future oriented thinking. She made similar suicidal threats when she was being discharged from [facility] EDs but did not act on these threats. After discharge from [facility], she attempted to jump off a picnic table but was stopped by security. Her recent reports of suicide attempts are of low risk of lethality (overdosing on melatonin, jumping off picnic table) and are more reflective of emotional dysregulation/cluster B traits rather than true suicidal intent. She has chronic suicidal ideation and chronic risk for suicide that is unlikely to be mitigated by ongoing hospitalization.     She has clear documented history of secondary gain from ongoing medical hospitalization. Hospitalization is in fact counterproductive to treatment of her mental illness. She has a clearly outlined ED care plan in place to avoid unnecessary hospitalization and that the threshold for admission should be high for both medical and psychiatric reasons. Due to presence of GJ tube and pantoja catheter, she is not able to go to IP psychiatric unit. Additionally, during previous admission at [facility] 1/3/23-2/7/23 great efforts were made to have feeding tube and catheter removed in order to get her admitted to inpatient psychiatry, where she was discharged after only 20 hours and was noted that she did not participate in groups. This appears to support the thought that she seeks to obtain secondary gain from medical admission rather than psychiatric admission.     This case has been discussed with several members of the psychiatry team, including Anne Melendez who was significantly involved in a previous admission and development of ED care plan, and Dr. Dasha Castle who has also familiar with pt from previous admission. We are in agreement with recommendation from  psychiatry to discontinue 72 hour hold and discharge her as soon as is medically appropriate. Due to her recent threats/attempts to harm herself after being discharged from the ED, I would recommend arranging for a  at discharge.    She has declined offers for therapy or other psychiatric referrals, and currently has a psychiatric provider at [facility] where her next scheduled appointment is on 7/20/23. She has a . She has her prescription medications at a friend/cousin's house and has reported that someone could get them for her. I do not recommend refilling her prescriptions, especially her benzodiazepines as there is evidence of misuse/drug seeking behavior in recent ED visits.          Summary of Recommendations:   1.  Due to GJ tube, pantoja catheter she cannot be admitted to IP psychiatry. Additionally, there is reason to believe that hospitalization is counterproductive to treatment and she would be better served in outpatient setting. She has outpatient psychiatrist already and has declined additional referrals for mental health treatment. Recommend discontinuing 72 hour hold and discharging with information on community resources.    2.  Do not recommend refilling medications- she has medications already. She also has psychiatry follow up appointment on 7/20/23 at [facility]. She has declined additional psychiatry referrals.     3.  She exhibits drug seeking behavior with benzodiazepines, use of these medications should be avoided entirely if possible or greatly minimized.    4.  As detailed in ED care plan, the threshold for future admissions should be high. Would recommend that she should not be admitted to medical unit in the future if primary reason for admission is psychiatric due to secondary gain she receives from medical admission- she should instead be required to board in the ED while awaiting psychiatric admission.       Shelbie Marion, CONRAD  CNP    Consult/Liaison Psychiatry   [facility]    Contact information available via [facility] Paging/Directory  If I am not available, then [facility] CL line ([phone]) should know who is covering our consult service.

## 2023-07-13 NOTE — PROGRESS NOTES
"Writer was assigned patient @ 1200. Pt's behavior was labile - aggressive, demanding, and accusatory. Pt walked out to nursing station stating \"I want to leave, do you have paperwork?\" Became angry and tearful when staff said they would print it for her. Discharge orders were placed and pt was handed AVS. Patient took signature sheet and crumpled it up and threw it on the ground. Patient ran down hurley towards the elevators demanding to go down to 7th floor. Patient threw cellphone on ground multiple times and requested to have the [place] PD called. Security and Psych was called to help de-escalate the situation. Patient left on elevators with security and psych.  "

## 2023-07-13 NOTE — CONSULTS
SW: Writer talked with Dmitriy TORRES from Psychiatry. Writer printed off [facility] to provide to patient when she is discharged. Patient is being followed by Psychiatry.     DIVYA Jessica

## 2023-07-14 ENCOUNTER — TELEPHONE (OUTPATIENT)
Dept: BEHAVIORAL HEALTH | Facility: CLINIC | Age: 23
End: 2023-07-14
Payer: COMMERCIAL

## 2023-07-14 PROBLEM — F43.10 PTSD (POST-TRAUMATIC STRESS DISORDER): Status: ACTIVE | Noted: 2020-06-23

## 2023-07-14 PROBLEM — F33.0 MAJOR DEPRESSIVE DISORDER, RECURRENT, MILD (H): Status: ACTIVE | Noted: 2019-04-23

## 2023-07-14 PROBLEM — R45.851 DEPRESSION WITH SUICIDAL IDEATION: Status: ACTIVE | Noted: 2020-08-21

## 2023-07-14 PROBLEM — F41.1 GAD (GENERALIZED ANXIETY DISORDER): Status: ACTIVE | Noted: 2019-04-23

## 2023-07-14 PROBLEM — F23 ACUTE PSYCHOSIS (H): Status: ACTIVE | Noted: 2023-07-14

## 2023-07-14 PROBLEM — F32.A DEPRESSION WITH SUICIDAL IDEATION: Status: ACTIVE | Noted: 2020-08-21

## 2023-07-14 LAB — GLUCOSE BLDC GLUCOMTR-MCNC: 85 MG/DL (ref 70–99)

## 2023-07-14 PROCEDURE — 250N000013 HC RX MED GY IP 250 OP 250 PS 637: Performed by: PSYCHIATRY & NEUROLOGY

## 2023-07-14 PROCEDURE — 99233 SBSQ HOSP IP/OBS HIGH 50: CPT | Performed by: STUDENT IN AN ORGANIZED HEALTH CARE EDUCATION/TRAINING PROGRAM

## 2023-07-14 PROCEDURE — 99255 IP/OBS CONSLTJ NEW/EST HI 80: CPT | Performed by: PSYCHIATRY & NEUROLOGY

## 2023-07-14 PROCEDURE — 82962 GLUCOSE BLOOD TEST: CPT

## 2023-07-14 PROCEDURE — 250N000011 HC RX IP 250 OP 636: Mod: JZ

## 2023-07-14 PROCEDURE — 250N000013 HC RX MED GY IP 250 OP 250 PS 637: Performed by: STUDENT IN AN ORGANIZED HEALTH CARE EDUCATION/TRAINING PROGRAM

## 2023-07-14 PROCEDURE — 99418 PROLNG IP/OBS E/M EA 15 MIN: CPT | Performed by: STUDENT IN AN ORGANIZED HEALTH CARE EDUCATION/TRAINING PROGRAM

## 2023-07-14 PROCEDURE — G0378 HOSPITAL OBSERVATION PER HR: HCPCS

## 2023-07-14 RX ORDER — HYDROXYZINE HYDROCHLORIDE 25 MG/1
25 TABLET, FILM COATED ORAL 2 TIMES DAILY
Status: DISCONTINUED | OUTPATIENT
Start: 2023-07-14 | End: 2023-07-14

## 2023-07-14 RX ORDER — GUAIFENESIN 600 MG/1
15 TABLET, EXTENDED RELEASE ORAL DAILY
Status: DISCONTINUED | OUTPATIENT
Start: 2023-07-15 | End: 2023-07-15

## 2023-07-14 RX ORDER — OLANZAPINE 2.5 MG/1
10 TABLET, FILM COATED ORAL AT BEDTIME
Status: DISCONTINUED | OUTPATIENT
Start: 2023-07-14 | End: 2023-07-17 | Stop reason: HOSPADM

## 2023-07-14 RX ORDER — DIVALPROEX SODIUM 500 MG/1
500 TABLET, EXTENDED RELEASE ORAL 2 TIMES DAILY
Status: DISCONTINUED | OUTPATIENT
Start: 2023-07-14 | End: 2023-07-17 | Stop reason: HOSPADM

## 2023-07-14 RX ORDER — HALOPERIDOL 5 MG/ML
5 INJECTION INTRAMUSCULAR EVERY 6 HOURS PRN
Status: COMPLETED | OUTPATIENT
Start: 2023-07-14 | End: 2023-07-14

## 2023-07-14 RX ORDER — FAMOTIDINE 20 MG/1
20 TABLET, FILM COATED ORAL DAILY
Status: DISCONTINUED | OUTPATIENT
Start: 2023-07-14 | End: 2023-07-17 | Stop reason: HOSPADM

## 2023-07-14 RX ORDER — MULTIPLE VITAMINS W/ MINERALS TAB 9MG-400MCG
1 TAB ORAL DAILY
Status: DISCONTINUED | OUTPATIENT
Start: 2023-07-14 | End: 2023-07-14

## 2023-07-14 RX ORDER — HALOPERIDOL 5 MG/ML
5 INJECTION INTRAMUSCULAR
Status: DISCONTINUED | OUTPATIENT
Start: 2023-07-14 | End: 2023-07-14

## 2023-07-14 RX ORDER — DEXTROSE MONOHYDRATE 100 MG/ML
INJECTION, SOLUTION INTRAVENOUS CONTINUOUS PRN
Status: DISCONTINUED | OUTPATIENT
Start: 2023-07-14 | End: 2023-07-15

## 2023-07-14 RX ORDER — CHOLECALCIFEROL (VITAMIN D3) 125 MCG
6000 CAPSULE ORAL 3 TIMES DAILY PRN
Status: DISCONTINUED | OUTPATIENT
Start: 2023-07-14 | End: 2023-07-17 | Stop reason: HOSPADM

## 2023-07-14 RX ORDER — VITAMIN B COMPLEX
50 TABLET ORAL DAILY
Status: DISCONTINUED | OUTPATIENT
Start: 2023-07-14 | End: 2023-07-15

## 2023-07-14 RX ORDER — ACETAMINOPHEN 325 MG/1
650 TABLET ORAL EVERY 6 HOURS PRN
Status: DISCONTINUED | OUTPATIENT
Start: 2023-07-14 | End: 2023-07-17 | Stop reason: HOSPADM

## 2023-07-14 RX ORDER — OLANZAPINE 10 MG/2ML
5 INJECTION, POWDER, FOR SOLUTION INTRAMUSCULAR ONCE
Status: DISCONTINUED | OUTPATIENT
Start: 2023-07-14 | End: 2023-07-14

## 2023-07-14 RX ORDER — HALOPERIDOL 2 MG/1
2 TABLET ORAL EVERY 6 HOURS PRN
Status: DISCONTINUED | OUTPATIENT
Start: 2023-07-14 | End: 2023-07-14

## 2023-07-14 RX ORDER — MULTIVIT WITH MINERALS/LUTEIN
250 TABLET ORAL DAILY
Status: DISCONTINUED | OUTPATIENT
Start: 2023-07-14 | End: 2023-07-15

## 2023-07-14 RX ORDER — ARIPIPRAZOLE 5 MG/1
10 TABLET ORAL EVERY EVENING
Status: DISCONTINUED | OUTPATIENT
Start: 2023-07-14 | End: 2023-07-14

## 2023-07-14 RX ORDER — LORAZEPAM 0.5 MG/1
0.5 TABLET ORAL AT BEDTIME
Status: DISCONTINUED | OUTPATIENT
Start: 2023-07-14 | End: 2023-07-17 | Stop reason: HOSPADM

## 2023-07-14 RX ORDER — HALOPERIDOL 2 MG/1
2 TABLET ORAL EVERY 6 HOURS PRN
Status: DISCONTINUED | OUTPATIENT
Start: 2023-07-14 | End: 2023-07-17 | Stop reason: HOSPADM

## 2023-07-14 RX ORDER — PROCHLORPERAZINE MALEATE 10 MG
10 TABLET ORAL EVERY 8 HOURS PRN
Status: DISCONTINUED | OUTPATIENT
Start: 2023-07-14 | End: 2023-07-15

## 2023-07-14 RX ORDER — HYDROXYZINE HYDROCHLORIDE 25 MG/1
25 TABLET, FILM COATED ORAL 2 TIMES DAILY
Status: DISCONTINUED | OUTPATIENT
Start: 2023-07-14 | End: 2023-07-17 | Stop reason: HOSPADM

## 2023-07-14 RX ADMIN — OLANZAPINE 10 MG: 2.5 TABLET, FILM COATED ORAL at 21:47

## 2023-07-14 RX ADMIN — PROCHLORPERAZINE MALEATE 10 MG: 10 TABLET ORAL at 08:17

## 2023-07-14 RX ADMIN — Medication 1 CAPSULE: at 08:16

## 2023-07-14 RX ADMIN — Medication 250 MG: at 09:22

## 2023-07-14 RX ADMIN — DULOXETINE HYDROCHLORIDE 60 MG: 60 CAPSULE, DELAYED RELEASE ORAL at 08:16

## 2023-07-14 RX ADMIN — ARIPIPRAZOLE 10 MG: 5 TABLET ORAL at 01:10

## 2023-07-14 RX ADMIN — LORAZEPAM 0.5 MG: 0.5 TABLET ORAL at 21:48

## 2023-07-14 RX ADMIN — Medication 50 MCG: at 08:18

## 2023-07-14 RX ADMIN — ACETAMINOPHEN 650 MG: 325 TABLET, FILM COATED ORAL at 08:17

## 2023-07-14 RX ADMIN — HYDROXYZINE HYDROCHLORIDE 25 MG: 25 TABLET ORAL at 08:17

## 2023-07-14 RX ADMIN — DIVALPROEX SODIUM 500 MG: 500 TABLET, FILM COATED, EXTENDED RELEASE ORAL at 20:00

## 2023-07-14 RX ADMIN — Medication 10 MG: at 01:10

## 2023-07-14 RX ADMIN — MULTIPLE VITAMINS W/ MINERALS TAB 1 TABLET: TAB at 08:17

## 2023-07-14 RX ADMIN — HYDROXYZINE HYDROCHLORIDE 25 MG: 25 TABLET ORAL at 20:01

## 2023-07-14 RX ADMIN — FAMOTIDINE 20 MG: 20 TABLET ORAL at 08:17

## 2023-07-14 RX ADMIN — CALCIUM CARBONATE (ANTACID) CHEW TAB 500 MG 500 MG: 500 CHEW TAB at 08:16

## 2023-07-14 RX ADMIN — Medication 10 MG: at 21:48

## 2023-07-14 RX ADMIN — HALOPERIDOL LACTATE 5 MG: 5 INJECTION, SOLUTION INTRAMUSCULAR at 15:55

## 2023-07-14 RX ADMIN — LORAZEPAM 0.5 MG: 0.5 TABLET ORAL at 01:10

## 2023-07-14 RX ADMIN — HYDROXYZINE HYDROCHLORIDE 25 MG: 25 TABLET, FILM COATED ORAL at 01:10

## 2023-07-14 RX ADMIN — DROSPIRENONE AND ETHINYL ESTRADIOL 1 TABLET: KIT at 09:23

## 2023-07-14 ASSESSMENT — ACTIVITIES OF DAILY LIVING (ADL)
DIFFICULTY_EATING/SWALLOWING: YES
ADLS_ACUITY_SCORE: 35
SWALLOWING: 0-->SWALLOWS FOODS/LIQUIDS WITHOUT DIFFICULTY (DEVELOPMENTALLY APPROPRIATE)
DRESSING/BATHING_DIFFICULTY: NO
FALL_HISTORY_WITHIN_LAST_SIX_MONTHS: YES
WEAR_GLASSES_OR_BLIND: YES
ADLS_ACUITY_SCORE: 25
NUMBER_OF_TIMES_PATIENT_HAS_FALLEN_WITHIN_LAST_SIX_MONTHS: 1
ADLS_ACUITY_SCORE: 35
HEARING_DIFFICULTY_OR_DEAF: NO
DIFFICULTY_EATING/SWALLOWING: YES
EATING: 0-->INDEPENDENT
TOILETING_ISSUES: NO
ADLS_ACUITY_SCORE: 25
EATING/SWALLOWING: EATING
CONCENTRATING,_REMEMBERING_OR_MAKING_DECISIONS_DIFFICULTY: YES
ADLS_ACUITY_SCORE: 25
EATING: 0-->INDEPENDENT
CHANGE_IN_FUNCTIONAL_STATUS_SINCE_ONSET_OF_CURRENT_ILLNESS/INJURY: NO
EATING/SWALLOWING_MANAGEMENT: TF
ADLS_ACUITY_SCORE: 35
ADLS_ACUITY_SCORE: 25
ADLS_ACUITY_SCORE: 35
SWALLOWING: 0-->SWALLOWS FOODS/LIQUIDS WITHOUT DIFFICULTY
ADLS_ACUITY_SCORE: 25
CONCENTRATING,_REMEMBERING_OR_MAKING_DECISIONS_DIFFICULTY: YES
ADLS_ACUITY_SCORE: 25
WALKING_OR_CLIMBING_STAIRS_DIFFICULTY: NO
ADLS_ACUITY_SCORE: 25
DOING_ERRANDS_INDEPENDENTLY_DIFFICULTY: NO
ADLS_ACUITY_SCORE: 35
DIFFICULTY_COMMUNICATING: NO
EATING/SWALLOWING: EATING

## 2023-07-14 NOTE — PROGRESS NOTES
Patient's mother called the unit and shared the following information about the patient:  Patient has pseudoseizures.  She knows how to work the system.  An example of this is she got a feeding tube placed.  She has significant mental health concerns, and we want to make sure she's seen as a mental health patient.  Our understanding is she's on a medical unit, but her issues are mental health.  She started having mental health issues five years ago.  She is constantly seeking medical care.  There's a pattern of her falling apart when we're out of town, and we're currently out of town.  Her mental health issues have been escalating over time.  In college she started saying she had an eating disorder.  She sought care for an eating disorder in five states.  She's lost most of her friends.  We want her to get the care/mental health help she needs.      Patient's mom wants the seizures ruled out with an EEG.  Patient's mom shared the patient's dad is on an anti-seizure medication from having a seizure in his teens.      This writer listened to patient's mom and offered support.
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
## 2023-07-14 NOTE — ED NOTES
Patient spoke with the writer concerning room placement, informed we are waiting for the room. Patient sitting outside the room, 1:1 monitoring present.

## 2023-07-14 NOTE — UTILIZATION REVIEW
"Admission Status; Secondary Review Determination     Admission Date: 7/13/2023  4:16 PM       Under the authority of the Utilization Management Committee, the utilization review process indicated a secondary review on the above patient.  The review outcome is based on review of the medical records, discussions with staff, and applying clinical experience noted on the date of the review.        ()      Inpatient Status Appropriate - This patient's medical care is consistent with medical management for inpatient care and reasonable inpatient medical practice.      () Observation Status Appropriate - This patient does not meet hospital inpatient criteria and is placed in observation status. If this patient's primary payer is Medicare and was admitted as an inpatient, Condition Code 44 should be used and patient status changed to \"observation\".   () Admission Status NOT Appropriate - This patient's medical care is not consistent with medical management for Inpatient or Observation Status.        () Outpatient Procedure Status Appropriate - Procedure not on Medicare Inpatient list and no complications at the time of this review       RATIONALE FOR DETERMINATION      Brief clinical presentation, information copied from the chart, abbreviated and edited for relevant content:     Discussed with resident - appears to be a mostly a disposition issue and no acute issues requiring inpatient medical admission. They are considering removing her Gtube, but this would not meet IP admission. If she medically decompensates, advised team to ask for another review. If after psych consult, an IP psych admission is planned then IP could be considered. Currently though, meets OBS status and team will change to OBS.    Thea Castle is a 23 year old female admitted on 7/13/2023. She has a history of factitious disorder, psychogenic seizures, h/o gastroparesis with indwelling G-J tube, chronic urinary retention with indwelling pantoja " catheter, anorexia nervosa, BPD, MDD, MAT, PTSD, and OCD, and is admitted for suicidal ideation, mental health instability leading to severe dysfunction in daily living, suicidal ideation, and consideration of civil commitment following 72 hour hold. Complex psychiatric history with 10 separate emergency room visits in the past five days, with 6 of them in the last 24 hours. Discharged from [facility] following psychiatric assessment and recommendation just hours prior to [facility] presentation.           In summary, the severity of illness, intensity of service provided, expected length of stay and risk for adverse outcome make the care complex, high risk and appropriate for hospital admission.        The information on this document is developed by the utilization review team in order for the business office to ensure compliance.  This only denotes the appropriateness of proper admission status and does not reflect the quality of care rendered.         The definitions of Inpatient Status and Observation Status used in making the determination above are those provided in the CMS Coverage Manual, Chapter 1 and Chapter 6, section 70.4.      Sincerely,      Elizabeth Hinds MD   Utilization Review/ Case Management  [facility].
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
## 2023-07-14 NOTE — CODE/RAPID RESPONSE
"Code blue called today: Code blue was activated with concern for seizure. Patient was described as having incontinence and displaying \"full body shaking\" per bedside attendant. Upon arrival to bedside the primary medicine service was present. Patient was AO x 4 and following commands.The patient has had recent history of psychogenic seizure and primary team expressed concerns that this episode is similar. Given patient is clinically back to baseline mentation and VSS; primary team feels patient is okay to remain on current unit with 1:1 sitter. Primary team will continue to follow patient and adjust plan of care accordingly.       Temp: 98.5  F (36.9  C) Temp src: Oral BP: 124/67 Pulse: 85   Resp: 18 SpO2: 100 %      "

## 2023-07-14 NOTE — PROGRESS NOTES
"RN was called to room at 1420 by 1:1 as pt began having seizure like activity. Pt was found convulsing in bed with all extremities. Pt was turned to side and writer initiated a rapid response, however changed to code blue to get staff in room as pt went limp in bed. Pt was found to have a pulse and breathing was noted. Pt was placed on 2LPM O2 and sats were 100%, BP stable, HR elevated. Within minutes, pt began communicating in full sentences regarding having an \"EEG\" done recently. Pt was responding, making eye contact, and was not convulsing at this time. It should be noted that seizure like activity occurred immediately after Aunt left room from visiting patient. Pt was distressed at the time talking with the aunt prior to seizure like activity and was seen crying and shaking. Pt was stabilized, returned to back in bed. MD seen at bedside. Pt began hitting herself and biting her tongue, and code 21 was called and given IM halodol 5mg using physical restraint from code team. Pt still was hitting herself and biting tongue, so therefore the call was made to place hard locked restraints on patient, 5 point due to harm to self. MD at bedside to assess, face to face was entered. Order placed for violent restraints for 4 hours initiated at 1610. 15 minute checks were completed. Was told by MD to have pt remain in hard restraints until G/J tube was addressed and removed. Around 1650, pt was seen sleeping in bed. 1:1 RN at bedside. PRN meds available if needed.    Soft restraints order placed- will need to be placed once G/J tube removed to prevent behaviors and new orders will need to be placed Q4H. 1:1 will need to remain, with psych associate preferred. Psych intake will be contacted once tubes are removed for placement.     Code green was called around 1805 in anticipation of removing G/J tube. MD removed tube at bedside. Pt remained in locked 5-point restraints during this process. Pt remained calm and cooperative " during the process. Tube was removed without difficulty. Dressing was placed. Abdominal binder placed over site. No halodol was needed to be given during this interaction. Soft wrist restraints were placed on pt at 1826. Instructed to have pt remain in soft point restraints for 1 hr and if calm and cooperative, may remove restraints at that time at 1930.     Hard locked 5 point restraints:     07/14/23 1610   Seclusion or Restraint Order   Length of Order 4   Order Obtained Yes   Attending Physician Notified Yes   Attending Physician's Name Dr. Castle   In Person Face to Face Assessment Conducted Yes-Eval of pt's immediate situation, reaction to intervention, complete review of systems assessment, behavioral assessment & review/assessment of hx, drugs & meds, recent labs, etc, behavioral condition, need to continue/terminate restraint/seclusion   Patient Experienced No adverse physical outcome from seclusion/restraint initiation   Continuation of Seclus/Restraint indicated at this time Yes        Soft 2 point wrist restraints:   07/14/23 1826   Seclusion or Restraint Order   Length of Order 4   Order Obtained Yes   Attending Physician Notified Yes   Attending Physician's Name Dr. Castle   In Person Face to Face Assessment Conducted Yes-Eval of pt's immediate situation, reaction to intervention, complete review of systems assessment, behavioral assessment & review/assessment of hx, drugs & meds, recent labs, etc, behavioral condition, need to continue/terminate restraint/seclusion   Patient Experienced No adverse physical outcome from seclusion/restraint initiation   Continuation of Seclus/Restraint indicated at this time Yes

## 2023-07-14 NOTE — PROGRESS NOTES
RESTRAINT FACE-TO-FACE  Delayed documentation for acute patient care  Within an hour after restraint an in person face to face assessment was completed at 1815, including an evaluation of the patient's immediate reaction to the intervention, behavioral assessment and review/assessment of history, drugs and medications, recent labs, etc., and behavioral condition.  The patient experienced: No adverse physical outcome from seclusion/restraint initiation. She was calm and cooperative throughout the assessment.  The intervention of restraint or seclusion needs to continue but can de-escalate from 4-point BH restraints with Posey chest restraint to soft wrist restraints only.    Will reassess patient in an hour to determine ability to further de-escalate restraints.

## 2023-07-14 NOTE — CONSULTS
Brief SW Note:    SW consulted for Commitment. SW can assist with Commitment process, if Psych determines appropriate.    Pt met with Sanderssarah MEDEIROS, Diya Delvalle, who was here for several hours with pt today. April had joined IDT huddle this morning to discuss pt's status. April reported that she will be meeting pt for the first time today; referral was placed by Tommy RYDER.    Tommy MEDEIROS  Diya Delvalle  PH: [phone]  [email or web address].      Initial CM Assessment is needed d/t Elevated Risk Score. SW unable to meet with pt today for assessment d/t multiple code blues.   SW escalated social admission to CM Leadership.    SW to continue to follow for discharge planning, pending Psych and MD recs.    Pt will be added to weekend help list for SW to assess, if able.        IAN Butler, LSW  [facility]   PHONE: [phone]  Pager: [phone]

## 2023-07-14 NOTE — PLAN OF CARE
"Goal Outcome Evaluation:    Pt admitted at 0100    VS: /77 (BP Location: Right arm)   Pulse 80   Temp 98.8  F (37.1  C) (Oral)   Resp 16   Ht 1.626 m (5' 4\")   Wt 56.9 kg (125 lb 7.1 oz)   LMP  (LMP Unknown)   SpO2 100%   BMI 21.53 kg/m    Denied CP and SOB   O2: >95% on RA   Output: Voiding spontaneously without difficulties in bathroom  Also has an indwelling Smith Catheter - output present    Last BM: Patient reported diarrhea on 7/13/23   Activity: Ind- SBA   Skin: Stoma - GJtube   Full skin assessment not completed   Pain: Denied pain upon admission    CMS: AO4 but sometimes all over the place with conversations   Dressing: GJ tube stoma- old drainage cleaned and dressing placed   Diet: Regular diet if tolerated and tube feeds  Nausea and Vomiting with PO   LDA: No PIV  GJ tube  Smith Catheter (not on LDA avatar)   Equipment: Personal belongings    Plan: TBD   Additional Info: Patient is 1:1 for SI  Patient is on a 72hr hold expiring on 7/17/23       "

## 2023-07-14 NOTE — MEDICATION SCRIBE - ADMISSION MEDICATION HISTORY
Medication Scribe Admission Medication History    Admission medication history is complete. The information provided in this note is only as accurate as the sources available at the time of the update.    Medication reconciliation/reorder completed by provider prior to medication history? No    Information Source(s): Facility ([facility] medication list/MAR and CareEverywhere/SureScripts via N/A    Pertinent Information: Unable to interview patient, medication history completed using MAR    Changes made to PTA medication list:    Added: None    Deleted: None    Changed: None    Medication Affordability:       Allergies reviewed with patient and updates made in EHR: unable to assess    Medication History Completed By: Ольга Brody 7/13/2023 9:02 PM    Prior to Admission medications    Medication Sig Last Dose Taking? Auth Provider Long Term End Date   acetaminophen (TYLENOL) 325 MG tablet Take 2 tablets by mouth every 4 hours as needed 7/13/2023 at 0332 Yes Reported, Patient No    ARIPiprazole (ABILIFY) 10 MG tablet Take 10 mg by mouth daily 7/12/2023 Yes Unknown, Entered By History No    DULoxetine (CYMBALTA) 30 MG capsule Take 60 mg by mouth daily 7/12/2023 at 0815 Yes Unknown, Entered By History No    LORazepam (ATIVAN) 0.5 MG tablet Take 0.5 mg by mouth At Bedtime 7/12/2023 at 0203 Yes Unknown, Entered By History     multivitamin w/minerals (THERA-VIT-M) tablet Take 1 tablet by mouth daily 7/13/2023 at 0858 Yes Александр Hull MD     ondansetron (ZOFRAN ODT) 4 MG ODT tab Take 1 tablet (4 mg) by mouth every 6 hours as needed for nausea or vomiting 7/13/2023 at 0917 Yes Jerrell Daniels MD     vitamin C (ASCORBIC ACID) 250 MG tablet Take 250 mg by mouth daily 7/13/2023 at 0858 Yes Unknown, Entered By History     calcium carbonate (TUMS) 500 MG chewable tablet Take 1 tablet (500 mg) by mouth as needed for heartburn   Александр Hull MD     cholecalciferol 50 MCG (2000 UT) tablet Take 1 capsule by  mouth daily   Reported, Patient     cyproheptadine (PERIACTIN) 4 MG tablet Take 4 mg by mouth 3 times daily   Unknown, Entered By History     diazepam (DIASTAT ACUDIAL) 10 MG GEL rectal gel Place 10 mg rectally every 10 minutes as needed for seizures   Louis Wong MD Yes    docusate sodium (COLACE) 100 MG tablet Take 100 mg by mouth daily   Unknown, Entered By History     drospirenone-ethinyl estradiol (FELIPE) 3-0.02 MG tablet Take 1 tablet by mouth daily   Unknown, Entered By History No    famotidine (PEPCID) 20 MG tablet Take 20 mg by mouth daily   Unknown, Entered By History     hydrOXYzine (ATARAX) 25 MG tablet Take 1 tablet (25 mg) by mouth 2 times daily as needed for anxiety or other (sleep, melatonin augmentation or failure)   Emma Abdi MD No    lactobacillus rhamnosus, GG, (CULTURELL) capsule Take 1 capsule by mouth daily   Unknown, Entered By History     linaclotide (LINZESS) 290 MCG capsule Take 290 mcg by mouth every morning (before breakfast)   Unknown, Entered By History No    lubiprostone (AMITIZA) 24 MCG capsule Take 24 mcg by mouth 2 times daily (with meals)   Unknown, Entered By History     melatonin 5 MG tablet Take 5 mg by mouth At Bedtime   Unknown, Entered By History No    pantoprazole (PROTONIX) 40 MG EC tablet Take 1 tablet (40 mg) by mouth 2 times daily   Josue Rubi MD No    plecanatide (TRULANCE) 3 MG tablet Take 3 mg by mouth daily   Unknown, Entered By History     prochlorperazine (COMPAZINE) 10 MG tablet Take 10 mg by mouth every 8 hours as needed for nausea or vomiting   Unknown, Entered By History     promethazine (PHENERGAN) 25 MG suppository Place 1 suppository (25 mg) rectally every 6 hours as needed for nausea (take instead of oral dose if you cannot keep the pills down)   Josue Rubi MD     promethazine (PHENERGAN) 25 MG tablet Take 25 mg by mouth every 6 hours as needed for nausea or vomiting   Unknown, Entered By History

## 2023-07-14 NOTE — CONSULTS
"    Interventional Radiology  [facility] Consult Service Note  07/14/23   3:57 PM    Consult Requested: \"threatening to harm self with G-J tube in the context of a patient stating SI. No capacity, pursuing guardianship\"    Recommendations/Plan:    No intervention at this time.    This is a 23 year old female with complex psychiatric history including factitious disorder, BPD, anorexia, PTSD, and self-injurious behavior. Per consult request, patient lacks capacity to make decisions, and they are pursuing guardianship.   Team reports that patient has psychiatric lability and is threatening to harm herself with the GJ tube. Team believes that the GJ tube may not be needed. GJ was placed at outside hospital on 5/30/23. Patient's team requesting urgent GJ tube removal.    GJ removal will not be done today. IR requires documentation from patient's primary care or GI stating that the GJ is no longer indicated prior to removal. Removal can be done non-urgently next week, after recommendations regarding use/indication for her GJ tube is clear. IR will defer to her primary team for management of SI/self-injurious behavior.      Case and imaging discussed with IR attending, Dr. Chacon. Recommendations were reviewed with requesting team.        Pertinent Imaging Reviewed: GJ tube placement 5/30/23.    Expected date of discharge:  TBD.    Vitals:   /67 (BP Location: Right arm)   Pulse 85   Temp 98.5  F (36.9  C) (Oral)   Resp 18   Ht 1.626 m (5' 4\")   Wt 56.9 kg (125 lb 7.1 oz)   LMP  (LMP Unknown)   SpO2 100%   BMI 21.53 kg/m      Pertinent Labs:   Lab Results   Component Value Date    WBC 8.3 07/13/2023    WBC 5.6 07/13/2023    WBC 6.5 07/09/2023    WBC 4.8 04/14/2021    WBC 5.5 04/13/2021    WBC 5.2 09/30/2020     Lab Results   Component Value Date    HGB 11.5 07/13/2023    HGB 10.4 07/13/2023    HGB 10.8 07/09/2023    HGB 11.4 04/14/2021    HGB 11.1 04/13/2021    HGB 10.9 09/30/2020     Lab " Results   Component Value Date     07/13/2023     07/13/2023     07/09/2023     04/14/2021     04/13/2021     09/30/2020     Lab Results   Component Value Date    INR 1.04 05/30/2023    PTT 27 04/13/2023     Lab Results   Component Value Date    POTASSIUM 4.3 07/13/2023    POTASSIUM 3.5 01/17/2023    POTASSIUM 4.1 04/14/2021        COVID-19 Antibody Results, Testing for Immunity         No data to display            COVID-19 PCR Results        9/27/2021    16:11 1/3/2023    14:29 2/7/2023    20:48   COVID-19 PCR Results   SARS CoV2 PCR Negative  Negative  Negative        Robert Quinones PA-C  Interventional Radiology  Pager: [phone]

## 2023-07-14 NOTE — CONSULTS
PSYCHIATRIC CONSULTATION    Requesting Physician: Geovanny Morales DO    Admission Date: 07/13/2023  Date of Service: 07/14/2023    The patient was seen, her chart reviewed, her case discussed with Dr. Morales and Dr. Castle. I was asked to see the patient in regards to possible MI commitment.  When I came to her room and introduced myself the patient set on her bed turning her back to me and refused to talk. I walked around to face her and she turned her back to me again. Finally with the nurses encouragement she started talking stating that she perfectly fine and not suicidal anymore and wanted to be discharged to the shelter. She verbalized suicidal thoughts half an hour ago and based on her history (chronic suicidal thoughts) and current emotional lability it is likely to come back.  She demanded to have a meeting with her medical providers and undersigned in her room and I had Dr. Castle come in. The patient was told that based on her recent behavior (multiple ER visits for suicidality) we cannot let leave the hospital and will start petition for commitment. In response to that the patient threw a fit starting to jump and hitting herself with her fists. Code 21 was called and the patient was put in restaints. She was given Haldol 5 mg IM.    This is a 23 year old single white female with a long history of BPD, depression, MAT, PTSD and anorexia who initially presented to our emergency department with multiple complaints including feeling as though she was going to have a seizure. She had an event over there that was witnessed and did not seem typical of a generalized seizure in many respects and seemed consistent with past events patient has been described as having in multiple different emergency department visits at different emergency departments. This had resolved by the time the patient came to the emergency department. She stated that she was here because she was having suicidal ideation. She stated that  she tried to overdose on melatonin last week and tried to jump off a building today after being discharged from another emergency department. This is about her fourth emergency department visit of the day and she states that nobody wanted to admit her because she has too many medical problems.  She was seen by CONRAD Galvez CNP for initial psychiatric consultation  The patient has a history of countless ED visits and was hospitalized psychiatrically on at least 8 separate occasions with the first admission in 2018 at [facility], 5 admissions at this hospital with the last one in February of 2023 under Dr. Boyer. She was at our ED on 07/12/23 when she was seen by a psychiatric consultant, CONRAD Cameron CNP on 07/13/23 and I refer the reader to her comprehensive note.    MEDICATIONS, psychotropic  Abilify 10 mg at bedtime  Cymbalta 60 mg QAM  Hydroxyzine 25 mg BID  Ativan 0.5 mg at bedtime  Melatonin 10 mg at bedtime  Haldol 5 mg IM Q6H PRN  Haldol 2 mg PO Q6H PRN    LABS: Glucose by meter was 85 at 14:24 today    VS: Pulse 85, BP - 124/67, Temp - 98.5, Resp -18, SpO2 - 100%    MENTAL STATUS EXAMINATION  Revealed a normally built and normally developed 23-year-old white female appearing her stated age. She was alert and oriented X 4. Her speech was rapid and pressured. She appeared to be depressed with marked irritability and emotional lability. She denied suicidal thoughts at the moment but reportedly had suicidal verbalizations 30 minutes ago. She was easily agitated. She denied hallucinations. She had no insight into her problems and her judgement was impaired. She had no capacity to make decisions.    DIAGNOSTIC IMPRESSION  Borderline Personality Disorder, primary diagnosis  Depression NOS  MAT  PTSD by history    Plan: In addition to her current medications I will start her on Depakote  mg BID as a mood stabilizer. Continue Cymbalta, Hydroxyzine and Melatonin at thew same doses. Replace  Abilify as ineffective with Zyprexa 10 mg at bedtime. Continue 72-hour hold and 1:1 observation. Initiate petition for MI Commitment. Transfer to Inpatient Psychiatry when medically stable.    Thanks,    Gennaro Oh MD  [phone] pager

## 2023-07-14 NOTE — PHARMACY-CONSULT NOTE
Pharmacy Tube Feeding Consult    Medication reviewed for administration by feeding tube and for potential food/drug interactions.    Recommendation: No changes are needed at this time. Patient can take meds orally. Patient is eating a regular diet.   - duloxetine should be taken by mouth and not given via feeding tubes: capsules should be taken whole, do not open capsules - opening capsules or mixing capsules/beads in water may decrease DR coating and make it immediate release. This can increase risk of side effects.   -Culturelle capsules: can be opened and given via tube if needed    Pharmacy will continue to follow as new medications are ordered.

## 2023-07-14 NOTE — PROGRESS NOTES
"CODE 21 & RESTRAINT START  Documentation submission delayed due to acute patient care      Following discussion with psychiatry determining most appropriate disposition plan, including pursuing commitment, patient became quickly escalated and started hitting herself in the forehead with both fists many times over. She asked repeatedly if we were going to take her GJ tube out and when we indicated that it was not a medically appropriate intervention, she stated \"then I'll just tear it out myself!\" and began to pull on her GJ tube.    Patient was placed in restraints after attempting to disrupt lines, tubes or dressings.  Less restrictive measures were ineffective at preventing such disruption.  See flowsheet for details about provider(s) involved, less restrictive measures attempted, patient and family education, discontinuation criteria and restraint type.    ____________________________  Prince Harris DO - PGY2  [facility] Resident  Pronouns: He/Him/His        "

## 2023-07-14 NOTE — PROGRESS NOTES
CLINICAL NUTRITION SERVICES - ASSESSMENT NOTE     Nutrition Prescription    RECOMMENDATIONS FOR MDs/PROVIDERS TO ORDER:  Please consult RD if nutrition-related issues are delaying discharge.     Malnutrition Status:    Unable to determine due to lack of intake history. Likely does not meet criteria.     Recommendations already ordered by Registered Dietitian (RD):  Patient does not meet two of the established criteria necessary for diagnosing malnutrition but is at risk for malnutrition    Future/Additional Recommendations:  RD to sign off at this time given pt now observation status    --If EN becomes POC:  --Enteral access: PEG/J  --GOAL: [employer] Peptide 1.5 (or equivalent) @ 40 mL/hr (960 mL/day) to provide 1476 kcal (26 kcal/kg), 71 g protein (1.2 g/kg), 132 g CHO, 9 g fiber, 74 g fat (40% from MCTs), and 672 mL free water daily   --Start TF @ 20 ml/hr and advance by 10 ml q 4 hrs until goal rate.  --Do not start or advance TF rate unless K+ >3.0, Mg++ > 1.5,  and Phos > 1.9.  --Minimum 30 ml q 4 hrs water flushes for tube patency.     REASON FOR ASSESSMENT  Thea Castle is a/an 23 year old female assessed by the dietitian for Provider Order - Registered Dietitian to Assess and Order TF per Medical Nutrition Therapy Protocol    NUTRITION/MEDICAL HISTORY  History of factitious disorder, psychogenic seizures, h/o gastroparesis with indwelling G-J tube, chronic urinary retention with indwelling pantoja catheter, anorexia nervosa, BPD, MDD, MAT, PTSD, and OCD, and is admitted for suicidal ideation, mental health instability leading to severe dysfunction in daily living, and consideration of civil commitment following 72 hour hold.    FINDINGS  RD met with pt at bedside. SW and sitter in room also. Pt reports not having TF for 6 days and wanting it restarted. Reports that doctors are saying she no longer needs it. Pt reports that she has not been eating enough to meet her needs and believes that she does need  "TF. Pt reports she has gone to the bathroom 32 times in the last couple days. Reports losing her doctor and GI doctor, being homeless and [facility] not wanting to provide tube feeds as well.     Per RN, pt eating okay and likely no need for TF.     Per H&P, pt \"plan is to discharge to a shelter for short time until she is admitted to the [facility] for residential program in 2 weeks.  States that her last residential treatment for anorexia was several years ago\".    Per Ethics, it is recommended to consider a surrogate decision maker as the pt has unsure decision making capacity.     CURRENT NUTRITION ORDERS  Diet: Regular  Nutrition Support: Previously on [employer] Peptide 1.5 @ 40 mL/hr but has not gotten it for 6 days per pt.     Intake/Tolerance: Per pt, has food poisoning from a [employer] and is not tolerating much food. May be eating a bit but per pt, this is not enough to meet her needs.     LABS   07/11/23 22:44 07/12/23 22:17 07/13/23 06:07 07/13/23 20:19   Sodium 138 139 138 139   Potassium 3.9 3.7 3.2 (L) 4.3   Urea Nitrogen 11.2 8.0 10.0 10.2   Creatinine 0.69 0.67 0.68 0.67   Magnesium 1.8      Albumin 4.2  4.0 4.3   Glucose 112 (H) 87 109 (H) 83   Vitamin B12   630      MEDICATIONS  Medications reviewed: pepcid, culturell, multivitamin, potassium chloride, vitamin C, Vitamin D3, TUMS    ANTHROPOMETRICS  Height: 162.6 cm (5' 4\")  Most Recent Weight: 56.9 kg (125 lb 7.1 oz)    IBW: 54.5 kg (104% IBW)  BMI: Normal BMI  Weight History: Pt with 5 lb (3.5%) weight loss over 1 week. Seems mostly weight stable with some 3-4% weight fluctuations over last 6 months   Wt Readings from Last Encounters:   07/14/23 56.9 kg (125 lb 7.1 oz)   07/13/23 57.5 kg (126 lb 12.8 oz)   07/11/23 59 kg (130 lb 1.1 oz)   07/09/23 59 kg (130 lb)   07/08/23 59 kg (130 lb)   07/04/23 57.6 kg (127 lb)   06/26/23 57.6 kg (127 lb)   06/04/23 60.5 kg (133 lb 6.4 oz)   04/24/23 56.7 kg (125 lb)   04/24/23 56.7 kg (125 lb)   04/20/23 " 56.7 kg (125 lb)   02/07/23 55.9 kg (123 lb 3.2 oz)   01/23/23 55.1 kg (121 lb 7.6 oz)   01/02/23 54.3 kg (119 lb 9.6 oz)   10/31/22 54.2 kg (119 lb 7.8 oz)   09/24/22 54.7 kg (120 lb 9.6 oz)   08/25/22 51.8 kg (114 lb 3.2 oz)   09/27/21 55.8 kg (123 lb 1.6 oz)     Dosing Weight: 57 kg - most recent weight.    ASSESSED NUTRITION NEEDS  Estimated Energy Needs: 5909-2237 kcals/day (25 - 30 kcals/kg)  Justification: Maintenance  Estimated Protein Needs: 45-57 grams protein/day (0.8 - 1 grams of pro/kg)  Justification: Maintenance  Estimated Fluid Needs: 1 ml/kcal or per provider pending fluid status    MALNUTRITION  % Intake: Difficult to assess  % Weight Loss: > 2% in 1 week (severe)  Subcutaneous Fat Loss: None observed  Muscle Loss: None observed  Fluid Accumulation/Edema: None noted  Malnutrition Diagnosis: Unable to determine due to lack of intake history. Likely does not meet criteria.     NUTRITION DIAGNOSIS  Predicted inadequate nutrient intake related to poor appetite/diarrhea as evidenced by pt report of poor appetite and persistent diarrhea.       INTERVENTIONS  Implementation  TF recs provided    Goals  Patient to consume % of nutritionally adequate meal trays TID, or the equivalent with supplements/snacks.     Monitoring/Evaluation  Progress toward goals will be monitored and evaluated per protocol.    Shabnam Beltre MS, RDN, LD  RD pager: [phone]  [facility] Weekend/Holiday Pager: [phone]

## 2023-07-14 NOTE — CONSULTS
Ethics Consultation      Note: The purpose of this note, including any accompanying recommendation, is advisory only concerning ethical principles and considerations related to this case. Determination of appropriate patient care decisions is the responsibility of the clinical team in consultation with patients and their decision makers and relevant institutional policy. Legal and policy questions should be addressed with Risk Management.    Date: 7/14/2023     Time:  1200     Attending physician: Geovanny Morales     Consult requested by: Gennaro Castle DO     Reason for consult: patient with dense psychiatric history; demanding unnecessary interventions/refusing removing pantoja +/-, potentially no appropriate surrugate     Participants in Consult:        baltazar Jung, attending    Gennaro Castle, resident     Decisional capacity: uncertain capacity     Advance directive: none     Health Care Agent: none     Code status: Full Code     Background: (Medical)       From Chart: Thea Castle is a 23 year old female admitted on 7/13/2023. She has a history of factitious disorder, psychogenic seizures, h/o gastroparesis with indwelling G-J tube, chronic urinary retention with indwelling pantoja catheter, anorexia nervosa, BPD, MDD, MAT, PTSD, and OCD, and is admitted for suicidal ideation, mental health instability leading to severe dysfunction in daily living, and consideration of civil commitment following 72 hour hold.     Social:       Patient is requesting that permanent catheter remain in place despite care teams concerns that it exposes her to greater harm and less invasive intervention would provide greater benefit.     Ethical Issue:    Withdrawing medically inappropriate interventions over the objection of a patient with uncertain capacity     Ethics Analysis:      In general, clinicians have an obligation to offer medical interventions which offer a meaningful benefit to the patient  and outweigh any risks associated with that intervention. When a current intervention no longer benefits the patientt and actively exposes the patient to greater harm the care team's ethical obligation to do no harm outweighs the patient's preference to keep the intervention in place.    In this case, the patient is actively requesting to keep a indwelling catheter in place despite the care teams concerns that a permanent catheter exposes her to greater risk and an intermittent catheter would more appropriately address her concerns for urinary retention. The removal of the permanent catheter is ethically appropriate despite the patient's objections because the principle of do no harm is prioritized over patient wishes. Patient autonomy does not extend to the right to compel clinicians to perform or maintain medically inappropriate interventions.     Additionally, the care team is concerned that this patient's request to retain a permanent catheter does not represent a true autonomous choice as psychiatric illness is possibly interfering with her ability to authentically express her goals and values. The presences of psychiatric illness does not necessarily mean a patient lacks decision-making capacity. However, when psychiatric illness prevents patient's from meeting the criteria capacity, it is ethically appropriate to engage a surrogate decision-maker to utilize substituted judgment and make decisions on behalf of the patient.    The criteria for an appropriate surrogate decision-maker are as follows:        The surrogate should know the patient s deeply held values and beliefs     The surrogate should be familiar with the patient s medical preferences     The surrogate should be willing and able to act as surogate       Because of these criteria, often a family member is in the best position to serve as a surrogate.     Surrogates must be able to use substituted judgment - to set aside their own wishes and make  medical decisions they believe the patient would have made if the patient had capacity. If the wishes of the patient are relatively unclear, it is appropriate for surrogates to use the best interest standard - or to act in the medical best interest of the patient.     Recommendations:      It is ethically appropriate to withdraw medically inappropriate treatment under the principle of do no harm    Respect for patient autonomy does not extend to compelling clinicians to performing medically inappropriate interventions.     Consider a surrogate decision-maker if the care team agrees this patient lacks decision-making capacity.    Please contact the service if we can be of any further assistance, service pager [phone].    Robert Zurita, PhD
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
## 2023-07-14 NOTE — CONSULTS
"Triage and Transition - Consult and Liaison     Thea Castle  July 14, 2023    Psychiatry consult acknowledged.     Patient will be seen by Dr. Oh later today.  Irina Martins whom is very familiar with the patient is off today.     Please see her note from earlier this week r/g patient's mental health.     Pt has flagged are plan notes r/g her needs r/g her mental health.     Patient has history of presenting to several hospitals, urgent cares, and different providers with various health concerns. There is concern about factitious disorder. Recent note from hospital indicates \"Patient presenting with a long list of complaints, including prolapsed rectum and blood in stool. None was seen during her 3-day hospital stay. Had extensive work up and seen by different specialists. Work up has been all normal. Patient was always requesting to see more providers from different specialties and was upset by her reassuring results.\"    Following care plan would be recommended:   - If patient presents to the hospital, would avoid future unnecessary admissions, limiting her providers and avoiding unnecessary testing / referrals. THRESHHOLD FOR ADMISSION IS HIGH... BOTH MEDICAL AND MENTAL HEALTH. Consult psychiatry in the ED prior to any admission.  - Boundaries and setting firm limits is essential for this patient. If patient presents with suicidal ideation, assess per [place] policy. If 1:1 is deemed necessary, The attendant should engage in minimum needed socialization to meet patient's needs. It is requested that the 1:1 does not braid or brush patient's hair.   - Patient is to engage and perform her ADLs as independently as possible when she is hospitalized.  - Discourage allowing patient to \"play favorite\" by selecting what staff work with her or requesting certain staff to come into her room to see her. If possible, only staff assigned to patient should be working with patient or coming into her room. It is " appropriate to honor patient's wishes of preference of female provider due to trauma history.     Hospitalization Management:     Concerns for factitious disorder     History of using ensure in emesis bag to show she is sick. History of intentionally placing finger in rectum to cause bleeding. History of pulling on catheter to cause irritation/bleeding.     A Tamper guard should be placed on any IV Access placed.    If tamper guards are not available, utilize wrapping line in coban and putting a solid strip over all with sharpie to assess if line has been tampered with. This is not something that can be negotiated to be removed.    A reminder to [facility]'s care team to check that the supply cart is locked and should ensure supplies are not left in her room. If possible, the supply cart should be placed outside of [facility]'s room.     RAYRAY LINDSAY, [facility]  Triage and Transition - Consult and Liaison   [phone]

## 2023-07-14 NOTE — ED NOTES
IP MH Referral Acuity Rating Score (RARS)    LMHP complete at referral to IP MH, with DEC; and, daily while awaiting IP MH placement. Call score to PPS.  CRITERIA SCORING   New 72 HH and Involuntary for IP MH (not adolescent) 1/1   Boarding over 24 hours 0/1   Vulnerable adult at least 55+ with multiple co morbidities; or, Patient age 11 or under 0/1   Suicide ideation without relief of precipitating factors 1/1   Current plan for suicide 1/1   Current plan for homicide 0/1   Imminent risk or actual attempt to seriously harm another without relief of factors precipitating the attempt 0/1   Severe dysfunction in daily living (ex: complete neglect for self care, extreme disruption in vegetative function, extreme deterioration in social interactions) 1/1   Recent (last 2 weeks) or current physical aggression in the ED 0/1   Restraints or seclusion episode in ED 0/1   Verbal aggression, agitation, yelling, etc., while in the ED 1/1   Active psychosis with psychomotor agitation or catatonia 0/1   Need for constant or near constant redirection (from leaving, from others, etc).  1/1   Intrusive or disruptive behaviors 0/1   TOTAL Acuity Total Score: 6
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
## 2023-07-14 NOTE — PLAN OF CARE
Thea Castle  July 13, 2023  Plan of Care Hand-off Note     Patient Care Path: Inpatient Medical    Plan for Care:     Pt reports poor sleep and ED issues, thinks she needs her urine catheter and g-j tube although it appears that her medical issues support her preference for being ill and needing help.  However her pursuit of medical dx may in fact be putting her at risk for infection and other issues; consistent with factitious disorder. Hx of self inflicted trauma to urethra and rectum.  If discharge is discussed today pt reports having suicidal plan to overdose on melatonin. She appears anxious and presents as disorganized.  Pt gives mixed message on willingness to pursue  php program, then adds that the program will just send her to the ED due to her chronic si anyway.  Pt is intelligent but struggling with judgement at this time. She does not think she has borderline personality, because she doesn't have colored hair or tattoos.  Pt vascilates with sx and needs, expressing suicidal plan one minute and asking to leave the next, thus Dr Hernandez placed on a hold at this time.    Recommend admission for  to pursue guardianship or commitment.  However pt's g-j tube (was placed in June 2023) may be an exclusion for  admission. Thus Dr Hernandez to order GI consult regarding need for g-j tube.   Potential need to start guardianship (parents) or commitment process.    Critical Safety Issues: Pt tends to change her mind quickly and be impulsive.  She has a tendency of asking to leave, then when time for discharge is unable to contract for safety.  Today when encouraged to keep the dinner tray to snack on later, pt pushed the entree plate to the floor in reaction.  She later apologized and picked it up, but needed reminders of her age and ability to express herself in an adult manner.  Pt also has a hx of self injury via catheter as a way to support her need for it.    Overview:  This patient is a child/adolescent:  No    This patient has additional special visitor precautions: No    Legal Status: 72 HH expiring 7/18/23 due to weekend    Contacts:   Mother is Keshia [phone]    Psychiatry Consult:  Psychiatry Consult not requested because pt was seen by Dr Hernandez today    Updated RN and Attending Provider regarding plan of care.    Chani Khan, [facility]

## 2023-07-14 NOTE — ED NOTES
Patient stated that they have lost 7 lbs within the last 5 days, RN weighed patient and according to their weight within the last 3 months they are in the normal range. Pt stated that the scale was wrong and the provider wants to take the only thing keeping them alive (peg). Pt cried and screamed for 20 mins regarding the discussion with the provider that her catheter may be removed if they are no longer necessary.

## 2023-07-14 NOTE — PROCEDURES
[facility]  Inpatient Medicine  Procedure Note    Thea Castle is a patient admitted to the [facility] Hospitalist Team on UR 5 Ortho Unit. Presently admitted on a 72h Hold for SI with active plan and intent. She is medically ready for inpatient psychiatric placement.     Indication: Removal of GJ Tube. Confirmed with [facility] GI team who supports removal based on non medically indicated presence, underlying factitious disorder, and risk to patient given harm behaviors.     Consent: Affirmation of informed consent was not needed based on the emergent nature of the need for removal. 2 physician consent based on best interest standard and Ethics Consult. Risks, benefits and alternatives were discussed.    Procedure safety checklist was completed:  Yes  Time Out (Pause for the Cause) completed: Yes    Labs: stable    Preoperative Diagnosis: GJ tube present  Postoperative Diagnosis: GJ tube removed    Technique:   Skin prep Betadine  Anesthesia none  Suture  n/a  EBL: none  Complications:  No  Tolerance:  Pt tolerated procedure well and was in stable condition.   Patholgy sent: n/a    Pt was supine in hospital bed in hard restraints. Pt was notified of removal and assented to procedure. Code Green Team was present. Pt layed supine in hospital bed and allowed draping in clean fashion. Was prepped with betadine and Tube balloon was deflated with 8 mL of fluid. Tube was removed with tammy traction over span of 15 sec with minimal discomfort. Prior tube site was examined and intact. No blood noted at os. Sterile gauze was placed with 2 Tegaderm. Abdominal binder then placed on pt.     Pt tolerated procedure extremely well and asking question about visitors and personal affects after.    (Addendum- did not share this note with the patient given current self harm)    Geovanny Morales DO, MA, NYLA-C  Pronouns: he/him/his    [facility] - [facility]/ [facility]     Department of Family Medicine and Community [facility]

## 2023-07-14 NOTE — PROGRESS NOTES
Pt was approached with MD with goal to remove pantoja catheter as it is not medically indicated. Case was discussed with risk management. Pt was adamant about not removing catheter. Told patient it was not a choice anymore, and would call a code if needed for removal. Pt then cooperated and let MD assist writer to remove pantoja catheter. Balloon was deflated, catheter was removed without difficulty. Will bladder scan at 4hr jef to assess for urine retention.
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
## 2023-07-15 ENCOUNTER — TELEPHONE (OUTPATIENT)
Dept: BEHAVIORAL HEALTH | Facility: CLINIC | Age: 23
End: 2023-07-15
Payer: COMMERCIAL

## 2023-07-15 LAB
HCG UR QL: NEGATIVE
MAGNESIUM SERPL-MCNC: 1.9 MG/DL (ref 1.7–2.3)
PHOSPHATE SERPL-MCNC: 3.3 MG/DL (ref 2.5–4.5)
SARS-COV-2 RNA RESP QL NAA+PROBE: NEGATIVE

## 2023-07-15 PROCEDURE — 87340 HEPATITIS B SURFACE AG IA: CPT | Performed by: STUDENT IN AN ORGANIZED HEALTH CARE EDUCATION/TRAINING PROGRAM

## 2023-07-15 PROCEDURE — 87591 N.GONORRHOEAE DNA AMP PROB: CPT | Performed by: STUDENT IN AN ORGANIZED HEALTH CARE EDUCATION/TRAINING PROGRAM

## 2023-07-15 PROCEDURE — 250N000013 HC RX MED GY IP 250 OP 250 PS 637: Performed by: STUDENT IN AN ORGANIZED HEALTH CARE EDUCATION/TRAINING PROGRAM

## 2023-07-15 PROCEDURE — 87491 CHLMYD TRACH DNA AMP PROBE: CPT | Performed by: STUDENT IN AN ORGANIZED HEALTH CARE EDUCATION/TRAINING PROGRAM

## 2023-07-15 PROCEDURE — G0378 HOSPITAL OBSERVATION PER HR: HCPCS

## 2023-07-15 PROCEDURE — 86803 HEPATITIS C AB TEST: CPT | Performed by: STUDENT IN AN ORGANIZED HEALTH CARE EDUCATION/TRAINING PROGRAM

## 2023-07-15 PROCEDURE — 87389 HIV-1 AG W/HIV-1&-2 AB AG IA: CPT | Performed by: STUDENT IN AN ORGANIZED HEALTH CARE EDUCATION/TRAINING PROGRAM

## 2023-07-15 PROCEDURE — 250N000011 HC RX IP 250 OP 636: Performed by: STUDENT IN AN ORGANIZED HEALTH CARE EDUCATION/TRAINING PROGRAM

## 2023-07-15 PROCEDURE — 250N000013 HC RX MED GY IP 250 OP 250 PS 637: Performed by: PSYCHIATRY & NEUROLOGY

## 2023-07-15 PROCEDURE — 87635 SARS-COV-2 COVID-19 AMP PRB: CPT | Performed by: STUDENT IN AN ORGANIZED HEALTH CARE EDUCATION/TRAINING PROGRAM

## 2023-07-15 PROCEDURE — 83735 ASSAY OF MAGNESIUM: CPT | Performed by: STUDENT IN AN ORGANIZED HEALTH CARE EDUCATION/TRAINING PROGRAM

## 2023-07-15 PROCEDURE — 36415 COLL VENOUS BLD VENIPUNCTURE: CPT | Performed by: STUDENT IN AN ORGANIZED HEALTH CARE EDUCATION/TRAINING PROGRAM

## 2023-07-15 PROCEDURE — 99232 SBSQ HOSP IP/OBS MODERATE 35: CPT | Performed by: STUDENT IN AN ORGANIZED HEALTH CARE EDUCATION/TRAINING PROGRAM

## 2023-07-15 PROCEDURE — 81025 URINE PREGNANCY TEST: CPT | Performed by: STUDENT IN AN ORGANIZED HEALTH CARE EDUCATION/TRAINING PROGRAM

## 2023-07-15 PROCEDURE — 84100 ASSAY OF PHOSPHORUS: CPT | Performed by: STUDENT IN AN ORGANIZED HEALTH CARE EDUCATION/TRAINING PROGRAM

## 2023-07-15 RX ORDER — HALOPERIDOL 5 MG/ML
5-10 INJECTION INTRAMUSCULAR ONCE
Status: DISCONTINUED | OUTPATIENT
Start: 2023-07-15 | End: 2023-07-16

## 2023-07-15 RX ADMIN — ONDANSETRON 4 MG: 4 TABLET, ORALLY DISINTEGRATING ORAL at 08:52

## 2023-07-15 RX ADMIN — ONDANSETRON 4 MG: 4 TABLET, ORALLY DISINTEGRATING ORAL at 17:23

## 2023-07-15 RX ADMIN — ONDANSETRON 4 MG: 4 TABLET, ORALLY DISINTEGRATING ORAL at 22:13

## 2023-07-15 RX ADMIN — PROCHLORPERAZINE MALEATE 10 MG: 10 TABLET ORAL at 03:07

## 2023-07-15 RX ADMIN — Medication 1 CAPSULE: at 08:44

## 2023-07-15 RX ADMIN — FAMOTIDINE 20 MG: 20 TABLET ORAL at 08:43

## 2023-07-15 RX ADMIN — Medication 50 MCG: at 08:43

## 2023-07-15 RX ADMIN — OLANZAPINE 10 MG: 2.5 TABLET, FILM COATED ORAL at 21:14

## 2023-07-15 RX ADMIN — DIVALPROEX SODIUM 500 MG: 500 TABLET, FILM COATED, EXTENDED RELEASE ORAL at 19:49

## 2023-07-15 RX ADMIN — DULOXETINE HYDROCHLORIDE 60 MG: 60 CAPSULE, DELAYED RELEASE ORAL at 08:43

## 2023-07-15 RX ADMIN — ACETAMINOPHEN 650 MG: 325 TABLET, FILM COATED ORAL at 09:25

## 2023-07-15 RX ADMIN — DROSPIRENONE AND ETHINYL ESTRADIOL 1 TABLET: KIT at 08:52

## 2023-07-15 RX ADMIN — HALOPERIDOL 2 MG: 2 TABLET ORAL at 17:57

## 2023-07-15 RX ADMIN — ACETAMINOPHEN 650 MG: 325 TABLET, FILM COATED ORAL at 14:54

## 2023-07-15 RX ADMIN — CALCIUM CARBONATE (ANTACID) CHEW TAB 500 MG 500 MG: 500 CHEW TAB at 10:46

## 2023-07-15 RX ADMIN — Medication 10 MG: at 21:14

## 2023-07-15 RX ADMIN — Medication 250 MG: at 08:43

## 2023-07-15 RX ADMIN — LORAZEPAM 0.5 MG: 0.5 TABLET ORAL at 21:15

## 2023-07-15 RX ADMIN — Medication 15 ML: at 08:43

## 2023-07-15 RX ADMIN — HYDROXYZINE HYDROCHLORIDE 25 MG: 25 TABLET ORAL at 08:44

## 2023-07-15 RX ADMIN — CALCIUM CARBONATE (ANTACID) CHEW TAB 500 MG 500 MG: 500 CHEW TAB at 22:13

## 2023-07-15 RX ADMIN — DIVALPROEX SODIUM 500 MG: 500 TABLET, FILM COATED, EXTENDED RELEASE ORAL at 08:43

## 2023-07-15 RX ADMIN — HYDROXYZINE HYDROCHLORIDE 25 MG: 25 TABLET ORAL at 19:49

## 2023-07-15 ASSESSMENT — ACTIVITIES OF DAILY LIVING (ADL)
ADLS_ACUITY_SCORE: 25

## 2023-07-15 NOTE — PROGRESS NOTES
"[facility]   BRIEF PROGRESS NOTE - CODE BLUE RESPONSE    SUBJECTIVE  CODE BLUE called at approximately 1430.  This writer arrived to find code team in place with multiple nursing staff at bedside, as patient reportedly had just presented with seizure-like activity.  Per patient's bedside RN, following a visit with the patient's aunt, patient became limp, eyes closed and fluttering, began demonstrating rhythmic limb shaking, and had fecal incontinence.  On evaluation patient's vital signs were stable, O2 satting at 100% on room air, pulses present, slightly tachycardic, lung sounds clear bilaterally, pupillary responses intact, EOM intact.  Patient is a well-documented history of psychogenic seizures and his presentation seemed consistent with past episodes.  Following discussion with PICU team which had also promptly arrived at the scene regarding patient's prior EEG results, patient had started to open eyes and respond, and stated that \"they only did a 45-minute EEG study on me, I was supposed to follow-up for 24-hour study\".  This prompt return to full alertness and orientation (within 3 minutes of code blue being called) with normal vital signs and without any evidence of postictal state was very reassuring against a neurogenic seizure, additionally in the context of recently completed normal work-up per neurology. Patient did have an episode of fecal incontinence during this episode but had a pantoja catheter in place at this time so could not assess any urinary incontinence.    OBJECTIVE:  /67 (BP Location: Right arm)   Pulse 85   Temp 98.5  F (36.9  C) (Oral)   Resp 18   Ht 1.626 m (5' 4\")   Wt 56.9 kg (125 lb 7.1 oz)   LMP  (LMP Unknown)   SpO2 100%   BMI 21.53 kg/m      Exam:  General: (Initial) Lying in bed, eyes closed, not-convulsing; (subsequent) alert, conversational, without any evidence of neurogenic compromise. Patient did have an episode of fecal incontinence  HEENT: " PERRLA, EOM, airway secure, no tongue biting  Cardiovascular: Moderately tachycardic with a regular rhythm, radial pulses 2+ bilaterally  Respiratory: Lungs CTAB, no increased respiratory effort  Abdominal: Soft, nontender to palpation, GJ tube in place  Neuro: No gross neurologic deficit, no post-ictal state, no convulsive movements appreciated    ASSESSMENT/PLAN:    # Psychogenic Seizure-Like Activity  Will have patient evaluated by psychiatry as per initial plan and continue with 1:1 sitter. Given history of increased medical interventions causing worsening behaviors, will continue to minimize interventions as possible and medically appropriate.    Please see daily rounding note for full A/P.  Prince Castle,   8:51 PM

## 2023-07-15 NOTE — PLAN OF CARE
VS: Temp: 98.5  F (36.9  C) Temp src: Oral BP: 124/67 Pulse: 85   Resp: 18    O2: Stable on RA. LS clear and equal bilaterally. Denies chest pain and SOB.    Output: Pt was asking about straight cathing, but RN educated her & encouraged her to try to void.  Pt used the bathroom @ 0145, but was unable to void. Provider came & talked to the pt & pt was able to void in the bathroom. Pt voided in the bathroom without help @ 0655.   Last BM: 7/14/2023 per pt, pt c/o tenderness on the abdomen.    Activity: Up ad jose juan   Skin: Pt refused full skin assessment, education was done.    Pain: Pt was C/o pain on her bladder before voiding, but after pt voided denied pain.   CMS: Intact, AOx4. Denies numbness and tingling.   Dressing: Removed GJ stoma site dressing has some dried drainage. Abdominal binder in place.   Diet: Regular diet. Pt c/o nausea & PRN compazine was given.   Pt ate 12 Martin & salty crackers, 2 ice creams & 1 popsicle.   LDA: No PIV.   Equipment: IV pole, personal belongings,    Plan: Pending discharge to psychiatry. Continue with plan of care. Call light within reach, pt able to make needs known.    Additional Info: Pt on 1:1    Pt on 72 hr hold.
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
## 2023-07-15 NOTE — TELEPHONE ENCOUNTER
R: [place]  Access Inpatient Bed Call Log 7/15/2023 @ 7:31am  (statewide):      Intake has called facilities that have not updated their bed status within the last 12 hours.            [facility] is posting 0 beds.      [facility] is posting 0 beds. [phone]      [facility] is posting 0 beds. [phone]     [facility] is posting 0 beds. [phone] - 1:07am Per Nir they are capped.      [facility] is posting 0 beds. [phone]     [facility] is posting 0 bed. [phone]      [facility] is posting 0 beds. [phone]     [facility] is posting 0 beds. [phone]     [facility] is posting 0 beds. [phone]     [facility] is posting 0 beds. [phone]       [facility] is posting 3 beds. Mixed unit 12+. Low acuity only.  [phone]; pt not appropriate for beds avail     [facility] is posting 0 beds. No aggression.  [phone]     [facility] is posting 0 beds. [phone]      [facility] is posting 5 beds. Low acuity. [phone];   pt not appropriate for beds avail    [facility] is posting 4 beds low acuity female only [phone]  pt not appropriate for avail beds    [facility] is posting  0  beds. Low acuity. [phone]      [facility] is posting 2 beds. 72 hr hold preferred. Low [phone];  Pt not appropriate for avail beds     [facility] is posting 3 beds.  very low acuity. [phone]    pt not appropriate for beds [facility] is posting 2 beds. Voluntary only- no holds/commitments, No Hx of aggression, violence, or assault. No sexual offenders. No 72 hr holds. ; pt meets exclusionary criteria due to 72 hr hold.      [facility] is posting 7 beds. (Must have the cognitive ability to do programming. No aggressive or violent behavior or recent HX in the last 2 yrs. MH must be primary.) low acuity [phone]   Pt not appropriate for beds avail.     [facility] is posting 0 beds. Low acuity only. Violence and aggression capped. [phone]      [facility] is posting 0 bed. Low acuity, Neg Covid. [phone]     [facility] is posting 1 bed. Covid neg. Vol only. Combined adolescent and adult unit. No aggressive or violent behavior. No registered sex offenders. [phone]; pt meets exclusionary criteria due to 72 hr hold    [facility] posting 1 bed. Negative covid.  pt not appropriate for bed avail.     [facility] is posting 18 beds. Call for details. [phone]  ; facility is in [place]. Pt is on a 72 hr hold; per intake policy, pts on holds can not be presented to facilities out of state [facility] is posting 3 beds. Mixed unit/low acuity [phone] ; pt not appropriate for beds avail     Pt remains on work list pending appropriate bed availability.
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
## 2023-07-15 NOTE — PROVIDER NOTIFICATION
Smith removed at 1515 and pt. hasn't voided yet.   2155: bladder scanned for 572 ml;  tried to urinate in the toilet but pt.unable to. Patient stated that she is having difficulty.  Ora ingram's to notify and call back received with order to encourage patient to void on her own. No bladder scan or straight cath order at this time.   Will continue to monitor.
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
## 2023-07-15 NOTE — PLAN OF CARE
Per provider pt is supposed to be using bathroom and  no straight cath. Pt was requesting straight cath in the middle of the night.Writer encouraged pt to use bathroom  and  educated that Doctor recommends emptying bladder in bathroom instead of straight cath. Pt demanded to talk to charge nurse and the Doctor .Charge notified, went and saw the pt. Provider paged as well due to same issue. Provider came and talked with the pt, later on she agreed to empty bladder x2  no issues when EMPTYING BLADDER bathroom. Pt aware of the importance of emptying bladder and she reports the writer when she empties her blade. We will continue to monitor and update.

## 2023-07-15 NOTE — TELEPHONE ENCOUNTER
No appropriate beds are currently available within the  system. Bed search update @ 12:50AM       [facility]: @ cap per website  [facility]: @ cap per website  [facility]: @ cap per website. Per Jennifer @ 00:52, they may have beds available in the morning, but not overnight  [facility]: @ cap per website  [facility]: @ cap per website  [facility]: @ cap per website  [facility]: @ cap per website  [facility]: @ cap per website  [facility]: @ cap per website  [facility]: Posting 3 beds (Mixed unit/Low acuity only). Pt not appropriate d/t acuity  [facility]: @ cap per website  [facility]: @ cap per website  [facility]: Posting 4 beds; low acuity only. Jamila @ 01:14 requests a callback later as they have patients in their ED  [facility]: Does not review after 10PM.    [facility]: Posting 6 beds. Per Ashia @ 00:56, they have beds available  [facility]: @ cap per website  [facility]: Posting 5 beds. Per [facility] @ 00:56, they have beds available  [facility]: Posting 3 beds. (No hx of aggression. No sexual offenders. Voluntary patients only). Pt is on a 72 HH and petition for commitment - [facility]: Posting 7 beds (Low acuity only. Must have the cognitive ability to do programming. No aggressive or violent behavior or recent HX in the last 2 yrs. MH must be primary). Pt not appropriate d/t acuity  [facility]: @ cap per website  [facility]: @ cap per website  [facility]: Posting 1 bed (Covid neg. Voluntary only. Mixed unit. No aggressive or violent behavior. No registered sex offenders).Pt is on a 72 HH and petition for commitment - [facility]: Posting 1 bed (No hx of aggression/assault. No lines, drains or tubes. Does not provide detox or CD treatment). Pt not appropriate d/t acuity  [facility]: Posting 18 beds. Facility is in [place]. Pt is on a 72 HH and petition  for commitment; cannot be placed in [place] [facility]: Posting 3 beds (Mixed unit/Low acuity; No lines, tubes or drains). Pt not appropriate d/t acuity        Pt remains on work list until appropriate placement is available
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
## 2023-07-15 NOTE — TELEPHONE ENCOUNTER
R:  [place]  Access Inpatient Bed Call Log 7/13/23 @ 3:44 pm:  Intake has called facilities that have not updated their bed status within the last 12 hours.                    [facility] is at capacity.         [facility] is posting 0 beds.  [phone]. Per call at 3:52 pm to [facility], they are at cap.     [facility] is posting 0 beds. Negative covid required.            [facility] is posting 0 beds. Negative covid required.  [phone].  Per call at 3:53 pm to [facility], they have 1-2 low acuity beds only.  pt not appropriate for beds avail.     [facility] is posting 0 beds.      [facility] is posting 0 beds. Per call at 3:57 pm to Shawna, they are at cap.     [facility] is posting 0 beds      [facility] ([facility] is posting 0 beds.            [facility] is posting 1 bed. Low acuity only. Pt not appropriate for bed avail.       [facility] is posting 3 beds -LOW acuity ONLY. Mixed unit 12+. Negative covid- [phone].  Pt not appropriate for beds avail.         [facility] has 0 beds posted. No aggression.  Negative Covid. Low acuity        [facility] is posting 0 beds.  Negative covid.            [facility] is posting 5 beds Low acuity only.  Negative covid. -   [phone].   Pt not appropriate for beds avail.         [facility]- is posting 4 beds. Low acuity. No current aggression.   Pt not appropriate for beds avail.           [facility] is posting 0 beds Low acuity only.  Negative covid. -   [phone]       [facility] is posting 2 bed. Low acuity. 72 HH hold preferred. [phone].  Negative covid required.  Pt not appropriate for beds avail.         [facility] is posting 3 beds. Low acuity only.  Negative covid. -   [phone]; Pt not appropriate for beds avail.         [facility] [place] is posting 2 Beds.  Negative covid required.   Vol only, No  history of aggression, violence, or assault. No sexual offenders. No 72 HH holds.   [phone]; pt meets exclusionary criteria due to 72 hr hold        [facility] is posting 7 beds Negative covid required.  (Must have the cognitive ability to do programming. No aggressive or violent behavior or recent HX in the last 2 yrs. MH must be primary.) Always low acuity.  [phone].  Per call at 4:10 pm to [facility], they have beds avail.  Pt not appropriate for beds avail.       [facility] has 0 beds posted. Negative covid required.  Low acuity only. Violence and aggression capped.  [phone].      [facility] is posting 0 bed. Low acuity, Negative covid required.  [phone].  Per call at 4:05 pm to [facility], low acuity beds only and those are being reviewed for currently. He took intake's phone number and said he will call back if something becomes avail     [facility] is posting 1 bed. Negative covid required.  Vol only. Combined adolescent and adult unit. No aggressive or violent behavior. No registered sex offenders.  [phone];  Per call at 4:08 pm to Sury, they may possibly have 1 bed for low acuity.  Pt meets exclusionary criteria due to 72 hr hold     [facility] posting 6 beds. Negative covid required.  [phone];  Per call at 4:42 pm to Ajay, he will review pt and will call back. Ajay called at 5:23 pm saying he declined Thea, as she just coded and would need an ICU bed (ICU at [facility], beds are [place] only).    [facility] is posting 1 bed. Per call to Aline at 4:14 pm, they are at cap.      [facility] is posting 22 beds. No covid test required.  [phone]; pt is on a 72 hr hold; facility is in [place]. Per intake's policy, pts on holds can not be presented for review at out of state facilities    [facility] is posting 3 beds. Mixed unit/low acuity. Per call at 4:17 pm to Christie, they have 5 beds avail. Pt not appropriate for  beds avail.               Pt remains on work list pending appropriate bed availability.

## 2023-07-15 NOTE — PROGRESS NOTES
Late documentation for patient care.   Discussed patient's care with Dr. Mota of [facility].     Per Dr. Mota, patient did not have a strong indication for GJ tube; however, after she got an [place] tube elsewhere, patient felt like her intake was improve with her eating disorder and had persistently asked for a GJ tube. With her not meeting 1200 nadira/day and her persistence, her PA with [facility] ultimately did purse GJ tube placement. Placed 5/30/2023.        I once again called Dr. Mota after discussion with IR PA Robert Quinones. He stated that GI removal would not be done until ordering provider approved removal despite threats of self injurious behavior with a recent history of patient injuring herself with pantoja. IR also outlined that this would not be an stat procedure as we should manage her psych etiologies that are contributing to the self injurious behavior using binders and restraints as necessary. Dr. Mota agreed that with patient's presentation, she agrees with removal. Unfortunately, she does not have permissions within in our system and cannot place order. However, she verbally agrees.        After patient started harming herself with GJ tube, I also reached out to GI if they would have any guidance. However, as they did not place it, they would not be able to reach out. Additionally, no procedure note from placement 5/30 that actually details sutures, amount in balloon, or other details of procedure. We then reached out to IR again with update that patient's threats had materialized.       Please see further documentation from rest of care team regarding subsequent events as I was pulled to other patient care.    Shabnam Merchant MD  7:12 PM

## 2023-07-15 NOTE — PROGRESS NOTES
BRIEF NOTE    Writer received page from RN that patient was complaining of bladder pain and distention and had not yet gone to the bathroom - see prior notes.  She had reportedly tried to void on her own for short period of time without success.    Writer went to bedside to talk with patient.  Offered to provide support while patient tried going to the bathroom again through verbal coaching.  Patient initially declining, perseverating on wanting to know discharge date, plan for moving to psych, whether her room would have from bathroom.  Writer offered again to provide coaching before leaving the room given patient's report of bladder pain.  Patient agreed, went and sat on toilet and quickly reported inability to void.  Writer counseled patient on function of the pelvic floor and the brain-body connection for relaxing target muscles.  Water faucet was turned on, counseled to create mental picture of voiding.  Patient then asked for bargain: If she were to void, could we then engage in safety planning?  Writer agreed.  Within 10 seconds, patient was voiding into toilet.  Volume not measured but seemed to release full contents of urine.    Patient exited bathroom, asked to discuss safety plan.  Writer affirmed team's desire for her safety and overall wellbeing, but stating many times that current discharge plan was not yet known and would not be known tonight.  Patient asked to write out her safety plan, which she did very nicely.  She described not feeling suicidal anymore, noting that barriers to outpatient treatment including her GJ tube and Smith have been removed, and stating desire to engage in [facility] program as this would be the only thing to ultimately help her eating disorder.  Writer thanked patient for her insight and descriptions, congratulated her on steps forward in her wellbeing.  Recommended she get some rest while we wait for further plans during the day.    Bridgette Marion MD  [facility], PGY-3

## 2023-07-15 NOTE — PLAN OF CARE
"Goal Outcome Evaluation:      Plan of Care Reviewed With: patient    Overall Patient Progress: no changeOverall Patient Progress: no change    Outcome Evaluation: Pantoja catheter and G/J tube removed today. Pt placed on psych wait list. Continue with bladder scans and SC if needed. Medically stable for psychiatry when bed available. See progress notes regarding codes today.      VS: VSS  Temp: 98.5  F (36.9  C) Temp src: Oral BP: 124/67 Pulse: 85   Resp: 18 SpO2: 100 %       O2: >90% on RA, denies SOB or CP. LSCTA   Output: Had pantoja catheter in PTA, discussed with team and not medically necessary. Per pt report, has hx of retention. Plan for bladder scan Q4H and straight cath if needed.    Last BM: LBM 7/14, did have one episode of fecal incontinence of hard stool. Pt reported having diarrhea \"30 times\".    Activity: Pt up ind   Skin: Skin intact ex abrasion to R wrist, scratches to L wrist, and scab to R ankle/buttocks. G/J tube site covered. Small amount of bleeding noted to urethral area when rema cares provided.    Pain: Pain is being managed with PRN tylenol, given x1 for generalized abdominal pain.    CMS: CMS intact denies N/T.    Dressing: Dressing to G/J tube site gauze and tegaderm x2, abdominal binder in place.    Diet: Regular diet, but reports poor appetite due to nausea and hx of gastroparesis. Pt had G/J tube that was discontinued due to not medically necessary. TF not necessary per primary team, attempt to count calories if able to ensure pt meeting daily needs. Provided PO fluids. Did eat 10 saltine packets, a popsicle, and ice cream.    LDA: No PIV access, not needed.    Equipment: Cell phone, belongings brought in from friends. 1:1   Plan: Discharge to psychiatry once bed available, on 72 hr hold currently that expires Tuesday. Team is pursuing commitment. Pt is on psych wait list.    Additional Info: See progress notes regarding code 21 and code green.        "
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
## 2023-07-15 NOTE — PROGRESS NOTES
Care Management Initial Consult    General Information  Assessment completed with: Patient, VM-chart review, Thea  Type of CM/SW Visit: Initial Assessment    Primary Care Provider verified and updated as needed: No   Readmission within the last 30 days: unable to assess      Reason for Consult: mental health concerns  Advance Care Planning: Advance Care Planning Reviewed: other (see comments) (Unable to confirm ACP docs due to clt decompensation)          Communication Assessment  Patient's communication style: spoken language (English or Bilingual)    Hearing Difficulty or Deaf: no   Wear Glasses or Blind: yes    Cognitive  Cognitive/Neuro/Behavioral: WDL  Level of Consciousness: alert  Arousal Level: opens eyes spontaneously  Orientation: oriented x 4  Mood/Behavior: calm, cooperative  Best Language: 0 - No aphasia  Speech: clear, spontaneous    Living Environment:   People in home: alone, parent(s)     Current living Arrangements: homeless      Able to return to prior arrangements: yes       Family/Social Support:  Care provided by: self  Provides care for: no one, unable/limited ability to care for self  Marital Status: Single  Parent(s)          Description of Support System: Supportive    Support Assessment: Lacks necessary supervision and assistance    Current Resources:   Patient receiving home care services:     No  Community Resources:  None  Equipment currently used at home: none  Supplies currently used at home:  None    Employment/Financial:  Employment Status: other (see comments)        Financial Concerns:     Referral to Financial Worker: No       Does the patient's insurance plan have a 3 day qualifying hospital stay waiver?  No    Lifestyle & Psychosocial Needs:  Social Determinants of Health     Tobacco Use: Low Risk  (7/11/2023)    Patient History      Smoking Tobacco Use: Never      Smokeless Tobacco Use: Never      Passive Exposure: Not on file   Alcohol Use: Not At Risk (2/7/2023)    AUDIT-C       Frequency of Alcohol Consumption: Monthly or less      Average Number of Drinks: 1 or 2      Frequency of Binge Drinking: Never   Financial Resource Strain: Not on file   Food Insecurity: Not on file   Transportation Needs: Not on file   Physical Activity: Not on file   Stress: Not on file   Social Connections: Not on file   Intimate Partner Violence: Not on file   Depression: Not on file   Housing Stability: Not on file       Functional Status:  Prior to admission patient needed assistance: coordinating community resources              Mental Health Status:  Mental Health Status: Current Concern  Mental Health Management: Medication    Chemical Dependency Status:  Chemical Dependency Status: No Current Concerns             Values/Beliefs:  Spiritual, Cultural Beliefs, Adventism Practices, Values that affect care: yes  Description of Beliefs that Will Affect Care: reports strong Congregation Values            Additional Information:  SW was approached by pt in the hallway with request to meet. Pt wanting help with addressing concerns related to current 72 hour hold and ability to have rights restored during the period. SW explained commitment process including follow up with appointed atty on any legal questions she may have and bedside team on her medical concerns. SW followed up with communication on clients behalf to provider. MALA followed up with [facility] Team to coordinate pt encounter.    SW will continue to be available if needed.    DIVYA Brooks   7/15/2023       Social Work and Care Management Department       SEARCHABLE in [employer] - search SOCIAL WORK       [place] ([phone]) Saturday and Sunday     Units: 4A, 4C, & 4E Pager: [phone]     Units: 5A & 5B Pager: [phone]     Units: 6A & 6B   Pager: [phone]     Units: 6C & 6D Pager: [phone]     Units: 7A &7B  Pager: [phone]     Units: 7C, 7D, & 5C Pager: [phone]     Unit: [place] ED Pager: [phone]       [facility] ([phone]) Saturday and Sunday      Units: 5 Ortho, 5 Med/Surg & WB ED  Pager:[phone]     Units: 6 Med/Surg, 8A, & 10A ICU  Pager: [phone]        After hours (1630 - 0000) Saturday & Sunday; (2003-1936) Mon-Fri; ([phone]) [facility] Recognized Holidays     Units: ALL  Pager: [phone]

## 2023-07-16 ENCOUNTER — TELEPHONE (OUTPATIENT)
Dept: BEHAVIORAL HEALTH | Facility: CLINIC | Age: 23
End: 2023-07-16
Payer: COMMERCIAL

## 2023-07-16 LAB
C TRACH DNA SPEC QL PROBE+SIG AMP: NEGATIVE
HBV SURFACE AG SERPL QL IA: NONREACTIVE
HCV AB SERPL QL IA: NONREACTIVE
HIV 1+2 AB+HIV1 P24 AG SERPL QL IA: NONREACTIVE
N GONORRHOEA DNA SPEC QL NAA+PROBE: NEGATIVE

## 2023-07-16 PROCEDURE — 250N000013 HC RX MED GY IP 250 OP 250 PS 637: Performed by: STUDENT IN AN ORGANIZED HEALTH CARE EDUCATION/TRAINING PROGRAM

## 2023-07-16 PROCEDURE — 250N000011 HC RX IP 250 OP 636: Performed by: STUDENT IN AN ORGANIZED HEALTH CARE EDUCATION/TRAINING PROGRAM

## 2023-07-16 PROCEDURE — G0378 HOSPITAL OBSERVATION PER HR: HCPCS

## 2023-07-16 PROCEDURE — 250N000013 HC RX MED GY IP 250 OP 250 PS 637: Performed by: PSYCHIATRY & NEUROLOGY

## 2023-07-16 PROCEDURE — 99232 SBSQ HOSP IP/OBS MODERATE 35: CPT | Performed by: STUDENT IN AN ORGANIZED HEALTH CARE EDUCATION/TRAINING PROGRAM

## 2023-07-16 RX ORDER — OLANZAPINE 2.5 MG/1
5 TABLET, FILM COATED ORAL
Status: COMPLETED | OUTPATIENT
Start: 2023-07-16 | End: 2023-07-16

## 2023-07-16 RX ORDER — PROCHLORPERAZINE MALEATE 5 MG
5 TABLET ORAL EVERY 6 HOURS PRN
Status: DISCONTINUED | OUTPATIENT
Start: 2023-07-16 | End: 2023-07-17 | Stop reason: HOSPADM

## 2023-07-16 RX ADMIN — ONDANSETRON 4 MG: 4 TABLET, ORALLY DISINTEGRATING ORAL at 20:11

## 2023-07-16 RX ADMIN — PROCHLORPERAZINE MALEATE 5 MG: 5 TABLET ORAL at 11:51

## 2023-07-16 RX ADMIN — Medication 10 MG: at 22:06

## 2023-07-16 RX ADMIN — LINACLOTIDE 290 MCG: 145 CAPSULE, GELATIN COATED ORAL at 08:48

## 2023-07-16 RX ADMIN — DIVALPROEX SODIUM 500 MG: 500 TABLET, FILM COATED, EXTENDED RELEASE ORAL at 21:22

## 2023-07-16 RX ADMIN — OLANZAPINE 5 MG: 2.5 TABLET, FILM COATED ORAL at 15:40

## 2023-07-16 RX ADMIN — ACETAMINOPHEN 650 MG: 325 TABLET, FILM COATED ORAL at 14:15

## 2023-07-16 RX ADMIN — DIVALPROEX SODIUM 500 MG: 500 TABLET, FILM COATED, EXTENDED RELEASE ORAL at 08:48

## 2023-07-16 RX ADMIN — Medication 1 CAPSULE: at 08:48

## 2023-07-16 RX ADMIN — DROSPIRENONE AND ETHINYL ESTRADIOL 1 TABLET: KIT at 08:49

## 2023-07-16 RX ADMIN — PANTOPRAZOLE SODIUM 40 MG: 40 TABLET, DELAYED RELEASE ORAL at 21:24

## 2023-07-16 RX ADMIN — DULOXETINE HYDROCHLORIDE 60 MG: 60 CAPSULE, DELAYED RELEASE ORAL at 08:48

## 2023-07-16 RX ADMIN — ONDANSETRON 4 MG: 4 TABLET, ORALLY DISINTEGRATING ORAL at 19:52

## 2023-07-16 RX ADMIN — HYDROXYZINE HYDROCHLORIDE 25 MG: 25 TABLET ORAL at 08:48

## 2023-07-16 RX ADMIN — PANTOPRAZOLE SODIUM 40 MG: 40 TABLET, DELAYED RELEASE ORAL at 08:48

## 2023-07-16 RX ADMIN — FAMOTIDINE 20 MG: 20 TABLET ORAL at 08:49

## 2023-07-16 RX ADMIN — LORAZEPAM 0.5 MG: 0.5 TABLET ORAL at 21:19

## 2023-07-16 RX ADMIN — ONDANSETRON 4 MG: 4 TABLET, ORALLY DISINTEGRATING ORAL at 09:07

## 2023-07-16 RX ADMIN — CALCIUM CARBONATE (ANTACID) CHEW TAB 500 MG 500 MG: 500 CHEW TAB at 19:52

## 2023-07-16 RX ADMIN — HALOPERIDOL 2 MG: 2 TABLET ORAL at 03:02

## 2023-07-16 RX ADMIN — OLANZAPINE 10 MG: 2.5 TABLET, FILM COATED ORAL at 21:19

## 2023-07-16 RX ADMIN — HYDROXYZINE HYDROCHLORIDE 25 MG: 25 TABLET ORAL at 21:24

## 2023-07-16 RX ADMIN — LUBIPROSTONE 24 MCG: 24 CAPSULE, GELATIN COATED ORAL at 08:48

## 2023-07-16 ASSESSMENT — ACTIVITIES OF DAILY LIVING (ADL)
ADLS_ACUITY_SCORE: 25
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
## 2023-07-16 NOTE — PLAN OF CARE
"  VS: /85 (BP Location: Left arm, Patient Position: Semi-Bansal's, Cuff Size: Adult Regular)   Pulse 113   Temp 98.4  F (36.9  C) (Oral)   Resp 18   Ht 1.626 m (5' 4\")   Wt 56.9 kg (125 lb 7.1 oz)   LMP  (LMP Unknown)   SpO2 97%   BMI 21.53 kg/m      O2: Stable on room air >90%, no SOB   Output: Voids spontaneously without difficulty    Last BM: 7/16/23. No loose stool since 5pm.   Activity: Independent, sitter at bedside. Walked the hallways with sitter a few times this shift.   Skin: Stoma site, scabs arms.   Pain: Denies pain, PRN tylenol for pain.    CMS: Denies chest pain, N/T, Alert. Complained of nausea prn zofran given. Patient vomited first dose. Second dose administer. Patient reported relief after second dose. Diet ginger ale given to help with nausea.   Dressing: Dressing over stoma site with abd binder. Dressing changed at around 10:15pm per patient request.   Diet: Regular diet and thin liquids. Patient had pizza for dinner.   LDA: No IV access   Equipment: Patient personal belonging, cell phone    Plan: Waiting for inpatient place   Additional Info: Patient ate 100% of  dinner and drink some fluids. Calm and cooperative this shift. Patient vomited at around 8pm and vomited all 8pm meds in the toilet. Pt stated she has gastroparesis and she cannot keep real food down reason for tube feed that was discontinued. Wants to know when psych nurse will come. Psych acuity nurses came to visit patient. They had a conversation with patient. They give patient some oatmeal and ice cream.                            "

## 2023-07-16 NOTE — PLAN OF CARE
7/15 Meals    Pt was made aware by the primary team that she is going to inpatient psych and may not leave AMA. Pt became agitated and teary but was educated on the reason for the decision. Pt ate a really good breakfast. Pt refused to eat lunch and dinner. Pt made it known that she was aware that IP psych will not take her if she's not eating. Lunch and dinner encouraged. Pt refused.

## 2023-07-16 NOTE — TELEPHONE ENCOUNTER
No appropriate beds are currently available within the  system. Bed search update @ 12AM        [facility]: @ cap per website  [facility]: @ cap per website  [facility]: @ cap per website. Per Jennifer @ 00:01 only low acuity beds on their step- down unit  [facility]: @ cap per website  [facility]: @ cap per website  [facility]: @ cap per website  [facility]: @ cap per website  [facility]: @ cap per website  [facility]: Posting 1 bed for low acuity only. Per Livia @ 00:02, they only have low acuity beds in [place] and [place] - Pt not appropriate for current beds available d/t acuity  [facility]: Posting 3 beds. Mixed unit/Low acuity only. Pt not appropriate d/t acuity  [facility]: @ cap per website  [facility]: @ cap per website  [facility]: Posting 4 beds. Per Livia @ 00:02, they only have low acuity beds in [place] and [facility]: Does not review after 10PM.    [facility]: Posting 5 beds. Per Ada @ 00:12, low acuity beds available - Pt not appropriate for current beds available d/t acuity  [facility]: @ cap per website  [facility]: Posting 3 beds. Per Ada @ 00:12, low acuity beds available - Pt not appropriate for current beds available d/t acuity  [facility]: @ cap per website  [facility]: Posting 7 beds (Low acuity only. Must have the cognitive ability to do programming. No aggressive or violent behavior or recent HX in the last 2 yrs. MH must be primary). Pt not appropriate d/t acuity   [facility]: @ cap per website  [facility]: @ cap per website  [facility]: Posting 1 bed (Covid neg. Voluntary only. Mixed unit. No aggressive or violent behavior. No registered sex offenders). Pt not appropriate d/t acuity and 72 [facility]: Posting 1 bed (No hx of aggression/assault. No lines, drains or tubes. Does not provide detox or CD treatment). Pt not appropriate d/t acuity  [facility]: Posting 22 beds. Facility is in [place]. Pt is on a [place] 72 HH: cannot be placed in [place]  [facility]: Posting 3 beds (Mixed unit/Low acuity). Pt not appropriate d/t acuity       Pt remains on work list until appropriate placement is available
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
## 2023-07-16 NOTE — PLAN OF CARE
Pt  states that  she takes linaclotide (LINZESS)  and she is wondering why it was discontinued , writer notified pt that she will have to talk to the Doctor  and find out. Cross cover provider nataly's notified and stated it was held due to diarrhea that she had reported to the provider  . Med will be restarted per provide. We will continue to monitor and  update

## 2023-07-16 NOTE — PLAN OF CARE
VS: Vital signs:  Temp: 99.1  F (37.3  C) Temp src: Oral BP: 121/74 Pulse: 74   Resp: 18 SpO2: 96 % O2 Device: None (Room air)      O2: 96 % RA. Denied SOB/CP`   Output: Cont B/B.       Last BM: 7/16/2023. Sever diarrhea. & watery yellow stools.    Activity: Independent in room    Up for meals? Yes    Skin: R wrist scaring. Stoma dressing CDI. Abdominal binder in place    Pain: Denied pain. Complain of nausea. Zofran given with pt reported ineffectiveness. Compazine given with pt reporting some relief.    Neuro / CMS: A&OIx4. CMS intact. Pt is anxious, staff seeking. Phone given for good behavior per [facility].     Dressing: Stoma dressing CDI.    Diet: Regular diet.    LDA: No IV access    Plan: Continue with plan of care    Additional Info: 1:1 for behavior problems.  Pt stated feeling more anxious. One time PRN dose given   Melvin aware of diarrhea
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
## 2023-07-16 NOTE — PLAN OF CARE
"VS: /85 (BP Location: Right arm, Patient Position: Semi-Bansal's)   Pulse 86   Temp 97.1  F (36.2  C) (Axillary)   Resp 18   Ht 1.626 m (5' 4\")   Wt 56.9 kg (125 lb 7.1 oz)   LMP  (LMP Unknown)   SpO2 97%   BMI 21.53 kg/m      O2: O2 SATS >90% denies chest pain,  or SBO   Output: Voids adequately in the bathroom   Last BM:  7/14/23 BS present all x4 quadrants    Activity: Up ad jose juan, independent with ambulation but on 1:1 due to suicidal ideations dx at admission    Up for meals? Regular diet , snacks administered per request    Skin: Visible skin intact declined full skin assessment   Pain: Denies pain    CMS: AOX4 Denies numbness and tingling   Dressing: none   Diet: Regular c/o nausea Zofran administered with relieve   LDA: No IV access   Equipment: Personal belongings    Plan: To discharge to psych unit once bed available  continue with plan of caew   Additional Info: Urine sample collected for HCG and Chlamydia Trachomatis/Neisseria Gonorrhoeae by PCR                             "

## 2023-07-16 NOTE — TELEPHONE ENCOUNTER
R:  [place]  Access Inpatient Bed Call Log 7/16/23 @ 7:07 AM  (statewide):  Intake has called facilities that have not updated their bed status within the last 12 hours.                         [facility] is at capacity.         [facility] is posting 0 beds.  [phone].     [facility] is posting 0 beds. Negative covid required.            [facility] is posting 0 beds. Negative covid required.  [phone]. Per call @7:20am to Beth will have CRN call back.     [facility] is posting 0 beds.      [facility] is posting 0 beds.       [facility] is posting 0 beds      [facility] ([facility]) is posting 0 beds.            [facility] is posting 0 bed. Low acuity only             [facility] is posting 3 bed -LOW acuity ONLY. Mixed unit 12+. Negative               covid- [phone]. Pt not appropriate for beds avail.     has 0 beds posted. No aggression.  Negative Covid. Low acuity        [facility] is posting 0 beds.  Negative covid.            [facility] ([place]) 5 beds Low acuity only.  Negative covid. -   [phone].   Per call @7:22am to Lucia beds are available.  Pt not appropriate for beds avail.         [facility] is posting 3 bed. Low acuity. No current aggression. Pt not appropriate for beds avail.        [facility] ([place]) 0 beds Low acuity only.  Negative covid. -   [phone]. Per call @7:22am to Lucia no beds available.     [facility] is posting 2 bed. Low acuity. 72 HH hold preferred. [phone].  Negative covid required.  Pt not appropriate for beds avail.        [facility] ([place]) 3 beds Low acuity only.  Negative covid. -   [phone]. Pt not appropriate for beds avail.     [facility] [place] is posting 1 Beds.  Negative covid required.   Vol only, No history of aggression, violence, or assault. No sexual offenders. No 72 HH holds.    [phone]; pt meets exclusionary criteria due to 72 hr hold         [facility] is posting 7 beds Negative covid required.  (Must have the cognitive ability to do programming. No aggressive or violent behavior or recent HX in the last 2 yrs. MH must be primary.) Always low acuity.  [phone]. pt not appropriate for beds avail    [facility] has 0 beds posted. Negative covid required.  Low acuity only. Violence and aggression capped.  [phone]. Per call @7:24am, on divert.     [facility] is posting 0 bed. Low acuity, Negative covid required.  [phone]. Per call @7:27am, call back shortly for update. Pt not appropriate for beds avail d/t acuity.    [facility] is posting 1 bed. Negative covid required.  Vol only. Combined adolescent and adult unit. No aggressive or violent behavior. No registered sex offenders.  [phone]; Per call @7:28am, taking admits case by case. Pt meets exclusionary criteria due to 72 hr hold        [facility] posting 6 beds. Negative covid required.  [phone];  Declined 7/15 due to needing an ICU bed (ICU beds currently at [facility]; only [place] beds are avail)    [facility] [place] is posting 1 bed.  Negative covid. (No lines, drains, or tubes, oxygen, CPAP, IV, etc. No hx of aggression). [phone]. Per call @7:30am to Aline 1 bed available. Pt not appropriate for bed avail.         [facility] ([facility]) is posting 3 beds. Negative covid. (No lines, drains, or tubes, oxygen, CPAP, IV, etc.).   Mixed unit/low acuity.[phone]. Per call @7:32am beds are available. Pt not appropriate for beds avail.     [facility] is posting 22 beds. No covid test required.  [phone]. Per call @7:35am beds are available. Facility is in [place]. Pt is on a [place] 72 HH; Cannot be placed in [place] due to intake's policy to not present pts for review to out state facilities when on a hold.                  author called  Silvia at 11:19 am and discussed pt at great length. He accepted her pending the nurse's approval to have staffing and capacity to take pt to her. He agreed they need to block other bed. If they can take pt, then have medical dr call him for a dr to dr review. Author called [facility]ae at 11:34 am; Sheyla said unit is too acute to accommodate pt; she said they already have 4 SIO's; she is declining to accept pt; paged ANS at 11:49 am; informed Silvia at 11:51 am. Informed ADELE Schneider at 11:57 am who said pt will need to wait on list until a unit can accommodate pt with appropriate staffing. ANS called back at 2:51 pm saying she discussed case w/ staff on #20 who told her that pt is too acute for their milieu and discussed with staff on  12 who told her pt is not acute enough for their unit. She said pt should remain on wait list until an appropriate bed is avail.  todd

## 2023-07-16 NOTE — PROGRESS NOTES
Pt reported that she was willing to eat if the Psych RN stayed in her room with her. Psych RN stayed in room with pt, but she still refused to eat.

## 2023-07-16 NOTE — TELEPHONE ENCOUNTER
Update 8:43 pm Ric ANGULO escalated pt on the worklist due to pt being too acute for medical unit.  Worklist updated.

## 2023-07-16 NOTE — PROGRESS NOTES
SPIRITUAL HEALTH SERVICES  SPIRITUAL ASSESSMENT Progress Note  [facility] ([facility]) 5 Ortho   ON-CALL VISIT    REFERRAL SOURCE: Routine consult - patient request for  support.     Patient is familiar with [facility] from previous visits. Patient shared frustrations in desire to discharge on Wednesday to shelter and/or to go to [facility]. She understands that that is unlikely and she will remain in the hospital. I acknowledged her frustration.     Patient finds Jehovah's witness nadeen meaningful. I shared Scripture with her focusing on God's presence and peace. She identified it as helpful to remember God is walking with her. She shared feelings of hopelessness but found comfort in my reflection that other's can hold hope for us when it is too hard for ourselves. She showed me her stuffed animals that were gifts from friends and we celebrated the connection to friendship and hope.  We shared prayer per her request.     Patient would appreciate continued visits from [facility].     PLAN: I will request that unit  follow-up Monday.     Nataliia Benitez    Pager [phone]    [facility] remains available 24/7 for emergent requests/referrals, either by having the switchboard page the on-call  or by entering an ASAP/STAT consult in Epic (this will also page the on-call ).
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
## 2023-07-17 ENCOUNTER — TELEPHONE (OUTPATIENT)
Dept: BEHAVIORAL HEALTH | Facility: CLINIC | Age: 23
End: 2023-07-17
Payer: COMMERCIAL

## 2023-07-17 VITALS
BODY MASS INDEX: 21.04 KG/M2 | SYSTOLIC BLOOD PRESSURE: 134 MMHG | HEART RATE: 103 BPM | OXYGEN SATURATION: 98 % | RESPIRATION RATE: 18 BRPM | HEIGHT: 64 IN | WEIGHT: 123.24 LBS | TEMPERATURE: 97.9 F | DIASTOLIC BLOOD PRESSURE: 73 MMHG

## 2023-07-17 PROCEDURE — 99207 PR NO BILLABLE SERVICE THIS VISIT: CPT | Performed by: PSYCHIATRY & NEUROLOGY

## 2023-07-17 PROCEDURE — 250N000011 HC RX IP 250 OP 636: Performed by: STUDENT IN AN ORGANIZED HEALTH CARE EDUCATION/TRAINING PROGRAM

## 2023-07-17 PROCEDURE — 99239 HOSP IP/OBS DSCHRG MGMT >30: CPT | Performed by: STUDENT IN AN ORGANIZED HEALTH CARE EDUCATION/TRAINING PROGRAM

## 2023-07-17 PROCEDURE — 250N000013 HC RX MED GY IP 250 OP 250 PS 637: Performed by: STUDENT IN AN ORGANIZED HEALTH CARE EDUCATION/TRAINING PROGRAM

## 2023-07-17 PROCEDURE — 250N000013 HC RX MED GY IP 250 OP 250 PS 637: Performed by: PSYCHIATRY & NEUROLOGY

## 2023-07-17 PROCEDURE — 250N000013 HC RX MED GY IP 250 OP 250 PS 637

## 2023-07-17 PROCEDURE — G0378 HOSPITAL OBSERVATION PER HR: HCPCS

## 2023-07-17 RX ORDER — LOPERAMIDE HCL 2 MG
2 CAPSULE ORAL 2 TIMES DAILY PRN
Status: DISCONTINUED | OUTPATIENT
Start: 2023-07-17 | End: 2023-07-17 | Stop reason: HOSPADM

## 2023-07-17 RX ORDER — HYDROXYZINE HYDROCHLORIDE 25 MG/1
25 TABLET, FILM COATED ORAL 2 TIMES DAILY PRN
Status: DISCONTINUED | OUTPATIENT
Start: 2023-07-17 | End: 2023-07-17 | Stop reason: HOSPADM

## 2023-07-17 RX ADMIN — HYDROXYZINE HYDROCHLORIDE 25 MG: 25 TABLET, FILM COATED ORAL at 14:59

## 2023-07-17 RX ADMIN — PANTOPRAZOLE SODIUM 40 MG: 40 TABLET, DELAYED RELEASE ORAL at 10:23

## 2023-07-17 RX ADMIN — HYDROXYZINE HYDROCHLORIDE 25 MG: 25 TABLET ORAL at 08:53

## 2023-07-17 RX ADMIN — LOPERAMIDE HYDROCHLORIDE 2 MG: 2 CAPSULE ORAL at 11:12

## 2023-07-17 RX ADMIN — DIVALPROEX SODIUM 500 MG: 500 TABLET, FILM COATED, EXTENDED RELEASE ORAL at 08:53

## 2023-07-17 RX ADMIN — Medication 1 CAPSULE: at 08:53

## 2023-07-17 RX ADMIN — ONDANSETRON 4 MG: 4 TABLET, ORALLY DISINTEGRATING ORAL at 16:33

## 2023-07-17 RX ADMIN — DROSPIRENONE AND ETHINYL ESTRADIOL 1 TABLET: KIT at 09:01

## 2023-07-17 RX ADMIN — DULOXETINE HYDROCHLORIDE 60 MG: 60 CAPSULE, DELAYED RELEASE ORAL at 08:53

## 2023-07-17 RX ADMIN — LUBIPROSTONE 24 MCG: 24 CAPSULE, GELATIN COATED ORAL at 08:53

## 2023-07-17 RX ADMIN — FAMOTIDINE 20 MG: 20 TABLET ORAL at 08:53

## 2023-07-17 RX ADMIN — ACETAMINOPHEN 650 MG: 325 TABLET, FILM COATED ORAL at 12:56

## 2023-07-17 ASSESSMENT — ACTIVITIES OF DAILY LIVING (ADL)
ADLS_ACUITY_SCORE: 25

## 2023-07-17 NOTE — PLAN OF CARE
VS: Temp: 97.9  F (36.6  C) Temp src: Oral BP: 134/73 Pulse: 103     SpO2: 98 % O2 Device: None (Room air)       O2: 98% on room air, denies SOB and CP, no cough present. Lung sounds clear.   Output: Voids spontaneously and without difficulty   Last BM: 07/17/2023   Activity: Up independently   Skin: G-tube stoma site on abdomen, scattered scabbing   Pain: Denies   Neuro: A & O x4, denies numbness and tingling. Pt can be very manipulative and attention seeking. Pt is very hyperactive, suspicious, and demanding. Pt's behaviors are very labile. Pt on 1:1 sitter.   Dressing: Tegaderm CDI   Diet: Regular   LDA: N/A   Equipment: Personal belongings, call light, abdominal binder   Plan: Discharge today   Additional Info:      Pt. discharged at 1700 via taxi to home/shelter in [place], [place]. Pt. left with personal belongings. Pt. received complete discharge paperwork and MD made a referral to set up primary care doctor for the patient. Pt. was given times of last dose for all medications in writing on discharge medication sheets.  Discharge teaching included medication management and resources for support. Pt. to follow up with primary care provider as needed. Pt was given a pair of shoes and was dressed appropriately for discharge. Writer arranged transportation for pt to [place], [place] where her car is located in then which the pt stated she was going to drive to a shelter where there is a bed reserved for her in [place], [place]. It had been determined during a complex care plan meeting by the psychiatry team, medical team, ethics, risk management, social work and nursing that inpatient psych was not a suitable plan for this patient, and that she was medically cleared to discharge from the hospital and follow up outpatient. 72 hr hold was removed and discharge orders placed by medicine team. Pt was safely escorted to her ride by support staff. Pt. had no further questions at the time of discharge and no unmet needs were  identified.

## 2023-07-17 NOTE — PROGRESS NOTES
"SPIRITUAL HEALTH SERVICES Progress Note  [facility] [employer] ([facility]) 5 Ortho    Saw pt Thea Castle per routine consult.    Patient/Family Understanding of Illness and Goals of Care - Pt said that she is going to be admitted to an inpatient psychiatric floor. Pt said, \"I'm having bad thoughts today because my doctors aren't listening to me.\" Pt shared that she recently had her g-tube removed but has not been able to eat.     Distress and Loss - Pt expressed distress surrounding not being allowed to have visitors. Pt said, \"My  tried to come visit but they wouldn't let her in.\" Pt said she was feeling very angry and frustrated with staff at this time.     Strengths, Coping, and Resources  - Pt shared that she has enjoyed coloring and doing a puzzle while in the hospital. Pt also finds comfort in prayer and requested that we pray together.     Meaning, Beliefs, and Spirituality - Pt finds comfort in her spirituality and nadeen. Pt would like her  to visit but is frustrated because she is not allowed visitors at this time. Pt requested prayer for wisdom for her care team and that she finds a place to go after she is discharged from the hospital.     Plan of Care - I provided pt with a prayer shawl per request. Pt said she would like a follow-up visit in the next few days. I will continue to follow pt. Pt also requested that she is only visited by female chaplains. Pt is aware how to request for SHS and SHS will remain available.     Jada Castle   Intern  Pager [phone]    * [facility] remains available 24/7 for emergent requests/referrals, either by having the switchboard page the on-call  or by entering an ASAP/STAT consult in Epic (this will also page the on-call ). Routine Epic consults receive an initial response within 24 hours.*    "
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
## 2023-07-17 NOTE — TELEPHONE ENCOUNTER
R:  [place]  Access Inpatient Bed Call Log 7/17/23 @ 7:03 AM  (statewide):  Intake has called facilities that have not updated their bed status within the last 12 hours.                  Adults:           [facility] is at capacity.         [facility] is posting 0 beds.  [phone].     [facility] is posting 0 beds. Negative covid required.            [facility] is posting 0 beds. Negative covid required.  [phone]. Per call @7:11am at capacity.     [facility] is posting 0 beds.      [facility] is posting 0 beds.       [facility] is posting 0 beds      [facility] is posting 0 beds.            [facility] is posting 0 bed. Low acuity only             [facility] is posting 1 bed -LOW acuity ONLY. Mixed unit 12+. Negative               covid- [phone].     Pt not appropriate for beds avail.     [facility] has 0 beds posted. No aggression.  Negative Covid. Low acuity        [facility] is posting 0 beds.  Negative covid.            [facility] 6 beds Low acuity only.  Negative covid. [phone]; Pt not appropriate for beds avail.     [facility]- is posting 3 bed. Low acuity. No current aggression.    Pt not appropriate for beds avail.         [facility] 0 beds Low acuity only.  Negative covid. -   [phone].      [facility] is posting 2 bed. Low acuity. 72 HH hold preferred.  [phone].  Negative covid required. Per call @7:16am currently reviewing for low and high acuity. Per call at 8:32 am to [place], based on screening questions, pt is too acute and not appropriate for their unit.         [facility] 4 beds Low acuity only.  Negative covid. -   [phone]. pt not appropriate for beds avail.      [facility] [place] is posting 0 Beds.  Negative covid required.   Vol only, No history of aggression, violence, or assault. No  "sexual offenders. No 72 HH holds.   [phone]        [facility] is posting 7 beds Negative covid required.  (Must have the cognitive ability to do programming. No aggressive or violent behavior or recent HX in the last 2 yrs. MH must be primary.) Always low acuity.  [phone]. pt not appropriate for beds avail    [facility] has 0 beds posted. Negative covid required.  Low acuity only. Violence and aggression capped.  [phone].      [facility] is posting 0 bed. Low acuity, Negative covid required.  [phone]. Per call @7:17am to [facility] at capacity and short staffed.     [facility] is posting 1 bed. Negative covid required.  Vol only. Combined adolescent and adult unit. No aggressive or violent behavior. No registered sex offenders.  [phone]; Per call @7:19am to [place] beds are available no holds.     [facility] posting 6 beds. Negative covid required.  [phone]; declined 7/15 due to needing an ICU bed; ICU currently at [facility] ([facility] beds only)     [facility] [place] is posting 1 bed.  Negative covid. (No lines, drains, or tubes, oxygen, CPAP, IV, etc.). [phone]. Per call @7:20am beds are available     [facility] ([facility] is posting 3 beds. Negative covid. (No lines, drains, or tubes, oxygen, CPAP, IV, etc.).   [phone]. Per call @7:22am to [place] beds are available for patient screening.    [facility] is posting 14 beds. No covid test required.  [phone]. Per call @7:24am no answer, unable to leave .       Bijan Snell at 9:20 am  Called Dr Gennaro Castle at 9:22 am; author asked if anything has been initiated with the commitment process and he referred author to the psych consult provider. He said he feels it is imperative that pt be placed on a court hold and [facility]. Per notes, Adriano's consult note on 7/14 states \"initiate petition for commitment.\" Notes form Ligia STOKES " on 7/14 state referral was placed by Tommy RYDER.     Per Adry in intake, Teagan declined pt at 9:59 am. Teagan called back at 10:14 am and author clarified w/ Teagan that she was declining due to acuity (per Adry, unit has a pod closed due to acuity).  Paged Tres at 10:38 am. Author discussed case with Tres at 10:36 am. He said a complex care huddle is needed with  leadership and Nisreen Cao, Josie James and Cornelia and rest of group. He said this should occur sometime today or tomorrow. He said it is not needed emergently. He said he does not feel pt needs to be admitted. He said he prefers 3:30 pm today if possible (vs 1:45 pm).  Author called Dr Castle at 12:31 pm and informed him of the meeting/car conference on teams at 3:30 pm. His email is: [email or web address].Dr Geovanny Morales was on the line (attending) and his email is [email or web address]. He asked that an invite also be sent to Robert Zurita on the ethics committee ([email or web address]). Author sent email invite for 3:30 team meeting to these people and to Tres Cárdenas, Cornelia Jama Elise, Nisreen Watkins, Ashely Hillman, Debbie Naegele and Josie James. An invite message was forwarded by Maty to Taylor Prince and to Anupam Combs.  Dr Johnson called at 12:50 pm asking if Risk Management can be included in the meeting. Author sent Maty Velazquez an invite to the meeting.   Mathieu Salvador was also added (forwarded invite by Ashely Hillman.       Complex care huddle occurred at 3:30 pm with the following attendees: Dr Roberto Joshua, Dr Gennaro Castle, Dr Geovanny Morales,  Dr Chase Cao, Octavio Salvador, Evita Patricio, Cornelia Tobin, Ashely Hillman, Josie James, Anupam Combs, Maty Velazquez, Deb Naegele, Robert Zurita, and Taylor Prince, and author. Plan is for pt to discharge today. Dr Cao said he will write a care plan note in chart and primary care team will drop the hold and discharge pt. todd

## 2023-07-17 NOTE — TELEPHONE ENCOUNTER
No appropriate beds are currently available within the  system. Bed search update @ 1:30AM:       [facility]: @ cap per website  [facility]: @ cap per website  [facility]: @ cap per website. Per Sebastien @ 01:36, they are capped  [facility]: @ cap per website  [facility]: @ cap per website  [facility]: @ cap per website  [facility]: @ cap per website  [facility]: @ cap per website  [facility]: @ cap per website  [facility]: Posting 1 bed. Mixed unit/Low acuity only. Pt not appropriate d/t acuity  [facility]: @ cap per website  [facility]: @ cap per website  [facility]: Posting 3 beds. Per Ashia @ 01:37 they only have low acuity beds avail - Pt not appropriate for current beds d/t acuity  [facility]: Does not review after 10PM.    [facility]: Posting 6 beds. Per Shawna @ 02:15 they have low-mod acuity beds available and require labs. - Pt not appropriate for current beds d/t acuity  [facility]: @ cap per website  [facility]: Posting 4 beds. Per Shawna @ 02:15 they have low acuity beds available and require labs. - Pt not appropriate for current beds d/t acuity  [facility]: Posting 1 bed (No hx of aggression. No sexual offenders. Voluntary patients only). Meets exclusionary d/t 72 [facility]: Posting 7 beds (Low acuity only. Must have the cognitive ability to do programming. No aggressive or violent behavior or recent HX in the last 2 yrs. MH must be primary). Pt not appropriate d/t acuity  [facility]: @ cap per website  [facility]: @ [facility] per [facility]  [facility]: Posting 1 bed (Covid neg. Voluntary only. Mixed unit. No aggressive or violent behavior. No registered sex offenders). Meets exclusionary d/t 72 [facility]: Posting 1 bed (No hx of aggression/assault. No lines, drains or tubes. Does not provide detox or CD treatment). Pt not appropriate d/t acuity  [facility]: Posting 14 beds.  Facility is in [place]. Pt is on a [place] 72 HH; cannot be placed in [place]  [facility]: Posting 3 beds (Mixed unit/Low acuity). Pt not appropriate d/t acuity       Pt remains on work list until appropriate placement is available

## 2023-07-17 NOTE — PROVIDER NOTIFICATION
Notified [facility]'s cross cover resident that patient has not been seen by psychiatry for 2 days now (7/15 and 7/16). Per [facility]'s order, pt should be seen daily. [facility] resident stated she would look into it further and if anything request a STAT consult for tomorrow morning to ensure pt be seen. Writer paged psych acuity nurse # to see if pt can be seen by someone who has psych experience. Left voicemail at this #[phone] for psych RN and will await call back to the unit.

## 2023-07-17 NOTE — DISCHARGE SUMMARY
[facility]  Discharge Summary - Medicine & Pediatrics       Date of Admission:  7/13/2023  Date of Discharge:  7/17/2023  Discharging Provider: Geovanny Morales DO  Discharge Service: [facility]    Discharge Diagnoses   # Factitious disorder  # Suicidal ideation  # Depression  # Borderline personality disorder  # History of self-injurious behaviors  # Generalized anxiety disorder  # Obsessive-compulsive disorder  # Posttraumatic stress disorder  # History of pseudoseizures  # Anorexia nervosa  # Multiple recent ED visits and admissions  # Psychogenic Seizures  # Hx of gastroparesis, s/p GJ tube removal  # Anorexia nervosa  # History of slow transit constipation  # Reported diarrhea  # Psychogenic urinary retention  # Contaminated urine  # Anemia, normocytic, stable    Clinically Significant Risk Factors          Follow-ups Needed After Discharge   Follow-up Appointments     Adult [facility] Follow-up and recommended labs and tests      Follow up with primary care provider, Bridgette Foss, within 7 days   for hospital follow- up.       Appointments on [place] and/or [facility] (with [facility] or [facility]   provider or service). Call [phone] if you haven't heard regarding   these appointments within 7 days of discharge.         PLEASE follow-up the importance of avoiding future inpatient hospitalizations and meeting with outpatient psychotherapy    Unresulted Labs Ordered in the Past 30 Days of this Admission     Date and Time Order Name Status Description    7/13/2023 10:04 PM Urine Culture Preliminary       These results will be followed up by PCP    Discharge Disposition   Discharged to home  Condition at discharge: [facility]    Hospital Course   Thea Castle is a 23 year old female admitted on 7/13/2023. She has a history of factitious disorder, psychogenic seizures, h/o gastroparesis with indwelling G-J tube, chronic urinary retention with  indwelling pantoja catheter, anorexia nervosa, BPD, MDD, MAT, PTSD, and OCD, and is admitted for suicidal ideation, mental health instability leading to severe dysfunction in daily living, and consideration of civil commitment following 72 hour hold.     # Suicidal ideation  # Factitious disorder  # History of depression  # Borderline personality disorder  # History of self-injurious behaviors  # Generalized anxiety disorder  # Obsessive-compulsive disorder  # Posttraumatic stress disorder  # History of pseudoseizures  # Anorexia nervosa  # Multiple recent ED visits and admissions  Complex psychiatric history with 10 separate emergency room visits in the span of five days prior to admission, including a psychiatric assessment just hours prior to [facility] presentation. Has incredibly well documented history of factitious disorder amongst many other psychiatric conditions with very high threshold for admission. In the event of an admission, priority should be to move towards commitment. Initial concern for this admission was for psychosis, but low concern for acute psychosis based on evaluation by same-day OSH eval, ED psych eval, and medicine admitting team eval. Placement on medicine team due to GJ tube presence.    During patient's admission, patient was determined to not have medical decision making capacity and was placed on a 72h hold with initial plans to pursue commitment. During her stay, there were multiple interval events (including code blue, a code 21 with restraints placed, other code greens/code 21s, a GJ tube removal at bedside, multiple instances of self harm including tongue biting, head hitting, pulling on pantoja catheters, and attempting to rip out her own GJ tube). Per assessment from psychiatry on Friday 7/14, decision to move forward with commitment and plan for inpatient psychiatric admission was placed.     As of a complex care plan meeting, the decision was reached upon talking with psychiatry, the  medicine team, ethics, risk management, social work, nursing, and others that the standard of care for patient's with well documented fictitious disorder is to avoid admission at all costs. With this in mind, we proceeded to remove the hold, cancel psych placement, and discharge the patient. We recommend FREQUENT outpatient follow-up and outpatient psychotherapy. Depakote was discontinued on discharge as mood stabilization medication is not indicated for patients with fictitious disorder.    PATIENT IS NOT TO BE ADMITTED UNLESS ABSOLUTELY MEDICALLY NECESSARY    Please reference the following excellent, comprehensive documentation of the patient's complicated situation:    7/13 16:53 - Mental health consult/Crisis assessment - LITO Benites (contains discussion around recommendation for admission to [facility] as well as documentation of parent collateral)    7/13 09:53 - Initial psychiatric consult - CONRAD Cameron CNP, [place] (outlines recommendation for discontinuation of 72h hold placed by [facility] ED and recommendation for discharge, as well as summary of ED visits over last week)    7/12 13:33 - Initial psychiatric consult - CONRAD Stack CNP [facility] (recommends against IP/medical admissions)    There are at least 5+ separate psychologist assessments in the last week that include extensive documentation of situation  **    # Psychogenic Seizure  Well-documented history of seizure-like activity without evidence of neurogenic etiology. Neurology note 1/10 reviewing long-term EEG monitoring at [facility] (10/24 to 10/25) that demonstrated no EEG correlation with multiple slumping spells. Additionally was evaluated with short-term EEG on 7/05 that was likewise unrevealing. Presented with a similar episode on 7/14 with eye rolling, convulsions, and fecal incontinence without any vital sign instability or evidence of neurologic compromise. Assessed very confidently to not be an  "epileptic seizure. Neurology was not consulted and strongly advised against neurology consultation in the future.     # Hx of gastroparesis, s/p GJ tube removal  # Anorexia nervosa  # History of slow transit constipation  # Reported diarrhea  G-J-tube present on admission, reported history of gastroparesis, reports being on tube feeds to help maintain calorie counts. GJ tube was placed 5/30 by [facility] following multiple repeated requests by patient insisting that she needed the tube to maintain her caloric intake. Discussion was had with [facility] provider 7/14 and assessed that GJ tube was not medically necessary and, in light of patient utilizing the tube as a potential source for self-harm, the GJ tube was pulled 7/14. Patient was able to successfully eat throughout admission though frequently insisted on someone to (literally) hold her hand with her as she ate.     # Psychogenic urinary retention  # Contaminated urine  Smith catheter placed in ED on 7/6 with 1000mL out. Urology consult note from 1/16/23 states:  \"She is followed as an outpatient by outside urologist and has had extensive workup for this issue including formal urodynamics which per report apparently showed good detrusor function. It was thought that urinary retention was related to dysfunctional voiding and she had been recommend pelvic floor physical therapy.\"  Urine culture with e faecalis and staph aureus. UA showing no nitrite. Asymptomatic, as such will not currently treat. Smith removed 7/14 and, overnight, patient demonstrated that she is fully able to void when encouraged and provided with incentives.     # Anemia, normocytic, stable  11.5 on admission. B12 and folate checked 7/13 wnl.   - Avoid over-medicalizing with inpatient labs and workup     Consultations This Hospital Stay   DIAGNOSTIC EVALUATION CENTER (DEC) ASSESSMENT ORDER  PSYCHIATRY IP CONSULT  PHARMACY IP CONSULT  NUTRITION SERVICES ADULT IP CONSULT  SOCIAL WORK IP CONSULT  SPIRITUAL " HEALTH SERVICES IP CONSULT  PHARMACY IP CONSULT  ETHICS IP CONSULT  PSYCHIATRY IP CONSULT  INTERVENTIONAL RADIOLOGY ADULT/PEDS IP CONSULT  GI LUMINAL ADULT IP CONSULT  SPIRITUAL HEALTH SERVICES IP CONSULT  SPIRITUAL HEALTH SERVICES IP CONSULT  SPIRITUAL HEALTH SERVICES IP CONSULT  PSYCHIATRY IP CONSULT  PSYCHIATRY IP CONSULT  PSYCHIATRY IP CONSULT  SPIRITUAL HEALTH SERVICES IP CONSULT  PSYCHIATRY IP CONSULT    Code Status   Full Code       The patient was discussed with Dr. Geovanny Morales, DO Prince Castle, DO [facility]  [facility] MED SURG ORTHOPEDIC  [address] [address]  Phone: [phone]  Fax: [phone]  ______________________________________________________________________    Physical Exam   Vital Signs: Temp: 97.9  F (36.6  C) Temp src: Oral BP: 134/73 Pulse: 103     SpO2: 98 % O2 Device: None (Room air)    Weight: 123 lbs 3.79 oz    Constitutional:       General: She is not in acute distress.     Appearance: She is not ill-appearing or toxic-appearing.   HENT:      Head: Normocephalic.   Eyes:      Conjunctiva/sclera: Conjunctivae normal.   Abd:  Prior gtube site without bleeding or redness. Dressing with tegaderm x2 present and intact.   Pulmonary:      Effort: Pulmonary effort is normal.   Skin:     Findings: Lesion (shallow abrasions on forehead and R forearm/wrist; healing) present.   Neurological:      General: No focal deficit present.       Mental Status Exam:   Appearance: awake, alert and adequately groomed, in scrubs  Attitude:  cooperative  Eye Contact:  good  Mood:  depressed  Affect:  flat and labile. mood congruent and intensity is blunted  Speech:  clear, coherent, rapid at times  Psychomotor Behavior:  no evidence of tardive dyskinesia, dystonia, or tics  Thought Process:  linear and goal oriented, consistently perseverative  Associations:  no loose associations  Thought Content:  no evidence of psychotic thought, no current SI, HI, or  plan  Insight:  poor  Judgement:  poor   Attention Span and Concentration:  intact  Recent and Remote Memory:  intact      Primary Care Physician   Bridgette Foss    Discharge Orders      Reason for your hospital stay    You were hospitalized for behavioral health stabilization.     Activity    Your activity upon discharge: activity as tolerated     Adult [facility] Follow-up and recommended labs and tests    Follow up with primary care provider, Bridgette Foss, within 7 days for hospital follow- up.       Appointments on [place] and/or [facility] (with [facility] or [facility] provider or service). Call [phone] if you haven't heard regarding these appointments within 7 days of discharge.     Diet    Follow this diet upon discharge: Orders Placed This Encounter      Combination Diet Regular Diet Adult       Significant Results and Procedures   Results for orders placed or performed during the hospital encounter of 07/04/23   Head CT w/o contrast    Narrative    EXAM: CT HEAD W/O CONTRAST  LOCATION: [facility]  DATE: 7/4/2023    INDICATION: Head trauma 2 weeks ago, multiple seizure like episodes today  COMPARISON: 06/06/2023  TECHNIQUE: Routine CT Head without IV contrast. Multiplanar reformats. Dose reduction techniques were used.    FINDINGS:  INTRACRANIAL CONTENTS: No intracranial hemorrhage, extraaxial collection, or mass effect.  No CT evidence of acute infarct. Normal parenchymal attenuation. Normal ventricles and sulci.     VISUALIZED ORBITS/SINUSES/MASTOIDS: No intraorbital abnormality. No paranasal sinus mucosal disease. No middle ear or mastoid effusion.    BONES/SOFT TISSUES: No acute abnormality.      Impression    IMPRESSION:  1.  No acute intracranial process.   XR Abdomen 1 View    Narrative    EXAM: XR ABDOMEN 1 VIEW  LOCATION: [facility]  DATE: 7/4/2023    INDICATION: fall, ensure placement of GJ tube  COMPARISON: 06/29/2023      Impression     IMPRESSION: Gastrojejunostomy tube remains in good position tip in the proximal jejunum. Bowel gas pattern is nonobstructed.       Discharge Medications   Current Discharge Medication List      CONTINUE these medications which have NOT CHANGED    Details   acetaminophen (TYLENOL) 325 MG tablet Take 2 tablets by mouth every 4 hours as needed      ARIPiprazole (ABILIFY) 10 MG tablet Take 10 mg by mouth daily      DULoxetine (CYMBALTA) 30 MG capsule Take 60 mg by mouth daily      LORazepam (ATIVAN) 0.5 MG tablet Take 0.5 mg by mouth At Bedtime      multivitamin w/minerals (THERA-VIT-M) tablet Take 1 tablet by mouth daily  Qty: 30 tablet, Refills: 0    Associated Diagnoses: Vitamin deficiency      ondansetron (ZOFRAN ODT) 4 MG ODT tab Take 1 tablet (4 mg) by mouth every 6 hours as needed for nausea or vomiting  Qty: 30 tablet, Refills: 0    Associated Diagnoses: Proctitis      vitamin C (ASCORBIC ACID) 250 MG tablet Take 250 mg by mouth daily      calcium carbonate (TUMS) 500 MG chewable tablet Take 1 tablet (500 mg) by mouth as needed for heartburn  Qty: 30 tablet, Refills: 0    Associated Diagnoses: Indigestion      cholecalciferol 50 MCG (2000 UT) tablet Take 1 capsule by mouth daily      cyproheptadine (PERIACTIN) 4 MG tablet Take 4 mg by mouth 3 times daily      diazepam (DIASTAT ACUDIAL) 10 MG GEL rectal gel Place 10 mg rectally every 10 minutes as needed for seizures  Qty: 1 each, Refills: 0      docusate sodium (COLACE) 100 MG tablet Take 100 mg by mouth daily      drospirenone-ethinyl estradiol (FELIPE) 3-0.02 MG tablet Take 1 tablet by mouth daily      famotidine (PEPCID) 20 MG tablet Take 20 mg by mouth daily      hydrOXYzine (ATARAX) 25 MG tablet Take 1 tablet (25 mg) by mouth 2 times daily as needed for anxiety or other (sleep, melatonin augmentation or failure)  Qty: 60 tablet, Refills: 0    Associated Diagnoses: MAT (generalized anxiety disorder)      lactobacillus rhamnosus, GG, (CULTURELL) capsule  "Take 1 capsule by mouth daily      linaclotide (LINZESS) 290 MCG capsule Take 290 mcg by mouth every morning (before breakfast)      lubiprostone (AMITIZA) 24 MCG capsule Take 24 mcg by mouth 2 times daily (with meals)      melatonin 5 MG tablet Take 5 mg by mouth At Bedtime      pantoprazole (PROTONIX) 40 MG EC tablet Take 1 tablet (40 mg) by mouth 2 times daily  Qty: 60 tablet, Refills: 0    Associated Diagnoses: Anorexia nervosa, restricting type      plecanatide (TRULANCE) 3 MG tablet Take 3 mg by mouth daily      prochlorperazine (COMPAZINE) 10 MG tablet Take 10 mg by mouth every 8 hours as needed for nausea or vomiting      promethazine (PHENERGAN) 25 MG suppository Place 1 suppository (25 mg) rectally every 6 hours as needed for nausea (take instead of oral dose if you cannot keep the pills down)  Qty: 30 suppository, Refills: 0      promethazine (PHENERGAN) 25 MG tablet Take 25 mg by mouth every 6 hours as needed for nausea or vomiting           Allergies   Allergies   Allergen Reactions     Amoxicillin Rash     Penicillins Unknown     Mother unsure if patient or sister had a reaction. Mother reports she \"didn't think it was anaphylactic\".     Levofloxacin Muscle Pain (Myalgia)     Developed myalgia on levofloxacin 500 mg/d (11/28/22) after 1 day and requested to switch antibiotics     Other Food Allergy GI Disturbance     Cilantro     "

## 2023-07-17 NOTE — UTILIZATION REVIEW
"  Concurrent stay review; Secondary Review Determination - [facility]        Under the authority of the Utilization Management Committee, the utilization review process indicated a secondary review on the above patient.  The review outcome is based on review of the medical records, discussions with staff, and applying clinical experience noted on the date of the review.        (x) Observation/outpatient Status Appropriate - Concurrent stay review       RATIONALE FOR DETERMINATION:     \"Thea Castle is a 23 year old female admitted on 7/13/2023. She has a history of factitious disorder, psychogenic seizures, h/o gastroparesis with indwelling G-J tube, chronic urinary retention with indwelling pantoja catheter, anorexia nervosa, BPD, MDD, MAT, PTSD, and OCD, and is admitted for suicidal ideation, mental health instability leading to severe dysfunction in daily living, and consideration of civil commitment following 72 hour hold.\"    Pt is medically ready for discharge to inpatient psychiatry.     Patient delayed discharge is related to disposition, there is no medical necessity for inpatient admission at the time of this review. If there is a change in patient status, please resend for review.    The information on this document is developed by the utilization review team in order for the business office to ensure compliance.  This only denotes the appropriateness of proper admission status and does not reflect the quality of care rendered.       The definitions of Inpatient Status and Observation Status used in making the determination above are those provided in the CMS Coverage Manual, Chapter 1 and Chapter 6, section 70.4.       Sincerely,    Maged Powell MD  "

## 2023-07-17 NOTE — CONSULTS
There was a care plan huddle with the team to discuss this patient's care.  This patient was not seen by this provider but her chart was reviewed, case discussed with the team, current care plan in the chart reviewed and previous knowledge of the patient from case consultations with the consult team from her frequent visits.  Inpatient hospitalization is not the treatment of choice for patients with similar presentations.  This tends to be detrimental to the care of the patient causing worsening of symptoms. The treatment of choice is following the care plan on file with frequent outpatient visits with primary care, therapy and psychiatry.  Often this entails several visits a week with the outpatient team with a consistent treatment message.  This was discussed with patient's care team at the care plan huddle.

## 2023-07-17 NOTE — PROGRESS NOTES
"  VS: /73 (BP Location: Left arm)   Pulse 103   Temp 97.9  F (36.6  C) (Oral)   Resp 18   Ht 1.626 m (5' 4\")   Wt 55.9 kg (123 lb 3.8 oz)   LMP  (LMP Unknown)   SpO2 98%   BMI 21.15 kg/m     O2: Room air saturations 98%   Output: Denies issues with urine output   Last BM: Pt reports \" I have had x 2 loose stools today. I had x 10 loose stools yesterday.\"    Activity: Up ad jose juan   Skin: Abdominal site from feeding tube is CDI.    Pain: Pt complaining of headache this am.    CMS: Intact   Dressing: Abdominal dressing is CDI   Diet: Regular. Tolerated well.    LDA: No IV   Equipment: 1;1 for 72 hour hold.    Plan: Awaiting for bed placement   Additional Info: Pt is apparently 2nd on list for placement and behavior has x 10 discharges. Will await for discharge availability. Pt was seen by [facility] health today. And is being followed by [facility]. Status of patient will continue to be monitored. Spoke with Behavioral intake. Plan is for patient to have a complex care conference at 1530 today. Will await for further info after conference.        "

## 2023-07-17 NOTE — PROGRESS NOTES
"Pt had been expressing frustration about not being assessed by psychiatrist yet. She reports she is to be seen daily. Unit RN contacted [facility] acuity RN to come talk with patient. Met with patient for about 15 minutes.     Pt reports she had a good day. Pt presents with flat affect and reports her mood today was \"better than yesterday.\" She reported she doesn't want to talk to Dr. Castle tomorrow. Writer emphasized importance of being fully assessed by her care team. It appears the psychiatrist attempted to assess pt to which she refused because he was male. Pt reported her anxiety was 7/10 (10 being highest) today. The main thing making her anxious is her physical ailments. She rated her depression 5/10 today, which is an improvement from yesterday. She denied any thoughts or urges of self harm. She reports she does scratch herself sometimes and pointed to some scabs on her right arm. She tells writer that last time she did this was 4 days ago. She reports distraction is helpful with her urges to self harm when they arise. She denied thoughts of wanting to be dead and denied suicidal thoughts today. She denied ever having thoughts of wanting to hurt others. She denied ever having hallucinations of any kind and denied all psychotic symptoms.       She reports that she has been feeling poor physically today and has been nauseous, vomited, and has had diarrhea 10 times today. She reports she was able to eat but then threw up 2 hours after eating. She adds, \"I threw up everything I ate.\" Writer suggested ice chips, which she wanted to try. When we went to get them, she requested some pudding and also oatmeal with 3 sugar packets. She reports she slept 9 hours last night and took a 4 hour nap today too. She reports a headache of 7/10, has received Tylenol and reports it hasn't been effective. She reports she has \"chronic migraines,\" is on injection called Aimovig, and thinks it may be due soon. She reports she has a " headache at baseline.

## 2023-07-17 NOTE — PROGRESS NOTES
COMPLEX CARE MEETING NOTE    A virtual meeting was held today to discuss the care of this patient. Present were team members from psychiatry, medicine, nursing, ethics, social work, risk management, among others. Discussion was had regarding the best management plan for this patient with respect to her incredibly severe factitious disorder and other psychiatric concerns. In discussion with the psychiatry team, it was determined that the standard of care for patients with these conditions is to minimize medical intervention as much as possible, including avoiding admission as much as absolutely possible. This includes admission to inpatient psychiatry, which often leads to worsening of the patient's mental status.    Ultimately the decision was made that the best possible plan for this patient was discharge to home with future plans to be put in motion to create a thorough emergency care plan for her in which it is made clear that the threshold for admission is incredibly high, recommending against hospitalization in almost every case save for genuine medical emergencies.    Please see remainder of daily progress notes, nursing notes, consult notes, and others from 7/14 - 7/17 for further details.    ____________________________  Prince Harris DO - PGY2  [facility] Resident  Pronouns: He/Him/His
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
## 2023-07-17 NOTE — PROGRESS NOTES
"Focus: Patients wishes staff at hospital  to contact [facility]  ( [facility] ) at [phone] ext [phone]  Pt states\" They want to be contacted with all this information from my chart to qualify for the program\"  I They need today's height and weight, Labs, CMP, CBC, Phos and Magnesium'  E: Listened to patient and assured that I would take down info and pass it along. Set patient up with coloring pages and markers to try to keep her from not being so bored. Also patient had a 100 piece puzzle in room and patient stated\" I can put that together in 15 minutes. I need one a little harder\" I reached out to [employer] to see if I could obtain a 300 piece puzzle. [employer] is going to call me back if they find one . Will continue to monitor patient status.   "

## 2023-07-18 ENCOUNTER — PATIENT OUTREACH (OUTPATIENT)
Dept: CARE COORDINATION | Facility: CLINIC | Age: 23
End: 2023-07-18
Payer: COMMERCIAL

## 2023-07-18 LAB
BACTERIA UR CULT: ABNORMAL
BACTERIA UR CULT: ABNORMAL

## 2023-07-18 NOTE — PROGRESS NOTES
[facility]    CHW called Pt, Pt stated that she is admitted at [facility].         Background: Transitional Care Management program identified per system criteria and reviewed by [facility] team for possible outreach.    Assessment: Upon chart review, [facility] Team member will not proceed with patient outreach related to this episode of Transitional Care Management program due to reason below:    Patient has presented to Emergency Department, been readmitted to hospital, or transferred to another hospital.    Plan: Transitional Care Management episode addressed appropriately per reason noted above.          TYRONE Malik  [facility]    *Connected Care Resource Team does NOT follow patient ongoing. Referrals are identified based on internal discharge reports and the outreach is to ensure patient has an understanding of their discharge instructions.

## 2024-06-16 ENCOUNTER — HEALTH MAINTENANCE LETTER (OUTPATIENT)
Age: 24
End: 2024-06-16
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
## 2025-04-29 NOTE — ED NOTES
Left message for patient to call back.   Pt asking to have their pantoja catheter removed because they feel like the balloon has been displaced. This writer stated if they remove this current pantoja it will not be replace with a new device. Pt refused, and asked to speak to Dr. Hernandez.    Statement Selected

## 2025-06-21 ENCOUNTER — HEALTH MAINTENANCE LETTER (OUTPATIENT)
Age: 25
End: 2025-06-21

## (undated) RX ORDER — LIDOCAINE HYDROCHLORIDE 10 MG/ML
INJECTION, SOLUTION INFILTRATION; PERINEURAL
Status: DISPENSED
Start: 2023-05-30

## (undated) RX ORDER — LIDOCAINE HYDROCHLORIDE 20 MG/ML
JELLY TOPICAL
Status: DISPENSED
Start: 2023-01-04

## (undated) RX ORDER — LIDOCAINE HYDROCHLORIDE 20 MG/ML
JELLY TOPICAL
Status: DISPENSED
Start: 2023-04-28

## (undated) RX ORDER — FENTANYL CITRATE 50 UG/ML
INJECTION, SOLUTION INTRAMUSCULAR; INTRAVENOUS
Status: DISPENSED
Start: 2023-05-30

## (undated) RX ORDER — LIDOCAINE HYDROCHLORIDE 20 MG/ML
JELLY TOPICAL
Status: DISPENSED
Start: 2023-01-10

## (undated) RX ORDER — KETOROLAC TROMETHAMINE 30 MG/ML
INJECTION, SOLUTION INTRAMUSCULAR; INTRAVENOUS
Status: DISPENSED
Start: 2023-05-30